# Patient Record
Sex: MALE | Race: WHITE | NOT HISPANIC OR LATINO | Employment: OTHER | URBAN - METROPOLITAN AREA
[De-identification: names, ages, dates, MRNs, and addresses within clinical notes are randomized per-mention and may not be internally consistent; named-entity substitution may affect disease eponyms.]

---

## 2017-01-13 ENCOUNTER — OFFICE VISIT (OUTPATIENT)
Dept: ENDOCRINOLOGY | Facility: CLINIC | Age: 67
End: 2017-01-13
Payer: COMMERCIAL

## 2017-01-13 VITALS
DIASTOLIC BLOOD PRESSURE: 76 MMHG | BODY MASS INDEX: 22.65 KG/M2 | WEIGHT: 167.25 LBS | HEART RATE: 93 BPM | SYSTOLIC BLOOD PRESSURE: 124 MMHG | HEIGHT: 72 IN

## 2017-01-13 DIAGNOSIS — E11.42 DM TYPE 2 WITH DIABETIC PERIPHERAL NEUROPATHY: Primary | Chronic | ICD-10-CM

## 2017-01-13 DIAGNOSIS — E78.1 HYPERTRIGLYCERIDEMIA: Chronic | ICD-10-CM

## 2017-01-13 DIAGNOSIS — M47.819 SPONDYLOSIS WITHOUT MYELOPATHY: ICD-10-CM

## 2017-01-13 DIAGNOSIS — F17.210 SMOKES 2 PACKS OF CIGARETTES PER DAY: Chronic | ICD-10-CM

## 2017-01-13 DIAGNOSIS — E11.21 TYPE 2 DIABETES WITH NEPHROPATHY: ICD-10-CM

## 2017-01-13 PROCEDURE — 99999 PR PBB SHADOW E&M-EST. PATIENT-LVL III: CPT | Mod: PBBFAC,,, | Performed by: NURSE PRACTITIONER

## 2017-01-13 PROCEDURE — 99214 OFFICE O/P EST MOD 30 MIN: CPT | Mod: S$GLB,,, | Performed by: NURSE PRACTITIONER

## 2017-01-13 RX ORDER — INSULIN LISPRO 100 [IU]/ML
INJECTION, SOLUTION INTRAVENOUS; SUBCUTANEOUS
Qty: 1 BOX | Refills: 6
Start: 2017-01-13 | End: 2017-04-26 | Stop reason: SDUPTHER

## 2017-01-13 RX ORDER — FLUTICASONE PROPIONATE 50 MCG
2 SPRAY, SUSPENSION (ML) NASAL DAILY PRN
COMMUNITY
End: 2019-01-09 | Stop reason: SDUPTHER

## 2017-01-13 RX ORDER — DESOXIMETASONE 2.5 MG/G
CREAM TOPICAL 2 TIMES DAILY PRN
Status: ON HOLD | COMMUNITY
End: 2021-01-01 | Stop reason: HOSPADM

## 2017-01-13 NOTE — PROGRESS NOTES
"CC: Mr. Moe Ren arrives today for management of Type 2 DM and chronic medical conditions as noted in visit diagnosis.      HPI: Mr. Moe Ren was diagnosed with Type 2 sometime between 2009 and 2012. No hospitalizations for  DM.     He has a h/o squamos cell oral CA, previously on primarily liquid diet, but is now tolerating solids well.   Occasionally drinks 2 ensures for BF, but reports he has been having [employer] sausage and egg croissant sandwich lately.     Currently using Accu check Amanda expert meter and is carb counting.     Checks BG 6x/day. BG logs:  Fasting   VASHTI 148-229  DIN  with two readings of 67 and 45  HS     Hypoglycemia: yes, 2 readings of 67 and 45. Felt "shaky" and treated with 1/2 can regular soda.     Missing doses of diabetes medications: No  Prandial Insulin Injections Taken with Meals: Yes    Exercise: None reported    Continues to smoke 2 ppd    CURRENT DIABETIC MEDS: Tresiba 8 units AM, Humalog (uses Accu-Chek Amanda Expert)  MN: Target 120, ISF 50, I:C 17  1030: Target 120, ISF 50, I:C 16  1630: Target 120, ISF 50, I:C 15    Uses Vials or Pens: pens  Type of Glucose Meter: Accu check Staci    Last Eye Exam: 04/2016  Last Podiatry Exam: 08/2016, +PN    REVIEW OF SYSTEMS  Personally reviewed past medical, social, and family history.     General: stable weight; no weakness,  fatigue, or fever.   Eyes: denies intermittent blurry vision.   Cardiac: no palpitations, chest pain, or edema.   Respiratory: no dyspnea, or wheezing; + smokers cough.   GI: no abdominal pain, heartburn, loss of appetite, or nausea.   Skin: no rashes, itching, dryness, or color changes.   Neuro: no mood changes, headaches, + numbness, tingling in toes.     Vital Signs  Personally reviewed the labs noted below.     Visit Vitals    /76 (BP Location: Right arm, Patient Position: Sitting, BP Method: Automatic)    Pulse 93    Ht 6' (1.829 m)    Wt 75.8 kg (167 lb 3.5 oz)    BMI " 22.68 kg/m2       Hemoglobin A1C   Date Value Ref Range Status   12/14/2016 7.2 (H) 4.5 - 6.2 % Final     Comment:     According to ADA guidelines, hemoglobin A1C <7.0% represents  optimal control in non-pregnant diabetic patients.  Different  metrics may apply to specific populations.   Standards of Medical Care in Diabetes - 2016.  For the purpose of screening for the presence of diabetes:  <5.7%     Consistent with the absence of diabetes  5.7-6.4%  Consistent with increasing risk for diabetes   (prediabetes)  >or=6.5%  Consistent with diabetes  Currently no consensus exists for use of hemoglobin A1C  for diagnosis of diabetes for children.     08/15/2016 7.6 (H) 4.5 - 6.2 % Final     Comment:     According to ADA guidelines, hemoglobin A1C <7.0% represents  optimal control in non-pregnant diabetic patients.  Different  metrics may apply to specific populations.   Standards of Medical Care in Diabetes - 2016.  For the purpose of screening for the presence of diabetes:  <5.7%     Consistent with the absence of diabetes  5.7-6.4%  Consistent with increasing risk for diabetes   (prediabetes)  >or=6.5%  Consistent with diabetes  Currently no consensus exists for use of hemoglobin A1C  for diagnosis of diabetes for children.     05/09/2016 8.4 (H) 4.5 - 6.2 % Final       Chemistry        Component Value Date/Time     12/29/2016 1047    K 4.4 12/29/2016 1047     12/29/2016 1047    CO2 25 12/29/2016 1047    BUN 8 12/29/2016 1047    CREATININE 1.0 12/29/2016 1047     (H) 12/29/2016 1047        Component Value Date/Time    CALCIUM 9.0 12/29/2016 1047    ALKPHOS 78 12/29/2016 1047    AST 30 12/29/2016 1047    ALT 18 12/29/2016 1047    BILITOT 0.4 12/29/2016 1047          Lab Results   Component Value Date    CHOL 165 12/14/2016    CHOL 154 02/01/2016    CHOL 145 12/17/2015     Lab Results   Component Value Date    HDL 52 12/14/2016    HDL 47 02/01/2016    HDL 47 12/17/2015     Lab Results   Component  Value Date    LDLCALC 49.0 (L) 12/14/2016    LDLCALC 61.2 (L) 02/01/2016    LDLCALC 51.6 (L) 12/17/2015     Lab Results   Component Value Date    TRIG 320 (H) 12/14/2016    TRIG 229 (H) 02/01/2016    TRIG 232 (H) 12/17/2015     Lab Results   Component Value Date    CHOLHDL 31.5 12/14/2016    CHOLHDL 30.5 02/01/2016    CHOLHDL 32.4 12/17/2015     Lab Results   Component Value Date    TSH 2.621 12/14/2016     Lab Results   Component Value Date    MICALBCREAT 13.2 08/22/2016     Vit D, 25-Hydroxy   Date Value Ref Range Status   02/01/2016 33 30 - 96 ng/mL Final     Comment:     Vitamin D deficiency.........<10 ng/mL                              Vitamin D insufficiency......10-29 ng/mL       Vitamin D sufficiency........> or equal to 30 ng/mL  Vitamin D toxicity............>100 ng/mL       PHYSICAL EXAMINATION  Constitutional: Appears well, no distress  Neck: Supple, trachea midline. No thryomegaly.  Respiratory: even and unlabored.  Cardiovascular: RRR  Lymph: no edema.   Skin: warm and dry; no rash, petechiae, ecchymosis, or acanthosis nigracans observed.  Neuro: CN 2-12 grossly intact; patient alert and cooperative.  Feet: wears appropriate shoes; saw Podiatry 08/2016.    Assessment/Plan    1. Type II or unspecified type diabetes mellitus with neurological manifestations  - A1C, DM improving.   - Continue to monitor BG AC/HS  - Continue Amanda meter with carb counting  - Change breakfast I:C to 14 to help reduce hyperglycemia at lunch  - Continue Tresiba 8 units QAM  - On Gabapentin for neuropathy and follows with Podiatry.    2. Spinal stenosis, lumbar region, with neurogenic claudication - limits physical activity. Sx is an option if tobacco use stops.    3. Smokes 2 packs of cigarettes per day - cessation encouraged   4. Hypertriglyceridemia - On Lipitor. Triglycerides uncontrolled. Was on a Fenofibrate, which was stopped after having back pain. Discuss with SAWYER Coleman for management.      FOLLOW UP  Return  in about 3 months (around 4/13/2017) with ANJU France APRN     Orders Placed This Encounter   Procedures    Hemoglobin A1c     Standing Status:   Future     Standing Expiration Date:   1/13/2018    Triglycerides     Standing Status:   Future     Standing Expiration Date:   1/13/2018

## 2017-01-13 NOTE — MR AVS SNAPSHOT
[facility] - Endocrinology  [address]  [facility] [address]  Phone: [phone]                  Moe Ren   2017 10:00 AM   Office Visit    Description:  Male : 1950   Provider:  SAWYER Bonds,ANP-C   Department:  [facility] - Endocrinology           Reason for Visit     Diabetes Mellitus           Diagnoses this Visit        Comments    DM type 2 with diabetic peripheral neuropathy    -  Primary            To Do List           Future Appointments        Provider Department Dept Phone    2017 9:30 AM LAB, [place] [facility] [phone]    2017 1:00 PM MACHO [facility] [phone]      Goals (5 Years of Data)     None      Follow-Up and Disposition     Return in about 3 months (around 2017).      [facility]     [facility] Care [facility] - 24/7 Assistance  Registered nurses in the [facility] provide clinical advisement, health education, appointment booking, and other advisory services.  Call for this free service at [phone].             Medications           Message regarding Medications     Verify the changes and/or additions to your medication regime listed below are the same as discussed with your clinician today.  If any of these changes or additions are incorrect, please notify your healthcare provider.        STOP taking these medications     acetaminophen-codeine 300-30mg (TYLENOL #3) 300-30 mg Tab Take 1 tablet by mouth every 4 to 6 hours as needed.    cefPROZIL (CEFZIL) 500 MG tablet Take 500 mg by mouth once daily.    fenofibrate 160 MG Tab TAKE 1 TABLET BY MOUTH ONCE DAILY    mupirocin (BACTROBAN) 2 % ointment APPLY TO NOSE ONCE DAILY    omeprazole (PRILOSEC) 20 MG capsule Take 20 mg by mouth once daily.           Verify that the below list of medications is an accurate representation of the medications you are currently taking.  If none reported, the list may be  "blank. If incorrect, please contact your healthcare provider. Carry this list with you in case of emergency.           Current Medications     ACCU-CHEK KM PLUS TEST STRP Strp 1 strip by Misc.(Non-Drug; Combo Route) route 6 (six) times daily. Pt to test blood glucose up to 6 times daily as needed.    ACCU-CHEK FASTCLIX Misc USE TO TEST 4 TIMES A DAY    alprazolam (XANAX) 0.5 MG tablet TAKE 1 TABLET (0.5 MG TOTAL) BY MOUTH 3 (THREE) TIMES DAILY.    atorvastatin (LIPITOR) 20 MG tablet TAKE 1 TABLET BY MOUTH ONCE DAILY    azelastine (ASTELIN) 137 mcg (0.1 %) nasal spray     BD ULTRA-FINE RENETTA PEN NEEDLES 32 gauge x 5/32" Ndle TO USE WITH MULTIPLE DAILY INJECTIONS    cyclobenzaprine (FLEXERIL) 10 MG tablet TAKE 1 TABLET (10 MG TOTAL) BY MOUTH 3 (THREE) TIMES DAILY AS NEEDED FOR MUSCLE SPASMS.    desoximetasone (TOPICORT) 0.25 % cream Apply topically 2 (two) times daily.    fluticasone (FLONASE) 50 mcg/actuation nasal spray 2 sprays by Each Nare route once daily.    gabapentin (NEURONTIN) 600 MG tablet TAKE 2 TABLETS BY MOUTH 3 TIMES A DAY    insulin degludec (TRESIBA FLEXTOUCH U-100) 100 unit/mL (3 mL) InPn Inject 7 Units into the skin once daily.    insulin lispro (HUMALOG KWIKPEN) 100 unit/mL InPn pen Use with insulin to carb ratio 1:9 with breakfast, 1:14 with lunch, 1:11 with dinner, plus correction scale as directed.    lisinopril (PRINIVIL,ZESTRIL) 5 MG tablet TAKE 1 TABLET (5 MG TOTAL) BY MOUTH ONCE DAILY.    mecobal-levomefolat Ca-B6 phos (L-METHYL-B6-B12) 3-35-2 mg Tab Take 1 tablet by mouth 2 (two) times daily.           Clinical Reference Information           Vital Signs - Last Recorded  Most recent update: 1/13/2017 10:05 AM by Jacqueline Ruiz MA    BP Pulse Ht Wt BMI    124/76 (BP Location: Right arm, Patient Position: Sitting, BP Method: Automatic) 93 6' (1.829 m) 75.8 kg (167 lb 3.5 oz) 22.68 kg/m2      Blood Pressure          Most Recent Value    BP  124/76      Allergies as of 1/13/2017     " Amoxicillin    Adhesive      Immunizations Administered on Date of Encounter - 1/13/2017     None      Orders Placed During Today's Visit     Future Labs/Procedures Expected by Expires    Hemoglobin A1c  1/13/2017 1/13/2018    Triglycerides  1/13/2017 1/13/2018      Smoking Cessation     If you would like to quit smoking:   You may be eligible for free services if you are a [place] resident and started smoking cigarettes before September 1, 1988.  Call the Smoking Cessation Trust (SCT) toll free at ([phone] or [phone] [phone].   Call 6-300-QUIT-NOW if you do not meet the above criteria.

## 2017-01-16 ENCOUNTER — PATIENT MESSAGE (OUTPATIENT)
Dept: ENDOCRINOLOGY | Facility: CLINIC | Age: 67
End: 2017-01-16

## 2017-01-16 DIAGNOSIS — G89.29 CHRONIC BACK PAIN GREATER THAN 3 MONTHS DURATION: ICD-10-CM

## 2017-01-16 DIAGNOSIS — M54.9 CHRONIC BACK PAIN GREATER THAN 3 MONTHS DURATION: ICD-10-CM

## 2017-01-16 RX ORDER — HYDROCODONE BITARTRATE AND ACETAMINOPHEN 10; 325 MG/1; MG/1
1 TABLET ORAL 2 TIMES DAILY PRN
Qty: 60 TABLET | Refills: 0 | Status: SHIPPED | OUTPATIENT
Start: 2017-01-16 | End: 2017-02-22 | Stop reason: SDUPTHER

## 2017-01-16 NOTE — TELEPHONE ENCOUNTER
----- Message from Gina Almonte sent at 1/16/2017 12:33 PM CST -----  Contact: self  [phone]  Pt is requesting refills on pain meds. Pls call pt [phone] when ready for . Thanks......Talisha

## 2017-02-02 ENCOUNTER — PATIENT MESSAGE (OUTPATIENT)
Dept: ENDOCRINOLOGY | Facility: CLINIC | Age: 67
End: 2017-02-02

## 2017-02-14 ENCOUNTER — PATIENT MESSAGE (OUTPATIENT)
Dept: ENDOCRINOLOGY | Facility: CLINIC | Age: 67
End: 2017-02-14

## 2017-02-22 DIAGNOSIS — M54.9 CHRONIC BACK PAIN GREATER THAN 3 MONTHS DURATION: ICD-10-CM

## 2017-02-22 DIAGNOSIS — G89.29 CHRONIC BACK PAIN GREATER THAN 3 MONTHS DURATION: ICD-10-CM

## 2017-02-22 RX ORDER — HYDROCODONE BITARTRATE AND ACETAMINOPHEN 10; 325 MG/1; MG/1
1 TABLET ORAL 2 TIMES DAILY PRN
Qty: 60 TABLET | Refills: 0 | Status: SHIPPED | OUTPATIENT
Start: 2017-02-22 | End: 2017-03-23 | Stop reason: SDUPTHER

## 2017-02-22 NOTE — TELEPHONE ENCOUNTER
----- Message from Shahida Bedoya sent at 2/22/2017 11:22 AM CST -----  Contact: Self/[phone]  Refill:  hydrocodone-acetaminophen 10-325mg (NORCO)  mg Tab    Thank you.

## 2017-02-24 ENCOUNTER — TELEPHONE (OUTPATIENT)
Dept: ENDOCRINOLOGY | Facility: CLINIC | Age: 67
End: 2017-02-24
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
## 2017-03-01 ENCOUNTER — TELEPHONE (OUTPATIENT)
Dept: DIABETES | Facility: CLINIC | Age: 67
End: 2017-03-01
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
## 2017-03-12 RX ORDER — MECOBAL/LEVOMEFOLAT CA/B6 PHOS 2-3-35 MG
TABLET ORAL
Qty: 60 TABLET | Refills: 5 | Status: SHIPPED | OUTPATIENT
Start: 2017-03-12 | End: 2018-01-10

## 2017-03-14 ENCOUNTER — PATIENT MESSAGE (OUTPATIENT)
Dept: ENDOCRINOLOGY | Facility: CLINIC | Age: 67
End: 2017-03-14

## 2017-03-23 ENCOUNTER — LAB VISIT (OUTPATIENT)
Dept: LAB | Facility: HOSPITAL | Age: 67
End: 2017-03-23
Payer: COMMERCIAL

## 2017-03-23 ENCOUNTER — OFFICE VISIT (OUTPATIENT)
Dept: FAMILY MEDICINE | Facility: CLINIC | Age: 67
End: 2017-03-23
Payer: COMMERCIAL

## 2017-03-23 VITALS
HEART RATE: 103 BPM | WEIGHT: 162.56 LBS | TEMPERATURE: 99 F | BODY MASS INDEX: 22.02 KG/M2 | SYSTOLIC BLOOD PRESSURE: 130 MMHG | HEIGHT: 72 IN | OXYGEN SATURATION: 96 % | DIASTOLIC BLOOD PRESSURE: 76 MMHG

## 2017-03-23 DIAGNOSIS — Z11.59 SPECIAL SCREENING EXAMINATION FOR OTHER SPECIFIED VIRAL DISEASES: ICD-10-CM

## 2017-03-23 DIAGNOSIS — M51.36 DEGENERATIVE DISC DISEASE, LUMBAR: ICD-10-CM

## 2017-03-23 DIAGNOSIS — E11.42 DM TYPE 2 WITH DIABETIC PERIPHERAL NEUROPATHY: Chronic | ICD-10-CM

## 2017-03-23 DIAGNOSIS — Z12.11 SPECIAL SCREENING FOR MALIGNANT NEOPLASMS, COLON: ICD-10-CM

## 2017-03-23 DIAGNOSIS — G89.29 CHRONIC BACK PAIN GREATER THAN 3 MONTHS DURATION: ICD-10-CM

## 2017-03-23 DIAGNOSIS — K63.5 POLYP OF COLON, UNSPECIFIED PART OF COLON, UNSPECIFIED TYPE: ICD-10-CM

## 2017-03-23 DIAGNOSIS — M62.838 MUSCLE SPASM: ICD-10-CM

## 2017-03-23 DIAGNOSIS — Z12.5 SCREENING FOR PROSTATE CANCER: ICD-10-CM

## 2017-03-23 DIAGNOSIS — M54.9 CHRONIC BACK PAIN GREATER THAN 3 MONTHS DURATION: ICD-10-CM

## 2017-03-23 DIAGNOSIS — Z00.00 ROUTINE MEDICAL EXAM: ICD-10-CM

## 2017-03-23 DIAGNOSIS — Z00.00 ROUTINE MEDICAL EXAM: Primary | ICD-10-CM

## 2017-03-23 DIAGNOSIS — F41.9 ANXIETY DISORDER, UNSPECIFIED TYPE: ICD-10-CM

## 2017-03-23 LAB
COMPLEXED PSA SERPL-MCNC: 2.6 NG/ML
GGT SERPL-CCNC: 44 U/L

## 2017-03-23 PROCEDURE — 84153 ASSAY OF PSA TOTAL: CPT

## 2017-03-23 PROCEDURE — 99396 PREV VISIT EST AGE 40-64: CPT | Mod: S$GLB,,,

## 2017-03-23 PROCEDURE — 82977 ASSAY OF GGT: CPT

## 2017-03-23 PROCEDURE — 99999 PR PBB SHADOW E&M-EST. PATIENT-LVL III: CPT | Mod: PBBFAC,,,

## 2017-03-23 PROCEDURE — 86803 HEPATITIS C AB TEST: CPT

## 2017-03-23 PROCEDURE — 36415 COLL VENOUS BLD VENIPUNCTURE: CPT | Mod: PO

## 2017-03-23 RX ORDER — ALPRAZOLAM 0.5 MG/1
TABLET ORAL
Qty: 90 TABLET | Refills: 2 | Status: SHIPPED | OUTPATIENT
Start: 2017-03-23 | End: 2017-08-02 | Stop reason: SDUPTHER

## 2017-03-23 RX ORDER — HYDROCODONE BITARTRATE AND ACETAMINOPHEN 10; 325 MG/1; MG/1
1 TABLET ORAL 2 TIMES DAILY PRN
Qty: 60 TABLET | Refills: 0 | Status: SHIPPED | OUTPATIENT
Start: 2017-04-22 | End: 2017-05-22

## 2017-03-23 RX ORDER — HYDROCODONE BITARTRATE AND ACETAMINOPHEN 10; 325 MG/1; MG/1
1 TABLET ORAL 2 TIMES DAILY PRN
Qty: 60 TABLET | Refills: 0 | Status: SHIPPED | OUTPATIENT
Start: 2017-03-23 | End: 2017-04-22

## 2017-03-23 RX ORDER — GABAPENTIN 600 MG/1
1200 TABLET ORAL 3 TIMES DAILY
Qty: 180 TABLET | Refills: 2 | Status: SHIPPED | OUTPATIENT
Start: 2017-03-23 | End: 2017-06-28 | Stop reason: SDUPTHER

## 2017-03-23 RX ORDER — HYDROCODONE BITARTRATE AND ACETAMINOPHEN 10; 325 MG/1; MG/1
1 TABLET ORAL 2 TIMES DAILY PRN
Qty: 60 TABLET | Refills: 0 | Status: SHIPPED | OUTPATIENT
Start: 2017-05-22 | End: 2017-06-20 | Stop reason: SDUPTHER

## 2017-03-23 RX ORDER — CYCLOBENZAPRINE HCL 10 MG
10 TABLET ORAL 3 TIMES DAILY PRN
Qty: 90 TABLET | Refills: 2 | Status: SHIPPED | OUTPATIENT
Start: 2017-03-23 | End: 2017-08-31 | Stop reason: SDUPTHER

## 2017-03-23 NOTE — PROGRESS NOTES
Chief Complaint   Patient presents with    Cough       HPI  Moe Ren is a 66 y.o. male who presents to the office today with literally a list of concerns.  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  He has had pancreatitis in the past.  Patient drinks alcohol every day.  Patient wants to know if he is healthy.  Pt is known to me.  These other concerns:    1.  Sometimes his hands become white, cold, but not painful.  He has been told in the past that he has Raynaud's phenomenon.    2.  He is developing a tremor in the hands.  This is a fine tremor, apparently some family member has had a history of Parkinson's disease.  The patient does not have any head bobbing, this does not go away with intentional actions.    3.  He has been having some urinary concerns, sometimes some dark objects seem to come out of his urine, but he doesn't have any flank pain ever had any history of renal calculi.    4.  Sometimes he has bowel movements that are bloody.  He had colon polyps in 2009, has not had a follow-up colonoscopy.  Does not have constipation or significant change in his bowel movements, weight, and no nausea or vomiting.    5.  He has had a Schatzki ring dilated in the past.  He also had a follow-up with [facility] for this, but there was no mention made of having a follow-up for his colon polyp problem.  I do not have a specific pathologic report regarding his colon polyp.    6.  Patient is concerned about prostate, he's concerned about having screening tests for hepatitis C.      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    Cataract     Diabetes mellitus     Hypertension     Pancreatitis 2008    2 Times       PAST SURGICAL HISTORY:  Past Surgical History:   Procedure  Laterality Date     thumb surgery      Dead Skin cell tumor Rt thumb    MOUTH FLOOR MASS EXCISION  2009    NASAL SEPTUM SURGERY      SKIN SURGERY         SOCIAL HISTORY:  Social History     Social History    Marital status:      Spouse name: N/A    Number of children: N/A    Years of education: N/A     Occupational History    Not on file.     Social History Main Topics    Smoking status: Current Every Day Smoker     Packs/day: 1.50     Years: 45.00    Smokeless tobacco: Never Used    Alcohol use 0.5 oz/week     1 Standard drinks or equivalent per week      Comment: daily scotch and water, half pint    Drug use: Yes     Special: Marijuana      Comment: rarely    Sexual activity: Not on file     Other Topics Concern    Not on file     Social History Narrative    , 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  One child committed suicide.       FAMILY HISTORY:  Family History   Problem Relation Age of Onset    Cataracts Mother     Leukemia Mother     Lung cancer Brother     No Known Problems Father     No Known Problems Daughter     No Known Problems Sister     No Known Problems Maternal Aunt     No Known Problems Maternal Uncle     No Known Problems Paternal Aunt     No Known Problems Paternal Uncle     No Known Problems Maternal Grandmother     No Known Problems Maternal Grandfather     No Known Problems Paternal Grandmother     No Known Problems Paternal Grandfather     Amblyopia Neg Hx     Blindness Neg Hx     Cancer Neg Hx     Diabetes Neg Hx     Glaucoma Neg Hx     Hypertension Neg Hx     Macular degeneration Neg Hx     Retinal detachment Neg Hx     Strabismus Neg Hx     Stroke Neg Hx     Thyroid disease Neg Hx        ALLERGIES AND MEDICATIONS: updated and reviewed.  Review of patient's allergies indicates:   Allergen Reactions    Amoxicillin Nausea And Vomiting    Adhesive Rash     Paper tape only  "    Current Outpatient Prescriptions   Medication Sig Dispense Refill    ACCU-CHEK KM PLUS TEST STRP Strp 1 strip by Misc.(Non-Drug; Combo Route) route 6 (six) times daily. Pt to test blood glucose up to 6 times daily as needed. 150 strip 11    ACCU-CHEK [place]STCLIX [facility] TO [place] 4 TIMES A  each 11    alprazolam (XANAX) 0.5 MG tablet TAKE 1 TABLET (0.5 MG TOTAL) BY MOUTH 3 (THREE) TIMES DAILY. 90 tablet 2    atorvastatin (LIPITOR) 20 MG tablet TAKE 1 TABLET BY MOUTH ONCE DAILY 90 tablet 3    azelastine (ASTELIN) 137 mcg (0.1 %) nasal spray   11    BD ULTRA-FINE RENETTA PEN NEEDLES 32 gauge x 5/32" Ndle TO USE WITH MULTIPLE DAILY INJECTIONS 200 each 11    cyclobenzaprine (FLEXERIL) 10 MG tablet Take 1 tablet (10 mg total) by mouth 3 (three) times daily as needed. 90 tablet 2    desoximetasone (TOPICORT) 0.25 % cream Apply topically 2 (two) times daily.      fluticasone (FLONASE) 50 mcg/actuation nasal spray 2 sprays by Each Nare route once daily.      gabapentin (NEURONTIN) 600 MG tablet Take 2 tablets (1,200 mg total) by mouth 3 (three) times daily. 180 tablet 2    hydrocodone-acetaminophen 10-325mg (NORCO)  mg Tab Take 1 tablet by mouth 2 (two) times daily as needed. 60 tablet 0    [START ON 4/22/2017] hydrocodone-acetaminophen 10-325mg (NORCO)  mg Tab Take 1 tablet by mouth 2 (two) times daily as needed. 60 tablet 0    [START ON 5/22/2017] hydrocodone-acetaminophen 10-325mg (NORCO)  mg Tab Take 1 tablet by mouth 2 (two) times daily as needed. 60 tablet 0    insulin degludec (TRESIBA FLEXTOUCH U-100) 100 unit/mL (3 mL) InPn Inject 7 Units into the skin once daily. 1 Syringe 3    insulin lispro (HUMALOG KWIKPEN) 100 unit/mL InPn pen Use with insulin to carb ratio 1:14 with breakfast, 1:16 with lunch, 1:15 with dinner, plus correction scale as directed. 1 Box 6    L-METHYL-B6-B12 3-35-2 mg Tab TAKE 1 TABLET BY MOUTH 2 (TWO) TIMES DAILY. 60 tablet 5    lisinopril " (PRINIVIL,ZESTRIL) 5 MG tablet TAKE 1 TABLET (5 MG TOTAL) BY MOUTH ONCE DAILY. 30 tablet 11     No current facility-administered medications for this visit.        ROS  Review of Systems   Constitutional: Negative for activity change and unexpected weight change.   HENT: Negative for hearing loss, rhinorrhea and trouble swallowing.    Eyes: Negative for discharge and visual disturbance.   Respiratory: Negative for chest tightness and wheezing.    Cardiovascular: Negative for chest pain and palpitations.   Gastrointestinal: Positive for blood in stool. Negative for constipation, diarrhea and vomiting.   Endocrine: Negative for polydipsia and polyuria.   Genitourinary: Negative for difficulty urinating, dysuria, flank pain, hematuria and urgency.   Musculoskeletal: Negative for arthralgias and joint swelling.   Neurological: Negative for weakness and headaches.   Psychiatric/Behavioral: Negative for confusion and dysphoric mood.       Physical Exam  Vitals:    03/23/17 1302   BP: 130/76   Pulse: 103   Temp: 98.5 °F (36.9 °C)    Body mass index is 22.05 kg/(m^2).  Weight: 73.8 kg (162 lb 9.4 oz)   Height: 6' (182.9 cm)     Physical Exam   Constitutional: He appears well-developed and well-nourished. No distress.   HENT:   TMs are normal bilaterally.  Oropharynx is clear, well hydrated.  No exudate.   Eyes: Conjunctivae are normal. Pupils are equal, round, and reactive to light. No scleral icterus.   Cardiovascular: Normal rate and regular rhythm.    Pulmonary/Chest: Effort normal.   Diminished breath sounds in all lung fields, no rales or wheezes heard.   Abdominal: Soft. Bowel sounds are normal. He exhibits no distension and no mass.   Genitourinary:   Genitourinary Comments: Rectal examination: Prostate is normal, Hemoccult was done, it was negative.  No tenderness, no hemorrhoids noted externally, etc.  Essentially negative exam.   Neurological:   Patient might have a little fine tremor associated with the hands.    Psychiatric: He has a normal mood and affect. His behavior is normal.   Vitals reviewed.      Health Maintenance       Date Due Completion Date    Pneumococcal PPSV23 (Medium Risk) (1) 10/16/1970 ---    Zoster Vaccine 10/16/2012 ---    Influenza Vaccine 8/1/2016 1/10/2014 (Done)    Override on 1/10/2014: Done    Eye Exam 4/5/2017 4/5/2016    Hemoglobin A1c 6/14/2017 12/14/2016    Foot Exam 8/17/2017 8/17/2016    Override on 5/27/2015: Done (Dr. Graves)    Urine Microalbumin 8/22/2017 8/22/2016    Lipid Panel 12/14/2017 12/14/2016    Colonoscopy 8/10/2019 8/10/2009    TETANUS VACCINE 6/22/2025 6/22/2015          Assessment & Plan    1. Routine medical exam  I'm doing this to check it out.  - Gamma GT; Future    2. DM type 2 with diabetic peripheral neuropathy  I doubt if the patient has any significant urinary problems.  - Urinalysis    3. Polyp of colon, unspecified part of colon, unspecified type  I'm ordering colonoscopy.    4. Chronic back pain greater than 3 months duration  He can continue with this medication.  - hydrocodone-acetaminophen 10-325mg (NORCO)  mg Tab; Take 1 tablet by mouth 2 (two) times daily as needed.  Dispense: 60 tablet; Refill: 0  - hydrocodone-acetaminophen 10-325mg (NORCO)  mg Tab; Take 1 tablet by mouth 2 (two) times daily as needed.  Dispense: 60 tablet; Refill: 0  - hydrocodone-acetaminophen 10-325mg (NORCO)  mg Tab; Take 1 tablet by mouth 2 (two) times daily as needed.  Dispense: 60 tablet; Refill: 0    5. Special screening examination for other specified viral diseases  Screening ordered.  - Hepatitis C antibody; Future    6. Special screening for malignant neoplasms, colon  Screening ordered.  - Case request GI: COLONOSCOPY    7. Screening for prostate cancer  Screening ordered.  - PSA, Screening; Future    8. Anxiety disorder, unspecified type  He can take this medication.  - alprazolam (XANAX) 0.5 MG tablet; TAKE 1 TABLET (0.5 MG TOTAL) BY MOUTH 3 (THREE) TIMES  "DAILY.  Dispense: 90 tablet; Refill: 2    9. Muscle spasm  Needs refills.  Done.  - cyclobenzaprine (FLEXERIL) 10 MG tablet; Take 1 tablet (10 mg total) by mouth 3 (three) times daily as needed.  Dispense: 90 tablet; Refill: 2    10. Degenerative disc disease, lumbar  Needs refills.  Done.  - gabapentin (NEURONTIN) 600 MG tablet; Take 2 tablets (1,200 mg total) by mouth 3 (three) times daily.  Dispense: 180 tablet; Refill: 2    11.  Long-time cigarette smoker.  He needs to stop smoking cigarettes.    12.  Ethanol abuse: This is ongoing, he's already had a couple bouts of pancreatitis, please note that the question mentioned above was "Am I in good health?"    Answer to this question is: I don't find the patient has cardiac disease, cerebrovascular disease, but he does have reversible problems, self inflicted, including cigarette and alcohol abuse.  He is not, in fact, living and healthy lifestyle.  I don't think that he has cirrhosis, but all of the other complaints that the patient has can be explained by overuse of alcohol (tremor, etc.), and he already has had tobacco related problems, and will have more of them in the future.      No Follow-up on file.   Follow-up: He is advised to stop smoking, drinking, lesion about her lifestyle, he will feel a lot better.        "

## 2017-03-23 NOTE — MR AVS SNAPSHOT
[facility]  [address]  [place] [address]  Phone: [phone]  Fax: [phone]                  Moe Ren   3/23/2017 1:00 PM   Office Visit    Description:  Male : 1950   Provider:  Gagandeep Weinberg Jr., MD   Department:  [facility] Medicine           Reason for Visit     Cough           Diagnoses this Visit        Comments    Routine medical exam    -  Primary     DM type 2 with diabetic peripheral neuropathy         Polyp of colon, unspecified part of colon, unspecified type         Chronic back pain greater than 3 months duration         Special screening examination for other specified viral diseases         Special screening for malignant neoplasms, colon         Screening for prostate cancer         Anxiety disorder, unspecified type         Muscle spasm         Degenerative disc disease, lumbar                To Do List           Future Appointments        Provider Department Dept Phone    2017 9:30 AM LAB, [place] [facility] [phone]    2017 1:00 PM MACHO [facility] - Endocrinology [phone]      Goals (5 Years of Data)     None       These Medications        Disp Refills Start End    hydrocodone-acetaminophen 10-325mg (NORCO)  mg Tab 60 tablet 0 3/23/2017 2017    Take 1 tablet by mouth 2 (two) times daily as needed. - Oral    Pharmacy: [facility]/pharmacy #[id number] - [place] - [address] [facility] Ph #: [phone]       alprazolam (XANAX) 0.5 MG tablet 90 tablet 2 3/23/2017     TAKE 1 TABLET (0.5 MG TOTAL) BY MOUTH 3 (THREE) TIMES DAILY.    Ph[id number] [place] [facility] [address] [facility]: [phone]       hydrocodone-acetaminophen 10-325mg (NORCO)  mg Tab 60 tablet 0 2017    Take 1 tablet by mouth 2 (two) times daily as needed. - Oral    Pharmacy: [facility][id number] [facility] [place] [facility] [address] [facility] Ph #: [phone]        hydrocodone-acetaminophen 10-325mg (NORCO)  mg Tab 60 tablet 0 5/22/2017 6/21/2017    Take 1 tablet by mouth 2 (two) times daily as needed. - Oral    [id number] [place] [facility] [address] [facility]: [phone]       cyclobenzaprine (FLEXERIL) 10 MG tablet 90 tablet 2 3/23/2017     Take 1 tablet (10 mg total) by mouth 3 (three) times daily as needed. - Oral    Ph[facility]: [id number] [place] [facility] [address] [place] [address] #: [phone]       gabapentin (NEURONTIN) 600 MG tablet 180 tablet 2 3/23/2017     Take 2 tablets (1,200 mg total) by mouth 3 (three) times daily. - Oral    [id number] - [address] [facility] #: [phone]         [facility]     [facility] - 24/7 Assistance  Registered nurses in the [facility] provide clinical advisement, health education, appointment booking, and other advisory services.  Call for this free service at [phone].             Medications           Message regarding Medications     Verify the changes and/or additions to your medication regime listed below are the same as discussed with your clinician today.  If any of these changes or additions are incorrect, please notify your healthcare provider.        START taking these NEW medications        Refills    hydrocodone-acetaminophen 10-325mg (NORCO)  mg Tab 0    Starting on: 4/22/2017    Sig: Take 1 tablet by mouth 2 (two) times daily as needed.    Class: Print    Route: Oral    hydrocodone-acetaminophen 10-325mg (NORCO)  mg Tab 0    Starting on: 5/22/2017    Sig: Take 1 tablet by mouth 2 (two) times daily as needed.    Class: Print    Route: Oral      CHANGE how you are taking these medications     Start Taking Instead of    cyclobenzaprine (FLEXERIL) 10 MG tablet cyclobenzaprine (FLEXERIL) 10 MG tablet    Dosage:  Take 1 tablet (10 mg total) by mouth 3 (three) times daily as needed. Dosage:  TAKE 1  "TABLET (10 MG TOTAL) BY MOUTH 3 (THREE) TIMES DAILY AS NEEDED FOR MUSCLE SPASMS.    Reason for Change:  Reorder     gabapentin (NEURONTIN) 600 MG tablet gabapentin (NEURONTIN) 600 MG tablet    Dosage:  Take 2 tablets (1,200 mg total) by mouth 3 (three) times daily. Dosage:  TAKE 2 TABLETS BY MOUTH 3 TIMES A DAY    Reason for Change:  Reorder            Verify that the below list of medications is an accurate representation of the medications you are currently taking.  If none reported, the list may be blank. If incorrect, please contact your healthcare provider. Carry this list with you in case of emergency.           Current Medications     ACCU-CHEK KM PLUS TEST STRP Strp 1 strip by Misc.(Non-Drug; Combo Route) route 6 (six) times daily. Pt to test blood glucose up to 6 times daily as needed.    ACCU-CHEK FASTCLIX Misc USE TO TEST 4 TIMES A DAY    alprazolam (XANAX) 0.5 MG tablet TAKE 1 TABLET (0.5 MG TOTAL) BY MOUTH 3 (THREE) TIMES DAILY.    atorvastatin (LIPITOR) 20 MG tablet TAKE 1 TABLET BY MOUTH ONCE DAILY    azelastine (ASTELIN) 137 mcg (0.1 %) nasal spray     BD ULTRA-FINE RENETTA PEN NEEDLES 32 gauge x 5/32" Ndle TO USE WITH MULTIPLE DAILY INJECTIONS    cyclobenzaprine (FLEXERIL) 10 MG tablet Take 1 tablet (10 mg total) by mouth 3 (three) times daily as needed.    desoximetasone (TOPICORT) 0.25 % cream Apply topically 2 (two) times daily.    fluticasone (FLONASE) 50 mcg/actuation nasal spray 2 sprays by Each Nare route once daily.    gabapentin (NEURONTIN) 600 MG tablet Take 2 tablets (1,200 mg total) by mouth 3 (three) times daily.    hydrocodone-acetaminophen 10-325mg (NORCO)  mg Tab Take 1 tablet by mouth 2 (two) times daily as needed.    hydrocodone-acetaminophen 10-325mg (NORCO)  mg Tab Starting on Apr 22, 2017. Take 1 tablet by mouth 2 (two) times daily as needed.    hydrocodone-acetaminophen 10-325mg (NORCO)  mg Tab Starting on May 22, 2017. Take 1 tablet by mouth 2 (two) times " daily as needed.    insulin degludec (TRESIBA FLEXTOUCH U-100) 100 unit/mL (3 mL) InPn Inject 7 Units into the skin once daily.    insulin lispro (HUMALOG KWIKPEN) 100 unit/mL InPn pen Use with insulin to carb ratio 1:14 with breakfast, 1:16 with lunch, 1:15 with dinner, plus correction scale as directed.    L-METHYL-B6-B12 3-35-2 mg Tab TAKE 1 TABLET BY MOUTH 2 (TWO) TIMES DAILY.    lisinopril (PRINIVIL,ZESTRIL) 5 MG tablet TAKE 1 TABLET (5 MG TOTAL) BY MOUTH ONCE DAILY.           Clinical Reference Information           Your Vitals Were     BP Pulse Temp Height Weight SpO2    130/76 (BP Location: Right arm, Patient Position: Sitting, BP Method: Manual) 103 98.5 °F (36.9 °C) 6' (1.829 m) 73.8 kg (162 lb 9.4 oz) 96%    BMI                22.05 kg/m2          Blood Pressure          Most Recent Value    BP  130/76      Allergies as of 3/23/2017     Amoxicillin    Adhesive      Immunizations Administered on Date of Encounter - 3/23/2017     None      Orders Placed During Today's Visit      Normal Orders This Visit    Case request GI: COLONOSCOPY     Urinalysis     Future Labs/Procedures Expected by Expires    Gamma GT  3/23/2017 5/22/2018    Hepatitis C antibody  3/23/2017 5/22/2018    PSA, Screening  3/23/2017 5/22/2018      Smoking Cessation     If you would like to quit smoking:   You may be eligible for free services if you are a [place] resident and started smoking cigarettes before September 1, 1988.  Call the Smoking Cessation Trust ([facility]) toll free at (892) [phone] or ([phone] [phone].   Call 1-800-QUIT-NOW if you do not meet the above criteria.            Language Assistance Services     ATTENTION: Language assistance services are available, free of charge. Please call [phone].      ATENCIÓN: Si joe saab, tiene a humphrey disposición servicios gratuitos de asistencia lingüística. Llame al [phone].     CHÚ Ý: N?u b?n nói Ti?ng Vi?t, có các d?ch v? h? tr? ngôn ng? mi?n phí dành cho b?n. G?i s?  [phone].         [facility] complies with applicable Federal civil rights laws and does not discriminate on the basis of race, color, national origin, age, disability, or sex.

## 2017-03-24 LAB — HCV AB SERPL QL IA: NEGATIVE

## 2017-04-01 ENCOUNTER — PATIENT MESSAGE (OUTPATIENT)
Dept: ENDOCRINOLOGY | Facility: CLINIC | Age: 67
End: 2017-04-01

## 2017-04-04 RX ORDER — BLOOD SUGAR DIAGNOSTIC
STRIP MISCELLANEOUS
Qty: 150 STRIP | Refills: 11 | Status: SHIPPED | OUTPATIENT
Start: 2017-04-04 | End: 2017-04-21 | Stop reason: SDUPTHER

## 2017-04-10 ENCOUNTER — ANESTHESIA EVENT (OUTPATIENT)
Dept: ENDOSCOPY | Facility: HOSPITAL | Age: 67
End: 2017-04-10
Payer: COMMERCIAL

## 2017-04-10 NOTE — ANESTHESIA PREPROCEDURE EVALUATION
04/10/2017  Moe Ren is a 66 y.o., male.    [facility] Anesthesia Evaluation    I have reviewed the Patient Summary Reports.    I have reviewed the Nursing Notes.      Review of Systems  Anesthesia Hx:  No problems with previous Anesthesia Denies Hx of Anesthetic complications  History of prior surgery of interest to airway management or planning: Denies Family Hx of Anesthesia complications.   Denies Personal Hx of Anesthesia complications.   Social:  Smoker, Alcohol Use    Hematology/Oncology:  Hematology Normal   Oncology Normal     EENT/Dental:EENT/Dental Normal   Cardiovascular:   Hypertension    Pulmonary:  Pulmonary Normal    Renal/:   Chronic Renal Disease    Hepatic/GI:  Hepatic/GI Normal    Musculoskeletal:   Arthritis     Neurological:   Neuromuscular Disease,    Endocrine:   Diabetes, type 2    Psych:   Psychiatric History          Physical Exam  General:  Well nourished    Airway/Jaw/Neck:  Airway Findings: Mouth Opening: Normal Tongue: Normal  General Airway Assessment: Adult  Mallampati: III  Improves to II with phonation.  TM Distance: Normal, at least 6 cm  Jaw/Neck Findings:     Eyes/Ears/Nose:  EYES/EARS/NOSE FINDINGS: Normal   Dental:  Dental Findings: Periodontal disease, Severe   Chest/Lungs:  Chest/Lungs Clear    Heart/Vascular:  Heart Findings: Normal Heart murmur: negative Vascular Findings: Normal    Abdomen:  Abdomen Findings: Normal    Musculoskeletal:  Musculoskeletal Findings: Normal   Skin:  Skin Findings: Normal    Mental Status:  Mental Status Findings:  Cooperative, Alert and Oriented         Anesthesia Plan  Type of Anesthesia, risks & benefits discussed:  Anesthesia Type:  general  Patient's Preference:   Intra-op Monitoring Plan:   Intra-op Monitoring Plan Comments:   Post Op Pain Control Plan:   Post Op Pain Control Plan Comments:   Induction:    Beta Blocker:  Patient  is not currently on a Beta-Blocker (No further documentation required).       Informed Consent: Patient understands risks and agrees with Anesthesia plan.  Questions answered. Anesthesia consent signed with patient.  ASA Score: 3     Day of Surgery Review of History & Physical:  There are no significant changes.  H&P update referred to the provider.     Anesthesia Plan Notes: Discussed risks and benefits with patient. Patient agrees to proceed with plan. All questions answered. The Patient is Ready For Surgery.  NPO after midnight.        Ready For Surgery From Anesthesia Perspective.

## 2017-04-11 ENCOUNTER — NURSE TRIAGE (OUTPATIENT)
Dept: ADMINISTRATIVE | Facility: CLINIC | Age: 67
End: 2017-04-11

## 2017-04-11 ENCOUNTER — ANESTHESIA (OUTPATIENT)
Dept: ENDOSCOPY | Facility: HOSPITAL | Age: 67
End: 2017-04-11
Payer: COMMERCIAL

## 2017-04-11 ENCOUNTER — HOSPITAL ENCOUNTER (OUTPATIENT)
Facility: HOSPITAL | Age: 67
Discharge: HOME | End: 2017-04-11
Attending: INTERNAL MEDICINE | Admitting: INTERNAL MEDICINE
Payer: COMMERCIAL

## 2017-04-11 ENCOUNTER — SURGERY (OUTPATIENT)
Age: 67
End: 2017-04-11

## 2017-04-11 VITALS
HEART RATE: 77 BPM | RESPIRATION RATE: 18 BRPM | DIASTOLIC BLOOD PRESSURE: 71 MMHG | HEIGHT: 72 IN | TEMPERATURE: 98 F | OXYGEN SATURATION: 97 % | WEIGHT: 164 LBS | SYSTOLIC BLOOD PRESSURE: 139 MMHG | BODY MASS INDEX: 22.21 KG/M2

## 2017-04-11 DIAGNOSIS — Z12.11 SPECIAL SCREENING FOR MALIGNANT NEOPLASMS, COLON: ICD-10-CM

## 2017-04-11 DIAGNOSIS — M51.36 DEGENERATIVE DISC DISEASE, LUMBAR: ICD-10-CM

## 2017-04-11 LAB — POCT GLUCOSE: 120 MG/DL (ref 70–110)

## 2017-04-11 PROCEDURE — 82962 GLUCOSE BLOOD TEST: CPT | Performed by: INTERNAL MEDICINE

## 2017-04-11 PROCEDURE — 37000008 HC ANESTHESIA 1ST 15 MINUTES: Performed by: INTERNAL MEDICINE

## 2017-04-11 PROCEDURE — 25000003 PHARM REV CODE 250: Performed by: NURSE ANESTHETIST, CERTIFIED REGISTERED

## 2017-04-11 PROCEDURE — 37000009 HC ANESTHESIA EA ADD 15 MINS: Performed by: INTERNAL MEDICINE

## 2017-04-11 PROCEDURE — 88305 TISSUE EXAM BY PATHOLOGIST: CPT | Mod: 26,,, | Performed by: PATHOLOGY

## 2017-04-11 PROCEDURE — 88305 TISSUE EXAM BY PATHOLOGIST: CPT | Performed by: PATHOLOGY

## 2017-04-11 PROCEDURE — 25000003 PHARM REV CODE 250: Performed by: ANESTHESIOLOGY

## 2017-04-11 PROCEDURE — D9220A PRA ANESTHESIA: Mod: PT,CRNA,, | Performed by: NURSE ANESTHETIST, CERTIFIED REGISTERED

## 2017-04-11 PROCEDURE — D9220A PRA ANESTHESIA: Mod: PT,ANES,, | Performed by: ANESTHESIOLOGY

## 2017-04-11 PROCEDURE — 63600175 PHARM REV CODE 636 W HCPCS: Performed by: NURSE ANESTHETIST, CERTIFIED REGISTERED

## 2017-04-11 PROCEDURE — 45380 COLONOSCOPY AND BIOPSY: CPT | Performed by: INTERNAL MEDICINE

## 2017-04-11 PROCEDURE — 27201012 HC FORCEPS, HOT/COLD, DISP: Performed by: INTERNAL MEDICINE

## 2017-04-11 RX ORDER — LIDOCAINE HCL/PF 100 MG/5ML
SYRINGE (ML) INTRAVENOUS
Status: DISCONTINUED | OUTPATIENT
Start: 2017-04-11 | End: 2017-04-11

## 2017-04-11 RX ORDER — LIDOCAINE HYDROCHLORIDE 10 MG/ML
1 INJECTION, SOLUTION EPIDURAL; INFILTRATION; INTRACAUDAL; PERINEURAL ONCE
Status: DISCONTINUED | OUTPATIENT
Start: 2017-04-11 | End: 2017-04-11 | Stop reason: HOSPADM

## 2017-04-11 RX ORDER — LIDOCAINE HYDROCHLORIDE 20 MG/ML
INJECTION, SOLUTION INFILTRATION; PERINEURAL
Status: DISCONTINUED
Start: 2017-04-11 | End: 2017-04-11 | Stop reason: HOSPADM

## 2017-04-11 RX ORDER — SODIUM CHLORIDE 9 MG/ML
INJECTION, SOLUTION INTRAVENOUS CONTINUOUS
Status: DISCONTINUED | OUTPATIENT
Start: 2017-04-11 | End: 2017-04-11 | Stop reason: HOSPADM

## 2017-04-11 RX ORDER — PROPOFOL 10 MG/ML
VIAL (ML) INTRAVENOUS
Status: DISCONTINUED
Start: 2017-04-11 | End: 2017-04-11 | Stop reason: HOSPADM

## 2017-04-11 RX ORDER — PROPOFOL 10 MG/ML
VIAL (ML) INTRAVENOUS
Status: DISCONTINUED | OUTPATIENT
Start: 2017-04-11 | End: 2017-04-11

## 2017-04-11 RX ORDER — GABAPENTIN 600 MG/1
TABLET ORAL
Qty: 180 TABLET | Refills: 2 | Status: SHIPPED | OUTPATIENT
Start: 2017-04-11 | End: 2017-06-01 | Stop reason: SDUPTHER

## 2017-04-11 RX ADMIN — SODIUM CHLORIDE: 0.9 INJECTION, SOLUTION INTRAVENOUS at 07:04

## 2017-04-11 RX ADMIN — PROPOFOL 40 MG: 10 INJECTION, EMULSION INTRAVENOUS at 08:04

## 2017-04-11 RX ADMIN — PROPOFOL 100 MG: 10 INJECTION, EMULSION INTRAVENOUS at 08:04

## 2017-04-11 RX ADMIN — LIDOCAINE HYDROCHLORIDE 100 MG: 20 INJECTION, SOLUTION INTRAVENOUS at 08:04

## 2017-04-11 NOTE — DISCHARGE SUMMARY
"[facility]  Discharge Summary      Admit Date: 4/11/2017    Discharge Date and Time:  04/11/2017 8:44 AM    Attending Physician: Meet Quesada MD     Reason for Admission: Screening colonoscopy    Procedures Performed: Procedure(s) (LRB):  COLONOSCOPY (N/A)    Hospital Course (synopsis of major diagnoses, care, treatment, and services provided during the course of the hospital stay): Outpatient colonoscopy     Consults: none    Significant Diagnostic Studies: Screening colonoscopy    Final Diagnoses:    Principal Problem: <principal problem not specified>   Secondary Diagnoses: Screening colonoscopy    Discharged Condition: good    Disposition: Home or Self Care    Follow Up/Patient Instructions: Follow-up with referring physician             Resume previous diet and activity.    Medications:  Reconciled Home Medications:   Current Discharge Medication List      CONTINUE these medications which have NOT CHANGED    Details   ACCU-CHEK KM PLUS TEST STRP Strp USE TO TEST UPTO 6 TIMES DAILY  Qty: 150 strip, Refills: 11    Associated Diagnoses: Type II diabetes mellitus, uncontrolled      ACCU-CHEK FASTCLIX Misc USE TO TEST 4 TIMES A DAY  Qty: 204 each, Refills: 11    Associated Diagnoses: Type II diabetes mellitus, uncontrolled      alprazolam (XANAX) 0.5 MG tablet TAKE 1 TABLET (0.5 MG TOTAL) BY MOUTH 3 (THREE) TIMES DAILY.  Qty: 90 tablet, Refills: 2    Associated Diagnoses: Anxiety disorder, unspecified type      atorvastatin (LIPITOR) 20 MG tablet TAKE 1 TABLET BY MOUTH ONCE DAILY  Qty: 90 tablet, Refills: 3    Associated Diagnoses: Type 2 diabetes with nephropathy      azelastine (ASTELIN) 137 mcg (0.1 %) nasal spray Refills: 11      BD ULTRA-FINE RENETTA PEN NEEDLES 32 gauge x 5/32" Ndle TO USE WITH MULTIPLE DAILY INJECTIONS  Qty: 200 each, Refills: 11    Associated Diagnoses: Type 2 diabetes with nephropathy      cyclobenzaprine (FLEXERIL) 10 MG tablet Take 1 tablet (10 mg total) by mouth 3 " (three) times daily as needed.  Qty: 90 tablet, Refills: 2    Associated Diagnoses: Muscle spasm      desoximetasone (TOPICORT) 0.25 % cream Apply topically 2 (two) times daily.      fluticasone (FLONASE) 50 mcg/actuation nasal spray 2 sprays by Each Nare route once daily.      gabapentin (NEURONTIN) 600 MG tablet Take 2 tablets (1,200 mg total) by mouth 3 (three) times daily.  Qty: 180 tablet, Refills: 2    Associated Diagnoses: Degenerative disc disease, lumbar      !! hydrocodone-acetaminophen 10-325mg (NORCO)  mg Tab Take 1 tablet by mouth 2 (two) times daily as needed.  Qty: 60 tablet, Refills: 0    Associated Diagnoses: Chronic back pain greater than 3 months duration      !! hydrocodone-acetaminophen 10-325mg (NORCO)  mg Tab Take 1 tablet by mouth 2 (two) times daily as needed.  Qty: 60 tablet, Refills: 0    Associated Diagnoses: Chronic back pain greater than 3 months duration      !! hydrocodone-acetaminophen 10-325mg (NORCO)  mg Tab Take 1 tablet by mouth 2 (two) times daily as needed.  Qty: 60 tablet, Refills: 0    Associated Diagnoses: Chronic back pain greater than 3 months duration      insulin degludec (TRESIBA FLEXTOUCH U-100) 100 unit/mL (3 mL) InPn Inject 7 Units into the skin once daily.  Qty: 1 Syringe, Refills: 3    Associated Diagnoses: Type 2 diabetes with nephropathy      insulin lispro (HUMALOG KWIKPEN) 100 unit/mL InPn pen Use with insulin to carb ratio 1:14 with breakfast, 1:16 with lunch, 1:15 with dinner, plus correction scale as directed.  Qty: 1 Box, Refills: 6    Associated Diagnoses: Type 2 diabetes with nephropathy      L-METHYL-B6-B12 3-35-2 mg Tab TAKE 1 TABLET BY MOUTH 2 (TWO) TIMES DAILY.  Qty: 60 tablet, Refills: 5      lisinopril (PRINIVIL,ZESTRIL) 5 MG tablet TAKE 1 TABLET (5 MG TOTAL) BY MOUTH ONCE DAILY.  Qty: 30 tablet, Refills: 11      polyethylene glycol (COLYTE) 240-22.72-6.72 -5.84 gram SolR Take 4,000 mLs (4 L total) by mouth as needed.  Qty: 1  Bottle, Refills: 0       !! - Potential duplicate medications found. Please discuss with provider.        No discharge procedures on file.

## 2017-04-11 NOTE — TELEPHONE ENCOUNTER
Reason for Disposition   Nursing judgment    Protocols used: ST NO GUIDELINE OR REFERENCE [id number]

## 2017-04-11 NOTE — TRANSFER OF CARE
Anesthesia Transfer of Care Note    Patient: Moe Ren    Procedure(s) Performed: Procedure(s) (LRB):  COLONOSCOPY (N/A)    Patient location: PACU    Anesthesia Type: general    Transport from OR: Transported from OR on room air with adequate spontaneous ventilation    Post pain: adequate analgesia    Post assessment: no apparent anesthetic complications    Post vital signs: stable    Level of consciousness: awake    Nausea/Vomiting: no nausea/vomiting    Complications: none          Last vitals:   Visit Vitals    BP (!) 97/58 (BP Location: Left arm, Patient Position: Lying, BP Method: Automatic)    Pulse 90    Temp 36.4 °C (97.5 °F) (Oral)    Resp 14    Ht 6' (1.829 m)    Wt 74.4 kg (164 lb)    SpO2 96%    BMI 22.24 kg/m2

## 2017-04-11 NOTE — H&P
"Chief Complaint:  "I need a colonoscopy."    HPI:  The patient is a 66 year old man presenting for a screening colonoscopy.  He underwent a colonoscopy in 2009 in which he had a nonadenomatous polyp.  The patient denies any abdominal pain, weight loss, nausea, emesis, diarrhea, constipation, melena, or hematochezia.  The patient also denies a family history of colon cancer.    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    Cataract     Diabetes mellitus     Hypertension     Pancreatitis 2008    2 Times     Past Surgical History:   Procedure Laterality Date     thumb surgery      Dead Skin cell tumor Rt thumb    MOUTH FLOOR MASS EXCISION  2009    NASAL SEPTUM SURGERY      SKIN SURGERY       Family History   Problem Relation Age of Onset    Cataracts Mother     Leukemia Mother     Lung cancer Brother     No Known Problems Father     No Known Problems Daughter     No Known Problems Sister     No Known Problems Maternal Aunt     No Known Problems Maternal Uncle     No Known Problems Paternal Aunt     No Known Problems Paternal Uncle     No Known Problems Maternal Grandmother     No Known Problems Maternal Grandfather     No Known Problems Paternal Grandmother     No Known Problems Paternal Grandfather     Amblyopia Neg Hx     Blindness Neg Hx     Cancer Neg Hx     Diabetes Neg Hx     Glaucoma Neg Hx     Hypertension Neg Hx     Macular degeneration Neg Hx     Retinal detachment Neg Hx     Strabismus Neg Hx     Stroke Neg Hx     Thyroid disease Neg Hx      Social History     Social History    Marital status:      Spouse name: N/A    Number of children: N/A    Years of education: N/A     Occupational History    Not on file.     Social History Main Topics    Smoking status: Current Every Day Smoker     Packs/day: 1.50     Years: 45.00    Smokeless tobacco: Never Used    Alcohol use 0.5 oz/week     1 Standard " drinks or equivalent per week      Comment: daily scotch and water, half pint    Drug use: Yes     Special: Marijuana      Comment: rarely    Sexual activity: Not on file     Other Topics Concern    Not on file     Social History Narrative    , 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  One child committed suicide.      lidocaine (PF) 10 mg/ml (1%)  1 mL Intradermal Once     Review of patient's allergies indicates:   Allergen Reactions    Amoxicillin Nausea And Vomiting    Adhesive Rash     Paper tape only     ROS:  No chest pain or dyspnea.  No dysuria.  No heartburn or dysphagia.  Otherwise as stated above.  Ten other systems negative.    Vitals:    04/11/17 0709 04/11/17 0710 04/11/17 0712   BP: 133/69 133/69 133/69   BP Location: Left arm     Patient Position: Lying     BP Method: Automatic     Pulse: 85     Resp: 18     Temp: 98 °F (36.7 °C)     TempSrc: Oral     SpO2: 99%     Weight: 74.4 kg (164 lb)     Height: 6' (1.829 m)       P.E.:  GEN: A x O x 3, NAD  SKIN: No jaundice  HEENT: EOMI, PERRL, anicteric sclera  CV: RRR, no M/R/G  Chest: CTA B  Abdomen: soft, NTND, normoactive BS  Ext: No C/C/E.  2+ dorsalis pedis pulses B  Neuro: No asterixes or tremors.  CN II-XII intact  Musculoskeletal: 5/5 strength bilaterally    Labs:  Lab Results   Component Value Date    WBC 11.35 04/05/2016    HGB 15.0 04/05/2016    HCT 46.0 04/05/2016    MCV 98 04/05/2016     (H) 04/05/2016     CMP  Sodium   Date Value Ref Range Status   12/29/2016 137 136 - 145 mmol/L Final     Potassium   Date Value Ref Range Status   12/29/2016 4.4 3.5 - 5.1 mmol/L Final     Chloride   Date Value Ref Range Status   12/29/2016 102 95 - 110 mmol/L Final     CO2   Date Value Ref Range Status   12/29/2016 25 23 - 29 mmol/L Final     Glucose   Date Value Ref Range Status   12/29/2016 167 (H) 70 - 110 mg/dL Final     BUN, Bld   Date Value Ref Range Status   12/29/2016 8  8 - 23 mg/dL Final     Creatinine   Date Value Ref Range Status   12/29/2016 1.0 0.5 - 1.4 mg/dL Final     Calcium   Date Value Ref Range Status   12/29/2016 9.0 8.7 - 10.5 mg/dL Final     Total Protein   Date Value Ref Range Status   12/29/2016 6.7 6.0 - 8.4 g/dL Final     Albumin   Date Value Ref Range Status   12/29/2016 3.5 3.5 - 5.2 g/dL Final     Total Bilirubin   Date Value Ref Range Status   12/29/2016 0.4 0.1 - 1.0 mg/dL Final     Comment:     For infants and newborns, interpretation of results should be based  on gestational age, weight and in agreement with clinical  observations.  Premature Infant recommended reference ranges:  Up to 24 hours.............<8.0 mg/dL  Up to 48 hours............<12.0 mg/dL  3-5 days..................<15.0 mg/dL  6-29 days.................<15.0 mg/dL       Alkaline Phosphatase   Date Value Ref Range Status   12/29/2016 78 55 - 135 U/L Final     AST   Date Value Ref Range Status   12/29/2016 30 10 - 40 U/L Final     ALT   Date Value Ref Range Status   12/29/2016 18 10 - 44 U/L Final     Anion Gap   Date Value Ref Range Status   12/29/2016 10 8 - 16 mmol/L Final     eGFR if    Date Value Ref Range Status   12/29/2016 >60.0 >60 mL/min/1.73 m^2 Final     eGFR if non    Date Value Ref Range Status   12/29/2016 >60.0 >60 mL/min/1.73 m^2 Final     Comment:     Calculation used to obtain the estimated glomerular filtration  rate (eGFR) is the CKD-EPI equation. Since race is unknown   in our information system, the eGFR values for   -American and Non--American patients are given   for each creatinine result.         No results for input(s): INR, APTT in the last 24 hours.    Invalid input(s): PT    A/P:  The patient is a 66 year old man presenting for a colonoscopy.  1.  Colonoscopy - he can undergo a colonoscopy.  I have explained the risks, benefits, and alternatives of the procedure in detail.  The patient voices understanding and all  questions have been answered.  The patient agrees to proceed as planned.

## 2017-04-11 NOTE — ANESTHESIA POSTPROCEDURE EVALUATION
Anesthesia Post Evaluation    Patient: Moe Ren    Procedure(s) Performed: Procedure(s) (LRB):  COLONOSCOPY (N/A)    Final Anesthesia Type: general  Patient location during evaluation: PACU  Patient participation: Yes- Able to Participate  Level of consciousness: awake and alert  Post-procedure vital signs: reviewed and stable  Pain management: adequate  Airway patency: patent  PONV status at discharge: No PONV  Anesthetic complications: no      Cardiovascular status: blood pressure returned to baseline  Respiratory status: unassisted  Hydration status: euvolemic  Follow-up not needed.        Visit Vitals    /71    Pulse 77    Temp 36.4 °C (97.5 °F) (Oral)    Resp 18    Ht 6' (1.829 m)    Wt 74.4 kg (164 lb)    SpO2 97%    BMI 22.24 kg/m2       Pain/Darwin Score: Pain Assessment Performed: Yes (4/11/2017  9:15 AM)  Presence of Pain: denies (4/11/2017  9:15 AM)  Darwin Score: 10 (4/11/2017  9:15 AM)

## 2017-04-11 NOTE — TELEPHONE ENCOUNTER
Scheduled for a colonscopy and is a diabetic and was advised to call the number and no one is answering the number. Blood sugar is 63 and pt is feeling weak and shaky. Just finished his prep. Was advised to call in the morning since he was a diabetic to be told what to do for a low blood glucose. Dr. Quesada answering service called and notified of the above. MD on call to contact pt. Caller advised to keep line open for MD call back. If no contact with MD in 10-15 min call back to OOC.  Verbalized understanding.

## 2017-04-11 NOTE — TELEPHONE ENCOUNTER
Pt called back to state that he did not get a call back. Answering service paged again and call connected with on call provider Dr. Gallegos. MD notified that pt was planning to go in early  And if unable to contact anyone at clinic was going to ED. MD states to Advise to have pt  Eat  peppermint or sugar candy. Do not drink anything. Call back placed and pt advised as noted and verbalized understanding.

## 2017-04-11 NOTE — IP AVS SNAPSHOT
[place] [address] [address] [place] [address]  Phone: [phone]           Patient Discharge Instructions   Our goal is to set you up for success. This packet includes information on your condition, medications, and your home care.  It will help you care for yourself to prevent having to return to the hospital.     Please ask your nurse if you have any questions.      There are many details to remember when preparing to leave the hospital. Here is what you will need to do:    1. Take your medicine. If you are prescribed medications, review your Medication List on the following pages. You may have new medications to  at the pharmacy and others that you'll need to stop taking. Review the instructions for how and when to take your medications. Talk with your doctor or nurses if you are unsure of what to do.     2. Go to your follow-up appointments. Specific follow-up information is listed in the following pages. Your may be contacted by a nurse or clinical provider about future appointments. Be sure we have all of the phone numbers to reach you. Please contact your provider's office if you are unable to make an appointment.     3. Watch for warning signs. Your doctor or nurse will give you detailed warning signs to watch for and when to call for assistance. These instructions may also include educational information about your condition. If you experience any of warning signs to your health, call your doctor.               ** Verify the list of medication(s) below is accurate and up to date. Carry this with you in case of emergency. If your medications have changed, please notify your healthcare provider.             Medication List      CONTINUE taking these medications        Additional Info                      ACCU-CHEK KM PLUS TEST STRP Strp   Quantity:  150 strip   Refills:  11   Generic drug:  blood sugar diagnostic    Instructions:  USE TO TEST UPTO 6 TIMES DAILY      "Begin Date    AM    Noon    PM    Bedtime       ACCU-CHEK FASTCLIX Misc   Quantity:  204 each   Refills:  11   Generic drug:  lancets    Instructions:  USE TO TEST 4 TIMES A DAY     Begin Date    AM    Noon    PM    Bedtime       alprazolam 0.5 MG tablet   Commonly known as:  XANAX   Quantity:  90 tablet   Refills:  2    Instructions:  TAKE 1 TABLET (0.5 MG TOTAL) BY MOUTH 3 (THREE) TIMES DAILY.     Begin Date    AM    Noon    PM    Bedtime       atorvastatin 20 MG tablet   Commonly known as:  LIPITOR   Quantity:  90 tablet   Refills:  3    Instructions:  TAKE 1 TABLET BY MOUTH ONCE DAILY     Begin Date    AM    Noon    PM    Bedtime       azelastine 137 mcg (0.1 %) nasal spray   Commonly known as:  ASTELIN   Refills:  11      Begin Date    AM    Noon    PM    Bedtime       BD ULTRA-FINE RENETTA PEN NEEDLES 32 gauge x 5/32" Ndle   Quantity:  200 each   Refills:  11   Generic drug:  pen needle, diabetic    Instructions:  TO USE WITH MULTIPLE DAILY INJECTIONS     Begin Date    AM    Noon    PM    Bedtime       cyclobenzaprine 10 MG tablet   Commonly known as:  FLEXERIL   Quantity:  90 tablet   Refills:  2   Dose:  10 mg    Instructions:  Take 1 tablet (10 mg total) by mouth 3 (three) times daily as needed.     Begin Date    AM    Noon    PM    Bedtime       desoximetasone 0.25 % cream   Commonly known as:  TOPICORT   Refills:  0    Instructions:  Apply topically 2 (two) times daily.     Begin Date    AM    Noon    PM    Bedtime       FLONASE 50 mcg/actuation nasal spray   Refills:  0   Dose:  2 spray   Generic drug:  fluticasone    Instructions:  2 sprays by Each Nare route once daily.     Begin Date    AM    Noon    PM    Bedtime       gabapentin 600 MG tablet   Commonly known as:  NEURONTIN   Quantity:  180 tablet   Refills:  2   Dose:  1200 mg    Instructions:  Take 2 tablets (1,200 mg total) by mouth 3 (three) times daily.     Begin Date    AM    Noon    PM    Bedtime       * hydrocodone-acetaminophen 10-325mg  " mg Tab   Commonly known as:  NORCO   Quantity:  60 tablet   Refills:  0   Dose:  1 tablet    Instructions:  Take 1 tablet by mouth 2 (two) times daily as needed.     Begin Date    AM    Noon    PM    Bedtime       * hydrocodone-acetaminophen 10-325mg  mg Tab   Commonly known as:  NORCO   Quantity:  60 tablet   Refills:  0   Dose:  1 tablet    Start Date:  4/22/2017   Instructions:  Take 1 tablet by mouth 2 (two) times daily as needed.     Begin Date    AM    Noon    PM    Bedtime       * hydrocodone-acetaminophen 10-325mg  mg Tab   Commonly known as:  NORCO   Quantity:  60 tablet   Refills:  0   Dose:  1 tablet    Start Date:  5/22/2017   Instructions:  Take 1 tablet by mouth 2 (two) times daily as needed.     Begin Date    AM    Noon    PM    Bedtime       insulin degludec 100 unit/mL (3 mL) Inpn   Commonly known as:  TRESIBA FLEXTOUCH U-100   Quantity:  1 Syringe   Refills:  3   Dose:  7 Units    Instructions:  Inject 7 Units into the skin once daily.     Begin Date    AM    Noon    PM    Bedtime       insulin lispro 100 unit/mL Inpn pen   Commonly known as:  HUMALOG KWIKPEN   Quantity:  1 Box   Refills:  6    Instructions:  Use with insulin to carb ratio 1:14 with breakfast, 1:16 with lunch, 1:15 with dinner, plus correction scale as directed.     Begin Date    AM    Noon    PM    Bedtime       L-METHYL-B6-B12 3-35-2 mg Tab   Quantity:  60 tablet   Refills:  5   Generic drug:  mecobal-levomefolat Ca-B6 phos    Instructions:  TAKE 1 TABLET BY MOUTH 2 (TWO) TIMES DAILY.     Begin Date    AM    Noon    PM    Bedtime       lisinopril 5 MG tablet   Commonly known as:  PRINIVIL,ZESTRIL   Quantity:  30 tablet   Refills:  11    Instructions:  TAKE 1 TABLET (5 MG TOTAL) BY MOUTH ONCE DAILY.     Begin Date    AM    Noon    PM    Bedtime       polyethylene glycol 240-22.72-6.72 -5.84 gram Solr   Commonly known as:  COLYTE   Quantity:  1 Bottle   Refills:  0   Dose:  4 L    Instructions:  Take 4,000 mLs (4 L  total) by mouth as needed.     Begin Date    AM    Noon    PM    Bedtime       * Notice:  This list has 3 medication(s) that are the same as other medications prescribed for you. Read the directions carefully, and ask your doctor or other care provider to review them with you.               Please bring to all follow up appointments:    1. A copy of your discharge instructions.  2. All medicines you are currently taking in their original bottles.  3. Identification and insurance card.    Please arrive 15 minutes ahead of scheduled appointment time.    Please call 24 hours in advance if you must reschedule your appointment and/or time.        Your Scheduled Appointments     Apr 19, 2017  9:30 AM CDT   F[facility] Lab [facility]    [address] [place] [address] [phone]            Apr 26, 2017  1:00 PM CDT   Established Patient with [facility] - Endocrinology ([facility] )    [address]  [facility] [address] [phone]              Follow-up Information     Follow up with Gagandeep Weinberg Jr, MD.    Specialty:  Internal Medicine    Why:  As needed    Contact information:    [address] [place] [address] [phone]          Discharge Instructions     Future Orders    Diet general     Questions:    Total calories:      Fat restriction, if any:      Protein restriction, if any:      Na restriction, if any:      Fluid restriction:      Additional restrictions:          Admission Information     Date & Time Provider Department CSN    4/11/2017  6:32 AM Meet Quesada MD [facility] [id number]      Care Providers     Provider Role Specialty Primary office phone    Meet Quesada MD Attending Provider Gastroenterology [phone]    Meet Quesada MD Surgeon  Gastroenterology [phone]      Your Vitals Were     BP Pulse Temp Resp Height Weight    97/58 (BP Location: Left arm,  Patient Position: Lying, BP Method: Automatic) 90 97.5 °F (36.4 °C) (Oral) 14 6' (1.829 m) 74.4 kg (164 lb)    SpO2 BMI             96% 22.24 kg/m2         Recent Lab Values        5/18/2015 7/30/2015 10/28/2015 2/1/2016 4/5/2016 5/9/2016 8/15/2016 12/14/2016      4:35 PM  9:05 AM  9:03 AM  9:03 AM 11:00 AM 10:03 AM 10:21 AM  8:00 AM    A1C 7.9 (H) 7.8 (H) 7.3 (H) 8.1 (H) 7.9 (H) 8.4 (H) 7.6 (H) 7.2 (H)    Comment for A1C at 10:21 AM on 8/15/2016:  According to ADA guidelines, hemoglobin A1C <7.0% represents  optimal control in non-pregnant diabetic patients.  Different  metrics may apply to specific populations.   Standards of Medical Care in Diabetes - 2016.  For the purpose of screening for the presence of diabetes:  <5.7%     Consistent with the absence of diabetes  5.7-6.4%  Consistent with increasing risk for diabetes   (prediabetes)  >or=6.5%  Consistent with diabetes  Currently no consensus exists for use of hemoglobin A1C  for diagnosis of diabetes for children.      Comment for A1C at  8:00 AM on 12/14/2016:  According to ADA guidelines, hemoglobin A1C <7.0% represents  optimal control in non-pregnant diabetic patients.  Different  metrics may apply to specific populations.   Standards of Medical Care in Diabetes - 2016.  For the purpose of screening for the presence of diabetes:  <5.7%     Consistent with the absence of diabetes  5.7-6.4%  Consistent with increasing risk for diabetes   (prediabetes)  >or=6.5%  Consistent with diabetes  Currently no consensus exists for use of hemoglobin A1C  for diagnosis of diabetes for children.        Pending Labs     Order Current Status    Specimen to Pathology - Surgery Collected (04/11/17 0841)      Allergies as of 4/11/2017        Reactions    Amoxicillin Nausea And Vomiting    Adhesive Rash    Paper tape only      [facility]     [facility] Nurse Care Line - 24/7 Assistance  Unless otherwise directed by your provider, please contact [facility], our  nurse care line that is available for 24/7 assistance.     Registered nurses in the [facility] provide clinical advisement, health education, appointment booking, and other advisory services.  Call for this free service at [phone].        Advance Directives     An advance directive is a document which, in the event you are no longer able to make decisions for yourself, tells your healthcare team what kind of treatment you do or do not want to receive, or who you would like to make those decisions for you.  If you do not currently have an advance directive, [facility] encourages you to create one.  For more information call:  [phone]), [phone] ([phone]),  or log on to [email or web address].        Smoking Cessation     If you would like to quit smoking:   You may be eligible for free services if you are a [place] resident and started smoking cigarettes before September 1, 1988.  Call the Smoking Cessation Trust ([facility]) toll free at ([phone] 782-0033 or ([phone] 946-2027.   Call 4-365-QUIT-NOW if you do not meet the above criteria.   Contact us via email: [email or web address]   View our website for more information: [email or web address]        Language Assistance Services     ATTENTION: Language assistance services are available, free of charge. Please call [phone].      ATENCIÓN: Si habla español, tiene a humphrey disposición servicios gratuitos de asistencia lingüística. Llame al [phone].     CHÚ Ý: N?u b?n nói Ti?ng Vi?t, có các d?ch v? h? tr? ngôn ng? mi?n phí dành cho b?n. G?i s? [phone].        Diabetes Discharge Instructions                                    [facility] complies with applicable Federal civil rights laws and does not discriminate on the basis of race, color, national origin, age, disability, or sex.

## 2017-04-15 ENCOUNTER — PATIENT MESSAGE (OUTPATIENT)
Dept: ENDOCRINOLOGY | Facility: CLINIC | Age: 67
End: 2017-04-15

## 2017-04-18 ENCOUNTER — OFFICE VISIT (OUTPATIENT)
Dept: OPTOMETRY | Facility: CLINIC | Age: 67
End: 2017-04-18
Payer: COMMERCIAL

## 2017-04-18 DIAGNOSIS — E11.9 DIABETES MELLITUS TYPE 2 WITHOUT RETINOPATHY: Primary | ICD-10-CM

## 2017-04-18 DIAGNOSIS — H25.13 NUCLEAR SCLEROSIS, BILATERAL: ICD-10-CM

## 2017-04-18 PROCEDURE — 99999 PR PBB SHADOW E&M-EST. PATIENT-LVL II: CPT | Mod: PBBFAC,,, | Performed by: OPTOMETRIST

## 2017-04-18 PROCEDURE — 92014 COMPRE OPH EXAM EST PT 1/>: CPT | Mod: S$GLB,,, | Performed by: OPTOMETRIST

## 2017-04-18 NOTE — MR AVS SNAPSHOT
[facility]  [address] [facility] [address] [place] [address]  Phone: [phone]  Fax: [phone]                  Moe Ren   2017 2:15 PM   Office Visit    Description:  Male : 1950   Provider:  Leonardo Ramsey OD   Department:  [facility]           Reason for Visit     Diabetic Eye Exam           Diagnoses this Visit        Comments    Diabetes mellitus type 2 without retinopathy    -  Primary     Nuclear sclerosis, bilateral                To Do List           Future Appointments        Provider Department Dept Phone    2017 9:30 AM [facility] [phone]    2017 1:00 PM MACHO Mullen [facility] - Endocrinology [phone]    2017 9:15 AM Sheree Graves DPM [facility] - Podiatry [phone]      Goals (5 Years of Data)     None      Follow-Up and Disposition     Return in about 1 year (around 2018).      [facility]     [facility] Nurse Care [facility] -  Assistance  Unless otherwise directed by your provider, please contact [facility], our nurse care line that is available for  assistance.     Registered nurses in the [facility] provide: appointment scheduling, clinical advisement, health education, and other advisory services.  Call: [phone] (toll free)               Medications           Message regarding Medications     Verify the changes and/or additions to your medication regime listed below are the same as discussed with your clinician today.  If any of these changes or additions are incorrect, please notify your healthcare provider.        STOP taking these medications     polyethylene glycol (COLYTE) 240-22.72-6.72 -5.84 gram SolR Take 4,000 mLs (4 L total) by mouth as needed.           Verify that the below list of medications is an accurate representation of the medications you are currently taking.  If none reported, the list may be blank. If incorrect, please contact your  "healthcare provider. Carry this list with you in case of emergency.           Current Medications     ACCU-CHEK KM PLUS TEST STRP Strp USE TO TEST UPTO 6 TIMES DAILY    ACCU-CHEK FASTCLIX Misc USE TO TEST 4 TIMES A DAY    alprazolam (XANAX) 0.5 MG tablet TAKE 1 TABLET (0.5 MG TOTAL) BY MOUTH 3 (THREE) TIMES DAILY.    atorvastatin (LIPITOR) 20 MG tablet TAKE 1 TABLET BY MOUTH ONCE DAILY    azelastine (ASTELIN) 137 mcg (0.1 %) nasal spray     BD ULTRA-FINE RENETTA PEN NEEDLES 32 gauge x 5/32" Ndle TO USE WITH MULTIPLE DAILY INJECTIONS    cyclobenzaprine (FLEXERIL) 10 MG tablet Take 1 tablet (10 mg total) by mouth 3 (three) times daily as needed.    desoximetasone (TOPICORT) 0.25 % cream Apply topically 2 (two) times daily.    fluticasone (FLONASE) 50 mcg/actuation nasal spray 2 sprays by Each Nare route once daily.    gabapentin (NEURONTIN) 600 MG tablet Take 2 tablets (1,200 mg total) by mouth 3 (three) times daily.    gabapentin (NEURONTIN) 600 MG tablet TAKE 2 TABLETS BY MOUTH 3 TIMES A DAY    hydrocodone-acetaminophen 10-325mg (NORCO)  mg Tab Take 1 tablet by mouth 2 (two) times daily as needed.    hydrocodone-acetaminophen 10-325mg (NORCO)  mg Tab Starting on Apr 22, 2017. Take 1 tablet by mouth 2 (two) times daily as needed.    hydrocodone-acetaminophen 10-325mg (NORCO)  mg Tab Starting on May 22, 2017. Take 1 tablet by mouth 2 (two) times daily as needed.    insulin degludec (TRESIBA FLEXTOUCH U-100) 100 unit/mL (3 mL) InPn Inject 7 Units into the skin once daily.    insulin lispro (HUMALOG KWIKPEN) 100 unit/mL InPn pen Use with insulin to carb ratio 1:14 with breakfast, 1:16 with lunch, 1:15 with dinner, plus correction scale as directed.    L-METHYL-B6-B12 3-35-2 mg Tab TAKE 1 TABLET BY MOUTH 2 (TWO) TIMES DAILY.    lisinopril (PRINIVIL,ZESTRIL) 5 MG tablet TAKE 1 TABLET (5 MG TOTAL) BY MOUTH ONCE DAILY.           Clinical Reference Information           Allergies as of 4/18/2017     " Amoxicillin    Adhesive      Immunizations Administered on Date of Encounter - 4/18/2017     None      Smoking Cessation     If you would like to quit smoking:   You may be eligible for free services if you are a [place] resident and started smoking cigarettes before September 1, 1988.  Call the Smoking Cessation Trust ([facility]) toll free at ([phone] 896-1023 or ([phone] 572-0002.   Call 1-800-QUIT-NOW if you do not meet the above criteria.   Contact us via email: [email or web address]   View our website for more information: [email or web address]        Language Assistance Services     ATTENTION: Language assistance services are available, free of charge. Please call [phone].      ATENCIÓN: Si habla katiana, tiene a humphrey disposición servicios gratuitos de asistencia lingüística. Llame al [phone].     CHÚ Ý: N?u b?n nói Ti?ng Vi?t, có các d?ch v? h? tr? ngôn ng? mi?n phí dành cho b?n. G?i s? [phone].         [facility] complies with applicable Federal civil rights laws and does not discriminate on the basis of race, color, national origin, age, disability, or sex.

## 2017-04-18 NOTE — PROGRESS NOTES
HPI     Diabetic Eye Exam    Additional comments: Pt is here for DM Check. Last BS was 134 just a few   minutes ago.           Comments   Pt is here for DM Check. Last BS was 134  Just a few minutes ago.  Denies eye pain and flashes. H/o floaters bilateral.   No itching, burning or tearing, reports allergies sometimes.   Dry Eyes bilateral.     Meds: Systane Balance BID  OU               [place] prn     (+)Smoker    Hemoglobin A1C       Date                     Value               Ref Range             Status                12/14/2016               7.2 (H)             4.5 - 6.2 %           Final                 08/15/2016               7.6 (H)             4.5 - 6.2 %           Final         05/09/2016               8.4 (H)             4.5 - 6.2 %           Final            ----------         Last edited by Leonardo Ramsey, VESTA on 4/18/2017  2:28 PM. (History)            Assessment /Plan     For exam results, see Encounter Report.    Diabetes mellitus type 2 without retinopathy  -No retinopathy noted today.  Continued control with primary care physician and annual comprehensive eye exam.    Nuclear sclerosis, bilateral  -Educated patient on presence of cataracts at today's exam, monitor at annual dilated fundus exam. 1-3+ years surgical estimate.      RTC 1 yr

## 2017-04-19 ENCOUNTER — LAB VISIT (OUTPATIENT)
Dept: LAB | Facility: HOSPITAL | Age: 67
End: 2017-04-19
Payer: COMMERCIAL

## 2017-04-19 ENCOUNTER — PATIENT MESSAGE (OUTPATIENT)
Dept: ENDOCRINOLOGY | Facility: CLINIC | Age: 67
End: 2017-04-19

## 2017-04-19 DIAGNOSIS — E11.42 DM TYPE 2 WITH DIABETIC PERIPHERAL NEUROPATHY: Chronic | ICD-10-CM

## 2017-04-19 DIAGNOSIS — E13.42 DIABETIC POLYNEUROPATHY ASSOCIATED WITH OTHER SPECIFIED DIABETES MELLITUS: Primary | ICD-10-CM

## 2017-04-19 LAB — TRIGL SERPL-MCNC: 98 MG/DL

## 2017-04-19 PROCEDURE — 36415 COLL VENOUS BLD VENIPUNCTURE: CPT | Mod: PO

## 2017-04-19 PROCEDURE — 84478 ASSAY OF TRIGLYCERIDES: CPT

## 2017-04-19 PROCEDURE — 83036 HEMOGLOBIN GLYCOSYLATED A1C: CPT

## 2017-04-20 LAB
ESTIMATED AVG GLUCOSE: 166 MG/DL
HBA1C MFR BLD HPLC: 7.4 %

## 2017-04-21 RX ORDER — LANCETS
EACH MISCELLANEOUS
Qty: 550 EACH | Refills: 3 | Status: SHIPPED | OUTPATIENT
Start: 2017-04-21 | End: 2018-04-30 | Stop reason: SDUPTHER

## 2017-04-26 ENCOUNTER — PATIENT MESSAGE (OUTPATIENT)
Dept: ENDOCRINOLOGY | Facility: CLINIC | Age: 67
End: 2017-04-26

## 2017-04-26 ENCOUNTER — OFFICE VISIT (OUTPATIENT)
Dept: ENDOCRINOLOGY | Facility: CLINIC | Age: 67
End: 2017-04-26
Payer: COMMERCIAL

## 2017-04-26 VITALS
DIASTOLIC BLOOD PRESSURE: 76 MMHG | WEIGHT: 159.81 LBS | SYSTOLIC BLOOD PRESSURE: 129 MMHG | BODY MASS INDEX: 21.65 KG/M2 | HEART RATE: 86 BPM | HEIGHT: 72 IN

## 2017-04-26 DIAGNOSIS — F17.210 SMOKES 2 PACKS OF CIGARETTES PER DAY: Chronic | ICD-10-CM

## 2017-04-26 DIAGNOSIS — E11.21 TYPE 2 DIABETES WITH NEPHROPATHY: ICD-10-CM

## 2017-04-26 DIAGNOSIS — E11.42 DM TYPE 2 WITH DIABETIC PERIPHERAL NEUROPATHY: Primary | Chronic | ICD-10-CM

## 2017-04-26 DIAGNOSIS — E78.1 HYPERTRIGLYCERIDEMIA: Chronic | ICD-10-CM

## 2017-04-26 PROCEDURE — 99999 PR PBB SHADOW E&M-EST. PATIENT-LVL IV: CPT | Mod: PBBFAC,,, | Performed by: NURSE PRACTITIONER

## 2017-04-26 PROCEDURE — 99214 OFFICE O/P EST MOD 30 MIN: CPT | Mod: S$GLB,,, | Performed by: NURSE PRACTITIONER

## 2017-04-26 RX ORDER — INSULIN LISPRO 100 [IU]/ML
INJECTION, SOLUTION INTRAVENOUS; SUBCUTANEOUS
Qty: 1 BOX | Refills: 6
Start: 2017-04-26 | End: 2017-06-02 | Stop reason: SDUPTHER

## 2017-04-26 NOTE — MR AVS SNAPSHOT
[facility]  [address]  [facility] [address]  Phone: [phone]                  Moe Ren   2017 1:00 PM   Office Visit    Description:  Male : 1950   Provider:  MACHO Mullen   Department:  [facility]           Reason for Visit     Diabetes Mellitus           Diagnoses this Visit        Comments    DM type 2 with diabetic peripheral neuropathy    -  Primary     Smokes 2 packs of cigarettes per day         Hypertriglyceridemia                To Do List           Future Appointments        Provider Department Dept Phone    2017 9:15 AM [facility] [phone]    2017 8:30 AM [facility] [phone]    2017 1:00 PM MACHO Mullen  Endocrinology [phone]      Goals (5 Years of Data)     None      Follow-Up and Disposition     Return in about 3 months (around 2017).      [facility]     [facility] Nurse Care Line -  Assistance  Unless otherwise directed by your provider, please contact [facility], our nurse care line that is available for  assistance.     Registered nurses in the [facility] provide: appointment scheduling, clinical advisement, health education, and other advisory services.  Call: [phone] (toll free)               Medications           Message regarding Medications     Verify the changes and/or additions to your medication regime listed below are the same as discussed with your clinician today.  If any of these changes or additions are incorrect, please notify your healthcare provider.             Verify that the below list of medications is an accurate representation of the medications you are currently taking.  If none reported, the list may be blank. If incorrect, please contact your healthcare provider. Carry this list with you in case of emergency.           Current Medications      "alprazolam (XANAX) 0.5 MG tablet TAKE 1 TABLET (0.5 MG TOTAL) BY MOUTH 3 (THREE) TIMES DAILY.    atorvastatin (LIPITOR) 20 MG tablet TAKE 1 TABLET BY MOUTH ONCE DAILY    azelastine (ASTELIN) 137 mcg (0.1 %) nasal spray     BD ULTRA-FINE RENETTA PEN NEEDLES 32 gauge x 5/32" Ndle TO USE WITH MULTIPLE DAILY INJECTIONS    blood sugar diagnostic (ACCU-CHEK KM PLUS TEST STRP) Strp USE TO TEST UPTO 6 TIMES DAILY    cyclobenzaprine (FLEXERIL) 10 MG tablet Take 1 tablet (10 mg total) by mouth 3 (three) times daily as needed.    desoximetasone (TOPICORT) 0.25 % cream Apply topically 2 (two) times daily.    fluticasone (FLONASE) 50 mcg/actuation nasal spray 2 sprays by Each Nare route once daily.    gabapentin (NEURONTIN) 600 MG tablet Take 2 tablets (1,200 mg total) by mouth 3 (three) times daily.    gabapentin (NEURONTIN) 600 MG tablet TAKE 2 TABLETS BY MOUTH 3 TIMES A DAY    hydrocodone-acetaminophen 10-325mg (NORCO)  mg Tab Take 1 tablet by mouth 2 (two) times daily as needed.    hydrocodone-acetaminophen 10-325mg (NORCO)  mg Tab Starting on May 22, 2017. Take 1 tablet by mouth 2 (two) times daily as needed.    insulin degludec (TRESIBA FLEXTOUCH U-100) 100 unit/mL (3 mL) InPn Inject 7 Units into the skin once daily.    insulin lispro (HUMALOG KWIKPEN) 100 unit/mL InPn pen Use with insulin to carb ratio 1:14 with breakfast, 1:16 with lunch, 1:15 with dinner, plus correction scale as directed.    L-METHYL-B6-B12 3-35-2 mg Tab TAKE 1 TABLET BY MOUTH 2 (TWO) TIMES DAILY.    lancets (ACCU-CHEK FASTCLIX) Misc USE TO TEST 6 TIMES A DAY    lisinopril (PRINIVIL,ZESTRIL) 5 MG tablet TAKE 1 TABLET (5 MG TOTAL) BY MOUTH ONCE DAILY.           Clinical Reference Information           Your Vitals Were     BP Pulse Height Weight BMI    129/76 (BP Location: Left arm, Patient Position: Sitting) 86 6' (1.829 m) 72.5 kg (159 lb 12.8 oz) 21.67 kg/m2      Blood Pressure          Most Recent Value    BP  129/76      Allergies as of " 4/26/2017     Amoxicillin    Adhesive      Immunizations Administered on Date of Encounter - 4/26/2017     None      Orders Placed During Today's Visit     Future Labs/Procedures Expected by Expires    Hemoglobin A1c  4/26/2017 6/25/2018      Smoking Cessation     If you would like to quit smoking:   You may be eligible for free services if you are a [place] resident and started smoking cigarettes before September 1, 1988.  Call the Smoking Cessation Trust ([facility]) toll free at ([phone] [phone] or (913) 556-1470.   Call 1-800-QUIT-NOW if you do not meet the above criteria.   Contact us via email: [email or web address]   View our website for more information: [email or web address]        Language Assistance Services     ATTENTION: Language assistance services are available, free of charge. Please call [phone].      ATENCIÓN: Si habla español, tiene a humphrey disposición servicios gratuitos de asistencia lingüística. Llame al [phone].     CHÚ Ý: N?u b?n nói Ti?ng Vi?t, có các d?ch v? h? tr? ngôn ng? mi?n phí dành cho b?n. G?i s? [phone].         [facility] complies with applicable Federal civil rights laws and does not discriminate on the basis of race, color, national origin, age, disability, or sex.

## 2017-05-01 ENCOUNTER — OFFICE VISIT (OUTPATIENT)
Dept: PODIATRY | Facility: CLINIC | Age: 67
End: 2017-05-01
Payer: COMMERCIAL

## 2017-05-01 VITALS — BODY MASS INDEX: 21.54 KG/M2 | WEIGHT: 159 LBS | HEIGHT: 72 IN

## 2017-05-01 DIAGNOSIS — E11.49 TYPE II DIABETES MELLITUS WITH NEUROLOGICAL MANIFESTATIONS: Primary | ICD-10-CM

## 2017-05-01 DIAGNOSIS — M20.5X2 HALLUX LIMITUS, LEFT: ICD-10-CM

## 2017-05-01 DIAGNOSIS — M20.42 HAMMER TOES OF BOTH FEET: ICD-10-CM

## 2017-05-01 DIAGNOSIS — M20.41 HAMMER TOES OF BOTH FEET: ICD-10-CM

## 2017-05-01 PROCEDURE — 99999 PR PBB SHADOW E&M-EST. PATIENT-LVL III: CPT | Mod: PBBFAC,,, | Performed by: PODIATRIST

## 2017-05-01 PROCEDURE — 99214 OFFICE O/P EST MOD 30 MIN: CPT | Mod: S$GLB,,, | Performed by: PODIATRIST

## 2017-05-01 NOTE — PATIENT INSTRUCTIONS
What is Peripheral Neuropathy?    Peripheral neuropathy is a disease of the nerves. It most often starts in your feet and may also eventually affect the arms.Sensory, motor, or both functions may be affected.  It may cause pain or make you unable to sense pain. Sometimes, weakness occurs as well. Lack of pain and weakness makes you more likely to injure yourself without knowing it. But you can learn ways to protect your feet from injury.  When nerves are diseased  Nerves in your feet carry signals to your brain. Your brain reads those signals and interprets them as sensations. When nerves in your feet are diseased, signals may be disrupted or changed. The result may be a lack of feeling (numbness) in your feet or other symptoms (tingling or pain) of peripheral neuropathy.    Symptoms mask pain  Symptoms of peripheral neuropathy usually begin in your toes. The symptoms slowly spread up your feet and legs as more nerve is affected. These symptoms may decrease sensation in your feet or mask pain. Without pain, you may not notice a cut or even a bone fracture. Cuts may become infected. Fractures may heal poorly and lead to foot deformity.    Common causes of peripheral neuropathy  Some common causes of peripheral neuropathy include:  · Diabetes or other endocrine disorders  · Toxins (such as alcohol)  · Nutritional deficiencies (such as Vitamin B-12)  · Kidney disease  · Injury  · Repetitive stress (such as carpal tunnel syndrome)  · Autoimmune disease  · Cancer and tumors  · Infection  Diagnosis and treatment  Diagnosis of peripheral neuropathy includes a complete history and physical exam.  Lab tests including blood work and imaging often help determine the cause.  Special nerve tests are often helpful including nerve conduction velocity studies (NCV), and electromyelography (EMG).  Treatment focuses on teating the underlying disorder and treating the symptoms using medications, injections, TENS (transcutaneous  electrical nerve stimulation), acupuncture, massage, and others.  Date Last Reviewed: 10/19/2015  © 2243-8783 [employer]. [address] [place] [address], [place] [address]. All rights reserved. This information is not intended as a substitute for professional medical care. Always follow your healthcare professional's instructions.        Treating Peripheral Neuropathy  Peripheral neuropathy is a disease of the nerves. It most often starts in your feet and may also eventually affect the arms. It may cause pain or may make you unable to sense pain. Sometimes, weakness occurs as well. Lack of pain and weakness makes you more likely to injure yourself without knowing it.  Learn ways to protect your feet. Check your feet daily for wounds you may not have felt. Avoid burns by testing bath water with your elbow before stepping in. Also, always wear shoes to prevent injury.    Regular foot care  If you have foot numbness, you may not notice cutting yourself while trimming your nails. To prevent problems, your doctor may ask you to visit for nail and callus trimming. See your doctor for foot care as often as suggested.  Check your feet daily  Catch problems early by checking your feet every day for changes. Look at the top and bottom of your feet, your heels, and between your toes. It may help to use a mirror. If this is hard, ask someone to check for you. Call your doctor if you notice a wound, ulceration, ingrown nail, or any changes in your feet. This includes increased heat, swelling, and redness.  Wear proper footwear  Always wear shoes and socks, even indoors. Ask your doctor how to choose the right shoe. After buying shoes, bring them to your doctor to be checked for fit. Take new shoes off every hour or so to check for red pressure areas on your feet. Each time you put on your shoes, use your fingers first to feel inside for foreign objects.  Common causes of peripheral neuropathy  Some common causes of  peripheral neuropathy include:  · Diabetes or other endocrine disorders  · Toxins (such as alcohol)  · Nutritional deficiencies (such as Vitamin B-12)  · Kidney disease  · Injury  · Repetitive stress (such as carpal tunnel syndrome)  · Autoimmune disease  · Cancer and tumors  · Infection  Diagnosis and treatment  Diagnosis of peripheral neuropathy includes a complete history and physical exam.  Lab tests including blood work and imaging often help determine the cause.  Special nerve tests are often helpful including nerve conduction velocity studies (NCV), and electromyelography (EMG).  Treatment focuses on treating the underlying disorder and treating symptoms through the use of medicines, injections, TENS (transcutaneous electrical nerve stimulation), acupuncture, massage, and other methods.  Date Last Reviewed: 10/19/2015  © 8349-4955 [employer]. [address] [place] [address], [place] [address]. All rights reserved. This information is not intended as a substitute for professional medical care. Always follow your healthcare professional's instructions.

## 2017-05-01 NOTE — PROGRESS NOTES
Subjective:      Patient ID: Moe Ren is a 66 y.o. male.    Chief Complaint: Diabetes Mellitus (pcp Dr. Lenard Galo ); Diabetic Foot Exam; and Nail Care    Moe is a 66 y.o. male who presents to the clinic for evaluation and treatment of high risk feet. Moe has a past medical history of Arthritis; Cancer; Cataract; Diabetes mellitus; Hypertension; and Pancreatitis (2008). The patient's chief complaint is numbness to the feet. Patient states that he now believes he cannot feel when heat is applied to his toes.  He is on gabapentin 1200 mg TID.   This patient has documented high risk feet requiring routine maintenance secondary to diabetes mellitis and those secondary complications of diabetes, as mentioned..      Chief Complaint   Patient presents with    Diabetes Mellitus     pcp Dr. Lenard Mead.     Diabetic Foot Exam    Nail Care       Current shoe gear:  [employer] tennis shoes    Hemoglobin A1C   Date Value Ref Range Status   04/19/2017 7.4 (H) 4.5 - 6.2 % Final     Comment:     According to ADA guidelines, hemoglobin A1C <7.0% represents  optimal control in non-pregnant diabetic patients.  Different  metrics may apply to specific populations.   Standards of Medical Care in Diabetes - 2016.  For the purpose of screening for the presence of diabetes:  <5.7%     Consistent with the absence of diabetes  5.7-6.4%  Consistent with increasing risk for diabetes   (prediabetes)  >or=6.5%  Consistent with diabetes  Currently no consensus exists for use of hemoglobin A1C  for diagnosis of diabetes for children.     12/14/2016 7.2 (H) 4.5 - 6.2 % Final     Comment:     According to ADA guidelines, hemoglobin A1C <7.0% represents  optimal control in non-pregnant diabetic patients.  Different  metrics may apply to specific populations.   Standards of Medical Care in Diabetes - 2016.  For the purpose of screening for the presence of diabetes:  <5.7%     Consistent with the absence of diabetes  5.7-6.4%  Consistent with  "increasing risk for diabetes   (prediabetes)  >or=6.5%  Consistent with diabetes  Currently no consensus exists for use of hemoglobin A1C  for diagnosis of diabetes for children.     08/15/2016 7.6 (H) 4.5 - 6.2 % Final     Comment:     According to ADA guidelines, hemoglobin A1C <7.0% represents  optimal control in non-pregnant diabetic patients.  Different  metrics may apply to specific populations.   Standards of Medical Care in Diabetes - 2016.  For the purpose of screening for the presence of diabetes:  <5.7%     Consistent with the absence of diabetes  5.7-6.4%  Consistent with increasing risk for diabetes   (prediabetes)  >or=6.5%  Consistent with diabetes  Currently no consensus exists for use of hemoglobin A1C  for diagnosis of diabetes for children.         Patient Active Problem List   Diagnosis    Anxiety disorder    Alcoholism    Smokes 2 packs of cigarettes per day    Hypertriglyceridemia    DM type 2 with diabetic peripheral neuropathy    Colon polyps    Spondylosis without myelopathy    Degeneration of lumbar or lumbosacral intervertebral disc    Spinal stenosis, lumbar region, with neurogenic claudication    Acquired spondylolisthesis    Thoracic or lumbosacral neuritis or radiculitis, unspecified    Facet arthritis of lumbar region    Closed rib fracture    Peripheral neuropathy    Chronic back pain greater than 3 months duration    Deviated nasal septum    ETOH abuse    Special screening for malignant neoplasms, colon     Current Outpatient Prescriptions on File Prior to Visit   Medication Sig Dispense Refill    alprazolam (XANAX) 0.5 MG tablet TAKE 1 TABLET (0.5 MG TOTAL) BY MOUTH 3 (THREE) TIMES DAILY. 90 tablet 2    atorvastatin (LIPITOR) 20 MG tablet TAKE 1 TABLET BY MOUTH ONCE DAILY 90 tablet 3    azelastine (ASTELIN) 137 mcg (0.1 %) nasal spray   11    BD ULTRA-FINE RENETTA PEN NEEDLES 32 gauge x 5/32" Ndle TO USE WITH MULTIPLE DAILY INJECTIONS 200 each 11    blood sugar " diagnostic (ACCU-CHEK KM PLUS TEST STRP) Strp USE TO TEST UPTO 6 TIMES DAILY 550 strip 3    cyclobenzaprine (FLEXERIL) 10 MG tablet Take 1 tablet (10 mg total) by mouth 3 (three) times daily as needed. 90 tablet 2    desoximetasone (TOPICORT) 0.25 % cream Apply topically 2 (two) times daily.      fluticasone (FLONASE) 50 mcg/actuation nasal spray 2 sprays by Each Nare route once daily.      gabapentin (NEURONTIN) 600 MG tablet Take 2 tablets (1,200 mg total) by mouth 3 (three) times daily. 180 tablet 2    gabapentin (NEURONTIN) 600 MG tablet TAKE 2 TABLETS BY MOUTH 3 TIMES A  tablet 2    hydrocodone-acetaminophen 10-325mg (NORCO)  mg Tab Take 1 tablet by mouth 2 (two) times daily as needed. 60 tablet 0    [START ON 5/22/2017] hydrocodone-acetaminophen 10-325mg (NORCO)  mg Tab Take 1 tablet by mouth 2 (two) times daily as needed. 60 tablet 0    insulin lispro (HUMALOG KWIKPEN) 100 unit/mL InPn pen Use with insulin to carb ratio 1:12 with breakfast, 1:17 with lunch, 1:14 with dinner, plus correction scale as directed. 1 Box 6    L-METHYL-B6-B12 3-35-2 mg Tab TAKE 1 TABLET BY MOUTH 2 (TWO) TIMES DAILY. 60 tablet 5    lancets (ACCU-CHEK FASTCLIX) Misc USE TO TEST 6 TIMES A  each 3    lisinopril (PRINIVIL,ZESTRIL) 5 MG tablet TAKE 1 TABLET (5 MG TOTAL) BY MOUTH ONCE DAILY. 30 tablet 11     No current facility-administered medications on file prior to visit.        Review of patient's allergies indicates:   Allergen Reactions    Amoxicillin Nausea And Vomiting    Adhesive Rash     Paper tape only     Past Surgical History:   Procedure Laterality Date     thumb surgery      Dead Skin cell tumor Rt thumb    COLONOSCOPY N/A 4/11/2017    Procedure: COLONOSCOPY;  Surgeon: Meet Quesada MD;  Location: [facility];  Service: Endoscopy;  Laterality: N/A;    MOUTH FLOOR MASS EXCISION  2009    NASAL SEPTUM SURGERY      SKIN SURGERY       Family History   Problem Relation Age of Onset     Cataracts Mother     Leukemia Mother     Lung cancer Brother     No Known Problems Father     No Known Problems Daughter     No Known Problems Sister     No Known Problems Maternal Aunt     No Known Problems Maternal Uncle     No Known Problems Paternal Aunt     No Known Problems Paternal Uncle     No Known Problems Maternal Grandmother     No Known Problems Maternal Grandfather     No Known Problems Paternal Grandmother     No Known Problems Paternal Grandfather     Amblyopia Neg Hx     Blindness Neg Hx     Cancer Neg Hx     Diabetes Neg Hx     Glaucoma Neg Hx     Hypertension Neg Hx     Macular degeneration Neg Hx     Retinal detachment Neg Hx     Strabismus Neg Hx     Stroke Neg Hx     Thyroid disease Neg Hx      Social History     Social History    Marital status:      Spouse name: N/A    Number of children: N/A    Years of education: N/A     Occupational History    Not on file.     Social History Main Topics    Smoking status: Current Every Day Smoker     Packs/day: 1.50     Years: 45.00    Smokeless tobacco: Never Used    Alcohol use 0.5 oz/week     1 Standard drinks or equivalent per week      Comment: daily scotch and water, half pint    Drug use: Yes     Special: Marijuana      Comment: rarely    Sexual activity: Not on file     Other Topics Concern    Not on file     Social History Narrative    , 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  One child committed suicide.       Review of Systems   Constitution: Negative for chills, diaphoresis, fever, malaise/fatigue and night sweats.   Cardiovascular: Negative for claudication, cyanosis, leg swelling and syncope.   Respiratory: Positive for cough. Negative for shortness of breath.    Hematologic/Lymphatic: Does not bruise/bleed easily.   Skin: Negative for color change, dry skin, nail changes, rash, suspicious lesions and unusual hair  distribution.   Musculoskeletal: Positive for arthritis, back pain, joint pain and muscle cramps. Negative for falls, joint swelling, muscle weakness and stiffness.   Gastrointestinal: Negative for constipation, diarrhea, nausea and vomiting.   Neurological: Positive for numbness, paresthesias and sensory change. Negative for brief paralysis, disturbances in coordination, focal weakness and tremors.   Psychiatric/Behavioral: Negative for altered mental status and hallucinations. The patient is not nervous/anxious.           Objective:     Vitals:    05/01/17 0914   Weight: 72.1 kg (159 lb)   Height: 6' (1.829 m)   PainSc: 0-No pain       Physical Exam   Constitutional: He appears well-developed and well-nourished. He is cooperative. No distress.   Cardiovascular:   Pulses:       Dorsalis pedis pulses are 1+ on the right side, and 1+ on the left side.        Posterior tibial pulses are 1+ on the right side, and 1+ on the left side.   Capillary refill 3-5 seconds all toes/distal feet, all toes/both feet warm to touch.      varicosities noted   Musculoskeletal:        Right ankle: Normal. No tenderness. Achilles tendon normal.        Left ankle: Normal. No tenderness. Achilles tendon normal.        Left foot: There is tenderness (1st MTPJ with pain with ROM and +tinels sign).   Normal angle, base, station of gait. Decreased stride length, early heel off, moderately propulsive toe off bilateral.    All ten toes without clubbing, cyanosis, or signs of ischemia.      Patient has hammertoes of digits    2-5 bilateral               partially reducible without symptom today.  Range of motion, stability, muscle strength, and muscle tone normal bilateral feet and legs.    Decreased first MPJ range of motion both weightbearing and nonweightbearing, no crepitus observed the first MP joint, +dorsal flag sign.Mild  bunion deformity is observed .      Lymphadenopathy:   Negative lymphadenopathy bilateral popliteal fossa and tarsal  tunnel.   Neurological: He is alert. He has normal strength. He displays no atrophy and no tremor. No sensory deficit. He exhibits normal muscle tone. He displays no seizure activity. Gait (decreased stride length, early heel of, moderate toe off bilateral) abnormal. Coordination normal.   Reflex Scores:       Patellar reflexes are 1+ on the right side and 1+ on the left side.       Achilles reflexes are 1+ on the right side and 1+ on the left side.  Vernon-Raina 5.07 monofilament is intact bilateral feet. Sharp/dull sensation is also intact Bilateral feet.    Paresthesias, and hyperesthesia bilateral feet at toes with no clearly identified trigger or source.       Skin: No bruising noted. No cyanosis. Nails show no clubbing.   Skin is normal age and health appropriate color, turgor, texture, and temperature bilateral lower extremities without ulceration, hyperpigmentation, discoloration, masses nodules or cords palpated.  No ecchymosis, erythema, edema, or cardinal signs of infection bilateral lower extremities.       Assessment:       Encounter Diagnoses   Name Primary?    Type II diabetes mellitus with neurological manifestations Yes    Hallux limitus, left     Hammer toes of both feet          Plan:       Moe was seen today for diabetes mellitus, diabetic foot exam and nail care.    Diagnoses and all orders for this visit:    Type II diabetes mellitus with neurological manifestations  -     Ambulatory consult to Neurology    Hallux limitus, left    Hammer toes of both feet      I counseled the patient on his conditions, their implications and medical management.     Shoe inspection. Diabetic Foot Education. Patient reminded of the importance of good nutrition and blood sugar control to help prevent podiatric complications of diabetes. Patient instructed on proper foot hygeine. We discussed wearing proper shoe gear, daily foot inspections, never walking without protective shoe gear, never putting sharp  instruments to feet.     Neuro Exam in office was otherwise normal however subjective sensation loss prompted referral to neurology.    I did  the patient in detail regarding surgical and conservative treatment measures for hallux limitus. I informed the  patient that the majority of pain is secondary to an arthritic joint with decreased joint spaces. Informed patient that outside of surgical intervention the main goal of therapy is to decreased the  range of motion at the first MPJ joint. This can be done so by utilizing either and extremely hard soled nonflexible shoe or a rocker bottom shoe such as a [employer] shape up     He will continue to monitor the areas daily, inspect his feet, wear protective shoe gear when ambulatory, moisturizer to maintain skin integrity and follow in this office in approximately 3-6 months, sooner p.r.n. Patient prefers yearly visits

## 2017-05-01 NOTE — MR AVS SNAPSHOT
[facility]  [address] [place] [address]  Phone: [phone]                  Moe Ren   2017 9:15 AM   Office Visit    Description:  Male : 1950   Provider:  Sheree Graves DPM   Department:  [facility]           Reason for Visit     Diabetes Mellitus     Diabetic Foot Exam     Nail Care           Diagnoses this Visit        Comments    Type II diabetes mellitus with neurological manifestations    -  Primary     Hallux limitus, left         Hammer toes of both feet                To Do List           Future Appointments        Provider Department Dept Phone    2017 8:30 AM LAB, [facility] [phone]    2017 1:00 PM [facility] - Endocrinology [phone]      Goals (5 Years of Data)     None      [facility] On [facility]     [facility] -  Assistance  Unless otherwise directed by your provider, please contact [facility], our nurse care line that is available for  assistance.     Registered nurses in the [facility] provide: appointment scheduling, clinical advisement, health education, and other advisory services.  Call: [phone] (toll free)               Medications           Message regarding Medications     Verify the changes and/or additions to your medication regime listed below are the same as discussed with your clinician today.  If any of these changes or additions are incorrect, please notify your healthcare provider.             Verify that the below list of medications is an accurate representation of the medications you are currently taking.  If none reported, the list may be blank. If incorrect, please contact your healthcare provider. Carry this list with you in case of emergency.           Current Medications     alprazolam (XANAX) 0.5 MG tablet TAKE 1 TABLET (0.5 MG TOTAL) BY MOUTH 3 (THREE) TIMES DAILY.    atorvastatin (LIPITOR) 20 MG tablet TAKE 1  "TABLET BY MOUTH ONCE DAILY    azelastine (ASTELIN) 137 mcg (0.1 %) nasal spray     BD ULTRA-FINE RENETTA PEN NEEDLES 32 gauge x 5/32" Ndle TO USE WITH MULTIPLE DAILY INJECTIONS    blood sugar diagnostic (ACCU-CHEK KM PLUS TEST STRP) Strp USE TO TEST UPTO 6 TIMES DAILY    cyclobenzaprine (FLEXERIL) 10 MG tablet Take 1 tablet (10 mg total) by mouth 3 (three) times daily as needed.    desoximetasone (TOPICORT) 0.25 % cream Apply topically 2 (two) times daily.    fluticasone (FLONASE) 50 mcg/actuation nasal spray 2 sprays by Each Nare route once daily.    gabapentin (NEURONTIN) 600 MG tablet Take 2 tablets (1,200 mg total) by mouth 3 (three) times daily.    gabapentin (NEURONTIN) 600 MG tablet TAKE 2 TABLETS BY MOUTH 3 TIMES A DAY    hydrocodone-acetaminophen 10-325mg (NORCO)  mg Tab Take 1 tablet by mouth 2 (two) times daily as needed.    hydrocodone-acetaminophen 10-325mg (NORCO)  mg Tab Starting on May 22, 2017. Take 1 tablet by mouth 2 (two) times daily as needed.    insulin lispro (HUMALOG KWIKPEN) 100 unit/mL InPn pen Use with insulin to carb ratio 1:12 with breakfast, 1:17 with lunch, 1:14 with dinner, plus correction scale as directed.    L-METHYL-B6-B12 3-35-2 mg Tab TAKE 1 TABLET BY MOUTH 2 (TWO) TIMES DAILY.    lancets (ACCU-CHEK FASTCLIX) Misc USE TO TEST 6 TIMES A DAY    lisinopril (PRINIVIL,ZESTRIL) 5 MG tablet TAKE 1 TABLET (5 MG TOTAL) BY MOUTH ONCE DAILY.           Clinical Reference Information           Your Vitals Were     Height Weight BMI          6' (1.829 m) 72.1 kg (159 lb) 21.56 kg/m2        Allergies as of 5/1/2017     Amoxicillin    Adhesive      Immunizations Administered on Date of Encounter - 5/1/2017     None      Orders Placed During Today's Visit      Normal Orders This Visit    Ambulatory consult to Neurology       Instructions      What is Peripheral Neuropathy?    Peripheral neuropathy is a disease of the nerves. It most often starts in your feet and may also eventually " affect the arms.Sensory, motor, or both functions may be affected.  It may cause pain or make you unable to sense pain. Sometimes, weakness occurs as well. Lack of pain and weakness makes you more likely to injure yourself without knowing it. But you can learn ways to protect your feet from injury.  When nerves are diseased  Nerves in your feet carry signals to your brain. Your brain reads those signals and interprets them as sensations. When nerves in your feet are diseased, signals may be disrupted or changed. The result may be a lack of feeling (numbness) in your feet or other symptoms (tingling or pain) of peripheral neuropathy.    Symptoms mask pain  Symptoms of peripheral neuropathy usually begin in your toes. The symptoms slowly spread up your feet and legs as more nerve is affected. These symptoms may decrease sensation in your feet or mask pain. Without pain, you may not notice a cut or even a bone fracture. Cuts may become infected. Fractures may heal poorly and lead to foot deformity.    Common causes of peripheral neuropathy  Some common causes of peripheral neuropathy include:  · Diabetes or other endocrine disorders  · Toxins (such as alcohol)  · Nutritional deficiencies (such as Vitamin B-12)  · Kidney disease  · Injury  · Repetitive stress (such as carpal tunnel syndrome)  · Autoimmune disease  · Cancer and tumors  · Infection  Diagnosis and treatment  Diagnosis of peripheral neuropathy includes a complete history and physical exam.  Lab tests including blood work and imaging often help determine the cause.  Special nerve tests are often helpful including nerve conduction velocity studies (NCV), and electromyelography (EMG).  Treatment focuses on teating the underlying disorder and treating the symptoms using medications, injections, TENS (transcutaneous electrical nerve stimulation), acupuncture, massage, and others.  Date Last Reviewed: 10/19/2015  © 5433-0823 [employer]. [address]  [place] [address]. All rights reserved. This information is not intended as a substitute for professional medical care. Always follow your healthcare professional's instructions.        Treating Peripheral Neuropathy  Peripheral neuropathy is a disease of the nerves. It most often starts in your feet and may also eventually affect the arms. It may cause pain or may make you unable to sense pain. Sometimes, weakness occurs as well. Lack of pain and weakness makes you more likely to injure yourself without knowing it.  Learn ways to protect your feet. Check your feet daily for wounds you may not have felt. Avoid burns by testing bath water with your elbow before stepping in. Also, always wear shoes to prevent injury.    Regular foot care  If you have foot numbness, you may not notice cutting yourself while trimming your nails. To prevent problems, your doctor may ask you to visit for nail and callus trimming. See your doctor for foot care as often as suggested.  Check your feet daily  Catch problems early by checking your feet every day for changes. Look at the top and bottom of your feet, your heels, and between your toes. It may help to use a mirror. If this is hard, ask someone to check for you. Call your doctor if you notice a wound, ulceration, ingrown nail, or any changes in your feet. This includes increased heat, swelling, and redness.  Wear proper footwear  Always wear shoes and socks, even indoors. Ask your doctor how to choose the right shoe. After buying shoes, bring them to your doctor to be checked for fit. Take new shoes off every hour or so to check for red pressure areas on your feet. Each time you put on your shoes, use your fingers first to feel inside for foreign objects.  Common causes of peripheral neuropathy  Some common causes of peripheral neuropathy include:  · Diabetes or other endocrine disorders  · Toxins (such as alcohol)  · Nutritional deficiencies (such as Vitamin  B-12)  · Kidney disease  · Injury  · Repetitive stress (such as carpal tunnel syndrome)  · Autoimmune disease  · Cancer and tumors  · Infection  Diagnosis and treatment  Diagnosis of peripheral neuropathy includes a complete history and physical exam.  Lab tests including blood work and imaging often help determine the cause.  Special nerve tests are often helpful including nerve conduction velocity studies (NCV), and electromyelography (EMG).  Treatment focuses on treating the underlying disorder and treating symptoms through the use of medicines, injections, TENS (transcutaneous electrical nerve stimulation), acupuncture, massage, and other methods.  Date Last Reviewed: 10/19/2015  © 7557-6349 [employer]. [address] [place] [address], [place] [address]. All rights reserved. This information is not intended as a substitute for professional medical care. Always follow your healthcare professional's instructions.             Smoking Cessation     If you would like to quit smoking:   You may be eligible for free services if you are a [place] resident and started smoking cigarettes before September 1, 1988.  Call the Smoking Cessation Trust ([facility]) toll free at (191) [phone] or ([phone].   Call 1-800-QUIT-NOW if you do not meet the above criteria.   Contact us via email: [email or web address]   View our website for more information: [email or web address]        Language Assistance Services     ATTENTION: Language assistance services are available, free of charge. Please call [phone].      ATENCIÓN: Si habla español, tiene a humphrey disposición servicios gratuitos de asistencia lingüística. Llame al [phone].     CHÚ Ý: N?u b?n nói Ti?ng Vi?t, có các d?ch v? h? tr? ngôn ng? mi?n phí dành cho b?n. G?i s? [phone].         [facility] complies with applicable Federal civil rights laws and does not discriminate on the basis of race, color, national origin, age, disability,  or sex.

## 2017-05-10 RX ORDER — LISINOPRIL 5 MG/1
5 TABLET ORAL DAILY
Qty: 30 TABLET | Refills: 6 | Status: SHIPPED | OUTPATIENT
Start: 2017-05-10 | End: 2017-12-04 | Stop reason: SDUPTHER

## 2017-05-10 NOTE — TELEPHONE ENCOUNTER
----- Message from Cathie Grace sent at 5/10/2017 10:01 AM CDT -----  Contact: [facility] [phone]  Pt is requesting a prescription refill for lisinopril (PRINIVIL,ZESTRIL) 5 MG tablet    [id number] - [place], [place] - [address]   [phone] (Phone)  [phone] (Fax)

## 2017-05-11 ENCOUNTER — PATIENT MESSAGE (OUTPATIENT)
Dept: ENDOCRINOLOGY | Facility: CLINIC | Age: 67
End: 2017-05-11

## 2017-06-01 ENCOUNTER — OFFICE VISIT (OUTPATIENT)
Dept: NEUROLOGY | Facility: CLINIC | Age: 67
End: 2017-06-01
Payer: COMMERCIAL

## 2017-06-01 ENCOUNTER — PATIENT MESSAGE (OUTPATIENT)
Dept: ENDOCRINOLOGY | Facility: CLINIC | Age: 67
End: 2017-06-01

## 2017-06-01 ENCOUNTER — LAB VISIT (OUTPATIENT)
Dept: LAB | Facility: HOSPITAL | Age: 67
End: 2017-06-01
Attending: NEUROLOGICAL SURGERY
Payer: COMMERCIAL

## 2017-06-01 VITALS
BODY MASS INDEX: 21.71 KG/M2 | HEIGHT: 72 IN | WEIGHT: 160.25 LBS | DIASTOLIC BLOOD PRESSURE: 79 MMHG | SYSTOLIC BLOOD PRESSURE: 120 MMHG | HEART RATE: 72 BPM

## 2017-06-01 DIAGNOSIS — G62.9 SMALL FIBER NEUROPATHY: ICD-10-CM

## 2017-06-01 DIAGNOSIS — G62.9 SMALL FIBER NEUROPATHY: Primary | ICD-10-CM

## 2017-06-01 DIAGNOSIS — F10.20 ALCOHOLISM: Chronic | ICD-10-CM

## 2017-06-01 LAB — TSH SERPL DL<=0.005 MIU/L-ACNC: 1.53 UIU/ML

## 2017-06-01 PROCEDURE — 99999 PR PBB SHADOW E&M-EST. PATIENT-LVL III: CPT | Mod: PBBFAC,,, | Performed by: NEUROLOGICAL SURGERY

## 2017-06-01 PROCEDURE — 86334 IMMUNOFIX E-PHORESIS SERUM: CPT

## 2017-06-01 PROCEDURE — 84425 ASSAY OF VITAMIN B-1: CPT

## 2017-06-01 PROCEDURE — 82746 ASSAY OF FOLIC ACID SERUM: CPT

## 2017-06-01 PROCEDURE — 99204 OFFICE O/P NEW MOD 45 MIN: CPT | Mod: S$GLB,,, | Performed by: NEUROLOGICAL SURGERY

## 2017-06-01 PROCEDURE — 82607 VITAMIN B-12: CPT

## 2017-06-01 PROCEDURE — 86334 IMMUNOFIX E-PHORESIS SERUM: CPT | Mod: 26,,, | Performed by: PATHOLOGY

## 2017-06-01 PROCEDURE — 84207 ASSAY OF VITAMIN B-6: CPT

## 2017-06-01 PROCEDURE — 84443 ASSAY THYROID STIM HORMONE: CPT

## 2017-06-01 PROCEDURE — 84165 PROTEIN E-PHORESIS SERUM: CPT

## 2017-06-01 PROCEDURE — 84165 PROTEIN E-PHORESIS SERUM: CPT | Mod: 26,,, | Performed by: PATHOLOGY

## 2017-06-01 PROCEDURE — 36415 COLL VENOUS BLD VENIPUNCTURE: CPT

## 2017-06-01 NOTE — PROGRESS NOTES
Chief Complaint   Patient presents with    Other     Neuropathy        Moe Ren is a 66 y.o. male with a history of multiple medical diagnoses as listed below that presents for evaluation of pain in the legs. He has been having numbness and tingling sensations in the feet for the last several months. There has been nothing that he can do to make the symptoms less prominent nor anything that he has found that can make the symptom worse. He does not feel that the symptoms have impacted his walking. He has no family history of muscle or nerve disease. He has a history of longstanding alcohol history.    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    Cataract     Diabetes mellitus     Hypertension     Pancreatitis 2008    2 Times       PAST SURGICAL HISTORY:  Past Surgical History:   Procedure Laterality Date     thumb surgery      Dead Skin cell tumor Rt thumb    COLONOSCOPY N/A 4/11/2017    Procedure: COLONOSCOPY;  Surgeon: Meet Quesada MD;  [facility];  Service: Endoscopy;  Laterality: N/A;    MOUTH FLOOR MASS EXCISION  2009    NASAL SEPTUM SURGERY      SKIN SURGERY         SOCIAL HISTORY:  Social History     Social History    Marital status:      Spouse name: N/A    Number of children: N/A    Years of education: N/A     Occupational History    Not on file.     Social History Main Topics    Smoking status: Current Every Day Smoker     Packs/day: 1.50     Years: 45.00    Smokeless tobacco: Never Used    Alcohol use 0.5 oz/week     1 Standard drinks or equivalent per week      Comment: daily scotch and water, half pint    Drug use:      Types: Marijuana      Comment: rarely    Sexual activity: Not on file     Other Topics Concern    Not on file     Social History Narrative    , 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 with two  "kids.  One child committed suicide.       FAMILY HISTORY:  Family History   Problem Relation Age of Onset    Cataracts Mother     Leukemia Mother     Lung cancer Brother     No Known Problems Father     No Known Problems Daughter     No Known Problems Sister     No Known Problems Maternal Aunt     No Known Problems Maternal Uncle     No Known Problems Paternal Aunt     No Known Problems Paternal Uncle     No Known Problems Maternal Grandmother     No Known Problems Maternal Grandfather     No Known Problems Paternal Grandmother     No Known Problems Paternal Grandfather     Amblyopia Neg Hx     Blindness Neg Hx     Cancer Neg Hx     Diabetes Neg Hx     Glaucoma Neg Hx     Hypertension Neg Hx     Macular degeneration Neg Hx     Retinal detachment Neg Hx     Strabismus Neg Hx     Stroke Neg Hx     Thyroid disease Neg Hx        ALLERGIES AND MEDICATIONS: updated and reviewed.  Review of patient's allergies indicates:   Allergen Reactions    Amoxicillin Nausea And Vomiting    Adhesive Rash     Paper tape only     Current Outpatient Prescriptions   Medication Sig Dispense Refill    alprazolam (XANAX) 0.5 MG tablet TAKE 1 TABLET (0.5 MG TOTAL) BY MOUTH 3 (THREE) TIMES DAILY. 90 tablet 2    atorvastatin (LIPITOR) 20 MG tablet TAKE 1 TABLET BY MOUTH ONCE DAILY 90 tablet 3    azelastine (ASTELIN) 137 mcg (0.1 %) nasal spray   11    BD ULTRA-FINE RENETTA PEN NEEDLES 32 gauge x 5/32" Ndle TO USE WITH MULTIPLE DAILY INJECTIONS 200 each 11    blood sugar diagnostic (ACCU-CHEK KM PLUS TEST STRP) Strp USE TO TEST UPTO 6 TIMES DAILY 550 strip 3    cyclobenzaprine (FLEXERIL) 10 MG tablet Take 1 tablet (10 mg total) by mouth 3 (three) times daily as needed. 90 tablet 2    desoximetasone (TOPICORT) 0.25 % cream Apply topically 2 (two) times daily.      fluticasone (FLONASE) 50 mcg/actuation nasal spray 2 sprays by Each Nare route once daily.      gabapentin (NEURONTIN) 600 MG tablet Take 2 tablets " (1,200 mg total) by mouth 3 (three) times daily. 180 tablet 2    HUMALOG KWIKPEN 100 unit/mL InPn pen USE WITH INSULIN TO CARB RATION 1:8 WITH BREAKFAST 1:13 WITH LUNCH 1:12 WITH DINNER PLUS CORRECTION 15 Syringe 5    hydrocodone-acetaminophen 10-325mg (NORCO)  mg Tab Take 1 tablet by mouth 2 (two) times daily as needed. 60 tablet 0    L-METHYL-B6-B12 3-35-2 mg Tab TAKE 1 TABLET BY MOUTH 2 (TWO) TIMES DAILY. 60 tablet 5    lancets (ACCU-CHEK FASTCLIX) [facility] [place] TO TEST 6 TIMES A  each 3    lisinopril (PRINIVIL,ZESTRIL) 5 MG tablet Take 1 tablet (5 mg total) by mouth once daily. 30 tablet 6     No current facility-administered medications for this visit.        Review of Systems   Constitutional: Negative for activity change, fatigue and unexpected weight change.   HENT: Negative for trouble swallowing and voice change.    Eyes: Negative for photophobia, pain and visual disturbance.   Respiratory: Negative for apnea and shortness of breath.    Cardiovascular: Negative for chest pain and palpitations.   Gastrointestinal: Negative for constipation, nausea and vomiting.   Genitourinary: Negative for difficulty urinating.   Musculoskeletal: Negative for arthralgias, back pain, gait problem, myalgias and neck pain.   Skin: Negative for color change and rash.   Neurological: Positive for numbness. Negative for dizziness, seizures, syncope, speech difficulty, weakness, light-headedness and headaches.   Psychiatric/Behavioral: Negative for agitation, behavioral problems and confusion.       Neurologic Exam     Mental Status   Oriented to person, place, and time.   Registration: recalls 3 of 3 objects.   Attention: normal. Concentration: normal.   Speech: speech is normal   Level of consciousness: alert  Knowledge: good.     Cranial Nerves     CN II   Visual fields full to confrontation.   Right visual field deficit: none  Left visual field deficit: none     CN III, IV, VI   Pupils are equal, round, and  reactive to light.  Extraocular motions are normal.   Right pupil: Size: 3 mm. Shape: regular. Accommodation: intact.   Left pupil: Size: 3 mm. Shape: regular. Accommodation: intact.   CN III: no CN III palsy  CN VI: no CN VI palsy  Nystagmus: none   Diplopia: none  Ophthalmoparesis: none  Upgaze: normal  Downgaze: normal  Conjugate gaze: present    CN V   Facial sensation intact.   Right facial sensation deficit: none  Left facial sensation deficit: none    CN VII   Facial expression full, symmetric.   Right facial weakness: none  Left facial weakness: none    CN VIII   CN VIII normal.     CN IX, X   CN IX normal.   CN X normal.   Palate: symmetric    CN XI   CN XI normal.   Right sternocleidomastoid strength: normal  Left sternocleidomastoid strength: normal  Right trapezius strength: normal  Left trapezius strength: normal    CN XII   CN XII normal.   Tongue deviation: none    Motor Exam   Muscle bulk: normal  Overall muscle tone: normal  Right arm tone: normal  Left arm tone: normal  Right leg tone: normal  Left leg tone: normal    Strength   Strength 5/5 throughout.     Sensory Exam   Right arm light touch: normal  Left arm light touch: normal  Right leg light touch: decreased from toes  Left leg light touch: decreased from toes  Right arm vibration: normal  Left arm vibration: normal  Right leg vibration: decreased from toes  Left leg vibration: decreased from toes  Right arm proprioception: normal  Left arm proprioception: normal  Right leg proprioception: normal  Left leg proprioception: normal  Right arm pinprick: normal  Left arm pinprick: normal  Right leg pinprick: decreased from toes  Left leg pinprick: decreased from toes    Gait, Coordination, and Reflexes     Gait  Gait: normal    Coordination   Romberg: negative  Finger to nose coordination: normal  Heel to shin coordination: normal  Tandem walking coordination: normal    Tremor   Resting tremor: absent    Reflexes   Right brachioradialis: 2+  Left  brachioradialis: 2+  Right biceps: 2+  Left biceps: 2+  Right triceps: 2+  Left triceps: 2+  Right patellar: 2+  Left patellar: 2+  Right achilles: 1+  Left achilles: 1+  Right plantar: normal  Left plantar: normal      Physical Exam   Constitutional: He is oriented to person, place, and time. He appears well-developed and well-nourished.   HENT:   Head: Normocephalic and atraumatic.   Eyes: EOM are normal. Pupils are equal, round, and reactive to light.   Cardiovascular: Intact distal pulses.    Pulmonary/Chest: Effort normal. No respiratory distress.   Musculoskeletal: Normal range of motion.   Neurological: He is alert and oriented to person, place, and time. He has normal strength. He has a normal Finger-Nose-Finger Test, a normal Heel to Shin Test, a normal Romberg Test and a normal Tandem Gait Test. Gait normal.   Reflex Scores:       Tricep reflexes are 2+ on the right side and 2+ on the left side.       Bicep reflexes are 2+ on the right side and 2+ on the left side.       Brachioradialis reflexes are 2+ on the right side and 2+ on the left side.       Patellar reflexes are 2+ on the right side and 2+ on the left side.       Achilles reflexes are 1+ on the right side and 1+ on the left side.  Skin: Skin is warm and dry.   Psychiatric: He has a normal mood and affect. His speech is normal and behavior is normal.       Vitals:    06/01/17 1315   BP: 120/79   BP Location: Left arm   Patient Position: Sitting   BP Method: Manual   Pulse: 72   Weight: 72.7 kg (160 lb 4.4 oz)   Height: 6' (1.829 m)       Assessment & Plan:    Problem List Items Addressed This Visit     Alcoholism (Chronic)    Small fiber neuropathy - Primary    Overview     Likely related to alcohol use         Current Assessment & Plan     Check for other causes of neuropathy         Relevant Orders    Folate (Completed)    Immunofixation electrophoresis (Completed)    Protein electrophoresis, serum (Completed)    TSH (Completed)    Vitamin B1     Vitamin B12 (Completed)    Vitamin B6 (Completed)      Other Visit Diagnoses    None.         Follow-up: Return in about 3 months (around 9/1/2017).  More than 50% of this 45 minute encounter was spent in counseling and coordinating care.

## 2017-06-01 NOTE — LETTER
June 4, 2017      Sheree Graves, DPM  [address] [facility] [address]           [facility] [address] [facility] [address]  [place] [address]  Phone: [phone]  Fax: [phone]          Patient: Moe Ren   MR Number: [id number]   YOB: 1950   Date of Visit: 6/1/2017       Dear Dr. Sheree Graves:    Thank you for referring Moe Ren to me for evaluation. Attached you will find relevant portions of my assessment and plan of care.    If you have questions, please do not hesitate to call me. I look forward to following Moe Ren along with you.    Sincerely,        Enclosure  CC:  No Recipients    If you would like to receive this communication electronically, please contact [email or web address] or [phone] to request more information on [employer] Link access.    For providers and/or their staff who would like to refer a patient to [facility], please contact us through our one-stop-shop provider referral line, [facility] , at [phone].    If you feel you have received this communication in error or would no longer like to receive these types of communications, please e-mail [email or web address]

## 2017-06-02 DIAGNOSIS — E11.21 TYPE 2 DIABETES WITH NEPHROPATHY: ICD-10-CM

## 2017-06-02 LAB
FOLATE SERPL-MCNC: >40 NG/ML
VIT B12 SERPL-MCNC: >2000 PG/ML

## 2017-06-02 RX ORDER — INSULIN LISPRO 100 [IU]/ML
INJECTION, SOLUTION INTRAVENOUS; SUBCUTANEOUS
Qty: 1 BOX | Refills: 6
Start: 2017-06-02 | End: 2017-06-05 | Stop reason: SDUPTHER

## 2017-06-02 NOTE — TELEPHONE ENCOUNTER
----- Message from Nayely Roxy sent at 6/1/2017  4:12 PM CDT -----  Contact: Moe   [phone]  Pt.says he needs his refills of   Humalog Kwik Pen/ Insulin Lis Pro  100 units p/ ml . Send to [facility]  Angélica Howe.     Pl call is you have any questions .    Pt.says  he is aware Misa is out and is asking someone to pls send his refills.

## 2017-06-05 ENCOUNTER — TELEPHONE (OUTPATIENT)
Dept: ENDOCRINOLOGY | Facility: CLINIC | Age: 67
End: 2017-06-05

## 2017-06-05 LAB
ALBUMIN SERPL ELPH-MCNC: 3.69 G/DL
ALPHA1 GLOB SERPL ELPH-MCNC: 0.38 G/DL
ALPHA2 GLOB SERPL ELPH-MCNC: 0.9 G/DL
B-GLOBULIN SERPL ELPH-MCNC: 0.76 G/DL
GAMMA GLOB SERPL ELPH-MCNC: 0.67 G/DL
INTERPRETATION SERPL IFE-IMP: NORMAL
PATHOLOGIST INTERPRETATION IFE: NORMAL
PATHOLOGIST INTERPRETATION SPE: NORMAL
PROT SERPL-MCNC: 6.4 G/DL

## 2017-06-05 RX ORDER — INSULIN LISPRO 100 [IU]/ML
INJECTION, SOLUTION INTRAVENOUS; SUBCUTANEOUS
Qty: 15 SYRINGE | Refills: 5 | Status: SHIPPED | OUTPATIENT
Start: 2017-06-05 | End: 2017-12-04 | Stop reason: CLARIF

## 2017-06-05 NOTE — TELEPHONE ENCOUNTER
----- Message from Nader Escobar sent at 6/5/2017  2:20 PM CDT -----  Contact: [facility]  Requesting a call back in regards to insulin lispro (HUMALOG KWIKPEN) 100 unit/mL InPn pen    Please call [phone] (Phone)  [phone] (Fax)

## 2017-06-08 LAB — PYRIDOXAL SERPL-MCNC: 106 UG/L (ref 5–50)

## 2017-06-09 ENCOUNTER — PATIENT MESSAGE (OUTPATIENT)
Dept: FAMILY MEDICINE | Facility: CLINIC | Age: 67
End: 2017-06-09

## 2017-06-10 LAB — VIT B1 SERPL-MCNC: 101 UG/L (ref 38–122)

## 2017-06-11 ENCOUNTER — PATIENT MESSAGE (OUTPATIENT)
Dept: NEUROLOGY | Facility: CLINIC | Age: 67
End: 2017-06-11

## 2017-06-13 ENCOUNTER — PATIENT MESSAGE (OUTPATIENT)
Dept: ENDOCRINOLOGY | Facility: CLINIC | Age: 67
End: 2017-06-13

## 2017-06-20 DIAGNOSIS — M54.9 CHRONIC BACK PAIN GREATER THAN 3 MONTHS DURATION: ICD-10-CM

## 2017-06-20 DIAGNOSIS — G89.29 CHRONIC BACK PAIN GREATER THAN 3 MONTHS DURATION: ICD-10-CM

## 2017-06-20 RX ORDER — HYDROCODONE BITARTRATE AND ACETAMINOPHEN 10; 325 MG/1; MG/1
1 TABLET ORAL 2 TIMES DAILY PRN
Qty: 60 TABLET | Refills: 0 | Status: SHIPPED | OUTPATIENT
Start: 2017-06-20 | End: 2017-07-20

## 2017-06-22 ENCOUNTER — PATIENT MESSAGE (OUTPATIENT)
Dept: ENDOCRINOLOGY | Facility: CLINIC | Age: 67
End: 2017-06-22

## 2017-06-28 DIAGNOSIS — M51.36 DEGENERATIVE DISC DISEASE, LUMBAR: ICD-10-CM

## 2017-06-28 RX ORDER — GABAPENTIN 600 MG/1
1200 TABLET ORAL 3 TIMES DAILY
Qty: 180 TABLET | Refills: 2 | Status: SHIPPED | OUTPATIENT
Start: 2017-06-28 | End: 2017-09-28 | Stop reason: SDUPTHER

## 2017-07-19 ENCOUNTER — LAB VISIT (OUTPATIENT)
Dept: LAB | Facility: HOSPITAL | Age: 67
End: 2017-07-19
Attending: NURSE PRACTITIONER
Payer: COMMERCIAL

## 2017-07-19 DIAGNOSIS — E11.42 DM TYPE 2 WITH DIABETIC PERIPHERAL NEUROPATHY: Chronic | ICD-10-CM

## 2017-07-19 LAB
ESTIMATED AVG GLUCOSE: 143 MG/DL
HBA1C MFR BLD HPLC: 6.6 %

## 2017-07-19 PROCEDURE — 36415 COLL VENOUS BLD VENIPUNCTURE: CPT | Mod: PO

## 2017-07-19 PROCEDURE — 83036 HEMOGLOBIN GLYCOSYLATED A1C: CPT

## 2017-07-31 ENCOUNTER — OFFICE VISIT (OUTPATIENT)
Dept: ENDOCRINOLOGY | Facility: CLINIC | Age: 67
End: 2017-07-31
Payer: COMMERCIAL

## 2017-07-31 VITALS
BODY MASS INDEX: 21.37 KG/M2 | HEIGHT: 72 IN | DIASTOLIC BLOOD PRESSURE: 77 MMHG | SYSTOLIC BLOOD PRESSURE: 127 MMHG | WEIGHT: 157.81 LBS | HEART RATE: 86 BPM

## 2017-07-31 DIAGNOSIS — E11.42 DM TYPE 2 WITH DIABETIC PERIPHERAL NEUROPATHY: Primary | Chronic | ICD-10-CM

## 2017-07-31 DIAGNOSIS — F17.210 SMOKES 2 PACKS OF CIGARETTES PER DAY: Chronic | ICD-10-CM

## 2017-07-31 DIAGNOSIS — E78.1 HYPERTRIGLYCERIDEMIA: Chronic | ICD-10-CM

## 2017-07-31 PROCEDURE — 4010F ACE/ARB THERAPY RXD/TAKEN: CPT | Mod: S$GLB,,, | Performed by: NURSE PRACTITIONER

## 2017-07-31 PROCEDURE — 99214 OFFICE O/P EST MOD 30 MIN: CPT | Mod: S$GLB,,, | Performed by: NURSE PRACTITIONER

## 2017-07-31 PROCEDURE — 3044F HG A1C LEVEL LT 7.0%: CPT | Mod: S$GLB,,, | Performed by: NURSE PRACTITIONER

## 2017-07-31 PROCEDURE — 99999 PR PBB SHADOW E&M-EST. PATIENT-LVL IV: CPT | Mod: PBBFAC,,, | Performed by: NURSE PRACTITIONER

## 2017-07-31 NOTE — PROGRESS NOTES
CC: Mr. Moe Ren arrives today for management of Type 2 DM and chronic medical conditions as noted in visit diagnosis.      HPI: Mr. Moe Ren was diagnosed with Type 2 sometime between 2009 and 2012. No hospitalizations for DM.   + h/o squamos cell oral CA  Spinal stenosis.  Meals generally at 10AM, 2PM, 6PM  Currently using Accu check Amanda expert meter and is carb counting.     Reports increased stress at work leading to lunch highs in the past month.   Otherwise, BGs mostly at/ near goal. Reasonable variability.  Checks BG 5x/day.    Hypoglycemia: usually Sundays after lunch (eats out), overestimates CHO.   Occassionally has more lows with increased activity.     Exercise: no formal exercise.  Continues to smoke 2 ppd    CURRENT DIABETIC MEDS: Humalog (uses Accu-Chek Amanda Expert)  MN: Target 115, ISF 50, I:C 8  11AM: Target 115, ISF 50, I:C 16  1600: Target 115, ISF 50, I:C 13  Missing doses of diabetes medications: No  Prandial Insulin Injections Taken with Meals: Yes    Uses Vials or Pens: pens  Type of Glucose Meter: Accu check Staci    Last Eye Exam: 04/2017  Last Podiatry Exam: 05/2017, +PN    REVIEW OF SYSTEMS  General: stable weight; no weakness,  fatigue, or fever.   Eyes: denies intermittent blurry vision.   Cardiac: no palpitations, chest pain, or edema.   Respiratory: no dyspnea, or wheezing; + smokers cough.   GI: no abdominal pain, heartburn, loss of appetite, or nausea.   Skin: no rashes, itching, dryness, or color changes.   Neuro: no mood changes, headaches, + numbness, tingling in toes.     Vital Signs  Personally reviewed the labs noted below.     /77 (BP Location: Right arm, Patient Position: Sitting)   Pulse 86   Ht 6' (1.829 m)   Wt 71.6 kg (157 lb 12.8 oz)   BMI 21.40 kg/m²     Hemoglobin A1C   Date Value Ref Range Status   07/19/2017 6.6 (H) 4.0 - 5.6 % Final     Comment:     According to ADA guidelines, hemoglobin A1c <7.0% represents  optimal control in non-pregnant  diabetic patients. Different  metrics may apply to specific patient populations.   Standards of Medical Care in Diabetes-2016.  For the purpose of screening for the presence of diabetes:  <5.7%     Consistent with the absence of diabetes  5.7-6.4%  Consistent with increasing risk for diabetes   (prediabetes)  >or=6.5%  Consistent with diabetes  Currently, no consensus exists for use of hemoglobin A1c  for diagnosis of diabetes for children.  This Hemoglobin A1c assay has significant interference with fetal   hemoglobin   (HbF). The results are invalid for patients with abnormal amounts of   HbF,   including those with known Hereditary Persistence   of Fetal Hemoglobin. Heterozygous hemoglobin variants (HbAS, HbAC,   HbAD, HbAE, HbA2) do not significantly interfere with this assay;   however, presence of multiple variants in a sample may impact the %   interference.     04/19/2017 7.4 (H) 4.5 - 6.2 % Final     Comment:     According to ADA guidelines, hemoglobin A1C <7.0% represents  optimal control in non-pregnant diabetic patients.  Different  metrics may apply to specific populations.   Standards of Medical Care in Diabetes - 2016.  For the purpose of screening for the presence of diabetes:  <5.7%     Consistent with the absence of diabetes  5.7-6.4%  Consistent with increasing risk for diabetes   (prediabetes)  >or=6.5%  Consistent with diabetes  Currently no consensus exists for use of hemoglobin A1C  for diagnosis of diabetes for children.     12/14/2016 7.2 (H) 4.5 - 6.2 % Final     Comment:     According to ADA guidelines, hemoglobin A1C <7.0% represents  optimal control in non-pregnant diabetic patients.  Different  metrics may apply to specific populations.   Standards of Medical Care in Diabetes - 2016.  For the purpose of screening for the presence of diabetes:  <5.7%     Consistent with the absence of diabetes  5.7-6.4%  Consistent with increasing risk for diabetes   (prediabetes)  >or=6.5%  Consistent  with diabetes  Currently no consensus exists for use of hemoglobin A1C  for diagnosis of diabetes for children.         Chemistry        Component Value Date/Time     12/29/2016 1047    K 4.4 12/29/2016 1047     12/29/2016 1047    CO2 25 12/29/2016 1047    BUN 8 12/29/2016 1047    CREATININE 1.0 12/29/2016 1047     (H) 12/29/2016 1047        Component Value Date/Time    CALCIUM 9.0 12/29/2016 1047    ALKPHOS 78 12/29/2016 1047    AST 30 12/29/2016 1047    ALT 18 12/29/2016 1047    BILITOT 0.4 12/29/2016 1047          Lab Results   Component Value Date    CHOL 165 12/14/2016    CHOL 154 02/01/2016    CHOL 145 12/17/2015     Lab Results   Component Value Date    HDL 52 12/14/2016    HDL 47 02/01/2016    HDL 47 12/17/2015     Lab Results   Component Value Date    LDLCALC 49.0 (L) 12/14/2016    LDLCALC 61.2 (L) 02/01/2016    LDLCALC 51.6 (L) 12/17/2015     Lab Results   Component Value Date    TRIG 98 04/19/2017    TRIG 320 (H) 12/14/2016    TRIG 229 (H) 02/01/2016     Lab Results   Component Value Date    CHOLHDL 31.5 12/14/2016    CHOLHDL 30.5 02/01/2016    CHOLHDL 32.4 12/17/2015     Lab Results   Component Value Date    TSH 1.530 06/01/2017     Lab Results   Component Value Date    MICALBCREAT 13.2 08/22/2016     Vit D, 25-Hydroxy   Date Value Ref Range Status   02/01/2016 33 30 - 96 ng/mL Final     Comment:     Vitamin D deficiency.........<10 ng/mL                              Vitamin D insufficiency......10-29 ng/mL       Vitamin D sufficiency........> or equal to 30 ng/mL  Vitamin D toxicity............>100 ng/mL       PHYSICAL EXAMINATION  Constitutional: Appears well, no distress  Neck: Supple, trachea midline.   Respiratory: even and unlabored.  Cardiovascular: RRR  Lymph: no edema.   Skin: warm and dry; no rash, petechiae, ecchymosis, or acanthosis nigracans observed.  Neuro: CN 2-12 grossly intact; patient alert and cooperative.  Feet: wears appropriate shoes.    Assessment/Plan    1.  Type II or unspecified type diabetes mellitus with neurological manifestations  - A1C controlled, DM stable  - Continue to monitor BG AC/HS  - Continue Amanda meter with carb counting  - Trial 1:7 ratio with BF while increased stress at work  - Caution with CHO estimation Sunday brunch.    - On Gabapentin for neuropathy and follows with Podiatry.     - On ACEi, statin     2. Smokes 2 packs of cigarettes per day - cessation encouraged.     3. Hypertriglyceridemia - On Lipitor. Triglycerides improved.        Discussed establishing PCP. Previous PCP retired.     ADDENDUM 11/2017, PT EMAIL:     I have made the suggested change to my breakfast ratio.   It was 1/7 ratio; I changed to 1/6.   Current ratios are now 1/6, 1/16, & 1/13 for breakfast, lunch, dinner respectively .     FOLLOW UP  Return in about 6 months    Orders Placed This Encounter   Procedures    Hemoglobin A1c     Standing Status:   Future     Standing Expiration Date:   9/29/2018    Vitamin D     Standing Status:   Future     Standing Expiration Date:   9/29/2018    Microalbumin/creatinine urine ratio     Standing Status:   Future     Standing Expiration Date:   9/29/2018     Order Specific Question:   Specimen Source     Answer:   Urine    Lipid panel     Standing Status:   Future     Standing Expiration Date:   9/29/2018    TSH     Standing Status:   Future     Standing Expiration Date:   9/29/2018

## 2017-08-02 ENCOUNTER — PATIENT MESSAGE (OUTPATIENT)
Dept: ENDOCRINOLOGY | Facility: CLINIC | Age: 67
End: 2017-08-02

## 2017-08-02 ENCOUNTER — OFFICE VISIT (OUTPATIENT)
Dept: FAMILY MEDICINE | Facility: CLINIC | Age: 67
End: 2017-08-02
Payer: COMMERCIAL

## 2017-08-02 VITALS
HEART RATE: 78 BPM | HEIGHT: 72 IN | WEIGHT: 166.25 LBS | OXYGEN SATURATION: 95 % | SYSTOLIC BLOOD PRESSURE: 136 MMHG | TEMPERATURE: 98 F | BODY MASS INDEX: 22.52 KG/M2 | DIASTOLIC BLOOD PRESSURE: 77 MMHG

## 2017-08-02 DIAGNOSIS — F41.9 ANXIETY DISORDER, UNSPECIFIED TYPE: Primary | ICD-10-CM

## 2017-08-02 DIAGNOSIS — F11.90 CHRONIC NARCOTIC USE: ICD-10-CM

## 2017-08-02 DIAGNOSIS — D12.6 TUBULAR ADENOMA OF COLON: ICD-10-CM

## 2017-08-02 DIAGNOSIS — G62.9 SMALL FIBER NEUROPATHY: ICD-10-CM

## 2017-08-02 DIAGNOSIS — F10.20 ALCOHOLISM: Chronic | ICD-10-CM

## 2017-08-02 DIAGNOSIS — M48.062 SPINAL STENOSIS, LUMBAR REGION, WITH NEUROGENIC CLAUDICATION: ICD-10-CM

## 2017-08-02 PROCEDURE — 99999 PR PBB SHADOW E&M-EST. PATIENT-LVL III: CPT | Mod: PBBFAC,,, | Performed by: INTERNAL MEDICINE

## 2017-08-02 PROCEDURE — 99214 OFFICE O/P EST MOD 30 MIN: CPT | Mod: S$GLB,,, | Performed by: INTERNAL MEDICINE

## 2017-08-02 RX ORDER — ALPRAZOLAM 0.5 MG/1
TABLET ORAL
Qty: 90 TABLET | Refills: 0 | Status: SHIPPED | OUTPATIENT
Start: 2017-08-02 | End: 2017-09-11 | Stop reason: SDUPTHER

## 2017-08-02 RX ORDER — SERTRALINE HYDROCHLORIDE 50 MG/1
50 TABLET, FILM COATED ORAL DAILY
Qty: 30 TABLET | Refills: 11 | Status: SHIPPED | OUTPATIENT
Start: 2017-08-02 | End: 2017-08-07 | Stop reason: SINTOL

## 2017-08-02 RX ORDER — HYDROCODONE BITARTRATE AND ACETAMINOPHEN 10; 325 MG/1; MG/1
1 TABLET ORAL EVERY 12 HOURS PRN
Qty: 60 TABLET | Refills: 0 | Status: SHIPPED | OUTPATIENT
Start: 2017-08-02 | End: 2017-09-11 | Stop reason: SDUPTHER

## 2017-08-02 NOTE — PROGRESS NOTES
This note was created by combination of typed  and Dragon dictation.  Transcription errors may be present.  If there are any questions, please contact me.    Assessment & Plan  Anxiety disorder, unspecified type-long discussion with the patient today.  He is taking 2 high risk medications and takes alcohol in excess.  We talked about health risks associated with all these things.  He's never tried SSRI therapy and after discussion I'm going to start him on Zoloft.  I'll refill his Xanax at his previous dose for now, but the goal is to ultimately wean off.  He is going to try and reduce his next to one pill at night.  -     alprazolam (XANAX) 0.5 MG tablet; TAKE 1 TABLET (0.5 MG TOTAL) BY MOUTH 3 (THREE) TIMES DAILY.  Dispense: 90 tablet; Refill: 0  -     sertraline (ZOLOFT) 50 MG tablet; Take 1 tablet (50 mg total) by mouth once daily.  Dispense: 30 tablet; Refill: 11    Tubular adenoma of colon    Spinal stenosis, lumbar region, with neurogenic claudication  Chronic narcotic use-again, on chronic high risk medications as well as underlying alcohol use.  He is very wary of surgery.  For now I refilled him at his previous dose, estimates he takes 1 and half to 2 tablets once at night.  I would like for him to set his first goals to be reducing alprazolam use as well as reducing alcohol intake.  -     hydrocodone-acetaminophen 10-325mg (NORCO)  mg Tab; Take 1 tablet by mouth every 12 (twelve) hours as needed for Pain.  Dispense: 60 tablet; Refill: 0    Alcoholism-currently drinks 4 to 6X a night and he promises me he will try to cut down to 3 drinks a day.    Small fiber neuropathy-multifactorial including spinal stenosis and alcohol use.      Medications Discontinued During This Encounter   Medication Reason    alprazolam (XANAX) 0.5 MG tablet Reorder       Follow-up: No Follow-up on file. OV 1 month f/u on reduced EtOH, new start zoloft and hopefully reduced need for  xanax      =================================================================      Chief Complaint   Patient presents with    Establish Care       KRISTIN  Moe is a 66 y.o. male, last appointment with this clinic was 3/23/2017.    Former pt of Dr. Weinberg  DM2 with neuropathy, followed by endo; gabapentin.  HTN  hyperlipidemia  Smoker, contemplative stage of quitting.  He is not interested in smoking cessation classes.  Spinal stenosis on chronic narcotic therapy, 7/2014 MRI L spine with lumbar spondylosis with mod/severe right neural foraminal stenossi L4-5; significant degenerative disk disease, facet joint arthropathy L4-5, L5-S1; hx of injections; had been seen by pain mgmt 2015 but discharged for THC. hydrocodone 10/325 BID. Takes by his estimate 1.5 tablets a day mainly in the evening.   Hx of Aleve had to stop after surgery for deviated septum. Now he notes various other joints that hurt and are helped by the narcotic therapy.  He had talked to neurosurgery, Janumet 2015, they had recommended surgery and he is very wary of surgery.  Anxiety, alprazolam 0.5 mg TID.  Typically takes 2 at night, maybe 1 in the morning.  Still works, finds his work very stressful.  Has heard bad things about SSRI therapy but has never used it himself.  Family history of suicide, father committed suicide and his son did as well.  He notes that after the death of his son, who brought him back to God.  He has about himself that he would never commit suicide and do such a thing to his family.  Alcohol use, drinks by his own estimate for this extrinsic night.  Notes that this is an improvement compared to previous.  Has been to Alcoholics Anonymous and has quit before.  Did not really find Alcoholics Anonymous to be that helpful.  Feels like he needs to get himself in his own mind set in order to quit and is not adjusted and further referral at this time.  Colon polyps, last colonoscopy 2009.  Referred in March for colonoscopy.  4/11/2017  colonoscopy with transverse tubular adenoma  Schatzki ring dilitation; followed by [facility].    Last narcotic refill 7/7  Last BZD refill 6/8, fills every 5-6 weeks    Answers for HPI/ROS submitted by the patient on 8/1/2017   Back pain  Chronicity: chronic  Onset: more than 1 year ago  Frequency: 2 to 4 times per day  Progression since onset: gradually worsening  Pain location: sacro-iliac  Pain quality: shooting  Radiates to: right foot  Pain - numeric: 8/10  Pain is: worse during the day  Aggravated by: standing  Stiffness is present: all day  leg pain: Yes  Treatments tried: analgesics  Improvement on treatment: mild      Patient Care Team:  Damian Ramon MD as PCP - General (Internal Medicine)  Kandice Bonilla RD as Dietitian (Diabetes)    Patient Active Problem List    Diagnosis Date Noted    Chronic narcotic use 08/02/2017    Special screening for malignant neoplasms, colon 04/11/2017    Deviated nasal septum 04/19/2016    Chronic back pain greater than 3 months duration 01/06/2016    Small fiber neuropathy 07/29/2015     Likely related to alcohol use      Closed rib fracture 01/16/2015    Facet arthritis of lumbar region 10/08/2014    Spondylosis without myelopathy 08/13/2014    Degeneration of lumbar or lumbosacral intervertebral disc 08/13/2014    Spinal stenosis, lumbar region, with neurogenic claudication 08/13/2014    Acquired spondylolisthesis 08/13/2014    Thoracic or lumbosacral neuritis or radiculitis, unspecified 08/13/2014    Tubular adenoma of colon 04/22/2014 4/11/2017 colonoscopy with transverse tubular adenoma      DM type 2 with diabetic peripheral neuropathy 07/24/2013    Hypertriglyceridemia 06/13/2013    Anxiety disorder 06/12/2013    Alcoholism 06/12/2013    Smokes 2 packs of cigarettes per day 06/12/2013       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    Cataract      Diabetes mellitus     Hypertension     Pancreatitis 2008    2 Times       PAST SURGICAL HISTORY:  Past Surgical History:   Procedure Laterality Date     thumb surgery      Dead Skin cell tumor Rt thumb    COLONOSCOPY N/A 4/11/2017    Procedure: COLONOSCOPY;  Surgeon: Meet Quesada MD;  [facility];  Service: Endoscopy;  Laterality: N/A;    MOUTH FLOOR MASS EXCISION  2009    NASAL SEPTUM SURGERY      SKIN SURGERY       Family History   Problem Relation Age of Onset    Cataracts Mother     Leukemia Mother     Lung cancer Brother     No Known Problems Father     No Known Problems Daughter     No Known Problems Sister     No Known Problems Maternal Aunt     No Known Problems Maternal Uncle     No Known Problems Paternal Aunt     No Known Problems Paternal Uncle     No Known Problems Maternal Grandmother     No Known Problems Maternal Grandfather     No Known Problems Paternal Grandmother     No Known Problems Paternal Grandfather     Amblyopia Neg Hx     Blindness Neg Hx     Cancer Neg Hx     Diabetes Neg Hx     Glaucoma Neg Hx     Hypertension Neg Hx     Macular degeneration Neg Hx     Retinal detachment Neg Hx     Strabismus Neg Hx     Stroke Neg Hx     Thyroid disease Neg Hx        SOCIAL HISTORY:  Social History     Social History    Marital status:      Spouse name: N/A    Number of children: N/A    Years of education: N/A     Occupational History    , self employed      Social History Main Topics    Smoking status: Current Every Day Smoker     Packs/day: 1.50     Years: 45.00    Smokeless tobacco: Never Used    Alcohol use 0.5 oz/week     1 Standard drinks or equivalent per week      Comment: daily scotch and water, half pint    Drug use:      Types: Marijuana      Comment: rarely    Sexual activity: Yes     Partners: Female     Other Topics Concern    Not on file     Social History Narrative    , specialized in  "underwater robotics.Self employed.No physical activity. with two kids.  One child committed suicide.       ALLERGIES AND MEDICATIONS: updated and reviewed.  Review of patient's allergies indicates:   Allergen Reactions    Amoxicillin Nausea And Vomiting    Adhesive Rash     Paper tape only     Current Outpatient Prescriptions   Medication Sig Dispense Refill    alprazolam (XANAX) 0.5 MG tablet TAKE 1 TABLET (0.5 MG TOTAL) BY MOUTH 3 (THREE) TIMES DAILY. 90 tablet 2    atorvastatin (LIPITOR) 20 MG tablet TAKE 1 TABLET BY MOUTH ONCE DAILY 90 tablet 3    azelastine (ASTELIN) 137 mcg (0.1 %) nasal spray   11    BD ULTRA-FINE RENETTA PEN NEEDLES 32 gauge x 5/32" Ndle TO USE WITH MULTIPLE DAILY INJECTIONS 200 each 11    blood sugar diagnostic (ACCU-CHEK KM PLUS TEST STRP) Strp USE TO TEST UPTO 6 TIMES DAILY 550 strip 3    cyclobenzaprine (FLEXERIL) 10 MG tablet Take 1 tablet (10 mg total) by mouth 3 (three) times daily as needed. 90 tablet 2    desoximetasone (TOPICORT) 0.25 % cream Apply topically 2 (two) times daily.      fluticasone (FLONASE) 50 mcg/actuation nasal spray 2 sprays by Each Nare route once daily.      gabapentin (NEURONTIN) 600 MG tablet Take 2 tablets (1,200 mg total) by mouth 3 (three) times daily. 180 tablet 2    HUMALOG KWIKPEN 100 unit/mL InPn pen USE WITH INSULIN TO CARB RATION 1:8 WITH BREAKFAST 1:13 WITH LUNCH 1:12 WITH DINNER PLUS CORRECTION 15 Syringe 5    L-METHYL-B6-B12 3-35-2 mg Tab TAKE 1 TABLET BY MOUTH 2 (TWO) TIMES DAILY. 60 tablet 5    lancets (ACCU-CHEK FASTCLIX) Misc USE TO TEST 6 TIMES A  each 3    lisinopril (PRINIVIL,ZESTRIL) 5 MG tablet Take 1 tablet (5 mg total) by mouth once daily. 30 tablet 6     No current facility-administered medications for this visit.        Review of Systems   Musculoskeletal: Positive for back pain.       Physical Exam   Vitals:    08/02/17 1301   BP: 136/77   BP Location: Right arm   Patient Position: Sitting   BP Method: " Automatic   Pulse: 78   Temp: 98.2 °F (36.8 °C)   TempSrc: Oral   SpO2: 95%   Weight: 75.4 kg (166 lb 3.6 oz)   Height: 6' (1.829 m)    Body mass index is 22.54 kg/m².  Weight: 75.4 kg (166 lb 3.6 oz)   Height: 6' (182.9 cm)     Physical Exam   Constitutional: He is oriented to person, place, and time. He appears well-developed and well-nourished. No distress.   Neurological: He is alert and oriented to person, place, and time.   Skin: Skin is warm and dry. No rash noted.   Psychiatric: He has a normal mood and affect. His behavior is normal. Thought content normal.

## 2017-08-07 ENCOUNTER — PATIENT MESSAGE (OUTPATIENT)
Dept: FAMILY MEDICINE | Facility: CLINIC | Age: 67
End: 2017-08-07

## 2017-08-07 ENCOUNTER — TELEPHONE (OUTPATIENT)
Dept: FAMILY MEDICINE | Facility: CLINIC | Age: 67
End: 2017-08-07

## 2017-08-07 DIAGNOSIS — F41.9 ANXIETY DISORDER, UNSPECIFIED TYPE: Primary | Chronic | ICD-10-CM

## 2017-08-07 RX ORDER — ESCITALOPRAM OXALATE 5 MG/1
5 TABLET ORAL DAILY
Qty: 30 TABLET | Refills: 11 | Status: SHIPPED | OUTPATIENT
Start: 2017-08-07 | End: 2017-09-11 | Stop reason: SDUPTHER

## 2017-08-07 NOTE — TELEPHONE ENCOUNTER
----- Message from Coretta Delgado sent at 8/7/2017  1:48 PM CDT -----  Contact: self  Patient states he thinks he is having side effects from  -- Zoloft . He is shaky , can barely write or use a computer mouse . He is concerned it is lowering his blood sugar .      [phone]     LL

## 2017-08-07 NOTE — TELEPHONE ENCOUNTER
Patient states he is very shacky since he started taking zoloft on Thursday. Patient advised to stop taking medication.  Please Advise

## 2017-08-10 ENCOUNTER — TELEPHONE (OUTPATIENT)
Dept: ENDOCRINOLOGY | Facility: CLINIC | Age: 67
End: 2017-08-10

## 2017-08-10 NOTE — TELEPHONE ENCOUNTER
----- Message from Falguni Marie sent at 8/4/2017 11:18 AM CDT -----  Regarding: PA Sumbitted   Good Morning,    I wanted to let you know that I have submitted a PA for Mr. Ren's Accu-Chek Amanda Plus Test Strips. It will take up to 24 hours for approval or denial.  I have left a message for the patient. Please let me know if you have any questions.    Thanks,  Falguni Marie  Pharmacy Technician   [facility]  Phone: [phone]  Fax: [phone]

## 2017-08-31 DIAGNOSIS — M62.838 MUSCLE SPASM: ICD-10-CM

## 2017-08-31 RX ORDER — CYCLOBENZAPRINE HCL 10 MG
10 TABLET ORAL 3 TIMES DAILY PRN
Qty: 90 TABLET | Refills: 2 | Status: SHIPPED | OUTPATIENT
Start: 2017-08-31 | End: 2018-03-13

## 2017-08-31 NOTE — TELEPHONE ENCOUNTER
----- Message from Ca Argueta sent at 8/31/2017 10:16 AM CDT -----  Contact: Rafal with [facility]  Refill request for -- cyclobenzaprine (FLEXERIL) 10 MG tablet--  Pharmacy call back   [phone].

## 2017-09-08 NOTE — PROGRESS NOTES
This note was created by combination of typed  and Dragon dictation.  Transcription errors may be present.  If there are any questions, please contact me.    Assessment & Plan  Anxiety disorder, unspecified type  Alcohol use disorder  Insomnia, unspecified type - would try trazodone instead of the xanax.  Expect that he'll be a slow wean.  Increase the lexapro to 10 mg.  Refilled xanax, 2 at night (he stopped the morning dose)..  has cut down the xanax - takes 2 at night nad not taking in the morning which is an improvement.  Refilled xanax #60  EtOH has cut down by self report.  -     trazodone (DESYREL) 50 MG tablet; 1/2 to 1 tablet at night for sleep; instead of xanax  Dispense: 30 tablet; Refill: 11  -     alprazolam (XANAX) 0.5 MG tablet; 2 tablets at night. Try the trazodone first  Dispense: 90 tablet; Refill: 0  -     escitalopram oxalate (LEXAPRO) 10 MG tablet; Take 1 tablet (10 mg total) by mouth once daily.  Dispense: 30 tablet; Refill: 11    Spinal stenosis, lumbar region, with neurogenic claudication  Chronic narcotic use - for now no change refilled hydrocodone.  -     hydrocodone-acetaminophen 10-325mg (NORCO)  mg Tab; Take 1 tablet by mouth every 12 (twelve) hours as needed for Pain.  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  Follow up 2 months on higher dose lexapro; see if trazodone helpful and if so wean away the xanax. If not - consider trial of buspar.  Cut down EtOH to 2 drinks max a day hopefully.      =================================================================      Chief Complaint   Patient presents with    Medication Refill       HPI  Moe is a 66 y.o. male,  "last appointment with this clinic was 8/2/2017.    DM2 with neuropathy, followed by endo; gabapentin.  HTN  hyperlipidemia  Smoker, contemplative stage of quitting.  He is not interested in smoking cessation classes.  Spinal stenosis on chronic narcotic therapy, 7/2014 MRI L spine with lumbar spondylosis with mod/severe right neural foraminal stenossi L4-5; significant degenerative disk disease, facet joint arthropathy L4-5, L5-S1; hx of injections; had been seen by pain mgmt 2015 but discharged for THC. hydrocodone 10/325 BID. Takes by his estimate 1.5 tablets a day mainly in the evening.   Hx of Aleve had to stop after surgery for deviated septum. Now he notes various other joints that hurt and are helped by the narcotic therapy. Worse with prolonged standing and walking.  He had talked to neurosurgery, 2015, they had recommended surgery and he is very wary of surgery.  Goal is to try to reduce intake.  Anxiety, Family history of suicide, father committed suicide and his son did as well. alprazolam 0.5 mg TID.  Typically takes 2 at night, maybe 1 in the morning.  8/2/2017, started on zoloft.  Zoloft SE of hypoglycemia/ shakiness so changed to Lexapro. Refilled xanax TID and ultimately goal is wean off. He was going to xanax to 1 tablet at night.  Notes he had a "hard father".  Alcohol use. Has been to Alcoholics Anonymous and has quit before.  Did not really find Alcoholics Anonymous to be that helpful.  Feels like he needs to get himself in his own mind set in order to quit and is not adjusted and further referral at this time.last OV he promised me he would cut down to 3 drinks a day.  Colon polyps, last colonoscopy 2009.  Referred in March for colonoscopy.  4/11/2017 colonoscopy with transverse tubular adenoma  Schatzki ring dilitation; followed by [facility].    Falls x 2.  Has to shuffle his feet when he wears slippers.  Stepped on a rug and the rug folded up and lost his balance.  Was holding on to the " doorknob and prevented an even worse fall.    Then another morning 6 AM wearing slippers fell and his his ribs on the bathtub.  Bruising on the left side.  Which exacerbated his back.  Caused right hip pain that lasted for 3-4 days. Has some radiation down the side of his leg down to his foot.    Stopped wearing slip on slippers and got new ones that hopefully will not come off so he can lift his feet.    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    Wonders if he had pancreatitis.  Was woken in the middle of the night with epigastric pain.  Naproxen really helped with that actually.  He stopped drinking completely for a week when that happened.    Chronic insomnia.      Had surgery for deviated septum earlier this year with Dr. De Anda.  Recalls high tolerance for medication.  Had to have a lot of sedation for the septum surgery.    Drinking 3-4 drinks a night.  Stopped going to bars. Mainly scotch.    lexapro better than the zoloft.  lexapro is helping - his wife notes a change.   Did not affect blood sugars.  Xanax - tried weaning down to 1 tablet at night.  In the past 2 in HS 1 during the day.      Needs to start exercising.     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 09/11/2017    Chronic narcotic use 08/02/2017    Special screening for malignant neoplasms, colon 04/11/2017    Deviated nasal septum 04/19/2016    Chronic back pain greater  than 3 months duration 01/06/2016    Small fiber neuropathy 07/29/2015     Likely related to alcohol use      Closed rib fracture 01/16/2015    Facet arthritis of lumbar region 10/08/2014    Spondylosis without myelopathy 08/13/2014    Degeneration of lumbar or lumbosacral intervertebral disc 08/13/2014    Spinal stenosis, lumbar region, with neurogenic claudication 08/13/2014    Tubular adenoma of colon 04/22/2014 4/11/2017 colonoscopy with transverse tubular adenoma      DM type 2 with diabetic peripheral neuropathy 07/24/2013    Hypertriglyceridemia 06/13/2013    Anxiety disorder 06/12/2013    Alcohol use disorder 06/12/2013    Smokes 2 packs of cigarettes per day 06/12/2013       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    Cataract     Diabetes mellitus     Hypertension     Pancreatitis 2008    2 Times       PAST SURGICAL HISTORY:  Past Surgical History:   Procedure Laterality Date     thumb surgery      Dead Skin cell tumor Rt thumb    COLONOSCOPY N/A 4/11/2017    Procedure: COLONOSCOPY;  Surgeon: Meet Quesada MD;  [facility];  Service: Endoscopy;  Laterality: N/A;    MOUTH FLOOR MASS EXCISION  2009    NASAL SEPTUM SURGERY      SKIN SURGERY         SOCIAL HISTORY:  Social History     Social History    Marital status:      Spouse name: N/A    Number of children: N/A    Years of education: N/A     Occupational History    , self employed      Social History Main Topics    Smoking status: Current Every Day Smoker     Packs/day: 1.50     Years: 45.00    Smokeless tobacco: Never Used    Alcohol use 0.5 oz/week     1 Standard drinks or equivalent per week      Comment: daily scotch and water, half pint    Drug use:      Types: Marijuana      Comment: rarely    Sexual activity: Yes     Partners: Female     Other Topics Concern    Not on file  "    Social History Narrative    , 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 with two kids.  One child committed suicide.       ALLERGIES AND MEDICATIONS: updated and reviewed.  Review of patient's allergies indicates:   Allergen Reactions    Amoxicillin Nausea And Vomiting    Adhesive Rash     Paper tape only     Current Outpatient Prescriptions   Medication Sig Dispense Refill    alprazolam (XANAX) 0.5 MG tablet TAKE 1 TABLET (0.5 MG TOTAL) BY MOUTH 3 (THREE) TIMES DAILY. 90 tablet 0    atorvastatin (LIPITOR) 20 MG tablet TAKE 1 TABLET BY MOUTH ONCE DAILY 90 tablet 3    azelastine (ASTELIN) 137 mcg (0.1 %) nasal spray   11    BD ULTRA-FINE RENETTA PEN NEEDLES 32 gauge x 5/32" Ndle TO USE WITH MULTIPLE DAILY INJECTIONS 200 each 11    blood sugar diagnostic (ACCU-CHEK KM PLUS TEST STRP) Strp USE TO TEST UPTO 6 TIMES DAILY 550 strip 3    cyclobenzaprine (FLEXERIL) 10 MG tablet Take 1 tablet (10 mg total) by mouth 3 (three) times daily as needed. 90 tablet 2    desoximetasone (TOPICORT) 0.25 % cream Apply topically 2 (two) times daily.      escitalopram oxalate (LEXAPRO) 5 MG Tab Take 1 tablet (5 mg total) by mouth once daily. 30 tablet 11    fluticasone (FLONASE) 50 mcg/actuation nasal spray 2 sprays by Each Nare route once daily.      gabapentin (NEURONTIN) 600 MG tablet Take 2 tablets (1,200 mg total) by mouth 3 (three) times daily. 180 tablet 2    HUMALOG KWIKPEN 100 unit/mL InPn pen USE WITH INSULIN TO CARB RATION 1:8 WITH BREAKFAST 1:13 WITH LUNCH 1:12 WITH DINNER PLUS CORRECTION 15 Syringe 5    hydrocodone-acetaminophen 10-325mg (NORCO)  mg Tab Take 1 tablet by mouth every 12 (twelve) hours as needed for Pain. 60 tablet 0    L-METHYL-B6-B12 3-35-2 mg Tab TAKE 1 TABLET BY MOUTH 2 (TWO) TIMES DAILY. 60 tablet 5    lancets (ACCU-CHEK FASTCLIX) Misc USE TO TEST 6 TIMES A  each 3    lisinopril (PRINIVIL,ZESTRIL) 5 MG tablet Take 1 " tablet (5 mg total) by mouth once daily. 30 tablet 6     No current facility-administered medications for this visit.        Review of Systems   HENT: Positive for hearing loss.    Eyes: Negative for discharge.   Respiratory: Negative for wheezing.    Cardiovascular: Negative for chest pain and palpitations.   Gastrointestinal: Negative for blood in stool, constipation, diarrhea and vomiting.   Genitourinary: Negative for hematuria and urgency.   Musculoskeletal: Negative for neck pain.   Neurological: Positive for headaches. Negative for weakness.   Endo/Heme/Allergies: Negative for polydipsia.       Physical Exam   Vitals:    09/11/17 1332   BP: 126/78   Pulse: 84   Temp: 98.3 °F (36.8 °C)   Weight: 70.7 kg (155 lb 12.1 oz)   Height: 6' (1.829 m)    Body mass index is 21.12 kg/m².  Weight: 70.7 kg (155 lb 12.1 oz)   Height: 6' (182.9 cm)     Physical Exam   Constitutional: He is oriented to person, place, and time. He appears well-developed and well-nourished. No distress.   Eyes: EOM are normal.   Cardiovascular: Normal rate, regular rhythm and normal heart sounds.    No murmur heard.  Pulmonary/Chest: Effort normal and breath sounds normal.   Abdominal: Soft. He exhibits no distension and no mass. There is no tenderness. There is no guarding.   Musculoskeletal: Normal range of motion.   Neurological: He is alert and oriented to person, place, and time. Coordination normal.   Skin: Skin is warm and dry.   Psychiatric: He has a normal mood and affect. His behavior is normal. Thought content normal.

## 2017-09-11 ENCOUNTER — OFFICE VISIT (OUTPATIENT)
Dept: FAMILY MEDICINE | Facility: CLINIC | Age: 67
End: 2017-09-11
Payer: COMMERCIAL

## 2017-09-11 VITALS
BODY MASS INDEX: 21.1 KG/M2 | WEIGHT: 155.75 LBS | SYSTOLIC BLOOD PRESSURE: 126 MMHG | HEIGHT: 72 IN | TEMPERATURE: 98 F | HEART RATE: 84 BPM | DIASTOLIC BLOOD PRESSURE: 78 MMHG

## 2017-09-11 DIAGNOSIS — F11.90 CHRONIC NARCOTIC USE: ICD-10-CM

## 2017-09-11 DIAGNOSIS — F10.90 ALCOHOL USE DISORDER: ICD-10-CM

## 2017-09-11 DIAGNOSIS — M48.062 SPINAL STENOSIS, LUMBAR REGION, WITH NEUROGENIC CLAUDICATION: ICD-10-CM

## 2017-09-11 DIAGNOSIS — F41.9 ANXIETY DISORDER, UNSPECIFIED TYPE: ICD-10-CM

## 2017-09-11 DIAGNOSIS — G47.00 INSOMNIA, UNSPECIFIED TYPE: Primary | ICD-10-CM

## 2017-09-11 DIAGNOSIS — R68.2 DRY MOUTH: ICD-10-CM

## 2017-09-11 PROCEDURE — 3008F BODY MASS INDEX DOCD: CPT | Mod: S$GLB,,, | Performed by: INTERNAL MEDICINE

## 2017-09-11 PROCEDURE — 99214 OFFICE O/P EST MOD 30 MIN: CPT | Mod: S$GLB,,, | Performed by: INTERNAL MEDICINE

## 2017-09-11 PROCEDURE — 99999 PR PBB SHADOW E&M-EST. PATIENT-LVL III: CPT | Mod: PBBFAC,,, | Performed by: INTERNAL MEDICINE

## 2017-09-11 RX ORDER — TRAZODONE HYDROCHLORIDE 50 MG/1
TABLET ORAL
Qty: 30 TABLET | Refills: 11 | Status: SHIPPED | OUTPATIENT
Start: 2017-09-11 | End: 2017-11-14

## 2017-09-11 RX ORDER — HYDROCODONE BITARTRATE AND ACETAMINOPHEN 10; 325 MG/1; MG/1
1 TABLET ORAL EVERY 12 HOURS PRN
Qty: 60 TABLET | Refills: 0 | Status: SHIPPED | OUTPATIENT
Start: 2017-09-11 | End: 2017-11-14 | Stop reason: SDUPTHER

## 2017-09-11 RX ORDER — ESCITALOPRAM OXALATE 10 MG/1
10 TABLET ORAL DAILY
Qty: 30 TABLET | Refills: 11 | Status: SHIPPED | OUTPATIENT
Start: 2017-09-11 | End: 2018-03-01 | Stop reason: SDUPTHER

## 2017-09-11 RX ORDER — ALPRAZOLAM 0.5 MG/1
TABLET ORAL
Qty: 90 TABLET | Refills: 0 | Status: SHIPPED | OUTPATIENT
Start: 2017-09-11 | End: 2017-11-14 | Stop reason: SDUPTHER

## 2017-09-11 NOTE — PATIENT INSTRUCTIONS
INCREASE LEXAPRO TO 10 MG    TRY TRAZODONE INSTEAD OF XANAX.  DO NOT TAKE BOTH TOGETHER.  GRADUALLY WOULD LIKE TO SEE ONLY TRAZODONE AT NIGHT.    FOR NOW NO CHANGE IN HYDROCODONE.    MAY CONSIDER BUSPAR IN THE FUTURE IF TRAZODONE INEFFECTIVE.    LIMIT ALCOHOL TO 2 DRINKS AT NIGHT.    FOLLOW UP 2 MONTHS.

## 2017-09-23 DIAGNOSIS — E11.21 TYPE 2 DIABETES WITH NEPHROPATHY: ICD-10-CM

## 2017-09-25 RX ORDER — PEN NEEDLE, DIABETIC 31 GX5/16"
NEEDLE, DISPOSABLE MISCELLANEOUS
Qty: 150 EACH | Refills: 12 | Status: SHIPPED | OUTPATIENT
Start: 2017-09-25 | End: 2018-04-30 | Stop reason: SDUPTHER

## 2017-09-28 DIAGNOSIS — M51.36 DEGENERATIVE DISC DISEASE, LUMBAR: ICD-10-CM

## 2017-09-28 RX ORDER — GABAPENTIN 600 MG/1
1200 TABLET ORAL 3 TIMES DAILY
Qty: 180 TABLET | Refills: 2 | Status: SHIPPED | OUTPATIENT
Start: 2017-09-28 | End: 2018-01-09 | Stop reason: SDUPTHER

## 2017-09-28 NOTE — TELEPHONE ENCOUNTER
----- Message from Aicha Woods sent at 9/27/2017 10:58 AM CDT -----  Contact:  [facility] [phone]  Pharmacy is following up on the refill for gabapentin (NEURONTIN) 600 MG tablet Please call  at your earliest convenience.  Thanks !

## 2017-10-03 ENCOUNTER — PATIENT MESSAGE (OUTPATIENT)
Dept: ENDOCRINOLOGY | Facility: CLINIC | Age: 67
End: 2017-10-03

## 2017-11-13 ENCOUNTER — OFFICE VISIT (OUTPATIENT)
Dept: NEUROLOGY | Facility: CLINIC | Age: 67
End: 2017-11-13
Payer: MEDICARE

## 2017-11-13 VITALS
HEART RATE: 88 BPM | HEIGHT: 72 IN | SYSTOLIC BLOOD PRESSURE: 138 MMHG | WEIGHT: 155.88 LBS | DIASTOLIC BLOOD PRESSURE: 69 MMHG | BODY MASS INDEX: 21.11 KG/M2

## 2017-11-13 DIAGNOSIS — G62.9 SMALL FIBER NEUROPATHY: Primary | ICD-10-CM

## 2017-11-13 PROCEDURE — 99999 PR PBB SHADOW E&M-EST. PATIENT-LVL III: CPT | Mod: PBBFAC,,, | Performed by: NEUROLOGICAL SURGERY

## 2017-11-13 PROCEDURE — 99214 OFFICE O/P EST MOD 30 MIN: CPT | Mod: S$GLB,,, | Performed by: NEUROLOGICAL SURGERY

## 2017-11-13 NOTE — PROGRESS NOTES
"Chief Complaint   Patient presents with    Neurologic Problem     Neuropathy        Moe Ren is a 67 y.o. male with a history of multiple medical diagnoses as listed below that presents for evaluation of pain in the legs. He has been having numbness and tingling sensations in the feet for the last several months. There has been nothing that he can do to make the symptoms less prominent nor anything that he has found that can make the symptom worse. He does not feel that the symptoms have impacted his walking. He has no family history of muscle or nerve disease. He has a history of longstanding alcohol history.    Interval history  11/13/2017  Overall he has been about the same since she was last seen in clinic.  There has been intermittent tingling in the feet and occasionally to the ankle area.  He says that the sensations also feel like pins and needles and burning sensations.  The sensations have been very short-lived several seconds at a time.  He says the pain is not lasting long enough for him to try to take any medications to help to alleviate the pain.  It is something he has been able to "deal with "for the last several months and does not look into changing his current medications and its management.  Tolerating gabapentin very well..    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    Cataract     Diabetes mellitus     Hypertension     Pancreatitis 2008    2 Times       PAST SURGICAL HISTORY:  Past Surgical History:   Procedure Laterality Date     thumb surgery      Dead Skin cell tumor Rt thumb    COLONOSCOPY N/A 4/11/2017    Procedure: COLONOSCOPY;  Surgeon: Meet Quesada MD;  Location: [facility];  Service: Endoscopy;  Laterality: N/A;    MOUTH FLOOR MASS EXCISION  2009    NASAL SEPTUM SURGERY      SKIN SURGERY         SOCIAL HISTORY:  Social History     Social History    Marital status: " "     Spouse name: N/A    Number of children: N/A    Years of education: N/A     Occupational History    , self employed      Social History Main Topics    Smoking status: Current Every Day Smoker     Packs/day: 1.50     Years: 45.00    Smokeless tobacco: Never Used    Alcohol use 0.5 oz/week     1 Standard drinks or equivalent per week      Comment: daily scotch and water, half pint    Drug use:      Types: Marijuana      Comment: rarely    Sexual activity: Yes     Partners: Female     Other Topics Concern    Not on file     Social History Narrative    , 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  One child committed suicide.       FAMILY HISTORY:  Family History   Problem Relation Age of Onset    Cataracts Mother     Leukemia Mother     Lung cancer Brother     Mental illness Father     No Known Problems Daughter     No Known Problems Sister     Mental illness Son        ALLERGIES AND MEDICATIONS: updated and reviewed.  Review of patient's allergies indicates:   Allergen Reactions    Amoxicillin Nausea And Vomiting    Adhesive Rash     Paper tape only     Current Outpatient Prescriptions   Medication Sig Dispense Refill    alprazolam (XANAX) 0.5 MG tablet 2 tablets at night. Try the trazodone first 90 tablet 0    atorvastatin (LIPITOR) 20 MG tablet TAKE 1 TABLET BY MOUTH ONCE DAILY 90 tablet 3    azelastine (ASTELIN) 137 mcg (0.1 %) nasal spray   11    BD ULTRA-FINE RENETTA PEN NEEDLES 32 gauge x 5/32" Ndle TO USE WITH MULTIPLE DAILY INJECTIONS 150 each 12    blood sugar diagnostic (ACCU-CHEK KM PLUS TEST STRP) Strp USE TO TEST UPTO 6 TIMES DAILY 550 strip 3    cyclobenzaprine (FLEXERIL) 10 MG tablet Take 1 tablet (10 mg total) by mouth 3 (three) times daily as needed. 90 tablet 2    desoximetasone (TOPICORT) 0.25 % cream Apply topically 2 (two) times daily.      escitalopram oxalate (LEXAPRO) 10 " MG tablet Take 1 tablet (10 mg total) by mouth once daily. 30 tablet 11    fluticasone (FLONASE) 50 mcg/actuation nasal spray 2 sprays by Each Nare route once daily.      gabapentin (NEURONTIN) 600 MG tablet Take 2 tablets (1,200 mg total) by mouth 3 (three) times daily. 180 tablet 2    HUMALOG KWIKPEN 100 unit/mL InPn pen USE WITH INSULIN TO CARB RATION 1:8 WITH BREAKFAST 1:13 WITH LUNCH 1:12 WITH DINNER PLUS CORRECTION 15 Syringe 5    hydrocodone-acetaminophen 10-325mg (NORCO)  mg Tab Take 1 tablet by mouth every 12 (twelve) hours as needed for Pain. 60 tablet 0    L-METHYL-B6-B12 3-35-2 mg Tab TAKE 1 TABLET BY MOUTH 2 (TWO) TIMES DAILY. 60 tablet 5    lancets (ACCU-CHEK FASTCLIX) Misc USE TO TEST 6 TIMES A  each 3    lisinopril (PRINIVIL,ZESTRIL) 5 MG tablet Take 1 tablet (5 mg total) by mouth once daily. 30 tablet 6    trazodone (DESYREL) 50 MG tablet 1/2 to 1 tablet at night for sleep; instead of xanax 30 tablet 11     No current facility-administered medications for this visit.        Review of Systems   Constitutional: Negative for activity change, fatigue and unexpected weight change.   HENT: Negative for trouble swallowing and voice change.    Eyes: Negative for photophobia, pain and visual disturbance.   Respiratory: Negative for apnea and shortness of breath.    Cardiovascular: Negative for chest pain and palpitations.   Gastrointestinal: Negative for constipation, nausea and vomiting.   Genitourinary: Negative for difficulty urinating.   Musculoskeletal: Negative for arthralgias, back pain, gait problem, myalgias and neck pain.   Skin: Negative for color change and rash.   Neurological: Positive for numbness. Negative for dizziness, seizures, syncope, speech difficulty, weakness, light-headedness and headaches.   Psychiatric/Behavioral: Negative for agitation, behavioral problems and confusion.       Neurologic Exam     Mental Status   Oriented to person, place, and time.    Registration: recalls 3 of 3 objects.   Attention: normal. Concentration: normal.   Speech: speech is normal   Level of consciousness: alert  Knowledge: good.     Cranial Nerves     CN II   Visual fields full to confrontation.   Right visual field deficit: none  Left visual field deficit: none     CN III, IV, VI   Pupils are equal, round, and reactive to light.  Extraocular motions are normal.   Right pupil: Size: 3 mm. Shape: regular. Accommodation: intact.   Left pupil: Size: 3 mm. Shape: regular. Accommodation: intact.   CN III: no CN III palsy  CN VI: no CN VI palsy  Nystagmus: none   Diplopia: none  Ophthalmoparesis: none  Upgaze: normal  Downgaze: normal  Conjugate gaze: present    CN V   Facial sensation intact.   Right facial sensation deficit: none  Left facial sensation deficit: none    CN VII   Facial expression full, symmetric.   Right facial weakness: none  Left facial weakness: none    CN VIII   CN VIII normal.     CN IX, X   CN IX normal.   CN X normal.   Palate: symmetric    CN XI   CN XI normal.   Right sternocleidomastoid strength: normal  Left sternocleidomastoid strength: normal  Right trapezius strength: normal  Left trapezius strength: normal    CN XII   CN XII normal.   Tongue deviation: none    Motor Exam   Muscle bulk: normal  Overall muscle tone: normal  Right arm tone: normal  Left arm tone: normal  Right leg tone: normal  Left leg tone: normal    Strength   Strength 5/5 throughout.     Sensory Exam   Right arm light touch: normal  Left arm light touch: normal  Right leg light touch: decreased from toes  Left leg light touch: decreased from toes  Right arm vibration: normal  Left arm vibration: normal  Right leg vibration: decreased from toes  Left leg vibration: decreased from toes  Right arm proprioception: normal  Left arm proprioception: normal  Right leg proprioception: normal  Left leg proprioception: normal  Right arm pinprick: normal  Left arm pinprick: normal  Right leg  pinprick: decreased from toes  Left leg pinprick: decreased from toes    Gait, Coordination, and Reflexes     Gait  Gait: normal    Coordination   Romberg: negative  Finger to nose coordination: normal  Heel to shin coordination: normal  Tandem walking coordination: normal    Tremor   Resting tremor: absent    Reflexes   Right brachioradialis: 2+  Left brachioradialis: 2+  Right biceps: 2+  Left biceps: 2+  Right triceps: 2+  Left triceps: 2+  Right patellar: 2+  Left patellar: 2+  Right achilles: 1+  Left achilles: 1+  Right plantar: normal  Left plantar: normal      Physical Exam   Constitutional: He is oriented to person, place, and time. He appears well-developed and well-nourished.   HENT:   Head: Normocephalic and atraumatic.   Eyes: EOM are normal. Pupils are equal, round, and reactive to light.   Cardiovascular: Intact distal pulses.    Pulmonary/Chest: Effort normal. No respiratory distress.   Musculoskeletal: Normal range of motion.   Neurological: He is alert and oriented to person, place, and time. He has normal strength. He has a normal Finger-Nose-Finger Test, a normal Heel to Shin Test, a normal Romberg Test and a normal Tandem Gait Test. Gait normal.   Reflex Scores:       Tricep reflexes are 2+ on the right side and 2+ on the left side.       Bicep reflexes are 2+ on the right side and 2+ on the left side.       Brachioradialis reflexes are 2+ on the right side and 2+ on the left side.       Patellar reflexes are 2+ on the right side and 2+ on the left side.       Achilles reflexes are 1+ on the right side and 1+ on the left side.  Skin: Skin is warm and dry.   Psychiatric: He has a normal mood and affect. His speech is normal and behavior is normal.       Vitals:    11/13/17 1400   BP: 138/69   BP Location: Left arm   Patient Position: Sitting   BP Method: Large (Automatic)   Pulse: 88   Weight: 70.7 kg (155 lb 13.8 oz)   Height: 6' (1.829 m)       Assessment & Plan:    Problem List Items Addressed  This Visit     Small fiber neuropathy - Primary    Overview     Likely related to alcohol use               Follow-up: Return if symptoms worsen or fail to improve.  More than 50% of this 25 minute encounter was spent in counseling and coordinating care.

## 2017-11-13 NOTE — PROGRESS NOTES
This note was created by combination of typed  and Dragon dictation.  Transcription errors may be present.  If there are any questions, please contact me.    Assessment & Plan  Anxiety disorder, unspecified type - trazodone without efficacy.  Has weaned down xanax use and I congratulated him on his reductions.  Will try buspar - try buspar at night.  If not completely controlled - hopefully will get relief with 1 buspar and 1 xanax at night instead of 2 xanax.  Can try 2 buspar for efficacy.  -     ALPRAZolam (XANAX) 0.5 MG tablet; 2 tablets at night. Try the trazodone first  Dispense: 60 tablet; Refill: 0  -     busPIRone (BUSPAR) 15 MG tablet; Take 1 tablet (15 mg total) by mouth 2 (two) times daily as needed (anxiety). Substitute for dose of xanax.  Dispense: 60 tablet; Refill: 11    Chronic narcotic use  Facet arthritis of lumbar region  Spinal stenosis, lumbar region, with neurogenic claudication  -seems to be using hydrocodone on avg 1 a day, sometimes may take another for breakthrough pain.  Has some left over and refilled it today and let's see how often he needs RF.  Last RF was about 2 months ago.   -     hydrocodone-acetaminophen 10-325mg (NORCO)  mg Tab; Take 1 tablet by mouth every 12 (twelve) hours as needed for Pain.  Dispense: 60 tablet; Refill: 0    Need for vaccination for Strep pneumoniae  -     Pneumococcal Conjugate Vaccine (13 Valent) (IM)    Smokers' cough - check PFTs. No inhaler currently.  -     Complete PFT with bronchodilator; Future    Abdominal aortic atherosclerosis - on statin.      There are no discontinued medications.    Follow-up: No Follow-up on file. follow up 3 months recall entered.      =================================================================      Chief Complaint   Patient presents with    Establish Care       KRISTIN  Moe is a 67 y.o. male, last appointment with this clinic was 9/11/2017.    DM2 with neuropathy, followed by endo;  "gabapentin.  HTN  hyperlipidemia  Smoker, contemplative stage of quitting.  He is not interested in smoking cessation classes.  Spinal stenosis on chronic narcotic therapy, 7/2014 MRI L spine with lumbar spondylosis with mod/severe right neural foraminal stenossi L4-5; significant degenerative disk disease, facet joint arthropathy L4-5, L5-S1; hx of injections; had been seen by pain mgmt 2015 but discharged for THC. hydrocodone 10/325 BID. Takes by his estimate 1.5 tablets a day mainly in the evening.   Hx of Aleve had to stop after surgery for deviated septum. Now he notes various other joints that hurt and are helped by the narcotic therapy. Worse with prolonged standing and walking.  He had talked to neurosurgery, 2015, they had recommended surgery and he is very wary of surgery.  Goal is to try to reduce intake.  Anxiety, Family history of suicide, father committed suicide and his son did as well. alprazolam 0.5 mg TID.  Typically takes 2 at night, maybe 1 in the morning.  8/2/2017, started on zoloft.  Zoloft SE of hypoglycemia/ shakiness so changed to Lexapro. Refilled xanax TID and ultimately goal is wean off. He was going to xanax to 1 tablet at night.  Notes he had a "hard father".  Alcohol use. Has been to Alcoholics Anonymous and has quit before.  Did not really find Alcoholics Anonymous to be that helpful.  Feels like he needs to get himself in his own mind set in order to quit and is not adjusted and further referral at this time.last OV he promised me he would cut down to 3 drinks a day.  Colon polyps, last colonoscopy 2009.  Referred in March for colonoscopy.  4/11/2017 colonoscopy with transverse tubular adenoma  Schatzki ring dilitation; followed by [facility].  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 [place] to be due to EtOH.    Last visit - insomnia - trial trazodone instead of xanax.  Slow wean.  Increased lexapro. He had cut out morning dose of xanax.  Cut down on EtOH.   Consider buspar    His " sugars he reports are all over the place.  Needs to email his data points to endo.  He attributes this to recent high stress.  Has been eating the same things.    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      He's been trying to limit his Xanax and his hydrocodone.  He last filled these 2 months ago and he estimates he has maybe 20 pills of Xanax left.  He's really been making a concerted effort to cut down.  However he did not find the trazodone effective so he still takes the Xanax at night.  No longer taking in the morning although he did have 1 day that was particularly stressful and he did take 1 in the morning.  Tries to stagger his Xanax and his hydrocodone use as we had discussed the high risk nature of both of these medications.      He continues to take the hydrocodone but reports that he still has some hydrocodone left over as well.  Takes one at a time.      He is taking a step backwards in his drinking unfortunately.  Due to the recent stressors.      Continues to smoke.  Recent URI and cough with that.  ENT called in an antibiotic for him.  Sometimes with inspiration - pain under the shoulder blade but that's better.  Notes a chronic cough however, productive throughout the day. Recalls hx of PFTs remotely.      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 09/11/2017    Chronic narcotic use  08/02/2017    Special screening for malignant neoplasms, colon 04/11/2017    Deviated nasal septum 04/19/2016    Chronic back pain greater than 3 months duration 01/06/2016    Small fiber neuropathy 07/29/2015     Likely related to alcohol use      Facet arthritis of lumbar region 10/08/2014    Spondylosis without myelopathy 08/13/2014    Degeneration of lumbar or lumbosacral intervertebral disc 08/13/2014    Spinal stenosis, lumbar region, with neurogenic claudication 08/13/2014    Tubular adenoma of colon 04/22/2014 4/11/2017 colonoscopy with transverse tubular adenoma      DM type 2 with diabetic peripheral neuropathy 07/24/2013    Hypertriglyceridemia 06/13/2013    Anxiety disorder 06/12/2013    Alcohol use disorder 06/12/2013    Smokes 2 packs of cigarettes per day 06/12/2013       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    Cataract     Diabetes mellitus     Hypertension     Pancreatitis 2008    2 Times       PAST SURGICAL HISTORY:  Past Surgical History:   Procedure Laterality Date     thumb surgery      Dead Skin cell tumor Rt thumb    COLONOSCOPY N/A 4/11/2017    Procedure: COLONOSCOPY;  Surgeon: Meet Quesada MD;  [facility];  Service: Endoscopy;  Laterality: N/A;    MOUTH FLOOR MASS EXCISION  2009    NASAL SEPTUM SURGERY      SKIN SURGERY         SOCIAL HISTORY:  Social History     Social History    Marital status:      Spouse name: N/A    Number of children: N/A    Years of education: N/A     Occupational History    , self employed      Social History Main Topics    Smoking status: Current Every Day Smoker     Packs/day: 1.50     Years: 45.00    Smokeless tobacco: Never Used    Alcohol use 0.5 oz/week     1 Standard drinks or equivalent per week      Comment: daily scotch and water, half pint    Drug use:      Types: Marijuana       "Comment: rarely    Sexual activity: Yes     Partners: Female     Other Topics Concern    Not on file     Social History Narrative    , 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  One child committed suicide.       ALLERGIES AND MEDICATIONS: updated and reviewed.  Review of patient's allergies indicates:   Allergen Reactions    Amoxicillin Nausea And Vomiting    Adhesive Rash     Paper tape only     Current Outpatient Prescriptions   Medication Sig Dispense Refill    alprazolam (XANAX) 0.5 MG tablet 2 tablets at night. Try the trazodone first 90 tablet 0    atorvastatin (LIPITOR) 20 MG tablet TAKE 1 TABLET BY MOUTH ONCE DAILY 90 tablet 3    azelastine (ASTELIN) 137 mcg (0.1 %) nasal spray   11    BD ULTRA-FINE RENETTA PEN NEEDLES 32 gauge x 5/32" Ndle TO USE WITH MULTIPLE DAILY INJECTIONS 150 each 12    blood sugar diagnostic (ACCU-CHEK KM PLUS TEST STRP) Strp USE TO TEST UPTO 6 TIMES DAILY 550 strip 3    cyclobenzaprine (FLEXERIL) 10 MG tablet Take 1 tablet (10 mg total) by mouth 3 (three) times daily as needed. 90 tablet 2    desoximetasone (TOPICORT) 0.25 % cream Apply topically 2 (two) times daily.      escitalopram oxalate (LEXAPRO) 10 MG tablet Take 1 tablet (10 mg total) by mouth once daily. 30 tablet 11    fluticasone (FLONASE) 50 mcg/actuation nasal spray 2 sprays by Each Nare route once daily.      gabapentin (NEURONTIN) 600 MG tablet Take 2 tablets (1,200 mg total) by mouth 3 (three) times daily. 180 tablet 2    HUMALOG KWIKPEN 100 unit/mL InPn pen USE WITH INSULIN TO CARB RATION 1:8 WITH BREAKFAST 1:13 WITH LUNCH 1:12 WITH DINNER PLUS CORRECTION 15 Syringe 5    hydrocodone-acetaminophen 10-325mg (NORCO)  mg Tab Take 1 tablet by mouth every 12 (twelve) hours as needed for Pain. 60 tablet 0    L-METHYL-B6-B12 3-35-2 mg Tab TAKE 1 TABLET BY MOUTH 2 (TWO) TIMES DAILY. 60 tablet 5    lancets (ACCU-CHEK FASTCLIX) Misc " USE TO TEST 6 TIMES A  each 3    lisinopril (PRINIVIL,ZESTRIL) 5 MG tablet Take 1 tablet (5 mg total) by mouth once daily. 30 tablet 6    trazodone (DESYREL) 50 MG tablet 1/2 to 1 tablet at night for sleep; instead of xanax 30 tablet 11     No current facility-administered medications for this visit.        Review of Systems   Constitutional: Negative for chills and fever.   HENT: Positive for congestion.    Respiratory: Positive for cough and sputum production. Negative for hemoptysis.    Cardiovascular: Negative for chest pain and palpitations.       Physical Exam   Vitals:    11/14/17 1457   BP: 128/74   Pulse: 84   Temp: 98.2 °F (36.8 °C)   SpO2: 95%   Weight: 72.9 kg (160 lb 11.5 oz)   Height: 6' (1.829 m)    Body mass index is 21.8 kg/m².  Weight: 72.9 kg (160 lb 11.5 oz)   Height: 6' (182.9 cm)     Physical Exam   Constitutional: He is oriented to person, place, and time. He appears well-developed and well-nourished. No distress.   Eyes: EOM are normal.   Cardiovascular: Normal rate, regular rhythm and normal heart sounds.    No murmur heard.  Pulmonary/Chest: Effort normal and breath sounds normal.   Musculoskeletal: Normal range of motion.   Neurological: He is alert and oriented to person, place, and time. Coordination normal.   Skin: Skin is warm and dry.   Psychiatric: He has a normal mood and affect. His behavior is normal. Thought content normal.

## 2017-11-14 ENCOUNTER — OFFICE VISIT (OUTPATIENT)
Dept: FAMILY MEDICINE | Facility: CLINIC | Age: 67
End: 2017-11-14
Payer: MEDICARE

## 2017-11-14 VITALS
HEIGHT: 72 IN | SYSTOLIC BLOOD PRESSURE: 128 MMHG | OXYGEN SATURATION: 95 % | BODY MASS INDEX: 21.77 KG/M2 | TEMPERATURE: 98 F | DIASTOLIC BLOOD PRESSURE: 74 MMHG | WEIGHT: 160.69 LBS | HEART RATE: 84 BPM

## 2017-11-14 DIAGNOSIS — Z23 NEED FOR VACCINATION FOR STREP PNEUMONIAE: ICD-10-CM

## 2017-11-14 DIAGNOSIS — M48.062 SPINAL STENOSIS, LUMBAR REGION, WITH NEUROGENIC CLAUDICATION: ICD-10-CM

## 2017-11-14 DIAGNOSIS — I70.0 ABDOMINAL AORTIC ATHEROSCLEROSIS: ICD-10-CM

## 2017-11-14 DIAGNOSIS — F11.90 CHRONIC NARCOTIC USE: ICD-10-CM

## 2017-11-14 DIAGNOSIS — J41.0 SMOKERS' COUGH: ICD-10-CM

## 2017-11-14 DIAGNOSIS — M47.816 FACET ARTHRITIS OF LUMBAR REGION: Primary | ICD-10-CM

## 2017-11-14 DIAGNOSIS — F41.9 ANXIETY DISORDER, UNSPECIFIED TYPE: ICD-10-CM

## 2017-11-14 PROCEDURE — G0009 ADMIN PNEUMOCOCCAL VACCINE: HCPCS | Mod: S$GLB,,, | Performed by: INTERNAL MEDICINE

## 2017-11-14 PROCEDURE — 99999 PR PBB SHADOW E&M-EST. PATIENT-LVL III: CPT | Mod: PBBFAC,,, | Performed by: INTERNAL MEDICINE

## 2017-11-14 PROCEDURE — 99214 OFFICE O/P EST MOD 30 MIN: CPT | Mod: S$GLB,,, | Performed by: INTERNAL MEDICINE

## 2017-11-14 PROCEDURE — 90670 PCV13 VACCINE IM: CPT | Mod: S$GLB,,, | Performed by: INTERNAL MEDICINE

## 2017-11-14 RX ORDER — HYDROCODONE BITARTRATE AND ACETAMINOPHEN 10; 325 MG/1; MG/1
1 TABLET ORAL EVERY 12 HOURS PRN
Qty: 60 TABLET | Refills: 0 | Status: ON HOLD | OUTPATIENT
Start: 2017-11-14 | End: 2018-01-03 | Stop reason: HOSPADM

## 2017-11-14 RX ORDER — ALPRAZOLAM 0.5 MG/1
TABLET ORAL
Qty: 60 TABLET | Refills: 0 | Status: SHIPPED | OUTPATIENT
Start: 2017-11-14 | End: 2018-01-10 | Stop reason: SDUPTHER

## 2017-11-14 RX ORDER — BUSPIRONE HYDROCHLORIDE 15 MG/1
15 TABLET ORAL 2 TIMES DAILY PRN
Qty: 60 TABLET | Refills: 11 | Status: SHIPPED | OUTPATIENT
Start: 2017-11-14 | End: 2018-03-01 | Stop reason: SDUPTHER

## 2017-11-14 NOTE — PROGRESS NOTES
Prevnar-13 vaccine administered, august well. Instructed to wait 15mins for observation, no reaction noted @ time of discharge.

## 2017-11-14 NOTE — PATIENT INSTRUCTIONS
STOP TRAZODONE.    WILL TRY BUSPAR AND SEE IF IT WORKS AS WELL AS XANAX - TRY BUSPAR BY ITSELF AT NIGHT.  IF STILL WITH ANXIETY - MAY TAKE 1 XANAX.  GOAL IS TO REPLACE XANAX COMPLETELY.    IF BUSPAR NO SIDE EFFECTS BUT NOT FULLY EFFECTIVE - MAY TRY 2.

## 2017-11-20 ENCOUNTER — PATIENT MESSAGE (OUTPATIENT)
Dept: ENDOCRINOLOGY | Facility: CLINIC | Age: 67
End: 2017-11-20

## 2017-11-24 ENCOUNTER — PATIENT MESSAGE (OUTPATIENT)
Dept: ENDOCRINOLOGY | Facility: CLINIC | Age: 67
End: 2017-11-24

## 2017-12-02 ENCOUNTER — PATIENT MESSAGE (OUTPATIENT)
Dept: ENDOCRINOLOGY | Facility: CLINIC | Age: 67
End: 2017-12-02

## 2017-12-04 DIAGNOSIS — E11.21 TYPE 2 DIABETES WITH NEPHROPATHY: ICD-10-CM

## 2017-12-04 RX ORDER — ATORVASTATIN CALCIUM 20 MG/1
20 TABLET, FILM COATED ORAL DAILY
Qty: 90 TABLET | Refills: 1 | Status: SHIPPED | OUTPATIENT
Start: 2017-12-04 | End: 2018-03-07 | Stop reason: SDUPTHER

## 2017-12-04 RX ORDER — LISINOPRIL 5 MG/1
5 TABLET ORAL DAILY
Qty: 30 TABLET | Refills: 6 | Status: SHIPPED | OUTPATIENT
Start: 2017-12-04 | End: 2018-07-10 | Stop reason: SDUPTHER

## 2017-12-04 RX ORDER — INSULIN ASPART 100 [IU]/ML
INJECTION, SOLUTION INTRAVENOUS; SUBCUTANEOUS
Qty: 15 SYRINGE | Refills: 5 | Status: SHIPPED | OUTPATIENT
Start: 2017-12-04 | End: 2018-02-05 | Stop reason: ALTCHOICE

## 2017-12-07 ENCOUNTER — HOSPITAL ENCOUNTER (OUTPATIENT)
Dept: RESPIRATORY THERAPY | Facility: HOSPITAL | Age: 67
Discharge: HOME | End: 2017-12-07
Attending: INTERNAL MEDICINE
Payer: MEDICARE

## 2017-12-07 DIAGNOSIS — J41.0 SMOKERS' COUGH: ICD-10-CM

## 2017-12-07 PROCEDURE — 94010 BREATHING CAPACITY TEST: CPT

## 2017-12-07 PROCEDURE — 94727 GAS DIL/WSHOT DETER LNG VOL: CPT

## 2017-12-07 PROCEDURE — 94729 DIFFUSING CAPACITY: CPT

## 2017-12-13 DIAGNOSIS — J43.8 OTHER EMPHYSEMA: Primary | ICD-10-CM

## 2017-12-13 NOTE — PROGRESS NOTES
PFT showed mild airflow obstruction.  Lung volume showing air trapping.  DLCO reduced. no O2 desat.  This looks like COPD

## 2018-01-01 ENCOUNTER — HOSPITAL ENCOUNTER (INPATIENT)
Facility: HOSPITAL | Age: 68
LOS: 2 days | Discharge: HOME HEALTH | DRG: 200 | End: 2018-01-03
Attending: EMERGENCY MEDICINE | Admitting: SURGERY
Payer: MEDICARE

## 2018-01-01 DIAGNOSIS — J93.9 PNEUMOTHORAX: ICD-10-CM

## 2018-01-01 DIAGNOSIS — R05.9 COUGH: ICD-10-CM

## 2018-01-01 DIAGNOSIS — S27.2XXA: ICD-10-CM

## 2018-01-01 DIAGNOSIS — S22.42XA CLOSED FRACTURE OF MULTIPLE RIBS OF LEFT SIDE, INITIAL ENCOUNTER: Primary | ICD-10-CM

## 2018-01-01 PROBLEM — J44.89: Status: ACTIVE | Noted: 2017-11-14

## 2018-01-01 PROBLEM — T79.7XXA SUBCUTANEOUS EMPHYSEMA DUE TO TRAUMA: Status: ACTIVE | Noted: 2018-01-01

## 2018-01-01 PROBLEM — J43.9: Status: ACTIVE | Noted: 2017-11-14

## 2018-01-01 PROBLEM — K86.0 ALCOHOL-INDUCED CHRONIC PANCREATITIS: Status: ACTIVE | Noted: 2018-01-01

## 2018-01-01 LAB
ALBUMIN SERPL BCP-MCNC: 3.8 G/DL
ALP SERPL-CCNC: 86 U/L
ALT SERPL W/O P-5'-P-CCNC: 19 U/L
ANION GAP SERPL CALC-SCNC: 11 MMOL/L
AST SERPL-CCNC: 33 U/L
BASOPHILS # BLD AUTO: 0.03 K/UL
BASOPHILS NFR BLD: 0.3 %
BILIRUB SERPL-MCNC: 0.6 MG/DL
BUN SERPL-MCNC: 22 MG/DL
CALCIUM SERPL-MCNC: 10 MG/DL
CHLORIDE SERPL-SCNC: 94 MMOL/L
CO2 SERPL-SCNC: 27 MMOL/L
CREAT SERPL-MCNC: 1.4 MG/DL
DIFFERENTIAL METHOD: ABNORMAL
EOSINOPHIL # BLD AUTO: 0.3 K/UL
EOSINOPHIL NFR BLD: 2.2 %
ERYTHROCYTE [DISTWIDTH] IN BLOOD BY AUTOMATED COUNT: 12.9 %
EST. GFR  (AFRICAN AMERICAN): >60 ML/MIN/1.73 M^2
EST. GFR  (NON AFRICAN AMERICAN): 52 ML/MIN/1.73 M^2
GLUCOSE SERPL-MCNC: 296 MG/DL
HCT VFR BLD AUTO: 39 %
HGB BLD-MCNC: 13.4 G/DL
LACTATE SERPL-SCNC: 1.3 MMOL/L
LYMPHOCYTES # BLD AUTO: 1.7 K/UL
LYMPHOCYTES NFR BLD: 14.5 %
MCH RBC QN AUTO: 33.1 PG
MCHC RBC AUTO-ENTMCNC: 34.4 G/DL
MCV RBC AUTO: 96 FL
MONOCYTES # BLD AUTO: 0.8 K/UL
MONOCYTES NFR BLD: 6.8 %
NEUTROPHILS # BLD AUTO: 8.9 K/UL
NEUTROPHILS NFR BLD: 76.2 %
PLATELET # BLD AUTO: 267 K/UL
PMV BLD AUTO: 10 FL
POCT GLUCOSE: 158 MG/DL (ref 70–110)
POCT GLUCOSE: 179 MG/DL (ref 70–110)
POCT GLUCOSE: 281 MG/DL (ref 70–110)
POTASSIUM SERPL-SCNC: 4.9 MMOL/L
PROT SERPL-MCNC: 7 G/DL
RBC # BLD AUTO: 4.05 M/UL
SODIUM SERPL-SCNC: 132 MMOL/L
WBC # BLD AUTO: 11.62 K/UL

## 2018-01-01 PROCEDURE — 63600175 PHARM REV CODE 636 W HCPCS: Performed by: EMERGENCY MEDICINE

## 2018-01-01 PROCEDURE — 96375 TX/PRO/DX INJ NEW DRUG ADDON: CPT

## 2018-01-01 PROCEDURE — 93005 ELECTROCARDIOGRAM TRACING: CPT

## 2018-01-01 PROCEDURE — 82962 GLUCOSE BLOOD TEST: CPT

## 2018-01-01 PROCEDURE — 99285 EMERGENCY DEPT VISIT HI MDM: CPT | Mod: 25

## 2018-01-01 PROCEDURE — 11000001 HC ACUTE MED/SURG PRIVATE ROOM

## 2018-01-01 PROCEDURE — 0W9B30Z DRAINAGE OF LEFT PLEURAL CAVITY WITH DRAINAGE DEVICE, PERCUTANEOUS APPROACH: ICD-10-PCS | Performed by: EMERGENCY MEDICINE

## 2018-01-01 PROCEDURE — 85025 COMPLETE CBC W/AUTO DIFF WBC: CPT

## 2018-01-01 PROCEDURE — 93010 ELECTROCARDIOGRAM REPORT: CPT | Mod: ,,, | Performed by: INTERNAL MEDICINE

## 2018-01-01 PROCEDURE — 96376 TX/PRO/DX INJ SAME DRUG ADON: CPT

## 2018-01-01 PROCEDURE — 25000003 PHARM REV CODE 250: Performed by: INTERNAL MEDICINE

## 2018-01-01 PROCEDURE — 96374 THER/PROPH/DIAG INJ IV PUSH: CPT

## 2018-01-01 PROCEDURE — 83605 ASSAY OF LACTIC ACID: CPT

## 2018-01-01 PROCEDURE — 63600175 PHARM REV CODE 636 W HCPCS: Performed by: STUDENT IN AN ORGANIZED HEALTH CARE EDUCATION/TRAINING PROGRAM

## 2018-01-01 PROCEDURE — 32551 INSERTION OF CHEST TUBE: CPT

## 2018-01-01 PROCEDURE — 94799 UNLISTED PULMONARY SVC/PX: CPT

## 2018-01-01 PROCEDURE — 25000003 PHARM REV CODE 250: Performed by: STUDENT IN AN ORGANIZED HEALTH CARE EDUCATION/TRAINING PROGRAM

## 2018-01-01 PROCEDURE — 80053 COMPREHEN METABOLIC PANEL: CPT

## 2018-01-01 PROCEDURE — 25000003 PHARM REV CODE 250

## 2018-01-01 PROCEDURE — 94761 N-INVAS EAR/PLS OXIMETRY MLT: CPT

## 2018-01-01 RX ORDER — INSULIN ASPART 100 [IU]/ML
1-10 INJECTION, SOLUTION INTRAVENOUS; SUBCUTANEOUS
Status: DISCONTINUED | OUTPATIENT
Start: 2018-01-01 | End: 2018-01-03 | Stop reason: HOSPADM

## 2018-01-01 RX ORDER — GLUCAGON 1 MG
1 KIT INJECTION
Status: DISCONTINUED | OUTPATIENT
Start: 2018-01-01 | End: 2018-01-03 | Stop reason: HOSPADM

## 2018-01-01 RX ORDER — OXYCODONE AND ACETAMINOPHEN 10; 325 MG/1; MG/1
1 TABLET ORAL EVERY 4 HOURS PRN
Status: DISCONTINUED | OUTPATIENT
Start: 2018-01-01 | End: 2018-01-03 | Stop reason: HOSPADM

## 2018-01-01 RX ORDER — MORPHINE SULFATE 10 MG/ML
2 INJECTION INTRAMUSCULAR; INTRAVENOUS; SUBCUTANEOUS EVERY 4 HOURS PRN
Status: DISCONTINUED | OUTPATIENT
Start: 2018-01-01 | End: 2018-01-01

## 2018-01-01 RX ORDER — ONDANSETRON 2 MG/ML
4 INJECTION INTRAMUSCULAR; INTRAVENOUS EVERY 8 HOURS PRN
Status: DISCONTINUED | OUTPATIENT
Start: 2018-01-01 | End: 2018-01-03 | Stop reason: HOSPADM

## 2018-01-01 RX ORDER — ALPRAZOLAM 0.25 MG/1
0.25 TABLET ORAL 2 TIMES DAILY PRN
Status: DISCONTINUED | OUTPATIENT
Start: 2018-01-01 | End: 2018-01-01

## 2018-01-01 RX ORDER — ENOXAPARIN SODIUM 100 MG/ML
30 INJECTION SUBCUTANEOUS EVERY 12 HOURS
Status: DISCONTINUED | OUTPATIENT
Start: 2018-01-01 | End: 2018-01-02

## 2018-01-01 RX ORDER — ATORVASTATIN CALCIUM 10 MG/1
20 TABLET, FILM COATED ORAL DAILY
Status: DISCONTINUED | OUTPATIENT
Start: 2018-01-01 | End: 2018-01-03 | Stop reason: HOSPADM

## 2018-01-01 RX ORDER — KETOROLAC TROMETHAMINE 30 MG/ML
15 INJECTION, SOLUTION INTRAMUSCULAR; INTRAVENOUS EVERY 6 HOURS
Status: DISCONTINUED | OUTPATIENT
Start: 2018-01-01 | End: 2018-01-03 | Stop reason: HOSPADM

## 2018-01-01 RX ORDER — IBUPROFEN 200 MG
16 TABLET ORAL
Status: DISCONTINUED | OUTPATIENT
Start: 2018-01-01 | End: 2018-01-03 | Stop reason: HOSPADM

## 2018-01-01 RX ORDER — IBUPROFEN 200 MG
24 TABLET ORAL
Status: DISCONTINUED | OUTPATIENT
Start: 2018-01-01 | End: 2018-01-03 | Stop reason: HOSPADM

## 2018-01-01 RX ORDER — THIAMINE HCL 100 MG
100 TABLET ORAL DAILY
Status: DISCONTINUED | OUTPATIENT
Start: 2018-01-01 | End: 2018-01-03 | Stop reason: HOSPADM

## 2018-01-01 RX ORDER — LIDOCAINE HYDROCHLORIDE 10 MG/ML
INJECTION INFILTRATION; PERINEURAL
Status: COMPLETED
Start: 2018-01-01 | End: 2018-01-01

## 2018-01-01 RX ORDER — LIDOCAINE HYDROCHLORIDE 10 MG/ML
5 INJECTION INFILTRATION; PERINEURAL
Status: COMPLETED | OUTPATIENT
Start: 2018-01-01 | End: 2018-01-01

## 2018-01-01 RX ORDER — LISINOPRIL 5 MG/1
5 TABLET ORAL DAILY
Status: DISCONTINUED | OUTPATIENT
Start: 2018-01-01 | End: 2018-01-03 | Stop reason: HOSPADM

## 2018-01-01 RX ORDER — MORPHINE SULFATE 10 MG/ML
6 INJECTION INTRAMUSCULAR; INTRAVENOUS; SUBCUTANEOUS
Status: COMPLETED | OUTPATIENT
Start: 2018-01-01 | End: 2018-01-01

## 2018-01-01 RX ORDER — ESCITALOPRAM OXALATE 10 MG/1
10 TABLET ORAL DAILY
Status: DISCONTINUED | OUTPATIENT
Start: 2018-01-01 | End: 2018-01-03 | Stop reason: HOSPADM

## 2018-01-01 RX ORDER — IPRATROPIUM BROMIDE AND ALBUTEROL SULFATE 2.5; .5 MG/3ML; MG/3ML
3 SOLUTION RESPIRATORY (INHALATION)
Status: DISCONTINUED | OUTPATIENT
Start: 2018-01-01 | End: 2018-01-01

## 2018-01-01 RX ORDER — ONDANSETRON 2 MG/ML
4 INJECTION INTRAMUSCULAR; INTRAVENOUS
Status: COMPLETED | OUTPATIENT
Start: 2018-01-01 | End: 2018-01-01

## 2018-01-01 RX ORDER — LORAZEPAM 2 MG/ML
1 INJECTION INTRAMUSCULAR EVERY 4 HOURS PRN
Status: DISCONTINUED | OUTPATIENT
Start: 2018-01-01 | End: 2018-01-03 | Stop reason: HOSPADM

## 2018-01-01 RX ORDER — MORPHINE SULFATE 10 MG/ML
2 INJECTION INTRAMUSCULAR; INTRAVENOUS; SUBCUTANEOUS
Status: COMPLETED | OUTPATIENT
Start: 2018-01-01 | End: 2018-01-01

## 2018-01-01 RX ORDER — HYDROMORPHONE HYDROCHLORIDE 2 MG/ML
1 INJECTION, SOLUTION INTRAMUSCULAR; INTRAVENOUS; SUBCUTANEOUS EVERY 4 HOURS PRN
Status: DISCONTINUED | OUTPATIENT
Start: 2018-01-01 | End: 2018-01-03 | Stop reason: HOSPADM

## 2018-01-01 RX ADMIN — MORPHINE SULFATE 2 MG: 10 INJECTION INTRAVENOUS at 03:01

## 2018-01-01 RX ADMIN — ESCITALOPRAM OXALATE 10 MG: 10 TABLET ORAL at 01:01

## 2018-01-01 RX ADMIN — HYDROMORPHONE HYDROCHLORIDE 1 MG: 2 INJECTION INTRAMUSCULAR; INTRAVENOUS; SUBCUTANEOUS at 07:01

## 2018-01-01 RX ADMIN — OXYCODONE HYDROCHLORIDE AND ACETAMINOPHEN 1 TABLET: 10; 325 TABLET ORAL at 02:01

## 2018-01-01 RX ADMIN — LISINOPRIL 5 MG: 5 TABLET ORAL at 01:01

## 2018-01-01 RX ADMIN — KETOROLAC TROMETHAMINE 15 MG: 30 INJECTION, SOLUTION INTRAMUSCULAR at 05:01

## 2018-01-01 RX ADMIN — Medication 100 MG: at 09:01

## 2018-01-01 RX ADMIN — THERA TABS 1 TABLET: TAB at 09:01

## 2018-01-01 RX ADMIN — KETOROLAC TROMETHAMINE 15 MG: 30 INJECTION, SOLUTION INTRAMUSCULAR at 11:01

## 2018-01-01 RX ADMIN — ATORVASTATIN CALCIUM 20 MG: 10 TABLET, FILM COATED ORAL at 01:01

## 2018-01-01 RX ADMIN — MORPHINE SULFATE 6 MG: 10 INJECTION INTRAVENOUS at 03:01

## 2018-01-01 RX ADMIN — ONDANSETRON 4 MG: 2 INJECTION INTRAMUSCULAR; INTRAVENOUS at 03:01

## 2018-01-01 RX ADMIN — LIDOCAINE HYDROCHLORIDE 200 MG: 10 INJECTION INFILTRATION; PERINEURAL at 05:01

## 2018-01-01 RX ADMIN — OXYCODONE HYDROCHLORIDE AND ACETAMINOPHEN 1 TABLET: 10; 325 TABLET ORAL at 08:01

## 2018-01-01 RX ADMIN — ENOXAPARIN SODIUM 30 MG: 100 INJECTION SUBCUTANEOUS at 08:01

## 2018-01-01 RX ADMIN — OXYCODONE HYDROCHLORIDE AND ACETAMINOPHEN 1 TABLET: 10; 325 TABLET ORAL at 10:01

## 2018-01-01 RX ADMIN — ALPRAZOLAM 0.25 MG: 0.25 TABLET ORAL at 02:01

## 2018-01-01 RX ADMIN — LIDOCAINE HYDROCHLORIDE 200 MG: 10 INJECTION, SOLUTION INFILTRATION; PERINEURAL at 05:01

## 2018-01-01 NOTE — NURSING
Admit note  Arrived via stretcher per transport, accompanied by spouse. Has 18 g to right ac, medicated with dilaudid for pain 7/10. Chest tube to left chest tube to low continuous wall suction. Applied safety socks, allergy and fall risk bands. Admit packet given to patient at bedside, report given to am nurse.

## 2018-01-01 NOTE — H&P
"                        General Surgery H&P    Chief complaint: Cough, rib pain    HPI: Mr. Ren is a 66 yo man with history of DM, alcoholism who presented to the ED with complaints of coughin. The patient reports that two days prior he fell onto a planter after tripping on the carpet. He fell onto his left side and denies any LOC. He reports significant left-sided back pain. He denies any hematemesis, fever/chills, nausea/vomiting, or chest pain.    Past Medical History:  Diabetes mellitus  Alcoholism  Chronic pancreatitis  SCC mouth  Skin cancer  Arthritis  Anxiety   Depression    Past Surgical History:  Thumb, skin cancer removal  Excision of SCC from mouth  Nasal septum    Current Medications:  No current facility-administered medications on file prior to encounter.      Current Outpatient Prescriptions on File Prior to Encounter   Medication Sig Dispense Refill    ALPRAZolam (XANAX) 0.5 MG tablet 2 tablets at night. Try the trazodone first 60 tablet 0    atorvastatin (LIPITOR) 20 MG tablet Take 1 tablet (20 mg total) by mouth once daily. 90 tablet 1    azelastine (ASTELIN) 137 mcg (0.1 %) nasal spray   11    BD ULTRA-FINE RENETTA PEN NEEDLES 32 gauge x 5/32" Ndle TO USE WITH MULTIPLE DAILY INJECTIONS 150 each 12    blood sugar diagnostic (ACCU-CHEK KM PLUS TEST STRP) Strp USE TO TEST UPTO 6 TIMES DAILY 550 strip 3    busPIRone (BUSPAR) 15 MG tablet Take 1 tablet (15 mg total) by mouth 2 (two) times daily as needed (anxiety). Substitute for dose of xanax. 60 tablet 11    cyclobenzaprine (FLEXERIL) 10 MG tablet Take 1 tablet (10 mg total) by mouth 3 (three) times daily as needed. 90 tablet 2    desoximetasone (TOPICORT) 0.25 % cream Apply topically 2 (two) times daily.      escitalopram oxalate (LEXAPRO) 10 MG tablet Take 1 tablet (10 mg total) by mouth once daily. 30 tablet 11    fluticasone (FLONASE) 50 mcg/actuation nasal spray 2 sprays by Each Nare route once daily.      gabapentin (NEURONTIN) " 600 MG tablet Take 2 tablets (1,200 mg total) by mouth 3 (three) times daily. 180 tablet 2    hydrocodone-acetaminophen 10-325mg (NORCO)  mg Tab Take 1 tablet by mouth every 12 (twelve) hours as needed for Pain. 60 tablet 0    insulin aspart (NOVOLOG FLEXPEN) 100 unit/mL InPn pen USE WITH INSULIN TO CARB RATIO 1:6 WITH BREAKFAST 1:16 WITH LUNCH 1:13 WITH DINNER PLUS CORRECTION 15 Syringe 5    ipratropium (ATROVENT HFA) 17 mcg/actuation inhaler Inhale 2 puffs into the lungs every 6 (six) hours. Rescue 12.9 g 5    lancets (ACCU-CHEK FASTCLIX) Misc USE TO TEST 6 TIMES A  each 3    lisinopril (PRINIVIL,ZESTRIL) 5 MG tablet Take 1 tablet (5 mg total) by mouth once daily. 30 tablet 6    L-METHYL-B6-B12 3-35-2 mg Tab TAKE 1 TABLET BY MOUTH 2 (TWO) TIMES DAILY. 60 tablet 5     Allergies:  Amoxicillin    Social History:  Smokes cigarettes daily. Drinks 5-6 glasses of Scotch per day. , owns business.    Family History:  Leukemia- mother  Lung cancer- brother    Review of Systems:  General ROS: negative for chills or fever  ENT ROS: negative for epistaxis or nasal congestion  Resp ROS: reports cough and shortness of breath, no wheezing  Cardiovascular ROS: no chest pain or palpitations  Gl ROS: no abdominal pain, black or bloody stools   ROS: no dysuria, trouble voiding, or hematuria  MSK ROS: negative for muscle pain or weakness  Neurological ROS: no TIA or stroke symptoms  Dermatological ROS: negative for rashes, itching    Vitals:  Vitals:    01/01/18 0617 01/01/18 0618 01/01/18 0818 01/01/18 1231   BP:  (!) 145/67 122/74 105/64   BP Location:  Left arm Right arm Right arm   Patient Position:  Lying Lying Lying   Pulse:  71 81 78   Resp:  16 18 19   Temp:  98.7 °F (37.1 °C) 97.9 °F (36.6 °C) 98.3 °F (36.8 °C)   TempSrc:  Oral Oral Oral   SpO2:  97% (!) 92% 95%   Weight: 74.2 kg (163 lb 9.3 oz)      Height: 6' (1.829 m)          Physical Exam:  General appearance: awake, alert, no  acute distress  HEENT: EOMI, anicteric, moist oral mucosal membranes  Chest: left sided chest tube in place with +air leak, bloody drainage, crepitus along anterior/posterior left chest wall  CV: S1, S2; no murmurs; RRR  Pulm: normal WOB, CTAB  Abd: +bowel sounds, soft, NTND  Extremities: warm, well-perfused, no edema, 1+ distal pulses  Neuro: CN II-XII grossly intact, normal speech  Skin: warm, dry, no rashes  Psych: appropriate mood and affect    Labs:  WBC 11.6, Hgb 13.4, Hct 39, plt 267  Na 132, K 4.9, BUN 22, Cr 1.4, glucose 296    Radiology:  CXR, 1/1/18: Large left-sided tension pneumothorax associated left-sided pleural effusion, consistent with large left-sided hydro-pneumothorax.    CT chest, 1/1/18: Interval placement of left-sided thoracostomy tube with reexpansion of the left lung. There is a tiny residual left-sided pneumothorax with associated small volume of left-sided pleural fluid. Acute left sided eighth through 10th posterior rib fractures. Moderate degree of subcutaneous emphysema about the left chest wall extending into the base of left neck. Bilateral patchy groundglass opacities throughout the lungs, right greater than left, which may represent edema, ARDS, infection, hemorrhage, or aspiration. Bandlike opacity within the left lower lobe may represent atelectasis and/or consolidation. Left lower lobe dependent atelectasis. Diffuse emphysematous changes of the lungs. Abnormal appearance of the visualized pancreatic body and tail which demonstrates coarse calcifications with dilatation of the main pancreatic duct suggesting sequela of chronic pancreatitis. Consider nonemergent followup dedicated pancreatic cross-sectional imaging.    Assessment/Plan:  68 yo man with history of DM, alcoholism, anxiety/depression presenting with a traumatic hemo/pneumothorax, left 8-10th posterior rib fractures from a fall, s/p left-sided chest tube placement, 1/1/18    -Admit to floor  -Chest tube to wall  suction  -Daily CXR  -MVI/Thiamine daily, monitor closely for signs of EtOH withdrawal  -Home dose lisinopril/statin  -Hospitalist consult for management of diabetes, HbgA1C depending  -Pain control for rib fractures, IV toradol/percocet  -Smoking cessation  -IS 10x/hr, encourage deep breaths  -OOB tid  -Lovenox/SCDs for DVT prophylaxis    Caroline Parikh MD  General Surgery

## 2018-01-01 NOTE — PLAN OF CARE
01/01/18 1200   Discharge Assessment   Assessment Type Discharge Planning Assessment   Confirmed/corrected address and phone number on facesheet? Yes   Assessment information obtained from? Patient;Caregiver   Prior to hospitilization cognitive status: Alert/Oriented   Prior to hospitalization functional status: Independent   Current cognitive status: Alert/Oriented   Current Functional Status: Independent   Lives With spouse   Able to Return to Prior Arrangements yes   Is patient able to care for self after discharge? Yes   Who are your caregiver(s) and their phone number(s)? Shanna pt spouse [phone]   Patient's perception of discharge disposition admitted as an inpatient   Readmission Within The Last 30 Days no previous admission in last 30 days   Patient currently being followed by outpatient case management? No   Patient currently receives any other outside agency services? No   Equipment Currently Used at Home none   Do you have any problems affording any of your prescribed medications? No   Is the patient taking medications as prescribed? yes   Does the patient have transportation home? Yes   Transportation Available car;family or friend will provide   Discharge Plan A Home with family   Discharge Plan B Home with family   Patient/Family In Agreement With Plan yes

## 2018-01-01 NOTE — NURSING
Changed dsg to left chest tube, applied drain gauze, covered with plain gauze and secured with medipore tape.

## 2018-01-01 NOTE — NURSING
Patient states that he check his own blood sugar and give himself insulin for better control. Spoke with Dr. Parikh and she states it would be okay as long as we chart it. Will continue to monitor.

## 2018-01-01 NOTE — ASSESSMENT & PLAN NOTE
Patient has well controlled diabetes managed by as needed insulin injection using sliding scale. Is on no long acting insulin. Patient has excellent control of his diabetes and asked me if he can do his own accuchecks and give his own insulin. I am ok with that as long as he reports BG and amount of insulin given. So far BG at goal.

## 2018-01-01 NOTE — ED TRIAGE NOTES
Pt presents to Ed with c/o a cough that he has been having for a while. Pt states that it started out as a smokers cough and then progressed. Pt also reports having a fall on Friday where he fell on his left side back area. Pt does have a small bruise to this area. Pt reports the pain is worse when he coughs or takes a deep breath. Denies chest pain or abdominal pain. Will continue to monitor.

## 2018-01-01 NOTE — ASSESSMENT & PLAN NOTE
No acute issues. Continue home regimen of escitalopram 10 mg daily. Anxiolytics for breakthrough as needed.

## 2018-01-01 NOTE — ASSESSMENT & PLAN NOTE
No symptoms to suggest acute pancreatitis or current issues with pancreatitis. Drinks on a daily basis. Alcohol cessation. Benzodiazepine to be used PRN. Must meet 2 of the following criteria: HR > 110, BP > 180/100, anxiety, tremors, hallucinations, seizures

## 2018-01-01 NOTE — ASSESSMENT & PLAN NOTE
2/2 to trauma resulting in tension pneumothorax. I am concerned that his underlying emphysema was a risk factor for this pneumothorax. Chest tube in place. Management per primary. DVT prophylaxis. Pain control.

## 2018-01-01 NOTE — ASSESSMENT & PLAN NOTE
Strongly encouraged cessation as it is negatively impacting his health. Not sure if patient is motivated to do so but discussed imaging results concerning for diffuse emphysema. This was alarming for him and his wife. Hopefully he will stop smoking

## 2018-01-01 NOTE — ED PROVIDER NOTES
Encounter Date: 1/1/2018    SCRIBE #1 NOTE: I, Bolivar Hoffman , am scribing for, and in the presence of,  Johny Bonilla MD . I have scribed the following portions of the note - Other sections scribed: HPI/ROS/PE .       History     Chief Complaint   Patient presents with    Cough     cough-productive tan mucous appearing thick; when coughs, has pain to left flank area by ribs; denies chest pains; started yesterday; denies fever, chills, or any associated symptoms; had fall this past week-tripped and fell on flower pot and was ok initially; woke up with pain yesterday     CC: Rib Pain, Cough     HPI: This 67 y.o. male with IDDM presents to the ED c/o acute-onset, constant L posterior rib pain since yesterday morning following a mechanical fall onto a flower pot 2 days prior and a productive cough since yesterday as well. Pt states that his rib pain is exacerbated with movement, palpation, deep inspiration, and coughing. No prior reported attempted tx. No hx of injury to the affected area. Pt otherwise denies fever, chills, chest pain, abdominal pain, and N/V.       The history is provided by the patient. No  was used.     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    Cataract     Diabetes mellitus     Pancreatitis 2008    2 Times     Past Surgical History:   Procedure Laterality Date     thumb surgery      Dead Skin cell tumor Rt thumb    COLONOSCOPY N/A 4/11/2017    Procedure: COLONOSCOPY;  Surgeon: Meet Quesada MD;  Location: [facility];  Service: Endoscopy;  Laterality: N/A;    MOUTH FLOOR MASS EXCISION  2009    NASAL SEPTUM SURGERY      SKIN SURGERY       Family History   Problem Relation Age of Onset    Cataracts Mother     Leukemia Mother     Lung cancer Brother      Mental illness Father     No Known Problems Daughter     No Known Problems Sister     Mental illness Son      Social History   Substance Use Topics    Smoking status: Current Every Day Smoker     Packs/day: 1.50     Years: 45.00     Types: Cigarettes    Smokeless tobacco: Never Used    Alcohol use 0.5 oz/week     1 Standard drinks or equivalent per week      Comment: daily scotch and water, half pint     Review of Systems   Constitutional: Negative for chills and fever.   HENT: Negative for ear pain and sore throat.    Eyes: Negative.  Negative for pain and visual disturbance.   Respiratory: Positive for cough (productive). Negative for shortness of breath and wheezing.    Cardiovascular: Negative for chest pain and leg swelling.   Gastrointestinal: Negative for abdominal pain, diarrhea, nausea and vomiting.   Endocrine: Negative.    Genitourinary: Negative for difficulty urinating and dysuria.   Musculoskeletal: Positive for arthralgias (L posterior rib pain ). Negative for back pain, gait problem, myalgias, neck pain and neck stiffness.   Skin: Negative for rash.   Neurological: Negative for weakness, numbness and headaches.       Physical Exam     Initial Vitals [01/01/18 0150]   BP Pulse Resp Temp SpO2   (!) 166/78 90 20 97.9 °F (36.6 °C) (!) 89 %      MAP       107.33         Physical Exam    Nursing note and vitals reviewed.  Constitutional: He appears well-developed and well-nourished. No distress.   HENT:   Head: Normocephalic and atraumatic.   Eyes: Conjunctivae and EOM are normal. Pupils are equal, round, and reactive to light. Right eye exhibits no discharge. Left eye exhibits no discharge.   Neck: Normal range of motion and full passive range of motion without pain. Neck supple. No thyromegaly present. No tracheal deviation present.   Cardiovascular: Normal rate, regular rhythm, normal heart sounds, intact distal pulses and normal pulses. Exam reveals no gallop and no friction rub.    No murmur  heard.  Pulmonary/Chest: Effort normal. He has no wheezes. He has rhonchi in the right upper field, the right middle field and the right lower field. He has no rales. He exhibits no tenderness.   Slight decreased breath sounds on left with rhonchi left base.  Chest wall tenderness left posterior 8-10 rib clinically fractured   Abdominal: Soft. Normal appearance and bowel sounds are normal. He exhibits no distension and no mass. There is no tenderness. There is no rebound and no guarding.   Musculoskeletal: Normal range of motion. He exhibits tenderness (TTP over the L sided 8th and 9th posterior ribs). He exhibits no edema.   Lymphadenopathy:     He has no cervical adenopathy.   Neurological: He is alert and oriented to person, place, and time. He has normal strength and normal reflexes. No cranial nerve deficit or sensory deficit.   Skin: Skin is warm, dry and intact. Capillary refill takes less than 2 seconds. No rash and no abscess noted. No erythema. No pallor.   Psychiatric: He has a normal mood and affect. His behavior is normal. Thought content normal.         ED Course   Chest Tube  Date/Time: 2018 3:48 AM  Location procedure was performed: [facility] EMERGENCY DEPARTMENT  Performed by: SHAUNA CORREA.  Authorized by: SHAUNA CORREA   Post-operative diagnosis: pneumothorax  Pre-operative diagnosis: pneumothorax  Consent Done: Yes  Consent: Verbal consent obtained. Written consent obtained.  Consent given by: patient  Patient understanding: patient states understanding of the procedure being performed  Patient consent: the patient's understanding of the procedure matches consent given  Procedure consent: procedure consent matches procedure scheduled  Relevant documents: relevant documents present and verified  Test results: test results available and properly labeled  Site marked: the operative site was marked  Imaging studies: imaging studies available  Patient identity confirmed: , name and verbally with  "patient  Time out: Immediately prior to procedure a "time out" was called to verify the correct patient, procedure, equipment, support staff and site/side marked as required.  Indications: pneumothorax, pleural effusion and hemothorax  Patient sedated: yes  Sedation type: anxiolysis  (See MAR for exact dosages of medications).  Analgesia: morphine  Sedation start date/time: 1/1/2018 3:15 AM  Sedation end date/time: 1/1/2018 3:50 AM  Anesthesia: local infiltration    Anesthesia:  Local Anesthetic: lidocaine 1% without epinephrine  Anesthetic total: 20 mL  Preparation: skin prepped with ChloraPrep and skin prepped with Betadine  Placement location: left lateral  Scalpel size: 11  Tube size: 32 Ghanaian  Dissection instrument: scissors  Ultrasound guidance: no  Tension pneumothorax heard: no  Tube connected to: water seal  Drainage characteristics: bloody  Drainage amount: 100 ml  Patient tolerance: Patient tolerated the procedure well with no immediate complications  Complications: No  Estimated blood loss (mL): 110  Specimens: No  Implants: No        Labs Reviewed   POCT GLUCOSE MONITORING CONTINUOUS          X-Rays:   Independently Interpreted Readings:   Chest X-Ray: Normal heart size. Left pneumothorax present. Left pleural effusion present. Rib Fracture(s): Right: 8th and 9th.                Scribe Attestation:   Scribe #1: I performed the above scribed service and the documentation accurately describes the services I performed. I attest to the accuracy of the note.    Attending Attestation:           Physician Attestation for Scribe:  Physician Attestation Statement for Scribe #1: I, Johny Bonilla MD , reviewed documentation, as scribed by Bolivar Hoffman  in my presence, and it is both accurate and complete.                 ED Course      Clinical Impression:   The encounter diagnosis was Cough.                           Johny Bonilla MD  01/01/18 0415    "

## 2018-01-01 NOTE — ASSESSMENT & PLAN NOTE
Clinically, he has chronic bronchitis. Imaging with evidence of diffuse emphysema. O2 sats stable at room air. PFTs 12/2017 with mild obstruction. Tobacco cessation.

## 2018-01-01 NOTE — HPI
67 year old male with DM2, tobacco abuse, anxiety disorder, chronic back pain, chronic narcotic use and COPD who presented with left sided tension pneumothorax secondary to trauma. Chest tube placed and managed by surgery. Hospital medicine consulted for co-management of diabetes, depression and COPD.

## 2018-01-01 NOTE — SUBJECTIVE & OBJECTIVE
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    Cataract     Diabetes mellitus     Pancreatitis 2008    2 Times       Past Surgical History:   Procedure Laterality Date     thumb surgery      Dead Skin cell tumor Rt thumb    COLONOSCOPY N/A 4/11/2017    Procedure: COLONOSCOPY;  Surgeon: Meet Quesada MD;  Location: [facility];  Service: Endoscopy;  Laterality: N/A;    MOUTH FLOOR MASS EXCISION  2009    NASAL SEPTUM SURGERY      SKIN SURGERY         Review of patient's allergies indicates:   Allergen Reactions    Amoxicillin Nausea And Vomiting    Adhesive Rash     Paper tape only       No current facility-administered medications on file prior to encounter.      Current Outpatient Prescriptions on File Prior to Encounter   Medication Sig    ALPRAZolam (XANAX) 0.5 MG tablet 2 tablets at night. Try the trazodone first    atorvastatin (LIPITOR) 20 MG tablet Take 1 tablet (20 mg total) by mouth once daily.    azelastine (ASTELIN) 137 mcg (0.1 %) nasal spray     BD ULTRA-FINE RENETTA PEN NEEDLES 32 gauge x 5/32" Ndle TO USE WITH MULTIPLE DAILY INJECTIONS    blood sugar diagnostic (ACCU-CHEK KM PLUS TEST STRP) Strp USE TO TEST UPTO 6 TIMES DAILY    busPIRone (BUSPAR) 15 MG tablet Take 1 tablet (15 mg total) by mouth 2 (two) times daily as needed (anxiety). Substitute for dose of xanax.    cyclobenzaprine (FLEXERIL) 10 MG tablet Take 1 tablet (10 mg total) by mouth 3 (three) times daily as needed.    desoximetasone (TOPICORT) 0.25 % cream Apply topically 2 (two) times daily.    escitalopram oxalate (LEXAPRO) 10 MG tablet Take 1 tablet (10 mg total) by mouth once daily.    fluticasone (FLONASE) 50 mcg/actuation nasal spray 2 sprays by Each Nare route once daily.    gabapentin (NEURONTIN) 600 MG tablet Take 2 tablets (1,200 mg total) by mouth 3 (three) times daily.    hydrocodone-acetaminophen 10-325mg (NORCO)  mg Tab Take 1 " tablet by mouth every 12 (twelve) hours as needed for Pain.    insulin aspart (NOVOLOG FLEXPEN) 100 unit/mL InPn pen USE WITH INSULIN TO CARB RATIO 1:6 WITH BREAKFAST 1:16 WITH LUNCH 1:13 WITH DINNER PLUS CORRECTION    ipratropium (ATROVENT HFA) 17 mcg/actuation inhaler Inhale 2 puffs into the lungs every 6 (six) hours. Rescue    lancets (ACCU-CHEK FASTCLIX) Misc USE TO TEST 6 TIMES A DAY    lisinopril (PRINIVIL,ZESTRIL) 5 MG tablet Take 1 tablet (5 mg total) by mouth once daily.    L-METHYL-B6-B12 3-35-2 mg Tab TAKE 1 TABLET BY MOUTH 2 (TWO) TIMES DAILY.     Family History     Problem Relation (Age of Onset)    Cataracts Mother    Leukemia Mother    Lung cancer Brother    Mental illness Father, Son    No Known Problems Daughter, Sister        Social History Main Topics    Smoking status: Current Every Day Smoker     Packs/day: 1.50     Years: 45.00     Types: Cigarettes    Smokeless tobacco: Never Used    Alcohol use 0.5 oz/week     1 Standard drinks or equivalent per week      Comment: daily scotch and water, half pint    Drug use: No      Comment: rarely    Sexual activity: Yes     Partners: Female     Review of Systems   Constitutional: Negative.    HENT: Negative.    Eyes: Negative.    Respiratory: Positive for cough (productive, chronic). Negative for shortness of breath.    Cardiovascular: Negative.    Gastrointestinal: Negative.    Endocrine: Negative.    Genitourinary: Negative.    Musculoskeletal: Negative.  Negative for back pain.   Hematological: Negative.    Psychiatric/Behavioral: Negative.      Objective:     Vital Signs (Most Recent):  Temp: 98.3 °F (36.8 °C) (01/01/18 1231)  Pulse: 78 (01/01/18 1231)  Resp: 19 (01/01/18 1231)  BP: 105/64 (01/01/18 1231)  SpO2: 95 % (01/01/18 1231) Vital Signs (24h Range):  Temp:  [97.9 °F (36.6 °C)-98.7 °F (37.1 °C)] 98.3 °F (36.8 °C)  Pulse:  [] 78  Resp:  [16-20] 19  SpO2:  [87 %-100 %] 95 %  BP: (105-166)/(64-78) 105/64     Weight: 74.2 kg (163 lb  9.3 oz)  Body mass index is 22.19 kg/m².    Physical Exam   Constitutional: He is oriented to person, place, and time. He appears well-developed. No distress.   Eyes: Conjunctivae and EOM are normal.   Neck: Normal range of motion.   Cardiovascular: Normal rate, regular rhythm, normal heart sounds and intact distal pulses.    Pulmonary/Chest: Effort normal.   Chest tube in place  Coarse breath sounds   Abdominal: Soft. Bowel sounds are normal.   Musculoskeletal: Normal range of motion. He exhibits no edema.   Neurological: He is alert and oriented to person, place, and time.   Skin: Skin is warm. No rash noted. He is not diaphoretic. No erythema. No pallor.   Psychiatric: He has a normal mood and affect. His behavior is normal. Judgment and thought content normal.   Nursing note and vitals reviewed.        CRANIAL NERVES     CN III, IV, VI   Extraocular motions are normal.        Significant Labs: All pertinent labs within the past 24 hours have been reviewed.    Significant Imaging: I have reviewed all pertinent imaging results/findings within the past 24 hours.  I have reviewed and interpreted all pertinent imaging results/findings within the past 24 hours.

## 2018-01-01 NOTE — CONSULTS
[facility] Hospital Medicine  Consult Note    Patient Name: Moe Ren  MRN: [id number]  Admission Date: 1/1/2018  Hospital Length of Stay: 0 days  Attending Physician: Zhou Oliver MD   Primary Care Provider: Damian Ramon MD           Patient information was obtained from patient and ER records.     Consults  Subjective:     Principal Problem: Pneumothorax with hemothorax, traumatic    Chief Complaint:   Chief Complaint   Patient presents with    Cough     cough-productive tan mucous appearing thick; when coughs, has pain to left flank area by ribs; denies chest pains; started yesterday; denies fever, chills, or any associated symptoms; had fall this past week-tripped and fell on flower pot and was ok initially; woke up with pain yesterday        HPI: 67 year old male with DM2, tobacco abuse, anxiety disorder, chronic back pain, chronic narcotic use and COPD who presented with left sided tension pneumothorax secondary to trauma. Chest tube placed and managed by surgery. Hospital medicine consulted for co-management of diabetes, depression and COPD.     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    Cataract     Diabetes mellitus     Pancreatitis 2008    2 Times       Past Surgical History:   Procedure Laterality Date     thumb surgery      Dead Skin cell tumor Rt thumb    COLONOSCOPY N/A 4/11/2017    Procedure: COLONOSCOPY;  Surgeon: Meet Quesada MD;  Location: [facility];  Service: Endoscopy;  Laterality: N/A;    MOUTH FLOOR MASS EXCISION  2009    NASAL SEPTUM SURGERY      SKIN SURGERY         Review of patient's allergies indicates:   Allergen Reactions    Amoxicillin Nausea And Vomiting    Adhesive Rash     Paper tape only       No current facility-administered medications on file prior to encounter.      Current Outpatient Prescriptions on File Prior to Encounter   Medication Sig    ALPRAZolam  "(XANAX) 0.5 MG tablet 2 tablets at night. Try the trazodone first    atorvastatin (LIPITOR) 20 MG tablet Take 1 tablet (20 mg total) by mouth once daily.    azelastine (ASTELIN) 137 mcg (0.1 %) nasal spray     BD ULTRA-FINE RENETTA PEN NEEDLES 32 gauge x 5/32" Ndle TO USE WITH MULTIPLE DAILY INJECTIONS    blood sugar diagnostic (ACCU-CHEK KM PLUS TEST STRP) Strp USE TO TEST UPTO 6 TIMES DAILY    busPIRone (BUSPAR) 15 MG tablet Take 1 tablet (15 mg total) by mouth 2 (two) times daily as needed (anxiety). Substitute for dose of xanax.    cyclobenzaprine (FLEXERIL) 10 MG tablet Take 1 tablet (10 mg total) by mouth 3 (three) times daily as needed.    desoximetasone (TOPICORT) 0.25 % cream Apply topically 2 (two) times daily.    escitalopram oxalate (LEXAPRO) 10 MG tablet Take 1 tablet (10 mg total) by mouth once daily.    fluticasone (FLONASE) 50 mcg/actuation nasal spray 2 sprays by Each Nare route once daily.    gabapentin (NEURONTIN) 600 MG tablet Take 2 tablets (1,200 mg total) by mouth 3 (three) times daily.    hydrocodone-acetaminophen 10-325mg (NORCO)  mg Tab Take 1 tablet by mouth every 12 (twelve) hours as needed for Pain.    insulin aspart (NOVOLOG FLEXPEN) 100 unit/mL InPn pen USE WITH INSULIN TO CARB RATIO 1:6 WITH BREAKFAST 1:16 WITH LUNCH 1:13 WITH DINNER PLUS CORRECTION    ipratropium (ATROVENT HFA) 17 mcg/actuation inhaler Inhale 2 puffs into the lungs every 6 (six) hours. Rescue    lancets (ACCU-CHEK FASTCLIX) Misc USE TO TEST 6 TIMES A DAY    lisinopril (PRINIVIL,ZESTRIL) 5 MG tablet Take 1 tablet (5 mg total) by mouth once daily.    L-METHYL-B6-B12 3-35-2 mg Tab TAKE 1 TABLET BY MOUTH 2 (TWO) TIMES DAILY.     Family History     Problem Relation (Age of Onset)    Cataracts Mother    Leukemia Mother    Lung cancer Brother    Mental illness Father, Son    No Known Problems Daughter, Sister        Social History Main Topics    Smoking status: Current Every Day Smoker     Packs/day: " 1.50     Years: 45.00     Types: Cigarettes    Smokeless tobacco: Never Used    Alcohol use 0.5 oz/week     1 Standard drinks or equivalent per week      Comment: daily scotch and water, half pint    Drug use: No      Comment: rarely    Sexual activity: Yes     Partners: Female     Review of Systems   Constitutional: Negative.    HENT: Negative.    Eyes: Negative.    Respiratory: Positive for cough (productive, chronic). Negative for shortness of breath.    Cardiovascular: Negative.    Gastrointestinal: Negative.    Endocrine: Negative.    Genitourinary: Negative.    Musculoskeletal: Negative.  Negative for back pain.   Hematological: Negative.    Psychiatric/Behavioral: Negative.      Objective:     Vital Signs (Most Recent):  Temp: 98.3 °F (36.8 °C) (01/01/18 1231)  Pulse: 78 (01/01/18 1231)  Resp: 19 (01/01/18 1231)  BP: 105/64 (01/01/18 1231)  SpO2: 95 % (01/01/18 1231) Vital Signs (24h Range):  Temp:  [97.9 °F (36.6 °C)-98.7 °F (37.1 °C)] 98.3 °F (36.8 °C)  Pulse:  [] 78  Resp:  [16-20] 19  SpO2:  [87 %-100 %] 95 %  BP: (105-166)/(64-78) 105/64     Weight: 74.2 kg (163 lb 9.3 oz)  Body mass index is 22.19 kg/m².    Physical Exam   Constitutional: He is oriented to person, place, and time. He appears well-developed. No distress.   Eyes: Conjunctivae and EOM are normal.   Neck: Normal range of motion.   Cardiovascular: Normal rate, regular rhythm, normal heart sounds and intact distal pulses.    Pulmonary/Chest: Effort normal.   Chest tube in place  Coarse breath sounds   Abdominal: Soft. Bowel sounds are normal.   Musculoskeletal: Normal range of motion. He exhibits no edema.   Neurological: He is alert and oriented to person, place, and time.   Skin: Skin is warm. No rash noted. He is not diaphoretic. No erythema. No pallor.   Psychiatric: He has a normal mood and affect. His behavior is normal. Judgment and thought content normal.   Nursing note and vitals reviewed.        CRANIAL NERVES     CN III,  IV, VI   Extraocular motions are normal.        Significant Labs: All pertinent labs within the past 24 hours have been reviewed.    Significant Imaging: I have reviewed all pertinent imaging results/findings within the past 24 hours.  I have reviewed and interpreted all pertinent imaging results/findings within the past 24 hours.    Assessment/Plan:     * Pneumothorax with hemothorax, traumatic    2/2 to trauma resulting in tension pneumothorax. I am concerned that his underlying emphysema was a risk factor for this pneumothorax. Chest tube in place. Management per primary. DVT prophylaxis. Pain control.           Closed fracture of multiple ribs of left side    Per primary          Subcutaneous emphysema due to trauma    Per primary          Continuous tobacco abuse    Strongly encouraged cessation as it is negatively impacting his health. Not sure if patient is motivated to do so but discussed imaging results concerning for diffuse emphysema. This was alarming for him and his wife. Hopefully he will stop smoking          Chronic obstructive bronchitis with pulmonary emphysema    Clinically, he has chronic bronchitis. Imaging with evidence of diffuse emphysema. O2 sats stable at room air. PFTs 12/2017 with mild obstruction. Tobacco cessation.           Alcohol-induced chronic pancreatitis    No symptoms to suggest acute pancreatitis or current issues with pancreatitis. Drinks on a daily basis. Alcohol cessation. Benzodiazepine to be used PRN. Must meet 2 of the following criteria: HR > 110, BP > 180/100, anxiety, tremors, hallucinations, seizures          Degeneration of lumbar or lumbosacral intervertebral disc    No acute issues. On chronic opioids.           DM type 2 with diabetic peripheral neuropathy    Patient has well controlled diabetes managed by as needed insulin injection using sliding scale. Is on no long acting insulin. Patient has excellent control of his diabetes and asked me if he can do his own  accuchecks and give his own insulin. I am ok with that as long as he reports BG and amount of insulin given. So far BG at goal. Lisinopril 5 mg daily for microalbuminuria.           Anxiety disorder    No acute issues. Continue home regimen of escitalopram 10 mg daily. Anxiolytics for breakthrough as needed.             VTE Risk Mitigation         Ordered     enoxaparin injection 30 mg  Every 12 hours     Route:  Subcutaneous        01/01/18 1330     Medium Risk of VTE  Once      01/01/18 0408     Place SHRUTHI hose  Until discontinued      01/01/18 0408     Place sequential compression device  Until discontinued      01/01/18 0408              Thank you for your consult. I will follow-up with patient. Please contact us if you have any additional questions.    Ava Osorio MD  Department of Hospital Medicine   [facility]

## 2018-01-02 LAB
ANION GAP SERPL CALC-SCNC: 9 MMOL/L
BASOPHILS # BLD AUTO: 0.05 K/UL
BASOPHILS NFR BLD: 0.5 %
BUN SERPL-MCNC: 20 MG/DL
CALCIUM SERPL-MCNC: 9.8 MG/DL
CHLORIDE SERPL-SCNC: 94 MMOL/L
CO2 SERPL-SCNC: 30 MMOL/L
CREAT SERPL-MCNC: 1.2 MG/DL
DIFFERENTIAL METHOD: ABNORMAL
EOSINOPHIL # BLD AUTO: 1 K/UL
EOSINOPHIL NFR BLD: 8.8 %
ERYTHROCYTE [DISTWIDTH] IN BLOOD BY AUTOMATED COUNT: 13 %
EST. GFR  (AFRICAN AMERICAN): >60 ML/MIN/1.73 M^2
EST. GFR  (NON AFRICAN AMERICAN): >60 ML/MIN/1.73 M^2
GLUCOSE SERPL-MCNC: 149 MG/DL
HCT VFR BLD AUTO: 37.6 %
HGB BLD-MCNC: 12.3 G/DL
LYMPHOCYTES # BLD AUTO: 2.6 K/UL
LYMPHOCYTES NFR BLD: 23.7 %
MAGNESIUM SERPL-MCNC: 1.6 MG/DL
MCH RBC QN AUTO: 32.3 PG
MCHC RBC AUTO-ENTMCNC: 32.7 G/DL
MCV RBC AUTO: 99 FL
MONOCYTES # BLD AUTO: 1 K/UL
MONOCYTES NFR BLD: 8.9 %
NEUTROPHILS # BLD AUTO: 6.4 K/UL
NEUTROPHILS NFR BLD: 57.8 %
PHOSPHATE SERPL-MCNC: 5.2 MG/DL
PLATELET # BLD AUTO: 294 K/UL
PMV BLD AUTO: 11 FL
POCT GLUCOSE: 128 MG/DL (ref 70–110)
POCT GLUCOSE: 161 MG/DL (ref 70–110)
POTASSIUM SERPL-SCNC: 5.3 MMOL/L
RBC # BLD AUTO: 3.81 M/UL
SODIUM SERPL-SCNC: 133 MMOL/L
WBC # BLD AUTO: 11.09 K/UL

## 2018-01-02 PROCEDURE — 36415 COLL VENOUS BLD VENIPUNCTURE: CPT

## 2018-01-02 PROCEDURE — G8987 SELF CARE CURRENT STATUS: HCPCS | Mod: CH

## 2018-01-02 PROCEDURE — G8988 SELF CARE GOAL STATUS: HCPCS | Mod: CH

## 2018-01-02 PROCEDURE — 11000001 HC ACUTE MED/SURG PRIVATE ROOM

## 2018-01-02 PROCEDURE — 63600175 PHARM REV CODE 636 W HCPCS: Performed by: INTERNAL MEDICINE

## 2018-01-02 PROCEDURE — 97161 PT EVAL LOW COMPLEX 20 MIN: CPT

## 2018-01-02 PROCEDURE — 80048 BASIC METABOLIC PNL TOTAL CA: CPT

## 2018-01-02 PROCEDURE — 25000003 PHARM REV CODE 250: Performed by: STUDENT IN AN ORGANIZED HEALTH CARE EDUCATION/TRAINING PROGRAM

## 2018-01-02 PROCEDURE — 83735 ASSAY OF MAGNESIUM: CPT

## 2018-01-02 PROCEDURE — 63600175 PHARM REV CODE 636 W HCPCS: Performed by: STUDENT IN AN ORGANIZED HEALTH CARE EDUCATION/TRAINING PROGRAM

## 2018-01-02 PROCEDURE — 97165 OT EVAL LOW COMPLEX 30 MIN: CPT

## 2018-01-02 PROCEDURE — G8989 SELF CARE D/C STATUS: HCPCS | Mod: CH

## 2018-01-02 PROCEDURE — 63600175 PHARM REV CODE 636 W HCPCS: Performed by: EMERGENCY MEDICINE

## 2018-01-02 PROCEDURE — 85025 COMPLETE CBC W/AUTO DIFF WBC: CPT

## 2018-01-02 PROCEDURE — 84100 ASSAY OF PHOSPHORUS: CPT

## 2018-01-02 RX ORDER — ENOXAPARIN SODIUM 100 MG/ML
40 INJECTION SUBCUTANEOUS EVERY 24 HOURS
Status: DISCONTINUED | OUTPATIENT
Start: 2018-01-03 | End: 2018-01-03 | Stop reason: HOSPADM

## 2018-01-02 RX ORDER — MORPHINE SULFATE 10 MG/ML
2 INJECTION INTRAMUSCULAR; INTRAVENOUS; SUBCUTANEOUS ONCE
Status: COMPLETED | OUTPATIENT
Start: 2018-01-02 | End: 2018-01-02

## 2018-01-02 RX ADMIN — KETOROLAC TROMETHAMINE 15 MG: 30 INJECTION, SOLUTION INTRAMUSCULAR at 05:01

## 2018-01-02 RX ADMIN — THERA TABS 1 TABLET: TAB at 08:01

## 2018-01-02 RX ADMIN — LISINOPRIL 5 MG: 5 TABLET ORAL at 08:01

## 2018-01-02 RX ADMIN — ATORVASTATIN CALCIUM 20 MG: 10 TABLET, FILM COATED ORAL at 08:01

## 2018-01-02 RX ADMIN — HYDROMORPHONE HYDROCHLORIDE 1 MG: 2 INJECTION INTRAMUSCULAR; INTRAVENOUS; SUBCUTANEOUS at 07:01

## 2018-01-02 RX ADMIN — OXYCODONE HYDROCHLORIDE AND ACETAMINOPHEN 1 TABLET: 10; 325 TABLET ORAL at 08:01

## 2018-01-02 RX ADMIN — OXYCODONE HYDROCHLORIDE AND ACETAMINOPHEN 1 TABLET: 10; 325 TABLET ORAL at 04:01

## 2018-01-02 RX ADMIN — OXYCODONE HYDROCHLORIDE AND ACETAMINOPHEN 1 TABLET: 10; 325 TABLET ORAL at 05:01

## 2018-01-02 RX ADMIN — LORAZEPAM 1 MG: 2 INJECTION, SOLUTION INTRAMUSCULAR; INTRAVENOUS at 10:01

## 2018-01-02 RX ADMIN — Medication 100 MG: at 08:01

## 2018-01-02 RX ADMIN — HYDROMORPHONE HYDROCHLORIDE 1 MG: 2 INJECTION INTRAMUSCULAR; INTRAVENOUS; SUBCUTANEOUS at 12:01

## 2018-01-02 RX ADMIN — MORPHINE SULFATE 2 MG: 10 INJECTION INTRAVENOUS at 11:01

## 2018-01-02 RX ADMIN — ENOXAPARIN SODIUM 30 MG: 100 INJECTION SUBCUTANEOUS at 08:01

## 2018-01-02 RX ADMIN — KETOROLAC TROMETHAMINE 15 MG: 30 INJECTION, SOLUTION INTRAMUSCULAR at 12:01

## 2018-01-02 RX ADMIN — ESCITALOPRAM OXALATE 10 MG: 10 TABLET ORAL at 08:01

## 2018-01-02 NOTE — NURSING
Patient complaining of severe pain to leg and back with spasms. Previously medicated patient with Dilaudid 1 mg and pain pill. States that was not doing any good. Placed call to Dr. Parikh. Waiting for return call. Will continue to monitor.

## 2018-01-02 NOTE — PLAN OF CARE
Problem: Patient Care Overview  Goal: Plan of Care Review  Outcome: Ongoing (interventions implemented as appropriate)  Pt progressing. Oriented X4. Voiding well per urinal. Skin integrity maintained. Left chest wall tube at low continuous wall suction with bloody drainage noted. Patient educated to increase ambulation, verbalizes understanding. Pain controlled with Iv and po pain meds. Denies nausea during shift. Vs stable. Adequate oral intake. Tolerating diet. Free of falls. Call light within reach. Bed in low position. No issues during shift. Continue plan of care.

## 2018-01-02 NOTE — NURSING
07:27 - Patient c/o of severe pain and spasms. Dilaudid 1 mg given IVP per order. Wife at bedside. States that it gave him immediate relief. Will continue to monitor.

## 2018-01-02 NOTE — PROGRESS NOTES
Chest tube clamped for ambulation. Patient ambulated with spouse twice around nursing station and tolerated well. Will continue to monitor.

## 2018-01-02 NOTE — PT/OT/SLP EVAL
Physical Therapy Evaluation and Discharge Note    Patient Name:  Moe Ren   MRN:  [id number]    Recommendations:     Discharge Recommendations:  outpatient PT   Discharge Equipment Recommendations: none   Barriers to discharge: None    Assessment:     Moe Ren is a 67 y.o. male admitted with a medical diagnosis of Pneumothorax with hemothorax, traumatic. At this time, patient is functioning at their prior level of function and does not require further acute PT services. Patient would benefit from outpatient PT at discharge to address chronic back pain. Nurse Rosales notified that patient had increased confusion at end of session and requesting pain medicine due to back pain.     Recent Surgery: * No surgery found *      Plan:     During this hospitalization, patient does not require further acute PT services.  Please re-consult if situation changes.     Plan of Care Reviewed with: patient, spouse    Subjective     Communicated with nurse Rosales prior to session.  Patient found standing in room coming out of bathroom upon PT entry to room, agreeable to evaluation.      Chief Complaint: Chronic back pain due to spinal stenosis but he does not want to have surgery.   Patient comments/goals: To go home.   Pain/Comfort:  · Pain Rating 1: 8/10  · Location 1: back (chronic)  · Pain Addressed 1: Cessation of Activity, Nurse notified that patient requesting pain medicine after PT session, Distraction    Living Environment:  Patient lives at home with his spouse. He fell prior to coming into the hospital due to his foot getting caught under a rug. He has spinal stenosis and has seen an orthopedic for it but does not want to have back surgery. Prior to admission, patients level of function was independent. Patient has the following equipment: none. Upon discharge, patient will have assistance from his spouse.    Objective:     Patient found with: chest tube, peripheral IV     General Precautions: Standard, fall    Orthopedic Precautions:N/A   Braces: N/A     Exams:  · Cognitive Exam:  Patient is oriented to Person (patient unable to verbalize where he currently was) and follows 100% of simple commands   · Gross Motor Coordination:  WFL  · Postural Exam:  Patient presented with the following abnormalities:    · -       Rounded shoulders  · -       Forward head  · Sensation:    · -       Impaired  numbness down right LE due to spinal stenosis  · RLE ROM: WFL  · RLE Strength: 4+/5  · LLE ROM: WFL  · LLE Strength: 5/5    Functional Mobility:  · Bed Mobility:     · Sit to Supine: modified independence  · Transfers:     · Sit to Stand:  modified independence with no AD  · Gait: 250ft with 1 seated rest break due to chronic back pain and numbness down his right leg. Patient had no loss of balance with ambulation and was able to carry his coffee mug in one hand and his chest tube in the other.     AM-PAC 6 CLICK MOBILITY  Total Score:24     Patient left supine with all lines intact, call button in reach and nurse Connie notified.    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    Cataract     Diabetes mellitus     Pancreatitis 2008    2 Times       Past Surgical History:   Procedure Laterality Date     thumb surgery      Dead Skin cell tumor Rt thumb    COLONOSCOPY N/A 4/11/2017    Procedure: COLONOSCOPY;  Surgeon: Meet Quesada MD;  Location: [facility];  Service: Endoscopy;  Laterality: N/A;    MOUTH FLOOR MASS EXCISION  2009    NASAL SEPTUM SURGERY      SKIN SURGERY       Time Tracking:     PT Received On: 01/02/18  PT Start Time: 1112     PT Stop Time:  1122  PT Total Time (min): 10 min     Billable Minutes: Evaluation  10     Angelina Morfin, PT  01/02/2018

## 2018-01-02 NOTE — PLAN OF CARE
Problem: Physical Therapy Goal  Goal: Physical Therapy Goal  Outcome: Outcome(s) achieved Date Met: 01/02/18    Patient ambulated 250ft with supervision and 1 seated rest break due to chronic back pain. He is okay to ambulate with nursing staff or family supervision. He would benefit from outpatient PT at discharge due to chronic back pain.

## 2018-01-02 NOTE — PROGRESS NOTES
Patient with intense burning to chest that radiates to back. States it feels like spasms. Prn dilaudid Iv administered. Patient states feels better immediately after administration. Will continue to monitor.

## 2018-01-02 NOTE — PLAN OF CARE
Problem: Occupational Therapy Goal  Goal: Occupational Therapy Goal  Outcome: Ongoing (interventions implemented as appropriate)  Patient tolerated evaluation well, patient with no need for skilled OT services at this time. RICARDO Fontenot, MS

## 2018-01-02 NOTE — SUBJECTIVE & OBJECTIVE
Interval History: Patient only complaint is pain at chest tube site.     Review of Systems   Constitutional: Negative for activity change.   Respiratory: Negative for chest tightness and shortness of breath.    Cardiovascular: Positive for chest pain.   Gastrointestinal: Negative for abdominal distention, constipation, diarrhea and nausea.   Musculoskeletal: Negative for arthralgias.     Objective:     Vital Signs (Most Recent):  Temp: 97.6 °F (36.4 °C) (01/02/18 0815)  Pulse: 90 (01/02/18 0815)  Resp: 18 (01/02/18 0815)  BP: 133/63 (01/02/18 0815)  SpO2: (!) 92 % (01/02/18 0815) Vital Signs (24h Range):  Temp:  [96.3 °F (35.7 °C)-98.3 °F (36.8 °C)] 97.6 °F (36.4 °C)  Pulse:  [68-92] 90  Resp:  [18-19] 18  SpO2:  [92 %-98 %] 92 %  BP: (105-133)/(58-68) 133/63     Weight: 74.2 kg (163 lb 9.3 oz)  Body mass index is 22.19 kg/m².    Intake/Output Summary (Last 24 hours) at 01/02/18 1038  Last data filed at 01/02/18 0700   Gross per 24 hour   Intake              240 ml   Output             1500 ml   Net            -1260 ml      Physical Exam   Constitutional: He is oriented to person, place, and time. He appears well-developed and well-nourished.   HENT:   Head: Normocephalic and atraumatic.   Cardiovascular: Normal rate.    Neurological: He is alert and oriented to person, place, and time.   Skin: Skin is warm and dry.   Psychiatric: He has a normal mood and affect. His behavior is normal.   Vitals reviewed.      Significant Labs:   BMP:   Recent Labs  Lab 01/02/18  0451   *   *   K 5.3*   CL 94*   CO2 30*   BUN 20   CREATININE 1.2   CALCIUM 9.8   MG 1.6     CBC:   Recent Labs  Lab 01/01/18  0311 01/02/18  0451   WBC 11.62 11.09   HGB 13.4* 12.3*   HCT 39.0* 37.6*    294       Significant Imaging:

## 2018-01-02 NOTE — PROGRESS NOTES
[facility]  General Surgery  Progress Note     Subjective:      Interval History: No acute events overnight. Reports continued left-sided chest pain, which is improving. No shortness of breath. Ambulating in hallway. CT with minimal drainage overnight.      Post-Op Info:  Left-sided chest tube placement, 1/1/18       Objective:   Vital Signs:   Vitals:    01/02/18 0407 01/02/18 0416 01/02/18 0815 01/02/18 1216   BP: 123/68  133/63 (!) 150/71   BP Location:   Left arm Left arm   Patient Position:   Lying Lying   Pulse: 83 74 90 (!) 47   Resp: 18 18 18   Temp: 97.7 °F (36.5 °C)  97.6 °F (36.4 °C) 97.3 °F (36.3 °C)   TempSrc: Oral  Oral Oral   SpO2:  (!) 93% (!) 92% 95%   Weight:       Height:          Intake/Output Summary (Last 24 hours):    Intake/Output Summary (Last 24 hours) at 01/02/18 1601  Last data filed at 01/02/18 0700   Gross per 24 hour   Intake                0 ml   Output             1000 ml   Net            -1000 ml     Physical Exam:  General appearance: awake, alert, no acute distress  HEENT: EOMI, anicteric, moist oral mucosal membranes  Chest: left sided chest tube in place with no air leak, bloody drainage, crepitus along anterior/posterior left chest wall  Pulm: normal WOB, CTAB     Significant Labs:  WBC 11.1, Hgb 12.3, Hct 37.6, plt 294  Na 133, K 5.3, Phos 5.2, Mg 1.6    CXR: Resolved left pneumothorax with chest tube in place.    Assessment/Plan:   68 yo man with history of DM, alcoholism, anxiety/depression presenting with a traumatic hemo/pneumothorax, left 8-10th posterior rib fractures from a fall, s/p left-sided chest tube placement, 1/1/18, hospital day #2     -Chest tube to water seal  -Repeat CXR in AM, will remove chest tube if no evidence of pneumothorax  -MVI/Thiamine daily, monitor closely for signs of EtOH withdrawal  -Home dose lisinopril/statin  -Diabetes well-controlled, evaluated by hospitalist. Patient is checking his own blood sugar regularly.  -Pain  control for rib fractures, IV toradol/percocet  -Smoking cessation  -IS 10x/hr, encourage deep breaths, Duonebs  -OOB tid  -Lovenox/SCDs for DVT prophylaxis  -Likely d/c home tomorrow     Caroline Parikh MD  General Surgery  [facility]

## 2018-01-02 NOTE — PLAN OF CARE
Problem: Fall Risk (Adult)  Goal: Absence of Falls  Patient will demonstrate the desired outcomes by discharge/transition of care.   Outcome: Ongoing (interventions implemented as appropriate)  Remain free of fall or injury. Plan of care being followed. Pain is being better controlled. Ambulating with wife and nurses during the day. Chest tube is now to water seal. Blood sugar is well controlled and being taken and insulin administer himself. Breathing much better. No new changes on today. Will continue to monitor.

## 2018-01-02 NOTE — PROGRESS NOTES
[facility] Hospital Medicine  Progress Note    Patient Name: Moe Ren  MRN: [id number]  Patient Class: IP- Inpatient   Admission Date: 1/1/2018  Length of Stay: 1 days  Attending Physician: Zhou Oliver MD  Primary Care Provider: Damian Ramon MD        Subjective:     Principal Problem:Pneumothorax with hemothorax, traumatic    HPI:  67 year old male with DM2, tobacco abuse, anxiety disorder, chronic back pain, chronic narcotic use and COPD who presented with left sided tension pneumothorax secondary to trauma. Chest tube placed and managed by surgery. Hospital medicine consulted for co-management of diabetes, depression and COPD.     Hospital Course:  No notes on file    Interval History: Patient only complaint is pain at chest tube site.     Review of Systems   Constitutional: Negative for activity change.   Respiratory: Negative for chest tightness and shortness of breath.    Cardiovascular: Positive for chest pain.   Gastrointestinal: Negative for abdominal distention, constipation, diarrhea and nausea.   Musculoskeletal: Negative for arthralgias.     Objective:     Vital Signs (Most Recent):  Temp: 97.6 °F (36.4 °C) (01/02/18 0815)  Pulse: 90 (01/02/18 0815)  Resp: 18 (01/02/18 0815)  BP: 133/63 (01/02/18 0815)  SpO2: (!) 92 % (01/02/18 0815) Vital Signs (24h Range):  Temp:  [96.3 °F (35.7 °C)-98.3 °F (36.8 °C)] 97.6 °F (36.4 °C)  Pulse:  [68-92] 90  Resp:  [18-19] 18  SpO2:  [92 %-98 %] 92 %  BP: (105-133)/(58-68) 133/63     Weight: 74.2 kg (163 lb 9.3 oz)  Body mass index is 22.19 kg/m².    Intake/Output Summary (Last 24 hours) at 01/02/18 1038  Last data filed at 01/02/18 0700   Gross per 24 hour   Intake              240 ml   Output             1500 ml   Net            -1260 ml      Physical Exam   Constitutional: He is oriented to person, place, and time. He appears well-developed and well-nourished.   HENT:   Head: Normocephalic and atraumatic.   Cardiovascular: Normal rate.     Neurological: He is alert and oriented to person, place, and time.   Skin: Skin is warm and dry.   Psychiatric: He has a normal mood and affect. His behavior is normal.   Vitals reviewed.      Significant Labs:   BMP:   Recent Labs  Lab 01/02/18  0451   *   *   K 5.3*   CL 94*   CO2 30*   BUN 20   CREATININE 1.2   CALCIUM 9.8   MG 1.6     CBC:   Recent Labs  Lab 01/01/18  0311 01/02/18  0451   WBC 11.62 11.09   HGB 13.4* 12.3*   HCT 39.0* 37.6*    294       Significant Imaging:     Assessment/Plan:      * Pneumothorax with hemothorax, traumatic    2/2 to trauma resulting in tension pneumothorax. I am concerned that his underlying emphysema was a risk factor for this pneumothorax. Chest tube in place. Management per primary. DVT prophylaxis. Pain control.           Alcohol-induced chronic pancreatitis    No symptoms to suggest acute pancreatitis or current issues with pancreatitis. Drinks on a daily basis. Alcohol cessation. Benzodiazepine to be used PRN. Must meet 2 of the following criteria: HR > 110, BP > 180/100, anxiety, tremors, hallucinations, seizures          Closed fracture of multiple ribs of left side    Per primary          Subcutaneous emphysema due to trauma    Per primary          Chronic obstructive bronchitis with pulmonary emphysema    Clinically, he has chronic bronchitis. Imaging with evidence of diffuse emphysema. O2 sats stable at room air. PFTs 12/2017 with mild obstruction. Tobacco cessation.           Degeneration of lumbar or lumbosacral intervertebral disc    No acute issues. On chronic opioids.           DM type 2 with diabetic peripheral neuropathy    Patient has well controlled diabetes managed by as needed insulin injection using sliding scale. Is on no long acting insulin. Patient has excellent control of his diabetes and asked me if he can do his own accuchecks and give his own insulin. I am ok with that as long as he reports BG and amount of insulin given. So  far BG at goal.           Continuous tobacco abuse    Strongly encouraged cessation as it is negatively impacting his health. Not sure if patient is motivated to do so but discussed imaging results concerning for diffuse emphysema. This was alarming for him and his wife. Hopefully he will stop smoking          Anxiety disorder    No acute issues. Continue home regimen of escitalopram 10 mg daily. Anxiolytics for breakthrough as needed.             VTE Risk Mitigation         Ordered     enoxaparin injection 30 mg  Every 12 hours     Route:  Subcutaneous        01/01/18 1330     Medium Risk of VTE  Once      01/01/18 0408     Place SHRUTHI hose  Until discontinued      01/01/18 0408     Place sequential compression device  Until discontinued      01/01/18 0408        Well controlled blood sugars and blood pressure.  Pain control. PT consult.       Paul Lopez MD  Department of Hospital Medicine   [facility]

## 2018-01-02 NOTE — PT/OT/SLP EVAL
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  Pain/Comfort:  · Pain Rating 1: 8/10  · Location 1: back (chronic)  · Pain Addressed 1: Cessation of Activity, Nurse notified, Distraction    Objective:     Patient found with: chest tube, peripheral IV    General Precautions: Standard, fall, respiratory   Orthopedic Precautions:N/A   Braces: N/A     Occupational Performance:    Bed Mobility:    · Patient completed Sit to Supine with modified independence    Functional Mobility/Transfers:  · Patient completed Sit <> Stand Transfer with independence  with  no assistive device   Patient completed Toilet Transfer independence, no AD     Activities of Daily Living:  · Feeding:  independence    · Grooming: independence    · UB Dressing: independence    · LB Dressing: minimum assistance    · Toileting: independence  " "    Cognitive/Visual Perceptual:  Cognitive/Psychosocial Skills:     -       Oriented to: Person and Situation   -       Follows Commands/attention:Easily distracted and Follows one-step commands  -       Communication: clear/fluent  -       Memory: Poor immediate recall  -       Safety awareness/insight to disability: intact     Physical Exam:  Balance:    -       sit balance fair plus, standing balance good    Patient left supine with all lines intact, call button in reach and nurse notified    [facility] 6 Click:  [facility] Total Score: 24    Treatment & Education:  evaluation  Education:    Assessment:     Moe Ren is a 67 y.o. male with a medical diagnosis of Pneumothorax with hemothorax, traumatic. At this time, patient is functioning at their prior level of function and does not require further acute OT services.     Clinical Decision Makin.  OT Low:  "Pt evaluation falls under low complexity for evaluation coding due to performance deficits noted in 1-3 areas as stated above and 0 co-morbities affecting current functional status. Data obtained from problem focused assessments. No modifications or assistance was required for completion of evaluation. Only brief occupational profile and history review completed."     Plan:     During this hospitalization, patient does not require further acute OT services.  Please re-consult if situation changes.    · Plan of Care Reviewed with: patient    This Plan of care has been discussed with the patient who was involved in its development and understands and is in agreement with the identified goals and treatment plan    GOALS:    Occupational Therapy Goals     Not on file          Multidisciplinary Problems (Resolved)        Problem: Occupational Therapy Goal    Goal Priority Disciplines Outcome Interventions   Occupational Therapy Goal   (Resolved)     OT, PT/OT Outcome(s) achieved                    Time Tracking:     OT Date of Treatment: 18  OT Start " Time: 1110  OT Stop Time: 1120  OT Total Time (min): 10 min    Billable Minutes:Evaluation 10 minutes with PT    RICARDO Fontenot, MS  1/2/2018

## 2018-01-03 VITALS
HEIGHT: 72 IN | SYSTOLIC BLOOD PRESSURE: 131 MMHG | DIASTOLIC BLOOD PRESSURE: 66 MMHG | OXYGEN SATURATION: 95 % | TEMPERATURE: 99 F | RESPIRATION RATE: 18 BRPM | BODY MASS INDEX: 22.15 KG/M2 | WEIGHT: 163.56 LBS | HEART RATE: 87 BPM

## 2018-01-03 PROBLEM — T79.7XXA SUBCUTANEOUS EMPHYSEMA DUE TO TRAUMA: Status: RESOLVED | Noted: 2018-01-01 | Resolved: 2018-01-03

## 2018-01-03 PROBLEM — S27.2XXA PNEUMOTHORAX WITH HEMOTHORAX, TRAUMATIC: Status: RESOLVED | Noted: 2018-01-01 | Resolved: 2018-01-03

## 2018-01-03 LAB
ANION GAP SERPL CALC-SCNC: 11 MMOL/L
BASOPHILS # BLD AUTO: 0.06 K/UL
BASOPHILS NFR BLD: 0.6 %
BUN SERPL-MCNC: 16 MG/DL
CALCIUM SERPL-MCNC: 9.4 MG/DL
CHLORIDE SERPL-SCNC: 95 MMOL/L
CO2 SERPL-SCNC: 27 MMOL/L
CREAT SERPL-MCNC: 1.1 MG/DL
DIFFERENTIAL METHOD: ABNORMAL
EOSINOPHIL # BLD AUTO: 1 K/UL
EOSINOPHIL NFR BLD: 10 %
ERYTHROCYTE [DISTWIDTH] IN BLOOD BY AUTOMATED COUNT: 12.5 %
EST. GFR  (AFRICAN AMERICAN): >60 ML/MIN/1.73 M^2
EST. GFR  (NON AFRICAN AMERICAN): >60 ML/MIN/1.73 M^2
GLUCOSE SERPL-MCNC: 149 MG/DL
HCT VFR BLD AUTO: 36.8 %
HGB BLD-MCNC: 12.3 G/DL
LYMPHOCYTES # BLD AUTO: 2 K/UL
LYMPHOCYTES NFR BLD: 20.8 %
MAGNESIUM SERPL-MCNC: 1.5 MG/DL
MCH RBC QN AUTO: 32.7 PG
MCHC RBC AUTO-ENTMCNC: 33.4 G/DL
MCV RBC AUTO: 98 FL
MONOCYTES # BLD AUTO: 1.1 K/UL
MONOCYTES NFR BLD: 10.8 %
NEUTROPHILS # BLD AUTO: 5.6 K/UL
NEUTROPHILS NFR BLD: 57.6 %
PHOSPHATE SERPL-MCNC: 3.5 MG/DL
PLATELET # BLD AUTO: 324 K/UL
PMV BLD AUTO: 10.8 FL
POCT GLUCOSE: 183 MG/DL (ref 70–110)
POCT GLUCOSE: 210 MG/DL (ref 70–110)
POTASSIUM SERPL-SCNC: 5.2 MMOL/L
RBC # BLD AUTO: 3.76 M/UL
SODIUM SERPL-SCNC: 133 MMOL/L
WBC # BLD AUTO: 9.68 K/UL

## 2018-01-03 PROCEDURE — 94761 N-INVAS EAR/PLS OXIMETRY MLT: CPT

## 2018-01-03 PROCEDURE — 63600175 PHARM REV CODE 636 W HCPCS: Performed by: INTERNAL MEDICINE

## 2018-01-03 PROCEDURE — 25000003 PHARM REV CODE 250: Performed by: STUDENT IN AN ORGANIZED HEALTH CARE EDUCATION/TRAINING PROGRAM

## 2018-01-03 PROCEDURE — 80048 BASIC METABOLIC PNL TOTAL CA: CPT

## 2018-01-03 PROCEDURE — 63600175 PHARM REV CODE 636 W HCPCS: Performed by: STUDENT IN AN ORGANIZED HEALTH CARE EDUCATION/TRAINING PROGRAM

## 2018-01-03 PROCEDURE — 63600175 PHARM REV CODE 636 W HCPCS: Performed by: SURGERY

## 2018-01-03 PROCEDURE — 83735 ASSAY OF MAGNESIUM: CPT

## 2018-01-03 PROCEDURE — 36415 COLL VENOUS BLD VENIPUNCTURE: CPT

## 2018-01-03 PROCEDURE — 85025 COMPLETE CBC W/AUTO DIFF WBC: CPT

## 2018-01-03 PROCEDURE — 84100 ASSAY OF PHOSPHORUS: CPT

## 2018-01-03 RX ORDER — MAGNESIUM SULFATE HEPTAHYDRATE 40 MG/ML
2 INJECTION, SOLUTION INTRAVENOUS ONCE
Status: COMPLETED | OUTPATIENT
Start: 2018-01-03 | End: 2018-01-03

## 2018-01-03 RX ORDER — OXYCODONE AND ACETAMINOPHEN 10; 325 MG/1; MG/1
1 TABLET ORAL EVERY 4 HOURS PRN
Qty: 30 TABLET | Refills: 0 | Status: SHIPPED | OUTPATIENT
Start: 2018-01-03 | End: 2018-01-03

## 2018-01-03 RX ORDER — OXYCODONE AND ACETAMINOPHEN 10; 325 MG/1; MG/1
1 TABLET ORAL EVERY 4 HOURS PRN
Qty: 30 TABLET | Refills: 0 | Status: SHIPPED | OUTPATIENT
Start: 2018-01-03 | End: 2018-04-04 | Stop reason: ALTCHOICE

## 2018-01-03 RX ADMIN — KETOROLAC TROMETHAMINE 15 MG: 30 INJECTION, SOLUTION INTRAMUSCULAR at 12:01

## 2018-01-03 RX ADMIN — ESCITALOPRAM OXALATE 10 MG: 10 TABLET ORAL at 08:01

## 2018-01-03 RX ADMIN — KETOROLAC TROMETHAMINE 15 MG: 30 INJECTION, SOLUTION INTRAMUSCULAR at 11:01

## 2018-01-03 RX ADMIN — MAGNESIUM SULFATE IN WATER 2 G: 40 INJECTION, SOLUTION INTRAVENOUS at 10:01

## 2018-01-03 RX ADMIN — Medication 100 MG: at 08:01

## 2018-01-03 RX ADMIN — ENOXAPARIN SODIUM 40 MG: 100 INJECTION SUBCUTANEOUS at 04:01

## 2018-01-03 RX ADMIN — OXYCODONE HYDROCHLORIDE AND ACETAMINOPHEN 1 TABLET: 10; 325 TABLET ORAL at 12:01

## 2018-01-03 RX ADMIN — LORAZEPAM 1 MG: 2 INJECTION, SOLUTION INTRAMUSCULAR; INTRAVENOUS at 08:01

## 2018-01-03 RX ADMIN — THERA TABS 1 TABLET: TAB at 08:01

## 2018-01-03 RX ADMIN — KETOROLAC TROMETHAMINE 15 MG: 30 INJECTION, SOLUTION INTRAMUSCULAR at 05:01

## 2018-01-03 RX ADMIN — LISINOPRIL 5 MG: 5 TABLET ORAL at 08:01

## 2018-01-03 RX ADMIN — MAGNESIUM SULFATE IN WATER 2 G: 40 INJECTION, SOLUTION INTRAVENOUS at 08:01

## 2018-01-03 RX ADMIN — OXYCODONE HYDROCHLORIDE AND ACETAMINOPHEN 1 TABLET: 10; 325 TABLET ORAL at 04:01

## 2018-01-03 RX ADMIN — ATORVASTATIN CALCIUM 20 MG: 10 TABLET, FILM COATED ORAL at 08:01

## 2018-01-03 NOTE — ASSESSMENT & PLAN NOTE
2/2 to trauma resulting in tension pneumothorax. I am concerned that his underlying emphysema was a risk factor for this pneumothorax. Chest tube in place. Management per primary. DVT prophylaxis. Pain control. To water seal.  X-ray this am. If resolved- surgery will pull tube.  Likely discharge to home.

## 2018-01-03 NOTE — PROGRESS NOTES
Follow-up Information     Chapo Nixon MD On 1/17/2018.    Specialty:  General Surgery  Why:  at 1:15pm. Hospital Follow Up Appointment.  Contact information:  [address]  [facility] [id number] [phone]             Damian Ramon MD On 1/10/2018.    Specialty:  Internal Medicine  Why:  at 1:20pm. Hospital Follow Up Appointment.  Contact information:  [address] [facility]  [place] [address]  [phone]             [facility].    Specialty:  Home Health Services  Why:  Home Health: Skilled Nursing and Physical and Occupational Therapy.  Contact information:  [address]  [place] [id number] [phone]

## 2018-01-03 NOTE — DISCHARGE SUMMARY
[facility]  Discharge Summary     Patient ID: [id number]    Admit date: 1/1/18    Discharge Date: 1/3/18    Admitting Physician:  Zhou Oliver MD    Discharge Provider: Chapo Nixon MD    Reason for Admission: Traumatic pneumothorax    Admission Condition: Fair    Procedures Performed: Left-sided chest tube placement, 1/1/18    Hospital Course: Mr. Ren is a 68 yo man with history of DM, alcoholism, anxiety/depression presenting with a traumatic hemo/pneumothorax and left 8-10th posterior rib fractures from a fall. He had a left-sided chest tube placement on 1/1/18. He also had a CT scan of his chest which demonstrated 8th-10th posterior rib fractures. His pneumothorax resolved and his chest tube was removed on hospital day #3. A follow up chest x-ray demonstrated no evidence of a recurrent pneumothorax. Mr. Ren's pain was well-controlled with oral pain medication. He was regularly using incentive spirometry. Internal Medicine was consulted for assistance with the patient's diabetes, which was well-controlled. The patient was also counseled on smoking cessation and reduction of his alcohol intake. This is hospital day #3 for Mr. Ren who will be discharged home in improved condition.    Final Diagnoses:    Principal Problem: Pneumothorax, multiple rib fractures, blunt trauma   Secondary Diagnoses: DM, alcoholism, nicotine use, anxiety/depression, HTN    Discharged Condition: stable    Discharge Exam:  General appearance: awake, alert, no acute distress  HEENT: EOMI, anicteric, moist oral mucosal membranes  CV: S1, S2; no murmurs; RRR  Pulm: CTAB, normal WOB  Chest: left-sided dressing in place   Abd: +bowel sounds, soft, NTND  Extremities: warm, well-perfused, no edema  Neuro: CN II-XII grossly intact, normal speech  Skin: warm, dry, no rashes  Psych: appropriate mood and affect    Disposition: Home or Self Care    Emergency Instructions:   -Contact clinic immediately if you have any  "fever/chills, redness/drainage from your incision site, nausea/vomiting, inability to tolerate oral intake, chest pain, or shortness of breath  -Start taking docusate if you experience diarrhea  -No driving while taking narcotics    Medications:   Moe Ren   Home Medication Instructions TARA:[id number]    Printed on:01/03/18 6655   Medication Information                      ALPRAZolam (XANAX) 0.5 MG tablet  2 tablets at night. Try the trazodone first             atorvastatin (LIPITOR) 20 MG tablet  Take 1 tablet (20 mg total) by mouth once daily.             azelastine (ASTELIN) 137 mcg (0.1 %) nasal spray               BD ULTRA-FINE RENETTA PEN NEEDLES 32 gauge x 5/32" Ndle  TO USE WITH MULTIPLE DAILY INJECTIONS             blood sugar diagnostic (ACCU-CHEK KM PLUS TEST STRP) Strp  USE TO TEST UPTO 6 TIMES DAILY             busPIRone (BUSPAR) 15 MG tablet  Take 1 tablet (15 mg total) by mouth 2 (two) times daily as needed (anxiety). Substitute for dose of xanax.             cyclobenzaprine (FLEXERIL) 10 MG tablet  Take 1 tablet (10 mg total) by mouth 3 (three) times daily as needed.             desoximetasone (TOPICORT) 0.25 % cream  Apply topically 2 (two) times daily.             escitalopram oxalate (LEXAPRO) 10 MG tablet  Take 1 tablet (10 mg total) by mouth once daily.             fluticasone (FLONASE) 50 mcg/actuation nasal spray  2 sprays by Each Nare route once daily.             gabapentin (NEURONTIN) 600 MG tablet  Take 2 tablets (1,200 mg total) by mouth 3 (three) times daily.             insulin aspart (NOVOLOG FLEXPEN) 100 unit/mL InPn pen  USE WITH INSULIN TO CARB RATIO 1:6 WITH BREAKFAST 1:16 WITH LUNCH 1:13 WITH DINNER PLUS CORRECTION             ipratropium (ATROVENT HFA) 17 mcg/actuation inhaler  Inhale 2 puffs into the lungs every 6 (six) hours. Rescue             L-METHYL-B6-B12 3-35-2 mg Tab  TAKE 1 TABLET BY MOUTH 2 (TWO) TIMES DAILY.             lancets (ACCU-CHEK FASTCLIX) Misc  USE " TO TEST 6 TIMES A DAY             lisinopril (PRINIVIL,ZESTRIL) 5 MG tablet  Take 1 tablet (5 mg total) by mouth once daily.             oxyCODONE-acetaminophen (PERCOCET)  mg per tablet  Take 1 tablet by mouth every 4 (four) hours as needed for Pain.               Activity: as tolerated  Diet: diabetic diet  Wound Care: keep wound clean and dry, remove dressing in 48 hours, ok to shower    Follow-up with your PCP in 1 week    Signed:  Carloine Parikh

## 2018-01-03 NOTE — PROGRESS NOTES
[facility] Hospital Medicine  Progress Note    Patient Name: Moe Ren  MRN: [id number]  Patient Class: IP- Inpatient   Admission Date: 1/1/2018  Length of Stay: 2 days  Attending Physician: Zhou Oliver MD  Primary Care Provider: Damian Ramon MD        Subjective:     Principal Problem:Pneumothorax with hemothorax, traumatic    HPI:  67 year old male with DM2, tobacco abuse, anxiety disorder, chronic back pain, chronic narcotic use and COPD who presented with left sided tension pneumothorax secondary to trauma. Chest tube placed and managed by surgery. Hospital medicine consulted for co-management of diabetes, depression and COPD.     Hospital Course:  No notes on file    Interval History: No new issues.     Review of Systems   Constitutional: Negative for activity change.   Respiratory: Negative for chest tightness and shortness of breath.    Cardiovascular: Positive for chest pain.   Gastrointestinal: Negative for abdominal distention, constipation, diarrhea and nausea.   Musculoskeletal: Negative for arthralgias.     Objective:     Vital Signs (Most Recent):  Temp: 97.7 °F (36.5 °C) (01/03/18 0340)  Pulse: 67 (01/03/18 0340)  Resp: 18 (01/03/18 0340)  BP: 132/71 (01/03/18 0340)  SpO2: 97 % (01/03/18 0340) Vital Signs (24h Range):  Temp:  [97.1 °F (36.2 °C)-97.8 °F (36.6 °C)] 97.7 °F (36.5 °C)  Pulse:  [47-90] 67  Resp:  [18] 18  SpO2:  [92 %-97 %] 97 %  BP: (131-150)/(63-77) 132/71     Weight: 74.2 kg (163 lb 9.3 oz)  Body mass index is 22.19 kg/m².    Intake/Output Summary (Last 24 hours) at 01/03/18 0634  Last data filed at 01/03/18 0500   Gross per 24 hour   Intake              720 ml   Output             1210 ml   Net             -490 ml      Physical Exam   Constitutional: He is oriented to person, place, and time. He appears well-developed and well-nourished.   HENT:   Head: Normocephalic and atraumatic.   Cardiovascular: Normal rate.    Neurological: He is alert and oriented to  person, place, and time.   Skin: Skin is warm and dry.   Psychiatric: He has a normal mood and affect. His behavior is normal.   Vitals reviewed.      Significant Labs:   CBC:   Recent Labs  Lab 01/02/18  0451   WBC 11.09   HGB 12.3*   HCT 37.6*        CMP:   Recent Labs  Lab 01/02/18  0451   *   K 5.3*   CL 94*   CO2 30*   *   BUN 20   CREATININE 1.2   CALCIUM 9.8   ANIONGAP 9   EGFRNONAA >60       Significant Imaging:     Assessment/Plan:      * Pneumothorax with hemothorax, traumatic    2/2 to trauma resulting in tension pneumothorax. I am concerned that his underlying emphysema was a risk factor for this pneumothorax. Chest tube in place. Management per primary. DVT prophylaxis. Pain control. To water seal.  X-ray this am. If resolved- surgery will pull tube.  Likely discharge to home.           Alcohol-induced chronic pancreatitis    No symptoms to suggest acute pancreatitis or current issues with pancreatitis. Drinks on a daily basis. Alcohol cessation. Benzodiazepine to be used PRN. Must meet 2 of the following criteria: HR > 110, BP > 180/100, anxiety, tremors, hallucinations, seizures          Closed fracture of multiple ribs of left side    Per primary          Subcutaneous emphysema due to trauma    Per primary          Chronic obstructive bronchitis with pulmonary emphysema    Clinically, he has chronic bronchitis. Imaging with evidence of diffuse emphysema. O2 sats stable at room air. PFTs 12/2017 with mild obstruction. Tobacco cessation.           Degeneration of lumbar or lumbosacral intervertebral disc    No acute issues. On chronic opioids.           DM type 2 with diabetic peripheral neuropathy    Patient has well controlled diabetes managed by as needed insulin injection using sliding scale. Is on no long acting insulin. Patient has excellent control of his diabetes and asked me if he can do his own accuchecks and give his own insulin. I am ok with that as long as he reports BG  and amount of insulin given. So far BG at goal.           Continuous tobacco abuse    Strongly encouraged cessation as it is negatively impacting his health. Not sure if patient is motivated to do so but discussed imaging results concerning for diffuse emphysema. This was alarming for him and his wife. Hopefully he will stop smoking          Anxiety disorder    No acute issues. Continue home regimen of escitalopram 10 mg daily. Anxiolytics for breakthrough as needed.             VTE Risk Mitigation         Ordered     enoxaparin injection 40 mg  Daily     Route:  Subcutaneous        01/02/18 1055     Medium Risk of VTE  Once      01/01/18 0408     Place SHRUTHI hose  Until discontinued      01/01/18 0408     Place sequential compression device  Until discontinued      01/01/18 0408        No new recs from med. Likely discharge to home later. Pneumothorax resolved on x-ray of 1/2.       Paul Lopez MD  Department of Hospital Medicine   [facility]

## 2018-01-03 NOTE — PROGRESS NOTES
Patient ambulating halls. No SOB or acute distress noted. Tolerated well. Will continue to monitor.

## 2018-01-03 NOTE — NURSING
Pt's discharge instructions, prescriptions and follow up appointments given and explained to pt. Verbalized understanding. IV discontinued. Reminded to keep dressing to left chest wall in place for 48 hrs. Verbalized understanding. Belongings at bedside. Pt refused transport and will ambulate off unit with wife.

## 2018-01-03 NOTE — PROGRESS NOTES
Patient ambulating halls with spouse. No SOB or acute distress noted. Tolerated well. Will continue to monitor.

## 2018-01-03 NOTE — PROGRESS NOTES
Patient ambulating halls. No acute distress noted and denies SOB. Tolerating well. Will continue to monitor.

## 2018-01-03 NOTE — PROGRESS NOTES
[facility]  General Surgery  Progress Note     Subjective:      Interval History: Reports left-sided chest pain improving. C/o leg pain. Ambulating in hallway. Denies any SOB. CT with 10 mL output.     Post-Op Info:  Left-sided chest tube placement, 1/1/18       Objective:   Vital Signs:   Vitals:    01/02/18 2001 01/03/18 0006 01/03/18 0340 01/03/18 0718   BP: (!) 143/77 131/66 132/71    BP Location:       Patient Position: Lying Lying Lying    Pulse: 71 74 67    Resp: 18 18 18    Temp: 97.4 °F (36.3 °C) 97.1 °F (36.2 °C) 97.7 °F (36.5 °C)    TempSrc: Oral Oral Oral    SpO2: 96% 96% 97% 98%   Weight:       Height:          Intake/Output Summary (Last 24 hours):    Intake/Output Summary (Last 24 hours) at 01/03/18 0739  Last data filed at 01/03/18 0500   Gross per 24 hour   Intake              720 ml   Output             1210 ml   Net             -490 ml     Physical Exam:  General appearance: awake, alert, no acute distress  HEENT: EOMI, anicteric, moist oral mucosal membranes  Chest: left sided chest tube in place with + air leak, bloody drainage  Pulm: normal WOB, CTAB     Significant Labs:  WBC 9.7, Hgb 12.3, Hct 36.8, plt 324  Na 133, K 5.2, Phos 5.2, Mg 1.5    CXR: Pending    Assessment/Plan:   68 yo man with history of DM, alcoholism, anxiety/depression presenting with a traumatic hemo/pneumothorax, left 8-10th posterior rib fractures from a fall, s/p left-sided chest tube placement, 1/1/18, hospital day #3     -Continue chest tube to water seal, will reconnect to suction if symptomatic  -Repeat CXR in AM, will remove chest tube if no evidence of pneumothorax  -MVI/Thiamine daily, monitor closely for signs of EtOH withdrawal  -Home dose lisinopril/statin  -Replete Mg  -Diabetes well-controlled, being followed by hospitalist. Patient is checking his own blood sugar regularly.   -Pain control for rib fractures, IV toradol/percocet  -Smoking cessation  -IS 10x/hr, encourage deep breaths,  Dufeliberto  -OJAKE tid  -Lovenox/SCDs for DVT prophylaxis  -Possible d/c home tomorrow if pneumothorax resolves     Caroline Parikh MD  General Surgery  [facility]

## 2018-01-03 NOTE — PLAN OF CARE
Problem: Patient Care Overview  Goal: Plan of Care Review  Outcome: Ongoing (interventions implemented as appropriate)  Pt progressing. Oriented X4. Voiding well per urinal. Skin integrity maintained. Left chest wall tube to water seal. Denies SOB.Patient educated to increase ambulation, verbalizes understanding. Pain controlled with Iv and po pain meds. Denies nausea during shift. Vs stable. Adequate oral intake. Tolerating diet. Free of falls. Call light within reach. Bed in low position. No issues during shift. Continue plan of care. Spouse at bedside.

## 2018-01-03 NOTE — PLAN OF CARE
Problem: Patient Care Overview  Goal: Plan of Care Review  Outcome: Ongoing (interventions implemented as appropriate)  Pt's progressing. No s/s of distress noted . Denies pain/discomfort. Chest tube to left chest wall removed per MD. Dressing in place. Up ad naomy. Safety measures maintained. Call bell within reach. Will continue to monitor

## 2018-01-03 NOTE — SUBJECTIVE & OBJECTIVE
Interval History: No new issues.     Review of Systems   Constitutional: Negative for activity change.   Respiratory: Negative for chest tightness and shortness of breath.    Cardiovascular: Positive for chest pain.   Gastrointestinal: Negative for abdominal distention, constipation, diarrhea and nausea.   Musculoskeletal: Negative for arthralgias.     Objective:     Vital Signs (Most Recent):  Temp: 97.7 °F (36.5 °C) (01/03/18 0340)  Pulse: 67 (01/03/18 0340)  Resp: 18 (01/03/18 0340)  BP: 132/71 (01/03/18 0340)  SpO2: 97 % (01/03/18 0340) Vital Signs (24h Range):  Temp:  [97.1 °F (36.2 °C)-97.8 °F (36.6 °C)] 97.7 °F (36.5 °C)  Pulse:  [47-90] 67  Resp:  [18] 18  SpO2:  [92 %-97 %] 97 %  BP: (131-150)/(63-77) 132/71     Weight: 74.2 kg (163 lb 9.3 oz)  Body mass index is 22.19 kg/m².    Intake/Output Summary (Last 24 hours) at 01/03/18 0634  Last data filed at 01/03/18 0500   Gross per 24 hour   Intake              720 ml   Output             1210 ml   Net             -490 ml      Physical Exam   Constitutional: He is oriented to person, place, and time. He appears well-developed and well-nourished.   HENT:   Head: Normocephalic and atraumatic.   Cardiovascular: Normal rate.    Neurological: He is alert and oriented to person, place, and time.   Skin: Skin is warm and dry.   Psychiatric: He has a normal mood and affect. His behavior is normal.   Vitals reviewed.      Significant Labs:   CBC:   Recent Labs  Lab 01/02/18  0451   WBC 11.09   HGB 12.3*   HCT 37.6*        CMP:   Recent Labs  Lab 01/02/18  0451   *   K 5.3*   CL 94*   CO2 30*   *   BUN 20   CREATININE 1.2   CALCIUM 9.8   ANIONGAP 9   EGFRNONAA >60       Significant Imaging:

## 2018-01-05 ENCOUNTER — PATIENT OUTREACH (OUTPATIENT)
Dept: ADMINISTRATIVE | Facility: CLINIC | Age: 68
End: 2018-01-05

## 2018-01-05 NOTE — PATIENT INSTRUCTIONS
"Pneumothorax,Spontaneous   PNEUMOTHORAX means an injury which causes a partial collapse of one lung. This occurs when a "bleb" or weakened spot on the lung surface ruptures. It may occur in persons with asthma, emphysema or even in those with no prior lung disease. The air that leaks out of the lung is trapped in the space between the lung and the chest wall (pleural space). This trapped air may compress the lung and prevent it from re-inflating.     Your pneumothorax is small and should get better without treatment with a catheter. This can be observed at home. If the amount of trapped air grows larger, it must be removed with a tube placed into the pleural space.   Home Care:   1) Rest at home. No strenuous activity for one week.   2) You may use acetaminophen (Tylenol) or ibuprofen (Motrin, Advil) to control pain, unless another medicine was prescribed. [ NOTE : If you have chronic liver or kidney disease or ever had a stomach ulcer or GI bleeding, talk with your doctor before using these medicines.]   3) Although your chest might hurt to breathe, during the next three days, it is important to take four slow deep breaths every one to two hours while awake. This sends extra oxygen and blood to the lung and is important to help keep the lung expanded. If an incentive spirometer (breathing exercise device) was given, use it as directed.   Follow Up   with your doctor or this facility in 24 hours or as advised for a repeat chest X-ray to be sure the pneumothorax is not getting larger.   [NOTE: Any X-rays taken will be reviewed by a radiologist. You will be notified of any new findings that may affect your care.]   Get Prompt Medical Attention   if any of the following occur:   -- Breathing becomes more painful or difficult   -- Increasing shortness of breath   -- Weakness, dizziness or fainting   © 9857-0438 [employer]. [address] [place] [address], [place] [address]. All rights reserved. This information is " not intended as a substitute for professional medical care. Always follow your healthcare professional's instructions.

## 2018-01-05 NOTE — Clinical Note
Please forward this important TCC information to your provider in order to maximize the post discharge care delivery of this patient.  C3 nurse spoke with Moe pearson for a TCC post hospital discharge follow up call. The patient has a scheduled HOSFU appointment with Damian Ramon MD on 1/10 @ 0859. Respectfully, Charline Mcfarlane RN  [facility]   [email or web address]     Please do not reply to this message, as this inbox is not routinely monitored.

## 2018-01-09 DIAGNOSIS — M51.36 DEGENERATIVE DISC DISEASE, LUMBAR: ICD-10-CM

## 2018-01-09 RX ORDER — GABAPENTIN 600 MG/1
1200 TABLET ORAL 3 TIMES DAILY
Qty: 180 TABLET | Refills: 2 | Status: SHIPPED | OUTPATIENT
Start: 2018-01-09 | End: 2018-01-10 | Stop reason: SDUPTHER

## 2018-01-09 NOTE — PROGRESS NOTES
Transitional Care Note  Subjective:       Patient ID: Moe Ren is a 67 y.o. male.  Chief Complaint: hospital followup    Family and/or Caretaker present at visit?  No.  Diagnostic tests reviewed/disposition: No diagnosic tests pending after this hospitalization.  Disease/illness education: none  Home health/community services discussion/referrals: Patient does not have home health established from hospital visit.  They do not need home health.  If needed, we will set up home health for the patient.   Establishment or re-establishment of referral orders for community resources: No other necessary community resources.   Discussion with other health care providers: No discussion with other health care providers necessary.   DM2 with neuropathy, followed by endo; gabapentin.  HTN  hyperlipidemia  Smoker, contemplative stage of quitting.  He is not interested in smoking cessation classes.  COPD, 12/2017 PFTs showing mild airflow obstruction, air trapping and decreased DLCO.  No desaturation.  Spinal stenosis on chronic narcotic therapy, 7/2014 MRI L spine with lumbar spondylosis with mod/severe right neural foraminal stenossi L4-5; significant degenerative disk disease, facet joint arthropathy L4-5, L5-S1; hx of injections; had been seen by pain mgmt 2015 but discharged for THC. hydrocodone 10/325 BID. Takes by his estimate 1.5 tablets a day mainly in the evening.   Hx of Aleve had to stop after surgery for deviated septum. Now he notes various other joints that hurt and are helped by the narcotic therapy. Worse with prolonged standing and walking.  He had talked to neurosurgery, 2015, they had recommended surgery and he is very wary of surgery.  Goal is to try to reduce intake.  Anxiety, Family history of suicide, father committed suicide and his son did as well. alprazolam 0.5 mg TID.  Typically takes 2 at night, maybe 1 in the morning.  8/2/2017, started on zoloft.  Zoloft SE of hypoglycemia/ shakiness so  "changed to Lexapro. Refilled xanax TID and ultimately goal is wean off. He was going to xanax to 1 tablet at night.  Notes he had a "hard father".  Alcohol use. Has been to Alcoholics Anonymous and has quit before.  Did not really find Alcoholics Anonymous to be that helpful.  Feels like he needs to get himself in his own mind set in order to quit and is not adjusted and further referral at this time.last OV he promised me he would cut down to 3 drinks a day.  Colon polyps, last colonoscopy 2009.  Referred in March for colonoscopy.  4/11/2017 colonoscopy with transverse tubular adenoma  Schatzki ring dilitation; followed by [facility].  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 [place] to be due to EtOH.    Last visit had been trying to limit his Xanax and hydrocodone.  He had been making a concerted effort to cut down.  Xanax was not helpful.  Tries to stagger his Xanax and hydrocodone use.  Had increased alcohol intake due to recent stress.  I wanted him to try BuSpar at night to decrease his Xanax use.  Trial of one BuSpar and one Xanax at night.  He had what I felt to be a smoker's cough so I wanted to check PFTs.  PFTs showing COPD with airflow obstruction, air trapping, reduced DLCO.  I sent in a prescription for Atrovent inhaler  He was admitted to the hospital with a fall with rib fractures causing pneumothorax in the left side.  CT showing fractures of ribs 89 and 10.  Had a chest tube which was removed while hospitalized.    He had tripped on the edge of a rug and fell and hit a flowerpot.  Didn't think much of it until 2 days later when he developed pain and dyspnea.  Wonders if in retrospect he had a collapsed lung prior to the event as he was more dyspneic.  Underlying emphysema.     Since last visit - joined a health club, [employer] program 9/2017.      Pain has reduced.  Pain meds in the evenings now.      Cutting down on xanax.  Taking 2 buspar in the evening instead of the xanax.  Not as " effective as xanax with some issues with sleep.  Cut down on alcohol and cigarettes.  1 PPD currently.  Reports that xanax some days he doesn't take any xanax.  Stressors - still some work stress though that is reduced.  Some stressors with his Rastafarian.    Plans to try 1 xanax in AM to try to reduce smoking as a nervous habit.  And keep buspar 2 at night.     Thinks the neda has a SE of memory loss.  Taking 2 tablets TID.  Plans to perhaps cut down but for now plans to stay on current dose.    Going to be a grandfather! Needs a Tdap.    Dermatologist gave him a cream to apply for bruising.  pramasone      Review of Systems   Constitutional: Negative for fatigue, fever and unexpected weight change.   Respiratory: Negative for chest tightness and shortness of breath.    Cardiovascular: Negative for chest pain and leg swelling.       Objective:      Physical Exam   Constitutional: He is oriented to person, place, and time. He appears well-developed and well-nourished. No distress.   Eyes: EOM are normal.   Cardiovascular: Normal rate, regular rhythm and normal heart sounds.    No murmur heard.  Pulmonary/Chest: Effort normal and breath sounds normal.   Left chest wall bandaged, no deformity, no swelling, no bruising, lung fields are clear   Musculoskeletal: Normal range of motion.   Neurological: He is alert and oriented to person, place, and time. Coordination normal.   Skin: Skin is warm and dry.   Psychiatric: He has a normal mood and affect. His behavior is normal. Thought content normal.       Assessment:       1. Closed fracture of multiple ribs of left side, initial encounter    2. Anxiety disorder, unspecified type    3. Degenerative disc disease, lumbar    4. Facet arthritis of lumbar region    5. Chronic back pain greater than 3 months duration    6. Need for diphtheria-tetanus-pertussis (Tdap) vaccine, adult/adolescent    7. Bruising        Plan:   Closed fracture of multiple ribs of left side, initial  encounter-status post chest tube, pneumothorax resolved, no issues    Anxiety disorder, unspecified type-has cut down on his Xanax, is now taking it in the mornings to try and substitute for smoking as he is trying to reduce smoking.  Taking BuSpar at night.  Refilled Xanax, #30, to take just one in the morning.   -     ALPRAZolam (XANAX) 0.5 MG tablet; Take 1 tablet (0.5 mg total) by mouth daily as needed for Anxiety. 2 tablets at night. Try the trazodone first  Dispense: 30 tablet; Refill: 0    Degenerative disc disease, lumbar  Facet arthritis of lumbar region  Chronic back pain greater than 3 months duration  -He has reduced his narcotic intake.  Was also given Percocet after discharge.  I've had him sign a pain contract today, salient points discussed.  He still has medication left and has reduced his intake and overall does not need refills.  I am reenlisting Norco but didn't send it in today.  When he needs a refill he'll call me, I would send and #30 at that time.  He feels like the gabapentin is causing some memory issues but is planning to stay on the same dose at this time.  Refilled that the pharmacy.  -     gabapentin (NEURONTIN) 600 MG tablet; Take 2 tablets (1,200 mg total) by mouth 3 (three) times daily.  Dispense: 180 tablet; Refill: 5  -     hydrocodone-acetaminophen 10-325mg (NORCO)  mg Tab; Take 1 tablet by mouth every 24 hours as needed for Pain.  Dispense: 30 tablet; Refill: 0    Need for diphtheria-tetanus-pertussis (Tdap) vaccine, adult/adolescent-expecting her grandson, needs pertussis vaccination, prescription sent to pharmacy.  Did discuss with him that this is probably not covered by insurance  -     diphth,pertus,acell,,tetanus (BOOSTRIX) 2.5-8-5 Lf-mcg-Lf/0.5mL Syrg injection; Inject 0.5 mLs into the muscle once.  Dispense: 0.5 mL; Refill: 0    Bruising-finds pramoxine/hydrocortisone to be helpful to resolve the bruising faster, prescription sent to pharmacy  -      pramoxine-hydrocortisone (ANALPRAM HC) cream; Apply topically 3 (three) times daily.  Dispense: 28.4 g; Refill: 0

## 2018-01-10 ENCOUNTER — OFFICE VISIT (OUTPATIENT)
Dept: FAMILY MEDICINE | Facility: CLINIC | Age: 68
End: 2018-01-10
Payer: MEDICARE

## 2018-01-10 VITALS
SYSTOLIC BLOOD PRESSURE: 126 MMHG | DIASTOLIC BLOOD PRESSURE: 68 MMHG | WEIGHT: 157.75 LBS | BODY MASS INDEX: 21.37 KG/M2 | TEMPERATURE: 98 F | HEIGHT: 72 IN | OXYGEN SATURATION: 97 % | HEART RATE: 56 BPM

## 2018-01-10 DIAGNOSIS — T14.8XXA BRUISING: ICD-10-CM

## 2018-01-10 DIAGNOSIS — G89.29 CHRONIC BACK PAIN GREATER THAN 3 MONTHS DURATION: ICD-10-CM

## 2018-01-10 DIAGNOSIS — M51.36 DEGENERATIVE DISC DISEASE, LUMBAR: ICD-10-CM

## 2018-01-10 DIAGNOSIS — Z23 NEED FOR DIPHTHERIA-TETANUS-PERTUSSIS (TDAP) VACCINE, ADULT/ADOLESCENT: ICD-10-CM

## 2018-01-10 DIAGNOSIS — F41.9 ANXIETY DISORDER, UNSPECIFIED TYPE: ICD-10-CM

## 2018-01-10 DIAGNOSIS — M54.9 CHRONIC BACK PAIN GREATER THAN 3 MONTHS DURATION: ICD-10-CM

## 2018-01-10 DIAGNOSIS — M47.816 FACET ARTHRITIS OF LUMBAR REGION: ICD-10-CM

## 2018-01-10 DIAGNOSIS — S22.42XA CLOSED FRACTURE OF MULTIPLE RIBS OF LEFT SIDE, INITIAL ENCOUNTER: Primary | ICD-10-CM

## 2018-01-10 PROCEDURE — 99999 PR PBB SHADOW E&M-EST. PATIENT-LVL III: CPT | Mod: PBBFAC,,, | Performed by: INTERNAL MEDICINE

## 2018-01-10 PROCEDURE — 99495 TRANSJ CARE MGMT MOD F2F 14D: CPT | Mod: S$GLB,,, | Performed by: INTERNAL MEDICINE

## 2018-01-10 RX ORDER — HYDROCODONE BITARTRATE AND ACETAMINOPHEN 10; 325 MG/1; MG/1
1 TABLET ORAL
Qty: 30 TABLET | Refills: 0
Start: 2018-01-10 | End: 2018-02-07 | Stop reason: SDUPTHER

## 2018-01-10 RX ORDER — ALPRAZOLAM 0.5 MG/1
0.5 TABLET ORAL DAILY PRN
Qty: 30 TABLET | Refills: 0 | Status: SHIPPED | OUTPATIENT
Start: 2018-01-10 | End: 2018-02-07 | Stop reason: SDUPTHER

## 2018-01-10 RX ORDER — GABAPENTIN 600 MG/1
1200 TABLET ORAL 3 TIMES DAILY
Qty: 180 TABLET | Refills: 5 | Status: SHIPPED | OUTPATIENT
Start: 2018-01-10 | End: 2018-05-04

## 2018-01-11 NOTE — PHYSICIAN QUERY
PT Name: Moe Ren  MR #: [id number]     Physician Query Form - Documentation Clarification      CDS/: Justine Burch               Contact information: [email or web address]    This form is a permanent document in the medical record.     Query Date: January 11, 2018    By submitting this query, we are merely seeking further clarification of documentation. Please utilize your independent clinical judgment when addressing the question(s) below.    The Medical record reflects the following:    Supporting Clinical Findings Location in Medical Record   Replete Mg    Magnesium= 1.5    Magnesium sulfate 2 gm IVPB x 1 dose given on 01/03       PN 01/03    Labs, Chem Profile 01/03    MAR                                                                                      Doctor, Please specify diagnosis or diagnoses associated with above clinical findings.    Provider Use Only      [x  ] Hypomagnesemia    [  ] Other diagnosis, please specify:  _______________                                                                                                                         [  ] Clinically undetermined

## 2018-01-18 ENCOUNTER — PATIENT MESSAGE (OUTPATIENT)
Dept: ENDOCRINOLOGY | Facility: CLINIC | Age: 68
End: 2018-01-18

## 2018-01-22 ENCOUNTER — LAB VISIT (OUTPATIENT)
Dept: LAB | Facility: HOSPITAL | Age: 68
End: 2018-01-22
Attending: NURSE PRACTITIONER
Payer: MEDICARE

## 2018-01-22 DIAGNOSIS — E11.42 DM TYPE 2 WITH DIABETIC PERIPHERAL NEUROPATHY: Chronic | ICD-10-CM

## 2018-01-22 LAB
25(OH)D3+25(OH)D2 SERPL-MCNC: 35 NG/ML
CHOLEST SERPL-MCNC: 169 MG/DL
CHOLEST/HDLC SERPL: 2.6 {RATIO}
ESTIMATED AVG GLUCOSE: 157 MG/DL
HBA1C MFR BLD HPLC: 7.1 %
HDLC SERPL-MCNC: 65 MG/DL
HDLC SERPL: 38.5 %
LDLC SERPL CALC-MCNC: 82.8 MG/DL
NONHDLC SERPL-MCNC: 104 MG/DL
TRIGL SERPL-MCNC: 106 MG/DL
TSH SERPL DL<=0.005 MIU/L-ACNC: 2.89 UIU/ML

## 2018-01-22 PROCEDURE — 83036 HEMOGLOBIN GLYCOSYLATED A1C: CPT

## 2018-01-22 PROCEDURE — 82306 VITAMIN D 25 HYDROXY: CPT

## 2018-01-22 PROCEDURE — 36415 COLL VENOUS BLD VENIPUNCTURE: CPT | Mod: PO

## 2018-01-22 PROCEDURE — 84443 ASSAY THYROID STIM HORMONE: CPT

## 2018-01-22 PROCEDURE — 80061 LIPID PANEL: CPT

## 2018-02-04 ENCOUNTER — PATIENT MESSAGE (OUTPATIENT)
Dept: ENDOCRINOLOGY | Facility: CLINIC | Age: 68
End: 2018-02-04

## 2018-02-05 ENCOUNTER — OFFICE VISIT (OUTPATIENT)
Dept: ENDOCRINOLOGY | Facility: CLINIC | Age: 68
End: 2018-02-05
Payer: MEDICARE

## 2018-02-05 VITALS
BODY MASS INDEX: 21.38 KG/M2 | DIASTOLIC BLOOD PRESSURE: 76 MMHG | HEART RATE: 93 BPM | WEIGHT: 157.88 LBS | SYSTOLIC BLOOD PRESSURE: 145 MMHG | HEIGHT: 72 IN

## 2018-02-05 DIAGNOSIS — E78.1 HYPERTRIGLYCERIDEMIA: ICD-10-CM

## 2018-02-05 DIAGNOSIS — R80.9 MICROALBUMINURIA: ICD-10-CM

## 2018-02-05 DIAGNOSIS — K86.0 ALCOHOL-INDUCED CHRONIC PANCREATITIS: ICD-10-CM

## 2018-02-05 PROCEDURE — 99215 OFFICE O/P EST HI 40 MIN: CPT | Mod: S$GLB,,, | Performed by: NURSE PRACTITIONER

## 2018-02-05 PROCEDURE — 3008F BODY MASS INDEX DOCD: CPT | Mod: S$GLB,,, | Performed by: NURSE PRACTITIONER

## 2018-02-05 PROCEDURE — 99999 PR PBB SHADOW E&M-EST. PATIENT-LVL V: CPT | Mod: PBBFAC,,, | Performed by: NURSE PRACTITIONER

## 2018-02-05 RX ORDER — DOXYCYCLINE HYCLATE 100 MG/1
100 TABLET, DELAYED RELEASE ORAL 2 TIMES DAILY
COMMUNITY
End: 2018-03-01

## 2018-02-05 RX ORDER — INSULIN DEGLUDEC 200 U/ML
14 INJECTION, SOLUTION SUBCUTANEOUS DAILY
Qty: 1 SYRINGE | Refills: 0 | COMMUNITY
Start: 2018-02-05 | End: 2018-02-28 | Stop reason: ALTCHOICE

## 2018-02-05 RX ORDER — METFORMIN HYDROCHLORIDE 500 MG/1
1000 TABLET, EXTENDED RELEASE ORAL 2 TIMES DAILY WITH MEALS
Qty: 120 TABLET | Refills: 0 | Status: SHIPPED | OUTPATIENT
Start: 2018-02-05 | End: 2018-03-04 | Stop reason: SDUPTHER

## 2018-02-05 NOTE — PATIENT INSTRUCTIONS
Will try to wean off of Novolog with metformin and once daily insulin like Tresiba.     Stop carb counting with Novolog.    Start Tresiba tonight at ~ 10 pm or later at 14 units.   Start metformin  mg tablet twice daily with meals for 1 week. Then increase to 2 tablets twice daily with meals.   You may continue to correct high blood sugar with Novolog according to the Expert meter recommendations.     Test blood sugar 3x/day before meals.     See Diabetes Educator/Registered Dietician for Medical Nutrition Therapy.     Return to clinic in 2-3 weeks. Message with any concerns or updates.

## 2018-02-05 NOTE — PROGRESS NOTES
"CC: This 67 y.o. White male  is here for evaluation of  T2DM along with comorbidities indicated in the Visit Diagnosis section of this encounter.    HPI: Moe Ren was diagnosed with T2DM in ~ 2010.  Metformin started at time of diagnosis.     He has a history of squamos cell oral CA, Spinal stenosis, chronic pancreatitis.    Last seen by ANJU France NP, in 7/2017. New to me.   a1c up from 6.6 to 7.1%.   Down to smoking 1 ppd. 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     Finds his blood glucoses have been higher since getting on antibiotics, with last week's glucoses up in the 200s often. He increased his carb ratio by 1 as seen below.   He is frustrated by how labile his glucoses are despite maintaining a consistent diet.     LAST DIABETES EDUCATION: 6/2016    PRESCRIBED DIABETES MEDICATIONS: Currently using Accu check Amanda expert meter and is carb counting.   Novolog Flexpen   ICR   12 am 1:5   1130 am 1:15   4 pm 1:12    ISF 1:50, goal 115     Misses medication doses - No    DM COMPLICATIONS: peripheral neuropathy    SIGNIFICANT DIABETES MED HISTORY: Tresiba gradually titrated down until it was stopped mid 2017 d/t hypoglycemia.     SELF MONITORING BLOOD GLUCOSE: Checks blood glucose at home 4-6x/day.     HYPOGLYCEMIC EPISODES: glucose drops as low as 40-50s once or twice a week.      CURRENT DIET: likes 2 glasses of milk a day - drinks with meals. His 2 pm meal is 2 Ensure's. Breakfast is a bowl of cereal - honey nut cheerios with milk, coffee w/ sugar - "his cheat."   Appears to very proficient at counting carbs.     Drinks scotch 5-6 whiskey drinks with water.     3 meal/day - Eats at 10 am, 2 pm, and 6 pm.     CURRENT EXERCISE: goes twice a week to gym - 1 hour of cardio/weights    OCCUPATION: part time contract . 10 hours.       BP (!) 145/76 (BP Location: Right arm, Patient Position: Sitting, BP Method: Large " (Automatic))   Pulse 93   Ht 6' (1.829 m)   Wt 71.6 kg (157 lb 13.6 oz)   BMI 21.41 kg/m²       ROS:   CONSTITUTIONAL: Appetite good, denies fatigue  RESPIRATORY: No shortness of breath or cough  CARDIAC: + chest pain worse worse with palpation       PHYSICAL EXAM:  GENERAL: Well developed, well nourished. No acute distress.   PSYCH: AAOx3, appropriate mood and affect, conversant, well-groomed. Judgement and insight good.   NEURO: Cranial nerves grossly intact. Speech clear, no tremor.   CHEST: Respirations even and unlabored.        Hemoglobin A1C   Date Value Ref Range Status   01/22/2018 7.1 (H) 4.0 - 5.6 % Final     Comment:     According to ADA guidelines, hemoglobin A1c <7.0% represents  optimal control in non-pregnant diabetic patients. Different  metrics may apply to specific patient populations.   Standards of Medical Care in Diabetes-2016.  For the purpose of screening for the presence of diabetes:  <5.7%     Consistent with the absence of diabetes  5.7-6.4%  Consistent with increasing risk for diabetes   (prediabetes)  >or=6.5%  Consistent with diabetes  Currently, no consensus exists for use of hemoglobin A1c  for diagnosis of diabetes for children.  This Hemoglobin A1c assay has significant interference with fetal   hemoglobin   (HbF). The results are invalid for patients with abnormal amounts of   HbF,   including those with known Hereditary Persistence   of Fetal Hemoglobin. Heterozygous hemoglobin variants (HbAS, HbAC,   HbAD, HbAE, HbA2) do not significantly interfere with this assay;   however, presence of multiple variants in a sample may impact the %   interference.     07/19/2017 6.6 (H) 4.0 - 5.6 % Final     Comment:     According to ADA guidelines, hemoglobin A1c <7.0% represents  optimal control in non-pregnant diabetic patients. Different  metrics may apply to specific patient populations.   Standards of Medical Care in Diabetes-2016.  For the purpose of screening for the presence of  diabetes:  <5.7%     Consistent with the absence of diabetes  5.7-6.4%  Consistent with increasing risk for diabetes   (prediabetes)  >or=6.5%  Consistent with diabetes  Currently, no consensus exists for use of hemoglobin A1c  for diagnosis of diabetes for children.  This Hemoglobin A1c assay has significant interference with fetal   hemoglobin   (HbF). The results are invalid for patients with abnormal amounts of   HbF,   including those with known Hereditary Persistence   of Fetal Hemoglobin. Heterozygous hemoglobin variants (HbAS, HbAC,   HbAD, HbAE, HbA2) do not significantly interfere with this assay;   however, presence of multiple variants in a sample may impact the %   interference.     04/19/2017 7.4 (H) 4.5 - 6.2 % Final     Comment:     According to ADA guidelines, hemoglobin A1C <7.0% represents  optimal control in non-pregnant diabetic patients.  Different  metrics may apply to specific populations.   Standards of Medical Care in Diabetes - 2016.  For the purpose of screening for the presence of diabetes:  <5.7%     Consistent with the absence of diabetes  5.7-6.4%  Consistent with increasing risk for diabetes   (prediabetes)  >or=6.5%  Consistent with diabetes  Currently no consensus exists for use of hemoglobin A1C  for diagnosis of diabetes for children.             Chemistry        Component Value Date/Time     (L) 01/03/2018 0435    K 5.2 (H) 01/03/2018 0435    CL 95 01/03/2018 0435    CO2 27 01/03/2018 0435    BUN 16 01/03/2018 0435    CREATININE 1.1 01/03/2018 0435     (H) 01/03/2018 0435        Component Value Date/Time    CALCIUM 9.4 01/03/2018 0435    ALKPHOS 86 01/01/2018 0311    AST 33 01/01/2018 0311    ALT 19 01/01/2018 0311    BILITOT 0.6 01/01/2018 0311    ESTGFRAFRICA >60 01/03/2018 0435    EGFRNONAA >60 01/03/2018 0435          Lab Results   Component Value Date    LDLCALC 82.8 01/22/2018        Ref. Range 1/22/2018 09:15   Cholesterol Latest Ref Range: 120 - 199 mg/dL  169   HDL Latest Ref Range: 40 - 75 mg/dL 65   LDL Cholesterol Latest Ref Range: 63.0 - 159.0 mg/dL 82.8   Total Cholesterol/HDL Ratio Latest Ref Range: 2.0 - 5.0  2.6   Triglycerides Latest Ref Range: 30 - 150 mg/dL 106     Lab Results   Component Value Date    SHRUTHILBCLEA 36.3 (H) 01/22/2018         STANDARDS of CARE:        ASA:               Last eye exam:       ASSESSMENT and PLAN:    A1C GOAL: < 7 %     1. Uncontrolled type 2 diabetes mellitus with complication, with long-term current use of insulin  Will try to wean off of Novolog with metformin and once daily insulin like Tresiba.     Stop carb counting with Novolog.    Start Tresiba tonight at ~ 10 pm or later at 14 units.   Start metformin  mg tablet twice daily with meals for 1 week. Then increase to 2 tablets twice daily with meals.   You may continue to correct high blood sugar with Novolog according to the Expert meter recommendations.     Test blood sugar 3x/day before meals.     See Diabetes Educator/Registered Dietician for Medical Nutrition Therapy.    Return to clinic in 2-3 weeks. Message with any concerns or updates.     Sample of Tresiba U200 1 pen provided to patient. Patient was instructed on how to use insulin and patient education was given.         Ambulatory Referral to Diabetes Education   2. Alcohol-induced chronic pancreatitis  Avoid dpp4-I and glp1-RA.    3. Hypertriglyceridemia  controlled   4. Microalbuminuria  Mildly high.   Will need to repeat with next lab draw.      Spent 40 minutes with patient with >50% time spent in counseling, as noted in # 1, 3, 4.           Orders Placed This Encounter   Procedures    Ambulatory Referral to Diabetes Education     Referral Priority:   Routine     Referral Type:   Consultation     Referral Reason:   Specialty Services Required     Requested Specialty:   Endocrinology     Number of Visits Requested:   1     Expiration Date:   2/5/2019        Follow-up in about 3 weeks (around  2/26/2018).

## 2018-02-07 ENCOUNTER — PATIENT MESSAGE (OUTPATIENT)
Dept: FAMILY MEDICINE | Facility: CLINIC | Age: 68
End: 2018-02-07

## 2018-02-07 DIAGNOSIS — G89.29 CHRONIC BACK PAIN GREATER THAN 3 MONTHS DURATION: ICD-10-CM

## 2018-02-07 DIAGNOSIS — M54.9 CHRONIC BACK PAIN GREATER THAN 3 MONTHS DURATION: ICD-10-CM

## 2018-02-07 DIAGNOSIS — M51.36 DEGENERATIVE DISC DISEASE, LUMBAR: ICD-10-CM

## 2018-02-07 DIAGNOSIS — F41.9 ANXIETY DISORDER, UNSPECIFIED TYPE: ICD-10-CM

## 2018-02-07 DIAGNOSIS — M47.816 FACET ARTHRITIS OF LUMBAR REGION: ICD-10-CM

## 2018-02-07 RX ORDER — HYDROCODONE BITARTRATE AND ACETAMINOPHEN 10; 325 MG/1; MG/1
1 TABLET ORAL
Qty: 30 TABLET | Refills: 0 | Status: SHIPPED | OUTPATIENT
Start: 2018-02-07 | End: 2018-03-01 | Stop reason: SDUPTHER

## 2018-02-07 RX ORDER — HYDROCODONE BITARTRATE AND ACETAMINOPHEN 10; 325 MG/1; MG/1
1 TABLET ORAL
Qty: 30 TABLET | Refills: 0 | Status: CANCELLED | OUTPATIENT
Start: 2018-02-07

## 2018-02-07 RX ORDER — ALPRAZOLAM 0.5 MG/1
0.5 TABLET ORAL DAILY PRN
Qty: 30 TABLET | Refills: 0 | Status: SHIPPED | OUTPATIENT
Start: 2018-02-07 | End: 2018-03-01 | Stop reason: SDUPTHER

## 2018-02-12 ENCOUNTER — CLINICAL SUPPORT (OUTPATIENT)
Dept: DIABETES | Facility: CLINIC | Age: 68
End: 2018-02-12
Payer: MEDICARE

## 2018-02-12 VITALS — WEIGHT: 158 LBS | BODY MASS INDEX: 21.43 KG/M2

## 2018-02-12 DIAGNOSIS — E11.42 DM TYPE 2 WITH DIABETIC PERIPHERAL NEUROPATHY: Primary | ICD-10-CM

## 2018-02-12 PROCEDURE — G0108 DIAB MANAGE TRN  PER INDIV: HCPCS | Mod: S$GLB,,, | Performed by: DIETITIAN, REGISTERED

## 2018-02-12 NOTE — PROGRESS NOTES
Diabetes Education  Author: Rose Sloan RD  Date: 2/12/2018    Diabetes Education Visit  Diabetes Education Record Assessment/Progress: Post Program/Follow-up  Pt visit today focused on appropriate CHO food choices to assit w glycemic control. Discussed CHO counting and limiting simple CHO as well as ways to increase calorie intake to keep wt in acceptable range  Diabetes Type  Diabetes Type : Type II  Diabetes History  Diabetes Diagnosis: 5-10 years    Nutrition-diet hx  Meal Planning: 3 meals per day, eats out often  What type of sweetener do you use?: sugar used in coffee; ~12 tsp/day  What type of beverages do you drink?: milk (12 oz whole milk at most meals)  Meal Plan 24 Hour Recall - Breakfast: 9;30AM honey nut cheerios, whole milk, coffee w 4 tsp sugar  Meal Plan 24 Hour Recall - Lunch: 2PM: 2 ENSURE ENLIGHT (2 cans= 700 jeronimo, 88g CHO, 40gProtei; best used post surgery/injury/illness not glycemic control)  Meal Plan 24 Hour Recall - Dinner: 6PM; 1/2 club sandwich, small order FF  sometimes fruit , milk  Meal Plan 24 Hour Recall - Snack: ice cream cone, regular jello    Monitoring   Monitoring: Other (acute checkAccu check Amanda expert meter )  Self Monitoring : 4-6x/day  Blood Glucose Logs: Yes (but only in meter)    Exercise   Exercise Type: use exercise equipment  Intensity: Moderate  Frequency:  (2x/wk)  Duration: 1 hour    Current Diabetes Treatment   Current Treatment: Oral Medication, Diet, Insulin    Social History  Preferred Learning Method: Face to Face  Primary Support: Self, Spouse  Educational Level: College Graduate  Occupation:   Smoking Status: Current Smoker  Alcohol Use: Weekly  Barriers to Change: None  Learning Challenges : None  Readiness to Learn : Acceptance  Cultural Influences: No    Diabetes Education Assessment/Progress  Diabetes Disease Process (diabetes disease process and treatment options): Discussion, Individual Session, Written Materials Provided,  "Demonstrates Understanding/Competency(verbalizes/demonstrates), Instructed  -Reviewed diabetes progression and self-management;   -To assist with CHO and Protein counting,Provided copy of [facility] Diabetes Management Guide and the Planning Healthy Meals guide from [employer]    Nutrition (Incorporating nutritional management into one's lifestyle): Discussion, Individual Session, Instructed, Comprehends Key Points   -diet hx indicates pt consumes regular sugar, foods high in simple CHO such as ice cream, doughnuts, reg jello every day; justifies use by counting total CHO and adjusting insulin dose by amt CHO consumed. At lunch, pt consumes at least 88 gCHO in form of 2 Ensure Enlive bc he states it keeps him from losing wt. Discussed ways to add calories and protein to assist w wt maintenance while NOT consuming the simple CHO; pt state he would have to "digest" the suggestions  -Rec'd limiting CHO to 250 g/day with 60-75g/meal and 15-30gCHO per snack; continue w his meal times; verbal menu suggestions/ modifications provided    Physical Activity (incorporating physical activity into one's lifestyle): Discussion, Individual Session  Medications (states correct name, dose, onset, peak, duration, side effects & timing of meds): Discussion, Individual Session, Demonstrates Understanding/Competency(verbalizes/demonstrates), Written Materials Provided, Instructed  Monitoring (monitoring blood glucose/other parameters & using results): Discussion, Individual Session, Written Materials Provided, Instructed, Comprehends Key Points, Needs Review, Demonstrates Understanding/Competency (verbalizes/demonstrates)  -SMBG 4-6x/day. Discussed  BG goals to reached by a combo of diet and meds    Acute Complications (preventing, detecting, and treating acute complications): Discussion, Individual Session, Demonstrates Understanding/Competency (verbalizes/demonstrates), Written Materials Provided, Instructed  Clinical (diabetes and " other pertinent medical history): Discussion, Individual Session  Cognitive (knowledge of self-management skills, functional health literacy): Not Covered/Deferred  Psychosocial (emotional response to diabetes): Not Covered/Deferred  Diabetes Distress and Support Systems: Not Covered/Deferred  Behavioral (readiness for change, lifestyle practices, self-care behaviors): Not Covered/Deferred    Goals  Patient has selected/evaluated goals during today's session: Yes, evaluated  Healthy Eating: In Progress    Diabetes Care Plan/Intervention  Education Plan/Intervention: Individual Follow-Up DSMT    Diabetes Meal Plan  Restrictions: Restricted Carbohydrate  Calories: 2200  Carbohydrate Per Meal: 60-75g  Carbohydrate Per Snack : 15-20g, 15-30g  Protein: 100g    Education Units of Time   Time Spent: 60 min    Health Maintenance Topics with due status: Not Due       Topic Last Completion Date    TETANUS VACCINE 06/22/2015    Colonoscopy 04/11/2017    Eye Exam 04/18/2017    Foot Exam 05/01/2017    Pneumococcal (65+) 11/14/2017    Lipid Panel 01/22/2018    Hemoglobin A1c 01/22/2018    Urine Microalbumin 01/22/2018    High Dose Statin 02/05/2018     Health Maintenance Due   Topic Date Due    Abdominal Aortic Aneurysm Screening  10/16/2017

## 2018-02-22 ENCOUNTER — PATIENT MESSAGE (OUTPATIENT)
Dept: ENDOCRINOLOGY | Facility: CLINIC | Age: 68
End: 2018-02-22

## 2018-02-27 ENCOUNTER — TELEPHONE (OUTPATIENT)
Dept: ENDOCRINOLOGY | Facility: CLINIC | Age: 68
End: 2018-02-27

## 2018-02-27 NOTE — TELEPHONE ENCOUNTER
Left message on patient's voicemail to confirm their appointment for Wed 2/28/18 at 3:00p and informed patient that we had a 1:00p cancellation and to notify the clinic if he would like his appointment moved to the earlier slot. Waiting to hear back.

## 2018-02-28 ENCOUNTER — OFFICE VISIT (OUTPATIENT)
Dept: ENDOCRINOLOGY | Facility: CLINIC | Age: 68
End: 2018-02-28
Payer: MEDICARE

## 2018-02-28 ENCOUNTER — TELEPHONE (OUTPATIENT)
Dept: ENDOCRINOLOGY | Facility: CLINIC | Age: 68
End: 2018-02-28

## 2018-02-28 ENCOUNTER — PATIENT MESSAGE (OUTPATIENT)
Dept: ENDOCRINOLOGY | Facility: CLINIC | Age: 68
End: 2018-02-28

## 2018-02-28 VITALS
BODY MASS INDEX: 21.05 KG/M2 | HEIGHT: 72 IN | SYSTOLIC BLOOD PRESSURE: 136 MMHG | HEART RATE: 86 BPM | DIASTOLIC BLOOD PRESSURE: 87 MMHG | WEIGHT: 155.44 LBS

## 2018-02-28 DIAGNOSIS — R80.9 MICROALBUMINURIA: ICD-10-CM

## 2018-02-28 DIAGNOSIS — K86.0 ALCOHOL-INDUCED CHRONIC PANCREATITIS: ICD-10-CM

## 2018-02-28 PROCEDURE — 99214 OFFICE O/P EST MOD 30 MIN: CPT | Mod: S$GLB,,, | Performed by: NURSE PRACTITIONER

## 2018-02-28 PROCEDURE — 99999 PR PBB SHADOW E&M-EST. PATIENT-LVL IV: CPT | Mod: PBBFAC,,, | Performed by: NURSE PRACTITIONER

## 2018-02-28 RX ORDER — REPAGLINIDE 1 MG/1
1 TABLET ORAL
Qty: 90 TABLET | Refills: 0 | Status: SHIPPED | OUTPATIENT
Start: 2018-02-28 | End: 2018-03-28 | Stop reason: ALTCHOICE

## 2018-02-28 NOTE — PROGRESS NOTES
This note was created by combination of typed  and Dragon dictation.  Transcription errors may be present.  If there are any questions, please contact me.    Assessment & Plan:   Chronic back pain greater than 3 months duration  Degenerative disc disease, lumbar  Facet arthritis of lumbar region   -not taking opiate daily.  Very glad to hear that. Refilled hydrocodone, to be filled in 2 weeks.  -     hydrocodone-acetaminophen 10-325mg (NORCO)  mg Tab; Take 1 tablet by mouth every 24 hours as needed for Pain.  Dispense: 30 tablet; Refill: 0    Anxiety disorder, unspecified type  Alcohol use disorder   - notes low mood worsened by diabetes fluctuating sugars and frustration with this.  Still drinking alcohol and has been drinking more in the past few weeks.  After discussion increase the lexapro.  Ideally would wean down the BZD with goal to wean to off.  buspar not as effective but also not as high risk and we discussed this (EtOH, BZD, and opiate).  Increase the lexapro to 20; after 2 weeks would try him on buspar 30 mg BID.  Refilled the buspar.  Refilled the alprazolam . He is not taking the opiate daily and I congratulated him on his achievement.  -     escitalopram oxalate (LEXAPRO) 20 MG tablet; Take 1 tablet (20 mg total) by mouth once daily.  Dispense: 30 tablet; Refill: 11  -     busPIRone (BUSPAR) 15 MG tablet; Take 2 tablets (30 mg total) by mouth 2 (two) times daily. Substitute for dose of xanax.  Dispense: 120 tablet; Refill: 11  -     ALPRAZolam (XANAX) 0.5 MG tablet; Take 1 tablet (0.5 mg total) by mouth daily as needed for Anxiety.  Dispense: 30 tablet; Refill: 0    Chronic seasonal allergic rhinitis, unspecified trigger  Chronic obstructive bronchitis with pulmonary emphysema  -notes incomplete relief with allegra and flonase. astelin too drying.  Add on singulair.  -     montelukast (SINGULAIR) 10 mg tablet; Take 1 tablet (10 mg total) by mouth every evening.  Dispense: 30 tablet;  "Refill: 0    Screening for AAA (abdominal aortic aneurysm)  -     US Abdominal Aorta; Future; Expected date: 03/01/2018    Abdominal aortic atherosclerosis - on statin.    There are no discontinued medications.  Modified Medications    No medications on file     New Prescriptions    No medications on file       Follow Up: No Follow-up on file.        Subjective:     Chief Complaint   Patient presents with    Medication Refill       HPI  Moe is a 67 y.o. male, last appointment with this clinic was 1/10/2018.    DM2 with neuropathy, followed by endo; gabapentin.  HTN  hyperlipidemia  Smoker, contemplative stage of quitting.  He is not interested in smoking cessation classes.  COPD, 12/2017 PFTs showing mild airflow obstruction, air trapping and decreased DLCO.  No desaturation.  Spinal stenosis on chronic narcotic therapy, 7/2014 MRI L spine with lumbar spondylosis with mod/severe right neural foraminal stenossi L4-5; significant degenerative disk disease, facet joint arthropathy L4-5, L5-S1; hx of injections; had been seen by pain mgmt 2015 but discharged for THC. hydrocodone 10/325 BID. Takes by his estimate 1.5 tablets a day mainly in the evening.   Hx of Aleve had to stop after surgery for deviated septum. Now he notes various other joints that hurt and are helped by the narcotic therapy. Worse with prolonged standing and walking.  He had talked to neurosurgery, 2015, they had recommended surgery and he is very wary of surgery.  Goal is to try to reduce intake.  Pain contract signed 1/2018  Anxiety, Family history of suicide, father committed suicide and his son did as well. alprazolam 0.5 mg TID.  Typically takes 2 at night, maybe 1 in the morning.  8/2/2017, started on zoloft.  Zoloft SE of hypoglycemia/ shakiness so changed to Lexapro. xanax TID and ultimately goal is wean off. He was going to xanax to 1 tablet at night.  Notes he had a "hard father".  Alcohol use. Has been to Alcoholics Anonymous and has " quit before.  Did not really find Alcoholics Anonymous to be that helpful.  Feels like he needs to get himself in his own mind set in order to quit and is not adjusted and further referral at this time.last OV he promised me he would cut down to 3 drinks a day.  Colon polyps, last colonoscopy 2009.  Referred in March for colonoscopy.  4/11/2017 colonoscopy with transverse tubular adenoma  Schatzki ring dilitation; followed by [facility].  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 [place] to be due to EtOH.    Endo stopped Tresiba due to hypoglycemia; titrate up metformin. Start repaglinide. Avoid DPP4-I and GLP1-RA    Last visit, follow-up after rib fracture and hospitalization.  At the time he had cut down alprazolam.  Taking 2 BuSpar and evening instead of the Xanax at night.  Had cut down on alcohol and cigarettes, 1 pack a day at that time.  Gabapentin side effect of memory loss.  His plan was to stay on the same dose at this time however.     last RF 2/7    Still has 14 pain pills remaining from that RF.     Allergies - Flonase.  Symptomatic.  Causes headache. Astelin too dry.  Allegra daily.  Never singulair.    His glucose control affects his mood a lot.  Example - last night prior to eating dinner, 250.  Had to supplement with novolog which caused him more frustration. And caused him to drink more/upset.   Had a low mood a few weeks ago with resultant increased alcohol intake.   Had changed from buspar at night to xanax at night and so ran out early.   Not sleeping well.  Fitbit tracking - seems to indicate 1.5 hours he's awake in the course of the night.     Patient Care Team:  Damian Ramon MD as PCP - General (Internal Medicine)  Kandice Bonilla RD as Dietitian (Diabetes)  Josias Ling MD as Consulting Physician (Neurosurgery)  Aime De Anda MD as Consulting Physician (Otolaryngology)    Patient Active Problem List    Diagnosis Date Noted    Closed fracture of multiple ribs of left side 01/01/2018     Alcohol-induced chronic pancreatitis 01/01/2018    Abdominal aortic atherosclerosis 11/14/2017     On CXR 4/5/2016 and on 11/2/2012 XR L spine      Chronic obstructive bronchitis with pulmonary emphysema 11/14/2017 12/2017 PFTs showed mild airflow obstruction.  Lung volume showing air trapping.  DLCO reduced. no O2 desat.  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    Chronic narcotic use 08/02/2017    Special screening for malignant neoplasms, colon 04/11/2017    Deviated nasal septum 04/19/2016    Chronic back pain greater than 3 months duration 01/06/2016    Small fiber neuropathy 07/29/2015     Likely related to alcohol use      Facet arthritis of lumbar region 10/08/2014    Spondylosis without myelopathy 08/13/2014    Degeneration of lumbar or lumbosacral intervertebral disc 08/13/2014    Spinal stenosis, lumbar region, with neurogenic claudication 08/13/2014    Tubular adenoma of colon 04/22/2014 4/11/2017 colonoscopy with transverse tubular adenoma      DM type 2 with diabetic peripheral neuropathy 07/24/2013    Hypertriglyceridemia 06/13/2013    Anxiety disorder 06/12/2013     Trazodone ineffective.     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    Cataract     Diabetes mellitus     Pancreatitis 2008    2 Times       PAST SURGICAL HISTORY:  Past Surgical History:   Procedure Laterality Date     thumb surgery      Dead Skin cell tumor Rt thumb    COLONOSCOPY N/A 4/11/2017    Procedure: COLONOSCOPY;  Surgeon: Meet Quesada MD;  Location: [facility];  Service: Endoscopy;  Laterality: N/A;    MOUTH FLOOR MASS EXCISION  2009    NASAL SEPTUM SURGERY      SKIN SURGERY         SOCIAL HISTORY:  Social History     Social History  "   Marital status:      Spouse name: N/A    Number of children: N/A    Years of education: N/A     Occupational History    , self employed      Social History Main Topics    Smoking status: Current Every Day Smoker     Packs/day: 1.50     Years: 45.00     Types: Cigarettes    Smokeless tobacco: Never Used    Alcohol use 0.5 oz/week     1 Standard drinks or equivalent per week      Comment: daily scotch and water, half pint    Drug use: No      Comment: rarely    Sexual activity: Yes     Partners: Female     Other Topics Concern    Not on file     Social History Narrative    , 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  One child committed suicide.       ALLERGIES AND MEDICATIONS: updated and reviewed.  Review of patient's allergies indicates:   Allergen Reactions    Amoxicillin Nausea And Vomiting    Adhesive Rash     Paper tape only     Current Outpatient Prescriptions   Medication Sig Dispense Refill    ALPRAZolam (XANAX) 0.5 MG tablet Take 1 tablet (0.5 mg total) by mouth daily as needed for Anxiety. 30 tablet 0    atorvastatin (LIPITOR) 20 MG tablet Take 1 tablet (20 mg total) by mouth once daily. 90 tablet 1    azelastine (ASTELIN) 137 mcg (0.1 %) nasal spray 1 spray by Nasal route.   11    BD ULTRA-FINE RENETTA PEN NEEDLES 32 gauge x 5/32" Ndle TO USE WITH MULTIPLE DAILY INJECTIONS 150 each 12    blood sugar diagnostic (ACCU-CHEK KM PLUS TEST STRP) Strp USE TO TEST UPTO 6 TIMES DAILY 550 strip 3    busPIRone (BUSPAR) 15 MG tablet Take 1 tablet (15 mg total) by mouth 2 (two) times daily as needed (anxiety). Substitute for dose of xanax. 60 tablet 11    cyclobenzaprine (FLEXERIL) 10 MG tablet Take 1 tablet (10 mg total) by mouth 3 (three) times daily as needed. 90 tablet 2    desoximetasone (TOPICORT) 0.25 % cream Apply topically 2 (two) times daily as needed.       doxycycline (DORYX) 100 MG EC " tablet Take 100 mg by mouth 2 (two) times daily.      escitalopram oxalate (LEXAPRO) 10 MG tablet Take 1 tablet (10 mg total) by mouth once daily. 30 tablet 11    fluticasone (FLONASE) 50 mcg/actuation nasal spray 2 sprays by Each Nare route once daily.      hydrocodone-acetaminophen 10-325mg (NORCO)  mg Tab Take 1 tablet by mouth every 24 hours as needed for Pain. 30 tablet 0    ipratropium (ATROVENT HFA) 17 mcg/actuation inhaler Inhale 2 puffs into the lungs every 6 (six) hours. Rescue 12.9 g 5    lancets (ACCU-CHEK FASTCLIX) Misc USE TO TEST 6 TIMES A  each 3    lisinopril (PRINIVIL,ZESTRIL) 5 MG tablet Take 1 tablet (5 mg total) by mouth once daily. 30 tablet 6    metFORMIN (GLUCOPHAGE-XR) 500 MG 24 hr tablet Take 2 tablets (1,000 mg total) by mouth 2 (two) times daily with meals. 120 tablet 0    oxyCODONE-acetaminophen (PERCOCET)  mg per tablet Take 1 tablet by mouth every 4 (four) hours as needed for Pain. 30 tablet 0    repaglinide (PRANDIN) 1 MG tablet Take 1 tablet (1 mg total) by mouth 3 (three) times daily before meals. 90 tablet 0    gabapentin (NEURONTIN) 600 MG tablet Take 2 tablets (1,200 mg total) by mouth 3 (three) times daily. 180 tablet 5     No current facility-administered medications for this visit.        Review of Systems   All other systems reviewed and are negative.      Objective:   Physical Exam   Vitals:    03/01/18 1416   BP: 128/80   Pulse: 96   Temp: 98.1 °F (36.7 °C)   SpO2: 96%   Weight: 69.4 kg (153 lb 1.8 oz)   Height: 6' (1.829 m)    Body mass index is 20.77 kg/m².  Weight: 69.4 kg (153 lb 1.8 oz)   Height: 6' (182.9 cm)     Physical Exam   Constitutional: He is oriented to person, place, and time. He appears well-developed and well-nourished. No distress.   Eyes: No scleral icterus.   Musculoskeletal: He exhibits no edema.   Neurological: He is alert and oriented to person, place, and time.   Skin: No rash noted.   Psychiatric: He has a normal mood  and affect. His behavior is normal. Thought content normal.

## 2018-02-28 NOTE — PROGRESS NOTES
CC: This 67 y.o. White male  is here for evaluation of  T2DM along with comorbidities indicated in the Visit Diagnosis section of this encounter.    HPI: Moe Ren was diagnosed with T2DM in ~ 2010.  Metformin started at time of diagnosis.     He has a history of squamos cell oral CA, Spinal stenosis, chronic pancreatitis.      Interval history  He was advised last week to stop taking tresiba d/t hypoglycemia on just 14 units/day and to titrate metformin to 2 grams/day. His fasting glucoses are high but he is having prandial excursions during the day. He did meet with dietician but diet is still high in carbs, presumably so that he can maintain his weight. Still drinking sugar in his coffee.       LAST DIABETES EDUCATION: 6/2016    PRESCRIBED DIABETES MEDICATIONS: metformin xr 1000 mg bid     Misses medication doses - No    DM COMPLICATIONS: peripheral neuropathy    SIGNIFICANT DIABETES MED HISTORY: Tresiba gradually titrated down until it was stopped mid 2017 d/t hypoglycemia.   Novolog stopped at ov 2/5/17 and metformin and tresiba were started. tresiba dc'd about a week later d/t hypo    SELF MONITORING BLOOD GLUCOSE: Checks blood glucose at home 4-6x/day.     HYPOGLYCEMIC EPISODES: glucose drops as low as 40-50s once or twice a week.      CURRENT DIET: 3 meal/day - Eats at 10 am, 2 pm, and 6 pm.     CURRENT EXERCISE: goes twice a week to gym - 1 hour of cardio/weights    SOCIAL: part time contract . 10 hours.  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       /87 (BP Location: Right arm, Patient Position: Sitting, BP Method: Large (Automatic))   Pulse 86   Ht 6' (1.829 m)   Wt 70.5 kg (155 lb 6.8 oz)   BMI 21.08 kg/m²       ROS:   CONSTITUTIONAL: Appetite good, denies fatigue  RESPIRATORY: No shortness of breath or cough        PHYSICAL EXAM:  GENERAL: Well developed, well nourished. No acute distress.   PSYCH: AAOx3,  appropriate mood and affect, conversant, well-groomed. Judgement and insight good.   NEURO: Cranial nerves grossly intact. Speech clear, no tremor.   CHEST: Respirations even and unlabored.        Hemoglobin A1C   Date Value Ref Range Status   01/22/2018 7.1 (H) 4.0 - 5.6 % Final     Comment:     According to ADA guidelines, hemoglobin A1c <7.0% represents  optimal control in non-pregnant diabetic patients. Different  metrics may apply to specific patient populations.   Standards of Medical Care in Diabetes-2016.  For the purpose of screening for the presence of diabetes:  <5.7%     Consistent with the absence of diabetes  5.7-6.4%  Consistent with increasing risk for diabetes   (prediabetes)  >or=6.5%  Consistent with diabetes  Currently, no consensus exists for use of hemoglobin A1c  for diagnosis of diabetes for children.  This Hemoglobin A1c assay has significant interference with fetal   hemoglobin   (HbF). The results are invalid for patients with abnormal amounts of   HbF,   including those with known Hereditary Persistence   of Fetal Hemoglobin. Heterozygous hemoglobin variants (HbAS, HbAC,   HbAD, HbAE, HbA2) do not significantly interfere with this assay;   however, presence of multiple variants in a sample may impact the %   interference.     07/19/2017 6.6 (H) 4.0 - 5.6 % Final     Comment:     According to ADA guidelines, hemoglobin A1c <7.0% represents  optimal control in non-pregnant diabetic patients. Different  metrics may apply to specific patient populations.   Standards of Medical Care in Diabetes-2016.  For the purpose of screening for the presence of diabetes:  <5.7%     Consistent with the absence of diabetes  5.7-6.4%  Consistent with increasing risk for diabetes   (prediabetes)  >or=6.5%  Consistent with diabetes  Currently, no consensus exists for use of hemoglobin A1c  for diagnosis of diabetes for children.  This Hemoglobin A1c assay has significant interference with fetal   hemoglobin    (HbF). The results are invalid for patients with abnormal amounts of   HbF,   including those with known Hereditary Persistence   of Fetal Hemoglobin. Heterozygous hemoglobin variants (HbAS, HbAC,   HbAD, HbAE, HbA2) do not significantly interfere with this assay;   however, presence of multiple variants in a sample may impact the %   interference.     04/19/2017 7.4 (H) 4.5 - 6.2 % Final     Comment:     According to ADA guidelines, hemoglobin A1C <7.0% represents  optimal control in non-pregnant diabetic patients.  Different  metrics may apply to specific populations.   Standards of Medical Care in Diabetes - 2016.  For the purpose of screening for the presence of diabetes:  <5.7%     Consistent with the absence of diabetes  5.7-6.4%  Consistent with increasing risk for diabetes   (prediabetes)  >or=6.5%  Consistent with diabetes  Currently no consensus exists for use of hemoglobin A1C  for diagnosis of diabetes for children.             Chemistry        Component Value Date/Time     (L) 01/03/2018 0435    K 5.2 (H) 01/03/2018 0435    CL 95 01/03/2018 0435    CO2 27 01/03/2018 0435    BUN 16 01/03/2018 0435    CREATININE 1.1 01/03/2018 0435     (H) 01/03/2018 0435        Component Value Date/Time    CALCIUM 9.4 01/03/2018 0435    ALKPHOS 86 01/01/2018 0311    AST 33 01/01/2018 0311    ALT 19 01/01/2018 0311    BILITOT 0.6 01/01/2018 0311    ESTGFRAFRICA >60 01/03/2018 0435    EGFRNONAA >60 01/03/2018 0435          Lab Results   Component Value Date    LDLCALC 82.8 01/22/2018        Ref. Range 1/22/2018 09:15   Cholesterol Latest Ref Range: 120 - 199 mg/dL 169   HDL Latest Ref Range: 40 - 75 mg/dL 65   LDL Cholesterol Latest Ref Range: 63.0 - 159.0 mg/dL 82.8   Total Cholesterol/HDL Ratio Latest Ref Range: 2.0 - 5.0  2.6   Triglycerides Latest Ref Range: 30 - 150 mg/dL 106     Lab Results   Component Value Date    MICALBCREAT 36.3 (H) 01/22/2018         STANDARDS of CARE:        ASA:                Last eye exam:       ASSESSMENT and PLAN:    A1C GOAL: < 7 %     1. Uncontrolled type 2 diabetes mellitus with complication, without long-term current use of insulin  Start repaglinide 1 mg ac.   Continue metformin.   Send log in a week. rtc in 2 mo with labs prior.       Hemoglobin A1c   2. Microalbuminuria  Microalbumin/creatinine urine ratio   3. Alcohol-induced chronic pancreatitis  Avoid dpp4-I and glp1-RA.            Orders Placed This Encounter   Procedures    Hemoglobin A1c     Standing Status:   Future     Standing Expiration Date:   4/29/2019    Microalbumin/creatinine urine ratio     Standing Status:   Future     Standing Expiration Date:   4/29/2019     Order Specific Question:   Specimen Source     Answer:   Urine        Follow-up in about 2 months (around 4/28/2018).

## 2018-03-01 ENCOUNTER — OFFICE VISIT (OUTPATIENT)
Dept: FAMILY MEDICINE | Facility: CLINIC | Age: 68
End: 2018-03-01
Payer: MEDICARE

## 2018-03-01 VITALS
HEIGHT: 72 IN | OXYGEN SATURATION: 96 % | BODY MASS INDEX: 20.74 KG/M2 | WEIGHT: 153.13 LBS | DIASTOLIC BLOOD PRESSURE: 80 MMHG | SYSTOLIC BLOOD PRESSURE: 128 MMHG | TEMPERATURE: 98 F | HEART RATE: 96 BPM

## 2018-03-01 DIAGNOSIS — J44.89 CHRONIC OBSTRUCTIVE BRONCHITIS WITH PULMONARY EMPHYSEMA: ICD-10-CM

## 2018-03-01 DIAGNOSIS — F41.9 ANXIETY DISORDER, UNSPECIFIED TYPE: ICD-10-CM

## 2018-03-01 DIAGNOSIS — M47.816 FACET ARTHRITIS OF LUMBAR REGION: ICD-10-CM

## 2018-03-01 DIAGNOSIS — I70.0 ABDOMINAL AORTIC ATHEROSCLEROSIS: ICD-10-CM

## 2018-03-01 DIAGNOSIS — F10.90 ALCOHOL USE DISORDER: ICD-10-CM

## 2018-03-01 DIAGNOSIS — J43.9 CHRONIC OBSTRUCTIVE BRONCHITIS WITH PULMONARY EMPHYSEMA: ICD-10-CM

## 2018-03-01 DIAGNOSIS — J30.2 CHRONIC SEASONAL ALLERGIC RHINITIS, UNSPECIFIED TRIGGER: ICD-10-CM

## 2018-03-01 DIAGNOSIS — M51.36 DEGENERATIVE DISC DISEASE, LUMBAR: ICD-10-CM

## 2018-03-01 DIAGNOSIS — M54.9 CHRONIC BACK PAIN GREATER THAN 3 MONTHS DURATION: Primary | ICD-10-CM

## 2018-03-01 DIAGNOSIS — G89.29 CHRONIC BACK PAIN GREATER THAN 3 MONTHS DURATION: Primary | ICD-10-CM

## 2018-03-01 DIAGNOSIS — Z13.6 SCREENING FOR AAA (ABDOMINAL AORTIC ANEURYSM): ICD-10-CM

## 2018-03-01 PROCEDURE — 99214 OFFICE O/P EST MOD 30 MIN: CPT | Mod: S$GLB,,, | Performed by: INTERNAL MEDICINE

## 2018-03-01 PROCEDURE — 99999 PR PBB SHADOW E&M-EST. PATIENT-LVL III: CPT | Mod: PBBFAC,,, | Performed by: INTERNAL MEDICINE

## 2018-03-01 RX ORDER — MONTELUKAST SODIUM 10 MG/1
10 TABLET ORAL NIGHTLY
Qty: 30 TABLET | Refills: 0 | Status: SHIPPED | OUTPATIENT
Start: 2018-03-01 | End: 2018-03-29 | Stop reason: SDUPTHER

## 2018-03-01 RX ORDER — ALPRAZOLAM 0.5 MG/1
0.5 TABLET ORAL DAILY PRN
Qty: 30 TABLET | Refills: 0 | Status: SHIPPED | OUTPATIENT
Start: 2018-03-01 | End: 2018-03-12 | Stop reason: SDUPTHER

## 2018-03-01 RX ORDER — ESCITALOPRAM OXALATE 20 MG/1
20 TABLET ORAL DAILY
Qty: 30 TABLET | Refills: 11 | Status: SHIPPED | OUTPATIENT
Start: 2018-03-01 | End: 2018-05-16 | Stop reason: SDUPTHER

## 2018-03-01 RX ORDER — HYDROCODONE BITARTRATE AND ACETAMINOPHEN 10; 325 MG/1; MG/1
1 TABLET ORAL
Qty: 30 TABLET | Refills: 0 | Status: SHIPPED | OUTPATIENT
Start: 2018-03-15 | End: 2018-04-12 | Stop reason: SDUPTHER

## 2018-03-01 RX ORDER — BUSPIRONE HYDROCHLORIDE 15 MG/1
30 TABLET ORAL 2 TIMES DAILY
Qty: 120 TABLET | Refills: 11 | Status: SHIPPED | OUTPATIENT
Start: 2018-03-01 | End: 2018-11-28

## 2018-03-05 RX ORDER — METFORMIN HYDROCHLORIDE 500 MG/1
1000 TABLET, EXTENDED RELEASE ORAL 2 TIMES DAILY WITH MEALS
Qty: 360 TABLET | Refills: 1 | Status: SHIPPED | OUTPATIENT
Start: 2018-03-05 | End: 2018-04-03 | Stop reason: SINTOL

## 2018-03-07 ENCOUNTER — TELEPHONE (OUTPATIENT)
Dept: BARIATRICS | Facility: CLINIC | Age: 68
End: 2018-03-07

## 2018-03-07 DIAGNOSIS — E11.21 TYPE 2 DIABETES WITH NEPHROPATHY: ICD-10-CM

## 2018-03-07 NOTE — TELEPHONE ENCOUNTER
----- Message from Magda Larson sent at 3/7/2018 12:54 PM CST -----  Contact: Malissa from [facility] can be reached at  [phone]  Pharmacy is requesting a refill for atorvastatin (LIPITOR) 20 MG tablet  For 90 supply      Thank you!

## 2018-03-08 RX ORDER — ATORVASTATIN CALCIUM 20 MG/1
20 TABLET, FILM COATED ORAL DAILY
Qty: 90 TABLET | Refills: 1 | Status: SHIPPED | OUTPATIENT
Start: 2018-03-08 | End: 2019-01-09 | Stop reason: SDUPTHER

## 2018-03-12 ENCOUNTER — PATIENT MESSAGE (OUTPATIENT)
Dept: FAMILY MEDICINE | Facility: CLINIC | Age: 68
End: 2018-03-12

## 2018-03-12 DIAGNOSIS — F41.9 ANXIETY DISORDER, UNSPECIFIED TYPE: ICD-10-CM

## 2018-03-12 RX ORDER — ALPRAZOLAM 0.5 MG/1
0.5 TABLET ORAL DAILY PRN
Qty: 30 TABLET | Refills: 0 | Status: SHIPPED | OUTPATIENT
Start: 2018-03-12 | End: 2018-04-12 | Stop reason: SDUPTHER

## 2018-03-12 NOTE — TELEPHONE ENCOUNTER
----- Message from Bijal Cheney sent at 3/12/2018  2:28 PM CDT -----  Contact: [facility] REQUESTING SCRIPT FOR XANAX TO BE RESENT. IMAGE DID NOT COME THROUGH.    THANKS!

## 2018-03-13 ENCOUNTER — PATIENT MESSAGE (OUTPATIENT)
Dept: FAMILY MEDICINE | Facility: CLINIC | Age: 68
End: 2018-03-13

## 2018-03-13 DIAGNOSIS — M62.838 MUSCLE SPASM: ICD-10-CM

## 2018-03-13 RX ORDER — CYCLOBENZAPRINE HCL 10 MG
10 TABLET ORAL 3 TIMES DAILY PRN
Qty: 90 TABLET | Refills: 2 | Status: CANCELLED | OUTPATIENT
Start: 2018-03-13

## 2018-03-13 RX ORDER — BACLOFEN 10 MG/1
10 TABLET ORAL 2 TIMES DAILY
Qty: 60 TABLET | Refills: 5 | Status: SHIPPED | OUTPATIENT
Start: 2018-03-13 | End: 2018-09-04 | Stop reason: SDUPTHER

## 2018-03-13 NOTE — TELEPHONE ENCOUNTER
Dr. Ramon,  I sent a request for a prescription refill for cyclobenzaprine.  This medication is not approved anymore.  The substitutes are; tizanidine tablet or baclofen tablet.  Please send a prescription to my [facility] pharmacy.  Note: I started taking the higher dose of Lexapro after out visit, this change has helped calmed me down more.  [facility] pharmacy called me and said they need instructions before filling the Xanax prescription you sent in after our visit.  Please verify they have the dosage instructions.  Thanks,  Moe Ren  [phone]

## 2018-03-21 ENCOUNTER — PATIENT MESSAGE (OUTPATIENT)
Dept: ENDOCRINOLOGY | Facility: CLINIC | Age: 68
End: 2018-03-21

## 2018-03-22 ENCOUNTER — HOSPITAL ENCOUNTER (OUTPATIENT)
Dept: RADIOLOGY | Facility: HOSPITAL | Age: 68
Discharge: HOME | End: 2018-03-22
Attending: INTERNAL MEDICINE
Payer: MEDICARE

## 2018-03-22 DIAGNOSIS — Z13.6 SCREENING FOR AAA (ABDOMINAL AORTIC ANEURYSM): ICD-10-CM

## 2018-03-22 PROCEDURE — 76775 US EXAM ABDO BACK WALL LIM: CPT | Mod: TC

## 2018-03-22 PROCEDURE — 76775 US EXAM ABDO BACK WALL LIM: CPT | Mod: 26,,, | Performed by: RADIOLOGY

## 2018-03-28 RX ORDER — REPAGLINIDE 1 MG/1
2 TABLET ORAL
Qty: 90 TABLET | Refills: 0 | Status: CANCELLED | OUTPATIENT
Start: 2018-03-28 | End: 2019-03-28

## 2018-03-28 RX ORDER — REPAGLINIDE 2 MG/1
2 TABLET ORAL
Qty: 90 TABLET | Refills: 0 | Status: SHIPPED | OUTPATIENT
Start: 2018-03-28 | End: 2018-04-03

## 2018-03-29 DIAGNOSIS — J30.2 CHRONIC SEASONAL ALLERGIC RHINITIS, UNSPECIFIED TRIGGER: ICD-10-CM

## 2018-03-29 RX ORDER — REPAGLINIDE 1 MG/1
TABLET ORAL
Qty: 90 TABLET | Refills: 0 | OUTPATIENT
Start: 2018-03-29

## 2018-03-29 RX ORDER — MONTELUKAST SODIUM 10 MG/1
10 TABLET ORAL NIGHTLY
Qty: 30 TABLET | Refills: 5 | Status: SHIPPED | OUTPATIENT
Start: 2018-03-29 | End: 2018-05-03 | Stop reason: SDUPTHER

## 2018-04-02 ENCOUNTER — LAB VISIT (OUTPATIENT)
Dept: LAB | Facility: HOSPITAL | Age: 68
End: 2018-04-02
Attending: NURSE PRACTITIONER
Payer: MEDICARE

## 2018-04-02 ENCOUNTER — TELEPHONE (OUTPATIENT)
Dept: ENDOCRINOLOGY | Facility: CLINIC | Age: 68
End: 2018-04-02

## 2018-04-02 ENCOUNTER — PATIENT MESSAGE (OUTPATIENT)
Dept: ENDOCRINOLOGY | Facility: CLINIC | Age: 68
End: 2018-04-02

## 2018-04-02 PROCEDURE — 83036 HEMOGLOBIN GLYCOSYLATED A1C: CPT

## 2018-04-02 PROCEDURE — 36415 COLL VENOUS BLD VENIPUNCTURE: CPT | Mod: PN

## 2018-04-02 NOTE — TELEPHONE ENCOUNTER
Called patient who wanted to reschedule appointment for Tues 4/3/18 at 2:00p and labs for today at 2:45p. Patient verbalized understanding.

## 2018-04-02 NOTE — TELEPHONE ENCOUNTER
----- Message from Karen Arnold sent at 4/2/2018 11:58 AM CDT -----  Contact: self  Pt is requesting sooner appointment than 4.30.18. States he has lost weight/difficulty eating. Contact pt at [phone].    Thanks-

## 2018-04-03 ENCOUNTER — OFFICE VISIT (OUTPATIENT)
Dept: ENDOCRINOLOGY | Facility: CLINIC | Age: 68
End: 2018-04-03
Payer: MEDICARE

## 2018-04-03 VITALS
DIASTOLIC BLOOD PRESSURE: 70 MMHG | BODY MASS INDEX: 19.61 KG/M2 | HEIGHT: 72 IN | SYSTOLIC BLOOD PRESSURE: 101 MMHG | WEIGHT: 144.81 LBS | HEART RATE: 88 BPM

## 2018-04-03 DIAGNOSIS — R63.0 APPETITE LOSS: ICD-10-CM

## 2018-04-03 DIAGNOSIS — E13.42 DIABETIC POLYNEUROPATHY ASSOCIATED WITH OTHER SPECIFIED DIABETES MELLITUS: ICD-10-CM

## 2018-04-03 DIAGNOSIS — F10.10 ETOH ABUSE: ICD-10-CM

## 2018-04-03 DIAGNOSIS — Z71.9 VISIT FOR COUNSELING: ICD-10-CM

## 2018-04-03 DIAGNOSIS — R80.9 MICROALBUMINURIA: ICD-10-CM

## 2018-04-03 DIAGNOSIS — K86.0 ALCOHOL-INDUCED CHRONIC PANCREATITIS: ICD-10-CM

## 2018-04-03 DIAGNOSIS — Z72.0 TOBACCO ABUSE: ICD-10-CM

## 2018-04-03 LAB
ESTIMATED AVG GLUCOSE: 160 MG/DL
HBA1C MFR BLD HPLC: 7.2 %

## 2018-04-03 PROCEDURE — 99215 OFFICE O/P EST HI 40 MIN: CPT | Mod: S$GLB,,, | Performed by: NURSE PRACTITIONER

## 2018-04-03 PROCEDURE — 99999 PR PBB SHADOW E&M-EST. PATIENT-LVL IV: CPT | Mod: PBBFAC,,, | Performed by: NURSE PRACTITIONER

## 2018-04-03 PROCEDURE — 3045F PR MOST RECENT HEMOGLOBIN A1C LEVEL 7.0-9.0%: CPT | Mod: CPTII,S$GLB,, | Performed by: NURSE PRACTITIONER

## 2018-04-03 NOTE — PROGRESS NOTES
CC: This 67 y.o. White male  is here for evaluation of  T2DM along with comorbidities indicated in the Visit Diagnosis section of this encounter.    HPI: Moe Ren was diagnosed with T2DM in ~ 2010.  Metformin started at time of diagnosis.     He has a history of squamos cell oral CA, Spinal stenosis, chronic pancreatitis.      Prior visit on 2/28/18  He was advised last week to stop taking tresiba d/t hypoglycemia on just 14 units/day and to titrate metformin to 2 grams/day. His fasting glucoses are high but he is having prandial excursions during the day. He did meet with dietician but diet is still high in carbs, presumably so that he can maintain his weight. Still drinking sugar in his coffee.   Plan Start repaglinide 1 mg ac.   Continue metformin.   Send log in a week. rtc in 2 mo with labs prior.     Interval history  Pt has been sending in frequent glucose logs. Metformin recently stopped and repaglinide doubled d/t reports of upset stomach, bad odor, and in particular low appetite presumably d/t metformin. He has been taking novolog for carbs since stopping metformin.  Yesterday     He has lost 11 lb since lov. Feels terrible and weak.    C/o memory loss so stopped gabapentin.   Smoking ppd - up from 1 ppd to 1.5 ppd now d/t anxiety.   Was drinking a lot when he was going through his cousin's illness. He suspects he had pancreatitis over the weekend bc he was having abdominal pains. Usually has 4-5 drinks (scotch with water)  each night  - last drank on Friday night.     Struggling to get in enough calories in. Still has low appetite despite stopping metformin last week although it has improved since metformin was stopped.     He has been taking 4-8 units of novolog before meals in addition to the repaglinide with BGs ranging from 90-200s, averaging mid 100s.       LAST DIABETES EDUCATION: 6/2016    PRESCRIBED DIABETES MEDICATIONS: repaglinide 2 mg ac      Misses medication doses - No    DM  COMPLICATIONS: peripheral neuropathy    SIGNIFICANT DIABETES MED HISTORY:   Tresiba gradually titrated down until it was stopped mid 2017 d/t hypoglycemia.   Novolog stopped at ov 2/5/18 and metformin and tresiba were started. tresiba dc'd about a week later d/t hypo    SELF MONITORING BLOOD GLUCOSE: Checks blood glucose at home 4-6x/day. See Media for Patient Entered Attachment.     HYPOGLYCEMIC EPISODES: bg dropped to 66 yesterday presumably d/t overcounting carbs and excessive novolog       CURRENT DIET: 3 meal/day - Eats at 10 am, 2 pm, and 6 pm.     CURRENT EXERCISE: none recent; previously - goes twice a week to gym - 1 hour of cardio/weights    SOCIAL: part time contract . 10 hours.  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       /70 (BP Location: Right arm, Patient Position: Sitting, BP Method: Medium (Automatic))   Pulse 88   Ht 6' (1.829 m)   Wt 65.7 kg (144 lb 13.5 oz)   BMI 19.64 kg/m²       ROS:   CONSTITUTIONAL: Appetite good, denies fatigue  RESPIRATORY: No shortness of breath or cough        PHYSICAL EXAM:  GENERAL: Well developed, well nourished. No acute distress.   PSYCH: AAOx3, appropriate mood and affect, conversant, well-groomed. Judgement and insight good.   NEURO: Cranial nerves grossly intact. Speech clear, no tremor.   CHEST: Respirations even and unlabored.        Hemoglobin A1C   Date Value Ref Range Status   04/02/2018 7.2 (H) 4.0 - 5.6 % Final     Comment:     According to ADA guidelines, hemoglobin A1c <7.0% represents  optimal control in non-pregnant diabetic patients. Different  metrics may apply to specific patient populations.   Standards of Medical Care in Diabetes-2016.  For the purpose of screening for the presence of diabetes:  <5.7%     Consistent with the absence of diabetes  5.7-6.4%  Consistent with increasing risk for diabetes   (prediabetes)  >or=6.5%  Consistent with  diabetes  Currently, no consensus exists for use of hemoglobin A1c  for diagnosis of diabetes for children.  This Hemoglobin A1c assay has significant interference with fetal   hemoglobin   (HbF). The results are invalid for patients with abnormal amounts of   HbF,   including those with known Hereditary Persistence   of Fetal Hemoglobin. Heterozygous hemoglobin variants (HbAS, HbAC,   HbAD, HbAE, HbA2) do not significantly interfere with this assay;   however, presence of multiple variants in a sample may impact the %   interference.     01/22/2018 7.1 (H) 4.0 - 5.6 % Final     Comment:     According to ADA guidelines, hemoglobin A1c <7.0% represents  optimal control in non-pregnant diabetic patients. Different  metrics may apply to specific patient populations.   Standards of Medical Care in Diabetes-2016.  For the purpose of screening for the presence of diabetes:  <5.7%     Consistent with the absence of diabetes  5.7-6.4%  Consistent with increasing risk for diabetes   (prediabetes)  >or=6.5%  Consistent with diabetes  Currently, no consensus exists for use of hemoglobin A1c  for diagnosis of diabetes for children.  This Hemoglobin A1c assay has significant interference with fetal   hemoglobin   (HbF). The results are invalid for patients with abnormal amounts of   HbF,   including those with known Hereditary Persistence   of Fetal Hemoglobin. Heterozygous hemoglobin variants (HbAS, HbAC,   HbAD, HbAE, HbA2) do not significantly interfere with this assay;   however, presence of multiple variants in a sample may impact the %   interference.     07/19/2017 6.6 (H) 4.0 - 5.6 % Final     Comment:     According to ADA guidelines, hemoglobin A1c <7.0% represents  optimal control in non-pregnant diabetic patients. Different  metrics may apply to specific patient populations.   Standards of Medical Care in Diabetes-2016.  For the purpose of screening for the presence of diabetes:  <5.7%     Consistent with the absence  of diabetes  5.7-6.4%  Consistent with increasing risk for diabetes   (prediabetes)  >or=6.5%  Consistent with diabetes  Currently, no consensus exists for use of hemoglobin A1c  for diagnosis of diabetes for children.  This Hemoglobin A1c assay has significant interference with fetal   hemoglobin   (HbF). The results are invalid for patients with abnormal amounts of   HbF,   including those with known Hereditary Persistence   of Fetal Hemoglobin. Heterozygous hemoglobin variants (HbAS, HbAC,   HbAD, HbAE, HbA2) do not significantly interfere with this assay;   however, presence of multiple variants in a sample may impact the %   interference.             Chemistry        Component Value Date/Time     (L) 01/03/2018 0435    K 5.2 (H) 01/03/2018 0435    CL 95 01/03/2018 0435    CO2 27 01/03/2018 0435    BUN 16 01/03/2018 0435    CREATININE 1.1 01/03/2018 0435     (H) 01/03/2018 0435        Component Value Date/Time    CALCIUM 9.4 01/03/2018 0435    ALKPHOS 86 01/01/2018 0311    AST 33 01/01/2018 0311    ALT 19 01/01/2018 0311    BILITOT 0.6 01/01/2018 0311    ESTGFRAFRICA >60 01/03/2018 0435    EGFRNONAA >60 01/03/2018 0435          Lab Results   Component Value Date    LDLCALC 82.8 01/22/2018        Ref. Range 1/22/2018 09:15   Cholesterol Latest Ref Range: 120 - 199 mg/dL 169   HDL Latest Ref Range: 40 - 75 mg/dL 65   LDL Cholesterol Latest Ref Range: 63.0 - 159.0 mg/dL 82.8   Total Cholesterol/HDL Ratio Latest Ref Range: 2.0 - 5.0  2.6   Triglycerides Latest Ref Range: 30 - 150 mg/dL 106     Lab Results   Component Value Date    MICALBCREAT 69.4 (H) 04/02/2018         STANDARDS of CARE:        ASA:               Last eye exam:       ASSESSMENT and PLAN:    A1C GOAL: < 7 %         1. Uncontrolled type 2 diabetes mellitus with complication, without long-term current use of insulin  Stop repaglinide - unlikely to control BGs alone.   Continue Novolog and continue prior settings for ISF and ICR on  [employer] Expert meter.     ICR   12 am 1:5   1130 am 1:15   4 pm 1:12    ISF 1:50, goal 115       Test bg ac/hs.   rtc in 4 weeks.    2. Appetite loss  Likely r/t combination of factors - metformin, increased smoking and alcohol intake, and grieving process.      3. Microalbuminuria  Improve glycemic control.      4. Alcohol-induced chronic pancreatitis  Encouraged abstinence from alcohol    5. Diabetic polyneuropathy associated with other specified diabetes mellitus  Improve glycemic control.      6. ETOH abuse  As above   7. Tobacco abuse  Encouraged smoking cessation.        Spent 40 minutes with patient with >50% time spent in counseling, as noted in # 1, 2, 4, 6-7.            No orders of the defined types were placed in this encounter.       Follow-up in about 4 weeks (around 5/1/2018).

## 2018-04-04 ENCOUNTER — OFFICE VISIT (OUTPATIENT)
Dept: FAMILY MEDICINE | Facility: CLINIC | Age: 68
End: 2018-04-04
Payer: MEDICARE

## 2018-04-04 VITALS
OXYGEN SATURATION: 96 % | SYSTOLIC BLOOD PRESSURE: 126 MMHG | HEART RATE: 71 BPM | DIASTOLIC BLOOD PRESSURE: 72 MMHG | BODY MASS INDEX: 19.86 KG/M2 | HEIGHT: 72 IN | WEIGHT: 146.63 LBS

## 2018-04-04 DIAGNOSIS — R63.4 WEIGHT LOSS: ICD-10-CM

## 2018-04-04 DIAGNOSIS — B96.89 ACUTE BACTERIAL SINUSITIS: ICD-10-CM

## 2018-04-04 DIAGNOSIS — J01.90 ACUTE BACTERIAL SINUSITIS: ICD-10-CM

## 2018-04-04 DIAGNOSIS — E11.42 DM TYPE 2 WITH DIABETIC PERIPHERAL NEUROPATHY: Primary | Chronic | ICD-10-CM

## 2018-04-04 PROCEDURE — 99214 OFFICE O/P EST MOD 30 MIN: CPT | Mod: S$GLB,,, | Performed by: INTERNAL MEDICINE

## 2018-04-04 PROCEDURE — 99999 PR PBB SHADOW E&M-EST. PATIENT-LVL III: CPT | Mod: PBBFAC,,, | Performed by: INTERNAL MEDICINE

## 2018-04-04 PROCEDURE — 3045F PR MOST RECENT HEMOGLOBIN A1C LEVEL 7.0-9.0%: CPT | Mod: CPTII,S$GLB,, | Performed by: INTERNAL MEDICINE

## 2018-04-04 RX ORDER — AZITHROMYCIN 250 MG/1
TABLET, FILM COATED ORAL
Qty: 6 TABLET | Refills: 0 | Status: SHIPPED | OUTPATIENT
Start: 2018-04-04 | End: 2018-05-04 | Stop reason: ALTCHOICE

## 2018-04-04 NOTE — PROGRESS NOTES
This note was created by combination of typed  and Dragon dictation.  Transcription errors may be present.  If there are any questions, please contact me.    Assessment & Plan:   Acute bacterial sinusitis - zithromax z earl  -     azithromycin (ZITHROMAX Z-EARL) 250 MG tablet; Take 2 pills day 1, then 1 pill day 2-5.  Dispense: 6 tablet; Refill: 0    Weight loss - multifactorial with high stress, SE of metformin, sinusitis.  Off of metformin.  Monitor.  If persisting - check CXR (smoker).    No change in chronic narcotic and chronic BZD - has been using these sparingly, aware of high risk nature of these meds.    Medications Discontinued During This Encounter   Medication Reason    oxyCODONE-acetaminophen (PERCOCET)  mg per tablet Therapy completed     Modified Medications    No medications on file     New Prescriptions    AZITHROMYCIN (ZITHROMAX Z-EARL) 250 MG TABLET    Take 2 pills day 1, then 1 pill day 2-5.       Follow Up: No Follow-up on file.        Subjective:     Chief Complaint   Patient presents with    Sinus Problem    Weight Loss       HPI  Moe is a 67 y.o. male, last appointment with this clinic was 3/1/2018.    DM2 with neuropathy, followed by endo; gabapentin.  HTN  hyperlipidemia  Smoker, contemplative stage of quitting.  He is not interested in smoking cessation classes.  COPD, 12/2017 PFTs showing mild airflow obstruction, air trapping and decreased DLCO.  No desaturation.  Spinal stenosis on chronic narcotic therapy, 7/2014 MRI L spine with lumbar spondylosis with mod/severe right neural foraminal stenossi L4-5; significant degenerative disk disease, facet joint arthropathy L4-5, L5-S1; hx of injections; had been seen by pain mgmt 2015 but discharged for THC. hydrocodone 10/325 BID. Takes by his estimate 1.5 tablets a day mainly in the evening.   Hx of Aleve had to stop after surgery for deviated septum. Now he notes various other joints that hurt and are helped by the narcotic  "therapy. Worse with prolonged standing and walking.  He had talked to neurosurgery, 2015, they had recommended surgery and he is very wary of surgery.  Goal is to try to reduce intake.  Pain contract signed 1/2018  Anxiety, Family history of suicide, father committed suicide and his son did as well. alprazolam 0.5 mg TID.  Typically takes 2 at night, maybe 1 in the morning.  8/2/2017, started on zoloft.  Zoloft SE of hypoglycemia/ shakiness so changed to Lexapro. xanax TID and ultimately goal is wean off. He was going to xanax to 1 tablet at night.  Notes he had a "hard father".  Alcohol use. Has been to Alcoholics Anonymous and has quit before.  Did not really find Alcoholics Anonymous to be that helpful.  Feels like he needs to get himself in his own mind set in order to quit and is not adjusted and further referral at this time.last OV he promised me he would cut down to 3 drinks a day.  Colon polyps, last colonoscopy 2009.  Referred in March for colonoscopy.  4/11/2017 colonoscopy with transverse tubular adenoma  Schatzki ring dilitation; followed by [facility].  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 [place] to be due to EtOH.    He thinks he has a sinus infection.  Having issues with abnormal smell and abnormal taste and it's been persisting for 3 weeks.  Has a history of ALLERGIES and last visit I started him on similar but he has not started it yet.  No fevers no chills.  Sinus pain and sinus congestion.    He saw endocrinology yesterday.  He had had side effects of metformin of weight loss.  Family stressors with resultant low appetite.  He was stopped off of oral hypoglycemics and resumed on NovoLog.  He is good at carb counting and so adjusts his insulin dose to match.    It's been a very difficult month for him emotionally.  In early March his cousin suffered cardiac arrest but he recovered circulation.  The patient had 2 rush over to [facility], he was there from about March 6 to March 10.  Low " appetite while in the hospital.  Spent a lot of time walking around.  He returned back to [place].   his cousin .  He returned back to [place] for the end of March for the  and return back .  Up until that point he had been moderate in his drinking but had a few episodes of binge drinking with that.  Maybe a week ago he had for 5 drinks on a Saturday night and awoke  in significant abdominal pain.  Reminiscent of previous episodes of pancreatic inflammation.  He was able to stay hydrated and thus did not report the ER for evaluation.  Took hydrocodone for the pain. He was afraid of EtOH withdrawal so he took a few xanax in the days following.  The following weekend he had 4-5 drinks on a Fri and awoke Sat with abd pain, not as severe.  Took more hydrocodone.  Has been abstinent since (about a week).    Notes dysphagia with a hx of Schatzki ring s/p dilitation and thinks he needs to follow up with GI again for possible scope.    Last visit increased lexapro and increased buspar. Thinks he has some modest improvement on the Lexapro higher dose.  Still with fidgeting but not as severe/bothersome.     Answers for HPI/ROS submitted by the patient on 2018   activity change: No  unexpected weight change: Yes  rhinorrhea: Yes  trouble swallowing: Yes  visual disturbance: No  chest tightness: No  polyuria: No  difficulty urinating: No  joint swelling: No  arthralgias: Yes  confusion: No  dysphoric mood: Yes        Patient Care Team:  Damian Ramon MD as PCP - General (Internal Medicine)  Kandice Bonilla RD as Dietitian (Diabetes)  Josias Ling MD as Consulting Physician (Neurosurgery)  Aime De Anda MD as Consulting Physician (Otolaryngology)    Patient Active Problem List    Diagnosis Date Noted    Chronic seasonal allergic rhinitis 2018    Closed fracture of multiple ribs of left side 2018    Alcohol-induced chronic pancreatitis 2018    Abdominal aortic  atherosclerosis 11/14/2017     On CXR 4/5/2016 and on 11/2/2012 XR L spine  On AAA screening 3/2018      Chronic obstructive bronchitis with pulmonary emphysema 11/14/2017 12/2017 PFTs showed mild airflow obstruction.  Lung volume showing air trapping.  DLCO reduced. no O2 desat.  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    Chronic narcotic use 08/02/2017    Special screening for malignant neoplasms, colon 04/11/2017    Deviated nasal septum 04/19/2016    Chronic back pain greater than 3 months duration 01/06/2016    Small fiber neuropathy 07/29/2015     Likely related to alcohol use      Facet arthritis of lumbar region 10/08/2014    Spondylosis without myelopathy 08/13/2014    Degeneration of lumbar or lumbosacral intervertebral disc 08/13/2014    Spinal stenosis, lumbar region, with neurogenic claudication 08/13/2014    Tubular adenoma of colon 04/22/2014 4/11/2017 colonoscopy with transverse tubular adenoma      DM type 2 with diabetic peripheral neuropathy 07/24/2013    Hypertriglyceridemia 06/13/2013    Anxiety disorder 06/12/2013     Trazodone ineffective.     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    Cataract     Diabetes mellitus     Pancreatitis 2008    2 Times       PAST SURGICAL HISTORY:  Past Surgical History:   Procedure Laterality Date     thumb surgery      Dead Skin cell tumor Rt thumb    COLONOSCOPY N/A 4/11/2017    Procedure: COLONOSCOPY;  Surgeon: Meet Quesada MD;  [facility]: [facility];  Service: Endoscopy;  Laterality: N/A;    MOUTH FLOOR MASS EXCISION  2009    NASAL SEPTUM SURGERY      SKIN SURGERY         SOCIAL HISTORY:  Social History     Social History    Marital status:      Spouse name: N/A  "   Number of children: N/A    Years of education: N/A     Occupational History    , self employed      Social History Main Topics    Smoking status: Current Every Day Smoker     Packs/day: 1.50     Years: 45.00     Types: Cigarettes    Smokeless tobacco: Never Used    Alcohol use 0.5 oz/week     1 Standard drinks or equivalent per week      Comment: daily scotch and water, half pint    Drug use: No      Comment: rarely    Sexual activity: Yes     Partners: Female     Other Topics Concern    Not on file     Social History Narrative    , 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  One child committed suicide.       ALLERGIES AND MEDICATIONS: updated and reviewed.  Review of patient's allergies indicates:   Allergen Reactions    Amoxicillin Nausea And Vomiting    Adhesive Rash     Paper tape only    Metformin Diarrhea     Appetite loss      Current Outpatient Prescriptions   Medication Sig Dispense Refill    ALPRAZolam (XANAX) 0.5 MG tablet Take 1 tablet (0.5 mg total) by mouth daily as needed for Anxiety. 30 tablet 0    atorvastatin (LIPITOR) 20 MG tablet Take 1 tablet (20 mg total) by mouth once daily. 90 tablet 1    baclofen (LIORESAL) 10 MG tablet Take 1 tablet (10 mg total) by mouth 2 (two) times daily. 60 tablet 5    BD ULTRA-FINE RENETTA PEN NEEDLES 32 gauge x 5/32" Ndle TO USE WITH MULTIPLE DAILY INJECTIONS 150 each 12    blood sugar diagnostic (ACCU-CHEK KM PLUS TEST STRP) Strp USE TO TEST UPTO 6 TIMES DAILY 550 strip 3    busPIRone (BUSPAR) 15 MG tablet Take 2 tablets (30 mg total) by mouth 2 (two) times daily. Substitute for dose of xanax. 120 tablet 11    desoximetasone (TOPICORT) 0.25 % cream Apply topically 2 (two) times daily as needed.       escitalopram oxalate (LEXAPRO) 20 MG tablet Take 1 tablet (20 mg total) by mouth once daily. 30 tablet 11    fluticasone (FLONASE) 50 mcg/actuation nasal " spray 2 sprays by Each Nare route once daily.      hydrocodone-acetaminophen 10-325mg (NORCO)  mg Tab Take 1 tablet by mouth every 24 hours as needed for Pain. 30 tablet 0    INSULIN ASPART (NOVOLOG FLEXPEN U-100 INSULIN SUBQ) Inject into the skin.      ipratropium (ATROVENT HFA) 17 mcg/actuation inhaler Inhale 2 puffs into the lungs every 6 (six) hours. Rescue 12.9 g 5    lancets (ACCU-CHEK FASTCLIX) Misc USE TO TEST 6 TIMES A  each 3    lisinopril (PRINIVIL,ZESTRIL) 5 MG tablet Take 1 tablet (5 mg total) by mouth once daily. 30 tablet 6    montelukast (SINGULAIR) 10 mg tablet TAKE 1 TABLET (10 MG TOTAL) BY MOUTH EVERY EVENING. 30 tablet 5    gabapentin (NEURONTIN) 600 MG tablet Take 2 tablets (1,200 mg total) by mouth 3 (three) times daily. 180 tablet 5     No current facility-administered medications for this visit.        Review of Systems   HENT: Negative for hearing loss.    Eyes: Negative for discharge.   Respiratory: Positive for wheezing.    Cardiovascular: Negative for chest pain and palpitations.   Gastrointestinal: Negative for blood in stool, constipation, diarrhea and vomiting.   Genitourinary: Negative for hematuria and urgency.   Musculoskeletal: Negative for neck pain.   Neurological: Positive for weakness and headaches.   Endo/Heme/Allergies: Negative for polydipsia.       Objective:   Physical Exam   Vitals:    04/04/18 1603   BP: 126/72   Pulse: 71   SpO2: 96%   Weight: 66.5 kg (146 lb 9.7 oz)   Height: 6' (1.829 m)    Body mass index is 19.88 kg/m².  Weight: 66.5 kg (146 lb 9.7 oz)   Height: 6' (182.9 cm)     Physical Exam   Constitutional: He is oriented to person, place, and time. He appears well-developed and well-nourished.   HENT:   TMs grey/clear bilaterally.  OP no erythema no exudates   Eyes: EOM are normal.   Neck: Neck supple.   Cardiovascular: Normal rate, regular rhythm and normal heart sounds.    Pulmonary/Chest: Effort normal and breath sounds normal. He has no  wheezes.   Lymphadenopathy:     He has no cervical adenopathy.   Neurological: He is alert and oriented to person, place, and time.   Skin: Skin is warm and dry.   Psychiatric: He has a normal mood and affect. His behavior is normal.

## 2018-04-12 DIAGNOSIS — M54.9 CHRONIC BACK PAIN GREATER THAN 3 MONTHS DURATION: ICD-10-CM

## 2018-04-12 DIAGNOSIS — M51.36 DEGENERATIVE DISC DISEASE, LUMBAR: ICD-10-CM

## 2018-04-12 DIAGNOSIS — G89.29 CHRONIC BACK PAIN GREATER THAN 3 MONTHS DURATION: ICD-10-CM

## 2018-04-12 DIAGNOSIS — M47.816 FACET ARTHRITIS OF LUMBAR REGION: ICD-10-CM

## 2018-04-12 DIAGNOSIS — F41.9 ANXIETY DISORDER, UNSPECIFIED TYPE: ICD-10-CM

## 2018-04-12 RX ORDER — ALPRAZOLAM 0.5 MG/1
0.5 TABLET ORAL DAILY PRN
Qty: 30 TABLET | Refills: 0 | Status: SHIPPED | OUTPATIENT
Start: 2018-04-12 | End: 2018-05-14 | Stop reason: SDUPTHER

## 2018-04-12 RX ORDER — HYDROCODONE BITARTRATE AND ACETAMINOPHEN 10; 325 MG/1; MG/1
1 TABLET ORAL
Qty: 30 TABLET | Refills: 0 | Status: SHIPPED | OUTPATIENT
Start: 2018-04-12 | End: 2018-05-14 | Stop reason: SDUPTHER

## 2018-04-24 ENCOUNTER — OFFICE VISIT (OUTPATIENT)
Dept: OPTOMETRY | Facility: CLINIC | Age: 68
End: 2018-04-24
Payer: COMMERCIAL

## 2018-04-24 DIAGNOSIS — H52.4 HYPEROPIA WITH PRESBYOPIA OF BOTH EYES: ICD-10-CM

## 2018-04-24 DIAGNOSIS — H52.03 HYPEROPIA WITH PRESBYOPIA OF BOTH EYES: ICD-10-CM

## 2018-04-24 DIAGNOSIS — Z01.00 EYE EXAM, ROUTINE: Primary | ICD-10-CM

## 2018-04-24 PROCEDURE — 92014 COMPRE OPH EXAM EST PT 1/>: CPT | Mod: S$GLB,,, | Performed by: OPTOMETRIST

## 2018-04-24 PROCEDURE — 92015 DETERMINE REFRACTIVE STATE: CPT | Mod: S$GLB,,, | Performed by: OPTOMETRIST

## 2018-04-24 PROCEDURE — 99999 PR PBB SHADOW E&M-EST. PATIENT-LVL II: CPT | Mod: PBBFAC,,, | Performed by: OPTOMETRIST

## 2018-04-24 NOTE — PROGRESS NOTES
HPI     DSL- 4/18/17     Pt states no Va change. Pt wear OTC +2.50 for near and +1.50 for computer.   Pt has eye allergies. Pt has floaters. Glare is a bother. BSL was 103   after lunch.     Eyemeds  [place] OU PRN  Systane OU PRN    Hemoglobin A1C       Date                     Value               Ref Range             Status                04/02/2018               7.2 (H)             4.0 - 5.6 %           Final                  01/22/2018               7.1 (H)             4.0 - 5.6 %           Final                  07/19/2017               6.6 (H)             4.0 - 5.6 %           Final                ----------      Last edited by Leonardo Ramsey, OD on 4/24/2018  2:30 PM. (History)            Assessment /Plan     For exam results, see Encounter Report.    Diabetes mellitus type 2 without retinopathy  -No retinopathy noted today.  Continued control with primary care physician and annual comprehensive eye exam.    Nuclear sclerosis, bilateral  -Educated patient on presence of cataracts at today's exam, monitor at annual dilated fundus exam. 5+ years surgical estimate.          RTC 1 yr

## 2018-04-24 NOTE — PROGRESS NOTES
HPI     DSL- 4/18/17     Pt states no Va change. Pt wear OTC +2.50 for near and +1.50 for computer.   Pt has eye allergies. Pt has floaters. Glare is a bother. BSL was 103   after lunch.     Eyemeds  [place] OU PRN  Systane OU PRN    Hemoglobin A1C       Date                     Value               Ref Range             Status                04/02/2018               7.2 (H)             4.0 - 5.6 %           Final                  01/22/2018               7.1 (H)             4.0 - 5.6 %           Final                  07/19/2017               6.6 (H)             4.0 - 5.6 %           Final                ----------      Last edited by Leonardo Ramsey, OD on 4/24/2018  2:30 PM. (History)            Assessment /Plan     For exam results, see Encounter Report.    Eye exam, routine  -Eyemed vision exam    Eyeglass Final Rx     Eyeglass Final Rx       Sphere Cylinder Axis Dist VA Add    Right +0.50 Sphere  20/25 +2.50    Left [place] +0.50 175 20/25 +2.50    Type:  SVL    Expiration Date:  4/25/2019                  RTC 1 yr

## 2018-04-27 ENCOUNTER — TELEPHONE (OUTPATIENT)
Dept: ENDOCRINOLOGY | Facility: CLINIC | Age: 68
End: 2018-04-27

## 2018-04-30 ENCOUNTER — PATIENT MESSAGE (OUTPATIENT)
Dept: ENDOCRINOLOGY | Facility: CLINIC | Age: 68
End: 2018-04-30

## 2018-04-30 DIAGNOSIS — E13.42 DIABETIC POLYNEUROPATHY ASSOCIATED WITH OTHER SPECIFIED DIABETES MELLITUS: ICD-10-CM

## 2018-04-30 DIAGNOSIS — E11.42 DM TYPE 2 WITH DIABETIC PERIPHERAL NEUROPATHY: Chronic | ICD-10-CM

## 2018-04-30 DIAGNOSIS — E11.21 TYPE 2 DIABETES WITH NEPHROPATHY: ICD-10-CM

## 2018-04-30 RX ORDER — INSULIN ASPART 100 [IU]/ML
INJECTION, SOLUTION INTRAVENOUS; SUBCUTANEOUS
Qty: 3 BOX | Refills: 1 | Status: SHIPPED | OUTPATIENT
Start: 2018-04-30 | End: 2018-11-05 | Stop reason: SDUPTHER

## 2018-04-30 RX ORDER — INSULIN PUMP SYRINGE, 3 ML
EACH MISCELLANEOUS
Qty: 1 EACH | Refills: 0 | Status: SHIPPED | OUTPATIENT
Start: 2018-04-30 | End: 2020-10-21 | Stop reason: SDUPTHER

## 2018-04-30 RX ORDER — PEN NEEDLE, DIABETIC 30 GX3/16"
NEEDLE, DISPOSABLE MISCELLANEOUS
Qty: 300 EACH | Refills: 3 | Status: SHIPPED | OUTPATIENT
Start: 2018-04-30 | End: 2019-06-25 | Stop reason: SDUPTHER

## 2018-04-30 RX ORDER — LANCETS
1 EACH MISCELLANEOUS
Qty: 600 EACH | Refills: 3 | Status: SHIPPED | OUTPATIENT
Start: 2018-04-30 | End: 2019-06-25 | Stop reason: SDUPTHER

## 2018-05-01 RX ORDER — LANCETS
EACH MISCELLANEOUS
Qty: 550 EACH | Refills: 3 | Status: CANCELLED | OUTPATIENT
Start: 2018-05-01

## 2018-05-01 RX ORDER — INSULIN ASPART 100 [IU]/ML
INJECTION, SOLUTION INTRAVENOUS; SUBCUTANEOUS
Qty: 3 BOX | Refills: 1 | OUTPATIENT
Start: 2018-05-01

## 2018-05-01 RX ORDER — LANCETS
1 EACH MISCELLANEOUS
Qty: 600 EACH | Refills: 3 | OUTPATIENT
Start: 2018-05-01

## 2018-05-02 ENCOUNTER — OFFICE VISIT (OUTPATIENT)
Dept: PODIATRY | Facility: CLINIC | Age: 68
End: 2018-05-02
Payer: MEDICARE

## 2018-05-02 VITALS
WEIGHT: 146 LBS | BODY MASS INDEX: 19.77 KG/M2 | SYSTOLIC BLOOD PRESSURE: 138 MMHG | HEIGHT: 72 IN | DIASTOLIC BLOOD PRESSURE: 76 MMHG

## 2018-05-02 DIAGNOSIS — M20.5X2 HALLUX LIMITUS, LEFT: ICD-10-CM

## 2018-05-02 DIAGNOSIS — M21.6X2 ACQUIRED BILATERAL PES CAVUS: ICD-10-CM

## 2018-05-02 DIAGNOSIS — M21.6X1 ACQUIRED BILATERAL PES CAVUS: ICD-10-CM

## 2018-05-02 DIAGNOSIS — M20.42 HAMMER TOES OF BOTH FEET: ICD-10-CM

## 2018-05-02 DIAGNOSIS — M20.41 HAMMER TOES OF BOTH FEET: ICD-10-CM

## 2018-05-02 DIAGNOSIS — E11.49 TYPE II DIABETES MELLITUS WITH NEUROLOGICAL MANIFESTATIONS: Primary | ICD-10-CM

## 2018-05-02 PROCEDURE — 3008F BODY MASS INDEX DOCD: CPT | Mod: CPTII,S$GLB,, | Performed by: PODIATRIST

## 2018-05-02 PROCEDURE — 3045F PR MOST RECENT HEMOGLOBIN A1C LEVEL 7.0-9.0%: CPT | Mod: CPTII,S$GLB,, | Performed by: PODIATRIST

## 2018-05-02 PROCEDURE — 99214 OFFICE O/P EST MOD 30 MIN: CPT | Mod: S$GLB,,, | Performed by: PODIATRIST

## 2018-05-02 PROCEDURE — 99999 PR PBB SHADOW E&M-EST. PATIENT-LVL III: CPT | Mod: PBBFAC,,, | Performed by: PODIATRIST

## 2018-05-02 NOTE — PATIENT INSTRUCTIONS
Recommend lotions: eucerin, eucerin for diabetics, aquaphor, A&D ointment, gold bond for diabetics, sween, [place]'s Bees all purpose baby ointment,  urea 40 with aloe (found on amazon.com)    Shoe recommendations: (try 6pm.com, [email or web address] , [email or web address], or shoes.[email or web address] for discounted prices) you can visit DSW shoes in [employer]  or Express Fit in the [facility] (there are also several shoe brand outlets in the [facility])    Asics (GT 2000 or gel foundations), Asics Gel Cumulus or Gel Numbus; Saucony Grid Cohesion or Xodus, Mizuno Wave [place] or Wave Pepe; Tonya Intuition or Instinct; Hoka One Challenger, or New Balance Vazee Pave (tennis shoe)    sofft brand, clarks, crocs, aerosoles, naturalizers, SAS, ecco, born, tatianna méndez, rockports (dress shoes)    Vionic, burkenstocks, fitflops, propet (sandals)  Nike comfort thong sandals, crocs, propet (house shoes)    Nail Home remedy:  Vicks Vapor rub to nails for easier managability    Occasional soaks for 15-20 mins in luke warm water with 1 cup of listerine and 1 cup of apple cider vinegar are ok You may add several drops of oil of oregano or tea tree oil as well        Diabetes: Inspecting Your Feet  Diabetes increases your chances of developing foot problems. So inspect your feet every day. This helps you find small skin irritations before they become serious infections. If you have trouble seeing the bottoms of your feet, use a mirror or ask a family member or friend to help.     Pressure spots on the bottom of the foot are common areas where problems develop.   How to check your feet  Below are tips to help you look for foot problems. Try to check your feet at the same time each day, such as when you get out of bed in the morning:  · Check the top of each foot. The tops of toes, back of the heel, and outer edge of the foot can get a lot of rubbing from poor-fitting shoes.  · Check the bottom of each foot. Daily wear and tear often leads to problems at pressure  spots.  · Check the toes and nails. Fungal infections often occur between toes. Toenail problems can also be a sign of fungal infections or lead to breaks in the skin.  · Check your shoes, too. Loose objects inside a shoe can injure the foot. Use your hand to feel inside your shoes for things like sharon, loose stitching, or rough areas that could irritate your skin.  Warning signs  Look for any color changes in the foot. Redness with streaks can signal a severe infection, which needs immediate medical attention. Tell your doctor right away if you have any of these problems:  · Swelling, sometimes with color changes, may be a sign of poor blood flow or infection. Symptoms include tenderness and an increase in the size of your foot.  · Warm or hot areas on your feet may be signs of infection. A foot that is cold may not be getting enough blood.  · Sensations such as burning, tingling, or pins and needles can be signs of a problem. Also check for areas that may be numb.  · Hot spots are caused by friction or pressure. Look for hot spots in areas that get a lot of rubbing. Hot spots can turn into blisters, calluses, or sores.  · Cracks and sores are caused by dry or irritated skin. They are a sign that the skin is breaking down, which can lead to infection.  · Toenail problems to watch for include nails growing into the skin (ingrown toenail) and causing redness or pain. Thick, yellow, or discolored nails can signal a fungal infection.  · Drainage and odor can develop from untreated sores and ulcers. Call your doctor right away if you notice white or yellow drainage, bleeding, or unpleasant odor.   © 6281-5760 [employer]. [address] [place] [id number]. All rights reserved. This information is not intended as a substitute for professional medical care. Always follow your healthcare professional's instructions.        Step-by-Step:  Inspecting Your Feet (Diabetes)    Date Last Reviewed:  10/1/2016  © 6977-4077 [employer], [employer]. [address] [place] [address], [place] [address]. All rights reserved. This information is not intended as a substitute for professional medical care. Always follow your healthcare professional's instructions.

## 2018-05-03 ENCOUNTER — PATIENT MESSAGE (OUTPATIENT)
Dept: ENDOCRINOLOGY | Facility: CLINIC | Age: 68
End: 2018-05-03

## 2018-05-03 DIAGNOSIS — J30.2 SEASONAL ALLERGIC RHINITIS, UNSPECIFIED TRIGGER: ICD-10-CM

## 2018-05-03 RX ORDER — MONTELUKAST SODIUM 10 MG/1
10 TABLET ORAL NIGHTLY
Qty: 30 TABLET | Refills: 5 | Status: SHIPPED | OUTPATIENT
Start: 2018-05-03 | End: 2018-11-09 | Stop reason: SDUPTHER

## 2018-05-03 NOTE — TELEPHONE ENCOUNTER
Called patient who stated he is trying to set up his Accu-Chek Amanda meter with all his settings and it requires a Health Care Professional Code. Scheduled patient to come in on Fri 5/4/18 at 11:00a to discuss further with provider. Attempted to provide patient with Accu-Chek customer service number [phone] but patient stated he'll just come in to see provider.

## 2018-05-04 ENCOUNTER — PATIENT MESSAGE (OUTPATIENT)
Dept: ENDOCRINOLOGY | Facility: CLINIC | Age: 68
End: 2018-05-04

## 2018-05-04 ENCOUNTER — OFFICE VISIT (OUTPATIENT)
Dept: ENDOCRINOLOGY | Facility: CLINIC | Age: 68
End: 2018-05-04
Payer: MEDICARE

## 2018-05-04 VITALS
WEIGHT: 145.06 LBS | DIASTOLIC BLOOD PRESSURE: 66 MMHG | BODY MASS INDEX: 19.65 KG/M2 | HEIGHT: 72 IN | SYSTOLIC BLOOD PRESSURE: 125 MMHG | HEART RATE: 69 BPM

## 2018-05-04 DIAGNOSIS — R80.9 MICROALBUMINURIA: ICD-10-CM

## 2018-05-04 DIAGNOSIS — Z72.0 TOBACCO ABUSE: ICD-10-CM

## 2018-05-04 DIAGNOSIS — F10.10 ETOH ABUSE: ICD-10-CM

## 2018-05-04 DIAGNOSIS — G63 POLYNEUROPATHY ASSOCIATED WITH UNDERLYING DISEASE: ICD-10-CM

## 2018-05-04 PROCEDURE — 3008F BODY MASS INDEX DOCD: CPT | Mod: CPTII,S$GLB,, | Performed by: NURSE PRACTITIONER

## 2018-05-04 PROCEDURE — 3045F PR MOST RECENT HEMOGLOBIN A1C LEVEL 7.0-9.0%: CPT | Mod: CPTII,S$GLB,, | Performed by: NURSE PRACTITIONER

## 2018-05-04 PROCEDURE — 99999 PR PBB SHADOW E&M-EST. PATIENT-LVL IV: CPT | Mod: PBBFAC,,, | Performed by: NURSE PRACTITIONER

## 2018-05-04 PROCEDURE — 99214 OFFICE O/P EST MOD 30 MIN: CPT | Mod: S$GLB,,, | Performed by: NURSE PRACTITIONER

## 2018-05-04 NOTE — PROGRESS NOTES
CC: This 67 y.o. White male  is here for evaluation of  T2DM along with comorbidities indicated in the Visit Diagnosis section of this encounter.    HPI: Moe Ren was diagnosed with T2DM in ~ 2010.  Metformin started at time of diagnosis.     He has a history of squamos cell oral CA, Spinal stenosis, chronic pancreatitis.      Prior visit on 4/3/18  Pt has been sending in frequent glucose logs. Metformin recently stopped and repaglinide doubled d/t reports of upset stomach, bad odor, and in particular low appetite presumably d/t metformin. He has been taking novolog for carbs since stopping metformin.  Yesterday   He has lost 11 lb since lov. Feels terrible and weak.    C/o memory loss so stopped gabapentin.   Smoking ppd - up from 1 ppd to 1.5 ppd now d/t anxiety.   Was drinking a lot when he was going through his cousin's illness. He suspects he had pancreatitis over the weekend bc he was having abdominal pains. Usually has 4-5 drinks (scotch with water)  each night  - last drank on Friday night.   Struggling to get in enough calories in. Still has low appetite despite stopping metformin last week although it has improved since metformin was stopped.   He has been taking 4-8 units of novolog before meals in addition to the repaglinide with BGs ranging from 90-200s, averaging mid 100s.   Plan Stop repaglinide - unlikely to control BGs alone.   Continue Novolog and continue prior settings for ISF and ICR on Accuchek Expert meter.   ICR   12 am 1:5   1130 am 1:15   4 pm 1:12  ISF 1:50, goal 115   Test bg ac/hs. rtc in 4 weeks.     Interval history  Has changed ICR - Using 1:6 for breakfast, 1:9 for lunch and 1:12 for dinner -- which gives him less insulin for breakfast and lunch.   Now using an Accuchek Amanda meter because his Expert broke. Would like to get HCP code for the Accuchek alessia so that he can use the insulin dose calculator.   Has cut down from 5-6 scotch per day to now 2 a day. Now is working on  cutting back on smoking. Grandson just born yesterday.   a1c basically the same from 7.1 to 7.2%.     LAST DIABETES EDUCATION: 6/2016    PRESCRIBED DIABETES MEDICATIONS: Novolog     ISF 1:50, goal 115     Misses medication doses - No  Breakfast 12-13  Units, lunch 11-12 units, dinner ~ 8 units       DM COMPLICATIONS: peripheral neuropathy    SIGNIFICANT DIABETES MED HISTORY:   Tresiba gradually titrated down until it was stopped mid 2017 d/t hypoglycemia.   Novolog stopped at ov 2/5/18 and metformin and tresiba were started. tresiba dc'd about a week later d/t hypo  repaglinide started at ov 2/28/18, and stopped about a month later d/t low efficacy     SELF MONITORING BLOOD GLUCOSE: Checks blood glucose at home 4- 6x/day. Recalls BGs 60-200s but mostly in the 100s.     HYPOGLYCEMIC EPISODES: bg dropped to 60s twice in the last week presumably d/t overcounting carbs at various times.      CURRENT DIET: 3 meal/day - Eats at 10 am, 2 pm, and 6 pm. Eats about 60 to 100 carbs per meal.     CURRENT EXERCISE: none recent; previously - goes twice a week to gym - 1 hour of cardio/weights    SOCIAL: part time contract . 10 hours.  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       /66 (BP Location: Right arm, Patient Position: Sitting, BP Method: Large (Automatic))   Pulse 69   Ht 6' (1.829 m)   Wt 65.8 kg (145 lb 1 oz)   BMI 19.67 kg/m²       ROS:   CONSTITUTIONAL: Appetite good, denies fatigue  RESPIRATORY: No shortness of breath; + cough; + talbert       PHYSICAL EXAM:  GENERAL: Well developed, well nourished. No acute distress.   PSYCH: AAOx3, appropriate mood and affect, conversant, well-groomed. Judgement and insight good.   NEURO: Cranial nerves grossly intact. Speech clear, no tremor.   CHEST: Respirations even and unlabored.      Hemoglobin A1C   Date Value Ref Range Status   04/02/2018 7.2 (H) 4.0 - 5.6 % Final     Comment:      According to ADA guidelines, hemoglobin A1c <7.0% represents  optimal control in non-pregnant diabetic patients. Different  metrics may apply to specific patient populations.   Standards of Medical Care in Diabetes-2016.  For the purpose of screening for the presence of diabetes:  <5.7%     Consistent with the absence of diabetes  5.7-6.4%  Consistent with increasing risk for diabetes   (prediabetes)  >or=6.5%  Consistent with diabetes  Currently, no consensus exists for use of hemoglobin A1c  for diagnosis of diabetes for children.  This Hemoglobin A1c assay has significant interference with fetal   hemoglobin   (HbF). The results are invalid for patients with abnormal amounts of   HbF,   including those with known Hereditary Persistence   of Fetal Hemoglobin. Heterozygous hemoglobin variants (HbAS, HbAC,   HbAD, HbAE, HbA2) do not significantly interfere with this assay;   however, presence of multiple variants in a sample may impact the %   interference.     01/22/2018 7.1 (H) 4.0 - 5.6 % Final     Comment:     According to ADA guidelines, hemoglobin A1c <7.0% represents  optimal control in non-pregnant diabetic patients. Different  metrics may apply to specific patient populations.   Standards of Medical Care in Diabetes-2016.  For the purpose of screening for the presence of diabetes:  <5.7%     Consistent with the absence of diabetes  5.7-6.4%  Consistent with increasing risk for diabetes   (prediabetes)  >or=6.5%  Consistent with diabetes  Currently, no consensus exists for use of hemoglobin A1c  for diagnosis of diabetes for children.  This Hemoglobin A1c assay has significant interference with fetal   hemoglobin   (HbF). The results are invalid for patients with abnormal amounts of   HbF,   including those with known Hereditary Persistence   of Fetal Hemoglobin. Heterozygous hemoglobin variants (HbAS, HbAC,   HbAD, HbAE, HbA2) do not significantly interfere with this assay;   however, presence of multiple  variants in a sample may impact the %   interference.     07/19/2017 6.6 (H) 4.0 - 5.6 % Final     Comment:     According to ADA guidelines, hemoglobin A1c <7.0% represents  optimal control in non-pregnant diabetic patients. Different  metrics may apply to specific patient populations.   Standards of Medical Care in Diabetes-2016.  For the purpose of screening for the presence of diabetes:  <5.7%     Consistent with the absence of diabetes  5.7-6.4%  Consistent with increasing risk for diabetes   (prediabetes)  >or=6.5%  Consistent with diabetes  Currently, no consensus exists for use of hemoglobin A1c  for diagnosis of diabetes for children.  This Hemoglobin A1c assay has significant interference with fetal   hemoglobin   (HbF). The results are invalid for patients with abnormal amounts of   HbF,   including those with known Hereditary Persistence   of Fetal Hemoglobin. Heterozygous hemoglobin variants (HbAS, HbAC,   HbAD, HbAE, HbA2) do not significantly interfere with this assay;   however, presence of multiple variants in a sample may impact the %   interference.             Chemistry        Component Value Date/Time     (L) 01/03/2018 0435    K 5.2 (H) 01/03/2018 0435    CL 95 01/03/2018 0435    CO2 27 01/03/2018 0435    BUN 16 01/03/2018 0435    CREATININE 1.1 01/03/2018 0435     (H) 01/03/2018 0435        Component Value Date/Time    CALCIUM 9.4 01/03/2018 0435    ALKPHOS 86 01/01/2018 0311    AST 33 01/01/2018 0311    ALT 19 01/01/2018 0311    BILITOT 0.6 01/01/2018 0311    ESTGFRAFRICA >60 01/03/2018 0435    EGFRNONAA >60 01/03/2018 0435          Lab Results   Component Value Date    LDLCALC 82.8 01/22/2018        Ref. Range 1/22/2018 09:15   Cholesterol Latest Ref Range: 120 - 199 mg/dL 169   HDL Latest Ref Range: 40 - 75 mg/dL 65   LDL Cholesterol Latest Ref Range: 63.0 - 159.0 mg/dL 82.8   Total Cholesterol/HDL Ratio Latest Ref Range: 2.0 - 5.0  2.6   Triglycerides Latest Ref Range: 30 - 150  mg/dL 106     Lab Results   Component Value Date    MICALBCREAT 69.4 (H) 04/02/2018         STANDARDS of CARE:        ASA:               Last eye exam:       ASSESSMENT and PLAN:    A1C GOAL: < 7 %     1. Uncontrolled type 2 diabetes mellitus with complication, with long-term current use of insulin  Continue current Novolog bolus ratios.   Test blood sugar before meals and bedtime. Will try to get pt the HCP code but otherwise he can use a standard insulin dose calculator alessia.  rtc in 2 mo with labs prior.     Hemoglobin A1c    Comprehensive metabolic panel   2. ETOH abuse  Praised pt on his progress.    3. Tobacco abuse  Encouraged cessation. Declines referral to cessation program.    4. Microalbuminuria  Continue lisinopril.   5. Polyneuropathy associated with underlying disease  Improve glycemic control.            Orders Placed This Encounter   Procedures    Hemoglobin A1c     Standing Status:   Future     Standing Expiration Date:   7/3/2019    Comprehensive metabolic panel     Standing Status:   Future     Standing Expiration Date:   7/3/2019        Follow-up in about 2 months (around 7/4/2018).

## 2018-05-05 NOTE — PROGRESS NOTES
Subjective:      Patient ID: Moe Ren is a 67 y.o. male.    Chief Complaint: Diabetes Mellitus; Diabetic Foot Exam; and Nail Care    Moe is a 67 y.o. male who presents to the clinic for evaluation and treatment of high risk feet. Moe has a past medical history of Arthritis; Basal cell carcinoma (1990s); Cancer; Cataract; Diabetes mellitus; and Pancreatitis (2008).  The patient has no major complaints with feet. Chief concern is how to care for feet as a diabetic.   This patient has documented high risk feet requiring routine maintenance secondary to diabetes mellitis and those secondary complications of diabetes, as mentioned..      Chief Complaint   Patient presents with    Diabetes Mellitus    Diabetic Foot Exam    Nail Care       Current shoe gear:  [employer] tennis shoes    Hemoglobin A1C   Date Value Ref Range Status   04/02/2018 7.2 (H) 4.0 - 5.6 % Final     Comment:     According to ADA guidelines, hemoglobin A1c <7.0% represents  optimal control in non-pregnant diabetic patients. Different  metrics may apply to specific patient populations.   Standards of Medical Care in Diabetes-2016.  For the purpose of screening for the presence of diabetes:  <5.7%     Consistent with the absence of diabetes  5.7-6.4%  Consistent with increasing risk for diabetes   (prediabetes)  >or=6.5%  Consistent with diabetes  Currently, no consensus exists for use of hemoglobin A1c  for diagnosis of diabetes for children.  This Hemoglobin A1c assay has significant interference with fetal   hemoglobin   (HbF). The results are invalid for patients with abnormal amounts of   HbF,   including those with known Hereditary Persistence   of Fetal Hemoglobin. Heterozygous hemoglobin variants (HbAS, HbAC,   HbAD, HbAE, HbA2) do not significantly interfere with this assay;   however, presence of multiple variants in a sample may impact the %   interference.     01/22/2018 7.1 (H) 4.0 - 5.6 % Final     Comment:     According to ADA  guidelines, hemoglobin A1c <7.0% represents  optimal control in non-pregnant diabetic patients. Different  metrics may apply to specific patient populations.   Standards of Medical Care in Diabetes-2016.  For the purpose of screening for the presence of diabetes:  <5.7%     Consistent with the absence of diabetes  5.7-6.4%  Consistent with increasing risk for diabetes   (prediabetes)  >or=6.5%  Consistent with diabetes  Currently, no consensus exists for use of hemoglobin A1c  for diagnosis of diabetes for children.  This Hemoglobin A1c assay has significant interference with fetal   hemoglobin   (HbF). The results are invalid for patients with abnormal amounts of   HbF,   including those with known Hereditary Persistence   of Fetal Hemoglobin. Heterozygous hemoglobin variants (HbAS, HbAC,   HbAD, HbAE, HbA2) do not significantly interfere with this assay;   however, presence of multiple variants in a sample may impact the %   interference.     07/19/2017 6.6 (H) 4.0 - 5.6 % Final     Comment:     According to ADA guidelines, hemoglobin A1c <7.0% represents  optimal control in non-pregnant diabetic patients. Different  metrics may apply to specific patient populations.   Standards of Medical Care in Diabetes-2016.  For the purpose of screening for the presence of diabetes:  <5.7%     Consistent with the absence of diabetes  5.7-6.4%  Consistent with increasing risk for diabetes   (prediabetes)  >or=6.5%  Consistent with diabetes  Currently, no consensus exists for use of hemoglobin A1c  for diagnosis of diabetes for children.  This Hemoglobin A1c assay has significant interference with fetal   hemoglobin   (HbF). The results are invalid for patients with abnormal amounts of   HbF,   including those with known Hereditary Persistence   of Fetal Hemoglobin. Heterozygous hemoglobin variants (HbAS, HbAC,   HbAD, HbAE, HbA2) do not significantly interfere with this assay;   however, presence of multiple variants in a  sample may impact the %   interference.         Patient Active Problem List   Diagnosis    Anxiety disorder    Alcohol use disorder    Continuous tobacco abuse    Hypertriglyceridemia    DM type 2 with diabetic peripheral neuropathy    Tubular adenoma of colon    Spondylosis without myelopathy    Degeneration of lumbar or lumbosacral intervertebral disc    Spinal stenosis, lumbar region, with neurogenic claudication    Facet arthritis of lumbar region    Small fiber neuropathy    Chronic back pain greater than 3 months duration    Deviated nasal septum    Special screening for malignant neoplasms, colon    Chronic narcotic use    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 30 tablet 0    atorvastatin (LIPITOR) 20 MG tablet Take 1 tablet (20 mg total) by mouth once daily. 90 tablet 1    baclofen (LIORESAL) 10 MG tablet Take 1 tablet (10 mg total) by mouth 2 (two) times daily. 60 tablet 5    blood sugar diagnostic (ACCU-CHEK KM PLUS TEST STRP) Strp 1 strip by Misc.(Non-Drug; Combo Route) route 6 (six) times daily. To be used with Accu-Chek Km Plus glucometer 600 strip 3    blood-glucose meter kit Use as instructed, Accu-Chek Km Plus glucometer 1 each 0    busPIRone (BUSPAR) 15 MG tablet Take 2 tablets (30 mg total) by mouth 2 (two) times daily. Substitute for dose of xanax. 120 tablet 11    desoximetasone (TOPICORT) 0.25 % cream Apply topically 2 (two) times daily as needed.       escitalopram oxalate (LEXAPRO) 20 MG tablet Take 1 tablet (20 mg total) by mouth once  "daily. 30 tablet 11    fluticasone (FLONASE) 50 mcg/actuation nasal spray 2 sprays by Each Nare route once daily.      hydrocodone-acetaminophen 10-325mg (NORCO)  mg Tab Take 1 tablet by mouth every 24 hours as needed for Pain. 30 tablet 0    insulin aspart U-100 (NOVOLOG) 100 unit/mL InPn pen Inject prior to meals. Requires max 50 units/day. 3 Box 1    ipratropium (ATROVENT HFA) 17 mcg/actuation inhaler Inhale 2 puffs into the lungs every 6 (six) hours. Rescue 12.9 g 5    lancets (ACCU-CHEK FASTCLIX) Misc 1 lancet by Misc.(Non-Drug; Combo Route) route 6 (six) times daily. To be used with Accu-Chek Amanda Plus glucometer 600 each 3    lisinopril (PRINIVIL,ZESTRIL) 5 MG tablet Take 1 tablet (5 mg total) by mouth once daily. 30 tablet 6    pen needle, diabetic (BD ULTRA-FINE RENETTA PEN NEEDLE) 32 gauge x 5/32" Ndle Use 3x/day with Novolog 300 each 3     No current facility-administered medications on file prior to visit.        Review of patient's allergies indicates:   Allergen Reactions    Amoxicillin Nausea And Vomiting    Adhesive Rash     Paper tape only     Past Surgical History:   Procedure Laterality Date     thumb surgery      Dead Skin cell tumor Rt thumb    COLONOSCOPY N/A 4/11/2017    Procedure: COLONOSCOPY;  Surgeon: Meet Quesada MD;  Location: [facility];  Service: Endoscopy;  Laterality: N/A;    MOUTH FLOOR MASS EXCISION  2009    NASAL SEPTUM SURGERY      SKIN SURGERY       Family History   Problem Relation Age of Onset    Cataracts Mother     Leukemia Mother     Lung cancer Brother     Mental illness Father     No Known Problems Daughter     No Known Problems Sister     Mental illness Son      Social History     Social History    Marital status:      Spouse name: N/A    Number of children: N/A    Years of education: N/A     Occupational History    , self employed      Social History Main Topics    Smoking status: Current Every Day Smoker     " Packs/day: 1.50     Years: 45.00     Types: Cigarettes    Smokeless tobacco: Never Used    Alcohol use 0.5 oz/week     1 Standard drinks or equivalent per week      Comment: daily scotch and water, half pint    Drug use: No      Comment: rarely    Sexual activity: Yes     Partners: Female     Other Topics Concern    Not on file     Social History Narrative    , 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  One child committed suicide.       Review of Systems   Constitution: Negative for chills, diaphoresis, fever, malaise/fatigue and night sweats.   Cardiovascular: Negative for claudication, cyanosis, leg swelling and syncope.   Respiratory: Negative for shortness of breath.    Hematologic/Lymphatic: Does not bruise/bleed easily.   Skin: Negative for color change, dry skin, nail changes, rash, suspicious lesions and unusual hair distribution.   Musculoskeletal: Positive for arthritis, back pain, joint pain and muscle cramps. Negative for falls, joint swelling, muscle weakness and stiffness.   Gastrointestinal: Negative for constipation, diarrhea, nausea and vomiting.   Neurological: Positive for numbness, paresthesias and sensory change. Negative for brief paralysis, disturbances in coordination, focal weakness and tremors.   Psychiatric/Behavioral: Negative for altered mental status and hallucinations. The patient is not nervous/anxious.           Objective:     Vitals:    05/02/18 1439   BP: 138/76   Weight: 66.2 kg (146 lb)   Height: 6' (1.829 m)   PainSc: 0-No pain       Physical Exam   Constitutional: He appears well-developed and well-nourished. He is cooperative. No distress.   Cardiovascular:   Pulses:       Dorsalis pedis pulses are 1+ on the right side, and 1+ on the left side.        Posterior tibial pulses are 1+ on the right side, and 1+ on the left side.   Capillary refill 3-5 seconds all toes/distal feet, all toes/both feet warm  to touch.      varicosities noted   Musculoskeletal:        Right ankle: Normal. No tenderness. Achilles tendon normal.        Left ankle: Normal. No tenderness. Achilles tendon normal.        Left foot: There is tenderness (1st MTPJ with pain with ROM and +tinels sign).   Patient has bilateral heel varus. There is a bilateral anterior cavus foot deformity Gait analysis reveals an early heel off with the fore foot bilateral striking longer in midstance. Patient shoes demonstrated medial heel counter wear bilateral.      All ten toes without clubbing, cyanosis, or signs of ischemia.      Patient has hammertoes of digits    2-5 bilateral               partially reducible without symptom today.  Range of motion, stability, muscle strength, and muscle tone normal bilateral feet and legs.    Decreased first MPJ range of motion both weightbearing and nonweightbearing, no crepitus observed the first MP joint, +dorsal flag sign.Mild  bunion deformity is observed .      Lymphadenopathy:   Negative lymphadenopathy bilateral popliteal fossa and tarsal tunnel.   Neurological: He is alert. He has normal strength. He displays no atrophy and no tremor. No sensory deficit. He exhibits normal muscle tone. He displays no seizure activity. Gait (decreased stride length, early heel of, moderate toe off bilateral) abnormal. Coordination normal.   Reflex Scores:       Patellar reflexes are 1+ on the right side and 1+ on the left side.       Achilles reflexes are 1+ on the right side and 1+ on the left side.  [place] 5.07 monofilament is intact bilateral feet. Sharp/dull sensation is also intact Bilateral feet.    Paresthesias, and hyperesthesia bilateral feet at toes with no clearly identified trigger or source.       Skin: No bruising noted. No cyanosis. Nails show no clubbing.   Skin is normal age and health appropriate color, turgor, texture, and temperature bilateral lower extremities without ulceration, hyperpigmentation,  discoloration, masses nodules or cords palpated.  No ecchymosis, erythema, edema, or cardinal signs of infection bilateral lower extremities.       Assessment:       Encounter Diagnoses   Name Primary?    Type II diabetes mellitus with neurological manifestations Yes    Hallux limitus, left     Hammer toes of both feet     Acquired bilateral pes cavus          Plan:       Moe was seen today for diabetes mellitus, diabetic foot exam and nail care.    Diagnoses and all orders for this visit:    Type II diabetes mellitus with neurological manifestations  -     DIABETIC SHOES FOR HOME USE    Hallux limitus, left  -     DIABETIC SHOES FOR HOME USE    Hammer toes of both feet  -     DIABETIC SHOES FOR HOME USE    Acquired bilateral pes cavus  -     DIABETIC SHOES FOR HOME USE      I counseled the patient on his conditions, their implications and medical management.     Shoe inspection. Diabetic Foot Education. Patient reminded of the importance of good nutrition and blood sugar control to help prevent podiatric complications of diabetes. Patient instructed on proper foot hygeine. We discussed wearing proper shoe gear, daily foot inspections, never walking without protective shoe gear, never putting sharp instruments to feet.     Neuro Exam in office was otherwise normal however subjective sensation loss andd paresthesia likely to sensory neuropathy    I did  the patient in detail regarding surgical and conservative treatment measures for hallux limitus. I informed the  patient that the majority of pain is secondary to an arthritic joint with decreased joint spaces. Informed patient that outside of surgical intervention the main goal of therapy is to decreased the  range of motion at the first MPJ joint. This can be done so by utilizing either and extremely hard soled nonflexible shoe or a rocker bottom shoe such as a [employer] shape up     Rx diabetic shoes for protection and support    He will continue to  monitor the areas daily, inspect his feet, wear protective shoe gear when ambulatory, moisturizer to maintain skin integrity and follow in this office in approximately 3-6 months, sooner p.r.n. Patient prefers yearly visits

## 2018-05-14 ENCOUNTER — PATIENT MESSAGE (OUTPATIENT)
Dept: FAMILY MEDICINE | Facility: CLINIC | Age: 68
End: 2018-05-14

## 2018-05-14 DIAGNOSIS — M51.36 DEGENERATIVE DISC DISEASE, LUMBAR: ICD-10-CM

## 2018-05-14 DIAGNOSIS — G89.29 CHRONIC BACK PAIN GREATER THAN 3 MONTHS DURATION: ICD-10-CM

## 2018-05-14 DIAGNOSIS — M54.9 CHRONIC BACK PAIN GREATER THAN 3 MONTHS DURATION: ICD-10-CM

## 2018-05-14 DIAGNOSIS — F41.9 ANXIETY DISORDER, UNSPECIFIED TYPE: ICD-10-CM

## 2018-05-14 DIAGNOSIS — M47.816 FACET ARTHRITIS OF LUMBAR REGION: ICD-10-CM

## 2018-05-14 RX ORDER — ALPRAZOLAM 0.5 MG/1
0.5 TABLET ORAL DAILY PRN
Qty: 30 TABLET | Refills: 0 | Status: SHIPPED | OUTPATIENT
Start: 2018-05-14 | End: 2018-06-11 | Stop reason: SDUPTHER

## 2018-05-14 RX ORDER — HYDROCODONE BITARTRATE AND ACETAMINOPHEN 10; 325 MG/1; MG/1
1 TABLET ORAL
Qty: 30 TABLET | Refills: 0 | Status: SHIPPED | OUTPATIENT
Start: 2018-05-14 | End: 2018-06-11 | Stop reason: SDUPTHER

## 2018-05-15 NOTE — PROGRESS NOTES
This note was created by combination of typed  and Dragon dictation.  Transcription errors may be present.  If there are any questions, please contact me.    Assessment & Plan:   Abdominal pain, unspecified abdominal location - with a hx of pancreatitis. Has cut down on EtOH but last visit had noted increased intake, of late he notes 2 daily but none since symptoms started. Tolerating PO.  Recently refilled pain meds.  Check labs.  If labs are significantly aberrant he may need to go to ER but BP is good, no fever, abd exam without peritoneal signs.  Don't think this is cardiac or pulmonary.  -     Comprehensive metabolic panel; Future; Expected date: 05/16/2018  -     CBC auto differential; Future; Expected date: 05/16/2018  -     Amylase; Future; Expected date: 05/16/2018  -     Lipase; Future; Expected date: 05/16/2018  -     Cancel: Lactate dehydrogenase; Future; Expected date: 05/16/2018  -     Lactate dehydrogenase; Future; Expected date: 05/16/2018    Anxiety disorder, unspecified type - anhedonia, possibly from overmedication?  Vs. Uncontrolled depression. Trial of decreased dose of lexapro 10 mg (1/2 tablet of 20).  If worse - may need to change SSRIs.  -     escitalopram oxalate (LEXAPRO) 20 MG tablet; Take 0.5 tablets (10 mg total) by mouth once daily.  Dispense: 15 tablet; Refill: 11    Irregular heartbeat - PACs. Not c/w AFib on EKG.   -     EKG 12-lead    Medications Discontinued During This Encounter   Medication Reason    escitalopram oxalate (LEXAPRO) 20 MG tablet Reorder     Modified Medications    Modified Medication Previous Medication    ESCITALOPRAM OXALATE (LEXAPRO) 20 MG TABLET escitalopram oxalate (LEXAPRO) 20 MG tablet       Take 0.5 tablets (10 mg total) by mouth once daily.    Take 1 tablet (20 mg total) by mouth once daily.     New Prescriptions    No medications on file       Follow Up: No Follow-up on file.        Subjective:     Chief Complaint   Patient presents with     "Abdominal Pain       HPI  Moe is a 67 y.o. male, last appointment with this clinic was 4/4/2018.    DM2 with neuropathy, followed by endo; gabapentin with SE of memory loss and ? Tremor so stopped; metformin with SE.  HTN  hyperlipidemia  Smoker, contemplative stage of quitting.  He is not interested in smoking cessation classes.  COPD, 12/2017 PFTs showing mild airflow obstruction, air trapping and decreased DLCO.  No desaturation.  Spinal stenosis on chronic narcotic therapy, 7/2014 MRI L spine with lumbar spondylosis with mod/severe right neural foraminal stenossi L4-5; significant degenerative disk disease, facet joint arthropathy L4-5, L5-S1; hx of injections; had been seen by pain mgmt 2015 but discharged for THC. hydrocodone 10/325 BID. Takes by his estimate 1.5 tablets a day mainly in the evening.   Hx of Aleve had to stop after surgery for deviated septum. Now he notes various other joints that hurt and are helped by the narcotic therapy. Worse with prolonged standing and walking.  He had talked to neurosurgery, 2015, they had recommended surgery and he is very wary of surgery.  Goal is to try to reduce intake.  Pain contract signed 1/2018  Anxiety, Family history of suicide, father committed suicide and his son did as well. alprazolam 0.5 mg TID.  Typically takes 2 at night, maybe 1 in the morning.  8/2/2017, started on zoloft.  Zoloft SE of hypoglycemia/ shakiness so changed to Lexapro. xanax TID and ultimately goal is wean off. He was going to xanax to 1 tablet at night.  Notes he had a "hard father".  Alcohol use. Has been to Alcoholics Anonymous and has quit before.  Did not really find Alcoholics Anonymous to be that helpful.  Feels like he needs to get himself in his own mind set in order to quit and is not adjusted and further referral at this time.last OV he promised me he would cut down to 3 drinks a day.  Colon polyps, last colonoscopy 2009.  Referred in March for colonoscopy.  4/11/2017 " colonoscopy with transverse tubular adenoma  Schatzki ring dilitation; followed by [facility].  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 [facility] to be due to EtOH.    Recently seen by endo. Plan to continue current Novolog at current bolus ratios.    abd pain worse with meals, tums without relief.  Has been taking narcotic for the pain.  It's across the abdomen.  Reminiscent of pancreatic pain in the past.  Getting acid reflux sensation, even with medications. BMs are solid/firm. No blood grossly.  Duration about 10 days.  Compared to inception, seems to be worsening. Overall feeling weak and body aching.  Breathing is fair, using inhaler a bit more often.     Had cut down the alcohol to 2 a day but in the past week no alcohol.    Mood is a little low.  He thinks the lexapro may be blunting his emotions.     Patient Care Team:  Damian Ramon MD as PCP - General (Internal Medicine)  Kandice Bonilla RD as Dietitian (Diabetes)  Josias Ling MD as Consulting Physician (Neurosurgery)  Amie De Anda MD as Consulting Physician (Otolaryngology)    Patient Active Problem List    Diagnosis Date Noted    Chronic seasonal allergic rhinitis 03/01/2018    Closed fracture of multiple ribs of left side 01/01/2018    Alcohol-induced chronic pancreatitis 01/01/2018    Abdominal aortic atherosclerosis 11/14/2017     On CXR 4/5/2016 and on 11/2/2012 XR L spine  On AAA screening 3/2018      Chronic obstructive bronchitis with pulmonary emphysema 11/14/2017 12/2017 PFTs showed mild airflow obstruction.  Lung volume showing air trapping.  DLCO reduced. no O2 desat.  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    Chronic narcotic use 08/02/2017    Special screening for malignant neoplasms, colon 04/11/2017    Deviated nasal septum 04/19/2016    Chronic back pain greater than 3 months duration 01/06/2016    Small fiber neuropathy 07/29/2015     Likely related to alcohol use       Facet arthritis of lumbar region 10/08/2014    Spondylosis without myelopathy 08/13/2014    Degeneration of lumbar or lumbosacral intervertebral disc 08/13/2014    Spinal stenosis, lumbar region, with neurogenic claudication 08/13/2014    Tubular adenoma of colon 04/22/2014 4/11/2017 colonoscopy with transverse tubular adenoma      DM type 2 with diabetic peripheral neuropathy 07/24/2013    Hypertriglyceridemia 06/13/2013    Anxiety disorder 06/12/2013     Trazodone ineffective.     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    Cataract     Diabetes mellitus     Pancreatitis 2008    2 Times       PAST SURGICAL HISTORY:  Past Surgical History:   Procedure Laterality Date     thumb surgery      Dead Skin cell tumor Rt thumb    COLONOSCOPY N/A 4/11/2017    Procedure: COLONOSCOPY;  Surgeon: Meet Queasda MD;  Location[facility];  Service: Endoscopy;  Laterality: N/A;    MOUTH FLOOR MASS EXCISION  2009    NASAL SEPTUM SURGERY      SKIN SURGERY         SOCIAL HISTORY:  Social History     Social History    Marital status:      Spouse name: N/A    Number of children: N/A    Years of education: N/A     Occupational History    , self employed      Social History Main Topics    Smoking status: Current Every Day Smoker     Packs/day: 1.50     Years: 45.00     Types: Cigarettes    Smokeless tobacco: Never Used    Alcohol use 0.5 oz/week     1 Standard drinks or equivalent per week      Comment: daily scotch and water, half pint    Drug use: No      Comment: rarely    Sexual activity: Yes     Partners: Female     Other Topics Concern    Not on file     Social History Narrative    , specialized in underwater robotics.Self employed.No physical  activity. with two kids.  One child committed suicide.       ALLERGIES AND MEDICATIONS: updated and reviewed.  Review of patient's allergies indicates:   Allergen Reactions    Amoxicillin Nausea And Vomiting    Adhesive Rash     Paper tape only    Metformin Diarrhea     Appetite loss      Current Outpatient Prescriptions   Medication Sig Dispense Refill    ALPRAZolam (XANAX) 0.5 MG tablet Take 1 tablet (0.5 mg total) by mouth daily as needed for Anxiety. 30 tablet 0    atorvastatin (LIPITOR) 20 MG tablet Take 1 tablet (20 mg total) by mouth once daily. 90 tablet 1    baclofen (LIORESAL) 10 MG tablet Take 1 tablet (10 mg total) by mouth 2 (two) times daily. 60 tablet 5    blood sugar diagnostic (ACCU-CHEK KM PLUS TEST STRP) Strp 1 strip by Misc.(Non-Drug; Combo Route) route 6 (six) times daily. To be used with Accu-Chek Km Plus glucometer 600 strip 3    blood-glucose meter kit Use as instructed, Accu-Chek Km Plus glucometer 1 each 0    busPIRone (BUSPAR) 15 MG tablet Take 2 tablets (30 mg total) by mouth 2 (two) times daily. Substitute for dose of xanax. 120 tablet 11    desoximetasone (TOPICORT) 0.25 % cream Apply topically 2 (two) times daily as needed.       escitalopram oxalate (LEXAPRO) 20 MG tablet Take 1 tablet (20 mg total) by mouth once daily. 30 tablet 11    fluticasone (FLONASE) 50 mcg/actuation nasal spray 2 sprays by Each Nare route once daily.      hydrocodone-acetaminophen 10-325mg (NORCO)  mg Tab Take 1 tablet by mouth every 24 hours as needed for Pain. 30 tablet 0    insulin aspart (NOVOLOG FLEXPEN U-100 INSULIN SUBQ)       insulin aspart U-100 (NOVOLOG) 100 unit/mL InPn pen Inject prior to meals. Requires max 50 units/day. 3 Box 1    ipratropium (ATROVENT HFA) 17 mcg/actuation inhaler Inhale 2 puffs into the lungs every 6 (six) hours. Rescue 12.9 g 5    lancets (ACCU-CHEK FASTCLIX) Misc 1 lancet by Misc.(Non-Drug; Combo Route) route 6 (six) times daily. To be  "used with Accu-Chek Amanda Plus glucometer 600 each 3    lisinopril (PRINIVIL,ZESTRIL) 5 MG tablet Take 1 tablet (5 mg total) by mouth once daily. 30 tablet 6    montelukast (SINGULAIR) 10 mg tablet Take 1 tablet (10 mg total) by mouth every evening. 30 tablet 5    pen needle, diabetic (BD ULTRA-FINE RENETTA PEN NEEDLE) 32 gauge x 5/32" Ndle Use 3x/day with Novolog 300 each 3     No current facility-administered medications for this visit.        Review of Systems   Constitutional: Positive for malaise/fatigue. Negative for chills and fever.   Respiratory: Positive for shortness of breath. Negative for cough.    Cardiovascular: Negative for chest pain, palpitations, orthopnea and claudication.   Gastrointestinal: Positive for abdominal pain and heartburn. Negative for blood in stool, diarrhea and melena.   Genitourinary: Negative for dysuria.   Neurological: Positive for weakness.   Psychiatric/Behavioral: Positive for depression.       Objective:   Physical Exam   Vitals:    05/16/18 0844   BP: 130/70   Pulse: 84   Temp: 98.1 °F (36.7 °C)   SpO2: 98%   Weight: 65.3 kg (143 lb 15.4 oz)   Height: 6' (1.829 m)    Body mass index is 19.52 kg/m².  Weight: 65.3 kg (143 lb 15.4 oz)   Height: 6' (182.9 cm)     Physical Exam   Constitutional: He is oriented to person, place, and time. He appears well-developed and well-nourished. No distress.   Eyes: EOM are normal.   Cardiovascular: Normal rate and normal heart sounds.    No murmur heard.  Irregularly irregular S1S2   Pulmonary/Chest: Effort normal and breath sounds normal.   Abdominal:   abd S+S epigastric discomfort without mass, guarding, involuntary rebound.  Vides's sign negative. Somewhat scaphoid abd   Musculoskeletal: Normal range of motion. He exhibits no edema.   Neurological: He is alert and oriented to person, place, and time. Coordination normal.   Skin: Skin is warm and dry.   Psychiatric: He has a normal mood and affect. His behavior is normal. Thought " content normal.     EKG sinus, with PACs.

## 2018-05-16 ENCOUNTER — HOSPITAL ENCOUNTER (EMERGENCY)
Facility: HOSPITAL | Age: 68
Discharge: HOME | End: 2018-05-17
Attending: EMERGENCY MEDICINE
Payer: MEDICARE

## 2018-05-16 ENCOUNTER — OFFICE VISIT (OUTPATIENT)
Dept: FAMILY MEDICINE | Facility: CLINIC | Age: 68
End: 2018-05-16
Payer: MEDICARE

## 2018-05-16 VITALS
BODY MASS INDEX: 19.5 KG/M2 | DIASTOLIC BLOOD PRESSURE: 70 MMHG | OXYGEN SATURATION: 98 % | WEIGHT: 143.94 LBS | SYSTOLIC BLOOD PRESSURE: 130 MMHG | TEMPERATURE: 98 F | HEIGHT: 72 IN | HEART RATE: 84 BPM

## 2018-05-16 DIAGNOSIS — R11.2 NON-INTRACTABLE VOMITING WITH NAUSEA, UNSPECIFIED VOMITING TYPE: ICD-10-CM

## 2018-05-16 DIAGNOSIS — K59.00 CONSTIPATION, UNSPECIFIED CONSTIPATION TYPE: ICD-10-CM

## 2018-05-16 DIAGNOSIS — I49.9 IRREGULAR HEARTBEAT: ICD-10-CM

## 2018-05-16 DIAGNOSIS — R10.9 ABDOMINAL PAIN, UNSPECIFIED ABDOMINAL LOCATION: Primary | ICD-10-CM

## 2018-05-16 DIAGNOSIS — F41.9 ANXIETY DISORDER, UNSPECIFIED TYPE: ICD-10-CM

## 2018-05-16 DIAGNOSIS — N20.0 RENAL STONE: ICD-10-CM

## 2018-05-16 DIAGNOSIS — K80.20 CALCULUS OF GALLBLADDER WITHOUT CHOLECYSTITIS WITHOUT OBSTRUCTION: Primary | ICD-10-CM

## 2018-05-16 DIAGNOSIS — J18.9 PNEUMONIA DUE TO INFECTIOUS ORGANISM, UNSPECIFIED LATERALITY, UNSPECIFIED PART OF LUNG: ICD-10-CM

## 2018-05-16 DIAGNOSIS — R10.9 ABDOMINAL PAIN: ICD-10-CM

## 2018-05-16 DIAGNOSIS — K29.70 GASTRITIS, PRESENCE OF BLEEDING UNSPECIFIED, UNSPECIFIED CHRONICITY, UNSPECIFIED GASTRITIS TYPE: Primary | ICD-10-CM

## 2018-05-16 LAB
ALBUMIN SERPL BCP-MCNC: 3.5 G/DL
ALP SERPL-CCNC: 71 U/L
ALT SERPL W/O P-5'-P-CCNC: 25 U/L
ANION GAP SERPL CALC-SCNC: 13 MMOL/L
AST SERPL-CCNC: 32 U/L
BACTERIA #/AREA URNS HPF: NORMAL /HPF
BASOPHILS # BLD AUTO: 0.12 K/UL
BASOPHILS NFR BLD: 1 %
BILIRUB SERPL-MCNC: 0.3 MG/DL
BILIRUB UR QL STRIP: NEGATIVE
BUN SERPL-MCNC: 23 MG/DL
CALCIUM SERPL-MCNC: 10.7 MG/DL
CHLORIDE SERPL-SCNC: 94 MMOL/L
CLARITY UR: CLEAR
CO2 SERPL-SCNC: 26 MMOL/L
COLOR UR: YELLOW
CREAT SERPL-MCNC: 1.3 MG/DL
DIFFERENTIAL METHOD: ABNORMAL
EOSINOPHIL # BLD AUTO: 0.7 K/UL
EOSINOPHIL NFR BLD: 5.8 %
ERYTHROCYTE [DISTWIDTH] IN BLOOD BY AUTOMATED COUNT: 13.3 %
EST. GFR  (AFRICAN AMERICAN): >60 ML/MIN/1.73 M^2
EST. GFR  (NON AFRICAN AMERICAN): 57 ML/MIN/1.73 M^2
GLUCOSE SERPL-MCNC: 161 MG/DL
GLUCOSE UR QL STRIP: NEGATIVE
HCT VFR BLD AUTO: 39.5 %
HGB BLD-MCNC: 13.5 G/DL
HGB UR QL STRIP: NEGATIVE
KETONES UR QL STRIP: ABNORMAL
LEUKOCYTE ESTERASE UR QL STRIP: NEGATIVE
LIPASE SERPL-CCNC: 20 U/L
LYMPHOCYTES # BLD AUTO: 2.9 K/UL
LYMPHOCYTES NFR BLD: 23.4 %
MCH RBC QN AUTO: 32.6 PG
MCHC RBC AUTO-ENTMCNC: 34.2 G/DL
MCV RBC AUTO: 95 FL
MICROSCOPIC COMMENT: NORMAL
MONOCYTES # BLD AUTO: 1.4 K/UL
MONOCYTES NFR BLD: 11.8 %
NEUTROPHILS # BLD AUTO: 7.1 K/UL
NEUTROPHILS NFR BLD: 57.8 %
NITRITE UR QL STRIP: NEGATIVE
PH UR STRIP: 6 [PH] (ref 5–8)
PLATELET # BLD AUTO: 340 K/UL
PMV BLD AUTO: 10.2 FL
POTASSIUM SERPL-SCNC: 4.7 MMOL/L
PROT SERPL-MCNC: 6.5 G/DL
PROT UR QL STRIP: NEGATIVE
RBC # BLD AUTO: 4.14 M/UL
RBC #/AREA URNS HPF: 0 /HPF (ref 0–4)
SODIUM SERPL-SCNC: 133 MMOL/L
SP GR UR STRIP: 1.01 (ref 1–1.03)
URN SPEC COLLECT METH UR: ABNORMAL
UROBILINOGEN UR STRIP-ACNC: NEGATIVE EU/DL
WBC # BLD AUTO: 12.22 K/UL
WBC #/AREA URNS HPF: 3 /HPF (ref 0–5)

## 2018-05-16 PROCEDURE — 83690 ASSAY OF LIPASE: CPT | Mod: 91

## 2018-05-16 PROCEDURE — 96361 HYDRATE IV INFUSION ADD-ON: CPT

## 2018-05-16 PROCEDURE — 80053 COMPREHEN METABOLIC PANEL: CPT | Mod: 91

## 2018-05-16 PROCEDURE — 96375 TX/PRO/DX INJ NEW DRUG ADDON: CPT

## 2018-05-16 PROCEDURE — 99284 EMERGENCY DEPT VISIT MOD MDM: CPT | Mod: 25

## 2018-05-16 PROCEDURE — 63600175 PHARM REV CODE 636 W HCPCS: Performed by: EMERGENCY MEDICINE

## 2018-05-16 PROCEDURE — 25000003 PHARM REV CODE 250: Performed by: EMERGENCY MEDICINE

## 2018-05-16 PROCEDURE — 85025 COMPLETE CBC W/AUTO DIFF WBC: CPT | Mod: 91

## 2018-05-16 PROCEDURE — 96374 THER/PROPH/DIAG INJ IV PUSH: CPT

## 2018-05-16 PROCEDURE — 3008F BODY MASS INDEX DOCD: CPT | Mod: CPTII,S$GLB,, | Performed by: INTERNAL MEDICINE

## 2018-05-16 PROCEDURE — 99214 OFFICE O/P EST MOD 30 MIN: CPT | Mod: S$GLB,,, | Performed by: INTERNAL MEDICINE

## 2018-05-16 PROCEDURE — 99999 PR PBB SHADOW E&M-EST. PATIENT-LVL III: CPT | Mod: PBBFAC,,, | Performed by: INTERNAL MEDICINE

## 2018-05-16 PROCEDURE — 81000 URINALYSIS NONAUTO W/SCOPE: CPT

## 2018-05-16 PROCEDURE — 93010 ELECTROCARDIOGRAM REPORT: CPT | Mod: S$GLB,,, | Performed by: INTERNAL MEDICINE

## 2018-05-16 PROCEDURE — 93005 ELECTROCARDIOGRAM TRACING: CPT | Mod: S$GLB,,, | Performed by: INTERNAL MEDICINE

## 2018-05-16 RX ORDER — ESCITALOPRAM OXALATE 20 MG/1
10 TABLET ORAL DAILY
Qty: 15 TABLET | Refills: 11
Start: 2018-05-16 | End: 2019-01-10 | Stop reason: SDUPTHER

## 2018-05-16 RX ORDER — HYDROCODONE BITARTRATE AND ACETAMINOPHEN 5; 325 MG/1; MG/1
1 TABLET ORAL
Status: COMPLETED | OUTPATIENT
Start: 2018-05-17 | End: 2018-05-16

## 2018-05-16 RX ORDER — SUCRALFATE 1 G/1
1 TABLET ORAL
Qty: 30 TABLET | Refills: 1 | Status: SHIPPED | OUTPATIENT
Start: 2018-05-16 | End: 2018-07-10

## 2018-05-16 RX ORDER — ONDANSETRON 4 MG/1
4 TABLET, FILM COATED ORAL EVERY 6 HOURS
Qty: 12 TABLET | Refills: 0 | Status: SHIPPED | OUTPATIENT
Start: 2018-05-16 | End: 2018-11-28

## 2018-05-16 RX ORDER — DOCUSATE SODIUM 100 MG/1
100 CAPSULE, LIQUID FILLED ORAL 2 TIMES DAILY
Qty: 20 CAPSULE | Refills: 0 | Status: SHIPPED | OUTPATIENT
Start: 2018-05-16 | End: 2018-05-26

## 2018-05-16 RX ORDER — OMEPRAZOLE 40 MG/1
40 CAPSULE, DELAYED RELEASE ORAL DAILY
Qty: 30 CAPSULE | Refills: 11 | Status: ON HOLD | OUTPATIENT
Start: 2018-05-16 | End: 2018-06-18

## 2018-05-16 RX ORDER — MORPHINE SULFATE 10 MG/ML
2 INJECTION INTRAMUSCULAR; INTRAVENOUS; SUBCUTANEOUS
Status: DISCONTINUED | OUTPATIENT
Start: 2018-05-16 | End: 2018-05-16

## 2018-05-16 RX ORDER — ONDANSETRON 2 MG/ML
4 INJECTION INTRAMUSCULAR; INTRAVENOUS
Status: COMPLETED | OUTPATIENT
Start: 2018-05-16 | End: 2018-05-16

## 2018-05-16 RX ORDER — ASPIRIN 81 MG/1
81 TABLET ORAL DAILY
COMMUNITY
End: 2018-11-28 | Stop reason: ALTCHOICE

## 2018-05-16 RX ORDER — AZITHROMYCIN 250 MG/1
250 TABLET, FILM COATED ORAL DAILY
Qty: 6 TABLET | Refills: 0 | Status: SHIPPED | OUTPATIENT
Start: 2018-05-16 | End: 2018-07-10 | Stop reason: ALTCHOICE

## 2018-05-16 RX ORDER — MORPHINE SULFATE 10 MG/ML
4 INJECTION INTRAMUSCULAR; INTRAVENOUS; SUBCUTANEOUS
Status: COMPLETED | OUTPATIENT
Start: 2018-05-16 | End: 2018-05-16

## 2018-05-16 RX ORDER — HYDROMORPHONE HYDROCHLORIDE 1 MG/ML
0.5 INJECTION, SOLUTION INTRAMUSCULAR; INTRAVENOUS; SUBCUTANEOUS
Status: DISCONTINUED | OUTPATIENT
Start: 2018-05-16 | End: 2018-05-16

## 2018-05-16 RX ADMIN — ONDANSETRON 4 MG: 2 INJECTION INTRAMUSCULAR; INTRAVENOUS at 09:05

## 2018-05-16 RX ADMIN — HYDROCODONE BITARTRATE AND ACETAMINOPHEN 1 TABLET: 5; 325 TABLET ORAL at 11:05

## 2018-05-16 RX ADMIN — MORPHINE SULFATE 4 MG: 10 INJECTION INTRAVENOUS at 09:05

## 2018-05-16 RX ADMIN — SODIUM CHLORIDE 1000 ML: 0.9 INJECTION, SOLUTION INTRAVENOUS at 10:05

## 2018-05-17 VITALS
RESPIRATION RATE: 18 BRPM | HEART RATE: 60 BPM | WEIGHT: 143 LBS | BODY MASS INDEX: 19.37 KG/M2 | HEIGHT: 72 IN | OXYGEN SATURATION: 99 % | SYSTOLIC BLOOD PRESSURE: 177 MMHG | TEMPERATURE: 99 F | DIASTOLIC BLOOD PRESSURE: 81 MMHG

## 2018-05-17 PROCEDURE — 25000003 PHARM REV CODE 250: Performed by: EMERGENCY MEDICINE

## 2018-05-17 NOTE — ED TRIAGE NOTES
Pt arrived to ED via personal transport with c/o right sided abd pain that he describes as sharp, and loss of appetite x 10 days. Pt denies vomiting, diarrhea, blood in the stools, CP or SOB. He reports his last bowel movement was this morning

## 2018-05-17 NOTE — ED PROVIDER NOTES
Encounter Date: 5/16/2018    This is a SORT/MSE of a 67 y.o. male presenting to the ED with c/o generalized abdominal pain, worse in the Right lower abdomen. Has been seen by PCP, getting worse. Also with N/V.  Care will be transferred to an alternate provider when patient is roomed for a full evaluation and final disposition. SAWYER Ramirez, FNP-C    SCRIBE #1 NOTE: I, Racquel Sebastian, am scribing for, and in the presence of, Rochelle Sorto MD.       History     Chief Complaint   Patient presents with    Abdominal Pain     Pt c/o abdominal pain x 1 week.  Seen PCP today, had some tests done today.  Reports nausea.      CC: Abdominal Pain    HPI: This 67 y.o male who has arthritis, basal cell carcinoma, diabetes mellitus, and pancreatitis presents to the ED for an evaluation of acute onset, constant right upper quadrant abdominal pain for the past 10 days.  Patient reports at 7:30 this evening, his pain worsened and rates it as a 10/10.  He describes the pain as stabbing in nature.  Associated symptoms include nausea and emesis.  Patient reports being evaluated by his PCP for his symptoms and reports being prescribed Prilosec, which he reports taking taking this evening.  Patient also reports attempting treatment with Tums and 5 mg Hydrocodone.  Patient reports last eating lunch this afternoon, which consisted of ensure, grits, toast, and eggs.  He reports his lunch did not exacerbate his pain today.  Patient reports he has been attempting to gain weight after having a 15 pound unintentional weight loss over the past few months, secondary to mourning the death of a recent family member.  Patient denies chest pain, increased shortness of breath, diarrhea, fever, chills, or any other associated symptoms.  Last normal bowel movement this morning.  No exacerbating factors.  Patient currently on Novolog insulin.      The history is provided by the patient. No  was used.     Review of patient's  allergies indicates:   Allergen Reactions    Amoxicillin Nausea And Vomiting    Adhesive Rash     Paper tape only    Metformin Diarrhea     Appetite loss      Past Medical History:   Diagnosis Date    Arthritis     Basal cell carcinoma 1990s    back     Cancer     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    Cataract     Diabetes mellitus     Pancreatitis 2008    2 Times     Past Surgical History:   Procedure Laterality Date     thumb surgery      Dead Skin cell tumor Rt thumb    COLONOSCOPY N/A 4/11/2017    Procedure: COLONOSCOPY;  Surgeon: Meet Quesada MD;  [facility];  Service: Endoscopy;  Laterality: N/A;    MOUTH FLOOR MASS EXCISION  2009    NASAL SEPTUM SURGERY      SKIN SURGERY       Family History   Problem Relation Age of Onset    Cataracts Mother     Leukemia Mother     Lung cancer Brother     Mental illness Father     No Known Problems Daughter     No Known Problems Sister     Mental illness Son      Social History   Substance Use Topics    Smoking status: Current Every Day Smoker     Packs/day: 1.50     Years: 45.00     Types: Cigarettes    Smokeless tobacco: Never Used    Alcohol use 0.5 oz/week     1 Standard drinks or equivalent per week      Comment: daily scotch and water, half pint     Review of Systems   Constitutional: Negative for chills and fever.   HENT: Negative for ear pain and sore throat.    Eyes: Negative for pain.   Respiratory: Negative for cough and shortness of breath.    Cardiovascular: Negative for chest pain.   Gastrointestinal: Positive for abdominal pain, nausea and vomiting. Negative for blood in stool, constipation and diarrhea.   Genitourinary: Negative for dysuria.   Musculoskeletal: Negative for back pain.   Skin: Negative for rash.   Neurological: Negative for headaches.       Physical Exam     Initial Vitals [05/16/18 2045]   BP Pulse Resp Temp SpO2   (!) 146/69 60 18 99 °F (37.2 °C) 96 %      MAP       94.67         Physical  Exam    Nursing note and vitals reviewed.  Constitutional: Vital signs are normal. He appears well-developed and well-nourished. He is active.  Non-toxic appearance. No distress.   Patient thin and pale in appearance.    HENT:   Head: Normocephalic and atraumatic.   Eyes: EOM are normal.   Neck: Trachea normal. Neck supple.   Cardiovascular: Normal rate and regular rhythm.   Pulmonary/Chest: Breath sounds normal. No respiratory distress.   Abdominal: Soft. Normal appearance and bowel sounds are normal. He exhibits no distension. There is tenderness in the right upper quadrant.   + Vdies's sign and pain with sonographic Vides. No obvious stones with bedside US. No signs of cholecystitis.    Musculoskeletal: Normal range of motion. He exhibits no edema.   Neurological: He is alert.   Skin: Skin is warm, dry and intact.   Psychiatric: He has a normal mood and affect.         ED Course   Procedures  Labs Reviewed   CBC W/ AUTO DIFFERENTIAL - Abnormal; Notable for the following:        Result Value    RBC 4.14 (*)     Hemoglobin 13.5 (*)     Hematocrit 39.5 (*)     MCH 32.6 (*)     Mono # 1.4 (*)     Eos # 0.7 (*)     All other components within normal limits   COMPREHENSIVE METABOLIC PANEL - Abnormal; Notable for the following:     Sodium 133 (*)     Chloride 94 (*)     Glucose 161 (*)     Calcium 10.7 (*)     eGFR if non  57 (*)     All other components within normal limits   URINALYSIS - Abnormal; Notable for the following:     Ketones, UA 1+ (*)     All other components within normal limits   LIPASE   URINALYSIS MICROSCOPIC          X-Rays:   Independently Interpreted Readings:   Chest X-Ray: Pulmonary hyperinflation and subtle airspace disease in the left perihilar region, which could reflect developing pneumonia, subsegmental atelectasis, or aspiration   Abdomen: No acute changes. + constipation/fecal content.   Other Readings:  RUQ US: Cholelithiasis, without sonographic evidence of acute  cholecystitis.  Non-obstructing R renal stone.    Medical Decision Making:   History:   Old Medical Records: I decided to obtain old medical records.  Initial Assessment:   67 y.o male who has arthritis, basal cell carcinoma, diabetes mellitus, and pancreatitis presents to the ED for an evaluation of acute onset, constant right upper quadrant abdominal pain for the past 10 days, worsened at 7:30 pm today.  Associated nausea and emesis.  Differential Diagnosis:   Biliary disease, Pancreatitis, Gastroenteritis, Metabolic derangement, ACS (unlikely)  Clinical Tests:   Lab Tests: Ordered and Reviewed  Radiological Study: Ordered and Reviewed  ED Management:  Patient will receive an abdominal workup and ultrasound of right upper quadrant to assess gallbladder.  Will treat pain while in the ED.  Disposition dependent upon results.     Update note: Labs wnl. Formal RUQ shows non-obstructing gallstones and . Pain improved with pain meds and patient tolerated PO. Will discharge with zofran, bland diet instructions and pcp /general surgery f/u.    11:07 PM 5/16/2018  Pat Sorto MD    Update note: Patient pain returing s/p US. Will give PO meds. elevated BP likely due to this and not cardiac in nature in setting of no CP or SOB. UA negative for UTI and CXR shows possible early pneumonia and constipation. In addition to what was stated above, will discharge with a z brunilda and colace.    11:44 PM 5/16/2018  Pat Sorto MD                       Scribe Attestation:   Scribe #1: I performed the above scribed service and the documentation accurately describes the services I performed. I attest to the accuracy of the note.    Attending Attestation:           Physician Attestation for Scribe:  Physician Attestation Statement for Scribe #1: I, Pat Sorto MD, reviewed documentation, as scribed by Racquel Sebastian in my presence, and it is both accurate and complete.                    Clinical Impression:   The primary  encounter diagnosis was Calculus of gallbladder without cholecystitis without obstruction. Diagnoses of Abdominal pain, Renal stone, and Non-intractable vomiting with nausea, unspecified vomiting type were also pertinent to this visit.                           Rochelle Sorto MD  05/16/18 [id number]       Rochelle Sorto MD  05/16/18 [id number]

## 2018-05-17 NOTE — ED NOTES
Pt. Tolerated PO challenge well. No nausea or vomiting or adverse report noted. DR. Sorot notified of results.

## 2018-05-18 ENCOUNTER — TELEPHONE (OUTPATIENT)
Dept: FAMILY MEDICINE | Facility: CLINIC | Age: 68
End: 2018-05-18

## 2018-05-18 NOTE — TELEPHONE ENCOUNTER
----- Message from Damion George sent at 5/18/2018  9:20 AM CDT -----  Contact: Moe [phone]  Pt would like to schedule an appointment to be seen. He says it was on his discharge papers, where he was seen for abdominal bleeding. He also mentioned something in regards to general surgery. Please call at your earliest convenience.

## 2018-05-18 NOTE — TELEPHONE ENCOUNTER
----- Message from Aicha Woods sent at 5/18/2018  1:27 PM CDT -----  Contact: [phone] Nicole Rg is requesting the pt latest clinical notes faxed to [phone] Thanks !

## 2018-05-21 PROBLEM — K80.20 CALCULUS OF GALLBLADDER WITHOUT CHOLECYSTITIS WITHOUT OBSTRUCTION: Status: ACTIVE | Noted: 2018-05-21

## 2018-05-21 NOTE — PROGRESS NOTES
This note was created by combination of typed  and Dragon dictation.  Transcription errors may be present.  If there are any questions, please contact me.    Assessment & Plan:   Abnormal CXR - s/p treatment with Z brunilda.  Atypical pneumonia?  Atalectasis?  Repeat CXR today. No cough no fever.  -     X-Ray Chest PA And Lateral; Future; Expected date: 05/22/2018    Calculus of gallbladder without cholecystitis without obstruction on RUQ US 5/2018 - incidental, discussed significance of findings, if he has abd pain in the future would need to re-investigate if there is acute obstruction.    Stomach pain - seems to be improved with high dose PPI and sucralfate.  Check EGD.  Stop the sucralfate for now.  -     Case request GI: ESOPHAGOGASTRODUODENOSCOPY (EGD)        There are no discontinued medications.  Modified Medications    No medications on file     New Prescriptions    No medications on file       Follow Up: No Follow-up on file.        Subjective:     Chief Complaint   Patient presents with    ER followup       HPI  Moe is a 67 y.o. male, last appointment with this clinic was 5/16/2018.    DM2 with neuropathy, followed by endo; gabapentin with SE of memory loss and ? Tremor so stopped; metformin with SE.  HTN  hyperlipidemia  Smoker, contemplative stage of quitting.  He is not interested in smoking cessation classes.  COPD, 12/2017 PFTs showing mild airflow obstruction, air trapping and decreased DLCO.  No desaturation.  Spinal stenosis on chronic narcotic therapy, 7/2014 MRI L spine with lumbar spondylosis with mod/severe right neural foraminal stenossi L4-5; significant degenerative disk disease, facet joint arthropathy L4-5, L5-S1; hx of injections; had been seen by pain mgmt 2015 but discharged for THC. hydrocodone 10/325 BID. Takes by his estimate 1.5 tablets a day mainly in the evening.   Hx of Aleve had to stop after surgery for deviated septum. Now he notes various other joints that hurt and  "are helped by the narcotic therapy. Worse with prolonged standing and walking.  He had talked to neurosurgery, 2015, they had recommended surgery and he is very wary of surgery.  Goal is to try to reduce intake.  Pain contract signed 1/2018  Anxiety, Family history of suicide, father committed suicide and his son did as well. alprazolam 0.5 mg TID.  Typically takes 2 at night, maybe 1 in the morning.  8/2/2017, started on zoloft.  Zoloft SE of hypoglycemia/ shakiness so changed to Lexapro. xanax TID and ultimately goal is wean off. He was going to xanax to 1 tablet at night.  Notes he had a "hard father".  Alcohol use. Has been to Alcoholics Anonymous and has quit before.  Did not really find Alcoholics Anonymous to be that helpful.  Feels like he needs to get himself in his own mind set in order to quit and is not adjusted and further referral at this time.last OV he promised me he would cut down to 3 drinks a day.  Colon polyps, last colonoscopy 2009.  Referred in March for colonoscopy.  4/11/2017 colonoscopy with transverse tubular adenoma  Schatzki ring dilitation; followed by [facility].  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 Pilger to be due to EtOH.  Gallstones on RUQ US 5/2018.    Was seen in ER after visit with me with continued pain.  RUQ US with gallstones without cholecystitis.  CXR with ? Left pneumonia. SHAMA worley.     He notes that after his visit with me that he took the meds, dropped the pain down some but still with pain so went to the ER.  Had the above workup.     Is reducing his pain med intake but thinks that the sucralfate and the high dose prilosed are the most beneficial for him so far.  Some constipation; took a laxative on Sunday. Had a more significant BM today after having minimal BMs. No cough. No dyspnea. Is agreeable to get another EGD.     Answers for HPI/ROS submitted by the patient on 5/20/2018   Abdominal pain  Chronicity: recurrent  Onset: 1 to 4 weeks ago  Onset quality: " gradual  Frequency: 2 to 4 times per day  Episode duration: 3 hours  Progression since onset: gradually improving  Pain - numeric: 9/10  Pain quality: aching, cramping, sharp  anorexia: Yes  arthralgias: Yes  belching: Yes  flatus: Yes  hematochezia: No  Aggravated by: eating  Diagnostic workup: ultrasound  Pain severity: severe  Treatments tried: antacids, oral narcotic analgesics  Improvement on treatment: moderate  abdominal surgery: No  colon cancer: No  Crohn's disease: No  GERD: Yes  irritable bowel syndrome: No  pancreatitis: Yes  PUD: No  ulcerative colitis: No  UTI: No      Patient Care Team:  Damian Ramon MD as PCP - General (Internal Medicine)  Kandice Bonilla RD as Dietitian (Diabetes)  Josias Ling MD as Consulting Physician (Neurosurgery)  Aime De Anda MD as Consulting Physician (Otolaryngology)    Patient Active Problem List    Diagnosis Date Noted    Calculus of gallbladder without cholecystitis without obstruction on RUQ US 5/2018 05/21/2018    Chronic seasonal allergic rhinitis 03/01/2018    Closed fracture of multiple ribs of left side 01/01/2018    Alcohol-induced chronic pancreatitis 01/01/2018    Abdominal aortic atherosclerosis 11/14/2017     On CXR 4/5/2016 and on 11/2/2012 XR L spine  On AAA screening 3/2018      Chronic obstructive bronchitis with pulmonary emphysema 11/14/2017 12/2017 PFTs showed mild airflow obstruction.  Lung volume showing air trapping.  DLCO reduced. no O2 desat.  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    Chronic narcotic use 08/02/2017    Special screening for malignant neoplasms, colon 04/11/2017    Deviated nasal septum 04/19/2016    Chronic back pain greater than 3 months duration 01/06/2016    Small fiber neuropathy 07/29/2015     Likely related to alcohol use      Facet arthritis of lumbar region 10/08/2014    Spondylosis without myelopathy 08/13/2014    Degeneration of lumbar or lumbosacral  intervertebral disc 08/13/2014    Spinal stenosis, lumbar region, with neurogenic claudication 08/13/2014    Tubular adenoma of colon 04/22/2014 4/11/2017 colonoscopy with transverse tubular adenoma      DM type 2 with diabetic peripheral neuropathy 07/24/2013    Hypertriglyceridemia 06/13/2013    Anxiety disorder 06/12/2013     Trazodone ineffective.     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    Cataract     Diabetes mellitus     Pancreatitis 2008    2 Times       PAST SURGICAL HISTORY:  Past Surgical History:   Procedure Laterality Date     thumb surgery      Dead Skin cell tumor Rt thumb    COLONOSCOPY N/A 4/11/2017    Procedure: COLONOSCOPY;  Surgeon: Meet Quesada MD;  [facility];  Service: Endoscopy;  Laterality: N/A;    MOUTH FLOOR MASS EXCISION  2009    NASAL SEPTUM SURGERY      SKIN SURGERY         SOCIAL HISTORY:  Social History     Social History    Marital status:      Spouse name: N/A    Number of children: N/A    Years of education: N/A     Occupational History    , self employed      Social History Main Topics    Smoking status: Current Every Day Smoker     Packs/day: 1.50     Years: 45.00     Types: Cigarettes    Smokeless tobacco: Never Used    Alcohol use 0.5 oz/week     1 Standard drinks or equivalent per week      Comment: daily scotch and water, half pint    Drug use: No      Comment: rarely    Sexual activity: Yes     Partners: Female     Other Topics Concern    Not on file     Social History Narrative    , 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  One child committed suicide.       ALLERGIES AND MEDICATIONS: updated and reviewed.  Review of  patient's allergies indicates:   Allergen Reactions    Amoxicillin Nausea And Vomiting    Adhesive Rash     Paper tape only    Metformin Diarrhea     Appetite loss      Current Outpatient Prescriptions   Medication Sig Dispense Refill    ALPRAZolam (XANAX) 0.5 MG tablet Take 1 tablet (0.5 mg total) by mouth daily as needed for Anxiety. 30 tablet 0    aspirin (ECOTRIN) 81 MG EC tablet Take 81 mg by mouth once daily.      atorvastatin (LIPITOR) 20 MG tablet Take 1 tablet (20 mg total) by mouth once daily. 90 tablet 1    azithromycin (Z-EARL) 250 MG tablet Take 1 tablet (250 mg total) by mouth once daily. Take first 2 tablets together, then 1 every day until finished. 6 tablet 0    baclofen (LIORESAL) 10 MG tablet Take 1 tablet (10 mg total) by mouth 2 (two) times daily. 60 tablet 5    blood sugar diagnostic (ACCU-CHEK KM PLUS TEST STRP) Strp 1 strip by Misc.(Non-Drug; Combo Route) route 6 (six) times daily. To be used with Accu-Chek Km Plus glucometer 600 strip 3    blood-glucose meter kit Use as instructed, Accu-Chek Km Plus glucometer 1 each 0    busPIRone (BUSPAR) 15 MG tablet Take 2 tablets (30 mg total) by mouth 2 (two) times daily. Substitute for dose of xanax. 120 tablet 11    desoximetasone (TOPICORT) 0.25 % cream Apply topically 2 (two) times daily as needed.       docusate sodium (COLACE) 100 MG capsule Take 1 capsule (100 mg total) by mouth 2 (two) times daily. Discontinue if you develop pneumonia. 20 capsule 0    escitalopram oxalate (LEXAPRO) 20 MG tablet Take 0.5 tablets (10 mg total) by mouth once daily. 15 tablet 11    fluticasone (FLONASE) 50 mcg/actuation nasal spray 2 sprays by Each Nare route once daily.      hydrocodone-acetaminophen 10-325mg (NORCO)  mg Tab Take 1 tablet by mouth every 24 hours as needed for Pain. 30 tablet 0    insulin aspart (NOVOLOG FLEXPEN U-100 INSULIN SUBQ)       insulin aspart U-100 (NOVOLOG) 100 unit/mL InPn pen Inject prior to meals.  "Requires max 50 units/day. 3 Box 1    ipratropium (ATROVENT HFA) 17 mcg/actuation inhaler Inhale 2 puffs into the lungs every 6 (six) hours. Rescue 12.9 g 5    lancets (ACCU-CHEK FASTCLIX) Misc 1 lancet by Misc.(Non-Drug; Combo Route) route 6 (six) times daily. To be used with Accu-Chek Amanda Plus glucometer 600 each 3    lisinopril (PRINIVIL,ZESTRIL) 5 MG tablet Take 1 tablet (5 mg total) by mouth once daily. 30 tablet 6    montelukast (SINGULAIR) 10 mg tablet Take 1 tablet (10 mg total) by mouth every evening. 30 tablet 5    omeprazole (PRILOSEC) 40 MG capsule Take 1 capsule (40 mg total) by mouth once daily. 30 capsule 11    ondansetron (ZOFRAN) 4 MG tablet Take 1 tablet (4 mg total) by mouth every 6 (six) hours. 12 tablet 0    pen needle, diabetic (BD ULTRA-FINE RENETTA PEN NEEDLE) 32 gauge x 5/32" Ndle Use 3x/day with Novolog 300 each 3    sucralfate (CARAFATE) 1 gram tablet Take 1 tablet (1 g total) by mouth 4 (four) times daily before meals and nightly. 30 tablet 1     No current facility-administered medications for this visit.        Review of Systems   Constitutional: Positive for weight loss. Negative for fever.   Gastrointestinal: Positive for abdominal pain, constipation and nausea. Negative for diarrhea, melena and vomiting.   Genitourinary: Positive for frequency. Negative for dysuria and hematuria.   Musculoskeletal: Positive for myalgias.   Neurological: Positive for headaches.       Objective:   Physical Exam   Vitals:    05/22/18 1048   BP: 122/70   Pulse: (!) 50   Temp: 98.5 °F (36.9 °C)   SpO2: 97%   Weight: 65.5 kg (144 lb 4.7 oz)   Height: 6' (1.829 m)    Body mass index is 19.57 kg/m².  Weight: 65.5 kg (144 lb 4.7 oz)   Height: 6' (182.9 cm)     Physical Exam   Constitutional: He is oriented to person, place, and time. He appears well-developed and well-nourished. No distress.   Eyes: EOM are normal.   Cardiovascular: Normal rate, regular rhythm and normal heart sounds.    No murmur " heard.  Pulmonary/Chest: Effort normal and breath sounds normal.   No dullness to percussion   Abdominal: He exhibits no distension and no mass. There is no tenderness. There is no rebound and no guarding.   RUQ nontender   Musculoskeletal: Normal range of motion.   Neurological: He is alert and oriented to person, place, and time. Coordination normal.   Skin: Skin is warm and dry.   Psychiatric: He has a normal mood and affect. His behavior is normal. Thought content normal.

## 2018-05-22 ENCOUNTER — HOSPITAL ENCOUNTER (OUTPATIENT)
Dept: RADIOLOGY | Facility: HOSPITAL | Age: 68
Discharge: HOME | End: 2018-05-22
Attending: INTERNAL MEDICINE
Payer: MEDICARE

## 2018-05-22 ENCOUNTER — OFFICE VISIT (OUTPATIENT)
Dept: FAMILY MEDICINE | Facility: CLINIC | Age: 68
End: 2018-05-22
Payer: MEDICARE

## 2018-05-22 VITALS
HEIGHT: 72 IN | SYSTOLIC BLOOD PRESSURE: 122 MMHG | TEMPERATURE: 99 F | OXYGEN SATURATION: 97 % | BODY MASS INDEX: 19.55 KG/M2 | HEART RATE: 50 BPM | DIASTOLIC BLOOD PRESSURE: 70 MMHG | WEIGHT: 144.31 LBS

## 2018-05-22 DIAGNOSIS — K80.20 CALCULUS OF GALLBLADDER WITHOUT CHOLECYSTITIS WITHOUT OBSTRUCTION: ICD-10-CM

## 2018-05-22 DIAGNOSIS — R93.89 ABNORMAL CXR: Primary | ICD-10-CM

## 2018-05-22 DIAGNOSIS — R10.9 STOMACH PAIN: ICD-10-CM

## 2018-05-22 DIAGNOSIS — R93.89 ABNORMAL CXR: ICD-10-CM

## 2018-05-22 PROCEDURE — 3008F BODY MASS INDEX DOCD: CPT | Mod: CPTII,S$GLB,, | Performed by: INTERNAL MEDICINE

## 2018-05-22 PROCEDURE — 71046 X-RAY EXAM CHEST 2 VIEWS: CPT | Mod: TC,FY,PO

## 2018-05-22 PROCEDURE — 99999 PR PBB SHADOW E&M-EST. PATIENT-LVL III: CPT | Mod: PBBFAC,,, | Performed by: INTERNAL MEDICINE

## 2018-05-22 PROCEDURE — 71046 X-RAY EXAM CHEST 2 VIEWS: CPT | Mod: 26,,, | Performed by: RADIOLOGY

## 2018-05-22 PROCEDURE — 99214 OFFICE O/P EST MOD 30 MIN: CPT | Mod: S$GLB,,, | Performed by: INTERNAL MEDICINE

## 2018-05-24 ENCOUNTER — PATIENT MESSAGE (OUTPATIENT)
Dept: ADMINISTRATIVE | Facility: OTHER | Age: 68
End: 2018-05-24

## 2018-06-10 ENCOUNTER — PATIENT MESSAGE (OUTPATIENT)
Dept: FAMILY MEDICINE | Facility: CLINIC | Age: 68
End: 2018-06-10

## 2018-06-11 DIAGNOSIS — F41.9 ANXIETY DISORDER, UNSPECIFIED TYPE: ICD-10-CM

## 2018-06-11 DIAGNOSIS — M47.816 FACET ARTHRITIS OF LUMBAR REGION: ICD-10-CM

## 2018-06-11 DIAGNOSIS — M51.36 DEGENERATIVE DISC DISEASE, LUMBAR: ICD-10-CM

## 2018-06-11 DIAGNOSIS — G89.29 CHRONIC BACK PAIN GREATER THAN 3 MONTHS DURATION: ICD-10-CM

## 2018-06-11 DIAGNOSIS — M54.9 CHRONIC BACK PAIN GREATER THAN 3 MONTHS DURATION: ICD-10-CM

## 2018-06-11 RX ORDER — HYDROCODONE BITARTRATE AND ACETAMINOPHEN 10; 325 MG/1; MG/1
1 TABLET ORAL
Qty: 30 TABLET | Refills: 0 | Status: SHIPPED | OUTPATIENT
Start: 2018-06-11 | End: 2018-07-09 | Stop reason: SDUPTHER

## 2018-06-11 RX ORDER — ALPRAZOLAM 0.5 MG/1
0.5 TABLET ORAL DAILY PRN
Qty: 30 TABLET | Refills: 0 | Status: SHIPPED | OUTPATIENT
Start: 2018-06-11 | End: 2018-07-09 | Stop reason: SDUPTHER

## 2018-06-13 ENCOUNTER — ANESTHESIA EVENT (OUTPATIENT)
Dept: ENDOSCOPY | Facility: HOSPITAL | Age: 68
End: 2018-06-13
Payer: MEDICARE

## 2018-06-13 RX ORDER — SODIUM CHLORIDE 9 MG/ML
INJECTION, SOLUTION INTRAVENOUS CONTINUOUS
Status: CANCELLED | OUTPATIENT
Start: 2018-06-13

## 2018-06-13 RX ORDER — LIDOCAINE HYDROCHLORIDE 10 MG/ML
1 INJECTION, SOLUTION EPIDURAL; INFILTRATION; INTRACAUDAL; PERINEURAL ONCE
Status: CANCELLED | OUTPATIENT
Start: 2018-06-13 | End: 2018-06-13

## 2018-06-18 ENCOUNTER — SURGERY (OUTPATIENT)
Age: 68
End: 2018-06-18

## 2018-06-18 ENCOUNTER — HOSPITAL ENCOUNTER (OUTPATIENT)
Facility: HOSPITAL | Age: 68
Discharge: HOME | End: 2018-06-18
Attending: INTERNAL MEDICINE | Admitting: INTERNAL MEDICINE
Payer: MEDICARE

## 2018-06-18 ENCOUNTER — PATIENT MESSAGE (OUTPATIENT)
Dept: FAMILY MEDICINE | Facility: CLINIC | Age: 68
End: 2018-06-18

## 2018-06-18 ENCOUNTER — ANESTHESIA (OUTPATIENT)
Dept: ENDOSCOPY | Facility: HOSPITAL | Age: 68
End: 2018-06-18
Payer: MEDICARE

## 2018-06-18 VITALS
SYSTOLIC BLOOD PRESSURE: 121 MMHG | HEART RATE: 65 BPM | TEMPERATURE: 97 F | DIASTOLIC BLOOD PRESSURE: 64 MMHG | RESPIRATION RATE: 18 BRPM | OXYGEN SATURATION: 99 %

## 2018-06-18 DIAGNOSIS — K29.70 GASTRITIS, PRESENCE OF BLEEDING UNSPECIFIED, UNSPECIFIED CHRONICITY, UNSPECIFIED GASTRITIS TYPE: ICD-10-CM

## 2018-06-18 DIAGNOSIS — Z12.11 COLON CANCER SCREENING: ICD-10-CM

## 2018-06-18 LAB — POCT GLUCOSE: 115 MG/DL (ref 70–110)

## 2018-06-18 PROCEDURE — D9220A PRA ANESTHESIA: Mod: PT,ANES,, | Performed by: ANESTHESIOLOGY

## 2018-06-18 PROCEDURE — 88305 TISSUE EXAM BY PATHOLOGIST: CPT | Performed by: PATHOLOGY

## 2018-06-18 PROCEDURE — C1726 CATH, BAL DIL, NON-VASCULAR: HCPCS | Performed by: INTERNAL MEDICINE

## 2018-06-18 PROCEDURE — 43239 EGD BIOPSY SINGLE/MULTIPLE: CPT | Mod: 59

## 2018-06-18 PROCEDURE — 25000003 PHARM REV CODE 250: Performed by: NURSE ANESTHETIST, CERTIFIED REGISTERED

## 2018-06-18 PROCEDURE — 82962 GLUCOSE BLOOD TEST: CPT | Performed by: INTERNAL MEDICINE

## 2018-06-18 PROCEDURE — 63600175 PHARM REV CODE 636 W HCPCS: Performed by: NURSE ANESTHETIST, CERTIFIED REGISTERED

## 2018-06-18 PROCEDURE — 88305 TISSUE EXAM BY PATHOLOGIST: CPT | Mod: 26,,, | Performed by: PATHOLOGY

## 2018-06-18 PROCEDURE — 37000009 HC ANESTHESIA EA ADD 15 MINS: Performed by: INTERNAL MEDICINE

## 2018-06-18 PROCEDURE — D9220A PRA ANESTHESIA: Mod: PT,CRNA,, | Performed by: NURSE ANESTHETIST, CERTIFIED REGISTERED

## 2018-06-18 PROCEDURE — 43249 ESOPH EGD DILATION <30 MM: CPT | Performed by: INTERNAL MEDICINE

## 2018-06-18 PROCEDURE — 37000008 HC ANESTHESIA 1ST 15 MINUTES: Performed by: INTERNAL MEDICINE

## 2018-06-18 PROCEDURE — 25000003 PHARM REV CODE 250: Performed by: INTERNAL MEDICINE

## 2018-06-18 RX ORDER — GLYCOPYRROLATE 0.2 MG/ML
INJECTION INTRAMUSCULAR; INTRAVENOUS
Status: COMPLETED
Start: 2018-06-18 | End: 2018-06-18

## 2018-06-18 RX ORDER — OMEPRAZOLE 40 MG/1
40 CAPSULE, DELAYED RELEASE ORAL
Qty: 60 CAPSULE | Refills: 3 | Status: SHIPPED | OUTPATIENT
Start: 2018-06-18 | End: 2019-01-09 | Stop reason: SDUPTHER

## 2018-06-18 RX ORDER — SODIUM CHLORIDE 9 MG/ML
INJECTION, SOLUTION INTRAVENOUS CONTINUOUS PRN
Status: DISCONTINUED | OUTPATIENT
Start: 2018-06-18 | End: 2018-06-18

## 2018-06-18 RX ORDER — PROPOFOL 10 MG/ML
VIAL (ML) INTRAVENOUS
Status: COMPLETED
Start: 2018-06-18 | End: 2018-06-18

## 2018-06-18 RX ORDER — SODIUM CHLORIDE 9 MG/ML
INJECTION, SOLUTION INTRAVENOUS ONCE
Status: COMPLETED | OUTPATIENT
Start: 2018-06-18 | End: 2018-06-18

## 2018-06-18 RX ORDER — GLYCOPYRROLATE 0.2 MG/ML
INJECTION INTRAMUSCULAR; INTRAVENOUS
Status: DISCONTINUED | OUTPATIENT
Start: 2018-06-18 | End: 2018-06-18

## 2018-06-18 RX ORDER — LIDOCAINE HYDROCHLORIDE 10 MG/ML
INJECTION INFILTRATION; PERINEURAL
Status: DISCONTINUED
Start: 2018-06-18 | End: 2018-06-18 | Stop reason: HOSPADM

## 2018-06-18 RX ORDER — PROPOFOL 10 MG/ML
VIAL (ML) INTRAVENOUS
Status: DISCONTINUED | OUTPATIENT
Start: 2018-06-18 | End: 2018-06-18

## 2018-06-18 RX ORDER — LIDOCAINE HCL/PF 100 MG/5ML
SYRINGE (ML) INTRAVENOUS
Status: DISCONTINUED | OUTPATIENT
Start: 2018-06-18 | End: 2018-06-18

## 2018-06-18 RX ADMIN — PROPOFOL 100 MG: 10 INJECTION, EMULSION INTRAVENOUS at 08:06

## 2018-06-18 RX ADMIN — SODIUM CHLORIDE: 0.9 INJECTION, SOLUTION INTRAVENOUS at 07:06

## 2018-06-18 RX ADMIN — PROPOFOL 50 MG: 10 INJECTION, EMULSION INTRAVENOUS at 08:06

## 2018-06-18 RX ADMIN — LIDOCAINE HYDROCHLORIDE 100 MG: 20 INJECTION, SOLUTION INTRAVENOUS at 08:06

## 2018-06-18 RX ADMIN — SODIUM CHLORIDE: 0.9 INJECTION, SOLUTION INTRAVENOUS at 08:06

## 2018-06-18 RX ADMIN — GLYCOPYRROLATE 0.1 MG: 0.2 INJECTION, SOLUTION INTRAMUSCULAR; INTRAVENOUS at 08:06

## 2018-06-18 NOTE — [employer] PATIENT INSTRUCTIONS
Discharge Summary/Instructions after an Endoscopic Procedure  Patient Name: Moe Ren  Patient MRN: [id number]  Patient YOB: 1950  Monday, June 18, 2018  Marco Antonio Gonsalves MD  RESTRICTIONS:  During your procedure today, you received medications for sedation.  These   medications may affect your judgment, balance and coordination.  Therefore,   for 24 hours, you have the following restrictions:   - DO NOT drive a car, operate machinery, make legal/financial decisions,   sign important papers or drink alcohol.    ACTIVITY:  Today: no heavy lifting, straining or running due to procedural   sedation/anesthesia.  The following day: return to full activity including work.  DIET:  Eat and drink normally unless instructed otherwise.     TREATMENT FOR COMMON SIDE EFFECTS:  - Mild abdominal pain, nausea, belching, bloating or excessive gas:  rest,   eat lightly and use a heating pad.  - Sore Throat: treat with throat lozenges and/or gargle with warm salt   water.  - Because air was used during the procedure, expelling large amounts of air   from your rectum or belching is normal.  - If a bowel prep was taken, you may not have a bowel movement for 1-3 days.    This is normal.  SYMPTOMS TO WATCH FOR AND REPORT TO YOUR PHYSICIAN:  1. Abdominal pain or bloating, other than gas cramps.  2. Chest pain.  3. Back pain.  4. Signs of infection such as: chills or fever occurring within 24 hours   after the procedure.  5. Rectal bleeding, which would show as bright red, maroon, or black stools.   (A tablespoon of blood from the rectum is not serious, especially if   hemorrhoids are present.)  6. Vomiting.  7. Weakness or dizziness.  GO DIRECTLY TO THE NEAREST EMERGENCY ROOM IF YOU HAVE ANY OF THE FOLLOWING:      Difficulty breathing              Chills and/or fever over 101 F   Persistent vomiting and/or vomiting blood   Severe abdominal pain   Severe chest pain   Black, tarry stools   Bleeding- more than one  tablespoon   Any other symptom or condition that you feel may need urgent attention  Your doctor recommends these additional instructions:  If any biopsies were taken, your doctors clinic will contact you in 1 to 2   weeks with any results.  - Discharge patient to home.   - Resume previous diet.   - Use a proton pump inhibitor PO BID for 4 weeks, then daily.   - Observe patient's clinical course.   - Await pathology results.   - Return to primary care physician as previously scheduled.   - Return to GI clinic in 3 weeks.  Consider esophageal manometry, if   symptoms persist.    - The findings and recommendations were discussed with the patient's family.     - Patient has a contact number available for emergencies.  The signs and   symptoms of potential delayed complications were discussed with the   patient.  Return to normal activities tomorrow.  Written discharge   instructions were provided to the patient.  For questions, problems or results please call your physician - Marco Antonio Gonsalves MD at Work:  [phone].  [facility] can be reached at [phone]     IF A COMPLICATION OR EMERGENCY SITUATION ARISES AND YOU ARE UNABLE TO REACH   YOUR PHYSICIAN - GO DIRECTLY TO THE EMERGENCY ROOM.  Marco Antonio Gonsalves MD  6/18/2018 8:48:25 AM  This report has been verified and signed electronically.  [employer]

## 2018-06-18 NOTE — TRANSFER OF CARE
Anesthesia Transfer of Care Note    Patient: Moe Ren    Procedure(s) Performed: Procedure(s) (LRB):  ESOPHAGOGASTRODUODENOSCOPY (EGD) (N/A)    Patient location: GI    Anesthesia Type: general    Transport from OR: Transported from OR on room air with adequate spontaneous ventilation    Post pain: adequate analgesia    Post assessment: no apparent anesthetic complications and tolerated procedure well    Post vital signs: stable    Level of consciousness: awake    Nausea/Vomiting: no nausea/vomiting    Complications: none    Transfer of care protocol was followed      Last vitals:   Visit Vitals  /69 (BP Location: Left arm, Patient Position: Sitting)   Pulse 86   Temp 36 °C (96.8 °F) (Axillary)   Resp 16   SpO2 99%

## 2018-06-18 NOTE — OR NURSING
MD at bedside with patient and wife.  Results and follow-up discussed, patient and wife verbalized understanding.  No acute distress voiced or observed.

## 2018-06-18 NOTE — ANESTHESIA POSTPROCEDURE EVALUATION
Anesthesia Post Evaluation    Patient: Moe Ren    Procedure(s) Performed: Procedure(s) (LRB):  ESOPHAGOGASTRODUODENOSCOPY (EGD) (N/A)    Final Anesthesia Type: general  Patient location during evaluation: GI PACU  Patient participation: Yes- Able to Participate  Level of consciousness: awake and alert, awake and oriented  Post-procedure vital signs: reviewed and stable  Pain management: adequate  Airway patency: patent  PONV status at discharge: No PONV  Anesthetic complications: no      Cardiovascular status: blood pressure returned to baseline and hemodynamically stable  Respiratory status: unassisted, spontaneous ventilation and room air  Hydration status: euvolemic  Follow-up not needed.        Visit Vitals  /64   Pulse 65   Temp 36 °C (96.8 °F) (Axillary)   Resp 18   SpO2 99%       Pain/Darwin Score: Pain Assessment Performed: Yes (6/18/2018  7:16 AM)  Presence of Pain: denies (6/18/2018  9:30 AM)  Pain Rating Prior to Med Admin: 4 (6/18/2018  7:16 AM)  Darwin Score: 10 (6/18/2018  9:30 AM)

## 2018-06-18 NOTE — ANESTHESIA PREPROCEDURE EVALUATION
06/18/2018  Moe Ren is a 67 y.o., male.    Anesthesia Evaluation    I have reviewed the Patient Summary Reports.    I have reviewed the Nursing Notes.   I have reviewed the Medications.     Review of Systems  Anesthesia Hx:  No problems with previous Anesthesia Denies Hx of Anesthetic complications  History of prior surgery of interest to airway management or planning: Denies Family Hx of Anesthesia complications.   Denies Personal Hx of Anesthesia complications.   Social:  Smoker, Alcohol Use    Hematology/Oncology:  Hematology Normal   Oncology Normal     EENT/Dental:EENT/Dental Normal   Cardiovascular:   Hypertension    Pulmonary:   COPD    Renal/:   Chronic Renal Disease    Hepatic/GI:  Hepatic/GI Normal    Musculoskeletal:   Arthritis     Neurological:   Neuromuscular Disease,    Endocrine:   Diabetes, type 2    Psych:   Psychiatric History          Physical Exam  General:  Well nourished    Airway/Jaw/Neck:  Airway Findings: Mouth Opening: Normal Tongue: Normal  General Airway Assessment: Adult  Mallampati: III  Improves to II with phonation.  TM Distance: Normal, at least 6 cm  Jaw/Neck Findings:     Eyes/Ears/Nose:  EYES/EARS/NOSE FINDINGS: Normal   Dental:  Dental Findings: Periodontal disease, Severe   Chest/Lungs:  Chest/Lungs Clear    Heart/Vascular:  Heart Findings: Normal Heart murmur: negative Vascular Findings: Normal    Abdomen:  Abdomen Findings: Normal    Musculoskeletal:  Musculoskeletal Findings: Normal   Skin:  Skin Findings: Normal    Mental Status:  Mental Status Findings:  Cooperative, Alert and Oriented         Anesthesia Plan  Type of Anesthesia, risks & benefits discussed:  Anesthesia Type:  general  Patient's Preference:   Intra-op Monitoring Plan: standard ASA monitors  Intra-op Monitoring Plan Comments:   Post Op Pain Control Plan: multimodal analgesia, IV/PO Opioids  PRN and per primary service following discharge from PACU  Post Op Pain Control Plan Comments:   Induction:   IV  Beta Blocker:  Patient is not currently on a Beta-Blocker (No further documentation required).       Informed Consent: Patient understands risks and agrees with Anesthesia plan.  Questions answered. Anesthesia consent signed with patient.  ASA Score: 3     Day of Surgery Review of History & Physical:  There are no significant changes.  H&P update referred to the provider.     Anesthesia Plan Notes: Discussed risks and benefits with patient. Patient agrees to proceed with plan. All questions answered. The Patient is Ready For Surgery.  NPO after midnight.        Ready For Surgery From Anesthesia Perspective.

## 2018-06-18 NOTE — H&P (VIEW-ONLY)
This note was created by combination of typed  and Dragon dictation.  Transcription errors may be present.  If there are any questions, please contact me.    Assessment & Plan:   Abnormal CXR - s/p treatment with Z brunilda.  Atypical pneumonia?  Atalectasis?  Repeat CXR today. No cough no fever.  -     X-Ray Chest PA And Lateral; Future; Expected date: 05/22/2018    Calculus of gallbladder without cholecystitis without obstruction on RUQ US 5/2018 - incidental, discussed significance of findings, if he has abd pain in the future would need to re-investigate if there is acute obstruction.    Stomach pain - seems to be improved with high dose PPI and sucralfate.  Check EGD.  Stop the sucralfate for now.  -     Case request GI: ESOPHAGOGASTRODUODENOSCOPY (EGD)        There are no discontinued medications.  Modified Medications    No medications on file     New Prescriptions    No medications on file       Follow Up: No Follow-up on file.        Subjective:     Chief Complaint   Patient presents with    ER followup       HPI  Moe is a 67 y.o. male, last appointment with this clinic was 5/16/2018.    DM2 with neuropathy, followed by endo; gabapentin with SE of memory loss and ? Tremor so stopped; metformin with SE.  HTN  hyperlipidemia  Smoker, contemplative stage of quitting.  He is not interested in smoking cessation classes.  COPD, 12/2017 PFTs showing mild airflow obstruction, air trapping and decreased DLCO.  No desaturation.  Spinal stenosis on chronic narcotic therapy, 7/2014 MRI L spine with lumbar spondylosis with mod/severe right neural foraminal stenossi L4-5; significant degenerative disk disease, facet joint arthropathy L4-5, L5-S1; hx of injections; had been seen by pain mgmt 2015 but discharged for THC. hydrocodone 10/325 BID. Takes by his estimate 1.5 tablets a day mainly in the evening.   Hx of Aleve had to stop after surgery for deviated septum. Now he notes various other joints that hurt and  "are helped by the narcotic therapy. Worse with prolonged standing and walking.  He had talked to neurosurgery, 2015, they had recommended surgery and he is very wary of surgery.  Goal is to try to reduce intake.  Pain contract signed 1/2018  Anxiety, Family history of suicide, father committed suicide and his son did as well. alprazolam 0.5 mg TID.  Typically takes 2 at night, maybe 1 in the morning.  8/2/2017, started on zoloft.  Zoloft SE of hypoglycemia/ shakiness so changed to Lexapro. xanax TID and ultimately goal is wean off. He was going to xanax to 1 tablet at night.  Notes he had a "hard father".  Alcohol use. Has been to Alcoholics Anonymous and has quit before.  Did not really find Alcoholics Anonymous to be that helpful.  Feels like he needs to get himself in his own mind set in order to quit and is not adjusted and further referral at this time.last OV he promised me he would cut down to 3 drinks a day.  Colon polyps, last colonoscopy 2009.  Referred in March for colonoscopy.  4/11/2017 colonoscopy with transverse tubular adenoma  Schatzki ring dilitation; followed by [facility].  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 [employer] to be due to EtOH.  Gallstones on RUQ US 5/2018.    Was seen in ER after visit with me with continued pain.  RUQ US with gallstones without cholecystitis.  CXR with ? Left pneumonia. SHAMA worley.     He notes that after his visit with me that he took the meds, dropped the pain down some but still with pain so went to the ER.  Had the above workup.     Is reducing his pain med intake but thinks that the sucralfate and the high dose prilosed are the most beneficial for him so far.  Some constipation; took a laxative on Sunday. Had a more significant BM today after having minimal BMs. No cough. No dyspnea. Is agreeable to get another EGD.     Answers for HPI/ROS submitted by the patient on 5/20/2018   Abdominal pain  Chronicity: recurrent  Onset: 1 to 4 weeks ago  Onset quality: " gradual  Frequency: 2 to 4 times per day  Episode duration: 3 hours  Progression since onset: gradually improving  Pain - numeric: 9/10  Pain quality: aching, cramping, sharp  anorexia: Yes  arthralgias: Yes  belching: Yes  flatus: Yes  hematochezia: No  Aggravated by: eating  Diagnostic workup: ultrasound  Pain severity: severe  Treatments tried: antacids, oral narcotic analgesics  Improvement on treatment: moderate  abdominal surgery: No  colon cancer: No  Crohn's disease: No  GERD: Yes  irritable bowel syndrome: No  pancreatitis: Yes  PUD: No  ulcerative colitis: No  UTI: No      Patient Care Team:  Damian Ramon MD as PCP - General (Internal Medicine)  Kandice Bonilla RD as Dietitian (Diabetes)  Josias Ling MD as Consulting Physician (Neurosurgery)  Aime De Anda MD as Consulting Physician (Otolaryngology)    Patient Active Problem List    Diagnosis Date Noted    Calculus of gallbladder without cholecystitis without obstruction on RUQ US 5/2018 05/21/2018    Chronic seasonal allergic rhinitis 03/01/2018    Closed fracture of multiple ribs of left side 01/01/2018    Alcohol-induced chronic pancreatitis 01/01/2018    Abdominal aortic atherosclerosis 11/14/2017     On CXR 4/5/2016 and on 11/2/2012 XR L spine  On AAA screening 3/2018      Chronic obstructive bronchitis with pulmonary emphysema 11/14/2017 12/2017 PFTs showed mild airflow obstruction.  Lung volume showing air trapping.  DLCO reduced. no O2 desat.  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    Chronic narcotic use 08/02/2017    Special screening for malignant neoplasms, colon 04/11/2017    Deviated nasal septum 04/19/2016    Chronic back pain greater than 3 months duration 01/06/2016    Small fiber neuropathy 07/29/2015     Likely related to alcohol use      Facet arthritis of lumbar region 10/08/2014    Spondylosis without myelopathy 08/13/2014    Degeneration of lumbar or lumbosacral  intervertebral disc 08/13/2014    Spinal stenosis, lumbar region, with neurogenic claudication 08/13/2014    Tubular adenoma of colon 04/22/2014 4/11/2017 colonoscopy with transverse tubular adenoma      DM type 2 with diabetic peripheral neuropathy 07/24/2013    Hypertriglyceridemia 06/13/2013    Anxiety disorder 06/12/2013     Trazodone ineffective.     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    Cataract     Diabetes mellitus     Pancreatitis 2008    2 Times       PAST SURGICAL HISTORY:  Past Surgical History:   Procedure Laterality Date     thumb surgery      Dead Skin cell tumor Rt thumb    COLONOSCOPY N/A 4/11/2017    Procedure: COLONOSCOPY;  Surgeon: Meet Quesada MD;  Location: [facility];  Service: Endoscopy;  Laterality: N/A;    MOUTH FLOOR MASS EXCISION  2009    NASAL SEPTUM SURGERY      SKIN SURGERY         SOCIAL HISTORY:  Social History     Social History    Marital status:      Spouse name: N/A    Number of children: N/A    Years of education: N/A     Occupational History    , self employed      Social History Main Topics    Smoking status: Current Every Day Smoker     Packs/day: 1.50     Years: 45.00     Types: Cigarettes    Smokeless tobacco: Never Used    Alcohol use 0.5 oz/week     1 Standard drinks or equivalent per week      Comment: daily scotch and water, half pint    Drug use: No      Comment: rarely    Sexual activity: Yes     Partners: Female     Other Topics Concern    Not on file     Social History Narrative    , 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  One child committed suicide.       ALLERGIES AND MEDICATIONS: updated and reviewed.  Review of  patient's allergies indicates:   Allergen Reactions    Amoxicillin Nausea And Vomiting    Adhesive Rash     Paper tape only    Metformin Diarrhea     Appetite loss      Current Outpatient Prescriptions   Medication Sig Dispense Refill    ALPRAZolam (XANAX) 0.5 MG tablet Take 1 tablet (0.5 mg total) by mouth daily as needed for Anxiety. 30 tablet 0    aspirin (ECOTRIN) 81 MG EC tablet Take 81 mg by mouth once daily.      atorvastatin (LIPITOR) 20 MG tablet Take 1 tablet (20 mg total) by mouth once daily. 90 tablet 1    azithromycin (Z-EARL) 250 MG tablet Take 1 tablet (250 mg total) by mouth once daily. Take first 2 tablets together, then 1 every day until finished. 6 tablet 0    baclofen (LIORESAL) 10 MG tablet Take 1 tablet (10 mg total) by mouth 2 (two) times daily. 60 tablet 5    blood sugar diagnostic (ACCU-CHEK KM PLUS TEST STRP) Strp 1 strip by Misc.(Non-Drug; Combo Route) route 6 (six) times daily. To be used with Accu-Chek Km Plus glucometer 600 strip 3    blood-glucose meter kit Use as instructed, Accu-Chek Km Plus glucometer 1 each 0    busPIRone (BUSPAR) 15 MG tablet Take 2 tablets (30 mg total) by mouth 2 (two) times daily. Substitute for dose of xanax. 120 tablet 11    desoximetasone (TOPICORT) 0.25 % cream Apply topically 2 (two) times daily as needed.       docusate sodium (COLACE) 100 MG capsule Take 1 capsule (100 mg total) by mouth 2 (two) times daily. Discontinue if you develop pneumonia. 20 capsule 0    escitalopram oxalate (LEXAPRO) 20 MG tablet Take 0.5 tablets (10 mg total) by mouth once daily. 15 tablet 11    fluticasone (FLONASE) 50 mcg/actuation nasal spray 2 sprays by Each Nare route once daily.      hydrocodone-acetaminophen 10-325mg (NORCO)  mg Tab Take 1 tablet by mouth every 24 hours as needed for Pain. 30 tablet 0    insulin aspart (NOVOLOG FLEXPEN U-100 INSULIN SUBQ)       insulin aspart U-100 (NOVOLOG) 100 unit/mL InPn pen Inject prior to meals.  "Requires max 50 units/day. 3 Box 1    ipratropium (ATROVENT HFA) 17 mcg/actuation inhaler Inhale 2 puffs into the lungs every 6 (six) hours. Rescue 12.9 g 5    lancets (ACCU-CHEK FASTCLIX) Misc 1 lancet by Misc.(Non-Drug; Combo Route) route 6 (six) times daily. To be used with Accu-Chek Amanda Plus glucometer 600 each 3    lisinopril (PRINIVIL,ZESTRIL) 5 MG tablet Take 1 tablet (5 mg total) by mouth once daily. 30 tablet 6    montelukast (SINGULAIR) 10 mg tablet Take 1 tablet (10 mg total) by mouth every evening. 30 tablet 5    omeprazole (PRILOSEC) 40 MG capsule Take 1 capsule (40 mg total) by mouth once daily. 30 capsule 11    ondansetron (ZOFRAN) 4 MG tablet Take 1 tablet (4 mg total) by mouth every 6 (six) hours. 12 tablet 0    pen needle, diabetic (BD ULTRA-FINE RENETTA PEN NEEDLE) 32 gauge x 5/32" Ndle Use 3x/day with Novolog 300 each 3    sucralfate (CARAFATE) 1 gram tablet Take 1 tablet (1 g total) by mouth 4 (four) times daily before meals and nightly. 30 tablet 1     No current facility-administered medications for this visit.        Review of Systems   Constitutional: Positive for weight loss. Negative for fever.   Gastrointestinal: Positive for abdominal pain, constipation and nausea. Negative for diarrhea, melena and vomiting.   Genitourinary: Positive for frequency. Negative for dysuria and hematuria.   Musculoskeletal: Positive for myalgias.   Neurological: Positive for headaches.       Objective:   Physical Exam   Vitals:    05/22/18 1048   BP: 122/70   Pulse: (!) 50   Temp: 98.5 °F (36.9 °C)   SpO2: 97%   Weight: 65.5 kg (144 lb 4.7 oz)   Height: 6' (1.829 m)    Body mass index is 19.57 kg/m².  Weight: 65.5 kg (144 lb 4.7 oz)   Height: 6' (182.9 cm)     Physical Exam   Constitutional: He is oriented to person, place, and time. He appears well-developed and well-nourished. No distress.   Eyes: EOM are normal.   Cardiovascular: Normal rate, regular rhythm and normal heart sounds.    No murmur " heard.  Pulmonary/Chest: Effort normal and breath sounds normal.   No dullness to percussion   Abdominal: He exhibits no distension and no mass. There is no tenderness. There is no rebound and no guarding.   RUQ nontender   Musculoskeletal: Normal range of motion.   Neurological: He is alert and oriented to person, place, and time. Coordination normal.   Skin: Skin is warm and dry.   Psychiatric: He has a normal mood and affect. His behavior is normal. Thought content normal.

## 2018-06-19 PROBLEM — K44.9 HIATAL HERNIA: Status: ACTIVE | Noted: 2018-06-19

## 2018-06-19 PROBLEM — K22.2 SCHATZKI'S RING OF DISTAL ESOPHAGUS: Status: ACTIVE | Noted: 2018-06-19

## 2018-06-20 ENCOUNTER — PATIENT MESSAGE (OUTPATIENT)
Dept: FAMILY MEDICINE | Facility: CLINIC | Age: 68
End: 2018-06-20

## 2018-06-29 ENCOUNTER — TELEPHONE (OUTPATIENT)
Dept: ENDOCRINOLOGY | Facility: CLINIC | Age: 68
End: 2018-06-29

## 2018-06-29 NOTE — TELEPHONE ENCOUNTER
Called and spoke to Latosha with [employer] who needs chart notes of recent visit, current med list, and current A1c from Endocrine to complete request for diabetic shoes. Information will be faxed to them today. They will contact Dr. Herrera to sign MD orders on behalf of Ellyn Meza NP.

## 2018-07-02 RX ORDER — LISINOPRIL 5 MG/1
TABLET ORAL
Qty: 30 TABLET | Refills: 5 | Status: CANCELLED | OUTPATIENT
Start: 2018-07-02

## 2018-07-09 ENCOUNTER — TELEPHONE (OUTPATIENT)
Dept: ENDOCRINOLOGY | Facility: CLINIC | Age: 68
End: 2018-07-09

## 2018-07-09 ENCOUNTER — PATIENT MESSAGE (OUTPATIENT)
Dept: ENDOCRINOLOGY | Facility: CLINIC | Age: 68
End: 2018-07-09

## 2018-07-09 ENCOUNTER — LAB VISIT (OUTPATIENT)
Dept: LAB | Facility: HOSPITAL | Age: 68
End: 2018-07-09
Payer: MEDICARE

## 2018-07-09 DIAGNOSIS — M51.36 DEGENERATIVE DISC DISEASE, LUMBAR: ICD-10-CM

## 2018-07-09 DIAGNOSIS — M47.816 FACET ARTHRITIS OF LUMBAR REGION: ICD-10-CM

## 2018-07-09 DIAGNOSIS — M54.9 CHRONIC BACK PAIN GREATER THAN 3 MONTHS DURATION: ICD-10-CM

## 2018-07-09 DIAGNOSIS — F41.9 ANXIETY DISORDER, UNSPECIFIED TYPE: ICD-10-CM

## 2018-07-09 DIAGNOSIS — G89.29 CHRONIC BACK PAIN GREATER THAN 3 MONTHS DURATION: ICD-10-CM

## 2018-07-09 LAB
ALBUMIN SERPL BCP-MCNC: 3.7 G/DL
ALP SERPL-CCNC: 76 U/L
ALT SERPL W/O P-5'-P-CCNC: 22 U/L
ANION GAP SERPL CALC-SCNC: 12 MMOL/L
AST SERPL-CCNC: 34 U/L
BILIRUB SERPL-MCNC: 0.3 MG/DL
BUN SERPL-MCNC: 9 MG/DL
CALCIUM SERPL-MCNC: 9.3 MG/DL
CHLORIDE SERPL-SCNC: 102 MMOL/L
CO2 SERPL-SCNC: 20 MMOL/L
CREAT SERPL-MCNC: 1.2 MG/DL
EST. GFR  (AFRICAN AMERICAN): >60 ML/MIN/1.73 M^2
EST. GFR  (NON AFRICAN AMERICAN): >60 ML/MIN/1.73 M^2
GLUCOSE SERPL-MCNC: 257 MG/DL
POTASSIUM SERPL-SCNC: 4.4 MMOL/L
PROT SERPL-MCNC: 6.7 G/DL
SODIUM SERPL-SCNC: 134 MMOL/L

## 2018-07-09 PROCEDURE — 36415 COLL VENOUS BLD VENIPUNCTURE: CPT | Mod: PN

## 2018-07-09 PROCEDURE — 83036 HEMOGLOBIN GLYCOSYLATED A1C: CPT

## 2018-07-09 PROCEDURE — 80053 COMPREHEN METABOLIC PANEL: CPT

## 2018-07-09 RX ORDER — ALPRAZOLAM 0.5 MG/1
0.5 TABLET ORAL DAILY PRN
Qty: 30 TABLET | Refills: 0 | Status: SHIPPED | OUTPATIENT
Start: 2018-07-09 | End: 2018-08-06 | Stop reason: SDUPTHER

## 2018-07-09 RX ORDER — HYDROCODONE BITARTRATE AND ACETAMINOPHEN 10; 325 MG/1; MG/1
1 TABLET ORAL
Qty: 30 TABLET | Refills: 0 | Status: SHIPPED | OUTPATIENT
Start: 2018-07-09 | End: 2018-08-06 | Stop reason: SDUPTHER

## 2018-07-09 NOTE — TELEPHONE ENCOUNTER
----- Message from Rachel Jara sent at 7/9/2018  4:10 PM CDT -----  Contact: [facility] pharmacy   .Rx Refill/Request     Is this a Refill or New Rx:  Refill   Rx Name and Strength:  lisinopril (PRINIVIL,ZESTRIL) 5 MG tablet  Preferred Pharmacy with phone number: [facility] #[id number] - [place], [place] - [address]  Communication Preference: call back   Additional Information:

## 2018-07-10 ENCOUNTER — OFFICE VISIT (OUTPATIENT)
Dept: ENDOCRINOLOGY | Facility: CLINIC | Age: 68
End: 2018-07-10
Payer: MEDICARE

## 2018-07-10 VITALS
WEIGHT: 146.81 LBS | SYSTOLIC BLOOD PRESSURE: 128 MMHG | DIASTOLIC BLOOD PRESSURE: 70 MMHG | BODY MASS INDEX: 19.88 KG/M2 | HEART RATE: 72 BPM | HEIGHT: 72 IN

## 2018-07-10 DIAGNOSIS — E11.649 HYPOGLYCEMIA ASSOCIATED WITH DIABETES: ICD-10-CM

## 2018-07-10 DIAGNOSIS — E13.42 DIABETIC POLYNEUROPATHY ASSOCIATED WITH OTHER SPECIFIED DIABETES MELLITUS: ICD-10-CM

## 2018-07-10 DIAGNOSIS — R80.9 MICROALBUMINURIA: ICD-10-CM

## 2018-07-10 LAB
ESTIMATED AVG GLUCOSE: 134 MG/DL
HBA1C MFR BLD HPLC: 6.3 %

## 2018-07-10 PROCEDURE — 3008F BODY MASS INDEX DOCD: CPT | Mod: CPTII,S$GLB,, | Performed by: NURSE PRACTITIONER

## 2018-07-10 PROCEDURE — 3044F HG A1C LEVEL LT 7.0%: CPT | Mod: CPTII,S$GLB,, | Performed by: NURSE PRACTITIONER

## 2018-07-10 PROCEDURE — 99214 OFFICE O/P EST MOD 30 MIN: CPT | Mod: S$GLB,,, | Performed by: NURSE PRACTITIONER

## 2018-07-10 PROCEDURE — 99999 PR PBB SHADOW E&M-EST. PATIENT-LVL IV: CPT | Mod: PBBFAC,,, | Performed by: NURSE PRACTITIONER

## 2018-07-10 RX ORDER — LISINOPRIL 5 MG/1
5 TABLET ORAL DAILY
Qty: 90 TABLET | Refills: 2 | Status: SHIPPED | OUTPATIENT
Start: 2018-07-10 | End: 2019-01-09 | Stop reason: SDUPTHER

## 2018-07-27 ENCOUNTER — PATIENT MESSAGE (OUTPATIENT)
Dept: ENDOCRINOLOGY | Facility: CLINIC | Age: 68
End: 2018-07-27

## 2018-07-30 ENCOUNTER — PATIENT MESSAGE (OUTPATIENT)
Dept: ENDOCRINOLOGY | Facility: CLINIC | Age: 68
End: 2018-07-30

## 2018-08-06 DIAGNOSIS — M47.816 FACET ARTHRITIS OF LUMBAR REGION: ICD-10-CM

## 2018-08-06 DIAGNOSIS — F41.9 ANXIETY DISORDER, UNSPECIFIED TYPE: ICD-10-CM

## 2018-08-06 DIAGNOSIS — M54.9 CHRONIC BACK PAIN GREATER THAN 3 MONTHS DURATION: ICD-10-CM

## 2018-08-06 DIAGNOSIS — G89.29 CHRONIC BACK PAIN GREATER THAN 3 MONTHS DURATION: ICD-10-CM

## 2018-08-06 DIAGNOSIS — M51.36 DEGENERATIVE DISC DISEASE, LUMBAR: ICD-10-CM

## 2018-08-06 RX ORDER — HYDROCODONE BITARTRATE AND ACETAMINOPHEN 10; 325 MG/1; MG/1
1 TABLET ORAL
Qty: 30 TABLET | Refills: 0 | Status: SHIPPED | OUTPATIENT
Start: 2018-08-06 | End: 2018-09-04 | Stop reason: SDUPTHER

## 2018-08-06 RX ORDER — ALPRAZOLAM 0.5 MG/1
0.5 TABLET ORAL DAILY PRN
Qty: 30 TABLET | Refills: 0 | Status: SHIPPED | OUTPATIENT
Start: 2018-08-06 | End: 2018-09-04 | Stop reason: SDUPTHER

## 2018-08-09 ENCOUNTER — PATIENT MESSAGE (OUTPATIENT)
Dept: FAMILY MEDICINE | Facility: CLINIC | Age: 68
End: 2018-08-09

## 2018-08-15 DIAGNOSIS — E11.9 TYPE 2 DIABETES MELLITUS: ICD-10-CM

## 2018-08-24 DIAGNOSIS — E11.9 TYPE 2 DIABETES MELLITUS: ICD-10-CM

## 2018-08-28 ENCOUNTER — PATIENT MESSAGE (OUTPATIENT)
Dept: ENDOCRINOLOGY | Facility: CLINIC | Age: 68
End: 2018-08-28

## 2018-09-04 DIAGNOSIS — G89.29 CHRONIC BACK PAIN GREATER THAN 3 MONTHS DURATION: ICD-10-CM

## 2018-09-04 DIAGNOSIS — F41.9 ANXIETY DISORDER, UNSPECIFIED TYPE: ICD-10-CM

## 2018-09-04 DIAGNOSIS — M51.36 DEGENERATIVE DISC DISEASE, LUMBAR: ICD-10-CM

## 2018-09-04 DIAGNOSIS — M47.816 FACET ARTHRITIS OF LUMBAR REGION: ICD-10-CM

## 2018-09-04 DIAGNOSIS — M62.838 MUSCLE SPASM: ICD-10-CM

## 2018-09-04 DIAGNOSIS — M54.9 CHRONIC BACK PAIN GREATER THAN 3 MONTHS DURATION: ICD-10-CM

## 2018-09-04 RX ORDER — ALPRAZOLAM 0.5 MG/1
0.5 TABLET ORAL DAILY PRN
Qty: 30 TABLET | Refills: 0 | Status: SHIPPED | OUTPATIENT
Start: 2018-09-04 | End: 2018-10-03 | Stop reason: SDUPTHER

## 2018-09-04 RX ORDER — BACLOFEN 10 MG/1
10 TABLET ORAL 2 TIMES DAILY
Qty: 60 TABLET | Refills: 5 | Status: SHIPPED | OUTPATIENT
Start: 2018-09-04 | End: 2018-12-17 | Stop reason: SDUPTHER

## 2018-09-04 RX ORDER — HYDROCODONE BITARTRATE AND ACETAMINOPHEN 10; 325 MG/1; MG/1
1 TABLET ORAL
Qty: 30 TABLET | Refills: 0 | Status: SHIPPED | OUTPATIENT
Start: 2018-09-04 | End: 2018-09-10 | Stop reason: SDUPTHER

## 2018-09-10 ENCOUNTER — PATIENT MESSAGE (OUTPATIENT)
Dept: FAMILY MEDICINE | Facility: CLINIC | Age: 68
End: 2018-09-10

## 2018-09-10 DIAGNOSIS — M51.36 DEGENERATIVE DISC DISEASE, LUMBAR: ICD-10-CM

## 2018-09-10 DIAGNOSIS — M54.9 CHRONIC BACK PAIN GREATER THAN 3 MONTHS DURATION: ICD-10-CM

## 2018-09-10 DIAGNOSIS — G89.29 CHRONIC BACK PAIN GREATER THAN 3 MONTHS DURATION: ICD-10-CM

## 2018-09-10 DIAGNOSIS — M47.816 FACET ARTHRITIS OF LUMBAR REGION: ICD-10-CM

## 2018-09-10 RX ORDER — HYDROCODONE BITARTRATE AND ACETAMINOPHEN 10; 325 MG/1; MG/1
1 TABLET ORAL
Qty: 30 TABLET | Refills: 0 | Status: SHIPPED | OUTPATIENT
Start: 2018-09-10 | End: 2018-10-03 | Stop reason: SDUPTHER

## 2018-09-12 ENCOUNTER — PATIENT MESSAGE (OUTPATIENT)
Dept: ENDOCRINOLOGY | Facility: CLINIC | Age: 68
End: 2018-09-12

## 2018-09-13 ENCOUNTER — PATIENT MESSAGE (OUTPATIENT)
Dept: ENDOCRINOLOGY | Facility: CLINIC | Age: 68
End: 2018-09-13

## 2018-10-02 NOTE — PROGRESS NOTES
This note was created by combination of typed  and Dragon dictation.  Transcription errors may be present.  If there are any questions, please contact me.    Assessment & Plan:   Anxiety disorder, unspecified type  Alcohol use disorder, mild, in controlled environment  -long discussion with the patient.  I do not 1 escalate his alprazolam.  In the setting of increasing alcohol use and also with narcotics.  He has been on alprazolam longstanding I have weaned him down.  I would not escalated further.  He is not convinced that BuSpar has been helpful.  Lexapro at higher doses causes him to feel emotionally numb.  He has never tried hydroxyzine and after discussion I will try him with hydroxyzine.  He may take it up to twice daily.  Xanax he can take once a day.  He is not sure if BuSpar is helpful and he may just stop it after his most recent prescription is done.  If he experiences worsening anxiety off of it he can restarted.  I did discuss with him the long-term it would be my goal to further reduce his alprazolam.  Currently taking 0.5 mg daily and in the future I would decrease it to 0.25 mg daily.  -     hydrOXYzine HCl (ATARAX) 25 MG tablet; Take 1 tablet (25 mg total) by mouth 3 (three) times daily as needed for Itching.  Dispense: 60 tablet; Refill: 5  -     ALPRAZolam (XANAX) 0.5 MG tablet; Take 1 tablet (0.5 mg total) by mouth daily as needed for Anxiety.  Dispense: 30 tablet; Refill: 0    Need for vaccination for Strep pneumoniae  -     (In Office Administered) Pneumococcal Polysaccharide Vaccine (23 Valent) (SQ/IM)    Need for shingles vaccine  -     varicella-zoster gE-AS01B, PF, (SHINGRIX, PF,) 50 mcg/0.5 mL injection; Inject 0.5 mLs into the muscle once. Repeat in 2 months for 1 dose  Dispense: 0.5 mL; Refill: 1    Needs flu shot  -     Influenza - High Dose (65+) (PF) (IM)    Continuous tobacco abuse  -he'll call the [employer] quit line as he might be able to get his nicotine patch  covered.    Degenerative disc disease, lumbar  Facet arthritis of lumbar region  Chronic back pain greater than 3 months duration  -refilled hydrocodone for once daily use.  He anticipates higher levels of pain with more activity upcoming travel but with risk factors as above I would not escalate this and we talked about high risk from a safety standpoint.  -     HYDROcodone-acetaminophen (NORCO)  mg per tablet; Take 1 tablet by mouth every 24 hours as needed for Pain.  Dispense: 30 tablet; Refill: 0    Medications Discontinued During This Encounter   Medication Reason    ALPRAZolam (XANAX) 0.5 MG tablet Reorder    HYDROcodone-acetaminophen (NORCO)  mg per tablet Reorder         Current Outpatient Medications:     ALPRAZolam (XANAX) 0.5 MG tablet, Take 1 tablet (0.5 mg total) by mouth daily as needed for Anxiety., Disp: 30 tablet, Rfl: 0    aspirin (ECOTRIN) 81 MG EC tablet, Take 81 mg by mouth once daily., Disp: , Rfl:     atorvastatin (LIPITOR) 20 MG tablet, Take 1 tablet (20 mg total) by mouth once daily., Disp: 90 tablet, Rfl: 1    baclofen (LIORESAL) 10 MG tablet, TAKE 1 TABLET (10 MG TOTAL) BY MOUTH 2 (TWO) TIMES DAILY., Disp: 60 tablet, Rfl: 5    blood sugar diagnostic (ACCU-CHEK KM PLUS TEST STRP) Strp, 1 strip by Misc.(Non-Drug; Combo Route) route 6 (six) times daily. To be used with Accu-Chek Km Plus glucometer, Disp: 600 strip, Rfl: 3    blood-glucose meter kit, Use as instructed, Accu-Chek Km Plus glucometer, Disp: 1 each, Rfl: 0    busPIRone (BUSPAR) 15 MG tablet, Take 2 tablets (30 mg total) by mouth 2 (two) times daily. Substitute for dose of xanax., Disp: 120 tablet, Rfl: 11    desoximetasone (TOPICORT) 0.25 % cream, Apply topically 2 (two) times daily as needed. , Disp: , Rfl:     escitalopram oxalate (LEXAPRO) 20 MG tablet, Take 0.5 tablets (10 mg total) by mouth once daily., Disp: 15 tablet, Rfl: 11    fluticasone (FLONASE) 50 mcg/actuation nasal spray, 2 sprays by  "Each Nare route once daily., Disp: , Rfl:     HYDROcodone-acetaminophen (NORCO)  mg per tablet, Take 1 tablet by mouth every 24 hours as needed for Pain., Disp: 30 tablet, Rfl: 0    insulin aspart (NOVOLOG FLEXPEN U-100 INSULIN SUBQ), , Disp: , Rfl:     insulin aspart U-100 (NOVOLOG) 100 unit/mL InPn pen, Inject prior to meals. Requires max 50 units/day., Disp: 3 Box, Rfl: 1    ipratropium (ATROVENT HFA) 17 mcg/actuation inhaler, Inhale 2 puffs into the lungs every 6 (six) hours. Rescue, Disp: 12.9 g, Rfl: 5    lancets (ACCU-CHEK FASTCLIX) Misc, 1 lancet by Misc.(Non-Drug; Combo Route) route 6 (six) times daily. To be used with Accu-Chek Amanda Plus glucometer, Disp: 600 each, Rfl: 3    lisinopril (PRINIVIL,ZESTRIL) 5 MG tablet, Take 1 tablet (5 mg total) by mouth once daily., Disp: 90 tablet, Rfl: 2    montelukast (SINGULAIR) 10 mg tablet, Take 1 tablet (10 mg total) by mouth every evening., Disp: 30 tablet, Rfl: 5    omeprazole (PRILOSEC) 40 MG capsule, Take 1 capsule (40 mg total) by mouth 2 (two) times daily before meals., Disp: 60 capsule, Rfl: 3    ondansetron (ZOFRAN) 4 MG tablet, Take 1 tablet (4 mg total) by mouth every 6 (six) hours., Disp: 12 tablet, Rfl: 0    pen needle, diabetic (BD ULTRA-FINE RENETTA PEN NEEDLE) 32 gauge x 5/32" Ndle, Use 3x/day with Novolog, Disp: 300 each, Rfl: 3    hydrOXYzine HCl (ATARAX) 25 MG tablet, Take 1 tablet (25 mg total) by mouth 3 (three) times daily as needed for Itching., Disp: 60 tablet, Rfl: 5    varicella-zoster gE-AS01B, PF, (SHINGRIX, PF,) 50 mcg/0.5 mL injection, Inject 0.5 mLs into the muscle once. Repeat in 2 months for 1 dose, Disp: 0.5 mL, Rfl: 1    Follow Up: No Follow-up on file.    Subjective:     Chief Complaint   Patient presents with    Anxiety    Back Pain    Nicotine Dependence       HPI  Moe is a 67 y.o. male, last appointment with this clinic was 5/22/2018.    DM2 with neuropathy, followed by endo; gabapentin with SE of memory loss " "and ? Tremor so stopped; metformin with SE.  HTN  hyperlipidemia  Smoker, contemplative stage of quitting.  He is not interested in smoking cessation classes.  COPD, 12/2017 PFTs showing mild airflow obstruction, air trapping and decreased DLCO.  No desaturation.  Spinal stenosis on chronic narcotic therapy, 7/2014 MRI L spine with lumbar spondylosis with mod/severe right neural foraminal stenossi L4-5; significant degenerative disk disease, facet joint arthropathy L4-5, L5-S1; hx of injections; had been seen by pain mgmt 2015 but discharged for THC. hydrocodone 10/325 BID. Takes by his estimate 1.5 tablets a day mainly in the evening.   Hx of Aleve had to stop after surgery for deviated septum. Now he notes various other joints that hurt and are helped by the narcotic therapy. Worse with prolonged standing and walking.  He had talked to neurosurgery, 2015, they had recommended surgery and he is very wary of surgery.  Goal is to try to reduce intake.  Pain contract signed 1/2018  Anxiety, Family history of suicide, father committed suicide and his son did as well. alprazolam 0.5 mg TID.  Typically takes 2 at night, maybe 1 in the morning.  8/2/2017, started on zoloft.  Zoloft SE of hypoglycemia/ shakiness so changed to Lexapro. xanax TID and ultimately goal is wean off. He was going to xanax to 1 tablet at night.  Notes he had a "hard father".  Alcohol use. Has been to Alcoholics Anonymous and has quit before.  Did not really find Alcoholics Anonymous to be that helpful.  Feels like he needs to get himself in his own mind set in order to quit and is not adjusted and further referral at this time.last OV he promised me he would cut down to 3 drinks a day.  Colon polyps, last colonoscopy 2009.  Referred in March for colonoscopy.  4/11/2017 colonoscopy with transverse tubular adenoma  Schatzki ring dilitation; followed by [facility].  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 [place] to be due to EtOH.  Gallstones on RUQ US " 5/2018.  6/2018 EGD with hiatal hernia, chronic gastritis, H pylori negative. Schatzki ring s/p dilitation    I previously saw him back in mid May.  Incidental calculus of the gallbladder  Stomach pain, plan was EGD  Abnormal chest x-ray status post treatment for pneumonia. Repeat CXR 5/22 was normal.     9/10 norco #30  9/5 xanax 0.5 mg #30    7/9/18 a1c 6.3.  1/2018 FLP good    He notes high stress.  He has deadlines at work at that he does not think it is going to be done with significant financial consequences.  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  So that is in extreme source of stress.  He is going to be traveling for vacation, degrees, [place], [place].  Unfortunately he does find travel to be stressful.  He is anticipating that his stress level be even worse.  So for this stress, he is requesting an increase for his Xanax.  Currently 0.5 mg once daily.  I have also been supplementing him with BuSpar.  He is not sure that the BuSpar has really ever helped.  He has never tried hydroxyzine.  He has been drinking more.  He estimates he will drink anywhere from 3-6 alcoholic drinks a night.  He is requesting refill on his hydrocodone.  One today.  In anticipation of walking more for his upcoming trip, he is requesting a temporary increase in the number.  I did discuss with him with his risk factors of a history of alcohol use disorder and current use of high risk medications that increasing the doses would be dangerous.  I am going to try him with hydroxyzine.  Higher doses of Lexapro had side effects of emotional numbness.    He is requesting a flu shot, pneumonia shot, and a shingles shot.  I will send that to the pharmacy.    Wants to quit smoking.  He has lozenges and has a fake cigarette.  We talked about nicotine patch and he wonders if a prescription would be cheaper than over-the-counter.  I did discuss with him that the smoking trust fund may be able to supply him this for free and he  will call them.    Hx of PACs on EKG 5/2018.    Answers for HPI/ROS submitted by the patient on 10/2/2018   Back pain  Chronicity: chronic  Onset: more than 1 year ago  Frequency: 2 to 4 times per day  Progression since onset: gradually worsening  Pain location: sacro-iliac  Pain quality: aching  Radiates to: right thigh  Pain - numeric: 10/10  Pain is: worse during the day  Aggravated by: standing  Stiffness is present: in the morning  bladder incontinence: No  bowel incontinence: No  leg pain: Yes  numbness: No  paresis: No  paresthesias: Yes  pelvic pain: Yes  genital pain: No  Risk factors: history of cancer  Pain severity: severe  Treatments tried: analgesics  Improvement on treatment: mild      Patient Care Team:  Damian Ramon MD as PCP - General (Internal Medicine)  Damian Ramon MD as PCP - Tati GIL/Alexander Bonilla RD as Dietitian (Diabetes)  Josias Ling MD as Consulting Physician (Neurosurgery)  Aime De Anda MD as Consulting Physician (Otolaryngology)    Patient Active Problem List    Diagnosis Date Noted    Schatzki's ring of distal esophagus 6/2018 s/p dilitation 06/19/2018    Hiatal hernia on EGD 6/2018; biopsies chronic gastritis, H pylori negative. 06/19/2018    Colon cancer screening 06/18/2018    Calculus of gallbladder without cholecystitis without obstruction on RUQ US 5/2018 05/21/2018    Chronic seasonal allergic rhinitis 03/01/2018    Closed fracture of multiple ribs of left side 01/01/2018    Alcohol-induced chronic pancreatitis 01/01/2018    Abdominal aortic atherosclerosis 11/14/2017     On CXR 4/5/2016 and on 11/2/2012 XR L spine  On AAA screening 3/2018      Chronic obstructive bronchitis with pulmonary emphysema 11/14/2017 12/2017 PFTs showed mild airflow obstruction.  Lung volume showing air trapping.  DLCO reduced. no O2 desat.  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    Chronic narcotic use  08/02/2017    Special screening for malignant neoplasms, colon 04/11/2017    Deviated nasal septum 04/19/2016    Chronic back pain greater than 3 months duration 01/06/2016    Small fiber neuropathy 07/29/2015     Likely related to alcohol use      Facet arthritis of lumbar region 10/08/2014    Spondylosis without myelopathy 08/13/2014    Degeneration of lumbar or lumbosacral intervertebral disc 08/13/2014    Spinal stenosis, lumbar region, with neurogenic claudication 08/13/2014    Tubular adenoma of colon 4/2017 04/22/2014 4/11/2017 colonoscopy with transverse tubular adenoma      DM type 2 with diabetic peripheral neuropathy 07/24/2013    Hypertriglyceridemia 06/13/2013    Anxiety disorder 06/12/2013     Trazodone ineffective.     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    Cataract     Diabetes mellitus     Pancreatitis 2008    2 Times       PAST SURGICAL HISTORY:  Past Surgical History:   Procedure Laterality Date     thumb surgery      Dead Skin cell tumor Rt thumb    CAUTERIZATION OF TURBINATES N/A 4/19/2016    [facility] at [facility] OR    COLONOSCOPY N/A 4/11/2017    Procedure: COLONOSCOPY;  Surgeon: Meet Quesada MD;  Location: [facility] ENDO;  Service: Endoscopy;  Laterality: N/A;    COLONOSCOPY N/A 4/11/2017    Performed by Meet Quesada MD at [facility] ENDO    ANDREA-TRANSFORAMINAL Right 11/26/2014    [facility]    ANDREA-TRANSFORAMINAL Right 11/5/2014    [facility]    ESOPHAGOGASTRODUODENOSCOPY N/A 6/18/2018    Procedure: ESOPHAGOGASTRODUODENOSCOPY (EGD);  Surgeon: Marco Antonio Gonsalves MD;  Location: [facility] ENDO;  Service: Endoscopy;  Laterality: N/A;    ESOPHAGOGASTRODUODENOSCOPY  (EGD) N/A 6/18/2018    [facility] at [facility] ENDO    ESOPHAGOGASTRODUODENOSCOPY (EGD) N/A 5/30/2014    Performed by Meet [facility]ada [facility] at [facility] ENDO    MOUTH FLOOR MASS EXCISION  2009    NASAL SEPTUM SURGERY      RECONSTRUCTION-NASAL N/A 4/19/2016    Per[facility] at [facility] OR    SKIN SURGERY         SOCIAL HISTORY:  Social History     Socioeconomic History    Marital status:      Spouse name: Not on file    Number of children: Not on file    Years of education: Not on file    Highest education level: Not on file   Social Needs    Financial resource strain: Not on file    Food insecurity - worry: Not on file    Food insecurity - inability: Not on file    Transportation needs - medical: Not on file    Transportation needs - non-medical: Not on file   Occupational History    Occupation: , self employed   Tobacco Use    Smoking status: Current Every Day Smoker     Packs/day: 1.50     Years: 45.00     Pack years: 67.50     Types: Cigarettes    Smokeless tobacco: Never Used   Substance and Sexual Activity    Alcohol use: Yes     Alcohol/week: 0.5 oz     Types: 1 Standard drinks or equivalent per week     Comment: daily scotch and water, half pint    Drug use: No     Comment: rarely    Sexual activity: Yes     Partners: Female   Other Topics Concern    Not on file   Social History Narrative    , 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  One child committed suicide.       ALLERGIES AND MEDICATIONS: updated and reviewed.  Review of patient's allergies indicates:   Allergen Reactions    Amoxicillin Nausea And Vomiting    Adhesive Rash     Paper tape only    Metformin Diarrhea     Appetite loss      Current Outpatient Medications   Medication Sig Dispense Refill    ALPRAZolam (XANAX) 0.5 MG tablet Take 1 tablet (0.5 mg total) by mouth daily as needed for Anxiety. 30  tablet 0    aspirin (ECOTRIN) 81 MG EC tablet Take 81 mg by mouth once daily.      atorvastatin (LIPITOR) 20 MG tablet Take 1 tablet (20 mg total) by mouth once daily. 90 tablet 1    baclofen (LIORESAL) 10 MG tablet TAKE 1 TABLET (10 MG TOTAL) BY MOUTH 2 (TWO) TIMES DAILY. 60 tablet 5    blood sugar diagnostic (ACCU-CHEK KM PLUS TEST STRP) Strp 1 strip by Misc.(Non-Drug; Combo Route) route 6 (six) times daily. To be used with Accu-Chek Km Plus glucometer 600 strip 3    blood-glucose meter kit Use as instructed, Accu-Chek Km Plus glucometer 1 each 0    busPIRone (BUSPAR) 15 MG tablet Take 2 tablets (30 mg total) by mouth 2 (two) times daily. Substitute for dose of xanax. 120 tablet 11    desoximetasone (TOPICORT) 0.25 % cream Apply topically 2 (two) times daily as needed.       escitalopram oxalate (LEXAPRO) 20 MG tablet Take 0.5 tablets (10 mg total) by mouth once daily. 15 tablet 11    fluticasone (FLONASE) 50 mcg/actuation nasal spray 2 sprays by Each Nare route once daily.      HYDROcodone-acetaminophen (NORCO)  mg per tablet Take 1 tablet by mouth every 24 hours as needed for Pain. 30 tablet 0    insulin aspart (NOVOLOG FLEXPEN U-100 INSULIN SUBQ)       insulin aspart U-100 (NOVOLOG) 100 unit/mL InPn pen Inject prior to meals. Requires max 50 units/day. 3 Box 1    ipratropium (ATROVENT HFA) 17 mcg/actuation inhaler Inhale 2 puffs into the lungs every 6 (six) hours. Rescue 12.9 g 5    lancets (ACCU-CHEK FASTCLIX) Misc 1 lancet by Misc.(Non-Drug; Combo Route) route 6 (six) times daily. To be used with Accu-Chek Km Plus glucometer 600 each 3    lisinopril (PRINIVIL,ZESTRIL) 5 MG tablet Take 1 tablet (5 mg total) by mouth once daily. 90 tablet 2    montelukast (SINGULAIR) 10 mg tablet Take 1 tablet (10 mg total) by mouth every evening. 30 tablet 5    omeprazole (PRILOSEC) 40 MG capsule Take 1 capsule (40 mg total) by mouth 2 (two) times daily before meals. 60 capsule 3     "ondansetron (ZOFRAN) 4 MG tablet Take 1 tablet (4 mg total) by mouth every 6 (six) hours. 12 tablet 0    pen needle, diabetic (BD ULTRA-FINE RENETTA PEN NEEDLE) 32 gauge x 5/32" Ndle Use 3x/day with Novolog 300 each 3     No current facility-administered medications for this visit.        Review of Systems   Constitutional: Negative for fever and weight loss.   Cardiovascular: Negative for chest pain.   Gastrointestinal: Negative for abdominal pain.   Genitourinary: Negative for dysuria and hematuria.   Musculoskeletal: Positive for back pain.   Neurological: Negative for headaches.       Objective:   Physical Exam   Vitals:    10/03/18 1506   BP: (!) 148/88   Pulse: 88   Weight: 70.8 kg (156 lb 1.4 oz)   Height: 6' (1.829 m)    Body mass index is 21.17 kg/m².  Weight: 70.8 kg (156 lb 1.4 oz)   Height: 6' (182.9 cm)     Physical Exam   Constitutional: He is oriented to person, place, and time. He appears well-developed and well-nourished. No distress.   Eyes: EOM are normal.   Cardiovascular: Normal rate and normal heart sounds.   No murmur heard.  Frequent early beats   Pulmonary/Chest: Effort normal and breath sounds normal.   Musculoskeletal: Normal range of motion. He exhibits no edema.   Neurological: He is alert and oriented to person, place, and time. Coordination normal.   Skin: Skin is warm and dry.   Psychiatric: He has a normal mood and affect. His behavior is normal. Thought content normal.     "

## 2018-10-03 ENCOUNTER — OFFICE VISIT (OUTPATIENT)
Dept: FAMILY MEDICINE | Facility: CLINIC | Age: 68
End: 2018-10-03
Payer: MEDICARE

## 2018-10-03 ENCOUNTER — LAB VISIT (OUTPATIENT)
Dept: LAB | Facility: HOSPITAL | Age: 68
End: 2018-10-03
Attending: NURSE PRACTITIONER
Payer: MEDICARE

## 2018-10-03 VITALS
HEIGHT: 72 IN | HEART RATE: 88 BPM | SYSTOLIC BLOOD PRESSURE: 148 MMHG | WEIGHT: 156.06 LBS | BODY MASS INDEX: 21.14 KG/M2 | DIASTOLIC BLOOD PRESSURE: 88 MMHG

## 2018-10-03 DIAGNOSIS — M54.9 CHRONIC BACK PAIN GREATER THAN 3 MONTHS DURATION: ICD-10-CM

## 2018-10-03 DIAGNOSIS — M51.36 DEGENERATIVE DISC DISEASE, LUMBAR: ICD-10-CM

## 2018-10-03 DIAGNOSIS — Z72.0 CONTINUOUS TOBACCO ABUSE: ICD-10-CM

## 2018-10-03 DIAGNOSIS — Z23 NEED FOR VACCINATION FOR STREP PNEUMONIAE: ICD-10-CM

## 2018-10-03 DIAGNOSIS — F41.9 ANXIETY DISORDER, UNSPECIFIED TYPE: Primary | ICD-10-CM

## 2018-10-03 DIAGNOSIS — M47.816 FACET ARTHRITIS OF LUMBAR REGION: ICD-10-CM

## 2018-10-03 DIAGNOSIS — Z23 NEED FOR SHINGLES VACCINE: ICD-10-CM

## 2018-10-03 DIAGNOSIS — Z23 NEEDS FLU SHOT: ICD-10-CM

## 2018-10-03 DIAGNOSIS — G89.29 CHRONIC BACK PAIN GREATER THAN 3 MONTHS DURATION: ICD-10-CM

## 2018-10-03 DIAGNOSIS — F10.10 ALCOHOL USE DISORDER, MILD, IN CONTROLLED ENVIRONMENT: ICD-10-CM

## 2018-10-03 PROCEDURE — 1101F PT FALLS ASSESS-DOCD LE1/YR: CPT | Mod: CPTII,,, | Performed by: INTERNAL MEDICINE

## 2018-10-03 PROCEDURE — 90732 PPSV23 VACC 2 YRS+ SUBQ/IM: CPT | Mod: PBBFAC,PO

## 2018-10-03 PROCEDURE — 36415 COLL VENOUS BLD VENIPUNCTURE: CPT | Mod: PN

## 2018-10-03 PROCEDURE — 3008F BODY MASS INDEX DOCD: CPT | Mod: CPTII,,, | Performed by: INTERNAL MEDICINE

## 2018-10-03 PROCEDURE — 83036 HEMOGLOBIN GLYCOSYLATED A1C: CPT

## 2018-10-03 PROCEDURE — 90662 IIV NO PRSV INCREASED AG IM: CPT | Mod: PBBFAC,PO

## 2018-10-03 PROCEDURE — 99213 OFFICE O/P EST LOW 20 MIN: CPT | Mod: PBBFAC,PO,25 | Performed by: INTERNAL MEDICINE

## 2018-10-03 PROCEDURE — 99214 OFFICE O/P EST MOD 30 MIN: CPT | Mod: S$PBB,,, | Performed by: INTERNAL MEDICINE

## 2018-10-03 PROCEDURE — 99999 PR PBB SHADOW E&M-EST. PATIENT-LVL III: CPT | Mod: PBBFAC,,, | Performed by: INTERNAL MEDICINE

## 2018-10-03 RX ORDER — HYDROXYZINE HYDROCHLORIDE 25 MG/1
25 TABLET, FILM COATED ORAL 3 TIMES DAILY PRN
Qty: 60 TABLET | Refills: 5 | Status: SHIPPED | OUTPATIENT
Start: 2018-10-03 | End: 2018-10-08

## 2018-10-03 RX ORDER — ALPRAZOLAM 0.5 MG/1
0.5 TABLET ORAL DAILY PRN
Qty: 30 TABLET | Refills: 0 | Status: SHIPPED | OUTPATIENT
Start: 2018-10-03 | End: 2018-11-09 | Stop reason: SDUPTHER

## 2018-10-03 RX ORDER — HYDROCODONE BITARTRATE AND ACETAMINOPHEN 10; 325 MG/1; MG/1
1 TABLET ORAL
Qty: 30 TABLET | Refills: 0 | Status: SHIPPED | OUTPATIENT
Start: 2018-10-03 | End: 2018-11-09 | Stop reason: SDUPTHER

## 2018-10-04 LAB
ESTIMATED AVG GLUCOSE: 157 MG/DL
HBA1C MFR BLD HPLC: 7.1 %

## 2018-10-08 ENCOUNTER — OFFICE VISIT (OUTPATIENT)
Dept: FAMILY MEDICINE | Facility: CLINIC | Age: 68
End: 2018-10-08
Payer: MEDICARE

## 2018-10-08 ENCOUNTER — HOSPITAL ENCOUNTER (OUTPATIENT)
Dept: RADIOLOGY | Facility: HOSPITAL | Age: 68
Discharge: HOME | End: 2018-10-08
Attending: INTERNAL MEDICINE
Payer: MEDICARE

## 2018-10-08 VITALS
BODY MASS INDEX: 21.17 KG/M2 | DIASTOLIC BLOOD PRESSURE: 88 MMHG | WEIGHT: 156.31 LBS | HEART RATE: 72 BPM | SYSTOLIC BLOOD PRESSURE: 148 MMHG | HEIGHT: 72 IN | OXYGEN SATURATION: 98 % | TEMPERATURE: 98 F

## 2018-10-08 DIAGNOSIS — R07.81 RIB PAIN ON RIGHT SIDE: ICD-10-CM

## 2018-10-08 DIAGNOSIS — B96.89 ACUTE BACTERIAL SINUSITIS: ICD-10-CM

## 2018-10-08 DIAGNOSIS — J01.90 ACUTE BACTERIAL SINUSITIS: ICD-10-CM

## 2018-10-08 DIAGNOSIS — F41.9 ANXIETY DISORDER, UNSPECIFIED TYPE: ICD-10-CM

## 2018-10-08 DIAGNOSIS — R55 SYNCOPE, UNSPECIFIED SYNCOPE TYPE: Primary | ICD-10-CM

## 2018-10-08 DIAGNOSIS — E11.42 DM TYPE 2 WITH DIABETIC PERIPHERAL NEUROPATHY: Chronic | ICD-10-CM

## 2018-10-08 PROCEDURE — 71100 X-RAY EXAM RIBS UNI 2 VIEWS: CPT | Mod: TC,FY,PO

## 2018-10-08 PROCEDURE — 3008F BODY MASS INDEX DOCD: CPT | Mod: CPTII,,, | Performed by: INTERNAL MEDICINE

## 2018-10-08 PROCEDURE — 99999 PR PBB SHADOW E&M-EST. PATIENT-LVL IV: CPT | Mod: PBBFAC,,, | Performed by: INTERNAL MEDICINE

## 2018-10-08 PROCEDURE — 99214 OFFICE O/P EST MOD 30 MIN: CPT | Mod: S$PBB,,, | Performed by: INTERNAL MEDICINE

## 2018-10-08 PROCEDURE — 99214 OFFICE O/P EST MOD 30 MIN: CPT | Mod: PBBFAC,PO | Performed by: INTERNAL MEDICINE

## 2018-10-08 PROCEDURE — 1101F PT FALLS ASSESS-DOCD LE1/YR: CPT | Mod: CPTII,,, | Performed by: INTERNAL MEDICINE

## 2018-10-08 PROCEDURE — 93005 ELECTROCARDIOGRAM TRACING: CPT | Mod: PBBFAC,PO | Performed by: INTERNAL MEDICINE

## 2018-10-08 PROCEDURE — 3045F PR MOST RECENT HEMOGLOBIN A1C LEVEL 7.0-9.0%: CPT | Mod: CPTII,,, | Performed by: INTERNAL MEDICINE

## 2018-10-08 PROCEDURE — 71100 X-RAY EXAM RIBS UNI 2 VIEWS: CPT | Mod: 26,,, | Performed by: RADIOLOGY

## 2018-10-08 PROCEDURE — 93010 ELECTROCARDIOGRAM REPORT: CPT | Mod: S$PBB,,, | Performed by: INTERNAL MEDICINE

## 2018-10-08 RX ORDER — HYDROXYZINE HYDROCHLORIDE 50 MG/1
TABLET, FILM COATED ORAL
Qty: 30 TABLET | Refills: 3 | Status: SHIPPED | OUTPATIENT
Start: 2018-10-08 | End: 2018-12-06 | Stop reason: SDUPTHER

## 2018-10-08 RX ORDER — DOXYCYCLINE 100 MG/1
100 CAPSULE ORAL EVERY 12 HOURS
Qty: 14 CAPSULE | Refills: 0 | Status: SHIPPED | OUTPATIENT
Start: 2018-10-08 | End: 2018-10-15

## 2018-10-08 RX ORDER — HYDROXYZINE HYDROCHLORIDE 50 MG/1
TABLET, FILM COATED ORAL
Qty: 30 TABLET | Refills: 3 | Status: SHIPPED | OUTPATIENT
Start: 2018-10-08 | End: 2018-10-08 | Stop reason: SDUPTHER

## 2018-10-08 NOTE — PROGRESS NOTES
This note was created by combination of typed  and Dragon dictation.  Transcription errors may be present.  If there are any questions, please contact me.    Assessment & Plan:   Syncope, unspecified syncope type  -his symptoms are more consistent with syncope rather than tripping or falling.  Check CBC, BMP, check TSH.  Referral to Cardiology for evaluation.  I do not think this is central nervous system pathology.  Does not seem to be consistent with seizure, no postictal, sounds like he was out for maybe a 2nd or 2 at most, awoke oriented.  -     EKG 12-lead  -     Ambulatory referral to Cardiology  -     CBC auto differential; Future; Expected date: 10/08/2018  -     Basic metabolic panel; Future; Expected date: 10/08/2018  -     TSH; Future; Expected date: 10/08/2018    Anxiety disorder, unspecified type  -rx for hydroxyzine 50, 1/2 tablet PRN  -     Discontinue: hydrOXYzine HCl (ATARAX) 50 MG tablet; 1/2 tablet every 4-6 hours as needed for anxiety  Dispense: 30 tablet; Refill: 3  -     hydrOXYzine (ATARAX) 50 MG tablet; 1/2 tablet every 4-6 hours as needed for anxiety  Dispense: 30 tablet; Refill: 3    Acute bacterial sinusitis  -rx for doxycycline  -     doxycycline (VIBRAMYCIN) 100 MG Cap; Take 1 capsule (100 mg total) by mouth every 12 (twelve) hours. for 7 days  Dispense: 14 capsule; Refill: 0    DM type 2 with diabetic peripheral neuropathy  -     TSH; Future; Expected date: 10/08/2018    Rib pain on right side  -check XR.  He is OK not to have additional narcotic to what he's already taking.   -     X-Ray Ribs 2 View Right; Future; Expected date: 10/08/2018        Medications Discontinued During This Encounter   Medication Reason    hydrOXYzine HCl (ATARAX) 25 MG tablet     hydrOXYzine HCl (ATARAX) 50 MG tablet Reorder         Current Outpatient Medications:     ALPRAZolam (XANAX) 0.5 MG tablet, Take 1 tablet (0.5 mg total) by mouth daily as needed for Anxiety., Disp: 30 tablet, Rfl: 0     aspirin (ECOTRIN) 81 MG EC tablet, Take 81 mg by mouth once daily., Disp: , Rfl:     atorvastatin (LIPITOR) 20 MG tablet, Take 1 tablet (20 mg total) by mouth once daily., Disp: 90 tablet, Rfl: 1    baclofen (LIORESAL) 10 MG tablet, TAKE 1 TABLET (10 MG TOTAL) BY MOUTH 2 (TWO) TIMES DAILY., Disp: 60 tablet, Rfl: 5    blood sugar diagnostic (ACCU-CHEK KM PLUS TEST STRP) Strp, 1 strip by Misc.(Non-Drug; Combo Route) route 6 (six) times daily. To be used with Accu-Chek Km Plus glucometer, Disp: 600 strip, Rfl: 3    blood-glucose meter kit, Use as instructed, Accu-Chek Km Plus glucometer, Disp: 1 each, Rfl: 0    busPIRone (BUSPAR) 15 MG tablet, Take 2 tablets (30 mg total) by mouth 2 (two) times daily. Substitute for dose of xanax., Disp: 120 tablet, Rfl: 11    desoximetasone (TOPICORT) 0.25 % cream, Apply topically 2 (two) times daily as needed. , Disp: , Rfl:     escitalopram oxalate (LEXAPRO) 20 MG tablet, Take 0.5 tablets (10 mg total) by mouth once daily., Disp: 15 tablet, Rfl: 11    fluticasone (FLONASE) 50 mcg/actuation nasal spray, 2 sprays by Each Nare route once daily., Disp: , Rfl:     HYDROcodone-acetaminophen (NORCO)  mg per tablet, Take 1 tablet by mouth every 24 hours as needed for Pain., Disp: 30 tablet, Rfl: 0    hydrOXYzine (ATARAX) 50 MG tablet, 1/2 tablet every 4-6 hours as needed for anxiety, Disp: 30 tablet, Rfl: 3    insulin aspart (NOVOLOG FLEXPEN U-100 INSULIN SUBQ), , Disp: , Rfl:     insulin aspart U-100 (NOVOLOG) 100 unit/mL InPn pen, Inject prior to meals. Requires max 50 units/day., Disp: 3 Box, Rfl: 1    ipratropium (ATROVENT HFA) 17 mcg/actuation inhaler, Inhale 2 puffs into the lungs every 6 (six) hours. Rescue, Disp: 12.9 g, Rfl: 5    lancets (ACCU-CHEK FASTCLIX) Misc, 1 lancet by Misc.(Non-Drug; Combo Route) route 6 (six) times daily. To be used with Accu-Chek Km Plus glucometer, Disp: 600 each, Rfl: 3    lisinopril (PRINIVIL,ZESTRIL) 5 MG tablet, Take  "1 tablet (5 mg total) by mouth once daily., Disp: 90 tablet, Rfl: 2    montelukast (SINGULAIR) 10 mg tablet, Take 1 tablet (10 mg total) by mouth every evening., Disp: 30 tablet, Rfl: 5    omeprazole (PRILOSEC) 40 MG capsule, Take 1 capsule (40 mg total) by mouth 2 (two) times daily before meals., Disp: 60 capsule, Rfl: 3    ondansetron (ZOFRAN) 4 MG tablet, Take 1 tablet (4 mg total) by mouth every 6 (six) hours., Disp: 12 tablet, Rfl: 0    pen needle, diabetic (BD ULTRA-FINE RENETTA PEN NEEDLE) 32 gauge x 5/32" Ndle, Use 3x/day with Novolog, Disp: 300 each, Rfl: 3    doxycycline (VIBRAMYCIN) 100 MG Cap, Take 1 capsule (100 mg total) by mouth every 12 (twelve) hours. for 7 days, Disp: 14 capsule, Rfl: 0    Follow Up: No Follow-up on file.    Subjective:     Chief Complaint   Patient presents with    Fall    Chest Pain       HPI  Moe is a 67 y.o. male, last appointment with this clinic was 10/3/2018.    Last Thursday, he went out to eat with his wife.  Had 3 alcoholic drinks which is an improvement for him over previous.  He typically drinks and Ensure and he was at home, in the kitchen, drinking the Ensure, tilted his head back to drink it, and then the next thing he knew he was waking up on the floor.  The he had apparently fallen onto his right side, hit the right hip and hit his right elbow which jammed into the right chest wall with resultant rib pain.    Does not recall any prodrome preceding this, no aura, no lightheadedness, no sensation of palpitations or presyncope.  Has never had anything like this before.  This was unwitnessed.  By the time his wife came into the room he was getting himself up off of the floor.    No further episodes since then.  I previously evaluated him for irregular heartbeat, EKG showing PACs.  No sensation of palpitations.    He has had some bruising on his right hip and bruising on the right elbow.  No bruising on the ribs.  It hurts to take a deep breath and coughing is " extremely painful.    He has been having sinus congestion for the past 6 months by his estimate, with discomfort and pressure which never seems to fully resolve.    He reports that the pharmacy was out of hydroxyzine.  I had gotten notice on another patient that 25 mg are in short supply and I will give him a prescription for 50 mg and instructions to take half of a pill.    Answers for HPI/ROS submitted by the patient on 10/8/2018   activity change: No  unexpected weight change: No  rhinorrhea: No  trouble swallowing: No  visual disturbance: No  chest tightness: No  polyuria: No  difficulty urinating: No  joint swelling: No  arthralgias: Yes  confusion: No  dysphoric mood: No      Patient Care Team:  Damian Ramon MD as PCP - General (Internal Medicine)  Damian Ramon MD as PCP - Tati GIL/Alexander Bonilla RD as Dietitian (Diabetes)  Josias Ling MD as Consulting Physician (Neurosurgery)  Aime De Anda MD as Consulting Physician (Otolaryngology)    Patient Active Problem List    Diagnosis Date Noted    Schatzki's ring of distal esophagus 6/2018 s/p dilitation 06/19/2018    Hiatal hernia on EGD 6/2018; biopsies chronic gastritis, H pylori negative. 06/19/2018    Colon cancer screening 06/18/2018    Calculus of gallbladder without cholecystitis without obstruction on RUQ US 5/2018 05/21/2018    Chronic seasonal allergic rhinitis 03/01/2018    Closed fracture of multiple ribs of left side 01/01/2018    Alcohol-induced chronic pancreatitis 01/01/2018    Abdominal aortic atherosclerosis 11/14/2017     On CXR 4/5/2016 and on 11/2/2012 XR L spine  On AAA screening 3/2018      Chronic obstructive bronchitis with pulmonary emphysema 11/14/2017 12/2017 PFTs showed mild airflow obstruction.  Lung volume showing air trapping.  DLCO reduced. no O2 desat.  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    Chronic narcotic use 08/02/2017    Special  screening for malignant neoplasms, colon 04/11/2017    Deviated nasal septum 04/19/2016    Chronic back pain greater than 3 months duration 01/06/2016    Small fiber neuropathy 07/29/2015     Likely related to alcohol use      Facet arthritis of lumbar region 10/08/2014    Spondylosis without myelopathy 08/13/2014    Degeneration of lumbar or lumbosacral intervertebral disc 08/13/2014    Spinal stenosis, lumbar region, with neurogenic claudication 08/13/2014    Tubular adenoma of colon 4/2017 04/22/2014 4/11/2017 colonoscopy with transverse tubular adenoma      DM type 2 with diabetic peripheral neuropathy 07/24/2013    Hypertriglyceridemia 06/13/2013    Anxiety disorder 06/12/2013     Trazodone ineffective.     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    Cataract     Diabetes mellitus     Pancreatitis 2008    2 Times       PAST SURGICAL HISTORY:  Past Surgical History:   Procedure Laterality Date     thumb surgery      Dead Skin cell tumor Rt thumb    CAUTERIZATION OF TURBINATES N/A 4/19/2016    [facility]    COLONOSCOPY N/A 4/11/2017    Procedure: COLONOSCOPY;  Surgeon: Meet Quesada MD;  Location: [facility] ENDO;  Service: Endoscopy;  Laterality: N/A;    COLONOSCOPY N/A 4/11/2017    Performed by Meet Quesada MD at [facility] ENDO    ANDREA-TRANSFORAMINAL Right 11/26/2014    [facility]    ANDREA-TRANSFORAMINAL Right 11/5/2014    [facility]    ESOPHAGOGASTRODUODENOSCOPY N/A 6/18/2018    Procedure: ESOPHAGOGASTRODUODENOSCOPY (EGD);  Surgeon: Marco Antonio Gonsalves MD;  Location: [facility] ENDO;  Service: Endoscopy;  Laterality: N/A;     ESOPHAGOGASTRODUODENOSCOPY (EGD) N/A 6/18/2018    Per[facility]    ESOPHAGOGASTRODUODENOSCOPY (EGD) N/A 5/30/2014    [facility]ada [facility] at [facility]O    MOUTH FLOOR MASS EXCISION  2009    NASAL SEPTUM SURGERY      RECONSTRUCTION-NASAL N/A 4/19/2016    [facility] at [facility]    SKIN SURGERY         SOCIAL HISTORY:  Social History     Socioeconomic History    Marital status:      Spouse name: Not on file    Number of children: Not on file    Years of education: Not on file    Highest education level: Not on file   Social Needs    Financial resource strain: Not on file    Food insecurity - worry: Not on file    Food insecurity - inability: Not on file    Transportation needs - medical: Not on file    Transportation needs - non-medical: Not on file   Occupational History    Occupation: , self employed   Tobacco Use    Smoking status: Current Every Day Smoker     Packs/day: 1.50     Years: 45.00     Pack years: 67.50     Types: Cigarettes    Smokeless tobacco: Never Used   Substance and Sexual Activity    Alcohol use: Yes     Alcohol/week: 0.5 oz     Types: 1 Standard drinks or equivalent per week     Comment: daily scotch and water, half pint    Drug use: No     Comment: rarely    Sexual activity: Yes     Partners: Female   Other Topics Concern    Not on file   Social History Narrative    , 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  One child committed suicide.       ALLERGIES AND MEDICATIONS: updated and reviewed.  Review of patient's allergies indicates:   Allergen Reactions    Amoxicillin Nausea And Vomiting    Adhesive Rash     Paper tape only    Metformin Diarrhea     Appetite loss      Current Outpatient Medications   Medication Sig Dispense Refill    ALPRAZolam (XANAX) 0.5 MG tablet Take 1 tablet (0.5 mg total) by mouth daily as  needed for Anxiety. 30 tablet 0    aspirin (ECOTRIN) 81 MG EC tablet Take 81 mg by mouth once daily.      atorvastatin (LIPITOR) 20 MG tablet Take 1 tablet (20 mg total) by mouth once daily. 90 tablet 1    baclofen (LIORESAL) 10 MG tablet TAKE 1 TABLET (10 MG TOTAL) BY MOUTH 2 (TWO) TIMES DAILY. 60 tablet 5    blood sugar diagnostic (ACCU-CHEK KM PLUS TEST STRP) Strp 1 strip by Misc.(Non-Drug; Combo Route) route 6 (six) times daily. To be used with Accu-Chek Km Plus glucometer 600 strip 3    blood-glucose meter kit Use as instructed, Accu-Chek Km Plus glucometer 1 each 0    busPIRone (BUSPAR) 15 MG tablet Take 2 tablets (30 mg total) by mouth 2 (two) times daily. Substitute for dose of xanax. 120 tablet 11    desoximetasone (TOPICORT) 0.25 % cream Apply topically 2 (two) times daily as needed.       escitalopram oxalate (LEXAPRO) 20 MG tablet Take 0.5 tablets (10 mg total) by mouth once daily. 15 tablet 11    fluticasone (FLONASE) 50 mcg/actuation nasal spray 2 sprays by Each Nare route once daily.      HYDROcodone-acetaminophen (NORCO)  mg per tablet Take 1 tablet by mouth every 24 hours as needed for Pain. 30 tablet 0    hydrOXYzine HCl (ATARAX) 25 MG tablet Take 1 tablet (25 mg total) by mouth 3 (three) times daily as needed for Itching. 60 tablet 5    insulin aspart (NOVOLOG FLEXPEN U-100 INSULIN SUBQ)       insulin aspart U-100 (NOVOLOG) 100 unit/mL InPn pen Inject prior to meals. Requires max 50 units/day. 3 Box 1    ipratropium (ATROVENT HFA) 17 mcg/actuation inhaler Inhale 2 puffs into the lungs every 6 (six) hours. Rescue 12.9 g 5    lancets (ACCU-CHEK FASTCLIX) Misc 1 lancet by Misc.(Non-Drug; Combo Route) route 6 (six) times daily. To be used with Accu-Chek Km Plus glucometer 600 each 3    lisinopril (PRINIVIL,ZESTRIL) 5 MG tablet Take 1 tablet (5 mg total) by mouth once daily. 90 tablet 2    montelukast (SINGULAIR) 10 mg tablet Take 1 tablet (10 mg total) by mouth every  "evening. 30 tablet 5    omeprazole (PRILOSEC) 40 MG capsule Take 1 capsule (40 mg total) by mouth 2 (two) times daily before meals. 60 capsule 3    ondansetron (ZOFRAN) 4 MG tablet Take 1 tablet (4 mg total) by mouth every 6 (six) hours. 12 tablet 0    pen needle, diabetic (BD ULTRA-FINE RENETTA PEN NEEDLE) 32 gauge x 5/32" Ndle Use 3x/day with Novolog 300 each 3     No current facility-administered medications for this visit.        Review of Systems   HENT: Negative for hearing loss.    Eyes: Negative for discharge.   Respiratory: Positive for wheezing.    Cardiovascular: Positive for chest pain. Negative for palpitations.   Gastrointestinal: Negative for blood in stool, constipation, diarrhea and vomiting.   Genitourinary: Negative for hematuria and urgency.   Musculoskeletal: Negative for neck pain.   Neurological: Positive for headaches. Negative for weakness.   Endo/Heme/Allergies: Negative for polydipsia.   All other systems reviewed and are negative.      Objective:   Physical Exam   Vitals:    10/08/18 1508   BP: (!) 148/88   Pulse: 72   Temp: 98 °F (36.7 °C)   SpO2: 98%   Weight: 70.9 kg (156 lb 4.9 oz)   Height: 6' (1.829 m)    Body mass index is 21.2 kg/m².  Weight: 70.9 kg (156 lb 4.9 oz)   Height: 6' (182.9 cm)     Physical Exam   Constitutional: He is oriented to person, place, and time. He appears well-developed and well-nourished. No distress.   Eyes: EOM are normal.   Cardiovascular: Normal rate and normal heart sounds.   No murmur heard.  Frequent early beats   Pulmonary/Chest: Effort normal and breath sounds normal.   Musculoskeletal: Normal range of motion.   Tender to palpation over the right lateral chest wall around the area of ribs 6 through 8, without deformity, without swelling, crepitus.  No overlying bruising  Ecchymosis present over the right lateral elbow area without deformity, no swelling, no effusion,  5/5  Normally ambulation, right hip greater trochanter area is mildly tender " without swelling or deformity or induration   Neurological: He is alert and oriented to person, place, and time. Coordination normal.   Skin: Skin is warm and dry.   Psychiatric: He has a normal mood and affect. His behavior is normal. Thought content normal.     EKG showing frequent PVCs.

## 2018-10-09 ENCOUNTER — PATIENT MESSAGE (OUTPATIENT)
Dept: ENDOCRINOLOGY | Facility: CLINIC | Age: 68
End: 2018-10-09

## 2018-10-09 ENCOUNTER — TELEPHONE (OUTPATIENT)
Dept: ENDOCRINOLOGY | Facility: CLINIC | Age: 68
End: 2018-10-09

## 2018-10-10 ENCOUNTER — TELEPHONE (OUTPATIENT)
Dept: ENDOCRINOLOGY | Facility: CLINIC | Age: 68
End: 2018-10-10

## 2018-10-10 ENCOUNTER — OFFICE VISIT (OUTPATIENT)
Dept: ENDOCRINOLOGY | Facility: CLINIC | Age: 68
End: 2018-10-10
Payer: MEDICARE

## 2018-10-10 VITALS
BODY MASS INDEX: 21.29 KG/M2 | DIASTOLIC BLOOD PRESSURE: 76 MMHG | SYSTOLIC BLOOD PRESSURE: 125 MMHG | WEIGHT: 157.19 LBS | HEART RATE: 80 BPM | HEIGHT: 72 IN

## 2018-10-10 DIAGNOSIS — F10.10 ETOH ABUSE: ICD-10-CM

## 2018-10-10 DIAGNOSIS — Z71.9 VISIT FOR COUNSELING: ICD-10-CM

## 2018-10-10 DIAGNOSIS — R80.9 MICROALBUMINURIA: ICD-10-CM

## 2018-10-10 DIAGNOSIS — Z72.0 TOBACCO ABUSE: ICD-10-CM

## 2018-10-10 DIAGNOSIS — E13.42 DIABETIC POLYNEUROPATHY ASSOCIATED WITH OTHER SPECIFIED DIABETES MELLITUS: ICD-10-CM

## 2018-10-10 DIAGNOSIS — E11.649 HYPOGLYCEMIA ASSOCIATED WITH DIABETES: ICD-10-CM

## 2018-10-10 PROCEDURE — 1101F PT FALLS ASSESS-DOCD LE1/YR: CPT | Mod: CPTII,,, | Performed by: NURSE PRACTITIONER

## 2018-10-10 PROCEDURE — 3008F BODY MASS INDEX DOCD: CPT | Mod: CPTII,,, | Performed by: NURSE PRACTITIONER

## 2018-10-10 PROCEDURE — 99215 OFFICE O/P EST HI 40 MIN: CPT | Mod: S$PBB,,, | Performed by: NURSE PRACTITIONER

## 2018-10-10 PROCEDURE — 3045F PR MOST RECENT HEMOGLOBIN A1C LEVEL 7.0-9.0%: CPT | Mod: CPTII,,, | Performed by: NURSE PRACTITIONER

## 2018-10-10 PROCEDURE — 99999 PR PBB SHADOW E&M-EST. PATIENT-LVL V: CPT | Mod: PBBFAC,,, | Performed by: NURSE PRACTITIONER

## 2018-10-10 PROCEDURE — 99215 OFFICE O/P EST HI 40 MIN: CPT | Mod: PBBFAC,PN | Performed by: NURSE PRACTITIONER

## 2018-10-10 NOTE — PATIENT INSTRUCTIONS
Make sure to test BG at bedtime. Your fasting blood sugars could be high due to high blood sugars after dinner.   Reduce Novolog dose by about 2 units before breakfast on Sundays.   Will get Dexcom to contact you regarding coverage for Dexcom  Continuous Glucose Monitoring (CGM) sensor.

## 2018-10-10 NOTE — PROGRESS NOTES
CC: This 67 y.o. White male  is here for evaluation of  T2DM along with comorbidities indicated in the Visit Diagnosis section of this encounter.    HPI: Moe Ren was diagnosed with T2DM in ~ 2010.  Metformin started at time of diagnosis.     He has a history of squamos cell oral CA, Spinal stenosis, chronic pancreatitis.      Prior visit on 7/10/18  a1c is down from 7.2 to 6.3%.   Has a lot of hypoglycemia after lunch - he attributes this to not eating his whole meal at times. Last week he was walking a lot on vacation and this contributed to hypoglycemia.   Drinking and smoking more r/t uncontrolled anxiety.   Plan Change ICR at lunch from 9 to 12 to avoid midday hypoglycemia.   Test bg ac/pp/hs, 6x/day.   rtc in 3 mo with labs prior.     Interval history  He is being sent to Cardiology by his PCP Dr. Ramon for evaluation for syncope; it was not associated with hypoglycemia.   a1c up from 6.3 to 7.1%.   He will be going on a 2 week vacation and will take a cruise around the [place]. He is interested in starting Dexcom CGM.     His BG is dropping to 40s midday on Sundays. He wakes up about 2-3 hours earlier than usual and eats breakfast, takes usually dose for breakfast but BG drops ~ 3 pm.     He has been drinking 3 drinks/day.     LAST DIABETES EDUCATION: 6/2016; 2/2018    PRESCRIBED DIABETES MEDICATIONS: Novolog ac; he rounds up on his dosing of Novolog   6:15 AM - ICR 10   11:30 AM - ICR 12       ISF 1:50, goal 115     Misses medication doses - No    DM COMPLICATIONS: peripheral neuropathy    SIGNIFICANT DIABETES MED HISTORY:   Tresiba gradually titrated down until it was stopped mid 2017 d/t hypoglycemia.   Novolog stopped at ov 2/5/18 and metformin and tresiba were started. tresiba dc'd about a week later d/t hypo  repaglinide started at ov 2/28/18, and stopped about a month later d/t low efficacy       SELF MONITORING BLOOD GLUCOSE: Checks blood glucose at home 5x/day. See pt's email from today with  uploaded BG logbook. Average BG is 149 +/-60    HYPOGLYCEMIC EPISODES: symptoms start in the 60s but definitely in the 50s. Corrects with coke.      CURRENT DIET: 3 meal/day - Eats at 10 am, 2 pm, and 6 pm. Eats about 60 to 100 carbs per meal.   He supplements with Ensure.     CURRENT EXERCISE: none recent; previously - goes twice a week to gym - 1 hour of cardio/weights    SOCIAL: part time contract . 10 hours.  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       /76 (BP Location: Right arm, Patient Position: Sitting)   Pulse 80   Ht 6' (1.829 m)   Wt 71.3 kg (157 lb 3 oz)   BMI 21.32 kg/m²       ROS:   CONSTITUTIONAL: Appetite ok, denies fatigue  RESPIRATORY: No shortness of breath  PSYCH: + anxiety       PHYSICAL EXAM:  GENERAL: Well developed, well nourished. No acute distress.   PSYCH: AAOx3, appropriate mood and affect, conversant, well-groomed. Judgement and insight good.   NEURO: Cranial nerves grossly intact. Speech clear, no tremor.   CHEST: Respirations even and unlabored.      Hemoglobin A1C   Date Value Ref Range Status   10/03/2018 7.1 (H) 4.0 - 5.6 % Final     Comment:     ADA Screening Guidelines:  5.7-6.4%  Consistent with prediabetes  >or=6.5%  Consistent with diabetes  High levels of fetal hemoglobin interfere with the HbA1C  assay. Heterozygous hemoglobin variants (HbS, HgC, etc)do  not significantly interfere with this assay.   However, presence of multiple variants may affect accuracy.     07/09/2018 6.3 (H) 4.0 - 5.6 % Final     Comment:     ADA Screening Guidelines:  5.7-6.4%  Consistent with prediabetes  >or=6.5%  Consistent with diabetes  High levels of fetal hemoglobin interfere with the HbA1C  assay. Heterozygous hemoglobin variants (HbS, HgC, etc)do  not significantly interfere with this assay.   However, presence of multiple variants may affect accuracy.     04/02/2018 7.2 (H) 4.0 - 5.6 % Final      Comment:     According to ADA guidelines, hemoglobin A1c <7.0% represents  optimal control in non-pregnant diabetic patients. Different  metrics may apply to specific patient populations.   Standards of Medical Care in Diabetes-2016.  For the purpose of screening for the presence of diabetes:  <5.7%     Consistent with the absence of diabetes  5.7-6.4%  Consistent with increasing risk for diabetes   (prediabetes)  >or=6.5%  Consistent with diabetes  Currently, no consensus exists for use of hemoglobin A1c  for diagnosis of diabetes for children.  This Hemoglobin A1c assay has significant interference with fetal   hemoglobin   (HbF). The results are invalid for patients with abnormal amounts of   HbF,   including those with known Hereditary Persistence   of Fetal Hemoglobin. Heterozygous hemoglobin variants (HbAS, HbAC,   HbAD, HbAE, HbA2) do not significantly interfere with this assay;   however, presence of multiple variants in a sample may impact the %   interference.             Chemistry        Component Value Date/Time     (L) 10/08/2018 1550    K 4.6 10/08/2018 1550    CL 98 10/08/2018 1550    CO2 24 10/08/2018 1550    BUN 11 10/08/2018 1550    CREATININE 1.1 10/08/2018 1550     (H) 10/08/2018 1550        Component Value Date/Time    CALCIUM 9.3 10/08/2018 1550    ALKPHOS 76 07/09/2018 1304    AST 34 07/09/2018 1304    ALT 22 07/09/2018 1304    BILITOT 0.3 07/09/2018 1304    ESTGFRAFRICA >60.0 10/08/2018 1550    EGFRNONAA >60.0 10/08/2018 1550          Lab Results   Component Value Date    LDLCALC 82.8 01/22/2018        Ref. Range 1/22/2018 09:15   Cholesterol Latest Ref Range: 120 - 199 mg/dL 169   HDL Latest Ref Range: 40 - 75 mg/dL 65   LDL Cholesterol Latest Ref Range: 63.0 - 159.0 mg/dL 82.8   Total Cholesterol/HDL Ratio Latest Ref Range: 2.0 - 5.0  2.6   Triglycerides Latest Ref Range: 30 - 150 mg/dL 106     Lab Results   Component Value Date    MICALBCREAT 69.4 (H) 04/02/2018          STANDARDS of CARE:        ASA:               Last eye exam:       ASSESSMENT and PLAN:    A1C GOAL: < 7 %     1. Uncontrolled type 2 diabetes mellitus with complication, with long-term current use of insulin  Make sure to test BG at bedtime. Your fasting blood sugars could be high due to high blood sugars after dinner.   Reduce Novolog dose by about 2 units before breakfast on Sundays.   Will get Dexcom to contact you regarding coverage for Dexcom  Continuous Glucose Monitoring (CGM) sensor.     Test blood sugar 6x/day.   rtc in 3 mo with labs prior.       Patient is testing blood sugars 5x/day and taking 3 injections of insulin a day.   Patient needs to use Dexcom CGM readings to help adjust insulin doses.         Hemoglobin A1c    Lipid panel   2. Diabetic polyneuropathy associated with other specified diabetes mellitus  Patient wears diabetic shoes.    3. Microalbuminuria  Continue lisinopril   4. Hypoglycemia associated with diabetes  Reduce insulin as above. Needs CGM device.    5. Tobacco abuse  Counseled total cessation.    6. ETOH abuse  Advised him to cut back on alcohol.    7. Visit for counseling       Spent 40 minutes with patient with >50% time spent in counseling, as noted in # 1, 2, 4-6.             Orders Placed This Encounter   Procedures    Hemoglobin A1c     Standing Status:   Future     Standing Expiration Date:   12/9/2019    Lipid panel     Standing Status:   Future     Standing Expiration Date:   10/10/2019        No Follow-up on file.

## 2018-10-11 ENCOUNTER — PATIENT MESSAGE (OUTPATIENT)
Dept: ENDOCRINOLOGY | Facility: CLINIC | Age: 68
End: 2018-10-11

## 2018-10-11 ENCOUNTER — TELEPHONE (OUTPATIENT)
Dept: FAMILY MEDICINE | Facility: CLINIC | Age: 68
End: 2018-10-11

## 2018-10-12 ENCOUNTER — TELEPHONE (OUTPATIENT)
Dept: FAMILY MEDICINE | Facility: CLINIC | Age: 68
End: 2018-10-12

## 2018-10-12 NOTE — TELEPHONE ENCOUNTER
Patient was given doxycycline and started it on Monday 10/08/2018 and was fine but when he took his dose Thursday night and Friday morning he has broken out in a rash all over his face.  He stated no problems with breathing or swallowing.  Do you want him to start a new medicine?  I did inform him not to take anymore of the doxy.

## 2018-10-12 NOTE — TELEPHONE ENCOUNTER
If he is feeling better can just stop it and not take anything further.  If he is still sick I can send in zithromax.

## 2018-10-18 ENCOUNTER — PATIENT MESSAGE (OUTPATIENT)
Dept: FAMILY MEDICINE | Facility: CLINIC | Age: 68
End: 2018-10-18

## 2018-10-18 DIAGNOSIS — J01.90 ACUTE BACTERIAL SINUSITIS: Primary | ICD-10-CM

## 2018-10-18 DIAGNOSIS — N52.9 MALE ERECTILE DISORDER: ICD-10-CM

## 2018-10-18 DIAGNOSIS — B96.89 ACUTE BACTERIAL SINUSITIS: Primary | ICD-10-CM

## 2018-10-18 RX ORDER — SILDENAFIL 50 MG/1
50 TABLET, FILM COATED ORAL DAILY PRN
Qty: 10 TABLET | Refills: 0 | Status: SHIPPED | OUTPATIENT
Start: 2018-10-18 | End: 2019-01-09 | Stop reason: SDUPTHER

## 2018-10-18 RX ORDER — AZITHROMYCIN 250 MG/1
TABLET, FILM COATED ORAL
Qty: 6 TABLET | Refills: 0 | Status: SHIPPED | OUTPATIENT
Start: 2018-10-18 | End: 2018-10-23

## 2018-10-19 ENCOUNTER — PATIENT MESSAGE (OUTPATIENT)
Dept: FAMILY MEDICINE | Facility: CLINIC | Age: 68
End: 2018-10-19

## 2018-10-22 ENCOUNTER — PATIENT MESSAGE (OUTPATIENT)
Dept: FAMILY MEDICINE | Facility: CLINIC | Age: 68
End: 2018-10-22

## 2018-10-22 NOTE — TELEPHONE ENCOUNTER
Spoke with pt he's in [place] will be boarding ship later this week.    Taking abx.  Was also taking NSAIDs which he stopped.   No BRBPR.    Discussed differential - could be gastritis; or C diff; or SE of antibiotic.    Recommended he get evaluated.  He is probably going to wait until the cruise - has a ship's doctor and can hopefully get abx either on the ship or at a port if suspicion for C diff is high.    He stopped NSAIDS

## 2018-11-05 ENCOUNTER — OFFICE VISIT (OUTPATIENT)
Dept: CARDIOLOGY | Facility: CLINIC | Age: 68
End: 2018-11-05
Payer: MEDICARE

## 2018-11-05 VITALS
DIASTOLIC BLOOD PRESSURE: 76 MMHG | WEIGHT: 157 LBS | HEART RATE: 65 BPM | SYSTOLIC BLOOD PRESSURE: 126 MMHG | OXYGEN SATURATION: 98 % | HEIGHT: 72 IN | BODY MASS INDEX: 21.26 KG/M2

## 2018-11-05 DIAGNOSIS — I70.0 ABDOMINAL AORTIC ATHEROSCLEROSIS: ICD-10-CM

## 2018-11-05 DIAGNOSIS — R55 SYNCOPE AND COLLAPSE: ICD-10-CM

## 2018-11-05 DIAGNOSIS — J44.89 CHRONIC OBSTRUCTIVE BRONCHITIS WITH PULMONARY EMPHYSEMA: ICD-10-CM

## 2018-11-05 DIAGNOSIS — E11.42 DM TYPE 2 WITH DIABETIC PERIPHERAL NEUROPATHY: Primary | Chronic | ICD-10-CM

## 2018-11-05 DIAGNOSIS — J43.9 CHRONIC OBSTRUCTIVE BRONCHITIS WITH PULMONARY EMPHYSEMA: ICD-10-CM

## 2018-11-05 DIAGNOSIS — R07.89 CHEST PAIN, ATYPICAL: ICD-10-CM

## 2018-11-05 DIAGNOSIS — D12.6 TUBULAR ADENOMA OF COLON: ICD-10-CM

## 2018-11-05 DIAGNOSIS — Z72.0 CONTINUOUS TOBACCO ABUSE: ICD-10-CM

## 2018-11-05 DIAGNOSIS — M48.062 SPINAL STENOSIS, LUMBAR REGION, WITH NEUROGENIC CLAUDICATION: ICD-10-CM

## 2018-11-05 PROCEDURE — 1101F PT FALLS ASSESS-DOCD LE1/YR: CPT | Mod: CPTII,HCNC,S$GLB, | Performed by: INTERNAL MEDICINE

## 2018-11-05 PROCEDURE — 99204 OFFICE O/P NEW MOD 45 MIN: CPT | Mod: HCNC,S$GLB,, | Performed by: INTERNAL MEDICINE

## 2018-11-05 PROCEDURE — 3045F PR MOST RECENT HEMOGLOBIN A1C LEVEL 7.0-9.0%: CPT | Mod: CPTII,HCNC,S$GLB, | Performed by: INTERNAL MEDICINE

## 2018-11-05 PROCEDURE — 99999 PR PBB SHADOW E&M-EST. PATIENT-LVL III: CPT | Mod: PBBFAC,HCNC,, | Performed by: INTERNAL MEDICINE

## 2018-11-05 RX ORDER — INSULIN ASPART 100 [IU]/ML
INJECTION, SOLUTION INTRAVENOUS; SUBCUTANEOUS
Qty: 45 ML | Refills: 1 | Status: SHIPPED | OUTPATIENT
Start: 2018-11-05 | End: 2019-01-09 | Stop reason: SDUPTHER

## 2018-11-05 NOTE — LETTER
November 5, 2018      Damian Ramon MD  [address] [facility]  [place] [address]           [facility]  [address] [facility]  [place] [address]  Phone: [phone]          Patient: Moe Ren   MR Number: [id number]   YOB: 1950   Date of Visit: 11/5/2018       Dear Dr. Damian Ramon:    Thank you for referring Moe Ren to me for evaluation. Attached you will find relevant portions of my assessment and plan of care.    If you have questions, please do not hesitate to call me. I look forward to following Moe Ren along with you.    Sincerely,    Lane Irby MD    Enclosure  CC:  No Recipients    If you would like to receive this communication electronically, please contact [email or web address] or [phone] to request more information on [facility] Link access.    For providers and/or their staff who would like to refer a patient to [facility], please contact us through our one-stop-shop provider referral line, [facility] , at [phone].    If you feel you have received this communication in error or would no longer like to receive these types of communications, please e-mail [email or web address]

## 2018-11-05 NOTE — PROGRESS NOTES
Subjective:    Patient ID:  Moe Ren is a 68 y.o. male who presents for follow-up of Chest Pain      HPI     Referred by Dr Yenny Rosa is a 67 y.o. male, last appointment with this clinic was 10/3/2018.     Last Thursday, he went out to eat with his wife.  Had 3 alcoholic drinks which is an improvement for him over previous.  He typically drinks and Ensure and he was at home, in the kitchen, drinking the Ensure, tilted his head back to drink it, and then the next thing he knew he was waking up on the floor.  The he had apparently fallen onto his right side, hit the right hip and hit his right elbow which jammed into the right chest wall with resultant rib pain.     Does not recall any prodrome preceding this, no aura, no lightheadedness, no sensation of palpitations or presyncope.  Has never had anything like this before.  This was unwitnessed.  By the time his wife came into the room he was getting himself up off of the floor.     No further episodes since then.  I previously evaluated him for irregular heartbeat, EKG showing PACs.  No sensation of palpitations.     He has had some bruising on his right hip and bruising on the right elbow.  No bruising on the ribs.  It hurts to take a deep breath and coughing is extremely painful.     He has been having sinus congestion for the past 6 months by his estimate, with discomfort and pressure which never seems to fully resolve.     He reports that the pharmacy was out of hydroxyzine.  I had gotten notice on another patient that 25 mg are in short supply and I will give him a prescription for 50 mg and instructions to take half of a pill.    EKG 10/8/18 NSR with frequent PVCs        Review of Systems   Constitution: Negative for decreased appetite.   HENT: Negative for ear discharge.    Eyes: Negative for blurred vision.   Endocrine: Negative for polyphagia.   Skin: Negative for nail changes.   Neurological: Negative for aphonia.   Psychiatric/Behavioral: Negative  for hallucinations.        Objective:    Physical Exam   Constitutional: He is oriented to person, place, and time. He appears well-developed and well-nourished.   HENT:   Head: Normocephalic and atraumatic.   Eyes: Conjunctivae are normal. Pupils are equal, round, and reactive to light.   Neck: Normal range of motion. Neck supple.   Cardiovascular: Normal rate, normal heart sounds and intact distal pulses.   Pulmonary/Chest: Effort normal and breath sounds normal.   Abdominal: Soft. Bowel sounds are normal.   Musculoskeletal: Normal range of motion.   Neurological: He is alert and oriented to person, place, and time.   Skin: Skin is warm and dry.         Assessment:       1. DM type 2 with diabetic peripheral neuropathy    2. Spinal stenosis, lumbar region, with neurogenic claudication    3. Abdominal aortic atherosclerosis    4. Chronic obstructive bronchitis with pulmonary emphysema    5. Tubular adenoma of colon 4/2017    6. Continuous tobacco abuse    7. Chest pain, atypical         Plan:       Will check echo, carotid and holter for syncope - if unrevealing and syncope returns consider ILR  Lexiscan myoview for CP - cannot walk treadmill due to spinal stenosis

## 2018-11-08 ENCOUNTER — PATIENT MESSAGE (OUTPATIENT)
Dept: FAMILY MEDICINE | Facility: CLINIC | Age: 68
End: 2018-11-08

## 2018-11-08 DIAGNOSIS — M54.9 CHRONIC BACK PAIN GREATER THAN 3 MONTHS DURATION: ICD-10-CM

## 2018-11-08 DIAGNOSIS — J43.8 OTHER EMPHYSEMA: ICD-10-CM

## 2018-11-08 DIAGNOSIS — M51.36 DEGENERATIVE DISC DISEASE, LUMBAR: ICD-10-CM

## 2018-11-08 DIAGNOSIS — J30.2 SEASONAL ALLERGIC RHINITIS, UNSPECIFIED TRIGGER: ICD-10-CM

## 2018-11-08 DIAGNOSIS — M47.816 FACET ARTHRITIS OF LUMBAR REGION: ICD-10-CM

## 2018-11-08 DIAGNOSIS — G89.29 CHRONIC BACK PAIN GREATER THAN 3 MONTHS DURATION: ICD-10-CM

## 2018-11-08 DIAGNOSIS — F41.9 ANXIETY DISORDER, UNSPECIFIED TYPE: ICD-10-CM

## 2018-11-09 ENCOUNTER — TELEPHONE (OUTPATIENT)
Dept: NEUROSURGERY | Facility: CLINIC | Age: 68
End: 2018-11-09

## 2018-11-09 PROBLEM — T39.395A NSAID-INDUCED DUODENAL ULCER: Status: ACTIVE | Noted: 2018-11-09

## 2018-11-09 PROBLEM — K26.9 NSAID-INDUCED DUODENAL ULCER: Status: ACTIVE | Noted: 2018-11-09

## 2018-11-09 RX ORDER — MONTELUKAST SODIUM 10 MG/1
10 TABLET ORAL NIGHTLY
Qty: 30 TABLET | Refills: 5 | Status: SHIPPED | OUTPATIENT
Start: 2018-11-09 | End: 2019-05-08

## 2018-11-09 RX ORDER — HYDROCODONE BITARTRATE AND ACETAMINOPHEN 10; 325 MG/1; MG/1
1 TABLET ORAL
Qty: 30 TABLET | Refills: 0 | Status: SHIPPED | OUTPATIENT
Start: 2018-11-09 | End: 2018-12-06 | Stop reason: SDUPTHER

## 2018-11-09 RX ORDER — ALPRAZOLAM 0.5 MG/1
0.5 TABLET ORAL DAILY PRN
Qty: 30 TABLET | Refills: 0 | Status: SHIPPED | OUTPATIENT
Start: 2018-11-09 | End: 2018-12-06 | Stop reason: SDUPTHER

## 2018-11-09 NOTE — TELEPHONE ENCOUNTER
Called pt to set up appt to discuss surgery w/ Dr. Ling. Per Dr. Ling's last note from 2015, he did not consider pt to be a surgical candidate at that time 2/2 his drinking and smoking. Pt states that have symptoms have progressed over the past few years, and he no longer drinks or smokes. I have scheduled him to see Dr. Ling on 11/28. He is aware of time, date, and location. Will send appt letter in mail.

## 2018-11-09 NOTE — TELEPHONE ENCOUNTER
----- Message from Yasmeen Conti sent at 11/9/2018 11:00 AM CST -----  Contact: Pt. [phone]  Needs Advice    Reason for call:The patient would like to speak to someone regarding scheduling a follow up appointment. He said he saw Dr. Ling last year but it's not on his chart. Please contact the patient to discuss further.          Communication Preference:PHONE     Additional Information:

## 2018-11-14 ENCOUNTER — HOSPITAL ENCOUNTER (OUTPATIENT)
Dept: RADIOLOGY | Facility: HOSPITAL | Age: 68
Discharge: HOME | End: 2018-11-14
Attending: INTERNAL MEDICINE
Payer: MEDICARE

## 2018-11-14 ENCOUNTER — HOSPITAL ENCOUNTER (OUTPATIENT)
Dept: CARDIOLOGY | Facility: HOSPITAL | Age: 68
Discharge: HOME | End: 2018-11-14
Attending: INTERNAL MEDICINE
Payer: MEDICARE

## 2018-11-14 VITALS — BODY MASS INDEX: 21.26 KG/M2 | HEIGHT: 72 IN | WEIGHT: 157 LBS

## 2018-11-14 DIAGNOSIS — J44.89 CHRONIC OBSTRUCTIVE BRONCHITIS WITH PULMONARY EMPHYSEMA: ICD-10-CM

## 2018-11-14 DIAGNOSIS — M48.062 SPINAL STENOSIS, LUMBAR REGION, WITH NEUROGENIC CLAUDICATION: ICD-10-CM

## 2018-11-14 DIAGNOSIS — D12.6 TUBULAR ADENOMA OF COLON: ICD-10-CM

## 2018-11-14 DIAGNOSIS — R07.89 CHEST PAIN, ATYPICAL: ICD-10-CM

## 2018-11-14 DIAGNOSIS — E11.42 DM TYPE 2 WITH DIABETIC PERIPHERAL NEUROPATHY: ICD-10-CM

## 2018-11-14 DIAGNOSIS — Z72.0 CONTINUOUS TOBACCO ABUSE: ICD-10-CM

## 2018-11-14 DIAGNOSIS — I70.0 ABDOMINAL AORTIC ATHEROSCLEROSIS: ICD-10-CM

## 2018-11-14 DIAGNOSIS — J43.9 CHRONIC OBSTRUCTIVE BRONCHITIS WITH PULMONARY EMPHYSEMA: ICD-10-CM

## 2018-11-14 DIAGNOSIS — R55 SYNCOPE AND COLLAPSE: ICD-10-CM

## 2018-11-14 LAB
AORTIC ROOT ANNULUS: 3.44 CM
AORTIC VALVE CUSP SEPERATION: 2.26 CM
ASCENDING AORTA: 2.87 CM
AV MEAN GRADIENT: 2.07 MMHG
AV PEAK GRADIENT: 3.92 MMHG
AV VALVE AREA: 2.36 CM2
BSA FOR ECHO PROCEDURE: 1.9 M2
CV ECHO LV RWT: 0.32 CM
CV STRESS BASE HR: 64
DIASTOLIC BLOOD PRESSURE: 77
DOP CALC AO PEAK VEL: 0.99 M/S
DOP CALC AO VTI: 26.11 CM
DOP CALC LVOT AREA: 3.02 CM2
DOP CALC LVOT DIAMETER: 1.96 CM
DOP CALC LVOT STROKE VOLUME: 61.7 CM3
DOP CALCLVOT PEAK VEL VTI: 20.46 CM
E WAVE DECELERATION TIME: 143.05 MSEC
E/A RATIO: 0.84
E/E' RATIO: 7.8
ECHO LV POSTERIOR WALL: 0.8 CM (ref 0.6–1.1)
FRACTIONAL SHORTENING: 33 % (ref 28–44)
INTERVENTRICULAR SEPTUM: 1.03 CM (ref 0.6–1.1)
IVRT: 0.11 MSEC
LA MAJOR: 3.98 CM
LA MINOR: 4.54 CM
LA WIDTH: 3.55 CM
LEFT ATRIUM SIZE: 3.26 CM
LEFT ATRIUM VOLUME INDEX: 22 ML/M2
LEFT ATRIUM VOLUME: 41.72 CM3
LEFT CBA DIAS: 19 CM/S
LEFT CBA SYS: 83 CM/S
LEFT CCA DIST DIAS: 21 CM/S
LEFT CCA DIST SYS: 72 CM/S
LEFT CCA MID DIAS: 26 CM/S
LEFT CCA MID SYS: 92 CM/S
LEFT CCA PROX DIAS: 22 CM/S
LEFT CCA PROX SYS: 123 CM/S
LEFT ECA DIAS: 6 CM/S
LEFT ECA SYS: 96 CM/S
LEFT ICA DIST DIAS: 42 CM/S
LEFT ICA DIST SYS: 136 CM/S
LEFT ICA MID DIAS: 56 CM/S
LEFT ICA MID SYS: 158 CM/S
LEFT ICA PROX DIAS: 40 CM/S
LEFT ICA PROX SYS: 108 CM/S
LEFT INTERNAL DIMENSION IN SYSTOLE: 3.3 CM (ref 2.1–4)
LEFT VENTRICLE DIASTOLIC VOLUME INDEX: 60.24 ML/M2
LEFT VENTRICLE DIASTOLIC VOLUME: 114.45 ML
LEFT VENTRICLE MASS INDEX: 83.1 G/M2
LEFT VENTRICLE SYSTOLIC VOLUME INDEX: 23.2 ML/M2
LEFT VENTRICLE SYSTOLIC VOLUME: 44.08 ML
LEFT VENTRICULAR INTERNAL DIMENSION IN DIASTOLE: 4.93 CM (ref 3.5–6)
LEFT VENTRICULAR MASS: 157.93 G
LEFT VERTEBRAL DIAS: 30 CM/S
LEFT VERTEBRAL SYS: 100 CM/S
LV LATERAL E/E' RATIO: 7.09
LV SEPTAL E/E' RATIO: 8.67
MV PEAK A VEL: 0.93 M/S
MV PEAK E VEL: 0.78 M/S
NUC REST DIASTOLIC VOLUME INDEX: 83
NUC REST EJECTION FRACTION: 66
NUC REST SYSTOLIC VOLUME INDEX: 28
OHS CV CAROTID ULTRASOUND LEFT ICA/CCA RATIO: 1.28
OHS CV CAROTID ULTRASOUND RIGHT ICA/CCA RATIO: 1.1
OHS CV CPX 85 PERCENT MAX PREDICTED HEART RATE MALE: 131
OHS CV CPX MAX PREDICTED HEART RATE: 154
OHS CV CPX PATIENT IS FEMALE: 0
OHS CV CPX PATIENT IS MALE: 1
OHS CV CPX PEAK DIASTOLIC BLOOD PRESSURE: 77 MMHG
OHS CV CPX PEAK HEAR RATE: 86
OHS CV CPX PEAK RATE PRESSURE PRODUCT: NORMAL
OHS CV CPX PEAK SYSTOLIC BLOOD PRESSURE: 127
OHS CV CPX PERCENT MAX PREDICTED HEART RATE ACHIEVED: 56
OHS CV CPX RATE PRESSURE PRODUCT PRESENTING: 7296
PISA TR MAX VEL: 2.3 M/S
PULM VEIN S/D RATIO: 1.41
PV PEAK D VEL: 0.51 M/S
PV PEAK S VEL: 0.72 M/S
PV PEAK VELOCITY: 0.84 CM/S
RA MAJOR: 3.82 CM
RA PRESSURE: 8 MMHG
RA WIDTH: 3.52 CM
RIGHT CBA DIAS: 8 CM/S
RIGHT CBA SYS: 50 CM/S
RIGHT CCA DIST DIAS: 14 CM/S
RIGHT CCA DIST SYS: 69 CM/S
RIGHT CCA MID DIAS: 11 CM/S
RIGHT CCA MID SYS: 91 CM/S
RIGHT CCA PROX DIAS: 10 CM/S
RIGHT CCA PROX SYS: 107 CM/S
RIGHT ECA DIAS: 9 CM/S
RIGHT ECA SYS: 56 CM/S
RIGHT ICA DIST DIAS: 32 CM/S
RIGHT ICA DIST SYS: 99 CM/S
RIGHT ICA MID DIAS: 45 CM/S
RIGHT ICA MID SYS: 118 CM/S
RIGHT ICA PROX DIAS: 31 CM/S
RIGHT ICA PROX SYS: 115 CM/S
RIGHT VENTRICULAR END-DIASTOLIC DIMENSION: 3.44 CM
RIGHT VERTEBRAL DIAS: 11 CM/S
RIGHT VERTEBRAL SYS: 45 CM/S
RV TISSUE DOPPLER FREE WALL SYSTOLIC VELOCITY 1 (APICAL 4 CHAMBER VIEW): 11.85 M/S
SINUS: 3.32 CM
STJ: 2.35 CM
SYSTOLIC BLOOD PRESSURE: 114
TDI LATERAL: 0.11
TDI SEPTAL: 0.09
TDI: 0.1
TR MAX PG: 21.16 MMHG
TRICUSPID ANNULAR PLANE SYSTOLIC EXCURSION: 2.18 CM
TV REST PULMONARY ARTERY PRESSURE: 29.16 MMHG

## 2018-11-14 PROCEDURE — 78452 HT MUSCLE IMAGE SPECT MULT: CPT | Mod: 26,HCNC,, | Performed by: INTERNAL MEDICINE

## 2018-11-14 PROCEDURE — 93017 CV STRESS TEST TRACING ONLY: CPT | Mod: HCNC

## 2018-11-14 PROCEDURE — 93227 XTRNL ECG REC<48 HR R&I: CPT | Mod: HCNC,,, | Performed by: INTERNAL MEDICINE

## 2018-11-14 PROCEDURE — 63600175 PHARM REV CODE 636 W HCPCS: Mod: HCNC

## 2018-11-14 PROCEDURE — 93306 TTE W/DOPPLER COMPLETE: CPT | Mod: 26,HCNC,, | Performed by: INTERNAL MEDICINE

## 2018-11-14 PROCEDURE — 93306 TTE W/DOPPLER COMPLETE: CPT | Mod: HCNC

## 2018-11-14 PROCEDURE — 93880 EXTRACRANIAL BILAT STUDY: CPT | Mod: 26,HCNC,, | Performed by: INTERNAL MEDICINE

## 2018-11-14 PROCEDURE — 93880 EXTRACRANIAL BILAT STUDY: CPT | Mod: 50,HCNC

## 2018-11-14 PROCEDURE — 93225 XTRNL ECG REC<48 HRS REC: CPT | Mod: HCNC

## 2018-11-14 PROCEDURE — 93018 CV STRESS TEST I&R ONLY: CPT | Mod: HCNC,,, | Performed by: INTERNAL MEDICINE

## 2018-11-14 PROCEDURE — 93016 CV STRESS TEST SUPVJ ONLY: CPT | Mod: HCNC,,, | Performed by: INTERNAL MEDICINE

## 2018-11-14 RX ORDER — REGADENOSON 0.08 MG/ML
INJECTION, SOLUTION INTRAVENOUS
Status: COMPLETED
Start: 2018-11-14 | End: 2018-11-14

## 2018-11-14 RX ADMIN — REGADENOSON: 0.08 INJECTION, SOLUTION INTRAVENOUS at 09:11

## 2018-11-15 ENCOUNTER — LAB VISIT (OUTPATIENT)
Dept: LAB | Facility: HOSPITAL | Age: 68
End: 2018-11-15
Attending: INTERNAL MEDICINE
Payer: MEDICARE

## 2018-11-15 DIAGNOSIS — E11.9 DIABETES MELLITUS WITHOUT COMPLICATION: ICD-10-CM

## 2018-11-15 DIAGNOSIS — K26.0 ACUTE DUODENAL ULCER WITH HEMORRHAGE AND OBSTRUCTION: Primary | ICD-10-CM

## 2018-11-15 LAB
BASOPHILS # BLD AUTO: 0.14 K/UL
BASOPHILS NFR BLD: 1.4 %
DIFFERENTIAL METHOD: ABNORMAL
EOSINOPHIL # BLD AUTO: 1.1 K/UL
EOSINOPHIL NFR BLD: 11.5 %
ERYTHROCYTE [DISTWIDTH] IN BLOOD BY AUTOMATED COUNT: 14 %
HCT VFR BLD AUTO: 34.1 %
HGB BLD-MCNC: 11.2 G/DL
IMM GRANULOCYTES # BLD AUTO: 0.03 K/UL
IMM GRANULOCYTES NFR BLD AUTO: 0.3 %
LYMPHOCYTES # BLD AUTO: 2.6 K/UL
LYMPHOCYTES NFR BLD: 26.4 %
MCH RBC QN AUTO: 30.2 PG
MCHC RBC AUTO-ENTMCNC: 32.8 G/DL
MCV RBC AUTO: 92 FL
MONOCYTES # BLD AUTO: 0.9 K/UL
MONOCYTES NFR BLD: 9.3 %
NEUTROPHILS # BLD AUTO: 5 K/UL
NEUTROPHILS NFR BLD: 51.1 %
NRBC BLD-RTO: 0 /100 WBC
OHS CV EVENT MONITOR DAY: 1
OHS CV HOLTER LENGTH DECIMAL HOURS: 23.98
OHS CV HOLTER LENGTH HOURS: 23
OHS CV HOLTER LENGTH MINUTES: 59
PLATELET # BLD AUTO: 410 K/UL
PMV BLD AUTO: 10.3 FL
RBC # BLD AUTO: 3.71 M/UL
WBC # BLD AUTO: 9.77 K/UL

## 2018-11-15 PROCEDURE — 85025 COMPLETE CBC W/AUTO DIFF WBC: CPT | Mod: HCNC

## 2018-11-15 PROCEDURE — 36415 COLL VENOUS BLD VENIPUNCTURE: CPT | Mod: HCNC,PO

## 2018-11-19 ENCOUNTER — PATIENT MESSAGE (OUTPATIENT)
Dept: ENDOCRINOLOGY | Facility: CLINIC | Age: 68
End: 2018-11-19

## 2018-11-20 ENCOUNTER — PATIENT MESSAGE (OUTPATIENT)
Dept: ENDOCRINOLOGY | Facility: CLINIC | Age: 68
End: 2018-11-20

## 2018-11-26 ENCOUNTER — OFFICE VISIT (OUTPATIENT)
Dept: CARDIOLOGY | Facility: CLINIC | Age: 68
End: 2018-11-26
Payer: MEDICARE

## 2018-11-26 VITALS
SYSTOLIC BLOOD PRESSURE: 121 MMHG | WEIGHT: 157 LBS | HEART RATE: 78 BPM | HEIGHT: 72 IN | BODY MASS INDEX: 21.26 KG/M2 | DIASTOLIC BLOOD PRESSURE: 67 MMHG | OXYGEN SATURATION: 95 %

## 2018-11-26 DIAGNOSIS — I70.0 ABDOMINAL AORTIC ATHEROSCLEROSIS: ICD-10-CM

## 2018-11-26 DIAGNOSIS — E78.1 HYPERTRIGLYCERIDEMIA: Chronic | ICD-10-CM

## 2018-11-26 DIAGNOSIS — R07.89 CHEST PAIN, ATYPICAL: Primary | ICD-10-CM

## 2018-11-26 DIAGNOSIS — R55 SYNCOPE AND COLLAPSE: ICD-10-CM

## 2018-11-26 DIAGNOSIS — J44.89 CHRONIC OBSTRUCTIVE BRONCHITIS WITH PULMONARY EMPHYSEMA: ICD-10-CM

## 2018-11-26 DIAGNOSIS — J43.9 CHRONIC OBSTRUCTIVE BRONCHITIS WITH PULMONARY EMPHYSEMA: ICD-10-CM

## 2018-11-26 PROCEDURE — 99999 PR PBB SHADOW E&M-EST. PATIENT-LVL III: CPT | Mod: PBBFAC,HCNC,, | Performed by: INTERNAL MEDICINE

## 2018-11-26 PROCEDURE — 1101F PT FALLS ASSESS-DOCD LE1/YR: CPT | Mod: CPTII,HCNC,S$GLB, | Performed by: INTERNAL MEDICINE

## 2018-11-26 PROCEDURE — 99214 OFFICE O/P EST MOD 30 MIN: CPT | Mod: HCNC,S$GLB,, | Performed by: INTERNAL MEDICINE

## 2018-11-26 RX ORDER — METOPROLOL SUCCINATE 25 MG/1
25 TABLET, EXTENDED RELEASE ORAL DAILY
Qty: 30 TABLET | Refills: 11 | Status: SHIPPED | OUTPATIENT
Start: 2018-11-26 | End: 2019-05-07 | Stop reason: SDUPTHER

## 2018-11-26 NOTE — PROGRESS NOTES
Subjective:    Patient ID:  Moe Ren is a 68 y.o. male who presents for follow-up of Results      HPI     Referred by Dr Ramon  Moe is a 67 y.o. male, last appointment with this clinic was 10/3/2018.     Last Thursday, he went out to eat with his wife.  Had 3 alcoholic drinks which is an improvement for him over previous.  He typically drinks and Ensure and he was at home, in the kitchen, drinking the Ensure, tilted his head back to drink it, and then the next thing he knew he was waking up on the floor.  The he had apparently fallen onto his right side, hit the right hip and hit his right elbow which jammed into the right chest wall with resultant rib pain.     Does not recall any prodrome preceding this, no aura, no lightheadedness, no sensation of palpitations or presyncope.  Has never had anything like this before.  This was unwitnessed.  By the time his wife came into the room he was getting himself up off of the floor.     No further episodes since then.  I previously evaluated him for irregular heartbeat, EKG showing PACs.  No sensation of palpitations.     He has had some bruising on his right hip and bruising on the right elbow.  No bruising on the ribs.  It hurts to take a deep breath and coughing is extremely painful.     He has been having sinus congestion for the past 6 months by his estimate, with discomfort and pressure which never seems to fully resolve.     He reports that the pharmacy was out of hydroxyzine.  I had gotten notice on another patient that 25 mg are in short supply and I will give him a prescription for 50 mg and instructions to take half of a pill.     EKG 10/8/18 NSR with frequent PVCs     Echo 11/14/18   · Low normal left ventricular systolic function. The estimated ejection fraction is 50%  · No wall motion abnormalities.  · Mild mitral regurgitation.    Stress test 11/14/18  · Abnormal Usha MPI  · There is a small amount of mild ischemia in the apex wall(s).  · Rest Ejection  Fraction is 66 %.  · LV cavity size at rest is normal.  · Resting wall motion is physiologic.     Carotid US 11/14/18  · Mild bilateral internal carotid artery plaque with no hemodynamically significant stenosis     Holter 11/14/18  Abnormal Holter  Frequent PVCs and PACs  Diary not returned   There were very frequent PVCs totalling 10179 and averaging 637.82 per hour. There were 8 couplets. There were 484 bigeminal cycles. There were 4 triplets.  There were very frequent PACs totalling 2495 and averaging 104.05 per hour.     Stopped smoking/drinking and feels much better  Denies dizziness or syncope      Review of Systems   Constitution: Negative for decreased appetite.   HENT: Negative for ear discharge.    Eyes: Negative for blurred vision.   Endocrine: Negative for polyphagia.   Skin: Negative for nail changes.   Neurological: Negative for aphonia.   Psychiatric/Behavioral: Negative for hallucinations.        Objective:    Physical Exam   Constitutional: He is oriented to person, place, and time. He appears well-developed and well-nourished.   HENT:   Head: Normocephalic and atraumatic.   Eyes: Conjunctivae are normal. Pupils are equal, round, and reactive to light.   Neck: Normal range of motion. Neck supple.   Cardiovascular: Normal rate, normal heart sounds and intact distal pulses.   Pulmonary/Chest: Effort normal and breath sounds normal.   Abdominal: Soft. Bowel sounds are normal.   Musculoskeletal: Normal range of motion.   Neurological: He is alert and oriented to person, place, and time.   Skin: Skin is warm and dry.         Assessment:       1. Chest pain, atypical    2. Syncope and collapse    3. Hypertriglyceridemia    4. Chronic obstructive bronchitis with pulmonary emphysema    5. Abdominal aortic atherosclerosis         Plan:       I discussed his abnormal stress and holter findings  Given his lack of symptoms he would like to try medical Rx before considering a [facility]  Will add toprol XL 25 qd for  frequent PVCs  OV 3 months

## 2018-11-28 ENCOUNTER — OFFICE VISIT (OUTPATIENT)
Dept: NEUROSURGERY | Facility: CLINIC | Age: 68
End: 2018-11-28
Payer: MEDICARE

## 2018-11-28 VITALS
DIASTOLIC BLOOD PRESSURE: 71 MMHG | TEMPERATURE: 98 F | SYSTOLIC BLOOD PRESSURE: 126 MMHG | BODY MASS INDEX: 21.02 KG/M2 | HEART RATE: 59 BPM | WEIGHT: 155 LBS

## 2018-11-28 DIAGNOSIS — M47.819 SPONDYLOSIS WITHOUT MYELOPATHY: ICD-10-CM

## 2018-11-28 DIAGNOSIS — M47.816 FACET ARTHRITIS OF LUMBAR REGION: Primary | ICD-10-CM

## 2018-11-28 DIAGNOSIS — G89.29 CHRONIC BACK PAIN GREATER THAN 3 MONTHS DURATION: ICD-10-CM

## 2018-11-28 DIAGNOSIS — M54.9 CHRONIC BACK PAIN GREATER THAN 3 MONTHS DURATION: ICD-10-CM

## 2018-11-28 DIAGNOSIS — M47.816 LUMBAR SPONDYLOSIS: ICD-10-CM

## 2018-11-28 PROCEDURE — 99999 PR PBB SHADOW E&M-EST. PATIENT-LVL III: CPT | Mod: PBBFAC,HCNC,, | Performed by: NEUROLOGICAL SURGERY

## 2018-11-28 PROCEDURE — 1101F PT FALLS ASSESS-DOCD LE1/YR: CPT | Mod: CPTII,HCNC,S$GLB, | Performed by: NEUROLOGICAL SURGERY

## 2018-11-28 PROCEDURE — 99203 OFFICE O/P NEW LOW 30 MIN: CPT | Mod: HCNC,S$GLB,, | Performed by: NEUROLOGICAL SURGERY

## 2018-11-28 NOTE — PATIENT INSTRUCTIONS
Pt will likely require a 4-level MIS TLIF. He is willing to proceed with surgery. Will order an updated MRI of the lumbar spine and have pt follow up with me to establish a definitive plan.

## 2018-11-28 NOTE — PROGRESS NOTES
Subjective:   I, Ny Thibodeaux, attest that this documentation has been prepared under the direction and in the presence of Josias Ling MD.     Patient ID: Moe Ren is a 68 y.o. male     Chief Complaint: Follow-up and Lumbar Spine Pain (L-Spine)      HPI  Mr. Moe Ren is a 68 y.o. year old male who was last seen by me on 2015 and is here today to discuss surgery. At that time, pt had complained of low back pain with associated RLE pain and was worked up with an MRI of L-spine which revealed spondylolisthesis at L4/5. I recommended a decompression and fusion at this level and counseled patient on refraining from smoking and to  his alcohol intake before proceeding with intervention.     He is here because the pain in his low back has been progressing. His pain is now more or less constant and he has difficulty participating in normal activities. He is unable to walk for long period of times without developing pain and weakness in his RLE. He mentions falling several times this year, most recently 3 months ago secondary to syncope. He states he quit smoking in October and has limited his alcohol intake to 1-5 drinks a week.      Review of Systems   Constitutional: Negative for activity change, appetite change, fatigue, fever and unexpected weight change.   HENT: Negative for facial swelling.    Eyes: Negative.    Respiratory: Negative.    Cardiovascular: Negative.    Gastrointestinal: Negative for diarrhea, nausea and vomiting.   Endocrine: Negative.    Genitourinary: Negative.    Musculoskeletal: Positive for back pain. Negative for joint swelling, myalgias and neck pain.        +RLE pain   Neurological: Negative for dizziness, weakness, numbness and headaches.   Psychiatric/Behavioral: Negative.       Past Medical History:   Diagnosis Date    Abnormal heart rhythm     frequent PVCs and PACs    Arthritis     Basal cell carcinoma 1990s    back     Cancer     squamous Ca of floor of mouth.   Skin ca.  BCE    Cataract     Diabetes mellitus     Gastric ulcer     GERD (gastroesophageal reflux disease)     Pancreatitis 2008    2 Times       Objective:      Vitals:    11/28/18 1123   BP: 126/71   Pulse: (!) 59   Temp: 97.6 °F (36.4 °C)      Physical Exam   Constitutional: He is oriented to person, place, and time. He appears well-nourished.   HENT:   Head: Normocephalic and atraumatic.   Neck: Neck supple.   Neurological: He is alert and oriented to person, place, and time. No cranial nerve deficit. He displays a negative Romberg sign. GCS eye subscore is 4. GCS verbal subscore is 5. GCS motor subscore is 6.          I, Dr. Josias Ling, personally performed the services described in this documentation. All medical record entries made by the scribe, Ny Thibodeaux, were at my direction and in my presence.  I have reviewed the chart and agree that the record reflects my personal performance and is accurate and complete. Josias Ling MD. 11/28/2018    Assessment:       1. Facet arthritis of lumbar region    2. Spondylosis without myelopathy    3. Chronic back pain greater than 3 months duration    4. Lumbar spondylosis         Plan:     Pt will likely require a 4-level MIS TLIF. He is willing to proceed with surgical intervention. Will order an updated MRI of the lumbar spine and have pt follow up with me to establish a definitive plan.

## 2018-12-06 ENCOUNTER — TELEPHONE (OUTPATIENT)
Dept: NEUROSURGERY | Facility: CLINIC | Age: 68
End: 2018-12-06

## 2018-12-06 DIAGNOSIS — M48.062 SPINAL STENOSIS, LUMBAR REGION, WITH NEUROGENIC CLAUDICATION: Primary | ICD-10-CM

## 2018-12-06 DIAGNOSIS — F41.9 ANXIETY DISORDER, UNSPECIFIED TYPE: ICD-10-CM

## 2018-12-06 DIAGNOSIS — M51.37 DEGENERATION OF LUMBAR OR LUMBOSACRAL INTERVERTEBRAL DISC: ICD-10-CM

## 2018-12-06 DIAGNOSIS — M47.816 FACET ARTHRITIS OF LUMBAR REGION: ICD-10-CM

## 2018-12-06 DIAGNOSIS — M54.9 CHRONIC BACK PAIN GREATER THAN 3 MONTHS DURATION: ICD-10-CM

## 2018-12-06 DIAGNOSIS — G89.29 CHRONIC BACK PAIN GREATER THAN 3 MONTHS DURATION: ICD-10-CM

## 2018-12-06 DIAGNOSIS — M51.36 DEGENERATIVE DISC DISEASE, LUMBAR: ICD-10-CM

## 2018-12-06 RX ORDER — HYDROCODONE BITARTRATE AND ACETAMINOPHEN 10; 325 MG/1; MG/1
1 TABLET ORAL
Qty: 30 TABLET | Refills: 0 | Status: SHIPPED | OUTPATIENT
Start: 2018-12-06 | End: 2019-01-03 | Stop reason: SDUPTHER

## 2018-12-06 RX ORDER — ALPRAZOLAM 0.5 MG/1
0.5 TABLET ORAL DAILY PRN
Qty: 30 TABLET | Refills: 0 | Status: SHIPPED | OUTPATIENT
Start: 2018-12-06 | End: 2019-01-03 | Stop reason: SDUPTHER

## 2018-12-06 RX ORDER — HYDROXYZINE HYDROCHLORIDE 50 MG/1
TABLET, FILM COATED ORAL
Qty: 30 TABLET | Refills: 3 | Status: SHIPPED | OUTPATIENT
Start: 2018-12-06 | End: 2019-04-11 | Stop reason: SDUPTHER

## 2018-12-06 RX ORDER — HYDROXYZINE HYDROCHLORIDE 50 MG/1
TABLET, FILM COATED ORAL
Qty: 30 TABLET | Refills: 3 | Status: SHIPPED | OUTPATIENT
Start: 2018-12-06 | End: 2018-12-06 | Stop reason: SDUPTHER

## 2018-12-12 ENCOUNTER — HOSPITAL ENCOUNTER (OUTPATIENT)
Dept: RADIOLOGY | Facility: HOSPITAL | Age: 68
Discharge: HOME | End: 2018-12-12
Attending: NEUROLOGICAL SURGERY
Payer: MEDICARE

## 2018-12-12 ENCOUNTER — TELEPHONE (OUTPATIENT)
Dept: NEUROSURGERY | Facility: CLINIC | Age: 68
End: 2018-12-12

## 2018-12-12 ENCOUNTER — OFFICE VISIT (OUTPATIENT)
Dept: NEUROSURGERY | Facility: CLINIC | Age: 68
End: 2018-12-12
Payer: MEDICARE

## 2018-12-12 VITALS
WEIGHT: 160.69 LBS | TEMPERATURE: 99 F | DIASTOLIC BLOOD PRESSURE: 73 MMHG | SYSTOLIC BLOOD PRESSURE: 168 MMHG | HEART RATE: 71 BPM | HEIGHT: 72 IN | BODY MASS INDEX: 21.77 KG/M2

## 2018-12-12 DIAGNOSIS — M47.816 LUMBAR SPONDYLOSIS: Primary | ICD-10-CM

## 2018-12-12 DIAGNOSIS — G89.29 CHRONIC BACK PAIN GREATER THAN 3 MONTHS DURATION: ICD-10-CM

## 2018-12-12 DIAGNOSIS — M47.816 LUMBAR SPONDYLOSIS: ICD-10-CM

## 2018-12-12 DIAGNOSIS — M43.16 SPONDYLOLISTHESIS OF LUMBAR REGION: Primary | ICD-10-CM

## 2018-12-12 DIAGNOSIS — M54.9 CHRONIC BACK PAIN GREATER THAN 3 MONTHS DURATION: ICD-10-CM

## 2018-12-12 DIAGNOSIS — M43.17 SPONDYLOLISTHESIS OF LUMBOSACRAL REGION: ICD-10-CM

## 2018-12-12 DIAGNOSIS — M47.819 SPONDYLOSIS WITHOUT MYELOPATHY: ICD-10-CM

## 2018-12-12 DIAGNOSIS — M51.16 LUMBAR DISC HERNIATION WITH RADICULOPATHY: ICD-10-CM

## 2018-12-12 DIAGNOSIS — M47.816 FACET ARTHRITIS OF LUMBAR REGION: ICD-10-CM

## 2018-12-12 PROCEDURE — 99214 OFFICE O/P EST MOD 30 MIN: CPT | Mod: HCNC,S$GLB,, | Performed by: NEUROLOGICAL SURGERY

## 2018-12-12 PROCEDURE — 99999 PR PBB SHADOW E&M-EST. PATIENT-LVL V: CPT | Mod: PBBFAC,HCNC,, | Performed by: NEUROLOGICAL SURGERY

## 2018-12-12 PROCEDURE — 72148 MRI LUMBAR SPINE W/O DYE: CPT | Mod: 26,HCNC,, | Performed by: RADIOLOGY

## 2018-12-12 PROCEDURE — 72148 MRI LUMBAR SPINE W/O DYE: CPT | Mod: TC,HCNC

## 2018-12-12 PROCEDURE — 1101F PT FALLS ASSESS-DOCD LE1/YR: CPT | Mod: CPTII,HCNC,S$GLB, | Performed by: NEUROLOGICAL SURGERY

## 2018-12-12 NOTE — PROGRESS NOTES
Subjective:   I, Ny Thibodeaux, attest that this documentation has been prepared under the direction and in the presence of Josias Ling MD.     Patient ID: Moe Ren is a 68 y.o. male     Chief Complaint: Follow-up      HPI  The pt is a 68 y.o. year old male with lumbar facet arthritis, lumbar spondylosis without myelopathy, and Chronic back pain who presents today for follow up after MRI of L-spine.  Pt was recently seen in clinic after several years, on 11/28/2018, with complaints of progressed low back pain and associated limitations in his physical capabilities. He specified being unable to walk for long period of times without developing pain and weakness in his RLE and several falls this year. He reports he quit smoking in October and has limited his alcohol intake to 1-5 drinks a week.     Today pt reports a mild episode of epistaxis today but states he has been otherwise well. No pertinent acute complaints. He has a hx of DM which is well-controlled. Denies any other medical complications.      Review of Systems   Constitutional: Negative for activity change, appetite change, fatigue, fever and unexpected weight change.   HENT: Negative for facial swelling.    Eyes: Negative.    Respiratory: Negative.    Cardiovascular: Negative.    Gastrointestinal: Negative for diarrhea, nausea and vomiting.   Endocrine: Negative.    Genitourinary: Negative.    Musculoskeletal: Positive for back pain. Negative for joint swelling, myalgias and neck pain.        +RLE pain   Neurological: Negative for dizziness, weakness, numbness and headaches.   Psychiatric/Behavioral: Negative.       Past Medical History:   Diagnosis Date    Abnormal heart rhythm     frequent PVCs and PACs    Arthritis     Basal cell carcinoma 1990s    back     Cancer     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    Cataract     Diabetes mellitus     Gastric ulcer     GERD (gastroesophageal reflux disease)     Pancreatitis 2008 2  Times       Objective:      Vitals:    12/12/18 0818   BP: (!) 168/73   Pulse: 71   Temp: 98.9 °F (37.2 °C)      Physical Exam   Constitutional: He is oriented to person, place, and time. He appears well-nourished.   HENT:   Head: Normocephalic and atraumatic.   Neck: Neck supple.   Neurological: He is alert and oriented to person, place, and time. No cranial nerve deficit. He displays a negative Romberg sign. GCS eye subscore is 4. GCS verbal subscore is 5. GCS motor subscore is 6.          IMAGING:  MRI Lumbar Spine Without Contrast (12/12/2018) shows sacralization of L5. Spondylolisthesis at L4-5. Slight spondylolisthesis at L5-S1.    I have personally reviewed the images with the pt.      I, Dr. Josias Ling, personally performed the services described in this documentation. All medical record entries made by the scribe, Ny Thibodeaux, were at my direction and in my presence.  I have reviewed the chart and agree that the record reflects my personal performance and is accurate and complete. Josias Ling MD. 12/12/2018    Assessment:       1. Lumbar spondylosis.  2. Lumbar spondylolisthesis.    Plan:   I have personally reviewed the MRI of lumbar spine with the pt which shows sacralization of L5. Spondylolisthesis at L4-5. Slight spondylolisthesis at L5-S1.    I recommend the patient a left MIS Transforaminal lumbar interbody fusion (TLIF) at L4-5 and L5-S1. I have discussed the risks/benefits, indications, and alternatives for the proposed procedure in detail.  I have answered all of their questions and the pt wishes to proceed with surgery.  We will schedule the pt on January 10, 2019.      I recommend the patient follow up with Pre-op for further medical evaluation. I will refer the patient.     Pt advised to use a humidifier to prevent dryness and irritation of his nose.

## 2018-12-12 NOTE — PATIENT INSTRUCTIONS
I have personally reviewed the MRI of lumbar spine with the pt which shows sacralization of L5. Spondylolisthesis at L4-5. Slight spondylolisthesis at L5-S1.    I recommend the patient a left MIS Transforaminal lumbar interbody fusion (TLIF) at L4-5 and L5-S1. I have discussed the risks/benefits, indications, and alternatives for the proposed procedure in detail.  I have answered all of their questions and the pt wishes to proceed with surgery.  We will schedule the pt on January 10, 2019.      I recommend the patient follow up with Pre-op for further medical evaluation. I will refer the patient.     Pt advised to use a humidifier to prevent dryness and irritation of his nose.

## 2018-12-14 ENCOUNTER — ANESTHESIA EVENT (OUTPATIENT)
Dept: SURGERY | Facility: HOSPITAL | Age: 68
DRG: 460 | End: 2018-12-14
Payer: MEDICARE

## 2018-12-14 ENCOUNTER — TELEPHONE (OUTPATIENT)
Dept: CARDIOLOGY | Facility: CLINIC | Age: 68
End: 2018-12-14

## 2018-12-14 ENCOUNTER — TELEPHONE (OUTPATIENT)
Dept: PREADMISSION TESTING | Facility: HOSPITAL | Age: 68
End: 2018-12-14

## 2018-12-14 DIAGNOSIS — K86.0 ALCOHOL-INDUCED CHRONIC PANCREATITIS: ICD-10-CM

## 2018-12-14 DIAGNOSIS — Z01.818 PREOPERATIVE TESTING: Primary | ICD-10-CM

## 2018-12-14 DIAGNOSIS — M79.604 PAIN OF RIGHT LOWER EXTREMITY: ICD-10-CM

## 2018-12-14 DIAGNOSIS — F10.10 ETOH ABUSE: ICD-10-CM

## 2018-12-14 DIAGNOSIS — Z78.9 ALCOHOL USE: ICD-10-CM

## 2018-12-14 NOTE — ANESTHESIA PREPROCEDURE EVALUATION
Anesthesia Assessment: Preoperative EQUATION     Planned Procedure: Procedure(s) (LRB):  FUSION, SPINE, LUMBAR, TLIF, MINIMALLY INVASIVE @L4/5 & L5/S1 (Left)  Requested Anesthesia Type:General  Surgeon: Josias Ling MD  Service: Neurosurgery  Known or anticipated Date of Surgery:1/10/2019     Surgeon notes: reviewed     Electronic QUestionnaire Assessment completed via nurse interview with patient.    NO AQ        Triage considerations:         Previous anesthesia records:MAC and No problems  6/18/2018 ESOPHAGOGASTRODUODENOSCOPY (EGD)  Airway/Jaw/Neck:  Airway Findings: Mouth Opening: Normal Tongue: Normal  General Airway Assessment: Adult  Mallampati: III  Improves to II with phonation.  TM Distance: Normal, at least 6 cm  Jaw/Neck Findings:           Last PCP note: within 3 months , within [facility]   Subspecialty notes: Cardiology: General, Endocrinology, NEUROSURGERY, PODIATRY, & OPTOMETRY     Other important co-morbidities: PER Epic:  DM2 WIT NEUROPATHY, BRONCHITIS WITH EMPHYSEMA, HYPERTRIGLYCERIDEMIA,H/O COLON ADENOMA, PANCREATITIS SECONDARY TO ETOH,GALLSTONES, SCHATZKI'S RING OF DISTAL ESOPHAGUS,H/O DUODENAL ULCER, H/OSQUAMOUS CELL CARCINOMA OF MOUTH FLOOR, BCC OF BACK,SYNCOPE WITH COLLAPSE,& SMOKER     Tests already available:  Available tests,  within 1 month , within [facility] .12/12/2018 MRI LUMBAR SPINE W/O CONTRAST,11/15/2018 CBC, 11/14/2018 EKG, TTE(ECHO), 24 HOUR HOLTER, BILATERAL CAROTID DOPPLER,STRESS TEST WITH MYOCARDIAL PERFUSION (SPECT)                       Instructions given. (See in Nurse's note)     Optimization:  Anesthesia Preop Clinic Assessment  Indicated    Medical Opinion Indicated                       Sub-specialist consult indicated:   CARDIOLOGY                                  Plan:           Testing:  BMP, A1C, PT/INR, PTT, T&S and UA   Pre-anesthesia  visit                                   Visit focus: concerns in complex and/or prolonged anesthesia                       Consultation:IM Perioperative Hospitalist                      Patient  has previously scheduled Medical Appointment:1/9/2019 LAB     Navigation: Tests Scheduled. TBD                   Consults scheduled.TBD                   Results will be tracked by Preop Clinic.                                Electronically signed by Samara Mustafa RN at 12/14/2018  2:09 PM       Pre-admit on 1/10/2019            Detailed Report    12/17 Etelvina note form 12/14/2018:  Cleared for spine fusion at moderate cardiac risk         Documentation       Lane Irby MD        1/8 Labs and ua resulted and noted. Medically optimized by Dr. Moore  on 1/7.            Moe Ren is a 68 y.o. Male with PMHx significant for COPD, T2DM, and frequent ectopy requiring beta blockade. He has been evalauted by neurosurgery and is appropriate for TLIF for spondylolisthesis and disc herniation.    AnesHx:  2016 - GETA  Easy mask  Grade I with Mac #3    EKG 10/8/18 NSR with frequent PVCs     Echo 11/14/18   · Low normal left ventricular systolic function. The estimated ejection fraction is 50%  · No wall motion abnormalities.  · Mild mitral regurgitation.     Stress test 11/14/18  · Abnormal Usha MPI  · There is a small amount of mild ischemia in the apex wall(s).  · Rest Ejection Fraction is 66 %.  · LV cavity size at rest is normal.  · Resting wall motion is physiologic.     Carotid US 11/14/18  · Mild bilateral internal carotid artery plaque with no hemodynamically significant stenosis      Holter 11/14/18  Abnormal Holter  Frequent PVCs and PACs  Diary not returned   There were very frequent PVCs totalling 71284 and averaging 637.82 per hour. There were 8 couplets. There were 484 bigeminal cycles. There were 4 triplets.  There were very frequent PACs totalling 2495 and averaging 104.05 per hour.       Anesthesia Evaluation    I have reviewed the Patient Summary Reports.    I have reviewed the Nursing Notes.   I have reviewed the  Medications.     Review of Systems  Anesthesia Hx:  No problems with previous Anesthesia  History of prior surgery of interest to airway management or planning: Denies Family Hx of Anesthesia complications.   Denies Personal Hx of Anesthesia complications.   Social:  Smoker, Alcohol Use Quit smoking for the last week.  Was smoking 2 packs/day for 15-16 years.      Does not currently drink alcohol daily for the past week (has cut back).  Was drinking approx. 3 drinks per day.     Hematology/Oncology:  Hematology Normal      Oncology Comments: H/O SQUAMOUS CELL CARCINOMA OF MOUTH FLOOR, BCC OF BACK    EENT/Dental:   chronic allergic rhinitis (uses Flonase)   Cardiovascular:   Exercise tolerance: poor Denies Pacemaker.  Denies Hypertension.  Denies MI. CAD  asymptomatic  Denies CABG/stent.   Denies Angina.         BOWERS (improved since stopping smoking) ECG has been reviewed. Abdominal aortic atherosclerosis - on statin.    Abnormal stress and holter findings - Per Cardiology note, trying medical Rx given his lack of symptoms.     Pulmonary:  Chronic Obstructive Pulmonary Disease (COPD): Chronic Bronchitis and Emphysema Inhaler use is rescue inhaler PRN. Oral/Intravenous steroid use is no prior steroid Rx. Current breathing status is optimal, free of wheezing.    Renal/:   Hyponatremia - likely from fluid intake   Hepatic/GI:   PUD, GERD, well controlled moderate Schatzki ring. Esophageal / Stomach Disorders Gastric Conditions:, Gastritis (non-erosive)  Hernia, Hiatal Hernia (large)   Musculoskeletal:   Arthritis (hands)  Lumbar spondylosis.  Lumbar spondylolisthesis.   Spine Disorders: lumbar    Neurological:   Neuromuscular Disease, Small fiber neuropathy - Felt to be due to EtOH.   Endocrine:   Diabetes, well controlled, type 2, using insulin  Diabetes, Type 2 Diabetes , Complications include Diabetic Neuropathy, peripheral sensory neuropathy , controlled by insulin. Typical AM glucose range: 140-150s , most recent  HgA1c value was 7.2 on 1/7/19.    Psych:  Anxiety Disorder.          Physical Exam  General:  Well nourished    Airway/Jaw/Neck:  Airway Findings: Mouth Opening: Normal Tongue: Normal  General Airway Assessment: Adult  Mallampati: III  Improves to II with phonation.  TM Distance: Normal, at least 6 cm  Jaw/Neck Findings:     Neck ROM: Normal ROM      Dental:  Dental Findings: In tact, molar caps   Chest/Lungs:  Chest/Lungs Findings: Clear to auscultation, Normal Respiratory Rate         Mental Status:  Mental Status Findings:  Cooperative, Alert and Oriented         Anesthesia Plan  Type of Anesthesia, risks & benefits discussed:  Anesthesia Type:  general  Patient's Preference:   Intra-op Monitoring Plan: arterial line and standard ASA monitors  Intra-op Monitoring Plan Comments:   Post Op Pain Control Plan: multimodal analgesia, IV/PO Opioids PRN and per primary service following discharge from PACU  Post Op Pain Control Plan Comments:   Induction:   IV  Beta Blocker:  Patient is on a Beta-Blocker and has received one dose within the past 24 hours (No further documentation required).       Informed Consent: Patient understands risks and agrees with Anesthesia plan.  Questions answered.   ASA Score: 3     Day of Surgery Review of History & Physical:    H&P update referred to the surgeon.         Ready For Surgery From Anesthesia Perspective.

## 2018-12-14 NOTE — TELEPHONE ENCOUNTER
----- Message from Samara Mustafa RN sent at 12/14/2018  2:21 PM CST -----  Patient is scheduled for lumbar spine fusion, TLIF, minimally invasive @ L4/5 and L5/S1 on 1/10/2019 with Dr. Ling( alessia[roximately 330 minutes of general anesthesia). He will need Cardiology clearance for this surgery. Patient was last seen by you on 11/26/2018. Please evaluate to see if can clear from last appt or if new appt &/or further testing is needed. Thanks!

## 2018-12-14 NOTE — TELEPHONE ENCOUNTER
----- Message from Samara Mustafa RN sent at 12/14/2018  2:19 PM CST -----  Please schedule Dr. Moore, poc, labs, & ua. Thanks!

## 2018-12-14 NOTE — PRE-PROCEDURE INSTRUCTIONS
Patient stated has not had any problems with anesthesia in the past. Will need medical optimization with Dr. Moore, poc appt, labs,& ua. Our  will call to set up these appts. Will also need cardiology clearance with your Cardiologist, Dr. Irby. He will set up this appt.  Preop instructions given. Hold asa, asa containing products, nsaids, vitamins and supplements one week prior to surgery. Verbalizes understanding.

## 2018-12-14 NOTE — PRE ADMISSION SCREENING
Anesthesia Assessment: Preoperative EQUATION    Planned Procedure: Procedure(s) (LRB):  FUSION, SPINE, LUMBAR, TLIF, MINIMALLY INVASIVE @L4/5 & L5/S1 (Left)  Requested Anesthesia Type:General  Surgeon: Josias Ling MD  Service: Neurosurgery  Known or anticipated Date of Surgery:1/10/2019    Surgeon notes: reviewed    Electronic QUestionnaire Assessment completed via nurse interview with patient.    NO AQ      Triage considerations:       Previous anesthesia records:MAC and No problems  6/18/2018 ESOPHAGOGASTRODUODENOSCOPY (EGD)  Airway/Jaw/Neck:  Airway Findings: Mouth Opening: Normal Tongue: Normal  General Airway Assessment: Adult  Mallampati: III  Improves to II with phonation.  TM Distance: Normal, at least 6 cm  Jaw/Neck Findings:         Last PCP note: within 3 months , within [facility]   Subspecialty notes: Cardiology: General, Endocrinology, NEUROSURGERY, PODIATRY, & OPTOMETRY    Other important co-morbidities: PER Epic:  DM2 WIT NEUROPATHY, BRONCHITIS WITH EMPHYSEMA, HYPERTRIGLYCERIDEMIA,H/O COLON ADENOMA, PANCREATITIS SECONDARY TO ETOH,GALLSTONES, SCHATZKI'S RING OF DISTAL ESOPHAGUS,H/O DUODENAL ULCER, H/OSQUAMOUS CELL CARCINOMA OF MOUTH FLOOR, BCC OF BACK,SYNCOPE WITH COLLAPSE,& SMOKER     Tests already available:  Available tests,  within 1 month , within [facility] .12/12/2018 MRI LUMBAR SPINE W/O CONTRAST,11/15/2018 CBC, 11/14/2018 EKG, TTE(ECHO), 24 HOUR HOLTER, BILATERAL CAROTID DOPPLER,STRESS TEST WITH MYOCARDIAL PERFUSION (SPECT)            Instructions given. (See in Nurse's note)    Optimization:  Anesthesia Preop Clinic Assessment  Indicated    Medical Opinion Indicated       Sub-specialist consult indicated:   CARDIOLOGY            Plan:    Testing:  BMP, A1C, PT/INR, PTT, T&S and UA   Pre-anesthesia  visit       Visit focus: concerns in complex and/or prolonged anesthesia     Consultation:IM Perioperative Hospitalist     Patient  has previously scheduled Medical Appointment:1/9/2019  LAB    Navigation: Tests Scheduled. TBD             Consults scheduled.TBD             Results will be tracked by Preop Clinic.

## 2018-12-17 ENCOUNTER — PATIENT MESSAGE (OUTPATIENT)
Dept: CARDIOLOGY | Facility: CLINIC | Age: 68
End: 2018-12-17

## 2018-12-17 DIAGNOSIS — M62.838 MUSCLE SPASM: ICD-10-CM

## 2018-12-17 RX ORDER — BACLOFEN 10 MG/1
10 TABLET ORAL 2 TIMES DAILY
Qty: 60 TABLET | Refills: 4 | Status: ON HOLD | OUTPATIENT
Start: 2018-12-17 | End: 2019-01-11 | Stop reason: HOSPADM

## 2018-12-27 ENCOUNTER — PATIENT MESSAGE (OUTPATIENT)
Dept: ENDOCRINOLOGY | Facility: CLINIC | Age: 68
End: 2018-12-27

## 2019-01-03 DIAGNOSIS — G89.29 CHRONIC BACK PAIN GREATER THAN 3 MONTHS DURATION: ICD-10-CM

## 2019-01-03 DIAGNOSIS — M51.36 DEGENERATIVE DISC DISEASE, LUMBAR: ICD-10-CM

## 2019-01-03 DIAGNOSIS — F41.9 ANXIETY DISORDER, UNSPECIFIED TYPE: ICD-10-CM

## 2019-01-03 DIAGNOSIS — M54.9 CHRONIC BACK PAIN GREATER THAN 3 MONTHS DURATION: ICD-10-CM

## 2019-01-03 DIAGNOSIS — M47.816 FACET ARTHRITIS OF LUMBAR REGION: ICD-10-CM

## 2019-01-03 RX ORDER — ALPRAZOLAM 0.5 MG/1
0.5 TABLET ORAL DAILY PRN
Qty: 30 TABLET | Refills: 0 | Status: SHIPPED | OUTPATIENT
Start: 2019-01-03 | End: 2019-03-25 | Stop reason: SDUPTHER

## 2019-01-03 RX ORDER — HYDROCODONE BITARTRATE AND ACETAMINOPHEN 10; 325 MG/1; MG/1
1 TABLET ORAL
Qty: 30 TABLET | Refills: 0 | Status: ON HOLD | OUTPATIENT
Start: 2019-01-03 | End: 2019-01-11 | Stop reason: HOSPADM

## 2019-01-06 PROBLEM — R94.39 ABNORMAL STRESS TEST: Status: ACTIVE | Noted: 2019-01-06

## 2019-01-07 ENCOUNTER — INITIAL CONSULT (OUTPATIENT)
Dept: INTERNAL MEDICINE | Facility: CLINIC | Age: 69
DRG: 460 | End: 2019-01-07
Payer: MEDICARE

## 2019-01-07 ENCOUNTER — PATIENT MESSAGE (OUTPATIENT)
Dept: FAMILY MEDICINE | Facility: CLINIC | Age: 69
End: 2019-01-07

## 2019-01-07 ENCOUNTER — HOSPITAL ENCOUNTER (OUTPATIENT)
Dept: PREADMISSION TESTING | Facility: HOSPITAL | Age: 69
Discharge: HOME | DRG: 460 | End: 2019-01-07
Attending: ANESTHESIOLOGY
Payer: MEDICARE

## 2019-01-07 ENCOUNTER — TELEPHONE (OUTPATIENT)
Dept: FAMILY MEDICINE | Facility: CLINIC | Age: 69
End: 2019-01-07

## 2019-01-07 VITALS
SYSTOLIC BLOOD PRESSURE: 106 MMHG | TEMPERATURE: 98 F | HEART RATE: 50 BPM | DIASTOLIC BLOOD PRESSURE: 55 MMHG | HEIGHT: 72 IN | WEIGHT: 163.31 LBS | OXYGEN SATURATION: 98 % | BODY MASS INDEX: 22.12 KG/M2

## 2019-01-07 DIAGNOSIS — F11.90 CHRONIC NARCOTIC USE: ICD-10-CM

## 2019-01-07 DIAGNOSIS — J43.9 CHRONIC OBSTRUCTIVE BRONCHITIS WITH PULMONARY EMPHYSEMA: ICD-10-CM

## 2019-01-07 DIAGNOSIS — I83.92 ASYMPTOMATIC VARICOSE VEINS OF LEFT LOWER EXTREMITY: ICD-10-CM

## 2019-01-07 DIAGNOSIS — F41.9 ANXIETY DISORDER, UNSPECIFIED TYPE: Chronic | ICD-10-CM

## 2019-01-07 DIAGNOSIS — R94.39 ABNORMAL STRESS TEST: ICD-10-CM

## 2019-01-07 DIAGNOSIS — K26.9 NSAID-INDUCED DUODENAL ULCER: ICD-10-CM

## 2019-01-07 DIAGNOSIS — E11.42 DM TYPE 2 WITH DIABETIC PERIPHERAL NEUROPATHY: Chronic | ICD-10-CM

## 2019-01-07 DIAGNOSIS — K44.9 HIATAL HERNIA: ICD-10-CM

## 2019-01-07 DIAGNOSIS — T39.395A NSAID-INDUCED DUODENAL ULCER: ICD-10-CM

## 2019-01-07 DIAGNOSIS — Z01.818 PREOP EXAMINATION: Primary | ICD-10-CM

## 2019-01-07 DIAGNOSIS — J44.89 CHRONIC OBSTRUCTIVE BRONCHITIS WITH PULMONARY EMPHYSEMA: ICD-10-CM

## 2019-01-07 DIAGNOSIS — F10.10 ALCOHOL USE DISORDER, MILD, IN CONTROLLED ENVIRONMENT: ICD-10-CM

## 2019-01-07 DIAGNOSIS — K22.2 SCHATZKI'S RING OF DISTAL ESOPHAGUS: ICD-10-CM

## 2019-01-07 DIAGNOSIS — E87.1 HYPONATREMIA: ICD-10-CM

## 2019-01-07 PROBLEM — R07.89 CHEST PAIN, ATYPICAL: Status: RESOLVED | Noted: 2018-11-05 | Resolved: 2019-01-07

## 2019-01-07 PROCEDURE — 3045F PR MOST RECENT HEMOGLOBIN A1C LEVEL 7.0-9.0%: CPT | Mod: CPTII,HCNC,S$GLB, | Performed by: HOSPITALIST

## 2019-01-07 PROCEDURE — 99999 PR PBB SHADOW E&M-EST. PATIENT-LVL III: ICD-10-PCS | Mod: PBBFAC,HCNC,, | Performed by: HOSPITALIST

## 2019-01-07 PROCEDURE — 1101F PT FALLS ASSESS-DOCD LE1/YR: CPT | Mod: CPTII,HCNC,S$GLB, | Performed by: HOSPITALIST

## 2019-01-07 PROCEDURE — 3045F PR MOST RECENT HEMOGLOBIN A1C LEVEL 7.0-9.0%: ICD-10-PCS | Mod: CPTII,HCNC,S$GLB, | Performed by: HOSPITALIST

## 2019-01-07 PROCEDURE — 1101F PR PT FALLS ASSESS DOC 0-1 FALLS W/OUT INJ PAST YR: ICD-10-PCS | Mod: CPTII,HCNC,S$GLB, | Performed by: HOSPITALIST

## 2019-01-07 PROCEDURE — 99214 OFFICE O/P EST MOD 30 MIN: CPT | Mod: HCNC,S$GLB,, | Performed by: HOSPITALIST

## 2019-01-07 PROCEDURE — 99214 PR OFFICE/OUTPT VISIT, EST, LEVL IV, 30-39 MIN: ICD-10-PCS | Mod: HCNC,S$GLB,, | Performed by: HOSPITALIST

## 2019-01-07 PROCEDURE — 99999 PR PBB SHADOW E&M-EST. PATIENT-LVL III: CPT | Mod: PBBFAC,HCNC,, | Performed by: HOSPITALIST

## 2019-01-07 RX ORDER — MULTIVITAMIN
2 TABLET ORAL DAILY
COMMUNITY

## 2019-01-07 NOTE — ASSESSMENT & PLAN NOTE
Has almost quit   No history of alcohol with drawl ( subjectively )  No suggestion of  hepatic decompensation

## 2019-01-07 NOTE — ASSESSMENT & PLAN NOTE
Was on NSAID   No longer using NSAID   Care with NSAID suggested   No longer having GI bleeding   Nov 15 th 2018 - Hb 11.2

## 2019-01-07 NOTE — LETTER
January 7, 2019      Prudencio Armenta MD  [address] [facility] [address]           [facility]  [address] [facility] [address]  Phone: [phone]          Patient: Moe Ren   MR Number: [id number]   YOB: 1950   Date of Visit: 1/7/2019       Dear Dr. Prudencio Armenta:    Thank you for referring Moe Ren to me for evaluation. Attached you will find relevant portions of my assessment and plan of care.    If you have questions, please do not hesitate to call me. I look forward to following Moe Ren along with you.    Sincerely,    Arlette Moore MD    Enclosure  CC:  Josias Ling MD    If you would like to receive this communication electronically, please contact [email or web address] or [phone] to request more information on [employer] Link access.    For providers and/or their staff who would like to refer a patient to [facility], please contact us through our one-stop-shop provider referral line, [facility], at [phone].    If you feel you have received this communication in error or would no longer like to receive these types of communications, please e-mail [email or web address]

## 2019-01-07 NOTE — PATIENT INSTRUCTIONS
Verified PreOp Instructions given:    -- Medication information (what to hold and what to take)   -- NPO guidelines as follows: (or as per your Surgeon)  1. Stop ALL solid food, gum, candy (including vitamins) 8 hours before arrival time.  2. Stop all CLOUDY liquids: coffee with creamer, cloudy juices, 6 hours prior to arrival time.  3. The patient should be ENCOURAGED to drink carbohydrate-rich clear liquids (sports drinks, clear juices) until 2 hours prior to arrival time.  4. CLEAR liquids include only water, black coffee NO creamer, clear oral rehydration drinks, clear sports drinks or clear fruit juices (no orange juice, no pulpy juices, no apple cider).   5. IF IN DOUBT, drink water instead.   6. NOTHING TO DRINK 2 hours before to arrival time. If you are told to take medication on the morning of surgery, it may be taken with a sip of water.   -- Arrival place and directions given; time to be given the day before procedure by the Surgeon's Office   -- Bathing with antibacterial soap   -- Don't wear any jewelry or bring any valuables AM of surgery   -- No makeup or moisturizer to face   -- No perfume/cologne, powder, lotions or aftershave     Pt verbalized understanding.

## 2019-01-07 NOTE — ASSESSMENT & PLAN NOTE
Had Cardiology evaluation  Given his lack of symptoms , like to try medical Rx before considering a LHC  No chest pain at rest or on exertion

## 2019-01-07 NOTE — ASSESSMENT & PLAN NOTE
Feels breathing a lot better since cut down on tobacco   Not using Atrovent inhaler much   Not troubled with breathing

## 2019-01-07 NOTE — OUTPATIENT SUBJECTIVE & OBJECTIVE
Outpatient Subjective & Objective     Chief complaint-Preoperative evaluation, Perioperative Medical management, complication reduction plan     Active cardiac conditions- none    Revised cardiac risk index predictors- insulin requiring diabetes mellitus    Functional capacity -Examples of physical activity, takes flights of stairs,  can take 1 flight of stairs and weeding garden----- He can undertake all the above activities without  chest pain,chest tightness, Shortness of breath ,dizziness,lightheadedness making his exercise tolerance more  than 4 Mets.   Was active , working out in the gym until early 2018 with no exertional symptoms     Review of Systems   Constitutional: Negative for chills and fever.        No unusual weight changes     HENT:        STOPBANG score  3/ 8      Age over 50 years  Neck size over 40 CM  Male gender   Eyes:        No unusual vision changes   Respiratory:          Cough with clear phlegm - much better since tobacco reduction  No change in color , consistency, amount of phlegm   Dry cough   No Hemoptysis   Cardiovascular:        As noted   Gastrointestinal:        Bowels- Regular  No overt GI/ blood losses   Endocrine:        Prednisone use > 20 mg daily for 3 weeks- None    Genitourinary: Negative for dysuria.        No urinary hesitancy    Musculoskeletal:        As above      Skin: Negative for rash.   Neurological: Negative for syncope.        No unilateral weakness   Hematological:        Current use of Anticoagulants  Antiplatelet agents  None   Psychiatric/Behavioral:          Depression, Anxiety - Controlled     No SI/HI     No vascular stenting           No anesthesia, bleeding, cardiac problems , PONV with previous surgeries/procedures.  Medications and Allergies reviewed in epic.   FH- No anesthesia,bleeding / venous thrombosis , early onset heart disease in family   Lives with wife who can help     Physical Exam  Blood pressure (!) 106/55, pulse (!) 50, temperature 97.7  °F (36.5 °C), temperature source Oral, height 6' (1.829 m), weight 74.1 kg (163 lb 4.8 oz), SpO2 98 %.      Physical Exam  Constitutional- Vitals - Body mass index is 22.15 kg/m².,   Vitals:    01/07/19 1501   BP: (!) 106/55   Pulse: (!) 50   Temp: 97.7 °F (36.5 °C)     General appearance-Conscious,Coherent  Eyes- No conjunctival icterus,pupils  round  and reactive to light   ENT-Oral cavity- moist  , Hearing grossly normal   Neck- No thyromegaly ,Trachea -central, No jugular venous distension,   No Carotid Bruit   Cardiovascular -Heart Sounds- Normal ,  no murmur  and  occasional irregularity suggestive of ectopy   , No gallop rhythm   Respiratory - Normal Respiratory Effort, Normal breath sounds,  no wheeze  and  no forced expiratory wheeze    Peripheral pitting pedal edema-- none , no calf pain   Gastrointestinal -Soft abdomen, No palpable masses, Non Tender,Liver,Spleen not palpable. No-- free fluid and shifting dullness  Musculoskeletal- No finger Clubbing. Strength grossly normal   Lymphatic-No Palpable cervical, axillary,Inguinal lymphadenopathy   Psychiatric - normal effect,Orientation  Rt Dorsalis pedis pulses-palpable    Lt Dorsalis pedis pulses- palpable   Rt Posterior tibial pulses -palpable   Left posterior tibial pulses -palpable   Miscellaneous -  no asterixis,  no renal bruit and  bowel sounds positive   Investigations  Lab and Imaging have been reviewed in [facility].    Review of Medicine tests    EKG- I had independently reviewed the EKG from--10/8/2018   It was reported to be showing     Sinus rhythm with sinus arrhythmia with frequent Premature ventricular  complexes  Possible Left atrial enlargement  Borderline Abnormal ECG  When compared with ECG of 16-MAY-2018 09:09,  Premature ventricular complexes are now Present  Aberrant conduction is no longer Present    Nov 2018     · Abnormal Usha MPI  · There is a small amount of mild ischemia in the apex wall(s).  · Rest Ejection Fraction is 66 %.  · LV  cavity size at rest is normal.  · Resting wall motion is physiologic    Nov 2018     · Mild bilateral internal carotid artery plaque with no hemodynamically significant stenosis    Review of clinical lab tests:  Lab Results   Component Value Date    CREATININE 1.2 01/07/2019    HGB 11.2 (L) 11/15/2018     (H) 11/15/2018   in the setting of recent GI bleeding       Review of old records- Was done and information gathered regards to events leading to surgery and health conditions of significance in the perioperative period.    Outpatient Subjective & Objective

## 2019-01-07 NOTE — TELEPHONE ENCOUNTER
----- Message from Neisha Prieto sent at 1/4/2019  4:51 PM CST -----  Contact: Drew Molina called to request what type of pain is patient being treated for [id number] medication. please call at [phone]          ALPRAZolam (XANAX) 0.5 MG tablet  HYDROcodone-acetaminophen (NORCO)  mg per tablet 30            [address]

## 2019-01-07 NOTE — ASSESSMENT & PLAN NOTE
Xanax   Attributes anxiety for alcohol excess   Anxiety - as per him his whole life  I suggest monitoring the sodium as SIADH from Lexapro  use and hypersecretion of ADH associated with surgery can reduce sodium in the perioperative period

## 2019-01-07 NOTE — ASSESSMENT & PLAN NOTE
Suggested feet care   Under podiatry twice a year   Type 2 Diabetes Mellitus  On treatment with oral  Insulin  Under Endocrinology care     Hemoglobin A1c- on 1/7 - 7.2   Does Carb counting   Capillary glucose check-Yes  Pre breakfast -150- 200  Pre lunch -150- 200  Pre supper-150- 200  Bed time-150- 200  Stressful situation -    Diabetes Mellitus-I suggest monitoring the glucose in the perioperative period ( Before meals and bed time,if the patient is on oral feeds or every 6 hourly ,if the patient is NPO )  Blood glucose target in hospitalized patients is 140-180. Oral Hypoglycemic agents are generally avoided during the hospital stay . If glucose is consistently elevated ,I suggest using basal ,prandial Insulin regimen to control the glucose , as elevated glucose can be associated with adverse surgical out comes. Please consider involving Hospital Medicine or Endocrinology ,if any help is needed with Glucose control. Patient will be instructed based on the pre op clinic guidelines  about adjustment of diabetic treatment  considering the NPO status for Surgery       I had educated that uncontrolled DM can cause post op complications,risk of infection, wound healing problem,increased length of stay in hospital and its associated complications.I suggest exercise as much as possible and follow diabetic diet    On Lisinopril for renal protection - holding ACE on the AM of surgery discussed   Toprol XL for skipped beats

## 2019-01-07 NOTE — PROGRESS NOTES
[facility] - Pre Op Consult  Progress Note    Patient Name: Moe Ren  MRN: [id number]  Date of Evaluation- 01/07/2019  PCP- Damian Ramon MD    Future cases for Moe Ren [[id number]]     Case ID Status Date Time Rizwan Procedure Provider Location    [id number] [facility] 1/10/2019  7:00  FUSION, SPINE, LUMBAR, TLIF, MINIMALLY INVASIVE @L4/5 & L5/S1 Josias Ling MD [[id number]] [facility] OR 2ND FLR          HPI:  History of present illness- I had the pleasure of meeting this pleasant 68 y.o. gentleman in the pre op clinic prior to his elective spine surgery. The patient is new to me .    I have obtained the history by speaking to the patient and by reviewing the electronic health records.    Events leading up to surgery / History of presenting illness -    Spondylolisthesis of lumbar region   Spondylolisthesis of lumbosacral region   Lumbar disc herniation with radiculopathy       He has been troubled with moderate-severe  low back pian , down the side of the leg , down to mid calf , starting at the Rt hip  for long  . Pain increases with activity and decreases with resting.    Relevant health conditions of significance for the perioperative period/ History of presenting illness -    He used to smoke tobacco, 2 packs a day down to few cigarettes a day   Used to drink alcohol excessively( 4-5- 8 drinks a night ) , down to 4 drinks a week  Cut both of them down for surgery and feels a lot better     His main health conditions are Anxiety , Depression , DM    Patient Active Problem List    Diagnosis Date Noted    Abnormal stress test 01/06/2019    NSAID-induced duodenal ulcer while traveling. EGD done in [place] 10/2018 with duodenal ulcers 11/09/2018         Schatzki's ring of distal esophagus 6/2018 s/p dilitation 06/19/2018                                            Chronic obstructive bronchitis with pulmonary emphysema 11/14/2017              Chronic narcotic use 08/02/2017                                                            DM type 2 with diabetic peripheral neuropathy 07/24/2013         Anxiety disorder 06/12/2013         Alcohol use disorder, mild, in controlled environment 06/12/2013    Continuous tobacco abuse 06/12/2013     .        Subjective/ Objective:          Chief complaint-Preoperative evaluation, Perioperative Medical management, complication reduction plan     Active cardiac conditions- none    Revised cardiac risk index predictors- insulin requiring diabetes mellitus    Functional capacity -Examples of physical activity, takes flights of stairs,  can take 1 flight of stairs and weeding garden----- He can undertake all the above activities without  chest pain,chest tightness, Shortness of breath ,dizziness,lightheadedness making his exercise tolerance more  than 4 Mets.   Was active , working out in the gym until early 2018 with no exertional symptoms     Review of Systems   Constitutional: Negative for chills and fever.        No unusual weight changes     HENT:        STOPBANG score  3/ 8      Age over 50 years  Neck size over 40 CM  Male gender   Eyes:        No unusual vision changes   Respiratory:          Cough with clear phlegm - much better since tobacco reduction  No change in color , consistency, amount of phlegm   Dry cough   No Hemoptysis   Cardiovascular:        As noted   Gastrointestinal:        Bowels- Regular  No overt GI/ blood losses   Endocrine:        Prednisone use > 20 mg daily for 3 weeks- None    Genitourinary: Negative for dysuria.        No urinary hesitancy    Musculoskeletal:        As above      Skin: Negative for rash.   Neurological: Negative for syncope.        No unilateral weakness   Hematological:        Current use of Anticoagulants  Antiplatelet agents  None   Psychiatric/Behavioral:          Depression, Anxiety - Controlled     No SI/HI     No vascular stenting           No anesthesia, bleeding, cardiac problems , PONV with previous  surgeries/procedures.  Medications and Allergies reviewed in epic.   FH- No anesthesia,bleeding / venous thrombosis , early onset heart disease in family   Lives with wife who can help     Physical Exam  Blood pressure (!) 106/55, pulse (!) 50, temperature 97.7 °F (36.5 °C), temperature source Oral, height 6' (1.829 m), weight 74.1 kg (163 lb 4.8 oz), SpO2 98 %.      Physical Exam  Constitutional- Vitals - Body mass index is 22.15 kg/m².,   Vitals:    01/07/19 1501   BP: (!) 106/55   Pulse: (!) 50   Temp: 97.7 °F (36.5 °C)     General appearance-Conscious,Coherent  Eyes- No conjunctival icterus,pupils  round  and reactive to light   ENT-Oral cavity- moist  , Hearing grossly normal   Neck- No thyromegaly ,Trachea -central, No jugular venous distension,   No Carotid Bruit   Cardiovascular -Heart Sounds- Normal ,  no murmur  and  occasional irregularity suggestive of ectopy   , No gallop rhythm   Respiratory - Normal Respiratory Effort, Normal breath sounds,  no wheeze  and  no forced expiratory wheeze    Peripheral pitting pedal edema-- none , no calf pain   Gastrointestinal -Soft abdomen, No palpable masses, Non Tender,Liver,Spleen not palpable. No-- free fluid and shifting dullness  Musculoskeletal- No finger Clubbing. Strength grossly normal   Lymphatic-No Palpable cervical, axillary,Inguinal lymphadenopathy   Psychiatric - normal effect,Orientation  Rt Dorsalis pedis pulses-palpable    Lt Dorsalis pedis pulses- palpable   Rt Posterior tibial pulses -palpable   Left posterior tibial pulses -palpable   Miscellaneous -  no asterixis,  no renal bruit and  bowel sounds positive   Investigations  Lab and Imaging have been reviewed in [facility].    Review of Medicine tests    EKG- I had independently reviewed the EKG from--10/8/2018   It was reported to be showing     Sinus rhythm with sinus arrhythmia with frequent Premature ventricular  complexes  Possible Left atrial enlargement  Borderline Abnormal ECG  When compared with  ECG of 16-MAY-2018 09:09,  Premature ventricular complexes are now Present  Aberrant conduction is no longer Present    Nov 2018     · Abnormal Usha MPI  · There is a small amount of mild ischemia in the apex wall(s).  · Rest Ejection Fraction is 66 %.  · LV cavity size at rest is normal.  · Resting wall motion is physiologic    Nov 2018     · Mild bilateral internal carotid artery plaque with no hemodynamically significant stenosis    Review of clinical lab tests:  Lab Results   Component Value Date    CREATININE 1.2 01/07/2019    HGB 11.2 (L) 11/15/2018     (H) 11/15/2018   in the setting of recent GI bleeding       Review of old records- Was done and information gathered regards to events leading to surgery and health conditions of significance in the perioperative period.        Preoperative cardiac risk assessment-  The patient does not have any active cardiac conditions . Revised cardiac risk index predictors- 1---.Functional capacity is more than 4 Mets. He will be undergoing a Spine procedure that carries a intermediate risk     The estimated risk of the rate of adverse cardiac outcomes  0.9%    No further cardiac work up is indicated prior to proceeding with the surgery          American Society of Anesthesiologists Physical status classification ( ASA ) class: 3     Postoperative pulmonary complication risk assessment:      ARISCAT ( Canet) risk index- risk class -  Low, if duration of surgery is under 3 hours, intermediate, if duration of surgery is over 3 hours       Assessment/Plan:     Abnormal stress test  Had Cardiology evaluation  Given his lack of symptoms , like to try medical Rx before considering a LHC  No chest pain at rest or on exertion             DM type 2 with diabetic peripheral neuropathy  Suggested feet care   Under podiatry twice a year   Type 2 Diabetes Mellitus  On treatment with oral  Insulin  Under Endocrinology care     Hemoglobin A1c- on 1/7 - 7.2   Does Carb counting    Capillary glucose check-Yes  Pre breakfast -150- 200  Pre lunch -150- 200  Pre supper-150- 200  Bed time-150- 200  Stressful situation -    Diabetes Mellitus-I suggest monitoring the glucose in the perioperative period ( Before meals and bed time,if the patient is on oral feeds or every 6 hourly ,if the patient is NPO )  Blood glucose target in hospitalized patients is 140-180. Oral Hypoglycemic agents are generally avoided during the hospital stay . If glucose is consistently elevated ,I suggest using basal ,prandial Insulin regimen to control the glucose , as elevated glucose can be associated with adverse surgical out comes. Please consider involving Hospital Medicine or Endocrinology ,if any help is needed with Glucose control. Patient will be instructed based on the pre op clinic guidelines  about adjustment of diabetic treatment  considering the NPO status for Surgery       I had educated that uncontrolled DM can cause post op complications,risk of infection, wound healing problem,increased length of stay in hospital and its associated complications.I suggest exercise as much as possible and follow diabetic diet    On Lisinopril for renal protection - holding ACE on the AM of surgery discussed   Toprol XL for skipped beats       Chronic narcotic use  Chronic continuous opioid use- In view of the opioid use, the patient may have opioid tolerance .I suggest considering the possibility of opioid tolerance  in planning post operative pain control     Anxiety disorder  Xanax   Attributes anxiety for alcohol excess   Anxiety - as per him his whole life  I suggest monitoring the sodium as SIADH from Lexapro  use and hypersecretion of ADH associated with surgery can reduce sodium in the perioperative period    Alcohol use disorder, mild, in controlled environment  Has almost quit   No history of alcohol with drawl ( subjectively )  No suggestion of  hepatic decompensation     Chronic obstructive bronchitis with  pulmonary emphysema  Feels breathing a lot better since cut down on tobacco   Not using Atrovent inhaler much   Not troubled with breathing      Schatzki's ring of distal esophagus 6/2018 s/p dilitation  No problem with swallowing   on regular consistency diet and on thin liquids      NSAID-induced duodenal ulcer while traveling. EGD done in [facility] 10/2018 with duodenal ulcers  Was on NSAID   No longer using NSAID   Care with NSAID suggested   No longer having GI bleeding   Nov 15 th 2018 - Hb 11.2    Hiatal hernia on EGD 6/2018; biopsies chronic gastritis, H pylori negative.  GERD-  I suggest continuation of the Proton pump inhibitor in the perioperative period . I suggest aspiration precautions    Hyponatremia  Drinks about 48 ounces of water a day plus 50 ounces of coffee and tea put together  Had low Sodium in the past  Lexapro could be causing Low Sodium through SIADH compunded by excessive fluid intake   Suggested reducing fluid intake to 2 litre,s ( 64 ounces a day )whichj likely will improve the sodium   Suggested avoidance of hypotonic IV fluids emily op   Fluid status Euvolemic       Asymptomatic varicose veins of left lower extremity  Increased risk of thrombosis in the emily operative period , compression stocking use discussed        Preventive perioperative care    Thromboembolic prophylaxis:  His risk factors for thrombosis include surgical procedure and age.I suggest  thromboembolic prophylaxis ( mechanical/pharmacological, weighing the risk benefits of pharmacological agent use considering emily procedural bleeding )  during the perioperative period.I suggested being active in the post operative period.      Postoperative pulmonary complication prophylaxis-Risk factors for post operative pulmonary complications include age over 65 years, ASA class >2 and COPD- I suggest incentive spirometry use, early ambulation and end tidal carbon dioxide monitoring  , oral care , head end of bed elevation      Renal complication prophylaxis-Risk factors for renal complications include diabetes mellitus . I suggest keeping him optimally  hydrated     Surgical site Infection Prophylaxis-I  suggest appropriate antibiotic for Prophylaxis against Surgical site infections     Delirium prophylaxis-Risk factors - opioid use - I suggest avoidance / minimizing the use of  Benzodiazepines ( unless the patient has been taking it on a regular basis ),Anticholinergic medication,Antihistamines ( like  Benadryl).I suggest minimizing the use of opioid medication and use of IV tylenol,if it is appropriate. I suggest using the lowest possible dose of opioids for the shortest duration possible in the perioperative period. I suggest to Keep shades/blinds open during the day, lights off and shades closed at night to encourage normal sleep/wake cycle.I encourage the presence of the family member with the patient at all times, if at all possible as mental status changes can be picked up early by the family members and they help with reorientation. I encouraged the presence of family to help with orientation in the perioperative period. Benadryl avoidance suggested      In view of Spine procedure the patient  is at risk of postoperative urinary retention.  I suggest avoidance / minimizing the of  Benzodiazepines,Anticholinergic medication,antihistamines ( Benadryl) , if possible in the perioperative period. I suggest using the minimum possible use of opioids for the minimum period of time in the perioperative period. Benadryl avoidance suggested      This visit was focused on Preoperative evaluation, Perioperative Medical management, complication reduction plans. I suggest that the patient follows up with primary care or relevant sub specialists for ongoing health care.    I appreciate the opportunity to be involved in this patients care. Please feel free to contact me if there were any questions about this consultation.    Patient is  optimized       Patient was instructed to call and update me about any changes to health,  medication, office visits ,testing out side of the emily operative care center , hospitalizations between now and surgery     Arlette Moore MD  Perioperative Medicine  [facility]   Pager [phone]  -----  1/8- 16 53     Urinalysis from 1/7/2019 showed - no suggestion of UTI   -----  1/9- 12 37      Called to follow up ,spoke to him,  to address any concerns with the up coming surgery or any questions on Medication instructions -  Doing fine,No changes to Medication, Health -  Call, if needed

## 2019-01-07 NOTE — ASSESSMENT & PLAN NOTE
Drinks about 48 ounces of water a day plus 50 ounces of coffee and tea put together  Had low Sodium in the past  Lexapro could be causing Low Sodium through SIADH compunded by excessive fluid intake   Suggested reducing fluid intake to 2 litre,s ( 64 ounces a day )whichj likely will improve the sodium   Suggested avoidance of hypotonic IV fluids emily op   Fluid status Euvolemic

## 2019-01-07 NOTE — PRE-PROCEDURE INSTRUCTIONS
Medication instructions given. Preop instructions given. He has been holding asa, asa containing products, nsaids, vitamins and supplements one week prior to surgery. Call for changes in status or questions.   Shower the night before surgery and the morning of surgery with an antibacterial soap( hibiclens or dial antibacterial soap).  Nothing on the skin once shower. Do not apply any deodorant, lotion, powder, perfume,or aftershave.  No jewelry going to surgery.  May have solid foods, gum, and hard candy until 8 hours before surgery/procedure time.  May have clear liquids( water, gatorade, powerade or apple juice) until 2 hours prior to surgery/procedure time.  No red or orange drinks. If in doubt , drink water. Nothing to drink 2 hours before arrival time for surgery/procedure. If you are told to take medication in the morning of surgery, it may be taken with a sip of water. If your doctor tells you something different pertaining to when to stop eating or drinking, follow your doctor's instructions.Verbalizes understanding. General anesthesia information given.

## 2019-01-07 NOTE — HPI
History of present illness- I had the pleasure of meeting this pleasant 68 y.o. gentleman in the pre op clinic prior to his elective spine surgery. The patient is new to me .    I have obtained the history by speaking to the patient and by reviewing the electronic health records.    Events leading up to surgery / History of presenting illness -    Spondylolisthesis of lumbar region   Spondylolisthesis of lumbosacral region   Lumbar disc herniation with radiculopathy       He has been troubled with moderate-severe  low back pian , down the side of the leg , down to mid calf , starting at the Rt hip  for long  . Pain increases with activity and decreases with resting.    Relevant health conditions of significance for the perioperative period/ History of presenting illness -    He used to smoke tobacco, 2 packs a day down to few cigarettes a day   Used to drink alcohol excessively( 4-5- 8 drinks a night ) , down to 4 drinks a week  Cut both of them down for surgery and feels a lot better     His main health conditions are Anxiety , Depression , DM    Patient Active Problem List    Diagnosis Date Noted    Abnormal stress test 01/06/2019    NSAID-induced duodenal ulcer while traveling. EGD done in [facility] 10/2018 with duodenal ulcers 11/09/2018         Schatzki's ring of distal esophagus 6/2018 s/p dilitation 06/19/2018                                            Chronic obstructive bronchitis with pulmonary emphysema 11/14/2017              Chronic narcotic use 08/02/2017                                                           DM type 2 with diabetic peripheral neuropathy 07/24/2013         Anxiety disorder 06/12/2013         Alcohol use disorder, mild, in controlled environment 06/12/2013    Continuous tobacco abuse 06/12/2013     .

## 2019-01-09 ENCOUNTER — TELEPHONE (OUTPATIENT)
Dept: NEUROSURGERY | Facility: CLINIC | Age: 69
End: 2019-01-09

## 2019-01-09 DIAGNOSIS — E11.21 TYPE 2 DIABETES WITH NEPHROPATHY: ICD-10-CM

## 2019-01-09 DIAGNOSIS — J43.8 OTHER EMPHYSEMA: ICD-10-CM

## 2019-01-09 DIAGNOSIS — K29.70 GASTRITIS, PRESENCE OF BLEEDING UNSPECIFIED, UNSPECIFIED CHRONICITY, UNSPECIFIED GASTRITIS TYPE: ICD-10-CM

## 2019-01-09 DIAGNOSIS — N52.9 MALE ERECTILE DISORDER: ICD-10-CM

## 2019-01-09 RX ORDER — OMEPRAZOLE 40 MG/1
40 CAPSULE, DELAYED RELEASE ORAL
Qty: 60 CAPSULE | Refills: 3 | Status: SHIPPED | OUTPATIENT
Start: 2019-01-09 | End: 2020-06-04 | Stop reason: SDUPTHER

## 2019-01-09 RX ORDER — FLUTICASONE PROPIONATE 50 MCG
2 SPRAY, SUSPENSION (ML) NASAL DAILY PRN
Qty: 16 G | Refills: 2 | Status: SHIPPED | OUTPATIENT
Start: 2019-01-09

## 2019-01-09 RX ORDER — ATORVASTATIN CALCIUM 20 MG/1
20 TABLET, FILM COATED ORAL DAILY
Qty: 90 TABLET | Refills: 1 | Status: SHIPPED | OUTPATIENT
Start: 2019-01-09 | End: 2019-01-31 | Stop reason: DRUGHIGH

## 2019-01-09 RX ORDER — LISINOPRIL 5 MG/1
5 TABLET ORAL DAILY
Qty: 90 TABLET | Refills: 2 | Status: SHIPPED | OUTPATIENT
Start: 2019-01-09 | End: 2019-05-28 | Stop reason: ALTCHOICE

## 2019-01-09 RX ORDER — INSULIN ASPART 100 [IU]/ML
INJECTION, SOLUTION INTRAVENOUS; SUBCUTANEOUS
Qty: 45 ML | Refills: 1 | Status: SHIPPED | OUTPATIENT
Start: 2019-01-09 | End: 2019-01-10 | Stop reason: SDUPTHER

## 2019-01-09 RX ORDER — SILDENAFIL 50 MG/1
50 TABLET, FILM COATED ORAL DAILY PRN
Qty: 10 TABLET | Refills: 0 | Status: ON HOLD | OUTPATIENT
Start: 2019-01-09 | End: 2019-01-11 | Stop reason: HOSPADM

## 2019-01-09 NOTE — TELEPHONE ENCOUNTER
Patient contacted. Advised to arrive for surgery at 1055, DOSC, NPO after midnight the night before, shower x 2 with Hibiclens or Dial antibacterial soap. Understanding verbalized by patient.

## 2019-01-09 NOTE — TELEPHONE ENCOUNTER
----- Message from Damion George sent at 1/9/2019  9:46 AM CST -----  Contact: Moe [phone]  REFILL: atorvastatin (LIPITOR) 20 MG tablet, escitalopram oxalate (LEXAPRO) 20 MG tablet, fluticasone (FLONASE) 50 mcg/actuation nasal spray, insulin aspart U-100 (NOVOLOG FLEXPEN U-100 INSULIN) 100 unit/mL InPn pen, ipratropium (ATROVENT HFA) 17 mcg/actuation inhaler, lisinopril (PRINIVIL,ZESTRIL) 5 MG tablet, omeprazole (PRILOSEC) 40 MG capsule, sildenafil (VIAGRA) 50 MG tablet, sildenafil (VIAGRA) 50 MG tablet, DIABETIC SUPPLIES    [address]

## 2019-01-10 ENCOUNTER — TELEPHONE (OUTPATIENT)
Dept: FAMILY MEDICINE | Facility: CLINIC | Age: 69
End: 2019-01-10

## 2019-01-10 ENCOUNTER — ANESTHESIA (OUTPATIENT)
Dept: SURGERY | Facility: HOSPITAL | Age: 69
DRG: 460 | End: 2019-01-10
Payer: MEDICARE

## 2019-01-10 ENCOUNTER — HOSPITAL ENCOUNTER (INPATIENT)
Facility: HOSPITAL | Age: 69
LOS: 2 days | Discharge: HOME | DRG: 460 | End: 2019-01-12
Attending: NEUROLOGICAL SURGERY | Admitting: NEUROLOGICAL SURGERY
Payer: MEDICARE

## 2019-01-10 DIAGNOSIS — M48.061 LUMBAR STENOSIS: ICD-10-CM

## 2019-01-10 DIAGNOSIS — K21.9 GERD (GASTROESOPHAGEAL REFLUX DISEASE): ICD-10-CM

## 2019-01-10 DIAGNOSIS — M47.816 FACET ARTHRITIS OF LUMBAR REGION: ICD-10-CM

## 2019-01-10 DIAGNOSIS — F41.9 ANXIETY DISORDER, UNSPECIFIED TYPE: ICD-10-CM

## 2019-01-10 DIAGNOSIS — M51.37 DEGENERATION OF LUMBAR OR LUMBOSACRAL INTERVERTEBRAL DISC: ICD-10-CM

## 2019-01-10 DIAGNOSIS — M47.819 SPONDYLOSIS WITHOUT MYELOPATHY: ICD-10-CM

## 2019-01-10 DIAGNOSIS — R13.10 DYSPHAGIA, UNSPECIFIED TYPE: Primary | ICD-10-CM

## 2019-01-10 DIAGNOSIS — R12 HEARTBURN: ICD-10-CM

## 2019-01-10 DIAGNOSIS — M48.062 SPINAL STENOSIS OF LUMBAR REGION WITH NEUROGENIC CLAUDICATION: ICD-10-CM

## 2019-01-10 DIAGNOSIS — M48.062 SPINAL STENOSIS, LUMBAR REGION, WITH NEUROGENIC CLAUDICATION: ICD-10-CM

## 2019-01-10 DIAGNOSIS — Z98.1 S/P LUMBAR SPINAL FUSION: ICD-10-CM

## 2019-01-10 LAB
POCT GLUCOSE: 212 MG/DL (ref 70–110)
POCT GLUCOSE: 213 MG/DL (ref 70–110)
POCT GLUCOSE: 255 MG/DL (ref 70–110)

## 2019-01-10 PROCEDURE — C1769 GUIDE WIRE: HCPCS | Mod: HCNC | Performed by: NEUROLOGICAL SURGERY

## 2019-01-10 PROCEDURE — 63600175 PHARM REV CODE 636 W HCPCS: Mod: HCNC | Performed by: NURSE ANESTHETIST, CERTIFIED REGISTERED

## 2019-01-10 PROCEDURE — 22632 ARTHRD PST TQ 1NTRSPC LM EA: CPT | Mod: HCNC,,, | Performed by: NEUROLOGICAL SURGERY

## 2019-01-10 PROCEDURE — 82962 GLUCOSE BLOOD TEST: CPT | Mod: HCNC | Performed by: NEUROLOGICAL SURGERY

## 2019-01-10 PROCEDURE — 25000003 PHARM REV CODE 250: Mod: HCNC | Performed by: NEUROLOGICAL SURGERY

## 2019-01-10 PROCEDURE — 63600175 PHARM REV CODE 636 W HCPCS: Mod: HCNC | Performed by: STUDENT IN AN ORGANIZED HEALTH CARE EDUCATION/TRAINING PROGRAM

## 2019-01-10 PROCEDURE — 37000008 HC ANESTHESIA 1ST 15 MINUTES: Mod: HCNC | Performed by: NEUROLOGICAL SURGERY

## 2019-01-10 PROCEDURE — 25000003 PHARM REV CODE 250: Mod: HCNC | Performed by: STUDENT IN AN ORGANIZED HEALTH CARE EDUCATION/TRAINING PROGRAM

## 2019-01-10 PROCEDURE — 94761 N-INVAS EAR/PLS OXIMETRY MLT: CPT | Mod: HCNC

## 2019-01-10 PROCEDURE — 37000009 HC ANESTHESIA EA ADD 15 MINS: Mod: HCNC | Performed by: NEUROLOGICAL SURGERY

## 2019-01-10 PROCEDURE — 25000003 PHARM REV CODE 250: Mod: HCNC | Performed by: NURSE ANESTHETIST, CERTIFIED REGISTERED

## 2019-01-10 PROCEDURE — 22853 INSJ BIOMECHANICAL DEVICE: CPT | Mod: HCNC,,, | Performed by: NEUROLOGICAL SURGERY

## 2019-01-10 PROCEDURE — 27201423 OPTIME MED/SURG SUP & DEVICES STERILE SUPPLY: Mod: HCNC | Performed by: NEUROLOGICAL SURGERY

## 2019-01-10 PROCEDURE — 22842 PR POSTERIOR SEGMENTAL INSTRUMENTATION 3-6 VRT SEG: ICD-10-PCS | Mod: HCNC,,, | Performed by: NEUROLOGICAL SURGERY

## 2019-01-10 PROCEDURE — 63600175 PHARM REV CODE 636 W HCPCS: Mod: HCNC | Performed by: ANESTHESIOLOGY

## 2019-01-10 PROCEDURE — 86920 COMPATIBILITY TEST SPIN: CPT | Mod: HCNC

## 2019-01-10 PROCEDURE — 36000711: Mod: HCNC | Performed by: NEUROLOGICAL SURGERY

## 2019-01-10 PROCEDURE — D9220A PRA ANESTHESIA: Mod: HCNC,ANES,, | Performed by: ANESTHESIOLOGY

## 2019-01-10 PROCEDURE — 20600001 HC STEP DOWN PRIVATE ROOM: Mod: HCNC

## 2019-01-10 PROCEDURE — 22630 ARTHRD PST TQ 1NTRSPC LUM: CPT | Mod: HCNC,,, | Performed by: NEUROLOGICAL SURGERY

## 2019-01-10 PROCEDURE — 63600175 PHARM REV CODE 636 W HCPCS: Mod: HCNC | Performed by: NEUROLOGICAL SURGERY

## 2019-01-10 PROCEDURE — D9220A PRA ANESTHESIA: ICD-10-PCS | Mod: HCNC,ANES,, | Performed by: ANESTHESIOLOGY

## 2019-01-10 PROCEDURE — 63600175 PHARM REV CODE 636 W HCPCS: Mod: HCNC

## 2019-01-10 PROCEDURE — 71000039 HC RECOVERY, EACH ADD'L HOUR: Mod: HCNC | Performed by: NEUROLOGICAL SURGERY

## 2019-01-10 PROCEDURE — 22630 PR ARTHRODESIS POSTERIOR INTERBODY LUMBAR: ICD-10-PCS | Mod: HCNC,,, | Performed by: NEUROLOGICAL SURGERY

## 2019-01-10 PROCEDURE — 22853 PR INSERT BIOMECH DEV W/INTERBODY ARTHRODESIS, EA CONTIGUOUS DEFECT: ICD-10-PCS | Mod: HCNC,,, | Performed by: NEUROLOGICAL SURGERY

## 2019-01-10 PROCEDURE — 22632 PR ARTHRODESIS POSTERIOR INTERBODY EA ADDL: ICD-10-PCS | Mod: HCNC,,, | Performed by: NEUROLOGICAL SURGERY

## 2019-01-10 PROCEDURE — 20936 PR AUTOGRAFT SPINE SURGERY LOCAL FROM SAME INCISION: ICD-10-PCS | Mod: HCNC,,, | Performed by: NEUROLOGICAL SURGERY

## 2019-01-10 PROCEDURE — 22842 INSERT SPINE FIXATION DEVICE: CPT | Mod: HCNC,,, | Performed by: NEUROLOGICAL SURGERY

## 2019-01-10 PROCEDURE — 36000710: Mod: HCNC | Performed by: NEUROLOGICAL SURGERY

## 2019-01-10 PROCEDURE — 20936 SP BONE AGRFT LOCAL ADD-ON: CPT | Mod: HCNC,,, | Performed by: NEUROLOGICAL SURGERY

## 2019-01-10 PROCEDURE — 71000033 HC RECOVERY, INTIAL HOUR: Mod: HCNC | Performed by: NEUROLOGICAL SURGERY

## 2019-01-10 PROCEDURE — C1713 ANCHOR/SCREW BN/BN,TIS/BN: HCPCS | Mod: HCNC | Performed by: NEUROLOGICAL SURGERY

## 2019-01-10 DEVICE — SCREW SNIPER 6.5MM X 40MM: Type: IMPLANTABLE DEVICE | Site: SPINE LUMBAR | Status: FUNCTIONAL

## 2019-01-10 DEVICE — CAGE LEVA 10MM 25X10X10: Type: IMPLANTABLE DEVICE | Site: SPINE LUMBAR | Status: FUNCTIONAL

## 2019-01-10 DEVICE — ROD SPINAL PERC 45MM LORDOTIC: Type: IMPLANTABLE DEVICE | Site: SPINE LUMBAR | Status: FUNCTIONAL

## 2019-01-10 DEVICE — SET SCREW SPINAL LOCKING: Type: IMPLANTABLE DEVICE | Site: SPINE LUMBAR | Status: FUNCTIONAL

## 2019-01-10 DEVICE — CAGE LEVA 11MM 25X10X10: Type: IMPLANTABLE DEVICE | Site: SPINE LUMBAR | Status: FUNCTIONAL

## 2019-01-10 DEVICE — ROD SPINAL LORDOTIC 5.5X80MM: Type: IMPLANTABLE DEVICE | Site: SPINE LUMBAR | Status: FUNCTIONAL

## 2019-01-10 DEVICE — SCREW SNIPER 6.5MM X 45MM: Type: IMPLANTABLE DEVICE | Site: SPINE LUMBAR | Status: FUNCTIONAL

## 2019-01-10 RX ORDER — PANTOPRAZOLE SODIUM 40 MG/1
40 TABLET, DELAYED RELEASE ORAL DAILY
Status: DISCONTINUED | OUTPATIENT
Start: 2019-01-11 | End: 2019-01-12 | Stop reason: HOSPADM

## 2019-01-10 RX ORDER — LISINOPRIL 5 MG/1
5 TABLET ORAL DAILY
Status: DISCONTINUED | OUTPATIENT
Start: 2019-01-11 | End: 2019-01-12 | Stop reason: HOSPADM

## 2019-01-10 RX ORDER — METOPROLOL SUCCINATE 25 MG/1
25 TABLET, EXTENDED RELEASE ORAL DAILY
Status: DISCONTINUED | OUTPATIENT
Start: 2019-01-11 | End: 2019-01-12 | Stop reason: HOSPADM

## 2019-01-10 RX ORDER — METHYLPREDNISOLONE ACETATE 40 MG/ML
INJECTION, SUSPENSION INTRA-ARTICULAR; INTRALESIONAL; INTRAMUSCULAR; SOFT TISSUE
Status: DISCONTINUED | OUTPATIENT
Start: 2019-01-10 | End: 2019-01-10 | Stop reason: HOSPADM

## 2019-01-10 RX ORDER — MONTELUKAST SODIUM 10 MG/1
10 TABLET ORAL NIGHTLY
Status: DISCONTINUED | OUTPATIENT
Start: 2019-01-10 | End: 2019-01-12 | Stop reason: HOSPADM

## 2019-01-10 RX ORDER — ALPRAZOLAM 0.25 MG/1
0.5 TABLET ORAL DAILY PRN
Status: DISCONTINUED | OUTPATIENT
Start: 2019-01-10 | End: 2019-01-12 | Stop reason: HOSPADM

## 2019-01-10 RX ORDER — SODIUM CHLORIDE 9 MG/ML
INJECTION, SOLUTION INTRAVENOUS CONTINUOUS
Status: DISCONTINUED | OUTPATIENT
Start: 2019-01-10 | End: 2019-01-11

## 2019-01-10 RX ORDER — IBUPROFEN 200 MG
24 TABLET ORAL
Status: DISCONTINUED | OUTPATIENT
Start: 2019-01-10 | End: 2019-01-12 | Stop reason: HOSPADM

## 2019-01-10 RX ORDER — FENTANYL CITRATE 50 UG/ML
INJECTION, SOLUTION INTRAMUSCULAR; INTRAVENOUS
Status: COMPLETED
Start: 2019-01-10 | End: 2019-01-10

## 2019-01-10 RX ORDER — MIDAZOLAM HYDROCHLORIDE 1 MG/ML
INJECTION, SOLUTION INTRAMUSCULAR; INTRAVENOUS
Status: DISCONTINUED | OUTPATIENT
Start: 2019-01-10 | End: 2019-01-10

## 2019-01-10 RX ORDER — EPHEDRINE SULFATE 50 MG/ML
INJECTION, SOLUTION INTRAVENOUS
Status: DISCONTINUED | OUTPATIENT
Start: 2019-01-10 | End: 2019-01-10

## 2019-01-10 RX ORDER — HYDROMORPHONE HYDROCHLORIDE 1 MG/ML
INJECTION, SOLUTION INTRAMUSCULAR; INTRAVENOUS; SUBCUTANEOUS
Status: COMPLETED
Start: 2019-01-10 | End: 2019-01-10

## 2019-01-10 RX ORDER — NEOSTIGMINE METHYLSULFATE 1 MG/ML
INJECTION, SOLUTION INTRAVENOUS
Status: DISCONTINUED | OUTPATIENT
Start: 2019-01-10 | End: 2019-01-10

## 2019-01-10 RX ORDER — ONDANSETRON 2 MG/ML
INJECTION INTRAMUSCULAR; INTRAVENOUS
Status: DISCONTINUED | OUTPATIENT
Start: 2019-01-10 | End: 2019-01-10

## 2019-01-10 RX ORDER — MIDAZOLAM HYDROCHLORIDE 1 MG/ML
1 INJECTION INTRAMUSCULAR; INTRAVENOUS EVERY 10 MIN PRN
Status: DISCONTINUED | OUTPATIENT
Start: 2019-01-10 | End: 2019-01-11

## 2019-01-10 RX ORDER — SODIUM CHLORIDE 0.9 % (FLUSH) 0.9 %
3 SYRINGE (ML) INJECTION
Status: DISCONTINUED | OUTPATIENT
Start: 2019-01-10 | End: 2019-01-12 | Stop reason: HOSPADM

## 2019-01-10 RX ORDER — MUPIROCIN 20 MG/G
1 OINTMENT TOPICAL 2 TIMES DAILY
Status: DISCONTINUED | OUTPATIENT
Start: 2019-01-10 | End: 2019-01-12 | Stop reason: HOSPADM

## 2019-01-10 RX ORDER — FLUTICASONE PROPIONATE 50 MCG
2 SPRAY, SUSPENSION (ML) NASAL DAILY
Status: DISCONTINUED | OUTPATIENT
Start: 2019-01-11 | End: 2019-01-12 | Stop reason: HOSPADM

## 2019-01-10 RX ORDER — BACLOFEN 10 MG/1
10 TABLET ORAL 2 TIMES DAILY
Status: DISCONTINUED | OUTPATIENT
Start: 2019-01-10 | End: 2019-01-11

## 2019-01-10 RX ORDER — INSULIN ASPART 100 [IU]/ML
INJECTION, SOLUTION INTRAVENOUS; SUBCUTANEOUS
Qty: 45 ML | Refills: 5 | Status: ON HOLD | OUTPATIENT
Start: 2019-01-10 | End: 2019-01-10 | Stop reason: SDUPTHER

## 2019-01-10 RX ORDER — OXYCODONE AND ACETAMINOPHEN 7.5; 325 MG/1; MG/1
1 TABLET ORAL EVERY 4 HOURS PRN
Status: DISCONTINUED | OUTPATIENT
Start: 2019-01-10 | End: 2019-01-10

## 2019-01-10 RX ORDER — SODIUM CHLORIDE, SODIUM LACTATE, POTASSIUM CHLORIDE, CALCIUM CHLORIDE 600; 310; 30; 20 MG/100ML; MG/100ML; MG/100ML; MG/100ML
INJECTION, SOLUTION INTRAVENOUS CONTINUOUS PRN
Status: DISCONTINUED | OUTPATIENT
Start: 2019-01-10 | End: 2019-01-10

## 2019-01-10 RX ORDER — HYDROXYZINE HYDROCHLORIDE 25 MG/1
25 TABLET, FILM COATED ORAL 4 TIMES DAILY PRN
Status: DISCONTINUED | OUTPATIENT
Start: 2019-01-10 | End: 2019-01-12 | Stop reason: HOSPADM

## 2019-01-10 RX ORDER — FENTANYL CITRATE 50 UG/ML
INJECTION, SOLUTION INTRAMUSCULAR; INTRAVENOUS
Status: DISCONTINUED | OUTPATIENT
Start: 2019-01-10 | End: 2019-01-10

## 2019-01-10 RX ORDER — BISACODYL 10 MG
10 SUPPOSITORY, RECTAL RECTAL DAILY
Status: DISCONTINUED | OUTPATIENT
Start: 2019-01-11 | End: 2019-01-11

## 2019-01-10 RX ORDER — MUPIROCIN 20 MG/G
OINTMENT TOPICAL
Status: DISCONTINUED | OUTPATIENT
Start: 2019-01-10 | End: 2019-01-10

## 2019-01-10 RX ORDER — BACITRACIN 50000 [IU]/1
INJECTION, POWDER, FOR SOLUTION INTRAMUSCULAR
Status: DISCONTINUED | OUTPATIENT
Start: 2019-01-10 | End: 2019-01-10 | Stop reason: HOSPADM

## 2019-01-10 RX ORDER — HYDROMORPHONE HYDROCHLORIDE 1 MG/ML
1 INJECTION, SOLUTION INTRAMUSCULAR; INTRAVENOUS; SUBCUTANEOUS
Status: COMPLETED | OUTPATIENT
Start: 2019-01-10 | End: 2019-01-10

## 2019-01-10 RX ORDER — BUPIVACAINE HYDROCHLORIDE AND EPINEPHRINE 5; 5 MG/ML; UG/ML
INJECTION, SOLUTION EPIDURAL; INTRACAUDAL; PERINEURAL
Status: DISCONTINUED | OUTPATIENT
Start: 2019-01-10 | End: 2019-01-10 | Stop reason: HOSPADM

## 2019-01-10 RX ORDER — ACETAMINOPHEN 10 MG/ML
INJECTION, SOLUTION INTRAVENOUS
Status: DISCONTINUED | OUTPATIENT
Start: 2019-01-10 | End: 2019-01-10

## 2019-01-10 RX ORDER — LIDOCAINE HCL/PF 100 MG/5ML
SYRINGE (ML) INTRAVENOUS
Status: DISCONTINUED | OUTPATIENT
Start: 2019-01-10 | End: 2019-01-10

## 2019-01-10 RX ORDER — HYDROMORPHONE HYDROCHLORIDE 1 MG/ML
1 INJECTION, SOLUTION INTRAMUSCULAR; INTRAVENOUS; SUBCUTANEOUS
Status: DISCONTINUED | OUTPATIENT
Start: 2019-01-10 | End: 2019-01-12 | Stop reason: HOSPADM

## 2019-01-10 RX ORDER — ATORVASTATIN CALCIUM 20 MG/1
20 TABLET, FILM COATED ORAL DAILY
Status: DISCONTINUED | OUTPATIENT
Start: 2019-01-11 | End: 2019-01-12 | Stop reason: HOSPADM

## 2019-01-10 RX ORDER — HYDROCODONE BITARTRATE AND ACETAMINOPHEN 10; 325 MG/1; MG/1
1 TABLET ORAL EVERY 4 HOURS PRN
Status: DISCONTINUED | OUTPATIENT
Start: 2019-01-10 | End: 2019-01-11

## 2019-01-10 RX ORDER — GLUCAGON 1 MG
1 KIT INJECTION
Status: DISCONTINUED | OUTPATIENT
Start: 2019-01-10 | End: 2019-01-12 | Stop reason: HOSPADM

## 2019-01-10 RX ORDER — KETAMINE HCL IN 0.9 % NACL 50 MG/5 ML
SYRINGE (ML) INTRAVENOUS
Status: DISCONTINUED | OUTPATIENT
Start: 2019-01-10 | End: 2019-01-10

## 2019-01-10 RX ORDER — GLYCOPYRROLATE 0.2 MG/ML
INJECTION INTRAMUSCULAR; INTRAVENOUS
Status: DISCONTINUED | OUTPATIENT
Start: 2019-01-10 | End: 2019-01-10

## 2019-01-10 RX ORDER — INSULIN ASPART 100 [IU]/ML
1-10 INJECTION, SOLUTION INTRAVENOUS; SUBCUTANEOUS
Status: DISCONTINUED | OUTPATIENT
Start: 2019-01-10 | End: 2019-01-12 | Stop reason: HOSPADM

## 2019-01-10 RX ORDER — FENTANYL CITRATE 50 UG/ML
25 INJECTION, SOLUTION INTRAMUSCULAR; INTRAVENOUS EVERY 5 MIN PRN
Status: COMPLETED | OUTPATIENT
Start: 2019-01-10 | End: 2019-01-10

## 2019-01-10 RX ORDER — PROPOFOL 10 MG/ML
VIAL (ML) INTRAVENOUS
Status: DISCONTINUED | OUTPATIENT
Start: 2019-01-10 | End: 2019-01-10

## 2019-01-10 RX ORDER — SODIUM CHLORIDE 9 MG/ML
INJECTION, SOLUTION INTRAVENOUS CONTINUOUS PRN
Status: DISCONTINUED | OUTPATIENT
Start: 2019-01-10 | End: 2019-01-10

## 2019-01-10 RX ORDER — AMOXICILLIN 250 MG
2 CAPSULE ORAL NIGHTLY PRN
Status: DISCONTINUED | OUTPATIENT
Start: 2019-01-10 | End: 2019-01-11

## 2019-01-10 RX ORDER — ONDANSETRON 8 MG/1
8 TABLET, ORALLY DISINTEGRATING ORAL EVERY 6 HOURS PRN
Status: DISCONTINUED | OUTPATIENT
Start: 2019-01-10 | End: 2019-01-12 | Stop reason: HOSPADM

## 2019-01-10 RX ORDER — MAG HYDROX/ALUMINUM HYD/SIMETH 200-200-20
30 SUSPENSION, ORAL (FINAL DOSE FORM) ORAL EVERY 4 HOURS PRN
Status: DISCONTINUED | OUTPATIENT
Start: 2019-01-10 | End: 2019-01-12 | Stop reason: HOSPADM

## 2019-01-10 RX ORDER — ROCURONIUM BROMIDE 10 MG/ML
INJECTION, SOLUTION INTRAVENOUS
Status: DISCONTINUED | OUTPATIENT
Start: 2019-01-10 | End: 2019-01-10

## 2019-01-10 RX ORDER — ESCITALOPRAM OXALATE 10 MG/1
10 TABLET ORAL DAILY
Status: DISCONTINUED | OUTPATIENT
Start: 2019-01-11 | End: 2019-01-12 | Stop reason: HOSPADM

## 2019-01-10 RX ORDER — INSULIN ASPART 100 [IU]/ML
INJECTION, SOLUTION INTRAVENOUS; SUBCUTANEOUS
Qty: 45 ML | Refills: 5 | Status: SHIPPED | OUTPATIENT
Start: 2019-01-10 | End: 2020-09-04 | Stop reason: SDUPTHER

## 2019-01-10 RX ORDER — ESCITALOPRAM OXALATE 20 MG/1
10 TABLET ORAL DAILY
Qty: 15 TABLET | Refills: 5
Start: 2019-01-10 | End: 2019-04-11 | Stop reason: SDUPTHER

## 2019-01-10 RX ORDER — ONDANSETRON 2 MG/ML
4 INJECTION INTRAMUSCULAR; INTRAVENOUS DAILY PRN
Status: DISCONTINUED | OUTPATIENT
Start: 2019-01-10 | End: 2019-01-10

## 2019-01-10 RX ORDER — MUPIROCIN 20 MG/G
1 OINTMENT TOPICAL
Status: DISCONTINUED | OUTPATIENT
Start: 2019-01-10 | End: 2019-01-10

## 2019-01-10 RX ORDER — IBUPROFEN 200 MG
16 TABLET ORAL
Status: DISCONTINUED | OUTPATIENT
Start: 2019-01-10 | End: 2019-01-12 | Stop reason: HOSPADM

## 2019-01-10 RX ADMIN — ACETAMINOPHEN 1000 MG: 10 INJECTION, SOLUTION INTRAVENOUS at 05:01

## 2019-01-10 RX ADMIN — PROPOFOL 100 MG: 10 INJECTION, EMULSION INTRAVENOUS at 04:01

## 2019-01-10 RX ADMIN — INSULIN HUMAN 5 UNITS: 100 INJECTION, SOLUTION PARENTERAL at 02:01

## 2019-01-10 RX ADMIN — Medication 20 MG: at 02:01

## 2019-01-10 RX ADMIN — OXYCODONE AND ACETAMINOPHEN 1 TABLET: 7.5; 325 TABLET ORAL at 07:01

## 2019-01-10 RX ADMIN — SODIUM CHLORIDE, SODIUM LACTATE, POTASSIUM CHLORIDE, AND CALCIUM CHLORIDE: 600; 310; 30; 20 INJECTION, SOLUTION INTRAVENOUS at 04:01

## 2019-01-10 RX ADMIN — MIDAZOLAM HYDROCHLORIDE 1 MG: 1 INJECTION, SOLUTION INTRAMUSCULAR; INTRAVENOUS at 01:01

## 2019-01-10 RX ADMIN — ROCURONIUM BROMIDE 40 MG: 10 INJECTION, SOLUTION INTRAVENOUS at 02:01

## 2019-01-10 RX ADMIN — MUPIROCIN 1 G: 20 OINTMENT TOPICAL at 10:01

## 2019-01-10 RX ADMIN — BACLOFEN 10 MG: 10 TABLET ORAL at 08:01

## 2019-01-10 RX ADMIN — Medication 10 MG: at 04:01

## 2019-01-10 RX ADMIN — CEFTRIAXONE 2 G: 2 INJECTION, SOLUTION INTRAVENOUS at 01:01

## 2019-01-10 RX ADMIN — MIDAZOLAM HYDROCHLORIDE 2 MG: 1 INJECTION, SOLUTION INTRAMUSCULAR; INTRAVENOUS at 04:01

## 2019-01-10 RX ADMIN — MUPIROCIN: 20 OINTMENT TOPICAL at 01:01

## 2019-01-10 RX ADMIN — PROPOFOL 160 MG: 10 INJECTION, EMULSION INTRAVENOUS at 02:01

## 2019-01-10 RX ADMIN — FENTANYL CITRATE 50 MCG: 50 INJECTION, SOLUTION INTRAMUSCULAR; INTRAVENOUS at 06:01

## 2019-01-10 RX ADMIN — FENTANYL CITRATE 25 MCG: 50 INJECTION INTRAMUSCULAR; INTRAVENOUS at 07:01

## 2019-01-10 RX ADMIN — Medication 10 MG: at 05:01

## 2019-01-10 RX ADMIN — FENTANYL CITRATE 25 MCG: 50 INJECTION, SOLUTION INTRAMUSCULAR; INTRAVENOUS at 06:01

## 2019-01-10 RX ADMIN — SODIUM CHLORIDE: 0.9 INJECTION, SOLUTION INTRAVENOUS at 07:01

## 2019-01-10 RX ADMIN — ONDANSETRON 4 MG: 2 INJECTION INTRAMUSCULAR; INTRAVENOUS at 05:01

## 2019-01-10 RX ADMIN — FENTANYL CITRATE 25 MCG: 50 INJECTION INTRAMUSCULAR; INTRAVENOUS at 06:01

## 2019-01-10 RX ADMIN — SODIUM CHLORIDE: 0.9 INJECTION, SOLUTION INTRAVENOUS at 02:01

## 2019-01-10 RX ADMIN — EPHEDRINE SULFATE 5 MG: 50 INJECTION, SOLUTION INTRAMUSCULAR; INTRAVENOUS; SUBCUTANEOUS at 03:01

## 2019-01-10 RX ADMIN — LIDOCAINE HYDROCHLORIDE 100 MG: 20 INJECTION, SOLUTION INTRAVENOUS at 02:01

## 2019-01-10 RX ADMIN — EPHEDRINE SULFATE 10 MG: 50 INJECTION, SOLUTION INTRAMUSCULAR; INTRAVENOUS; SUBCUTANEOUS at 04:01

## 2019-01-10 RX ADMIN — EPHEDRINE SULFATE 5 MG: 50 INJECTION, SOLUTION INTRAMUSCULAR; INTRAVENOUS; SUBCUTANEOUS at 05:01

## 2019-01-10 RX ADMIN — HYDROMORPHONE HYDROCHLORIDE 1 MG: 1 INJECTION, SOLUTION INTRAMUSCULAR; INTRAVENOUS; SUBCUTANEOUS at 07:01

## 2019-01-10 RX ADMIN — FENTANYL CITRATE 50 MCG: 50 INJECTION, SOLUTION INTRAMUSCULAR; INTRAVENOUS at 05:01

## 2019-01-10 RX ADMIN — REMIFENTANIL HYDROCHLORIDE 0.1 MCG/KG/MIN: 1 INJECTION, POWDER, LYOPHILIZED, FOR SOLUTION INTRAVENOUS at 02:01

## 2019-01-10 RX ADMIN — HYDROMORPHONE HYDROCHLORIDE 1 MG: 1 INJECTION, SOLUTION INTRAMUSCULAR; INTRAVENOUS; SUBCUTANEOUS at 08:01

## 2019-01-10 RX ADMIN — NEOSTIGMINE METHYLSULFATE 3 MG: 1 INJECTION INTRAVENOUS at 06:01

## 2019-01-10 RX ADMIN — MONTELUKAST SODIUM 10 MG: 10 TABLET, FILM COATED ORAL at 08:01

## 2019-01-10 RX ADMIN — HYDROCODONE BITARTRATE AND ACETAMINOPHEN 1 TABLET: 10; 325 TABLET ORAL at 10:01

## 2019-01-10 RX ADMIN — INSULIN ASPART 3 UNITS: 100 INJECTION, SOLUTION INTRAVENOUS; SUBCUTANEOUS at 09:01

## 2019-01-10 RX ADMIN — GLYCOPYRROLATE 0.4 MG: 0.2 INJECTION, SOLUTION INTRAMUSCULAR; INTRAVENOUS at 06:01

## 2019-01-10 NOTE — TELEPHONE ENCOUNTER
----- Message from Damion George sent at 1/10/2019  9:17 AM CST -----  Contact: [facility] [phone]  REFILL: PATRICK JEROME(# how much he used per meal on the directions#)    PHARMACY: [facility] [address] [place] [address]

## 2019-01-10 NOTE — TELEPHONE ENCOUNTER
----- Message from Letty Calloway sent at 1/9/2019 12:35 PM CST -----  Contact: Pharmacy  Type:  Pharmacy Calling to Clarify an RX    Name of Caller:  Maureen    [id number]  [place], [place] - [address] [facility]  [address] [facility] [place] [address]  Phone: [phone] Fax: [phone]      Prescription Name:  insulin aspart U-100 (NOVOLOG FLEXPEN U-100 INSULIN) 100 unit/mL InPn pen    What do they need to clarify?Clarify the direction on medication     Best Call Back Number: [phone]

## 2019-01-11 LAB
ANION GAP SERPL CALC-SCNC: 11 MMOL/L
BASOPHILS # BLD AUTO: 0.12 K/UL
BASOPHILS NFR BLD: 0.8 %
BUN SERPL-MCNC: 11 MG/DL
CALCIUM SERPL-MCNC: 9.3 MG/DL
CHLORIDE SERPL-SCNC: 100 MMOL/L
CO2 SERPL-SCNC: 18 MMOL/L
CREAT SERPL-MCNC: 1.1 MG/DL
DIFFERENTIAL METHOD: ABNORMAL
EOSINOPHIL # BLD AUTO: 0 K/UL
EOSINOPHIL NFR BLD: 0.1 %
ERYTHROCYTE [DISTWIDTH] IN BLOOD BY AUTOMATED COUNT: 15.4 %
EST. GFR  (AFRICAN AMERICAN): >60 ML/MIN/1.73 M^2
EST. GFR  (NON AFRICAN AMERICAN): >60 ML/MIN/1.73 M^2
GLUCOSE SERPL-MCNC: 212 MG/DL
HCT VFR BLD AUTO: 33.8 %
HGB BLD-MCNC: 10.7 G/DL
IMM GRANULOCYTES # BLD AUTO: 0.15 K/UL
IMM GRANULOCYTES NFR BLD AUTO: 1 %
LYMPHOCYTES # BLD AUTO: 1 K/UL
LYMPHOCYTES NFR BLD: 6.6 %
MCH RBC QN AUTO: 29.4 PG
MCHC RBC AUTO-ENTMCNC: 31.7 G/DL
MCV RBC AUTO: 93 FL
MONOCYTES # BLD AUTO: 1 K/UL
MONOCYTES NFR BLD: 6.9 %
NEUTROPHILS # BLD AUTO: 12.5 K/UL
NEUTROPHILS NFR BLD: 84.6 %
NRBC BLD-RTO: 0 /100 WBC
PLATELET # BLD AUTO: 286 K/UL
PMV BLD AUTO: 10.2 FL
POCT GLUCOSE: 145 MG/DL (ref 70–110)
POCT GLUCOSE: 210 MG/DL (ref 70–110)
POCT GLUCOSE: 235 MG/DL (ref 70–110)
POCT GLUCOSE: 238 MG/DL (ref 70–110)
POCT GLUCOSE: 265 MG/DL (ref 70–110)
POTASSIUM SERPL-SCNC: 5.2 MMOL/L
RBC # BLD AUTO: 3.64 M/UL
SODIUM SERPL-SCNC: 129 MMOL/L
TROPONIN I SERPL DL<=0.01 NG/ML-MCNC: 0.01 NG/ML
WBC # BLD AUTO: 14.74 K/UL

## 2019-01-11 PROCEDURE — 99900035 HC TECH TIME PER 15 MIN (STAT): Mod: HCNC

## 2019-01-11 PROCEDURE — 25000242 PHARM REV CODE 250 ALT 637 W/ HCPCS: Mod: HCNC | Performed by: STUDENT IN AN ORGANIZED HEALTH CARE EDUCATION/TRAINING PROGRAM

## 2019-01-11 PROCEDURE — 25000003 PHARM REV CODE 250: Mod: HCNC | Performed by: PHYSICIAN ASSISTANT

## 2019-01-11 PROCEDURE — 63600175 PHARM REV CODE 636 W HCPCS: Mod: HCNC | Performed by: STUDENT IN AN ORGANIZED HEALTH CARE EDUCATION/TRAINING PROGRAM

## 2019-01-11 PROCEDURE — 99024 POSTOP FOLLOW-UP VISIT: CPT | Mod: HCNC,,, | Performed by: PHYSICIAN ASSISTANT

## 2019-01-11 PROCEDURE — 20600001 HC STEP DOWN PRIVATE ROOM: Mod: HCNC

## 2019-01-11 PROCEDURE — 36415 COLL VENOUS BLD VENIPUNCTURE: CPT | Mod: HCNC

## 2019-01-11 PROCEDURE — 94799 UNLISTED PULMONARY SVC/PX: CPT | Mod: HCNC

## 2019-01-11 PROCEDURE — 63600175 PHARM REV CODE 636 W HCPCS: Mod: HCNC | Performed by: PHYSICIAN ASSISTANT

## 2019-01-11 PROCEDURE — 92610 EVALUATE SWALLOWING FUNCTION: CPT | Mod: HCNC

## 2019-01-11 PROCEDURE — 85025 COMPLETE CBC W/AUTO DIFF WBC: CPT | Mod: HCNC

## 2019-01-11 PROCEDURE — 80048 BASIC METABOLIC PNL TOTAL CA: CPT | Mod: HCNC

## 2019-01-11 PROCEDURE — 99024 PR POST-OP FOLLOW-UP VISIT: ICD-10-PCS | Mod: HCNC,,, | Performed by: PHYSICIAN ASSISTANT

## 2019-01-11 PROCEDURE — 25000003 PHARM REV CODE 250: Mod: HCNC | Performed by: STUDENT IN AN ORGANIZED HEALTH CARE EDUCATION/TRAINING PROGRAM

## 2019-01-11 PROCEDURE — 84484 ASSAY OF TROPONIN QUANT: CPT | Mod: HCNC

## 2019-01-11 RX ORDER — AMOXICILLIN 250 MG
1 CAPSULE ORAL 2 TIMES DAILY
Status: DISCONTINUED | OUTPATIENT
Start: 2019-01-11 | End: 2019-01-12 | Stop reason: HOSPADM

## 2019-01-11 RX ORDER — BACITRACIN 500 [USP'U]/G
OINTMENT TOPICAL 2 TIMES DAILY
Status: DISCONTINUED | OUTPATIENT
Start: 2019-01-11 | End: 2019-01-12 | Stop reason: HOSPADM

## 2019-01-11 RX ORDER — OXYCODONE AND ACETAMINOPHEN 5; 325 MG/1; MG/1
1 TABLET ORAL EVERY 4 HOURS PRN
Status: DISCONTINUED | OUTPATIENT
Start: 2019-01-11 | End: 2019-01-12 | Stop reason: HOSPADM

## 2019-01-11 RX ORDER — DIAZEPAM 5 MG/1
5 TABLET ORAL EVERY 6 HOURS PRN
Status: DISCONTINUED | OUTPATIENT
Start: 2019-01-11 | End: 2019-01-12 | Stop reason: HOSPADM

## 2019-01-11 RX ORDER — TAMSULOSIN HYDROCHLORIDE 0.4 MG/1
0.4 CAPSULE ORAL DAILY
Status: DISCONTINUED | OUTPATIENT
Start: 2019-01-11 | End: 2019-01-12 | Stop reason: HOSPADM

## 2019-01-11 RX ORDER — POLYETHYLENE GLYCOL 3350 17 G/17G
17 POWDER, FOR SOLUTION ORAL DAILY
Status: DISCONTINUED | OUTPATIENT
Start: 2019-01-11 | End: 2019-01-12 | Stop reason: HOSPADM

## 2019-01-11 RX ORDER — CALCIUM CARBONATE 200(500)MG
500 TABLET,CHEWABLE ORAL 3 TIMES DAILY PRN
Status: DISCONTINUED | OUTPATIENT
Start: 2019-01-11 | End: 2019-01-12 | Stop reason: HOSPADM

## 2019-01-11 RX ORDER — BACITRACIN 500 [USP'U]/G
OINTMENT TOPICAL 2 TIMES DAILY
Refills: 0 | COMMUNITY
Start: 2019-01-11 | End: 2019-01-31

## 2019-01-11 RX ORDER — HEPARIN SODIUM 5000 [USP'U]/ML
5000 INJECTION, SOLUTION INTRAVENOUS; SUBCUTANEOUS EVERY 8 HOURS
Status: DISCONTINUED | OUTPATIENT
Start: 2019-01-11 | End: 2019-01-12 | Stop reason: HOSPADM

## 2019-01-11 RX ORDER — BISACODYL 10 MG
10 SUPPOSITORY, RECTAL RECTAL DAILY PRN
Status: DISCONTINUED | OUTPATIENT
Start: 2019-01-11 | End: 2019-01-12 | Stop reason: HOSPADM

## 2019-01-11 RX ADMIN — INSULIN ASPART 4 UNITS: 100 INJECTION, SOLUTION INTRAVENOUS; SUBCUTANEOUS at 08:01

## 2019-01-11 RX ADMIN — STANDARDIZED SENNA CONCENTRATE AND DOCUSATE SODIUM 1 TABLET: 8.6; 5 TABLET, FILM COATED ORAL at 09:01

## 2019-01-11 RX ADMIN — ONDANSETRON 8 MG: 8 TABLET, ORALLY DISINTEGRATING ORAL at 02:01

## 2019-01-11 RX ADMIN — INSULIN ASPART 4 UNITS: 100 INJECTION, SOLUTION INTRAVENOUS; SUBCUTANEOUS at 01:01

## 2019-01-11 RX ADMIN — POLYETHYLENE GLYCOL 3350 17 G: 17 POWDER, FOR SOLUTION ORAL at 08:01

## 2019-01-11 RX ADMIN — HYDROMORPHONE HYDROCHLORIDE 1 MG: 1 INJECTION, SOLUTION INTRAMUSCULAR; INTRAVENOUS; SUBCUTANEOUS at 03:01

## 2019-01-11 RX ADMIN — ATORVASTATIN CALCIUM 20 MG: 20 TABLET, FILM COATED ORAL at 08:01

## 2019-01-11 RX ADMIN — CALCIUM CARBONATE (ANTACID) CHEW TAB 500 MG 500 MG: 500 CHEW TAB at 06:01

## 2019-01-11 RX ADMIN — CALCIUM CARBONATE (ANTACID) CHEW TAB 500 MG 500 MG: 500 CHEW TAB at 11:01

## 2019-01-11 RX ADMIN — PROMETHAZINE HYDROCHLORIDE 6.25 MG: 25 INJECTION INTRAMUSCULAR; INTRAVENOUS at 09:01

## 2019-01-11 RX ADMIN — HYDROMORPHONE HYDROCHLORIDE 1 MG: 1 INJECTION, SOLUTION INTRAMUSCULAR; INTRAVENOUS; SUBCUTANEOUS at 09:01

## 2019-01-11 RX ADMIN — METOPROLOL SUCCINATE 25 MG: 25 TABLET, EXTENDED RELEASE ORAL at 08:01

## 2019-01-11 RX ADMIN — MUPIROCIN 1 G: 20 OINTMENT TOPICAL at 09:01

## 2019-01-11 RX ADMIN — ALPRAZOLAM 0.5 MG: 0.25 TABLET ORAL at 12:01

## 2019-01-11 RX ADMIN — FLUTICASONE PROPIONATE 100 MCG: 50 SPRAY, METERED NASAL at 08:01

## 2019-01-11 RX ADMIN — HEPARIN SODIUM 5000 UNITS: 5000 INJECTION, SOLUTION INTRAVENOUS; SUBCUTANEOUS at 09:01

## 2019-01-11 RX ADMIN — ALUMINUM HYDROXIDE, MAGNESIUM HYDROXIDE, AND SIMETHICONE: 200; 200; 20 SUSPENSION ORAL at 04:01

## 2019-01-11 RX ADMIN — PANTOPRAZOLE SODIUM 40 MG: 40 TABLET, DELAYED RELEASE ORAL at 08:01

## 2019-01-11 RX ADMIN — SODIUM CHLORIDE TAB 1 GM 2 G: 1 TAB at 09:01

## 2019-01-11 RX ADMIN — BACITRACIN: 500 OINTMENT TOPICAL at 09:01

## 2019-01-11 RX ADMIN — DIAZEPAM 5 MG: 5 TABLET ORAL at 01:01

## 2019-01-11 RX ADMIN — STANDARDIZED SENNA CONCENTRATE AND DOCUSATE SODIUM 1 TABLET: 8.6; 5 TABLET, FILM COATED ORAL at 08:01

## 2019-01-11 RX ADMIN — HYDROCODONE BITARTRATE AND ACETAMINOPHEN 1 TABLET: 10; 325 TABLET ORAL at 07:01

## 2019-01-11 RX ADMIN — ALUMINUM HYDROXIDE, MAGNESIUM HYDROXIDE, AND SIMETHICONE 30 ML: 200; 200; 20 SUSPENSION ORAL at 11:01

## 2019-01-11 RX ADMIN — HYDROMORPHONE HYDROCHLORIDE 1 MG: 1 INJECTION, SOLUTION INTRAMUSCULAR; INTRAVENOUS; SUBCUTANEOUS at 11:01

## 2019-01-11 RX ADMIN — BACITRACIN: 500 OINTMENT TOPICAL at 11:01

## 2019-01-11 RX ADMIN — MONTELUKAST SODIUM 10 MG: 10 TABLET, FILM COATED ORAL at 09:01

## 2019-01-11 RX ADMIN — ESCITALOPRAM OXALATE 10 MG: 10 TABLET ORAL at 08:01

## 2019-01-11 RX ADMIN — TAMSULOSIN HYDROCHLORIDE 0.4 MG: 0.4 CAPSULE ORAL at 03:01

## 2019-01-11 RX ADMIN — SODIUM CHLORIDE TAB 1 GM 2 G: 1 TAB at 08:01

## 2019-01-11 RX ADMIN — INSULIN ASPART 3 UNITS: 100 INJECTION, SOLUTION INTRAVENOUS; SUBCUTANEOUS at 10:01

## 2019-01-11 RX ADMIN — ALUMINUM HYDROXIDE, MAGNESIUM HYDROXIDE, AND SIMETHICONE 30 ML: 200; 200; 20 SUSPENSION ORAL at 09:01

## 2019-01-11 RX ADMIN — LISINOPRIL 5 MG: 5 TABLET ORAL at 08:01

## 2019-01-11 RX ADMIN — MUPIROCIN 1 G: 20 OINTMENT TOPICAL at 08:01

## 2019-01-11 RX ADMIN — DIAZEPAM 5 MG: 5 TABLET ORAL at 09:01

## 2019-01-11 RX ADMIN — HYDROMORPHONE HYDROCHLORIDE 1 MG: 1 INJECTION, SOLUTION INTRAMUSCULAR; INTRAVENOUS; SUBCUTANEOUS at 04:01

## 2019-01-11 RX ADMIN — OXYCODONE HYDROCHLORIDE AND ACETAMINOPHEN 1 TABLET: 5; 325 TABLET ORAL at 06:01

## 2019-01-11 RX ADMIN — HEPARIN SODIUM 5000 UNITS: 5000 INJECTION, SOLUTION INTRAVENOUS; SUBCUTANEOUS at 03:01

## 2019-01-11 RX ADMIN — HYDROMORPHONE HYDROCHLORIDE 1 MG: 1 INJECTION, SOLUTION INTRAMUSCULAR; INTRAVENOUS; SUBCUTANEOUS at 12:01

## 2019-01-11 RX ADMIN — HYDROCODONE BITARTRATE AND ACETAMINOPHEN 1 TABLET: 10; 325 TABLET ORAL at 02:01

## 2019-01-11 NOTE — ASSESSMENT & PLAN NOTE
68 year old male with lumbar facet arthritis, lumbar spondylosis without myelopathy, and chronic back pain s/p L4/5 and L5/S1 TLIF on 1/10.  -Pt improving post op  -Post op xrays are stable  -LSO brace on when sitting, standing, OOB, or working with therapy. OK to remove when lying in bed.   -Bacitracin to incision BID  -Pain control: DC Hydrocodone and Baclofen. Start Oxycodone and Valium PRN.   -Urinary retention: Ocampo dc'd, voiding trial. Continue Flomax. Bladder scan q 4 hours PRN.   -Fever - T-max 100.7 overnight. Asymptomatic CTM.   IS to bedside.  -Dysphagia: Resolved.  Tolerating diet.  Cleared by ST  -Hyponatremia: Na 129. Chronic, present on admit. Begin Na tabs for replacement. FU with PCP after discharge.   -DVT prophylaxis: Begin SQH. Continue SHRUTHI's/SCD's.   -Bowel regimen: Begin Miralax and senna daily  -Atelectasis prevention: IS hourly while awake. OOB > 6 hours per day.   -DC pending voiding/afebrile

## 2019-01-11 NOTE — DISCHARGE INSTRUCTIONS
Please follow ONLY the instructions that are checked below.    Activity Restrictions:  [x]  Return to work will be determined on an individual basis.  [x]  No lifting greater than 10 pounds.  [x]  Avoid bending and twisting the area of your surgery more than 45 degrees from neutral position in any direction.  [x]  No driving or operating machinery:  [x]  until cleared by your surgeon.  [x]  while taking narcotic pain medications or muscle relaxants.  [x]  Wear your brace at all times except when lying in bed, while showering, or using the restroom.  [x]  Increase ambulation over the next 2 weeks so that you are walking 2 miles per day at 2 weeks post-operatively.  [x]  Walk on paved surfaces only. It is okay to walk up and down stairs while holding onto a side rail.  [x]  No sexual activity for 2-3 weeks.    Discharge Medication/Follow-up:  [x]  Please refer to discharge medication reconciliation form.   [x]  Do not take ANY Aspirin or Aspirin containing products  [x]  Do not take ANY non-steroidal anti-inflammatory drugs (NSAIDS), including the following: ibuprofen, naprosyn, Aleve, Advil, Indocin, Mobic, or Celebrex for:  [x]  6 weeks  [x]  Prescriptions for appropriate medication will be given upon discharge.  [x]  Take docusate (Colace 100 mg): take one capsule a day as needed for constipation. You can get this over the counter.  [x]  Follow-up appointment:  [x]  10-14 days post-op for wound check by physician assistant/nurse  [x]  4-6 weeks with MD:  [x]  with x-rays  [x]  An appointment will be mailed to you.    Wound Care:  [x]  No bandage required. Keep your incision open to the air.  [x]  You may shower on the 2nd day after your surgery. Keep the incision clean and dry at all times. Please cover the incision while showering and REMOVE once you have completed taking your shower. Do not allow the force of water to hit the incision. If the incision gets damp, pat it dry. Do not rub or scrub the incision.  [x]   You cannot take a bath until 8 weeks after surgery.  [x]  Apply bacitracin to incision twice a day for 2 weeks.    Call your doctor or go to the Emergency Room for any signs of infection, including: increased redness, drainage, pain, or fever (temperature ?101.5 for 24 hours). Call your doctor or go to the Emergency Room if there are any localized neurological changes; problems with speech, vision, numbness, tingling, weakness, or severe headache; or for other concerns.    Special Instructions:  [x]  No use of tobacco products.  [x]  Diet: Please eat a diabetic diet as tolerated.  [x]  Your sodium levels were lower than normal at the time you were admitted to the hospital. Please continue to take over the counter salt tablets for replacement and follow up with you primary care physician in 1-2 weeks for evaluation. You are responsible for scheduling this appointment.   [x]  You had issues with urinary retention after surgery, so you were started on a new medication called Flomax. Please follow up with Urology in 1-2 weeks for evaluation and management. This appointment will be scheduled for you.     Physicians need 3 days' notice for pain medicine to be refilled. Pain medicine will only be refilled between 8 AM and 5 PM, Monday through Friday, due to Food and Drug Administration regulation of documentation.    If you have any questions about this form, please call [phone].    Form No. 19051 (Revised 10/31/2013)  Anesthesia: General Anesthesia     You are watched continuously during your procedure by your anesthesia provider.     Youre due to have surgery. During surgery, youll be given medicine called anesthesia or anesthetic. This will keep you comfortable and pain-free. Your anesthesia provider will use general anesthesia.  What is general anesthesia?  General anesthesia puts you into a state like deep sleep. It goes into the bloodstream (IV anesthetics), into the lungs (gas anesthetics), or both. You feel  nothing during the procedure. You will not remember it. During the procedure, the anesthesia provider monitors you continuously. He or she checks your heart rate and rhythm, blood pressure, breathing, and blood oxygen.  · IV anesthetics. IV anesthetics are given through an IV line in your arm. Theyre often given first. This is so you are asleep before a gas anesthetic is started. Some kinds of IV anesthetics relieve pain. Others relax you. Your doctor will decide which kind is best in your case.  · Gas anesthetics. Gas anesthetics are breathed into the lungs. They are often used to keep you asleep. They can be given through a facemask or a tube placed in your larynx or trachea (breathing tube).  ? If you have a facemask, your anesthesia provider will most likely place it over your nose and mouth while youre still awake. Youll breathe oxygen through the mask as your IV anesthetic is started. Gas anesthetic may be added through the mask.  ? If you have a tube in the larynx or trachea, it will be inserted into your throat after youre asleep.  Anesthesia tools and medicines  You will likely have:  · IV anesthetics. These are put into an IV line into your bloodstream.  · Gas anesthetics. You breathe these anesthetics into your lungs, where they pass into your bloodstream.  · Pulse oximeter. This is a small clip that is attached to the end of your finger. This measures your blood oxygen level.  · Electrocardiography leads (electrodes). These are small sticky pads that are placed on your chest. They record your heart rate and rhythm.  · Blood pressure cuff. This reads your blood pressure.  Risks and possible complications  General anesthesia has some risks. These include:  · Breathing problems  · Nausea and vomiting  · Sore throat or hoarseness (usually temporary)  · Allergic reaction to the anesthetic  · Irregular heartbeat (rare)  · Cardiac arrest (rare)   Anesthesia safety  · Follow all instructions you are given  for how long not to eat or drink before your procedure.  · Be sure your doctor knows what medicines and drugs you take. This includes over-the-counter medicines, herbs, supplements, alcohol or other drugs. You will be asked when those were last taken.  · Have an adult family member or friend drive you home after the procedure.  · For the first 24 hours after your surgery:  ? Do not drive or use heavy equipment.  ? Do not make important decisions or sign legal documents. If important decisions or signing legal documents is necessary during the first 24 hours after surgery, have a trusted family member or spouse act on your behalf.  ? Avoid alcohol.  ? Have a responsible adult stay with you. He or she can watch for problems and help keep you safe.  Date Last Reviewed: 12/1/2016 © 2000-2017 [employer]. [address] [place] [address], [place] [address]. All rights reserved. This information is not intended as a substitute for professional medical care. Always follow your healthcare professional's instructions

## 2019-01-11 NOTE — PT/OT/SLP EVAL
"Speech Language Pathology Evaluation  Bedside Swallow and Discharge Summary    Patient Name:  Moe Ren   MRN:  [id number]  Admitting Diagnosis: <principal problem not specified>    Recommendations:                 General Recommendations:  Follow-up not indicated  Diet recommendations:  Regular, Thin   Aspiration Precautions: Standard aspiration precautions   General Precautions: Standard,    Communication strategies:  none    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    Cataract     Diabetes mellitus     Gastric ulcer     GERD (gastroesophageal reflux disease)     Pancreatitis 2008    2 Times       Past Surgical History:   Procedure Laterality Date     thumb surgery      Dead Skin cell tumor Rt thumb    CAUTERIZATION OF TURBINATES N/A 4/19/2016    [facility]    COLONOSCOPY N/A 4/11/2017    Performed by Meet Quesada MD at [facility] ENDO    ANDREA-TRANSFORAMINAL Right 11/26/2014    [facility]    ANDREA-TRANSFORAMINAL Right 11/5/2014    [facility]    ESOPHAGOGASTRODUODENOSCOPY (EGD) N/A 6/18/2018    Performed by Marco Antonio Gonsalves MD at [facility] ENDO    ESOPHAGOGASTRODUODENOSCOPY (EGD) N/A 5/30/2014    Performed by Meet Quesada [facility] at [facility] ENDO    FUSION, SPINE, LUMBAR, TLIF, MINIMALLY INVASIVE @L4/5 & L5/S1 Left 1/10/2019    [facility]    MOUTH FLOOR MASS EXCISION  2009    NASAL SEPTUM SURGERY      RECONSTRUCTION-NASAL N/A 4/19/2016    Per[facility] by [facility] at [facility] OR    SKIN SURGERY         Social History: Patient lives with spouse.    Prior diet: regular / thin liquids.     Subjective     "I'm not doing well my acid reflux is real bad right now"    Pain/Comfort:  · Pain " Rating 1: 0/10    Objective:     Oral Musculature Evaluation  · Oral Musculature: WFL  · Dentition: present and adequate  · Mucosal Quality: good  · Mandibular Strength and Mobility: WFL  · Oral Labial Strength and Mobility: WFL  · Lingual Strength and Mobility: WFL  · Velar Elevation: WFL  · Buccal Strength and Mobility: WFL  · Volitional Cough: Present  · Volitional Swallow: Present    Bedside Swallow Eval:   Consistencies Assessed:  · Thin liquids via 2 straw sips  · Solids via 1/2 cracker     Oral Phase:   · WFL    Pharyngeal Phase:   · belching post all swallows    Compensatory Strategies  · None    Treatment: Patient symptoms coincide with reflux. Oral and pharyngeal phases of swallow appear WFL. Skilled education was provided to patient and family members re: diet recs, standard aspiration precautions of which to follow, and ongoing ST plan of care. RN updated. MD paged - awaiting call back.     Assessment:     Moe Ren is a 68 y.o. male with an SLP diagnosis of no oropharyngeal dysphagia. .     Plan:   · Plan of Care reviewed with:  patient   · SLP Follow-Up:  No       Discharge recommendations:  other (see comments)   Barriers to Discharge:  None    Time Tracking:     SLP Treatment Date:   01/11/19  Speech Start Time:  1100  Speech Stop Time:  1108     Speech Total Time (min):  8 min    Billable Minutes: Eval Swallow and Oral Function 8    Emily Abadie, CCC-SLP  01/11/2019

## 2019-01-11 NOTE — ANESTHESIA POSTPROCEDURE EVALUATION
Anesthesia Post Evaluation    Patient: Moe Ren    Procedure(s) Performed: Procedure(s) (LRB):  FUSION, SPINE, LUMBAR, TLIF, MINIMALLY INVASIVE @L4/5 & L5/S1 (Left)    Final Anesthesia Type: general  Patient location during evaluation: PACU  Patient participation: Yes- Able to Participate  Level of consciousness: awake and alert  Post-procedure vital signs: reviewed and stable  Pain management: adequate  Airway patency: patent  PONV status at discharge: No PONV  Anesthetic complications: no      Cardiovascular status: blood pressure returned to baseline and hemodynamically stable  Respiratory status: unassisted, spontaneous ventilation and room air  Hydration status: euvolemic  Follow-up not needed.        Visit Vitals  BP (!) 161/71 (Patient Position: Lying)   Pulse 81   Temp 36.6 °C (97.8 °F) (Axillary)   Resp 13   Ht 6' (1.829 m)   Wt 68 kg (150 lb)   SpO2 (!) 93%   BMI 20.34 kg/m²       Pain/Darwin Score: Pain Rating Prior to Med Admin: 6 (1/11/2019  4:37 AM)  Pain Rating Post Med Admin: 4 (1/11/2019 12:00 AM)  Darwin Score: 10 (1/10/2019  8:37 PM)

## 2019-01-11 NOTE — ASSESSMENT & PLAN NOTE
68 year old male with lumbar facet arthritis, lumbar spondylosis without myelopathy, and chronic back pain s/p L4/5 and L5/S1 TLIF on 1/10.    -Post op xrays pending  -LSO brace on when sitting, standing, OOB, or working with therapy. OK to remove when lying in bed.   -Bacitracin to incision BID  -Pain control: DC Hydrocodone and Baclofen. Start Oxycodone and Valium PRN.   -Urinary retention: Denies any issues pre op. Straight cath x 2. If patient requires another straight cath, will replace luna. Begin Flomax. Bladder scan q 4 hours PRN.   -Dysphagia: Wife reports issues swallowing pre op. Denies any progression but patient unable to tolerate liquids or solids. NPO. ST ordered for swallow eval.   -DVT prophylaxis: Begin SQH. Continue SHRUTHI's/SCD's.   -Bowel regimen: Begin Miralax and senna daily  -Atelectasis prevention: IS hourly while awake. OOB > 6 hours per day.       DISPO: Likely DC home tomorrow

## 2019-01-11 NOTE — PROGRESS NOTES
Report called to Linda RN, pt resting quietly,VSS, states pain 4/10,dressing dry and intact, bilat /dorsi /planter flexion strong, august po fluids and meds, stable at present

## 2019-01-11 NOTE — PLAN OF CARE
CM met with patient to obtain discharge planning assessment.  Patient is POD 1 for lumbar spinal fusion.  Planned discharge is home with family - Plan (A) or home with family and home health - Plan (B).    PCP:  Damian Ramon MD     Payor:  Payor: [employer] MEDICARE / Plan: [employer] MEDICARE HMO / Product Type: Capitation /      Pharmacy:    [facility] - [place], [place] - [address]  [address] [facility] [address]  Phone: [phone] Fax: [phone]       01/11/19 1313   Discharge Assessment   Assessment Type Discharge Planning Assessment   Confirmed/corrected address and phone number on facesheet? Yes   Assessment information obtained from? Patient   Communicated expected length of stay with patient/caregiver no   Prior to hospitilization cognitive status: Alert/Oriented   Prior to hospitalization functional status: Independent   Current cognitive status: Alert/Oriented   Current Functional Status: Independent   Lives With spouse   Able to Return to Prior Arrangements yes   Is patient able to care for self after discharge? Yes   Who are your caregiver(s) and their phone number(s)? Shanna - spouse [phone]   Patient's perception of discharge disposition home or selfcare   Readmission Within the Last 30 Days no previous admission in last 30 days   Patient currently being followed by outpatient case management? No   Patient currently receives any other outside agency services? No   Equipment Currently Used at Home none   Do you have any problems affording any of your prescribed medications? No   Is the patient taking medications as prescribed? yes   Does the patient have transportation home? Yes   Transportation Anticipated family or friend will provide   Does the patient receive services at the [facility]? No   Discharge Plan A Home with family   Discharge Plan B Home with family;Home Health   DME Needed Upon Discharge  none   Patient/Family in Agreement with Plan yes

## 2019-01-11 NOTE — SUBJECTIVE & OBJECTIVE
Interval History:   Urinary retention overnight and this morning. Straight cath x 2. Swallowing difficulties with liquids, solids, and medications. Patient with dysphagia at baseline. Speech therapy ordered. Pain control issues overnight. Medications adjusted. Denies any LE pain or paresthesias. Denies any urinary incontinence.        Medications:  Continuous Infusions:  Scheduled Meds:   atorvastatin  20 mg Oral Daily    escitalopram oxalate  10 mg Oral Daily    fluticasone  2 spray Each Nare Daily    heparin (porcine)  5,000 Units Subcutaneous Q8H    ipratropium  2 puff Inhalation Q6H    lisinopril  5 mg Oral Daily    metoprolol succinate  25 mg Oral Daily    montelukast  10 mg Oral QHS    mupirocin  1 g Nasal BID    pantoprazole  40 mg Oral Daily    polyethylene glycol  17 g Oral Daily    senna-docusate 8.6-50 mg  1 tablet Oral BID    sodium chloride  2 g Oral TID    tamsulosin  0.4 mg Oral Daily     PRN Meds:ALPRAZolam, aluminum-magnesium hydroxide-simethicone, bisacodyl, dextrose 50%, dextrose 50%, diazePAM, glucagon (human recombinant), glucose, glucose, HYDROmorphone, hydrOXYzine, insulin aspart U-100, ondansetron, oxyCODONE-acetaminophen, promethazine (PHENERGAN) IVPB, sodium chloride 0.9%     Review of Systems  Objective:     Weight: 68 kg (150 lb)  Body mass index is 20.34 kg/m².  Vital Signs (Most Recent):  Temp: 98.3 °F (36.8 °C) (01/11/19 0719)  Pulse: 80 (01/11/19 0719)  Resp: 16 (01/11/19 0719)  BP: (!) 141/66 (01/11/19 0719)  SpO2: (!) 92 % (01/11/19 0719) Vital Signs (24h Range):  Temp:  [97 °F (36.1 °C)-98.3 °F (36.8 °C)] 98.3 °F (36.8 °C)  Pulse:  [73-94] 80  Resp:  [13-20] 16  SpO2:  [92 %-100 %] 92 %  BP: (120-189)/(58-92) 141/66     Date 01/11/19 0700 - 01/12/19 0659   Shift 2975-2317 2701-6744 6217-5245 24 Hour Total   INTAKE   Shift Total(mL/kg)       OUTPUT   Urine(mL/kg/hr) 1650   1650   Shift Total(mL/kg) 1650(24.3)   1650(24.3)   Weight (kg) 68 68 68 68     Neurosurgery  Physical Exam  General: well developed, well nourished, no distress.   Head: normocephalic, atraumatic  Neurologic: Alert and oriented. Thought content appropriate.  GCS: Motor: 6/Verbal: 5/Eyes: 4 GCS Total: 15  Mental Status: Awake, Alert, Oriented x 4  Language: No aphasia  Speech: No dysarthria  Cranial nerves: face symmetric, tongue midline, CN II-XII grossly intact.   Eyes: pupils equal, round, reactive to light with accomodation, EOMI.   Pulmonary: normal respirations, no signs of respiratory distress  Abdomen: soft, non-distended, not tender to palpation  Sensory: intact to light touch throughout  Motor Strength:Moves all extremities spontaneously with good tone.  Full strength upper and lower extremities. Tremors in BUE.   Mcduffie: absent  Clonus: absent  Skin: Skin is warm, dry and intact.      Incision:  Clean, dry, dissolvable sutures intact. No surrounding erythema or edema. No drainage from the incision.         Significant Labs:  Recent Labs   Lab 01/11/19  0312   *   *   K 5.2*      CO2 18*   BUN 11   CREATININE 1.1   CALCIUM 9.3     Recent Labs   Lab 01/11/19  0312   WBC 14.74*   HGB 10.7*   HCT 33.8*

## 2019-01-11 NOTE — NURSING
CECELIA Randhawa aware of persistent urinary retention. RN will place Ocampo catheter. Mobility and Xray plans for this afternoon.

## 2019-01-11 NOTE — HOSPITAL COURSE
1/10: OR for L4/L5 and L5/S1 TLIF. No intra op complications. Patient tolerated the procedure well. Recovered in PACU.   1/11: Urinary retention overnight and this morning. Straight cath x 2. Swallowing difficulties with liquids, solids, and medications. Patient with dysphagia at baseline. Speech therapy ordered. Pain control issues overnight. Medications adjusted. PT ordered.   1/12: Ekg/troponin are wnl.  CP resolved today. Low grade temp. Ocampo removed, monitor voiding check PVR.  DC planning pending voiding.

## 2019-01-11 NOTE — OP NOTE
DATE OF PROCEDURE:  01/10/2019.    SURGEON:  Josias Ling M.D., Ph.D.    ASSISTANT:  Rubio Clay M.D. (RES) (the assistant is a [facility] resident).    PREOPERATIVE DIAGNOSES:  1.  L4-L5 spondylolisthesis.  2.  L4-L5 and L5-S1 spondylosis.  3.  Lumbar facet arthritis.  4.  History of smoking and tobacco abuse.     POSTOPERATIVE DIAGNOSES:  1.  L4-L5 spondylolisthesis.  2.  L4-L5 and L5-S1 spondylosis.  3.  Lumbar facet arthritis.  4.  History of smoking and tobacco abuse.     INDICATIONS IN DETAIL:  Mr. Moe Ren is a very pleasant 68-year-old   gentleman who has been under my care with back pain that is worse on the right   than the left.  The patient has had a workup, which revealed that he has lumbar   spondylosis with facet arthritis at L4-L5 and L5-S1 and he has a grade 1   spondylolisthesis at L4-L5.  The patient has had worsening back pain and has had   conservative care for this, which has not helped to this point.  The patient   has been seen by me, and after the risks, benefits and alternatives were   described, wished to proceed with a transforaminal lumbar interbody fusion for a   decompression and fusion at L4-L5 and L5-S1.  I had also counseled the patient   that we should be able to better correct his spondylolisthesis, which would help   him greatly.  The patient understood and wished to proceed.    PROCEDURE IN DETAIL:  The patient was seen again in the preoperative area and   the risks, benefits and alternatives were discussed and he wished to proceed.    The patient was brought to the Operating Room and a general anesthetic was   administered.  All proper lines were placed.  The patient was turned to the   prone position on a Cirilo table.  The patient's back was cleaned, prepped and   draped in the usual fashion.  AP and lateral fluoroscopy was performed and the   L4-L5 and L5-S1 levels were seen and marked.  A lidocaine-bupivacaine mix was   infiltrated under the  skin lateral to the shadow of the facet at L4-L5 on the   right side and a 2.5 cm incision was made in the skin and carried down to the   fascia using a #10 blade.  Using the METRx set, a minimally invasive approach   was made to the junction of the lamina and the facet on the right side.  Once   this was performed, under microscopic visualization, we removed the soft tissue   using Bovie cautery and, using a high-speed Movaya drill with a matchstick   farooq, performed a complete facetectomy at L4-L5.  Laminectomy was also performed   on the right.  The bone from the drilling was collected using the Hensler bone   press to be used for fusion later.  Once we had bony removal, the ligamentum   flavum was dissected and removed.  The thecal sac was seen.  We then dissected   through the foramen and found the L4-L5 disk in the foramen.  We retracted the   thecal sac and nerve roots, which had been decompressed medially and entered   into the L4-L5 disk space.  Diskectomy was performed using curettes and rongeurs   in the usual fashion and the endplates were prepared for fusion using disk   kandi.  Once this was completed, an expandable cage trial was placed and we   opted to place an expandable Leva cage at this level.  We opted to use a 25 x 11   mm cage at L4-L5.  This was fully expanded under direct visualization as well   as fluoroscopic guidance.  Once this was in place, bone from the Hensler bone   press was placed in its center as autograft.  Once this was complete, the wound   was irrigated copiously.  All bleeding points were coagulated.  A Depo-Medrol   soaked piece of Gelfoam was placed over the thecal sac and nerve roots in that   region.    At this point, the retractor was removed and we made a similar incision on the   right side lateral to the shadow of the facet at L5-S1.  An approach was made   exactly as we had done at L4-L5.  Again, under microscopic visualization, the   soft tissues were removed.   We also performed a facetectomy and laminectomy,   collecting the bone for autograft.  Through this approach, we decompressed the   foramen and the thecal sac.  Once the decompression was complete, we performed a   diskectomy at L5-S1.  Once this was complete, we placed a Leva cage.  At this   time, a 10 mm cage was placed.  Once this was in place, the autograft was put in   place.  The retractors were then removed.  We then placed percutaneous pedicle   screws at L4, L5 and S1.  This was done by using fluoroscopic guidance to place   Jamshidi needles at those levels.  This was done in a lateral to medial fashion.    We did this first at L4, then at L5 and then at S1.  We then verified   placement of our Jamshidi needles with lateral fluoroscopy and stimulated them.    Although the cMAPs were appropriate except for the S1 screw on the left side,   which stimulated at 9 amperes.  This was then replaced.  Unfortunately, the   placement was only slightly better with stimulation at 12 amperes.  Given this   and the difficulty seeing this level, we opted not to place a screw on that   side.  Once this was complete, we then placed wires through the Jamshidi   needles, tapped the pedicles and placed percutaneous pedicle screws with towers   attached.  Rods of the appropriate size were then placed through the towers and   reduced into the screw heads.  End caps were used to secure this.  Once we had   finished our permanent tightening, the towers were removed.  The wound was   irrigated copiously.  All bleeding points were coagulated.  The wound was then   closed in layers with a 4-0 Monocryl in the skin bilaterally.  A clean dressing   was placed.  The patient was turned to the supine position on a gurney.  The   patient was then awakened by the Anesthesia staff.  At the point the patient was   awake and following commands, he was extubated.    There were no intraprocedural complications.    EBL was approximately 100  Carolynn.    All counts were correct at the end of surgery.    There were no abnormal findings by neuromonitoring during the case.    Dr. Josias Ling was present during the entire procedure.    There were no specimens taken to Pathology.      JENNIFER/GARRY  dd: 01/11/2019 09:09:24 (CST)  td: 01/11/2019 09:31:06 (CST)  Doc ID   #[id number]  Job ID #[id number]    CC:

## 2019-01-11 NOTE — BRIEF OP NOTE
[facility]  Brief Operative Note    SUMMARY     Surgery Date: 1/10/2019     Surgeon(s) and Role:     * Josias Ling MD - Primary     * Rubio Clay MD - Resident - Assisting        Pre-op Diagnosis:  Spondylolisthesis of lumbar region [M43.16]  Spondylolisthesis of lumbosacral region [M43.17]  Lumbar disc herniation with radiculopathy [M51.16]    Post-op Diagnosis:  Post-Op Diagnosis Codes:     * Spondylolisthesis of lumbar region [M43.16]     * Spondylolisthesis of lumbosacral region [M43.17]     * Lumbar disc herniation with radiculopathy [M51.16]    Procedure(s) (LRB):  FUSION, SPINE, LUMBAR, TLIF, MINIMALLY INVASIVE @L4/5 & L5/S1 (Left)    Anesthesia: General    Description of Procedure: L4-5, L5-S1 TLIF    Description of the findings of the procedure: adequate hardware positioning on intraoperative imaging    Estimated Blood Loss: * No values recorded between 1/10/2019  3:19 PM and 1/10/2019  6:35 PM *         Specimens:   Specimen (12h ago, onward)    None

## 2019-01-11 NOTE — PROGRESS NOTES
[facility]  Neurosurgery  Progress Note    Subjective:     History of Present Illness: Mr. Moe Ren is a 68 year old male with lumbar facet arthritis, lumbar spondylosis without myelopathy, and chronic back pain who presented to NSGY clinic with complaints of progression of his pain. He stated that he was unable to walk for long period of times without developing pain and weakness in his RLE which had lead to multiple falls. MRI lumbar spine showed sacralization of L5, spondylolisthesis at L4-5 and slight spondylolisthesis at L5-S1. He presents to [facility] today for elective TLIF.     Post-Op Info:  Procedure(s) (LRB):  FUSION, SPINE, LUMBAR, TLIF, MINIMALLY INVASIVE @L4/5 & L5/S1 (Left)   1 Day Post-Op     Interval History:   No family at bedside. Urinary retention overnight and this morning. Straight cath x 2. Swallowing difficulties with liquids, solids, and medications. Patient with dysphagia at baseline. Speech therapy ordered. Pain control issues overnight. Medications adjusted. Denies any LE pain or paresthesias. Denies any urinary incontinence.        Medications:  Continuous Infusions:  Scheduled Meds:   atorvastatin  20 mg Oral Daily    escitalopram oxalate  10 mg Oral Daily    fluticasone  2 spray Each Nare Daily    heparin (porcine)  5,000 Units Subcutaneous Q8H    ipratropium  2 puff Inhalation Q6H    lisinopril  5 mg Oral Daily    metoprolol succinate  25 mg Oral Daily    montelukast  10 mg Oral QHS    mupirocin  1 g Nasal BID    pantoprazole  40 mg Oral Daily    polyethylene glycol  17 g Oral Daily    senna-docusate 8.6-50 mg  1 tablet Oral BID    sodium chloride  2 g Oral TID    tamsulosin  0.4 mg Oral Daily     PRN Meds:ALPRAZolam, aluminum-magnesium hydroxide-simethicone, bisacodyl, dextrose 50%, dextrose 50%, diazePAM, glucagon (human recombinant), glucose, glucose, HYDROmorphone, hydrOXYzine, insulin aspart U-100, ondansetron, oxyCODONE-acetaminophen, promethazine  (PHENERGAN) IVPB, sodium chloride 0.9%     Review of Systems  Objective:     Weight: 68 kg (150 lb)  Body mass index is 20.34 kg/m².  Vital Signs (Most Recent):  Temp: 98.3 °F (36.8 °C) (01/11/19 0719)  Pulse: 80 (01/11/19 0719)  Resp: 16 (01/11/19 0719)  BP: (!) 141/66 (01/11/19 0719)  SpO2: (!) 92 % (01/11/19 0719) Vital Signs (24h Range):  Temp:  [97 °F (36.1 °C)-98.3 °F (36.8 °C)] 98.3 °F (36.8 °C)  Pulse:  [73-94] 80  Resp:  [13-20] 16  SpO2:  [92 %-100 %] 92 %  BP: (120-189)/(58-92) 141/66     Date 01/11/19 0700 - 01/12/19 0659   Shift 5842-6371 9583-6694 3079-0888 24 Hour Total   INTAKE   Shift Total(mL/kg)       OUTPUT   Urine(mL/kg/hr) 1650   1650   Shift Total(mL/kg) 1650(24.3)   1650(24.3)   Weight (kg) 68 68 68 68     Neurosurgery Physical Exam  General: well developed, well nourished, no distress.   Head: normocephalic, atraumatic  Neurologic: Alert and oriented. Thought content appropriate.  GCS: Motor: 6/Verbal: 5/Eyes: 4 GCS Total: 15  Mental Status: Awake, Alert, Oriented x 4  Language: No aphasia  Speech: No dysarthria  Cranial nerves: face symmetric, tongue midline, CN II-XII grossly intact.   Eyes: pupils equal, round, reactive to light with accomodation, EOMI.   Pulmonary: normal respirations, no signs of respiratory distress  Abdomen: soft, non-distended, not tender to palpation  Sensory: intact to light touch throughout  Motor Strength:Moves all extremities spontaneously with good tone.  Full strength upper and lower extremities. Tremors in BUE.   Mcduffie: absent  Clonus: absent  Skin: Skin is warm, dry and intact.      Incision:  Clean, dry, dissolvable sutures intact. No surrounding erythema or edema. No drainage from the incision.         Significant Labs:  Recent Labs   Lab 01/11/19 0312   *   *   K 5.2*      CO2 18*   BUN 11   CREATININE 1.1   CALCIUM 9.3     Recent Labs   Lab 01/11/19 0312   WBC 14.74*   HGB 10.7*   HCT 33.8*            Assessment/Plan:      Lumbar stenosis    68 year old male with lumbar facet arthritis, lumbar spondylosis without myelopathy, and chronic back pain s/p L4/5 and L5/S1 TLIF on 1/10.    -Post op xrays pending  -LSO brace on when sitting, standing, OOB, or working with therapy. OK to remove when lying in bed.   -Bacitracin to incision BID  -Pain control: DC Hydrocodone and Baclofen. Start Oxycodone and Valium PRN.   -Urinary retention: Denies any issues pre op. Straight cath x 2. If patient requires another straight cath, will replace luna. Begin Flomax. Bladder scan q 4 hours PRN.   -Dysphagia: Wife reports issues swallowing pre op. Denies any progression but patient unable to tolerate liquids or solids. NPO. ST ordered for swallow eval.   -Hyponatremia: Na 129. Chronic, present on admit. Begin Na tabs for replacement. FU with PCP after discharge.   -DVT prophylaxis: Begin SQH. Continue SHRUTHI's/SCD's.   -Bowel regimen: Begin Miralax and senna daily  -Atelectasis prevention: IS hourly while awake. OOB > 6 hours per day.       DISPO: Likely DC home tomorrow       Patient seen and examined by Dr. Ling. All of the patient's questions were answered.     Please call with any questions      Sydnee Almeida PA-C   Neurosurgery   Pager: [phone]

## 2019-01-11 NOTE — TRANSFER OF CARE
Anesthesia Transfer of Care Note    Patient: Moe Ren    Procedure(s) Performed: Procedure(s) (LRB):  FUSION, SPINE, LUMBAR, TLIF, MINIMALLY INVASIVE @L4/5 & L5/S1 (Left)    Patient location: PACU    Anesthesia Type: general    Transport from OR: Transported from OR on 6-10 L/min O2 by face mask with adequate spontaneous ventilation    Post pain: adequate analgesia    Post assessment: tolerated procedure well and no apparent anesthetic complications    Post vital signs: stable    Level of consciousness: awake, alert and oriented    Nausea/Vomiting: no nausea/vomiting    Complications: none    Transfer of care protocol was followed      Last vitals:   Visit Vitals  BP (!) 189/92   Pulse 92   Temp 36.6 °C (97.9 °F) (Temporal)   Resp 20   Ht 6' (1.829 m)   Wt 72.6 kg (160 lb)   SpO2 95%   BMI 21.70 kg/m²

## 2019-01-11 NOTE — PLAN OF CARE
GURJIT following for DC needs. GURJIT in communication with CM.    SW spoke with the patient and patient's wife regarding discharge planning. SW informed patient and wife that HH is being recommended. The patient did not want to give HH choices at this time. The patient stated that he does not want anyone to come to his house until he speaks to his physicians and is ready to go home.     Nitza Madera LMSW  [facility]  [phone]

## 2019-01-11 NOTE — HPI
Mr. Moe Ren is a 68 year old male with lumbar facet arthritis, lumbar spondylosis without myelopathy, and chronic back pain who presented to NSGY clinic with complaints of progression of his pain. He stated that he was unable to walk for long period of times without developing pain and weakness in his RLE which had lead to multiple falls. MRI lumbar spine showed sacralization of L5, spondylolisthesis at L4-5 and slight spondylolisthesis at L5-S1. He presents to [facility] today for elective TLIF.

## 2019-01-11 NOTE — NURSING
Patient having issues voiding. Night shift RN had to straight cath patient. Patient attempting to void on his own now. Will alert CECELIA Randhawa now.

## 2019-01-11 NOTE — NURSING
RN attempted bedside swallow exam. Patient able to swallow water but unable to keep it down. Patient vomited water back up after about 5 minutes. Making patient NPO until Speech is able to see patient.

## 2019-01-11 NOTE — NURSING
Patient coughing and sputtering attempting to eat breakfast, CECELIA Randhawa on unit and aware. RN to assess now.

## 2019-01-12 VITALS
HEART RATE: 60 BPM | DIASTOLIC BLOOD PRESSURE: 58 MMHG | HEIGHT: 72 IN | OXYGEN SATURATION: 96 % | SYSTOLIC BLOOD PRESSURE: 123 MMHG | TEMPERATURE: 98 F | BODY MASS INDEX: 20.32 KG/M2 | RESPIRATION RATE: 18 BRPM | WEIGHT: 150 LBS

## 2019-01-12 LAB
ANION GAP SERPL CALC-SCNC: 11 MMOL/L
BASOPHILS # BLD AUTO: 0.1 K/UL
BASOPHILS NFR BLD: 0.8 %
BUN SERPL-MCNC: 8 MG/DL
CALCIUM SERPL-MCNC: 9.4 MG/DL
CHLORIDE SERPL-SCNC: 98 MMOL/L
CO2 SERPL-SCNC: 26 MMOL/L
CREAT SERPL-MCNC: 1.1 MG/DL
DIFFERENTIAL METHOD: ABNORMAL
EOSINOPHIL # BLD AUTO: 0 K/UL
EOSINOPHIL NFR BLD: 0.2 %
ERYTHROCYTE [DISTWIDTH] IN BLOOD BY AUTOMATED COUNT: 15.8 %
EST. GFR  (AFRICAN AMERICAN): >60 ML/MIN/1.73 M^2
EST. GFR  (NON AFRICAN AMERICAN): >60 ML/MIN/1.73 M^2
GLUCOSE SERPL-MCNC: 263 MG/DL
HCT VFR BLD AUTO: 29.8 %
HGB BLD-MCNC: 10.1 G/DL
IMM GRANULOCYTES # BLD AUTO: 0.16 K/UL
IMM GRANULOCYTES NFR BLD AUTO: 1.2 %
LYMPHOCYTES # BLD AUTO: 1.6 K/UL
LYMPHOCYTES NFR BLD: 12.7 %
MCH RBC QN AUTO: 30.2 PG
MCHC RBC AUTO-ENTMCNC: 33.9 G/DL
MCV RBC AUTO: 89 FL
MONOCYTES # BLD AUTO: 1.9 K/UL
MONOCYTES NFR BLD: 14.6 %
NEUTROPHILS # BLD AUTO: 9.1 K/UL
NEUTROPHILS NFR BLD: 70.5 %
NRBC BLD-RTO: 0 /100 WBC
PLATELET # BLD AUTO: 273 K/UL
PMV BLD AUTO: 10.7 FL
POCT GLUCOSE: 130 MG/DL (ref 70–110)
POCT GLUCOSE: 263 MG/DL (ref 70–110)
POCT GLUCOSE: 300 MG/DL (ref 70–110)
POCT GLUCOSE: 330 MG/DL (ref 70–110)
POTASSIUM SERPL-SCNC: 4.4 MMOL/L
RBC # BLD AUTO: 3.34 M/UL
SODIUM SERPL-SCNC: 135 MMOL/L
WBC # BLD AUTO: 12.87 K/UL

## 2019-01-12 PROCEDURE — 25000003 PHARM REV CODE 250: Mod: HCNC | Performed by: STUDENT IN AN ORGANIZED HEALTH CARE EDUCATION/TRAINING PROGRAM

## 2019-01-12 PROCEDURE — 97161 PT EVAL LOW COMPLEX 20 MIN: CPT | Mod: HCNC

## 2019-01-12 PROCEDURE — 25000003 PHARM REV CODE 250: Mod: HCNC | Performed by: NEUROLOGICAL SURGERY

## 2019-01-12 PROCEDURE — 63600175 PHARM REV CODE 636 W HCPCS: Mod: HCNC | Performed by: PHYSICIAN ASSISTANT

## 2019-01-12 PROCEDURE — 36415 COLL VENOUS BLD VENIPUNCTURE: CPT | Mod: HCNC

## 2019-01-12 PROCEDURE — 63600175 PHARM REV CODE 636 W HCPCS: Mod: HCNC | Performed by: STUDENT IN AN ORGANIZED HEALTH CARE EDUCATION/TRAINING PROGRAM

## 2019-01-12 PROCEDURE — 25000003 PHARM REV CODE 250: Mod: HCNC | Performed by: PHYSICIAN ASSISTANT

## 2019-01-12 PROCEDURE — 85025 COMPLETE CBC W/AUTO DIFF WBC: CPT | Mod: HCNC

## 2019-01-12 PROCEDURE — 80048 BASIC METABOLIC PNL TOTAL CA: CPT | Mod: HCNC

## 2019-01-12 PROCEDURE — 25000242 PHARM REV CODE 250 ALT 637 W/ HCPCS: Mod: HCNC | Performed by: STUDENT IN AN ORGANIZED HEALTH CARE EDUCATION/TRAINING PROGRAM

## 2019-01-12 PROCEDURE — 63600175 PHARM REV CODE 636 W HCPCS: Mod: HCNC | Performed by: NEUROLOGICAL SURGERY

## 2019-01-12 RX ORDER — TAMSULOSIN HYDROCHLORIDE 0.4 MG/1
0.4 CAPSULE ORAL DAILY
Qty: 30 CAPSULE | Refills: 3 | Status: SHIPPED | OUTPATIENT
Start: 2019-01-13 | End: 2019-05-15 | Stop reason: SDUPTHER

## 2019-01-12 RX ORDER — DIAZEPAM 5 MG/1
5 TABLET ORAL EVERY 6 HOURS PRN
Qty: 60 TABLET | Refills: 0 | Status: SHIPPED | OUTPATIENT
Start: 2019-01-12 | End: 2019-04-08

## 2019-01-12 RX ORDER — DIAZEPAM 5 MG/ML
5 INJECTION, SOLUTION INTRAMUSCULAR; INTRAVENOUS ONCE
Status: COMPLETED | OUTPATIENT
Start: 2019-01-12 | End: 2019-01-12

## 2019-01-12 RX ORDER — OXYCODONE AND ACETAMINOPHEN 5; 325 MG/1; MG/1
1 TABLET ORAL EVERY 4 HOURS PRN
Qty: 60 TABLET | Refills: 0 | Status: SHIPPED | OUTPATIENT
Start: 2019-01-12 | End: 2019-04-08

## 2019-01-12 RX ORDER — SULFAMETHOXAZOLE AND TRIMETHOPRIM 800; 160 MG/1; MG/1
1 TABLET ORAL 2 TIMES DAILY
Qty: 10 TABLET | Refills: 0 | Status: SHIPPED | OUTPATIENT
Start: 2019-01-12 | End: 2019-01-17

## 2019-01-12 RX ORDER — VANCOMYCIN HCL IN 5 % DEXTROSE 1G/250ML
1000 PLASTIC BAG, INJECTION (ML) INTRAVENOUS ONCE
Status: COMPLETED | OUTPATIENT
Start: 2019-01-12 | End: 2019-01-12

## 2019-01-12 RX ADMIN — HEPARIN SODIUM 5000 UNITS: 5000 INJECTION, SOLUTION INTRAVENOUS; SUBCUTANEOUS at 01:01

## 2019-01-12 RX ADMIN — INSULIN ASPART 6 UNITS: 100 INJECTION, SOLUTION INTRAVENOUS; SUBCUTANEOUS at 08:01

## 2019-01-12 RX ADMIN — CALCIUM CARBONATE (ANTACID) CHEW TAB 500 MG 500 MG: 500 CHEW TAB at 08:01

## 2019-01-12 RX ADMIN — ALUMINUM HYDROXIDE, MAGNESIUM HYDROXIDE, AND SIMETHICONE 30 ML: 200; 200; 20 SUSPENSION ORAL at 08:01

## 2019-01-12 RX ADMIN — ESCITALOPRAM OXALATE 10 MG: 10 TABLET ORAL at 08:01

## 2019-01-12 RX ADMIN — FLUTICASONE PROPIONATE 100 MCG: 50 SPRAY, METERED NASAL at 08:01

## 2019-01-12 RX ADMIN — STANDARDIZED SENNA CONCENTRATE AND DOCUSATE SODIUM 1 TABLET: 8.6; 5 TABLET, FILM COATED ORAL at 08:01

## 2019-01-12 RX ADMIN — ATORVASTATIN CALCIUM 20 MG: 20 TABLET, FILM COATED ORAL at 08:01

## 2019-01-12 RX ADMIN — POLYETHYLENE GLYCOL 3350 17 G: 17 POWDER, FOR SOLUTION ORAL at 08:01

## 2019-01-12 RX ADMIN — PANTOPRAZOLE SODIUM 40 MG: 40 TABLET, DELAYED RELEASE ORAL at 08:01

## 2019-01-12 RX ADMIN — Medication 1000 MG: at 11:01

## 2019-01-12 RX ADMIN — HEPARIN SODIUM 5000 UNITS: 5000 INJECTION, SOLUTION INTRAVENOUS; SUBCUTANEOUS at 05:01

## 2019-01-12 RX ADMIN — BACITRACIN: 500 OINTMENT TOPICAL at 11:01

## 2019-01-12 RX ADMIN — IPRATROPIUM BROMIDE 2 PUFF: 17 AEROSOL, METERED RESPIRATORY (INHALATION) at 04:01

## 2019-01-12 RX ADMIN — METOPROLOL SUCCINATE 25 MG: 25 TABLET, EXTENDED RELEASE ORAL at 08:01

## 2019-01-12 RX ADMIN — HYDROMORPHONE HYDROCHLORIDE 1 MG: 1 INJECTION, SOLUTION INTRAMUSCULAR; INTRAVENOUS; SUBCUTANEOUS at 01:01

## 2019-01-12 RX ADMIN — INSULIN ASPART 6 UNITS: 100 INJECTION, SOLUTION INTRAVENOUS; SUBCUTANEOUS at 11:01

## 2019-01-12 RX ADMIN — TAMSULOSIN HYDROCHLORIDE 0.4 MG: 0.4 CAPSULE ORAL at 08:01

## 2019-01-12 RX ADMIN — OXYCODONE HYDROCHLORIDE AND ACETAMINOPHEN 1 TABLET: 5; 325 TABLET ORAL at 10:01

## 2019-01-12 RX ADMIN — PROMETHAZINE HYDROCHLORIDE 12.5 MG: 25 INJECTION INTRAMUSCULAR; INTRAVENOUS at 04:01

## 2019-01-12 RX ADMIN — DIAZEPAM 5 MG: 5 INJECTION, SOLUTION INTRAMUSCULAR; INTRAVENOUS at 04:01

## 2019-01-12 RX ADMIN — CALCIUM CARBONATE (ANTACID) CHEW TAB 500 MG 500 MG: 500 CHEW TAB at 11:01

## 2019-01-12 RX ADMIN — HYDROMORPHONE HYDROCHLORIDE 1 MG: 1 INJECTION, SOLUTION INTRAMUSCULAR; INTRAVENOUS; SUBCUTANEOUS at 08:01

## 2019-01-12 RX ADMIN — LISINOPRIL 5 MG: 5 TABLET ORAL at 08:01

## 2019-01-12 RX ADMIN — OXYCODONE HYDROCHLORIDE AND ACETAMINOPHEN 1 TABLET: 5; 325 TABLET ORAL at 04:01

## 2019-01-12 RX ADMIN — INSULIN ASPART 8 UNITS: 100 INJECTION, SOLUTION INTRAVENOUS; SUBCUTANEOUS at 04:01

## 2019-01-12 RX ADMIN — MUPIROCIN 1 G: 20 OINTMENT TOPICAL at 08:01

## 2019-01-12 RX ADMIN — HYDROMORPHONE HYDROCHLORIDE 1 MG: 1 INJECTION, SOLUTION INTRAMUSCULAR; INTRAVENOUS; SUBCUTANEOUS at 02:01

## 2019-01-12 NOTE — SUBJECTIVE & OBJECTIVE
Interval History:   Denies further CP.  Swallowing resolved.  Tmax 100.7 ON.  Ocampo remains in place. Denies F/C/CP/SOB.   NAEON.   Denies new/worsening weakness today.  Tolerating diet.        Medications:  Continuous Infusions:  Scheduled Meds:   atorvastatin  20 mg Oral Daily    bacitracin   Topical (Top) BID    escitalopram oxalate  10 mg Oral Daily    fluticasone  2 spray Each Nare Daily    heparin (porcine)  5,000 Units Subcutaneous Q8H    ipratropium  2 puff Inhalation Q6H    lisinopril  5 mg Oral Daily    metoprolol succinate  25 mg Oral Daily    montelukast  10 mg Oral QHS    mupirocin  1 g Nasal BID    pantoprazole  40 mg Oral Daily    polyethylene glycol  17 g Oral Daily    senna-docusate 8.6-50 mg  1 tablet Oral BID    sodium chloride  2 g Oral TID    tamsulosin  0.4 mg Oral Daily     PRN Meds:ALPRAZolam, aluminum-magnesium hydroxide-simethicone, bisacodyl, calcium carbonate, dextrose 50%, dextrose 50%, diazePAM, glucagon (human recombinant), glucose, glucose, HYDROmorphone, hydrOXYzine, insulin aspart U-100, ondansetron, oxyCODONE-acetaminophen, promethazine (PHENERGAN) IVPB, sodium chloride 0.9%       Objective:     Weight: 68 kg (150 lb)  Body mass index is 20.34 kg/m².  Vital Signs (Most Recent):  Temp: 97.5 °F (36.4 °C) (01/12/19 1538)  Pulse: 60 (01/12/19 1538)  Resp: 18 (01/12/19 1538)  BP: (!) 123/58 (01/12/19 1538)  SpO2: 96 % (01/12/19 1538) Vital Signs (24h Range):  Temp:  [96.4 °F (35.8 °C)-100.7 °F (38.2 °C)] 97.5 °F (36.4 °C)  Pulse:  [60-91] 60  Resp:  [18-19] 18  SpO2:  [92 %-96 %] 96 %  BP: (119-148)/(57-73) 123/58     Date 01/12/19 0700 - 01/13/19 0659   Shift 9978-8201 7309-7476 2096-5766 24 Hour Total   INTAKE   Shift Total(mL/kg)       OUTPUT   Urine(mL/kg/hr) 300(0.6) 130  430   Shift Total(mL/kg) 300(4.4) 130(1.9)  430(6.3)   Weight (kg) 68 68 68 68     Neurosurgery Physical Exam    General: well developed, well nourished, no distress.   Head: normocephalic,  atraumatic  Neurologic: Alert and oriented. Thought content appropriate.  GCS: Motor: 6/Verbal: 5/Eyes: 4 GCS Total: 15  Mental Status: Awake, Alert, Oriented x 4  Language: No aphasia  Speech: No dysarthria  Cranial nerves: face symmetric, tongue midline, CN II-XII grossly intact.   Eyes: pupils equal, round, reactive to light with accomodation, EOMI.   Pulmonary: normal respirations, no signs of respiratory distress  Abdomen: soft, non-distended, not tender to palpation  Sensory: intact to light touch throughout  Motor Strength:Moves all extremities spontaneously with good tone.  Full strength upper and lower extremities. Tremors in BUE.   Mcduffie: absent  Clonus: absent  Skin: Skin is warm, dry and intact.      Incision:  Clean, dry, dissolvable sutures intact. No surrounding erythema or edema. No drainage from the incision.         Significant Labs:  Recent Labs   Lab 01/11/19 0312 01/12/19  0428   * 263*   * 135*   K 5.2* 4.4    98   CO2 18* 26   BUN 11 8   CREATININE 1.1 1.1   CALCIUM 9.3 9.4     Recent Labs   Lab 01/11/19 0312 01/12/19  0428   WBC 14.74* 12.87*   HGB 10.7* 10.1*   HCT 33.8* 29.8*    273

## 2019-01-12 NOTE — PLAN OF CARE
Problem: Adult Inpatient Plan of Care  Goal: Plan of Care Review  Outcome: Ongoing (interventions implemented as appropriate)  Pt had continuous episodes of vomiting/spitting up during the night. Pt denies nausea and says that he is just having severe acid reflux, which he has had before in the past. Little to no relief achieved with prn medications. PO medications seen in emesis. Neurosurgery notified of pt's distress. Additional dose of phenergan and iv valium given. Mild relief noted. At least 1L of output seen from pt's episodes. Pt complains of 4-6/10 back and hip pain controlled with dilaudid, PO medications not effective or kept down. Vital signs stable. PRN insulin given for BG > 200.   POC reviewed with patient. Pt verbalized understanding. Questions and concerns addressed. No acute events overnight. Pt progressing toward goals. Will continue to monitor. See flow sheets for full assessment and VS info

## 2019-01-12 NOTE — PT/OT/SLP EVAL
Physical Therapy Evaluation    Patient Name:  Moe Ren   MRN:  [id number]    Recommendations:     Discharge Recommendations:  (OPPT)   Discharge Equipment Recommendations: none   Barriers to discharge: None    Assessment:     Moe Ren is a 68 y.o. male admitted with a medical diagnosis of Lumbar stenosis.  He presents with the following impairments/functional limitations:  weakness, gait instability, impaired endurance, impaired balance, impaired self care skills, impaired functional mobilty, orthopedic precautions, pain. Pt completed functional mobility without physical assist or use of DME. Ambulated approx household distance with mild instability noted but no overt LOB. Further gait distance limited by back pain. Pt would benefit from skilled acute PT in order to address current deficits and progress functional mobility.     Rehab Prognosis: Good; patient would benefit from acute skilled PT services to address these deficits and reach maximum level of function.    Recent Surgery: Procedure(s) (LRB):  FUSION, SPINE, LUMBAR, TLIF, MINIMALLY INVASIVE @L4/5 & L5/S1 (Left) 2 Days Post-Op    Plan:     During this hospitalization, patient to be seen 3 x/week to address the identified rehab impairments via gait training, therapeutic activities, therapeutic exercises, neuromuscular re-education and progress toward the following goals:    · Plan of Care Expires:  02/10/19    Subjective     Chief Complaint: back pain   Patient/Family Comments/goals: return home   Pain/Comfort:  · Pain Rating 1: 7/10  · Location - Orientation 1: lower  · Location 1: back  · Pain Addressed 1: Reposition, Distraction, Pre-medicate for activity, Cessation of Activity    Patients cultural, spiritual, Jain conflicts given the current situation: no    Living Environment:  Pt lives with wife in a 1SH with  to enter.   Prior to admission, patients level of function was independent; reports ambulation distance limited PTA 2* pain.   Equipment used at home: none.  DME owned (not currently used): none.  Upon discharge, patient will have assistance from wife.    Objective:     Communicated with RN prior to session.  Patient found supine with (no lines connected during session)  upon PT entry to room.    General Precautions: Standard, fall   Orthopedic Precautions:spinal precautions   Braces: LSO     Exams:  · Cognitive Exam:  Patient is oriented to Person, Place, Time and Situation  · Sensation:    · -       Impaired BLE 2* neuropathy   · RLE ROM: WFL  · RLE Strength: WFL  · LLE ROM: WFL  · LLE Strength: WFL    Functional Mobility:  · Bed Mobility:     · Supine to Sit: supervision and via log-roll technique  · Transfers:     · Sit to Stand:  stand by assistance with no AD  · Gait: ~100 ft. with no AD and CGA  · Decreased lizzeth, decreased step length, impaired weight-shifting ability, mild gait instability       Therapeutic Activities and Exercises:  Pt educated on role of PT and PT POC. Pt verbalized understanding.   Pt donned LSO while seated EOB with SBA.   Pt educated on spinal precautions, log-roll technique, and LSO wear. Pt v/u.   Pt oriented to call bell and instructed that he must have staff assist for standing tasks/transfers. Pt v/u.     AM-PAC 6 CLICK MOBILITY  Total Score:22     Patient left up in chair with all lines intact, call button in reach and RN present. Chair alarm not placed as okay'd by RN.     GOALS:   Multidisciplinary Problems     Physical Therapy Goals        Problem: Physical Therapy Goal    Goal Priority Disciplines Outcome Goal Variances Interventions   Physical Therapy Goal     PT, PT/OT Ongoing (interventions implemented as appropriate)     Description:  Goals to be met by: 19     Patient will increase functional independence with mobility by performin. Supine to sit with Modified [place]  2. Sit to stand transfer with Supervision  3. Gait  x 150 feet with Supervision using AD as needed.   4.  Lower extremity exercise program x15 reps, with supervision, in order to increase LE strength and (I) with functional mobility.                       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    Cataract     Diabetes mellitus     Gastric ulcer     GERD (gastroesophageal reflux disease)     Pancreatitis 2008    2 Times       Past Surgical History:   Procedure Laterality Date     thumb surgery      Dead Skin cell tumor Rt thumb    CAUTERIZATION OF TURBINATES N/A 4/19/2016    Performed by [facility] at [facility]    COLONOSCOPY N/A 4/11/2017    Performed by Meet Quesada MD at [facility] ENDO    ANDREA-TRANSFORAMINAL Right 11/26/2014    [facility] at [facility]    ANDREA-TRANSFORAMINAL Right 11/5/2014    [facility] by [facility] at [facility]    ESOPHAGOGASTRODUODENOSCOPY (EGD) N/A 6/18/2018    Performed by Marco Antonio Gonsalves MD at [facility] ENDO    ESOPHAGOGASTRODUODENOSCOPY (EGD) N/A 5/30/2014    Performed by Meet Quesada MD at [facility] ENDO    FUSION, SPINE, LUMBAR, TLIF, MINIMALLY INVASIVE @L4/5 & L5/S1 Left 1/10/2019    [facility]    MOUTH FLOOR MASS EXCISION  2009    NASAL SEPTUM SURGERY      RECONSTRUCTION-NASAL N/A 4/19/2016    Performed by [facility]ime [facility]a MD at [facility] OR    SKIN SURGERY         Clinical Decision Making:     History  Co-morbidities and personal factors that may impact the plan of care Examination  Body Structures and Functions, activity limitations and participation restrictions that may impact the plan of care Clinical Presentation   Decision Making/ Complexity Score   Co-morbidities:   [x] Time since onset of injury / illness / exacerbation  [] Status of current condition  []Patient's cognitive status and safety concerns    [x]  Multiple Medical Problems (see med hx)  Personal Factors:   [x] Patient's age  [] Prior Level of function   [] Patient's home situation (environment and family support)  [] Patient's level of motivation  [] Expected progression of patient      HISTORY:(criteria)    [] 11175 - no personal factors/history    [] 51490 - has 1-2 personal factor/comorbidity     [x] 77997 - has >3 personal factor/comorbidity     Body Regions:  [] Objective examination findings  [] Head     []  Neck  [] Trunk   [] Upper Extremity  [] Lower Extremity    Body Systems:  [] For communication ability, affect, cognition, language, and learning style: the assessment of the ability to make needs known, consciousness, orientation (person, place, and time), expected emotional /behavioral responses, and learning preferences (eg, learning barriers, education  needs)  [x] For the neuromuscular system: a general assessment of gross coordinated movement (eg, balance, gait, locomotion, transfers, and transitions) and motor function  (motor control and motor learning)  [x] For the musculoskeletal system: the assessment of gross symmetry, gross range of motion, gross strength, height, and weight  [] For the integumentary system: the assessment of pliability(texture), presence of scar formation, skin color, and skin integrity  [] For cardiovascular/pulmonary system: the assessment of heart rate, respiratory rate, blood pressure, and edema     Activity limitations:    [] Patient's cognitive status and saf ety concerns          [] Status of current condition      [] Weight bearing restriction  [] Cardiopulmunary Restriction    Participation Restrictions:   [] Goals and goal agreement with the patient     [] Rehab potential (prognosis) and probable outcome      Examination of Body System: (criteria)    [x] 66682 - addressing 1-2 elements    [] 47585 - addressing a total of 3 or more elements     [] 51599 -  Addressing a total of 4 or more elements          Clinical Presentation: (criteria)  Stable - 43828     On examination of body system using standardized tests and measures patient presents with 1-2 elements from any of the following: body structures and functions, activity limitations, and/or participation restrictions.  Leading to a clinical presentation that is considered stable and/or uncomplicated                              Clinical Decision Making  (Eval Complexity):  Low- 08026     Time Tracking:     PT Received On: 01/12/19  PT Start Time: 1130     PT Stop Time: 1146  PT Total Time (min): 16 min     Billable Minutes: Evaluation 16    Felicia Marie, PT, DPT   1/12/2019  [phone]

## 2019-01-12 NOTE — PROGRESS NOTES
[facility]  Neurosurgery  Progress Note    Subjective:     History of Present Illness: Mr. Moe Ren is a 68 year old male with lumbar facet arthritis, lumbar spondylosis without myelopathy, and chronic back pain who presented to NSGY clinic with complaints of progression of his pain. He stated that he was unable to walk for long period of times without developing pain and weakness in his RLE which had lead to multiple falls. MRI lumbar spine showed sacralization of L5, spondylolisthesis at L4-5 and slight spondylolisthesis at L5-S1. He presents to [facility] today for elective TLIF.     Post-Op Info:  Procedure(s) (LRB):  FUSION, SPINE, LUMBAR, TLIF, MINIMALLY INVASIVE @L4/5 & L5/S1 (Left)   2 Days Post-Op     Interval History:   Denies further CP.  Swallowing resolved.  Tmax 100.7 ON.  Ocampo remains in place. Denies F/C/CP/SOB.   NAEON.   Denies new/worsening weakness today.  Tolerating diet.        Medications:  Continuous Infusions:  Scheduled Meds:   atorvastatin  20 mg Oral Daily    bacitracin   Topical (Top) BID    escitalopram oxalate  10 mg Oral Daily    fluticasone  2 spray Each Nare Daily    heparin (porcine)  5,000 Units Subcutaneous Q8H    ipratropium  2 puff Inhalation Q6H    lisinopril  5 mg Oral Daily    metoprolol succinate  25 mg Oral Daily    montelukast  10 mg Oral QHS    mupirocin  1 g Nasal BID    pantoprazole  40 mg Oral Daily    polyethylene glycol  17 g Oral Daily    senna-docusate 8.6-50 mg  1 tablet Oral BID    sodium chloride  2 g Oral TID    tamsulosin  0.4 mg Oral Daily     PRN Meds:ALPRAZolam, aluminum-magnesium hydroxide-simethicone, bisacodyl, calcium carbonate, dextrose 50%, dextrose 50%, diazePAM, glucagon (human recombinant), glucose, glucose, HYDROmorphone, hydrOXYzine, insulin aspart U-100, ondansetron, oxyCODONE-acetaminophen, promethazine (PHENERGAN) IVPB, sodium chloride 0.9%       Objective:     Weight: 68 kg (150 lb)  Body mass index is 20.34  kg/m².  Vital Signs (Most Recent):  Temp: 97.5 °F (36.4 °C) (01/12/19 1538)  Pulse: 60 (01/12/19 1538)  Resp: 18 (01/12/19 1538)  BP: (!) 123/58 (01/12/19 1538)  SpO2: 96 % (01/12/19 1538) Vital Signs (24h Range):  Temp:  [96.4 °F (35.8 °C)-100.7 °F (38.2 °C)] 97.5 °F (36.4 °C)  Pulse:  [60-91] 60  Resp:  [18-19] 18  SpO2:  [92 %-96 %] 96 %  BP: (119-148)/(57-73) 123/58     Date 01/12/19 0700 - 01/13/19 0659   Shift 0643-0016 7967-2523 0584-8080 24 Hour Total   INTAKE   Shift Total(mL/kg)       OUTPUT   Urine(mL/kg/hr) 300(0.6) 130  430   Shift Total(mL/kg) 300(4.4) 130(1.9)  430(6.3)   Weight (kg) 68 68 68 68     Neurosurgery Physical Exam    General: well developed, well nourished, no distress.   Head: normocephalic, atraumatic  Neurologic: Alert and oriented. Thought content appropriate.  GCS: Motor: 6/Verbal: 5/Eyes: 4 GCS Total: 15  Mental Status: Awake, Alert, Oriented x 4  Language: No aphasia  Speech: No dysarthria  Cranial nerves: face symmetric, tongue midline, CN II-XII grossly intact.   Eyes: pupils equal, round, reactive to light with accomodation, EOMI.   Pulmonary: normal respirations, no signs of respiratory distress  Abdomen: soft, non-distended, not tender to palpation  Sensory: intact to light touch throughout  Motor Strength:Moves all extremities spontaneously with good tone.  Full strength upper and lower extremities. Tremors in BUE.   Mcduffie: absent  Clonus: absent  Skin: Skin is warm, dry and intact.      Incision:  Clean, dry, dissolvable sutures intact. No surrounding erythema or edema. No drainage from the incision.         Significant Labs:  Recent Labs   Lab 01/11/19  0312 01/12/19  0428   * 263*   * 135*   K 5.2* 4.4    98   CO2 18* 26   BUN 11 8   CREATININE 1.1 1.1   CALCIUM 9.3 9.4     Recent Labs   Lab 01/11/19 0312 01/12/19  0428   WBC 14.74* 12.87*   HGB 10.7* 10.1*   HCT 33.8* 29.8*    273         Assessment/Plan:     * Lumbar stenosis    68 year old  male with lumbar facet arthritis, lumbar spondylosis without myelopathy, and chronic back pain s/p L4/5 and L5/S1 TLIF on 1/10.  -Pt improving post op  -Post op xrays are stable  -LSO brace on when sitting, standing, OOB, or working with therapy. OK to remove when lying in bed.   -Bacitracin to incision BID  -Pain control: DC Hydrocodone and Baclofen. Start Oxycodone and Valium PRN.   -Urinary retention: Ocampo dc'd, voiding trial. Continue Flomax. Bladder scan q 4 hours PRN.   -Fever - T-max 100.7 overnight. Asymptomatic CTM.   IS to bedside.  -Dysphagia: Resolved.  Tolerating diet.  Cleared by ST  -Hyponatremia: Na 129. Chronic, present on admit. Begin Na tabs for replacement. FU with PCP after discharge.   -DVT prophylaxis: Begin SQH. Continue SHRUTHI's/SCD's.   -Bowel regimen: Begin Miralax and senna daily  -Atelectasis prevention: IS hourly while awake. OOB > 6 hours per day.   -DC pending voiding/afebrile         CECELIA Arreola  Neurosurgery  [facility]

## 2019-01-12 NOTE — PLAN OF CARE
Problem: Physical Therapy Goal  Goal: Physical Therapy Goal  Goals to be met by: 19     Patient will increase functional independence with mobility by performin. Supine to sit with Modified [place]  2. Sit to stand transfer with Supervision  3. Gait  x 150 feet with Supervision using AD as needed.   4. Lower extremity exercise program x15 reps, with supervision, in order to increase LE strength and (I) with functional mobility.     Outcome: Ongoing (interventions implemented as appropriate)  PT evaluation complete and appropriate goals established.    Felicia Marie, PT, DPT   2019  [phone]

## 2019-01-13 NOTE — NURSING
Patient received discharge orders. Discharge instructions reviewed with family and patient. Patent demonstrated understanding of discharge instructions. IV and tele removed. Prescriptions given to patient. Waiting for transportation to be discharged home with family. Talya.

## 2019-01-14 LAB
BLD PROD TYP BPU: NORMAL
BLD PROD TYP BPU: NORMAL
BLOOD UNIT EXPIRATION DATE: NORMAL
BLOOD UNIT EXPIRATION DATE: NORMAL
BLOOD UNIT TYPE CODE: 6200
BLOOD UNIT TYPE CODE: 6200
BLOOD UNIT TYPE: NORMAL
BLOOD UNIT TYPE: NORMAL
CODING SYSTEM: NORMAL
CODING SYSTEM: NORMAL
DISPENSE STATUS: NORMAL
DISPENSE STATUS: NORMAL
TRANS ERYTHROCYTES VOL PATIENT: NORMAL ML
TRANS ERYTHROCYTES VOL PATIENT: NORMAL ML

## 2019-01-14 NOTE — PLAN OF CARE
Patient discharged home.  The patient does not have any home needs.  Family provided transportation home.  Neurosurgery clinic to schedule follow up appointment.    Future Appointments   Date Time Provider Department Center   1/21/2019  8:40 AM Tonya Johnson PA-C [facility] UROLOGY [facility]   1/24/2019 10:20 AM Mary Mata RN [facility] NEUROS7 [facility]   1/31/2019  1:00 PM Ellyn Meza NP [facility] ENDO DI [facility]   3/7/2019  2:30 PM Lane Irby MD [facility] CARDIO [place]        01/14/19 0849   Final Note   Assessment Type Final Discharge Note   Anticipated Discharge Disposition Home   Hospital Follow Up  Appt(s) scheduled? (Neurosurgery clinic to schedule follow up appointment.)   Discharge plans and expectations educations in teach back method with documentation complete? Yes

## 2019-01-15 ENCOUNTER — PATIENT MESSAGE (OUTPATIENT)
Dept: FAMILY MEDICINE | Facility: CLINIC | Age: 69
End: 2019-01-15

## 2019-01-15 ENCOUNTER — TELEPHONE (OUTPATIENT)
Dept: FAMILY MEDICINE | Facility: CLINIC | Age: 69
End: 2019-01-15

## 2019-01-15 DIAGNOSIS — F41.9 ANXIETY DISORDER, UNSPECIFIED TYPE: ICD-10-CM

## 2019-01-15 DIAGNOSIS — R33.9 URINARY RETENTION: Primary | ICD-10-CM

## 2019-01-15 RX ORDER — ESCITALOPRAM OXALATE 20 MG/1
20 TABLET ORAL DAILY
Qty: 30 TABLET | Refills: 11 | Status: SHIPPED | OUTPATIENT
Start: 2019-01-15 | End: 2019-01-31 | Stop reason: SDUPTHER

## 2019-01-15 NOTE — TELEPHONE ENCOUNTER
----- Message from Carmen Locke sent at 1/15/2019 12:26 PM CST -----  Contact: Self   Patient is requesting a urology referral for the [facility]. Please call at [phone].

## 2019-01-15 NOTE — TELEPHONE ENCOUNTER
Looks like he has appt with urology 1/21 for urinary retention.  I will submit referral (in case hsi insurance requires it) but would keep that appt

## 2019-01-17 NOTE — DISCHARGE SUMMARY
[facility]  Neurosurgery  Discharge Summary      Patient Name: Moe Ren  MRN: [id number]  Admission Date: 1/10/2019  Hospital Length of Stay: 2 days  Discharge Date: 1/17/19  Attending Physician: Josias Ling M.D.   Discharging Provider: CECELIA Arreola  Primary Care Provider: Damian Ramon MD    HPI:   Mr. Moe Ren is a 68 year old male with lumbar facet arthritis, lumbar spondylosis without myelopathy, and chronic back pain who presented to NSGY clinic with complaints of progression of his pain. He stated that he was unable to walk for long period of times without developing pain and weakness in his RLE which had lead to multiple falls. MRI lumbar spine showed sacralization of L5, spondylolisthesis at L4-5 and slight spondylolisthesis at L5-S1. He presents to [facility] today for elective TLIF.     Procedure(s) (LRB):  FUSION, SPINE, LUMBAR, TLIF, MINIMALLY INVASIVE @L4/5 & L5/S1 (Left)     Hospital Course: 1/10: OR for L4/L5 and L5/S1 TLIF. No intra op complications. Patient tolerated the procedure well. Recovered in PACU.   1/11: Urinary retention overnight and this morning. Straight cath x 2. Swallowing difficulties with liquids, solids, and medications. Patient with dysphagia at baseline. Speech therapy ordered. Pain control issues overnight. Medications adjusted. PT ordered.   1/12: Ekg/troponin are wnl.  CP resolved today. Low grade temp. Ocampo removed, voiding normally without post void residuals.  Low grade fever resolved.  DC home today.  F/u in 2 weeks for wound check.       Pending Diagnostic Studies:     None        Final Active Diagnoses:    Diagnosis Date Noted POA    PRINCIPAL PROBLEM:  Lumbar stenosis 1/11/19 FUSION, SPINE, LUMBAR, TLIF, MINIMALLY INVASIVE @L4/5 & L5/S1 (Left) [M48.061] 01/10/2019 Yes      Problems Resolved During this Admission:      Discharged Condition: stable    Disposition: Home or Self Care      Patient Instructions:      Ambulatory referral to  Home Health   Referral Priority: Routine Referral Type: Home Health   Referral Reason: Specialty Services Required   Requested Specialty: Home Health Services   Number of Visits Requested: 1     Medications:  Reconciled Home Medications:      Medication List      START taking these medications    bacitracin 500 unit/gram ointment  Apply topically 2 (two) times daily.     diazePAM 5 MG tablet  Commonly known as:  VALIUM  Take 1 tablet (5 mg total) by mouth every 6 (six) hours as needed (muscles spasms).     oxyCODONE-acetaminophen 5-325 mg per tablet  Commonly known as:  PERCOCET  Take 1 tablet by mouth every 4 (four) hours as needed (pain).     sulfamethoxazole-trimethoprim 800-160mg 800-160 mg Tab  Commonly known as:  BACTRIM DS  Take 1 tablet by mouth 2 (two) times daily. for 5 days     tamsulosin 0.4 mg Cap  Commonly known as:  FLOMAX  Take 1 capsule (0.4 mg total) by mouth once daily.        CHANGE how you take these medications    insulin aspart U-100 100 unit/mL Inpn pen  Commonly known as:  NovoLOG Flexpen U-100 Insulin  Sliding scale up to 20 units TID. MAX 50 UNITS/DAY.  What changed:  additional instructions     metoprolol succinate 25 MG 24 hr tablet  Commonly known as:  TOPROL-XL  Take 1 tablet (25 mg total) by mouth once daily.  What changed:  when to take this        CONTINUE taking these medications    ALPRAZolam 0.5 MG tablet  Commonly known as:  XANAX  Take 1 tablet (0.5 mg total) by mouth daily as needed for Anxiety.     atorvastatin 20 MG tablet  Commonly known as:  LIPITOR  Take 1 tablet (20 mg total) by mouth once daily.     blood sugar diagnostic Strp  Commonly known as:  ACCU-CHEK KM PLUS TEST STRP  1 strip by Misc.(Non-Drug; Combo Route) route 6 (six) times daily. To be used with Accu-Chek Km Plus glucometer     blood-glucose meter kit  Use as instructed, Accu-Chek Km Plus glucometer     desoximetasone 0.25 % cream  Commonly known as:  TOPICORT  Apply topically 2 (two) times daily as  "needed.     escitalopram oxalate 20 MG tablet  Commonly known as:  LEXAPRO  Take 0.5 tablets (10 mg total) by mouth once daily.     fluticasone 50 mcg/actuation nasal spray  Commonly known as:  FLONASE  2 sprays (100 mcg total) by Each Nare route daily as needed.     hydrOXYzine 50 MG tablet  Commonly known as:  ATARAX  1/2 tablet every 4-6 hours as needed for anxiety     ipratropium 17 mcg/actuation inhaler  Commonly known as:  ATROVENT HFA  Inhale 2 puffs into the lungs every 6 (six) hours. Rescue     lancets Misc  Commonly known as:  ACCU-CHEK FASTCLIX LANCING DEV  1 lancet by Misc.(Non-Drug; Combo Route) route 6 (six) times daily. To be used with Accu-Chek Amanda Plus glucometer     lisinopril 5 MG tablet  Commonly known as:  PRINIVIL,ZESTRIL  Take 1 tablet (5 mg total) by mouth once daily.     montelukast 10 mg tablet  Commonly known as:  SINGULAIR  Take 1 tablet (10 mg total) by mouth every evening.     multivitamin per tablet  Commonly known as:  THERAGRAN  Take 2 tablets by mouth once daily.     omeprazole 40 MG capsule  Commonly known as:  PRILOSEC  Take 1 capsule (40 mg total) by mouth 2 (two) times daily before meals.     pen needle, diabetic 32 gauge x 5/32" Ndle  Commonly known as:  BD ULTRA-FINE RENETTA PEN NEEDLE  Use 3x/day with CECELIA Minaya  Neurosurgery [facility]  "

## 2019-01-21 ENCOUNTER — OFFICE VISIT (OUTPATIENT)
Dept: UROLOGY | Facility: CLINIC | Age: 69
End: 2019-01-21
Payer: MEDICARE

## 2019-01-21 VITALS
DIASTOLIC BLOOD PRESSURE: 75 MMHG | WEIGHT: 164 LBS | HEIGHT: 72 IN | SYSTOLIC BLOOD PRESSURE: 134 MMHG | HEART RATE: 88 BPM | BODY MASS INDEX: 22.21 KG/M2

## 2019-01-21 DIAGNOSIS — Z12.5 PROSTATE CANCER SCREENING: ICD-10-CM

## 2019-01-21 DIAGNOSIS — R35.1 NOCTURIA: ICD-10-CM

## 2019-01-21 DIAGNOSIS — R33.9 URINARY RETENTION: Primary | ICD-10-CM

## 2019-01-21 DIAGNOSIS — E11.8 TYPE 2 DIABETES MELLITUS WITH COMPLICATION, WITH LONG-TERM CURRENT USE OF INSULIN: ICD-10-CM

## 2019-01-21 DIAGNOSIS — Z79.4 TYPE 2 DIABETES MELLITUS WITH COMPLICATION, WITH LONG-TERM CURRENT USE OF INSULIN: ICD-10-CM

## 2019-01-21 PROCEDURE — 3045F PR MOST RECENT HEMOGLOBIN A1C LEVEL 7.0-9.0%: CPT | Mod: CPTII,HCNC,S$GLB, | Performed by: PHYSICIAN ASSISTANT

## 2019-01-21 PROCEDURE — 51798 US URINE CAPACITY MEASURE: CPT | Mod: HCNC,S$GLB,, | Performed by: PHYSICIAN ASSISTANT

## 2019-01-21 PROCEDURE — 99999 PR PBB SHADOW E&M-EST. PATIENT-LVL V: CPT | Mod: PBBFAC,HCNC,, | Performed by: PHYSICIAN ASSISTANT

## 2019-01-21 PROCEDURE — 99999 PR PBB SHADOW E&M-EST. PATIENT-LVL V: ICD-10-PCS | Mod: PBBFAC,HCNC,, | Performed by: PHYSICIAN ASSISTANT

## 2019-01-21 PROCEDURE — 99203 PR OFFICE/OUTPT VISIT, NEW, LEVL III, 30-44 MIN: ICD-10-PCS | Mod: HCNC,S$GLB,, | Performed by: PHYSICIAN ASSISTANT

## 2019-01-21 PROCEDURE — 3045F PR MOST RECENT HEMOGLOBIN A1C LEVEL 7.0-9.0%: ICD-10-PCS | Mod: CPTII,HCNC,S$GLB, | Performed by: PHYSICIAN ASSISTANT

## 2019-01-21 PROCEDURE — 1101F PR PT FALLS ASSESS DOC 0-1 FALLS W/OUT INJ PAST YR: ICD-10-PCS | Mod: CPTII,HCNC,S$GLB, | Performed by: PHYSICIAN ASSISTANT

## 2019-01-21 PROCEDURE — 51798 PR MEAS,POST-VOID RES,US,NON-IMAGING: ICD-10-PCS | Mod: HCNC,S$GLB,, | Performed by: PHYSICIAN ASSISTANT

## 2019-01-21 PROCEDURE — 1101F PT FALLS ASSESS-DOCD LE1/YR: CPT | Mod: CPTII,HCNC,S$GLB, | Performed by: PHYSICIAN ASSISTANT

## 2019-01-21 PROCEDURE — 99203 OFFICE O/P NEW LOW 30 MIN: CPT | Mod: HCNC,S$GLB,, | Performed by: PHYSICIAN ASSISTANT

## 2019-01-21 NOTE — PROGRESS NOTES
CHIEF COMPLAINT:    Mr. Ren is a 68 y.o. male presenting for urinary retention.    PRESENTING ILLNESS:    Moe Ren is a 68 y.o. male  who presents for urinary retention.   Patient was admitted for elective FUSION, SPINE, LUMBAR, TLIF, MINIMALLY INVASIVE @L4/5 & L5/S1 on 1/10/19.  He experienced urinary retention 1/11 requiring straight caths.  A luna catheter was ultimatley replaced and removed 1/12.  Patient was able to void and discharged without the catheter.       He is taking flomax.    He reports a history of painful catheter placement.  He reports having a catheter in place while being treated for a GI bleed in [facility].    Prior to his surgery he denies difficulty urinating, hesitancy, intermittency, frequency and dysuria.  He has nocturia x 3.  It is not bothersome as he can fall back to sleep.  He drinks a lot of fluids.      He denies a personal and family history of prostate cancer.      REVIEW OF SYSTEMS:    Constitutional: Negative for fever and chills.   HENT: Negative for hearing loss.   Eyes: Negative for visual disturbance.   Respiratory: Negative for shortness of breath.   Cardiovascular: Negative for chest pain.   Gastrointestinal: Negative for vomiting, and constipation.   Genitourinary: See HPI  Neurological: Negative for dizziness.   Hematological: Does not bruise/bleed easily.   Psychiatric/Behavioral: Negative for confusion.       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    Cataract     Diabetes mellitus     Gastric ulcer     GERD (gastroesophageal reflux disease)     Pancreatitis 2008    2 Times       Past Surgical History:   Procedure Laterality Date     thumb surgery      Dead Skin cell tumor Rt thumb    CAUTERIZATION OF TURBINATES N/A 4/19/2016    [facility]     COLONOSCOPY N/A 4/11/2017    Performed by Meet Quesada MD at [facility] ENDO    ANDREA-TRANSFORAMINAL Right 11/26/2014    [facility] at [facility]    ANDREA-TRANSFORAMINAL Right 11/5/2014    [facility] at [facility]    ESOPHAGOGASTRODUODENOSCOPY (EGD) N/A 6/18/2018    Performed by Marco Antonio Gonsalves MD at [facility] ENDO    ESOPHAGOGASTRODUODENOSCOPY (EGD) N/A 5/30/2014    Performed by Meet Quesada MD at [facility] ENDO    FUSION, SPINE, LUMBAR, TLIF, MINIMALLY INVASIVE @L4/5 & L5/S1 Left 1/10/2019    [facility]    MOUTH FLOOR MASS EXCISION  2009    NASAL SEPTUM SURGERY      RECONSTRUCTION-NASAL N/A 4/19/2016    Per[facility]ime [facility] at [facility] OR    SKIN SURGERY         Family History   Problem Relation Age of Onset    Cataracts Mother     Leukemia Mother     Lung cancer Brother     Mental illness Father         suicide    No Known Problems Daughter     No Known Problems Sister        Social History     Socioeconomic History    Marital status:      Spouse name: Not on file    Number of children: Not on file    Years of education: Not on file    Highest education level: Not on file   Social Needs    Financial resource strain: Not on file    Food insecurity - worry: Not on file    Food insecurity - inability: Not on file    Transportation needs - medical: Not on file    Transportation needs - non-medical: Not on file   Occupational History    Occupation: , self employed   Tobacco Use    Smoking status: Current Every Day Smoker     Packs/day: 0.25     Years: 45.00     Pack years: 11.25     Types: Cigarettes    Smokeless tobacco: Never Used   Substance and Sexual Activity    Alcohol use: Yes     Alcohol/week: 0.5 oz     Types: 1 Standard drinks or equivalent per week     Comment: 4 drinks a week    Drug use: No    Sexual activity: Yes     Partners: Female   Other Topics Concern     Not on file   Social History Narrative    , 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 with two kids.  One child committed suicide.       Allergies:  Amoxicillin; Adhesive; and Metformin    Medications:    Current Outpatient Medications:     ALPRAZolam (XANAX) 0.5 MG tablet, Take 1 tablet (0.5 mg total) by mouth daily as needed for Anxiety., Disp: 30 tablet, Rfl: 0    atorvastatin (LIPITOR) 20 MG tablet, Take 1 tablet (20 mg total) by mouth once daily., Disp: 90 tablet, Rfl: 1    bacitracin 500 unit/gram ointment, Apply topically 2 (two) times daily., Disp: , Rfl: 0    blood sugar diagnostic (ACCU-CHEK KM PLUS TEST STRP) Strp, 1 strip by Misc.(Non-Drug; Combo Route) route 6 (six) times daily. To be used with Accu-Chek Km Plus glucometer, Disp: 600 strip, Rfl: 3    blood-glucose meter kit, Use as instructed, Accu-Chek Km Plus glucometer, Disp: 1 each, Rfl: 0    desoximetasone (TOPICORT) 0.25 % cream, Apply topically 2 (two) times daily as needed. , Disp: , Rfl:     diazePAM (VALIUM) 5 MG tablet, Take 1 tablet (5 mg total) by mouth every 6 (six) hours as needed (muscles spasms)., Disp: 60 tablet, Rfl: 0    escitalopram oxalate (LEXAPRO) 20 MG tablet, Take 0.5 tablets (10 mg total) by mouth once daily., Disp: 15 tablet, Rfl: 5    escitalopram oxalate (LEXAPRO) 20 MG tablet, TAKE 1 TABLET (20 MG TOTAL) BY MOUTH ONCE DAILY., Disp: 30 tablet, Rfl: 11    fluticasone (FLONASE) 50 mcg/actuation nasal spray, 2 sprays (100 mcg total) by Each Nare route daily as needed., Disp: 16 g, Rfl: 2    hydrOXYzine (ATARAX) 50 MG tablet, 1/2 tablet every 4-6 hours as needed for anxiety, Disp: 30 tablet, Rfl: 3    insulin aspart U-100 (NOVOLOG FLEXPEN U-100 INSULIN) 100 unit/mL InPn pen, Sliding scale up to 20 units TID. MAX 50 UNITS/DAY., Disp: 45 mL, Rfl: 5    ipratropium (ATROVENT HFA) 17 mcg/actuation inhaler, Inhale 2 puffs into the lungs every 6 (six) hours. Rescue, Disp:  "12.9 g, Rfl: 5    lancets (ACCU-CHEK FASTCLIX) Misc, 1 lancet by Misc.(Non-Drug; Combo Route) route 6 (six) times daily. To be used with Accu-Chek Amanda Plus glucometer, Disp: 600 each, Rfl: 3    lisinopril (PRINIVIL,ZESTRIL) 5 MG tablet, Take 1 tablet (5 mg total) by mouth once daily., Disp: 90 tablet, Rfl: 2    metoprolol succinate (TOPROL-XL) 25 MG 24 hr tablet, Take 1 tablet (25 mg total) by mouth once daily. (Patient taking differently: Take 25 mg by mouth every morning. ), Disp: 30 tablet, Rfl: 11    montelukast (SINGULAIR) 10 mg tablet, Take 1 tablet (10 mg total) by mouth every evening., Disp: 30 tablet, Rfl: 5    multivitamin (THERAGRAN) per tablet, Take 2 tablets by mouth once daily., Disp: , Rfl:     omeprazole (PRILOSEC) 40 MG capsule, Take 1 capsule (40 mg total) by mouth 2 (two) times daily before meals., Disp: 60 capsule, Rfl: 3    oxyCODONE-acetaminophen (PERCOCET) 5-325 mg per tablet, Take 1 tablet by mouth every 4 (four) hours as needed (pain)., Disp: 60 tablet, Rfl: 0    pen needle, diabetic (BD ULTRA-FINE RENETTA PEN NEEDLE) 32 gauge x 5/32" Ndle, Use 3x/day with Novolog, Disp: 300 each, Rfl: 3    tamsulosin (FLOMAX) 0.4 mg Cap, Take 1 capsule (0.4 mg total) by mouth once daily., Disp: 30 capsule, Rfl: 3    PHYSICAL EXAMINATION:    Constitutional: He appears well-developed and well-nourished.  He is in no apparent distress.    Eyes: No scleral icterus noted bilaterally. No discharge bilaterally.    Nose: No rhinorrhea    Cardiovascular: Normal rate.  No pitting edema noted in lower extremities bilaterally    Pulmonary/Chest: Effort normal. No respiratory distress.     Abdominal:  He exhibits no distension.  There is no CVA tenderness.     Lymphadenopathy:        Right: No supraclavicular adenopathy present.        Left: No supraclavicular adenopathy present.     Neurological: He is alert and oriented to person, place, and time.     Skin: Skin is warm and dry.     Psych: Cooperative with " normal affect.    Genitourinary:  No rectal mass.    The prostate is 25g.  Prostate is smooth, non tender with no nodules noted.    Physical Exam    Bladder scan performed by Nurse Valentin.  PVR 93ml    LABS:    U/a: 1.010, pH 5, + glucose, otherwise  negative  Lab Results   Component Value Date    PSA 2.6 03/23/2017    PSA 2.1 05/18/2015       IMPRESSION:    Encounter Diagnoses   Name Primary?    Urinary retention Yes    Prostate cancer screening     Nocturia     Type 2 diabetes mellitus with complication, with long-term current use of insulin          PLAN:    PVR today is 93ml.  Continue flomax  We will continue to monitor nocturia as it is not bothesome.    Discussed glucosuria and that it could be a sign of high blood sugar.  Patient states that his sugar has been running high since his surgery and that he is following up with his DM provider at the end of the month.    He is due for a PSA.  However given his recent catheterization, we will wait a month before checking a PSA to avoid a false PSA elevation.     Follow up in 1 year or sooner if needed.    Tonya Johnson PA-C

## 2019-01-21 NOTE — LETTER
January 21, 2019      Josias Ling MD  [address]  [facility] [address]           [facility] - Urology 4th Floor  [address]  [facility] [address]  Phone: [phone]          Patient: Moe Ren   MR Number: [id number]   YOB: 1950   Date of Visit: 1/21/2019       Dear Dr. Josias Ling:    Thank you for referring Moe Ren to me for evaluation. Attached you will find relevant portions of my assessment and plan of care.    If you have questions, please do not hesitate to call me. I look forward to following Moe Ren along with you.    Sincerely,    Tonya Johnson PA-C    Enclosure  CC:  No Recipients    If you would like to receive this communication electronically, please contact [email or web address] or [phone] to request more information on [email or web address] Link access.    For providers and/or their staff who would like to refer a patient to [facility], please contact us through our one-stop-shop provider referral line, [facility], at [phone].    If you feel you have received this communication in error or would no longer like to receive these types of communications, please e-mail [email or web address]

## 2019-01-22 ENCOUNTER — PATIENT MESSAGE (OUTPATIENT)
Dept: ENDOCRINOLOGY | Facility: CLINIC | Age: 69
End: 2019-01-22

## 2019-01-23 ENCOUNTER — PATIENT MESSAGE (OUTPATIENT)
Dept: ENDOCRINOLOGY | Facility: CLINIC | Age: 69
End: 2019-01-23

## 2019-01-24 ENCOUNTER — CLINICAL SUPPORT (OUTPATIENT)
Dept: NEUROSURGERY | Facility: CLINIC | Age: 69
End: 2019-01-24
Payer: MEDICARE

## 2019-01-24 VITALS
HEART RATE: 96 BPM | WEIGHT: 161 LBS | TEMPERATURE: 99 F | HEIGHT: 72 IN | SYSTOLIC BLOOD PRESSURE: 116 MMHG | DIASTOLIC BLOOD PRESSURE: 65 MMHG | BODY MASS INDEX: 21.81 KG/M2

## 2019-01-24 PROCEDURE — 99999 PR PBB SHADOW E&M-EST. PATIENT-LVL IV: ICD-10-PCS | Mod: PBBFAC,HCNC,,

## 2019-01-24 PROCEDURE — 99999 PR PBB SHADOW E&M-EST. PATIENT-LVL IV: CPT | Mod: PBBFAC,HCNC,,

## 2019-01-31 ENCOUNTER — PATIENT MESSAGE (OUTPATIENT)
Dept: ENDOCRINOLOGY | Facility: CLINIC | Age: 69
End: 2019-01-31

## 2019-01-31 ENCOUNTER — OFFICE VISIT (OUTPATIENT)
Dept: ENDOCRINOLOGY | Facility: CLINIC | Age: 69
End: 2019-01-31
Payer: MEDICARE

## 2019-01-31 VITALS
SYSTOLIC BLOOD PRESSURE: 113 MMHG | HEART RATE: 84 BPM | HEIGHT: 72 IN | DIASTOLIC BLOOD PRESSURE: 70 MMHG | BODY MASS INDEX: 21.81 KG/M2 | WEIGHT: 161 LBS

## 2019-01-31 DIAGNOSIS — R80.9 MICROALBUMINURIA: ICD-10-CM

## 2019-01-31 DIAGNOSIS — E78.5 HYPERLIPIDEMIA LDL GOAL <70: ICD-10-CM

## 2019-01-31 DIAGNOSIS — E11.42 DM TYPE 2 WITH DIABETIC PERIPHERAL NEUROPATHY: Primary | Chronic | ICD-10-CM

## 2019-01-31 PROCEDURE — 99214 PR OFFICE/OUTPT VISIT, EST, LEVL IV, 30-39 MIN: ICD-10-PCS | Mod: HCNC,S$GLB,, | Performed by: NURSE PRACTITIONER

## 2019-01-31 PROCEDURE — 1101F PT FALLS ASSESS-DOCD LE1/YR: CPT | Mod: HCNC,CPTII,S$GLB, | Performed by: NURSE PRACTITIONER

## 2019-01-31 PROCEDURE — 99999 PR PBB SHADOW E&M-EST. PATIENT-LVL V: ICD-10-PCS | Mod: PBBFAC,HCNC,, | Performed by: NURSE PRACTITIONER

## 2019-01-31 PROCEDURE — 3045F PR MOST RECENT HEMOGLOBIN A1C LEVEL 7.0-9.0%: ICD-10-PCS | Mod: HCNC,CPTII,S$GLB, | Performed by: NURSE PRACTITIONER

## 2019-01-31 PROCEDURE — 99214 OFFICE O/P EST MOD 30 MIN: CPT | Mod: HCNC,S$GLB,, | Performed by: NURSE PRACTITIONER

## 2019-01-31 PROCEDURE — 99999 PR PBB SHADOW E&M-EST. PATIENT-LVL V: CPT | Mod: PBBFAC,HCNC,, | Performed by: NURSE PRACTITIONER

## 2019-01-31 PROCEDURE — 1101F PR PT FALLS ASSESS DOC 0-1 FALLS W/OUT INJ PAST YR: ICD-10-PCS | Mod: HCNC,CPTII,S$GLB, | Performed by: NURSE PRACTITIONER

## 2019-01-31 PROCEDURE — 3045F PR MOST RECENT HEMOGLOBIN A1C LEVEL 7.0-9.0%: CPT | Mod: HCNC,CPTII,S$GLB, | Performed by: NURSE PRACTITIONER

## 2019-01-31 RX ORDER — ATORVASTATIN CALCIUM 10 MG/1
10 TABLET, FILM COATED ORAL DAILY
Qty: 90 TABLET | Refills: 2 | Status: SHIPPED | OUTPATIENT
Start: 2019-01-31 | End: 2020-03-04 | Stop reason: SDUPTHER

## 2019-01-31 NOTE — PROGRESS NOTES
CC: This 68 y.o. White male  is here for evaluation of  T2DM along with comorbidities indicated in the Visit Diagnosis section of this encounter.    HPI: Moe Ren was diagnosed with T2DM in ~ 2010.  Metformin started at time of diagnosis.     He has a history of squamos cell oral CA, Spinal stenosis, chronic pancreatitis.      Prior visit on 10/10/18  He is being sent to Cardiology by his PCP Dr. Ramon for evaluation for syncope; it was not associated with hypoglycemia.   a1c up from 6.3 to 7.1%.   He will be going on a 2 week vacation and will take a cruise around [place]. He is interested in starting Dexcom CGM.   His BG is dropping to 40s midday on Sundays. He wakes up about 2-3 hours earlier than usual and eats breakfast, takes usually dose for breakfast but BG drops ~ 3 pm.   He has been drinking 3 drinks/day.   Plan Make sure to test BG at bedtime. Your fasting blood sugars could be high due to high blood sugars after dinner.   Reduce Novolog dose by about 2 units before breakfast on Sundays.   Will get Dexcom to contact you regarding coverage for Dexcom  Continuous Glucose Monitoring (CGM) sensor.   Test blood sugar 6x/day.   rtc in 3 mo with labs prior.       Interval history  a1c up to 7.5%. Recent glucoses higher after back surgery.   He was advised last week through [facility] to increase ICR at breakfast to 8 and at lunch to 12.   Overall glucoses have improved especially before lunch and dinner (although less so). He continues to have high FBGs. No bedtime snacks.     On Sundays, he gets up early to go to [employer] and finds his BG tends to drop lower on Sundays. Therefore, he usually cuts back his usual Novolog dose by about 2units to avoid hypoglycemia. BG of 57 this past Sunday r/t NOT cutting his usual dose back.        LAST DIABETES EDUCATION: 6/2016; 2/2018    PRESCRIBED DIABETES MEDICATIONS: Novolog ac; he rounds up on his dosing of Novolog   5:15 AM - ICR 8   12 PM - ICR 12       ISF  1:40, goal 115     Misses medication doses - No    DM COMPLICATIONS: peripheral neuropathy    SIGNIFICANT DIABETES MED HISTORY:   Tresiba gradually titrated down until it was stopped mid 2017 d/t hypoglycemia.   Novolog stopped at ov 2/5/18 and metformin and tresiba were started. tresiba dc'd about a week later d/t hypo  repaglinide started at ov 2/28/18, and stopped about a month later d/t low efficacy       SELF MONITORING BLOOD GLUCOSE: Checks blood glucose at home 5x/day. See pt's email from today with uploaded BG logbook.     Not using Dexcom right now because he is wearing a back brace.     HYPOGLYCEMIC EPISODES: symptoms start in the 60s but definitely in the 50s. Corrects with coke.      CURRENT DIET: 3 meal/day - Eats at 10 am, 2 pm, and 6 pm. Eats about 60 to 130 carbs per meal.   He supplements with Ensure.     CURRENT EXERCISE: none recent    SOCIAL: part time contract . 10 hours.  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       /70 (BP Location: Right arm, Patient Position: Sitting, BP Method: Large (Automatic))   Pulse 84   Ht 6' (1.829 m)   Wt 73 kg (161 lb)   BMI 21.84 kg/m²       ROS:   CONSTITUTIONAL: Appetite ok, denies fatigue  RESPIRATORY: No shortness of breath  MS: + back ache       PHYSICAL EXAM:  GENERAL: Well developed, well nourished. No acute distress.   PSYCH: AAOx3, appropriate mood and affect, conversant, well-groomed. Judgement and insight good.   NEURO: Cranial nerves grossly intact. Speech clear, no tremor.   CHEST: Respirations even and unlabored.      Hemoglobin A1C   Date Value Ref Range Status   01/09/2019 7.5 (H) 4.0 - 5.6 % Final     Comment:     ADA Screening Guidelines:  5.7-6.4%  Consistent with prediabetes  >or=6.5%  Consistent with diabetes  High levels of fetal hemoglobin interfere with the HbA1C  assay. Heterozygous hemoglobin variants (HbS, HgC, etc)do  not significantly  interfere with this assay.   However, presence of multiple variants may affect accuracy.     01/07/2019 7.2 (H) 4.0 - 5.6 % Final     Comment:     ADA Screening Guidelines:  5.7-6.4%  Consistent with prediabetes  >or=6.5%  Consistent with diabetes  High levels of fetal hemoglobin interfere with the HbA1C  assay. Heterozygous hemoglobin variants (HbS, HgC, etc)do  not significantly interfere with this assay.   However, presence of multiple variants may affect accuracy.     10/03/2018 7.1 (H) 4.0 - 5.6 % Final     Comment:     ADA Screening Guidelines:  5.7-6.4%  Consistent with prediabetes  >or=6.5%  Consistent with diabetes  High levels of fetal hemoglobin interfere with the HbA1C  assay. Heterozygous hemoglobin variants (HbS, HgC, etc)do  not significantly interfere with this assay.   However, presence of multiple variants may affect accuracy.             Chemistry        Component Value Date/Time     (L) 01/12/2019 0428    K 4.4 01/12/2019 0428    CL 98 01/12/2019 0428    CO2 26 01/12/2019 0428    BUN 8 01/12/2019 0428    CREATININE 1.1 01/12/2019 0428     (H) 01/12/2019 0428        Component Value Date/Time    CALCIUM 9.4 01/12/2019 0428    ALKPHOS 76 07/09/2018 1304    AST 34 07/09/2018 1304    ALT 22 07/09/2018 1304    BILITOT 0.3 07/09/2018 1304    ESTGFRAFRICA >60.0 01/12/2019 0428    EGFRNONAA >60.0 01/12/2019 0428          Lab Results   Component Value Date    LDLCALC 32.2 (L) 01/09/2019        Ref. Range 1/9/2019 08:04   Cholesterol Latest Ref Range: 120 - 199 mg/dL 131   HDL Latest Ref Range: 40 - 75 mg/dL 60   HDL/Chol Ratio Latest Ref Range: 20.0 - 50.0 % 45.8   LDL Cholesterol Latest Ref Range: 63.0 - 159.0 mg/dL 32.2 (L)   Non-HDL Cholesterol Latest Units: mg/dL 71   Total Cholesterol/HDL Ratio Latest Ref Range: 2.0 - 5.0  2.2   Triglycerides Latest Ref Range: 30 - 150 mg/dL 194 (H)         Lab Results   Component Value Date    MICALBCREAT 69.4 (H) 04/02/2018         STANDARDS of CARE:         ASA:               Last eye exam:       ASSESSMENT and PLAN:    A1C GOAL: < 7 %     1. DM type 2 with diabetic peripheral neuropathy  Increase carb ratio before lunch and dinner to 10.   Please take a correction dose of Novolog at bedtime as needed.     Test blood sugar 6x/day.     Return to clinic in 5 months with labs prior.         Hemoglobin A1c   2. Hyperlipidemia LDL goal <70  atorvastatin (LIPITOR) 10 MG tablet    Lipid panel   3. Microalbuminuria  Microalbumin/creatinine urine ratio          Orders Placed This Encounter   Procedures    Lipid panel     Standing Status:   Future     Standing Expiration Date:   1/31/2020    Hemoglobin A1c     Standing Status:   Future     Standing Expiration Date:   3/31/2020    Microalbumin/creatinine urine ratio     Standing Status:   Future     Standing Expiration Date:   3/31/2020     Order Specific Question:   Specimen Source     Answer:   Urine        Follow-up in about 5 months (around 6/30/2019).

## 2019-01-31 NOTE — PATIENT INSTRUCTIONS
Increase carb ratio before lunch and dinner to 10.   Please take a correction dose of Novolog at bedtime as needed.     Test blood sugar 6x/day.     Return to clinic in 5 months with labs prior.

## 2019-02-08 DIAGNOSIS — E11.42 DM TYPE 2 WITH DIABETIC PERIPHERAL NEUROPATHY: ICD-10-CM

## 2019-02-21 ENCOUNTER — LAB VISIT (OUTPATIENT)
Dept: LAB | Facility: HOSPITAL | Age: 69
End: 2019-02-21
Attending: PHYSICIAN ASSISTANT
Payer: MEDICARE

## 2019-02-21 DIAGNOSIS — Z12.5 PROSTATE CANCER SCREENING: ICD-10-CM

## 2019-02-21 LAB — COMPLEXED PSA SERPL-MCNC: 1.5 NG/ML

## 2019-02-21 PROCEDURE — 36415 COLL VENOUS BLD VENIPUNCTURE: CPT | Mod: HCNC

## 2019-02-21 PROCEDURE — 84153 ASSAY OF PSA TOTAL: CPT | Mod: HCNC

## 2019-02-27 ENCOUNTER — TELEPHONE (OUTPATIENT)
Dept: NEUROSURGERY | Facility: CLINIC | Age: 69
End: 2019-02-27

## 2019-02-27 ENCOUNTER — OFFICE VISIT (OUTPATIENT)
Dept: NEUROSURGERY | Facility: CLINIC | Age: 69
End: 2019-02-27
Payer: MEDICARE

## 2019-02-27 ENCOUNTER — HOSPITAL ENCOUNTER (OUTPATIENT)
Dept: RADIOLOGY | Facility: HOSPITAL | Age: 69
Discharge: HOME | End: 2019-02-27
Attending: NEUROLOGICAL SURGERY
Payer: MEDICARE

## 2019-02-27 VITALS
BODY MASS INDEX: 22.13 KG/M2 | TEMPERATURE: 98 F | HEART RATE: 63 BPM | WEIGHT: 163.13 LBS | SYSTOLIC BLOOD PRESSURE: 101 MMHG | DIASTOLIC BLOOD PRESSURE: 58 MMHG

## 2019-02-27 DIAGNOSIS — M47.816 LUMBAR SPONDYLOSIS: ICD-10-CM

## 2019-02-27 DIAGNOSIS — M51.16 LUMBAR DISC HERNIATION WITH RADICULOPATHY: ICD-10-CM

## 2019-02-27 DIAGNOSIS — M48.062 SPINAL STENOSIS OF LUMBAR REGION WITH NEUROGENIC CLAUDICATION: ICD-10-CM

## 2019-02-27 DIAGNOSIS — M43.17 SPONDYLOLISTHESIS OF LUMBOSACRAL REGION: ICD-10-CM

## 2019-02-27 DIAGNOSIS — M43.16 SPONDYLOLISTHESIS OF LUMBAR REGION: Primary | ICD-10-CM

## 2019-02-27 DIAGNOSIS — M48.062 SPINAL STENOSIS, LUMBAR REGION, WITH NEUROGENIC CLAUDICATION: Primary | ICD-10-CM

## 2019-02-27 PROCEDURE — 72100 XR LUMBAR SPINE AP AND LATERAL: ICD-10-PCS | Mod: 26,HCNC,, | Performed by: RADIOLOGY

## 2019-02-27 PROCEDURE — 99024 POSTOP FOLLOW-UP VISIT: CPT | Mod: HCNC,S$GLB,, | Performed by: NEUROLOGICAL SURGERY

## 2019-02-27 PROCEDURE — 99024 PR POST-OP FOLLOW-UP VISIT: ICD-10-PCS | Mod: HCNC,S$GLB,, | Performed by: NEUROLOGICAL SURGERY

## 2019-02-27 PROCEDURE — 72100 X-RAY EXAM L-S SPINE 2/3 VWS: CPT | Mod: TC,HCNC

## 2019-02-27 PROCEDURE — 99999 PR PBB SHADOW E&M-EST. PATIENT-LVL III: CPT | Mod: PBBFAC,HCNC,, | Performed by: NEUROLOGICAL SURGERY

## 2019-02-27 PROCEDURE — 99999 PR PBB SHADOW E&M-EST. PATIENT-LVL III: ICD-10-PCS | Mod: PBBFAC,HCNC,, | Performed by: NEUROLOGICAL SURGERY

## 2019-02-27 PROCEDURE — 72100 X-RAY EXAM L-S SPINE 2/3 VWS: CPT | Mod: 26,HCNC,, | Performed by: RADIOLOGY

## 2019-02-27 RX ORDER — TRIAMCINOLONE ACETONIDE 1 MG/ML
LOTION TOPICAL
Status: ON HOLD | COMMUNITY
Start: 2019-02-18 | End: 2021-01-01 | Stop reason: HOSPADM

## 2019-02-27 NOTE — PROGRESS NOTES
Subjective:   I, Ny Thibodeaux, attest that this documentation has been prepared under the direction and in the presence of Josias Ling MD.     Patient ID: Moe Ren is a 68 y.o. male     Chief Complaint: Follow-up      HPI  Mr. Moe Ren is a pleasant 68 y.o. gentleman with lumbar spondylosis and spondylolisthesis who is s/p MIS left-sided TLIF at L4-5 and L5-S1 on 01/10/2019 and presents today for his postoperative follow up. The pt has been under my care for back pain that is worse on the right than the right. He had progressively worsening of his back pain and significant limitations of his physical activities because of his back pain and weakness in the RLE. He had a work up with an MRI of his lumbar spine which revealed he had a grade I spondylolisthesis at L4-L5 and spondylosis with facet arthritis at L4-L5 and L5-S1 which were indications for surgical intervention.     Pt is pleased with his postsurgical state. He reports the preoperative pain in his RLE has resolved. He is able to walk 0.5 miles before he becomes very tired and is not very active otherwise. He states he woke up with calf pain several days ago which resolved with compression socks. He has no additional complaints at this time.    Review of Systems   Constitutional: Negative for activity change, appetite change, fatigue, fever and unexpected weight change.   HENT: Negative for facial swelling.    Eyes: Negative.    Respiratory: Negative.    Cardiovascular: Negative.    Gastrointestinal: Negative for diarrhea, nausea and vomiting.   Endocrine: Negative.    Genitourinary: Negative.    Musculoskeletal: Negative for back pain, joint swelling, myalgias and neck pain.   Neurological: Negative for dizziness, weakness, numbness and headaches.   Psychiatric/Behavioral: Negative.       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 He appears well-nourished.   HENT:   Head: Normocephalic and atraumatic.   Neck: Neck supple.   Neurological: He is alert and oriented to person, place, and time. No cranial nerve deficit. He displays a negative Romberg sign. GCS eye subscore is 4. GCS verbal subscore is 5. GCS motor subscore is 6.        IMAGING:  X-Ray Lumbar Spine AP And Lateral (02/27/2019) shows postoperative changes of MIS left-sided TLIF at L4-5 and L5-S1 with proper hardware placement and alignment.    I have personally reviewed the images with the pt.      I, Dr. Josias Ling, personally performed the services described in this documentation. All medical record entries made by the scribeNy, were at my direction and in my presence.  I have reviewed the chart and agree that the record reflects my personal performance and is accurate and complete. Josias Ling MD. 02/27/2019    Assessment:       Lumbar spondylosis.  Herniated lumbar disc.     Plan:   I have personally reviewed the X-ray of lumbar spine  with the pt which shows postoperative changes of MIS left-sided TLIF at L4-5 and L5-S1 with proper hardware placement and alignment.    I will schedule the patient for 6 month follow up with CT of lumbar spine.

## 2019-02-27 NOTE — PATIENT INSTRUCTIONS
I have personally reviewed the X-ray of lumbar spine  with the pt which shows postoperative changes of MIS left-sided TLIF at L4-5 and L5-S1 with proper hardware placement and alignment.    I will schedule the patient for 6 month follow up with CT of lumbar spine.

## 2019-03-07 ENCOUNTER — OFFICE VISIT (OUTPATIENT)
Dept: CARDIOLOGY | Facility: CLINIC | Age: 69
End: 2019-03-07
Payer: MEDICARE

## 2019-03-07 VITALS
HEIGHT: 72 IN | HEART RATE: 64 BPM | WEIGHT: 171.94 LBS | BODY MASS INDEX: 23.29 KG/M2 | DIASTOLIC BLOOD PRESSURE: 66 MMHG | SYSTOLIC BLOOD PRESSURE: 117 MMHG

## 2019-03-07 DIAGNOSIS — E78.1 HYPERTRIGLYCERIDEMIA: Chronic | ICD-10-CM

## 2019-03-07 DIAGNOSIS — R55 SYNCOPE AND COLLAPSE: Primary | ICD-10-CM

## 2019-03-07 DIAGNOSIS — E11.42 DM TYPE 2 WITH DIABETIC PERIPHERAL NEUROPATHY: Chronic | ICD-10-CM

## 2019-03-07 DIAGNOSIS — Z72.0 CONTINUOUS TOBACCO ABUSE: ICD-10-CM

## 2019-03-07 PROCEDURE — 3045F PR MOST RECENT HEMOGLOBIN A1C LEVEL 7.0-9.0%: ICD-10-PCS | Mod: HCNC,CPTII,S$GLB, | Performed by: INTERNAL MEDICINE

## 2019-03-07 PROCEDURE — 1101F PR PT FALLS ASSESS DOC 0-1 FALLS W/OUT INJ PAST YR: ICD-10-PCS | Mod: HCNC,CPTII,S$GLB, | Performed by: INTERNAL MEDICINE

## 2019-03-07 PROCEDURE — 3045F PR MOST RECENT HEMOGLOBIN A1C LEVEL 7.0-9.0%: CPT | Mod: HCNC,CPTII,S$GLB, | Performed by: INTERNAL MEDICINE

## 2019-03-07 PROCEDURE — 99214 PR OFFICE/OUTPT VISIT, EST, LEVL IV, 30-39 MIN: ICD-10-PCS | Mod: HCNC,S$GLB,, | Performed by: INTERNAL MEDICINE

## 2019-03-07 PROCEDURE — 99999 PR PBB SHADOW E&M-EST. PATIENT-LVL IV: CPT | Mod: PBBFAC,HCNC,, | Performed by: INTERNAL MEDICINE

## 2019-03-07 PROCEDURE — 99999 PR PBB SHADOW E&M-EST. PATIENT-LVL IV: ICD-10-PCS | Mod: PBBFAC,HCNC,, | Performed by: INTERNAL MEDICINE

## 2019-03-07 PROCEDURE — 99214 OFFICE O/P EST MOD 30 MIN: CPT | Mod: HCNC,S$GLB,, | Performed by: INTERNAL MEDICINE

## 2019-03-07 PROCEDURE — 1101F PT FALLS ASSESS-DOCD LE1/YR: CPT | Mod: HCNC,CPTII,S$GLB, | Performed by: INTERNAL MEDICINE

## 2019-03-07 NOTE — PROGRESS NOTES
Subjective:    Patient ID:  Moe Ren is a 68 y.o. male who presents for follow-up of Chest Pain      HPI     Referred by Dr Yenny Rosa is a 67 y.o. male, last appointment with this clinic was 10/3/2018.     Last Thursday, he went out to eat with his wife.  Had 3 alcoholic drinks which is an improvement for him over previous.  He typically drinks and Ensure and he was at home, in the kitchen, drinking the Ensure, tilted his head back to drink it, and then the next thing he knew he was waking up on the floor.  The he had apparently fallen onto his right side, hit the right hip and hit his right elbow which jammed into the right chest wall with resultant rib pain.     Does not recall any prodrome preceding this, no aura, no lightheadedness, no sensation of palpitations or presyncope.  Has never had anything like this before.  This was unwitnessed.  By the time his wife came into the room he was getting himself up off of the floor.     No further episodes since then.  I previously evaluated him for irregular heartbeat, EKG showing PACs.  No sensation of palpitations.     He has had some bruising on his right hip and bruising on the right elbow.  No bruising on the ribs.  It hurts to take a deep breath and coughing is extremely painful.     He has been having sinus congestion for the past 6 months by his estimate, with discomfort and pressure which never seems to fully resolve.     He reports that the pharmacy was out of hydroxyzine.  I had gotten notice on another patient that 25 mg are in short supply and I will give him a prescription for 50 mg and instructions to take half of a pill.     EKG 10/8/18 NSR with frequent PVCs     Echo 11/14/18   · Low normal left ventricular systolic function. The estimated ejection fraction is 50%  · No wall motion abnormalities.  · Mild mitral regurgitation.     Stress test 11/14/18  · Abnormal Usha MPI  · There is a small amount of mild ischemia in the apex wall(s).  · Rest  Ejection Fraction is 66 %.  · LV cavity size at rest is normal.  · Resting wall motion is physiologic.     Carotid US 11/14/18  · Mild bilateral internal carotid artery plaque with no hemodynamically significant stenosis      Holter 11/14/18  Abnormal Holter  Frequent PVCs and PACs  Diary not returned   There were very frequent PVCs totalling 15873 and averaging 637.82 per hour. There were 8 couplets. There were 484 bigeminal cycles. There were 4 triplets.  There were very frequent PACs totalling 2495 and averaging 104.05 per hour.      11/26/18 Stopped smoking/drinking and feels much better  Denies dizziness or syncope   I discussed his abnormal stress and holter findings  Given his lack of symptoms he would like to try medical Rx before considering a [facility]  Will add toprol XL 25 qd for frequent PVCs    Denies CP, SOB, or syncope  Had back surgery and did well from a cardiac standpoint          Review of Systems   Constitution: Negative for decreased appetite.   HENT: Negative for ear discharge.    Eyes: Negative for blurred vision.   Endocrine: Negative for polyphagia.   Skin: Negative for nail changes.   Genitourinary: Negative for bladder incontinence.   Neurological: Negative for aphonia.   Psychiatric/Behavioral: Negative for hallucinations.   Allergic/Immunologic: Negative for hives.        Objective:    Physical Exam   Constitutional: He is oriented to person, place, and time. He appears well-developed and well-nourished.   HENT:   Head: Normocephalic and atraumatic.   Eyes: Conjunctivae are normal. Pupils are equal, round, and reactive to light.   Neck: Normal range of motion. Neck supple.   Cardiovascular: Normal rate, normal heart sounds and intact distal pulses.   Pulmonary/Chest: Effort normal and breath sounds normal.   Abdominal: Soft. Bowel sounds are normal.   Musculoskeletal: Normal range of motion.   Neurological: He is alert and oriented to person, place, and time.   Skin: Skin is warm and dry.          Assessment:       1. Syncope and collapse    2. Continuous tobacco abuse    3. Hypertriglyceridemia    4. DM type 2 with diabetic peripheral neuropathy         Plan:       Wishes to continue medical Rx for possible CAD  OV 6 months

## 2019-03-25 ENCOUNTER — PATIENT MESSAGE (OUTPATIENT)
Dept: FAMILY MEDICINE | Facility: CLINIC | Age: 69
End: 2019-03-25

## 2019-03-25 DIAGNOSIS — F41.9 ANXIETY DISORDER, UNSPECIFIED TYPE: ICD-10-CM

## 2019-03-26 ENCOUNTER — TELEPHONE (OUTPATIENT)
Dept: FAMILY MEDICINE | Facility: CLINIC | Age: 69
End: 2019-03-26

## 2019-03-26 RX ORDER — HYDROCODONE BITARTRATE AND ACETAMINOPHEN 10; 325 MG/1; MG/1
1 TABLET ORAL EVERY 6 HOURS PRN
Qty: 60 TABLET | Refills: 0 | Status: SHIPPED | OUTPATIENT
Start: 2019-03-26 | End: 2019-04-08

## 2019-03-26 RX ORDER — ALPRAZOLAM 0.5 MG/1
0.5 TABLET ORAL DAILY PRN
Qty: 30 TABLET | Refills: 0 | Status: SHIPPED | OUTPATIENT
Start: 2019-03-26 | End: 2019-04-08

## 2019-03-26 NOTE — TELEPHONE ENCOUNTER
Spoke with Tracy and he needed diagnosis code for use of pain medication Norco and what will be his duration on the medication for corporate information.  Was given diagnosis code M48.062 for Spinal Stenosis for Lumbar region with Neurogenic claudication . Informed him that patient is to follow up with Neurosurgeon in 6 month with repeat CT and has follow up appointment with provider next month. Is unable to give him future treatment plan for patient at this time.

## 2019-03-26 NOTE — TELEPHONE ENCOUNTER
----- Message from Carmen Locke sent at 3/26/2019  9:05 AM CDT -----  Contact: [facility]   Pharmacy says they need documentation for pain management and drug interactions and what the patient is being treated for. Asking to please call then at [phone].    ALPRAZolam (XANAX) 0.5 MG tablet  HYDROcodone-acetaminophen (NORCO)  mg per tablet       [address] [phone] (Phone)  [phone] (Fax)

## 2019-03-28 ENCOUNTER — PATIENT MESSAGE (OUTPATIENT)
Dept: ENDOCRINOLOGY | Facility: CLINIC | Age: 69
End: 2019-03-28

## 2019-04-05 ENCOUNTER — TELEPHONE (OUTPATIENT)
Dept: NEUROSURGERY | Facility: CLINIC | Age: 69
End: 2019-04-05

## 2019-04-05 ENCOUNTER — PATIENT MESSAGE (OUTPATIENT)
Dept: NEUROSURGERY | Facility: CLINIC | Age: 69
End: 2019-04-05

## 2019-04-08 ENCOUNTER — OFFICE VISIT (OUTPATIENT)
Dept: FAMILY MEDICINE | Facility: CLINIC | Age: 69
End: 2019-04-08
Payer: MEDICARE

## 2019-04-08 VITALS
BODY MASS INDEX: 23.41 KG/M2 | WEIGHT: 172.81 LBS | OXYGEN SATURATION: 98 % | SYSTOLIC BLOOD PRESSURE: 122 MMHG | TEMPERATURE: 98 F | HEIGHT: 72 IN | DIASTOLIC BLOOD PRESSURE: 60 MMHG | HEART RATE: 80 BPM

## 2019-04-08 DIAGNOSIS — K13.0 LIP LESION: Primary | ICD-10-CM

## 2019-04-08 DIAGNOSIS — K86.0 ALCOHOL-INDUCED CHRONIC PANCREATITIS: ICD-10-CM

## 2019-04-08 DIAGNOSIS — I65.23 BILATERAL CAROTID ARTERY STENOSIS: ICD-10-CM

## 2019-04-08 DIAGNOSIS — Z23 NEED FOR SHINGLES VACCINE: ICD-10-CM

## 2019-04-08 DIAGNOSIS — F11.90 CHRONIC NARCOTIC USE: ICD-10-CM

## 2019-04-08 DIAGNOSIS — M47.816 FACET ARTHRITIS OF LUMBAR REGION: ICD-10-CM

## 2019-04-08 DIAGNOSIS — I70.0 ABDOMINAL AORTIC ATHEROSCLEROSIS: ICD-10-CM

## 2019-04-08 DIAGNOSIS — F41.9 ANXIETY DISORDER, UNSPECIFIED TYPE: ICD-10-CM

## 2019-04-08 DIAGNOSIS — I25.10 CORONARY ARTERY DISEASE INVOLVING NATIVE CORONARY ARTERY OF NATIVE HEART WITHOUT ANGINA PECTORIS: ICD-10-CM

## 2019-04-08 PROBLEM — Z12.11 SPECIAL SCREENING FOR MALIGNANT NEOPLASMS, COLON: Status: RESOLVED | Noted: 2017-04-11 | Resolved: 2019-04-08

## 2019-04-08 PROBLEM — Z12.11 COLON CANCER SCREENING: Status: RESOLVED | Noted: 2018-06-18 | Resolved: 2019-04-08

## 2019-04-08 PROBLEM — S22.42XA CLOSED FRACTURE OF MULTIPLE RIBS OF LEFT SIDE: Status: RESOLVED | Noted: 2018-01-01 | Resolved: 2019-04-08

## 2019-04-08 PROCEDURE — 99214 PR OFFICE/OUTPT VISIT, EST, LEVL IV, 30-39 MIN: ICD-10-PCS | Mod: HCNC,S$GLB,, | Performed by: INTERNAL MEDICINE

## 2019-04-08 PROCEDURE — 99214 OFFICE O/P EST MOD 30 MIN: CPT | Mod: HCNC,S$GLB,, | Performed by: INTERNAL MEDICINE

## 2019-04-08 PROCEDURE — 99999 PR PBB SHADOW E&M-EST. PATIENT-LVL III: ICD-10-PCS | Mod: PBBFAC,HCNC,, | Performed by: INTERNAL MEDICINE

## 2019-04-08 PROCEDURE — 99999 PR PBB SHADOW E&M-EST. PATIENT-LVL III: CPT | Mod: PBBFAC,HCNC,, | Performed by: INTERNAL MEDICINE

## 2019-04-08 PROCEDURE — 1101F PR PT FALLS ASSESS DOC 0-1 FALLS W/OUT INJ PAST YR: ICD-10-PCS | Mod: HCNC,CPTII,S$GLB, | Performed by: INTERNAL MEDICINE

## 2019-04-08 PROCEDURE — 1101F PT FALLS ASSESS-DOCD LE1/YR: CPT | Mod: HCNC,CPTII,S$GLB, | Performed by: INTERNAL MEDICINE

## 2019-04-08 RX ORDER — HYDROCODONE BITARTRATE AND ACETAMINOPHEN 10; 325 MG/1; MG/1
1 TABLET ORAL
Qty: 30 TABLET | Refills: 0 | Status: SHIPPED | OUTPATIENT
Start: 2019-04-08 | End: 2019-06-10

## 2019-04-08 RX ORDER — VALACYCLOVIR HYDROCHLORIDE 1 G/1
1000 TABLET, FILM COATED ORAL EVERY 12 HOURS
Qty: 14 TABLET | Refills: 0 | Status: SHIPPED | OUTPATIENT
Start: 2019-04-08 | End: 2020-09-02

## 2019-04-08 RX ORDER — ALPRAZOLAM 0.25 MG/1
0.25 TABLET ORAL DAILY PRN
Qty: 30 TABLET | Refills: 0 | Status: SHIPPED | OUTPATIENT
Start: 2019-04-08 | End: 2019-06-10 | Stop reason: SDUPTHER

## 2019-04-08 NOTE — PATIENT INSTRUCTIONS
GET THE CBT-I TERENCE FOR INSOMNIA.    The [facility] requires the patients the call and make their own appointments.  Please see the below phone numbers:    [facility] [place]: [phone]    [facility] [place]:  [facility] - [phone]

## 2019-04-08 NOTE — PROGRESS NOTES
This note was created by combination of typed  and Dragon dictation.  Transcription errors may be present.  If there are any questions, please contact me.    Assessment & Plan:   Lip lesion  -nonhealing for 3 weeks.  Trial of Valtrex.  If no improvement-ENT versus Dermatology.  I think it may be fasting for him to see Dermatology.  Dr. Wharton.  He was seeing Dr. De Anda who is retiring.  -     valACYclovir (VALTREX) 1000 MG tablet; Take 1 tablet (1,000 mg total) by mouth every 12 (twelve) hours. for 7 doses  Dispense: 14 tablet; Refill: 0    Need for shingles vaccine  -prescription should the pharmacy.  -     varicella-zoster gE-AS01B, PF, (SHINGRIX, PF,) 50 mcg/0.5 mL injection; Inject 0.5 mLs into the muscle once. Repeat in 2 months for 1 dose  Dispense: 0.5 mL; Refill: 1    Alcohol-induced chronic pancreatitis  Anxiety disorder, unspecified type  Chronic narcotic use  Facet arthritis of lumbar region  -status post lumbar fusion in January.  Is on a slow course of recovery managed by Neurosurgery.  Alcohol use, history of remission, history of rehab, with relapses.    He and I had a very long discussion.  We talked about the high risk nature of these medications individually as well as in combination and in addition to his alcohol.  We have been slowly weaning him down with the pause for his surgery.  He is not interested in rehabilitation for alcohol use.  [facility]  We talked about therapy and counseling and I have given him the contact information for our counselor.  For insomnia, I have recommended he get the CBT-I alessia.  I am going to wean down his alprazolam.  He gets 0.5 mg number 30.  He is not due for refill but when he is, I will reduce it to 0.25 mg number 30.  And following that, 15.  Would give him 15.  For 2 months and then stop.  Similarly, refilled his narcotic for number 30 for next refill.  And then 15.  After that.  Would give  him 15 tablets a month for 2 months and then stop.  -     HYDROcodone-acetaminophen (NORCO)  mg per tablet; Take 1 tablet by mouth every 24 hours as needed for Pain. Reduced #; continued wean  Dispense: 30 tablet; Refill: 0  -     ALPRAZolam (XANAX) 0.25 MG tablet; Take 1 tablet (0.25 mg total) by mouth daily as needed for Anxiety. Reduced dose  Dispense: 30 tablet; Refill: 0    Abdominal aortic atherosclerosis  CAD  Carotid stenosis bilaterally  -on statin.    Depression-feels like his current regimen Lexapro 20 is stable.  Again we talked about seeing therapy/counseling versus Psychiatry.    Hyponatremia-could be due to alcohol use.  Chest x-ray was done in January, negative.    Medications Discontinued During This Encounter   Medication Reason    ALPRAZolam (XANAX) 0.5 MG tablet     HYDROcodone-acetaminophen (NORCO)  mg per tablet        meds sent this encounter:  Medications Ordered This Encounter   Medications    ALPRAZolam (XANAX) 0.25 MG tablet     Sig: Take 1 tablet (0.25 mg total) by mouth daily as needed for Anxiety. Reduced dose     Dispense:  30 tablet     Refill:  0    HYDROcodone-acetaminophen (NORCO)  mg per tablet     Sig: Take 1 tablet by mouth every 24 hours as needed for Pain. Reduced #; continued wean     Dispense:  30 tablet     Refill:  0    valACYclovir (VALTREX) 1000 MG tablet     Sig: Take 1 tablet (1,000 mg total) by mouth every 12 (twelve) hours. for 7 doses     Dispense:  14 tablet     Refill:  0    varicella-zoster gE-AS01B, PF, (SHINGRIX, PF,) 50 mcg/0.5 mL injection     Sig: Inject 0.5 mLs into the muscle once. Repeat in 2 months for 1 dose     Dispense:  0.5 mL     Refill:  1       Follow Up: No follow-ups on file.  Follow-up 3 months    Subjective:     Chief Complaint   Patient presents with    Pain And Symptom Management       KRISTIN Rosa is a 68 y.o. male, last appointment with this clinic was Visit date not found.    Diabetes nurse practitioner changed his  "insulin, lowered his insulin with lunch.    He saw cardiology in early March in follow-up for syncope.  No changes.  CAD with abn stress test 11/2018, asymptomatic, med mgmt unless sx show.    11/2018 holter negative  11/14/2018 nuclear medicine stress test abnormal, small amount of mild ischemia in the apical walls.  EF 66%.  11/14/2018 TTE LVEF 50%.  Normal wall thickness.  Normal diastolic function.  No wall motion abnormalities.  Carotid US 11/14/18 Mild bilateral internal carotid artery plaque with no hemodynamically significant stenosis    Hospitalized in [facility] for 6 days. EGD with duodenal ulcers.  Transfused 3 units.     Had stopped smoking and drinking for a period of time and then restarted, stopped for surgery and and then restarted again.  Re: etoh and smoking - hx of rehab didn't find it helpful.     1/11/19 L spine fusion L4/5 L5S1, left, minimally invasive.  The surgery improved the severe pain in the back.  But with restrictions and feels like overall he is losing overall strength. Wearing back brace.  Restarted NSAIDS.   Has been taking 1/2 tablet of pain medication.      3/26 percocet #60  3/26 xanax #30    Ulceration on the right lower lip, uncomfortable and burns.  3 weeks duration[place] Never cold sore before.  Was seeing Dr. De Anda but he retired.  He sees Naveed Wharton.     Needs the shingrix. I had sent to [facility] but he no longer goes there.  Will send to [facility].     Hydroxyzine - taking it 1/2 tablet of 50 mg every 6 hours. It's "different".  Denies drowsiness or sedation.     We talked at length about his chronic narcotic and chronic benzodiazepine use.  I believe I have discussed with him before my concerns given his alcohol use in the past and high risk nature of these medicines individually as well as in combination.  I do recommend that we work to eliminate these medications given his risk factors.    He denies suicidal or homicidal ideations.  Feels like " the Lexapro is doing okay.    Patient Care Team:  Damian Ramon MD as PCP - General (Internal Medicine)  Kandice Bonilla RD (Inactive) as Dietitian (Diabetes)  Josias Ling MD as Consulting Physician (Neurosurgery)  Aime De Anda MD as Consulting Physician (Otolaryngology)    Patient Active Problem List    Diagnosis Date Noted    Bilateral carotid artery stenosis mild on US 11/2018 04/08/2019     Carotid US 11/14/18 Mild bilateral internal carotid artery plaque with no hemodynamically significant stenosis      Lumbar stenosis 1/11/19 FUSION, SPINE, LUMBAR, TLIF, MINIMALLY INVASIVE @L4/5 & L5/S1 (Left) 01/10/2019     1/11/19 FUSION, SPINE, LUMBAR, TLIF, MINIMALLY INVASIVE @L4/5 & L5/S1 (Left)          Hyponatremia 01/07/2019    Asymptomatic varicose veins of left lower extremity 01/07/2019    Coronary artery disease involving native coronary artery of native heart without angina pectoris with mildly abn stress test 11/2018; med mgmt 01/06/2019 11/14/2018 nuclear medicine stress test abnormal, small amount of mild ischemia in the apical walls.  EF 66%.      NSAID-induced duodenal ulcer while traveling. EGD done in [place], [facility] 10/2018 with duodenal ulcers 11/09/2018    Syncope and collapse 11/05/2018    Schatzki's ring of distal esophagus 6/2018 s/p dilitation 06/19/2018    Hiatal hernia on EGD 6/2018; biopsies chronic gastritis, H pylori negative. 06/19/2018    Calculus of gallbladder without cholecystitis without obstruction on RUQ US 5/2018 05/21/2018    Chronic seasonal allergic rhinitis 03/01/2018    Alcohol-induced chronic pancreatitis 01/01/2018    Abdominal aortic atherosclerosis 11/14/2017     On CXR 4/5/2016 and on 11/2/2012 XR L spine  On AAA screening 3/2018      Chronic obstructive bronchitis with pulmonary emphysema 11/14/2017 12/2017 PFTs showed mild airflow obstruction.  Lung volume showing air trapping.  DLCO reduced. no O2 desat.  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 09/11/2017    Chronic narcotic use 08/02/2017    Deviated nasal septum 04/19/2016    Chronic back pain greater than 3 months duration 01/06/2016    Small fiber neuropathy 07/29/2015     Likely related to alcohol use      Facet arthritis of lumbar region 10/08/2014    Spondylosis without myelopathy 08/13/2014    Degeneration of lumbar or lumbosacral intervertebral disc 08/13/2014    Tubular adenoma of colon 4/2017 04/22/2014 4/11/2017 colonoscopy with transverse tubular adenoma      DM type 2 with diabetic peripheral neuropathy 07/24/2013    Hypertriglyceridemia 06/13/2013    Anxiety disorder 06/12/2013     Trazodone ineffective.      Alcohol use disorder, mild, in controlled environment 06/12/2013    Continuous tobacco abuse 06/12/2013       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    Cataract     Diabetes mellitus     Gastric ulcer     GERD (gastroesophageal reflux disease)     Pancreatitis 2008    2 Times       PAST SURGICAL HISTORY:  Past Surgical History:   Procedure Laterality Date     thumb surgery      Dead Skin cell tumor Rt thumb    CAUTERIZATION OF TURBINATES N/A 4/19/2016    [facility]    COLONOSCOPY N/A 4/11/2017    Performed by Meet Quesada MD at [facility] ENDO    ANDREA-TRANSFORAMINAL Right 11/26/2014    [facility]    ANDREA-TRANSFORAMINAL Right 11/5/2014    [facility]    ESOPHAGOGASTRODUODENOSCOPY (EGD) N/A 6/18/2018    Performed by Marco Antonio Gonsalves MD at [facility] ENDO    ESOPHAGOGASTRODUODENOSCOPY (EGD) N/A 5/30/2014    Performed by Meet Quesada MD at [facility] ENDO    FUSION, SPINE, LUMBAR, TLIF, MINIMALLY INVASIVE @L4/5 & L5/S1 Left 1/10/2019     [facility]    MOUTH FLOOR MASS EXCISION  2009    NASAL SEPTUM SURGERY      RECONSTRUCTION-NASAL N/A 4/19/2016    [facility]    SKIN SURGERY         SOCIAL HISTORY:  Social History     Socioeconomic History    Marital status:      Spouse name: Not on file    Number of children: Not on file    Years of education: Not on file    Highest education level: Not on file   Occupational History    Occupation: , self employed   Social Needs    Financial resource strain: Not hard at all    Food insecurity:     Worry: Never true     Inability: Never true    Transportation needs:     Medical: No     Non-medical: No   Tobacco Use    Smoking status: Current Every Day Smoker     Packs/day: 0.25     Years: 45.00     Pack years: 11.25     Types: Cigarettes    Smokeless tobacco: Never Used   Substance and Sexual Activity    Alcohol use: Yes     Alcohol/week: 0.5 oz     Types: 1 Standard drinks or equivalent per week     Frequency: 4 or more times a week     Drinks per session: 5 or 6     Binge frequency: Weekly     Comment: 4 drinks a week    Drug use: No    Sexual activity: Yes     Partners: Female   Lifestyle    Physical activity:     Days per week: 0 days     Minutes per session: 0 min    Stress: To some extent   Relationships    Social connections:     Talks on phone: Three times a week     Gets together: Three times a week     Attends [employer] service: Not on file     Active member of club or organization: Yes     Attends meetings of clubs or organizations: More than 4 times per year     Relationship status:    Other Topics Concern    Not on file   Social History Narrative    , 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  One child committed suicide.       ALLERGIES AND MEDICATIONS: updated and reviewed.  Review of patient's allergies indicates:   Allergen  Reactions    Amoxicillin Nausea And Vomiting    Adhesive Rash     Can only use paper tape   Has problem with Band aid    Metformin Diarrhea     Appetite loss      Current Outpatient Medications   Medication Sig Dispense Refill    ALPRAZolam (XANAX) 0.5 MG tablet Take 1 tablet (0.5 mg total) by mouth daily as needed for Anxiety. 30 tablet 0    atorvastatin (LIPITOR) 10 MG tablet Take 1 tablet (10 mg total) by mouth once daily. 90 tablet 2    blood sugar diagnostic (ACCU-CHEK KM PLUS TEST STRP) Strp 1 strip by Misc.(Non-Drug; Combo Route) route 6 (six) times daily. To be used with Accu-Chek Km Plus glucometer 600 strip 3    blood-glucose meter kit Use as instructed, Accu-Chek Km Plus glucometer 1 each 0    desoximetasone (TOPICORT) 0.25 % cream Apply topically 2 (two) times daily as needed.       escitalopram oxalate (LEXAPRO) 20 MG tablet Take 0.5 tablets (10 mg total) by mouth once daily. 15 tablet 5    fluticasone (FLONASE) 50 mcg/actuation nasal spray 2 sprays (100 mcg total) by Each Nare route daily as needed. 16 g 2    HYDROcodone-acetaminophen (NORCO)  mg per tablet Take 1 tablet by mouth every 6 (six) hours as needed for Pain. 60 tablet 0    hydrOXYzine (ATARAX) 50 MG tablet 1/2 tablet every 4-6 hours as needed for anxiety 30 tablet 3    insulin aspart U-100 (NOVOLOG FLEXPEN U-100 INSULIN) 100 unit/mL InPn pen Sliding scale up to 20 units TID. MAX 50 UNITS/DAY. 45 mL 5    ipratropium (ATROVENT HFA) 17 mcg/actuation inhaler Inhale 2 puffs into the lungs every 6 (six) hours. Rescue 12.9 g 5    lancets (ACCU-CHEK FASTCLIX) Misc 1 lancet by Misc.(Non-Drug; Combo Route) route 6 (six) times daily. To be used with Accu-Chek Km Plus glucometer 600 each 3    lisinopril (PRINIVIL,ZESTRIL) 5 MG tablet Take 1 tablet (5 mg total) by mouth once daily. 90 tablet 2    metoprolol succinate (TOPROL-XL) 25 MG 24 hr tablet Take 1 tablet (25 mg total) by mouth once daily. (Patient taking  "differently: Take 25 mg by mouth every morning. ) 30 tablet 11    montelukast (SINGULAIR) 10 mg tablet Take 1 tablet (10 mg total) by mouth every evening. 30 tablet 5    multivitamin (THERAGRAN) per tablet Take 2 tablets by mouth once daily.      omeprazole (PRILOSEC) 40 MG capsule Take 1 capsule (40 mg total) by mouth 2 (two) times daily before meals. 60 capsule 3    pen needle, diabetic (BD ULTRA-FINE RENETTA PEN NEEDLE) 32 gauge x 5/32" Ndle Use 3x/day with Novolog 300 each 3    tamsulosin (FLOMAX) 0.4 mg Cap Take 1 capsule (0.4 mg total) by mouth once daily. 30 capsule 3    triamcinolone acetonide 0.1% (KENALOG) 0.1 % Lotn        No current facility-administered medications for this visit.        Review of Systems   HENT: Positive for hearing loss.    Eyes: Negative for discharge.   Respiratory: Negative for wheezing.    Cardiovascular: Negative for chest pain and palpitations.   Gastrointestinal: Negative for blood in stool, constipation, diarrhea and vomiting.   Genitourinary: Negative for hematuria and urgency.   Musculoskeletal: Negative for neck pain.   Neurological: Positive for headaches. Negative for weakness.   Endo/Heme/Allergies: Positive for polydipsia.   All other systems reviewed and are negative.      Objective:   Physical Exam   Vitals:    04/08/19 1450   BP: 122/60   Pulse: 80   Temp: 98 °F (36.7 °C)   SpO2: 98%   Weight: 78.4 kg (172 lb 13.5 oz)   Height: 6' (1.829 m)    Body mass index is 23.44 kg/m².  Weight: 78.4 kg (172 lb 13.5 oz)   Height: 6' (182.9 cm)     Physical Exam   Constitutional: He is oriented to person, place, and time. He appears well-developed and well-nourished. No distress.   HENT:   Mouth/Throat:       On the lower lip to the right of midline, is a small shallow ulceration   Eyes: EOM are normal.   Cardiovascular: Normal rate, regular rhythm and normal heart sounds.   No murmur heard.  Pulmonary/Chest: Effort normal and breath sounds normal.   Neurological: He is alert " and oriented to person, place, and time. Coordination normal.   Skin: Skin is warm and dry.   Psychiatric: He has a normal mood and affect. His behavior is normal. Thought content normal.

## 2019-04-11 DIAGNOSIS — F41.9 ANXIETY DISORDER, UNSPECIFIED TYPE: ICD-10-CM

## 2019-04-11 RX ORDER — ESCITALOPRAM OXALATE 20 MG/1
20 TABLET ORAL DAILY
Qty: 90 TABLET | Refills: 1 | Status: SHIPPED | OUTPATIENT
Start: 2019-04-11 | End: 2019-10-21 | Stop reason: SDUPTHER

## 2019-04-11 RX ORDER — HYDROXYZINE HYDROCHLORIDE 50 MG/1
TABLET, FILM COATED ORAL
Qty: 90 TABLET | Refills: 1 | Status: SHIPPED | OUTPATIENT
Start: 2019-04-11 | End: 2019-11-20 | Stop reason: SDUPTHER

## 2019-04-11 RX ORDER — ESCITALOPRAM OXALATE 20 MG/1
20 TABLET ORAL DAILY
Qty: 90 TABLET | Refills: 1
Start: 2019-04-11 | End: 2019-04-11 | Stop reason: SDUPTHER

## 2019-05-07 RX ORDER — METOPROLOL SUCCINATE 25 MG/1
25 TABLET, EXTENDED RELEASE ORAL DAILY
Qty: 30 TABLET | Refills: 11 | Status: SHIPPED | OUTPATIENT
Start: 2019-05-07 | End: 2019-09-13

## 2019-05-09 ENCOUNTER — OFFICE VISIT (OUTPATIENT)
Dept: PODIATRY | Facility: CLINIC | Age: 69
End: 2019-05-09
Payer: MEDICARE

## 2019-05-09 VITALS — DIASTOLIC BLOOD PRESSURE: 70 MMHG | SYSTOLIC BLOOD PRESSURE: 130 MMHG

## 2019-05-09 DIAGNOSIS — M21.6X2 ACQUIRED BILATERAL PES CAVUS: ICD-10-CM

## 2019-05-09 DIAGNOSIS — M20.42 HAMMER TOES OF BOTH FEET: ICD-10-CM

## 2019-05-09 DIAGNOSIS — M20.5X2 HALLUX LIMITUS, LEFT: ICD-10-CM

## 2019-05-09 DIAGNOSIS — M21.6X1 ACQUIRED BILATERAL PES CAVUS: ICD-10-CM

## 2019-05-09 DIAGNOSIS — E11.9 COMPREHENSIVE DIABETIC FOOT EXAMINATION, TYPE 2 DM, ENCOUNTER FOR: Primary | ICD-10-CM

## 2019-05-09 DIAGNOSIS — M20.41 HAMMER TOES OF BOTH FEET: ICD-10-CM

## 2019-05-09 DIAGNOSIS — R20.2 PARESTHESIA OF BOTH FEET: ICD-10-CM

## 2019-05-09 DIAGNOSIS — E11.49 TYPE II DIABETES MELLITUS WITH NEUROLOGICAL MANIFESTATIONS: ICD-10-CM

## 2019-05-09 PROCEDURE — 3045F PR MOST RECENT HEMOGLOBIN A1C LEVEL 7.0-9.0%: ICD-10-PCS | Mod: HCNC,CPTII,S$GLB, | Performed by: PODIATRIST

## 2019-05-09 PROCEDURE — 99214 OFFICE O/P EST MOD 30 MIN: CPT | Mod: HCNC,S$GLB,, | Performed by: PODIATRIST

## 2019-05-09 PROCEDURE — 99999 PR PBB SHADOW E&M-EST. PATIENT-LVL III: ICD-10-PCS | Mod: PBBFAC,HCNC,, | Performed by: PODIATRIST

## 2019-05-09 PROCEDURE — 99999 PR PBB SHADOW E&M-EST. PATIENT-LVL III: CPT | Mod: PBBFAC,HCNC,, | Performed by: PODIATRIST

## 2019-05-09 PROCEDURE — 1101F PR PT FALLS ASSESS DOC 0-1 FALLS W/OUT INJ PAST YR: ICD-10-PCS | Mod: HCNC,CPTII,S$GLB, | Performed by: PODIATRIST

## 2019-05-09 PROCEDURE — 1101F PT FALLS ASSESS-DOCD LE1/YR: CPT | Mod: HCNC,CPTII,S$GLB, | Performed by: PODIATRIST

## 2019-05-09 PROCEDURE — 3045F PR MOST RECENT HEMOGLOBIN A1C LEVEL 7.0-9.0%: CPT | Mod: HCNC,CPTII,S$GLB, | Performed by: PODIATRIST

## 2019-05-09 PROCEDURE — 99214 PR OFFICE/OUTPT VISIT, EST, LEVL IV, 30-39 MIN: ICD-10-PCS | Mod: HCNC,S$GLB,, | Performed by: PODIATRIST

## 2019-05-09 NOTE — PROGRESS NOTES
Subjective:      Patient ID: Moe Ren is a 68 y.o. male.    Chief Complaint: Diabetic Foot Exam (pcp Dair); Diabetes Mellitus; and Nail Care    Moe is a 68 y.o. male who presents to the clinic for evaluation and treatment of high risk feet. Moe has a past medical history of Abnormal heart rhythm, Arthritis, Basal cell carcinoma (1990s), Cancer, Cataract, Diabetes mellitus, Gastric ulcer, GERD (gastroesophageal reflux disease), and Pancreatitis (2008).  The patient has no major complaints with feet. Chief concern is how to care for feet as a diabetic.   This patient has documented high risk feet requiring routine maintenance secondary to diabetes mellitis and those secondary complications of diabetes, as mentioned..    Patient relates he had back surgery in January 2019 and now has paresthesia to LE, R>L    Chief Complaint   Patient presents with    Diabetic Foot Exam     pcp Dair    Diabetes Mellitus    Nail Care       Current shoe gear:  [employer]ics tennis shoes    Hemoglobin A1C   Date Value Ref Range Status   01/09/2019 7.5 (H) 4.0 - 5.6 % Final     Comment:     ADA Screening Guidelines:  5.7-6.4%  Consistent with prediabetes  >or=6.5%  Consistent with diabetes  High levels of fetal hemoglobin interfere with the HbA1C  assay. Heterozygous hemoglobin variants (HbS, HgC, etc)do  not significantly interfere with this assay.   However, presence of multiple variants may affect accuracy.     01/07/2019 7.2 (H) 4.0 - 5.6 % Final     Comment:     ADA Screening Guidelines:  5.7-6.4%  Consistent with prediabetes  >or=6.5%  Consistent with diabetes  High levels of fetal hemoglobin interfere with the HbA1C  assay. Heterozygous hemoglobin variants (HbS, HgC, etc)do  not significantly interfere with this assay.   However, presence of multiple variants may affect accuracy.     10/03/2018 7.1 (H) 4.0 - 5.6 % Final     Comment:     ADA Screening Guidelines:  5.7-6.4%  Consistent with prediabetes  >or=6.5%  Consistent with  diabetes  High levels of fetal hemoglobin interfere with the HbA1C  assay. Heterozygous hemoglobin variants (HbS, HgC, etc)do  not significantly interfere with this assay.   However, presence of multiple variants may affect accuracy.         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    Syncope and collapse    NSAID-induced duodenal ulcer while traveling. EGD done in [place], [facility] 10/2018 with duodenal ulcers    Coronary artery disease involving native coronary artery of native heart without angina pectoris with mildly abn stress test 11/2018; med mgmt    Hyponatremia    Asymptomatic varicose veins of left lower extremity    Lumbar stenosis 1/11/19 FUSION, SPINE, LUMBAR, TLIF, MINIMALLY INVASIVE @L4/5 & L5/S1 (Left)    Bilateral carotid artery stenosis mild on US 11/2018     Current Outpatient Medications on File Prior to Visit   Medication Sig Dispense Refill    ALPRAZolam (XANAX) 0.25 MG tablet Take 1 tablet (0.25 mg total) by mouth daily as needed for Anxiety. Reduced dose 30 tablet 0  "   atorvastatin (LIPITOR) 10 MG tablet Take 1 tablet (10 mg total) by mouth once daily. 90 tablet 2    blood sugar diagnostic (ACCU-CHEK KM PLUS TEST STRP) Strp 1 strip by Misc.(Non-Drug; Combo Route) route 6 (six) times daily. To be used with Accu-Chek Km Plus glucometer 600 strip 3    blood-glucose meter kit Use as instructed, Accu-Chek Km Plus glucometer 1 each 0    desoximetasone (TOPICORT) 0.25 % cream Apply topically 2 (two) times daily as needed.       escitalopram oxalate (LEXAPRO) 20 MG tablet Take 1 tablet (20 mg total) by mouth once daily. 90 tablet 1    fluticasone (FLONASE) 50 mcg/actuation nasal spray 2 sprays (100 mcg total) by Each Nare route daily as needed. 16 g 2    HYDROcodone-acetaminophen (NORCO)  mg per tablet Take 1 tablet by mouth every 24 hours as needed for Pain. Reduced #; continued wean 30 tablet 0    hydrOXYzine (ATARAX) 50 MG tablet TAKE 1/2 (ONE-HALF) TABLET BY MOUTH EVERY 4 TO 6 HOURS AS NEEDED FOR ANXIETY 90 tablet 1    insulin aspart U-100 (NOVOLOG FLEXPEN U-100 INSULIN) 100 unit/mL InPn pen Sliding scale up to 20 units TID. MAX 50 UNITS/DAY. 45 mL 5    ipratropium (ATROVENT HFA) 17 mcg/actuation inhaler Inhale 2 puffs into the lungs every 6 (six) hours. Rescue 12.9 g 5    lancets (ACCU-CHEK FASTCLIX) Misc 1 lancet by Misc.(Non-Drug; Combo Route) route 6 (six) times daily. To be used with Accu-Chek Km Plus glucometer 600 each 3    lisinopril (PRINIVIL,ZESTRIL) 5 MG tablet Take 1 tablet (5 mg total) by mouth once daily. 90 tablet 2    metoprolol succinate (TOPROL-XL) 25 MG 24 hr tablet Take 1 tablet (25 mg total) by mouth once daily. 30 tablet 11    multivitamin (THERAGRAN) per tablet Take 2 tablets by mouth once daily.      omeprazole (PRILOSEC) 40 MG capsule Take 1 capsule (40 mg total) by mouth 2 (two) times daily before meals. 60 capsule 3    pen needle, diabetic (BD ULTRA-FINE RENETTA PEN NEEDLE) 32 gauge x 5/32" Ndle Use 3x/day with Novolog 300 " each 3    tamsulosin (FLOMAX) 0.4 mg Cap Take 1 capsule (0.4 mg total) by mouth once daily. 30 capsule 3    triamcinolone acetonide 0.1% (KENALOG) 0.1 % Lotn       valACYclovir (VALTREX) 1000 MG tablet Take 1 tablet (1,000 mg total) by mouth every 12 (twelve) hours. for 7 doses 14 tablet 0     No current facility-administered medications on file prior to visit.        Review of patient's allergies indicates:   Allergen Reactions    Amoxicillin Nausea And Vomiting    Adhesive Rash     Paper tape only     Past Surgical History:   Procedure Laterality Date     thumb surgery      Dead Skin cell tumor Rt thumb    CAUTERIZATION OF TURBINATES N/A 4/19/2016    [facility]    COLONOSCOPY N/A 4/11/2017    Performed by Meet Quesada MD at [facility] ENDO    ANDREA-TRANSFORAMINAL Right 11/26/2014    [facility]    ANDREA-TRANSFORAMINAL Right 11/5/2014    [facility] at [facility]    ESOPHAGOGASTRODUODENOSCOPY (EGD) N/A 6/18/2018    Performed by Marco Antonio Gonsalves [facility] at [facility] ENDO    ESOPHAGOGASTRODUODENOSCOPY (EGD) N/A 5/30/2014    Performed by Meet Quesada [facility] at [facility] ENDO    FUSION, SPINE, LUMBAR, TLIF, MINIMALLY INVASIVE @L4/5 & L5/S1 Left 1/10/2019    [facility] F[facility]    MOUTH FLOOR MASS EXCISION  2009    NASAL SEPTUM SURGERY      RECONSTRUCTION-NASAL N/A 4/19/2016    [facility] OR    SKIN SURGERY       Family History   Problem Relation Age of Onset    Cataracts Mother     Leukemia Mother     Lung cancer Brother     Mental illness Father         suicide    No Known Problems Daughter     No Known Problems Sister      Social History     Socioeconomic History    Marital status:      Spouse name: Not on file    Number of children: Not on file    Years of education: Not on file    Highest education level: Not on file   Occupational History     Occupation: , self employed   Social Needs    Financial resource strain: Not hard at all    Food insecurity:     Worry: Never true     Inability: Never true    Transportation needs:     Medical: No     Non-medical: No   Tobacco Use    Smoking status: Current Every Day Smoker     Packs/day: 0.25     Years: 45.00     Pack years: 11.25     Types: Cigarettes    Smokeless tobacco: Never Used   Substance and Sexual Activity    Alcohol use: Yes     Alcohol/week: 0.5 oz     Types: 1 Standard drinks or equivalent per week     Frequency: 4 or more times a week     Drinks per session: 5 or 6     Binge frequency: Weekly     Comment: 4 drinks a week    Drug use: No    Sexual activity: Yes     Partners: Female   Lifestyle    Physical activity:     Days per week: 0 days     Minutes per session: 0 min    Stress: To some extent   Relationships    Social connections:     Talks on phone: Three times a week     Gets together: Three times a week     Attends Rastafarian service: Not on file     Active member of club or organization: Yes     Attends meetings of clubs or organizations: More than 4 times per year     Relationship status:    Other Topics Concern    Not on file   Social History Narrative    , 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  One child committed suicide.       Review of Systems   Constitution: Negative for chills, diaphoresis, fever, malaise/fatigue and night sweats.   Cardiovascular: Negative for claudication, cyanosis, leg swelling and syncope.   Respiratory: Negative for shortness of breath.    Hematologic/Lymphatic: Does not bruise/bleed easily.   Skin: Negative for color change, dry skin, nail changes, rash, suspicious lesions and unusual hair distribution.   Musculoskeletal: Positive for arthritis, back pain, joint pain and muscle cramps. Negative for falls, joint swelling, muscle weakness and stiffness.   Gastrointestinal:  Negative for constipation, diarrhea, nausea and vomiting.   Neurological: Positive for numbness, paresthesias and sensory change. Negative for brief paralysis, disturbances in coordination, focal weakness and tremors.   Psychiatric/Behavioral: Negative for altered mental status and hallucinations. The patient is not nervous/anxious.           Objective:     Vitals:    05/09/19 1537   BP: 130/70   PainSc:   7   PainLoc: Foot       Physical Exam   Constitutional: He appears well-developed and well-nourished. He is cooperative. No distress.   Cardiovascular:   Pulses:       Dorsalis pedis pulses are 1+ on the right side, and 1+ on the left side.        Posterior tibial pulses are 1+ on the right side, and 1+ on the left side.   Capillary refill 3-5 seconds all toes/distal feet, all toes/both feet warm to touch.      varicosities noted   Musculoskeletal:        Right ankle: Normal. No tenderness. Achilles tendon normal.        Left ankle: Normal. No tenderness. Achilles tendon normal.   Patient has bilateral heel varus. There is a bilateral anterior cavus foot deformity Gait analysis reveals an early heel off with the fore foot bilateral striking longer in midstance. Patient shoes demonstrated medial heel counter wear bilateral.      All ten toes without clubbing, cyanosis, or signs of ischemia.      Patient has hammertoes of digits    2-5 bilateral               partially reducible without symptom today.  Range of motion, stability, muscle strength, and muscle tone normal bilateral feet and legs.    Decreased first MPJ range of motion both weightbearing and nonweightbearing, no crepitus observed the first MP joint, +dorsal flag sign.Mild  bunion deformity is observed .      Lymphadenopathy:   Negative lymphadenopathy bilateral popliteal fossa and tarsal tunnel.   Neurological: He is alert. He has normal strength. He displays no atrophy and no tremor. No sensory deficit. He exhibits normal muscle tone. He displays no  seizure activity. Gait (decreased stride length, early heel of, moderate toe off bilateral) abnormal. Coordination normal.   Reflex Scores:       Patellar reflexes are 1+ on the right side and 1+ on the left side.       Achilles reflexes are 1+ on the right side and 1+ on the left side.  Poynette-Raina 5.07 monofilament is intact bilateral feet. Sharp/dull sensation is also intact Bilateral feet.    Paresthesias, and hyperesthesia bilateral feet with no clearly identified trigger or source.   Skin: No bruising noted. No cyanosis. Nails show no clubbing.   Skin is normal age and health appropriate color, turgor, texture, and temperature bilateral lower extremities without ulceration, hyperpigmentation, discoloration, masses nodules or cords palpated.  No ecchymosis, erythema, edema, or cardinal signs of infection bilateral lower extremities.       Assessment:       Encounter Diagnoses   Name Primary?    Type II diabetes mellitus with neurological manifestations     Comprehensive diabetic foot examination, type 2 DM, encounter for Yes    Paresthesia of both feet     Hallux limitus, left     Hammer toes of both feet     Acquired bilateral pes cavus          Plan:       Moe was seen today for diabetic foot exam, diabetes mellitus and nail care.    Diagnoses and all orders for this visit:    Comprehensive diabetic foot examination, type 2 DM, encounter for    Type II diabetes mellitus with neurological manifestations  -     DIABETIC SHOES FOR HOME USE    Paresthesia of both feet  -     DIABETIC SHOES FOR HOME USE    Hallux limitus, left  -     DIABETIC SHOES FOR HOME USE    Hammer toes of both feet  -     DIABETIC SHOES FOR HOME USE    Acquired bilateral pes cavus  -     DIABETIC SHOES FOR HOME USE      I counseled the patient on his conditions, their implications and medical management.     Shoe inspection. Diabetic Foot Education. Patient reminded of the importance of good nutrition and blood sugar control to  help prevent podiatric complications of diabetes. Patient instructed on proper foot hygeine. We discussed wearing proper shoe gear, daily foot inspections, never walking without protective shoe gear, never putting sharp instruments to feet.     Neuro Exam in office was otherwise normal however subjective sensation loss and paresthesia likely to sensory neuropathy and low back pathology. Will defer to neurosurgeon    I did  the patient in detail regarding surgical and conservative treatment measures for hallux limitus. I informed the  patient that the majority of pain is secondary to an arthritic joint with decreased joint spaces. Informed patient that outside of surgical intervention the main goal of therapy is to decreased the  range of motion at the first MPJ joint. This can be done so by utilizing either and extremely hard soled nonflexible shoe or a rocker bottom shoe such as a RedLasso shape up     Rx diabetic shoes for protection and support    He will continue to monitor the areas daily, inspect his feet, wear protective shoe gear when ambulatory, moisturizer to maintain skin integrity and follow in this office in approximately 3-6 months, sooner p.r.n. Patient prefers yearly visits

## 2019-05-13 ENCOUNTER — PATIENT MESSAGE (OUTPATIENT)
Dept: NEUROSURGERY | Facility: CLINIC | Age: 69
End: 2019-05-13

## 2019-05-15 ENCOUNTER — TELEPHONE (OUTPATIENT)
Dept: NEUROSURGERY | Facility: CLINIC | Age: 69
End: 2019-05-15

## 2019-05-15 DIAGNOSIS — N40.0 BENIGN PROSTATIC HYPERPLASIA, UNSPECIFIED WHETHER LOWER URINARY TRACT SYMPTOMS PRESENT: Primary | ICD-10-CM

## 2019-05-15 DIAGNOSIS — M51.37 DEGENERATION OF LUMBAR OR LUMBOSACRAL INTERVERTEBRAL DISC: ICD-10-CM

## 2019-05-15 DIAGNOSIS — M48.062 SPINAL STENOSIS OF LUMBAR REGION WITH NEUROGENIC CLAUDICATION: Primary | ICD-10-CM

## 2019-05-15 RX ORDER — TAMSULOSIN HYDROCHLORIDE 0.4 MG/1
0.4 CAPSULE ORAL DAILY
Qty: 90 CAPSULE | Refills: 1 | Status: SHIPPED | OUTPATIENT
Start: 2019-05-15 | End: 2019-05-16 | Stop reason: SDUPTHER

## 2019-05-15 RX ORDER — TAMSULOSIN HYDROCHLORIDE 0.4 MG/1
0.4 CAPSULE ORAL DAILY
Qty: 90 CAPSULE | Refills: 1 | Status: SHIPPED | OUTPATIENT
Start: 2019-05-15 | End: 2019-05-15 | Stop reason: SDUPTHER

## 2019-05-16 RX ORDER — TAMSULOSIN HYDROCHLORIDE 0.4 MG/1
0.4 CAPSULE ORAL DAILY
Qty: 90 CAPSULE | Refills: 1 | Status: SHIPPED | OUTPATIENT
Start: 2019-05-16 | End: 2020-03-04 | Stop reason: SDUPTHER

## 2019-05-27 ENCOUNTER — PATIENT OUTREACH (OUTPATIENT)
Dept: ADMINISTRATIVE | Facility: HOSPITAL | Age: 69
End: 2019-05-27

## 2019-05-27 ENCOUNTER — PATIENT MESSAGE (OUTPATIENT)
Dept: ENDOCRINOLOGY | Facility: CLINIC | Age: 69
End: 2019-05-27

## 2019-05-28 ENCOUNTER — OFFICE VISIT (OUTPATIENT)
Dept: ENDOCRINOLOGY | Facility: CLINIC | Age: 69
End: 2019-05-28
Payer: MEDICARE

## 2019-05-28 ENCOUNTER — LAB VISIT (OUTPATIENT)
Dept: LAB | Facility: HOSPITAL | Age: 69
End: 2019-05-28
Attending: NURSE PRACTITIONER
Payer: MEDICARE

## 2019-05-28 VITALS — WEIGHT: 167.56 LBS | DIASTOLIC BLOOD PRESSURE: 56 MMHG | BODY MASS INDEX: 22.72 KG/M2 | SYSTOLIC BLOOD PRESSURE: 98 MMHG

## 2019-05-28 DIAGNOSIS — E11.649 HYPOGLYCEMIA ASSOCIATED WITH DIABETES: ICD-10-CM

## 2019-05-28 DIAGNOSIS — I25.10 CORONARY ARTERY DISEASE INVOLVING NATIVE CORONARY ARTERY OF NATIVE HEART WITHOUT ANGINA PECTORIS: ICD-10-CM

## 2019-05-28 DIAGNOSIS — F10.10 ETOH ABUSE: ICD-10-CM

## 2019-05-28 DIAGNOSIS — Z72.0 TOBACCO ABUSE: ICD-10-CM

## 2019-05-28 DIAGNOSIS — E78.5 HYPERLIPIDEMIA LDL GOAL <70: ICD-10-CM

## 2019-05-28 DIAGNOSIS — E11.42 DM TYPE 2 WITH DIABETIC PERIPHERAL NEUROPATHY: Primary | ICD-10-CM

## 2019-05-28 DIAGNOSIS — E11.42 DM TYPE 2 WITH DIABETIC PERIPHERAL NEUROPATHY: Chronic | ICD-10-CM

## 2019-05-28 LAB
CHOLEST SERPL-MCNC: 149 MG/DL (ref 120–199)
CHOLEST/HDLC SERPL: 2 {RATIO} (ref 2–5)
HDLC SERPL-MCNC: 75 MG/DL (ref 40–75)
HDLC SERPL: 50.3 % (ref 20–50)
LDLC SERPL CALC-MCNC: 47.2 MG/DL (ref 63–159)
NONHDLC SERPL-MCNC: 74 MG/DL
TRIGL SERPL-MCNC: 134 MG/DL (ref 30–150)

## 2019-05-28 PROCEDURE — 80061 LIPID PANEL: CPT | Mod: HCNC

## 2019-05-28 PROCEDURE — 99999 PR PBB SHADOW E&M-EST. PATIENT-LVL IV: CPT | Mod: PBBFAC,HCNC,, | Performed by: NURSE PRACTITIONER

## 2019-05-28 PROCEDURE — 99999 PR PBB SHADOW E&M-EST. PATIENT-LVL IV: ICD-10-PCS | Mod: PBBFAC,HCNC,, | Performed by: NURSE PRACTITIONER

## 2019-05-28 PROCEDURE — 99214 PR OFFICE/OUTPT VISIT, EST, LEVL IV, 30-39 MIN: ICD-10-PCS | Mod: HCNC,S$GLB,, | Performed by: NURSE PRACTITIONER

## 2019-05-28 PROCEDURE — 1101F PR PT FALLS ASSESS DOC 0-1 FALLS W/OUT INJ PAST YR: ICD-10-PCS | Mod: HCNC,CPTII,S$GLB, | Performed by: NURSE PRACTITIONER

## 2019-05-28 PROCEDURE — 99214 OFFICE O/P EST MOD 30 MIN: CPT | Mod: HCNC,S$GLB,, | Performed by: NURSE PRACTITIONER

## 2019-05-28 PROCEDURE — 1101F PT FALLS ASSESS-DOCD LE1/YR: CPT | Mod: HCNC,CPTII,S$GLB, | Performed by: NURSE PRACTITIONER

## 2019-05-28 PROCEDURE — 36415 COLL VENOUS BLD VENIPUNCTURE: CPT | Mod: HCNC,PN

## 2019-05-28 PROCEDURE — 3045F PR MOST RECENT HEMOGLOBIN A1C LEVEL 7.0-9.0%: ICD-10-PCS | Mod: HCNC,CPTII,S$GLB, | Performed by: NURSE PRACTITIONER

## 2019-05-28 PROCEDURE — 3045F PR MOST RECENT HEMOGLOBIN A1C LEVEL 7.0-9.0%: CPT | Mod: HCNC,CPTII,S$GLB, | Performed by: NURSE PRACTITIONER

## 2019-05-28 PROCEDURE — 83036 HEMOGLOBIN GLYCOSYLATED A1C: CPT | Mod: HCNC

## 2019-05-28 RX ORDER — VALSARTAN 40 MG/1
40 TABLET ORAL DAILY
Qty: 90 TABLET | Refills: 3 | Status: SHIPPED | OUTPATIENT
Start: 2019-05-28 | End: 2020-01-24

## 2019-05-28 NOTE — PROGRESS NOTES
liCC: This 68 y.o. White male  is here for evaluation of  T2DM along with comorbidities indicated in the Visit Diagnosis section of this encounter.    HPI: Moe Ren was diagnosed with T2DM in ~ 2010.  Metformin started at time of diagnosis.     He has a history of squamos cell oral CA, Spinal stenosis, chronic pancreatitis.      Prior visit on 1/31/19  a1c up to 7.5%. Recent glucoses higher after back surgery.   He was advised last week through [facility] to increase ICR at breakfast to 8 and at lunch to 12.   Overall glucoses have improved especially before lunch and dinner (although less so). He continues to have high FBGs. No bedtime snacks.   On Sundays, he gets up early to go to [employer] and finds his BG tends to drop lower on Sundays. Therefore, he usually cuts back his usual Novolog dose by about 2units to avoid hypoglycemia. BG of 57 this past Sunday r/t NOT cutting his usual dose back.   Plan Increase carb ratio before lunch and dinner to 10.   Please take a correction dose of Novolog at bedtime as needed.   Test blood sugar 6x/day. Return to clinic in 5 months with labs prior.     Interval history  Returns today with fairly stable glucose control. Back to smoking however at 1 ppd; has quit 2x in the last few months with current relapse now. Also drinking alcohol again - about 7-8 oz of scotch last night      LAST DIABETES EDUCATION: 6/2016; 2/2018    PRESCRIBED DIABETES MEDICATIONS: Novolog ac; he rounds up on his dosing of Novolog   ICR   0515 - 8   1130 - 12  1600 - 10     ISF 1:40, goal 115     Subtracts 2 units of Novolog from Sunday breakfast and lunch since he wakes up late.     Misses medication doses - No    DM COMPLICATIONS: peripheral neuropathy    SIGNIFICANT DIABETES MED HISTORY:   Tresiba gradually titrated down until it was stopped mid 2017 d/t hypoglycemia.   Novolog stopped at ov 2/5/18 and metformin and tresiba were started. tresiba dc'd about a week later d/t hypo  repaglinide started  at ov 2/28/18, and stopped about a month later d/t low efficacy     Avoid incretin mimetics d/t h/o chronic pancreatitis     SELF MONITORING BLOOD GLUCOSE: Checks blood glucose at home 5x/day. See pt's email from today with uploaded BG logbook.     Not using Dexcom right now because he is wearing a back brace.     HYPOGLYCEMIC EPISODES: symptoms start in the 60s but definitely in the 50s. Corrects with coke.    Overall less frequent, generally occurs after meals presumably d/t overcounting carb intake. Has had about 1-2 episodes per week.     CURRENT DIET: 3 meal/day - Eats at 10 am, 2 pm, and 6 pm. Eats about 60 to 130 carbs per meal.   He supplements with Ensure.     CURRENT EXERCISE: none recent    SOCIAL: retired  - consultant now.  Has a 2 yo grandson      BP (!) 98/56 (BP Location: Left arm, Patient Position: Sitting, BP Method: Large (Manual))   Wt 76 kg (167 lb 8.8 oz)   BMI 22.72 kg/m²       ROS:   CONSTITUTIONAL: Appetite ok, denies fatigue  RESPIRATORY: No shortness of breath  MS: pain to feet       PHYSICAL EXAM:  GENERAL: Well developed, well nourished. No acute distress.   PSYCH: AAOx3, appropriate mood and affect, conversant, well-groomed. Judgement and insight good.   NEURO: Cranial nerves grossly intact. Speech clear, no tremor.   CHEST: Respirations even and unlabored.      Hemoglobin A1C   Date Value Ref Range Status   01/09/2019 7.5 (H) 4.0 - 5.6 % Final     Comment:     ADA Screening Guidelines:  5.7-6.4%  Consistent with prediabetes  >or=6.5%  Consistent with diabetes  High levels of fetal hemoglobin interfere with the HbA1C  assay. Heterozygous hemoglobin variants (HbS, HgC, etc)do  not significantly interfere with this assay.   However, presence of multiple variants may affect accuracy.     01/07/2019 7.2 (H) 4.0 - 5.6 % Final     Comment:     ADA Screening Guidelines:  5.7-6.4%  Consistent with prediabetes  >or=6.5%  Consistent with diabetes  High levels of fetal  hemoglobin interfere with the HbA1C  assay. Heterozygous hemoglobin variants (HbS, HgC, etc)do  not significantly interfere with this assay.   However, presence of multiple variants may affect accuracy.     10/03/2018 7.1 (H) 4.0 - 5.6 % Final     Comment:     ADA Screening Guidelines:  5.7-6.4%  Consistent with prediabetes  >or=6.5%  Consistent with diabetes  High levels of fetal hemoglobin interfere with the HbA1C  assay. Heterozygous hemoglobin variants (HbS, HgC, etc)do  not significantly interfere with this assay.   However, presence of multiple variants may affect accuracy.             Chemistry        Component Value Date/Time     (L) 01/12/2019 0428    K 4.4 01/12/2019 0428    CL 98 01/12/2019 0428    CO2 26 01/12/2019 0428    BUN 8 01/12/2019 0428    CREATININE 1.1 01/12/2019 0428     (H) 01/12/2019 0428        Component Value Date/Time    CALCIUM 9.4 01/12/2019 0428    ALKPHOS 76 07/09/2018 1304    AST 34 07/09/2018 1304    ALT 22 07/09/2018 1304    BILITOT 0.3 07/09/2018 1304    ESTGFRAFRICA >60.0 01/12/2019 0428    EGFRNONAA >60.0 01/12/2019 0428          Lab Results   Component Value Date    LDLCALC 32.2 (L) 01/09/2019        Ref. Range 1/9/2019 08:04   Cholesterol Latest Ref Range: 120 - 199 mg/dL 131   HDL Latest Ref Range: 40 - 75 mg/dL 60   HDL/Chol Ratio Latest Ref Range: 20.0 - 50.0 % 45.8   LDL Cholesterol Latest Ref Range: 63.0 - 159.0 mg/dL 32.2 (L)   Non-HDL Cholesterol Latest Units: mg/dL 71   Total Cholesterol/HDL Ratio Latest Ref Range: 2.0 - 5.0  2.2   Triglycerides Latest Ref Range: 30 - 150 mg/dL 194 (H)         Lab Results   Component Value Date    MICALBCREAT 69.4 (H) 04/02/2018               ASSESSMENT and PLAN:    A1C GOAL: < 7 %     1. DM type 2 with diabetic peripheral neuropathy  Continue current therapy.   Has appt with Neurology in a few weeks.   Test glucoses 5x/day.   rtc in 4 mo with labs prior.     Hemoglobin A1c   2. Tobacco abuse  valsartan (DIOVAN) 40 MG  tablet   3. Hypoglycemia associated with diabetes  Improved    4. ETOH abuse  Encouraged him to stop smoking and drinking alcohol.    5. Coronary artery disease involving native coronary artery of native heart without angina pectoris with mildly abn stress test 11/2018; med mgmt  valsartan (DIOVAN) 40 MG tablet           Orders Placed This Encounter   Procedures    Hemoglobin A1c     Standing Status:   Future     Standing Expiration Date:   7/26/2020        Follow up in about 4 months (around 9/28/2019).

## 2019-05-29 LAB
ESTIMATED AVG GLUCOSE: 174 MG/DL (ref 68–131)
HBA1C MFR BLD HPLC: 7.7 % (ref 4–5.6)

## 2019-06-04 ENCOUNTER — OFFICE VISIT (OUTPATIENT)
Dept: OPTOMETRY | Facility: CLINIC | Age: 69
End: 2019-06-04
Payer: COMMERCIAL

## 2019-06-04 DIAGNOSIS — Z01.00 EYE EXAM, ROUTINE: Primary | ICD-10-CM

## 2019-06-04 DIAGNOSIS — H52.03 HYPEROPIA WITH PRESBYOPIA OF BOTH EYES: ICD-10-CM

## 2019-06-04 DIAGNOSIS — H52.4 HYPEROPIA WITH PRESBYOPIA OF BOTH EYES: ICD-10-CM

## 2019-06-04 LAB
LEFT EYE DM RETINOPATHY: NEGATIVE
RIGHT EYE DM RETINOPATHY: NEGATIVE

## 2019-06-04 PROCEDURE — 92014 PR EYE EXAM, EST PATIENT,COMPREHESV: ICD-10-PCS | Mod: HCNC,S$GLB,, | Performed by: OPTOMETRIST

## 2019-06-04 PROCEDURE — 99999 PR PBB SHADOW E&M-EST. PATIENT-LVL II: ICD-10-PCS | Mod: PBBFAC,HCNC,, | Performed by: OPTOMETRIST

## 2019-06-04 PROCEDURE — 99999 PR PBB SHADOW E&M-EST. PATIENT-LVL II: CPT | Mod: PBBFAC,HCNC,, | Performed by: OPTOMETRIST

## 2019-06-04 PROCEDURE — 92014 COMPRE OPH EXAM EST PT 1/>: CPT | Mod: HCNC,S$GLB,, | Performed by: OPTOMETRIST

## 2019-06-04 PROCEDURE — 92015 DETERMINE REFRACTIVE STATE: CPT | Mod: HCNC,S$GLB,, | Performed by: OPTOMETRIST

## 2019-06-04 PROCEDURE — 92015 PR REFRACTION: ICD-10-PCS | Mod: HCNC,S$GLB,, | Performed by: OPTOMETRIST

## 2019-06-04 NOTE — PROGRESS NOTES
HPI     Dls: 4/24/18 Dr. Ramsey     67 y/o male presents today for diabetic eyemed vision exam.   Pt states no changes in vision. Pt wears otc readers.      this am     + tearing  No itching  No burning  No pain  + ha's  + floaters  No flashes    Eye meds  otc gtts ou p rn     Hemoglobin A1C       Date                     Value               Ref Range             Status                05/28/2019               7.7 (H)             4.0 - 5.6 %           Final                  01/09/2019               7.5 (H)             4.0 - 5.6 %           Final                  01/07/2019               7.2 (H)             4.0 - 5.6 %           Final                  Last edited by Leonardo Ramsey, OD on 6/4/2019  1:31 PM. (History)            Assessment /Plan     For exam results, see Encounter Report.    Eye exam, routine  -No retinopathy noted today.  Continued control with primary care physician and annual comprehensive eye exam.  -Eyemed    Hyperopia with presbyopia of both eyes  -optional  Eyeglass Final Rx     Eyeglass Final Rx       Sphere Cylinder Axis Dist [facility]    Right +0.50 Sphere  20/20-    Left [place] +0.50 175 20/20--    Type:  SVL    Expiration Date:  6/4/2020                  RTC 1 yr

## 2019-06-10 ENCOUNTER — OFFICE VISIT (OUTPATIENT)
Dept: FAMILY MEDICINE | Facility: CLINIC | Age: 69
End: 2019-06-10
Payer: MEDICARE

## 2019-06-10 VITALS
TEMPERATURE: 99 F | SYSTOLIC BLOOD PRESSURE: 126 MMHG | BODY MASS INDEX: 23.19 KG/M2 | WEIGHT: 171.19 LBS | OXYGEN SATURATION: 97 % | DIASTOLIC BLOOD PRESSURE: 62 MMHG | HEART RATE: 73 BPM | HEIGHT: 72 IN

## 2019-06-10 DIAGNOSIS — F11.90 CHRONIC NARCOTIC USE: ICD-10-CM

## 2019-06-10 DIAGNOSIS — F10.20 ALCOHOL USE DISORDER, MODERATE, DEPENDENCE: ICD-10-CM

## 2019-06-10 DIAGNOSIS — M48.062 SPINAL STENOSIS OF LUMBAR REGION WITH NEUROGENIC CLAUDICATION: Primary | ICD-10-CM

## 2019-06-10 DIAGNOSIS — F41.9 ANXIETY DISORDER, UNSPECIFIED TYPE: ICD-10-CM

## 2019-06-10 DIAGNOSIS — G62.9 SMALL FIBER NEUROPATHY: ICD-10-CM

## 2019-06-10 PROCEDURE — 99214 PR OFFICE/OUTPT VISIT, EST, LEVL IV, 30-39 MIN: ICD-10-PCS | Mod: HCNC,S$GLB,, | Performed by: INTERNAL MEDICINE

## 2019-06-10 PROCEDURE — 1101F PR PT FALLS ASSESS DOC 0-1 FALLS W/OUT INJ PAST YR: ICD-10-PCS | Mod: HCNC,CPTII,S$GLB, | Performed by: INTERNAL MEDICINE

## 2019-06-10 PROCEDURE — 99214 OFFICE O/P EST MOD 30 MIN: CPT | Mod: HCNC,S$GLB,, | Performed by: INTERNAL MEDICINE

## 2019-06-10 PROCEDURE — 99999 PR PBB SHADOW E&M-EST. PATIENT-LVL III: ICD-10-PCS | Mod: PBBFAC,HCNC,, | Performed by: INTERNAL MEDICINE

## 2019-06-10 PROCEDURE — 1101F PT FALLS ASSESS-DOCD LE1/YR: CPT | Mod: HCNC,CPTII,S$GLB, | Performed by: INTERNAL MEDICINE

## 2019-06-10 PROCEDURE — 99999 PR PBB SHADOW E&M-EST. PATIENT-LVL III: CPT | Mod: PBBFAC,HCNC,, | Performed by: INTERNAL MEDICINE

## 2019-06-10 RX ORDER — ALPRAZOLAM 0.25 MG/1
TABLET ORAL
Qty: 15 TABLET | Refills: 0
Start: 2019-06-10 | End: 2019-09-13

## 2019-06-10 RX ORDER — HYDROCODONE BITARTRATE AND ACETAMINOPHEN 5; 325 MG/1; MG/1
0.5 TABLET ORAL
Qty: 30 TABLET | Refills: 0 | Status: SHIPPED | OUTPATIENT
Start: 2019-06-10 | End: 2019-08-21

## 2019-06-10 RX ORDER — AMITRIPTYLINE HYDROCHLORIDE 25 MG/1
25 TABLET, FILM COATED ORAL NIGHTLY
Qty: 30 TABLET | Refills: 11 | Status: SHIPPED | OUTPATIENT
Start: 2019-06-10 | End: 2020-01-24

## 2019-06-10 NOTE — PROGRESS NOTES
This note was created by combination of typed  and Dragon dictation.  Transcription errors may be present.  If there are any questions, please contact me.    Assessment & Plan:   Spinal stenosis of lumbar region with neurogenic claudication  Chronic narcotic use  Alcohol use disorder, moderate, dependence  -he has been cutting down his use and I have applauded him on his attempts.  I will further reduce him, he has been taking half of Norco 10, I will reduce it to Norco 5, half tablet once daily, 30 pills should last him 60 days as it has currently.  Will alcohol use, dependency, had tried abstinence on his own without long-term success.  I have again offered him use disorder clinic and he is not quite ready to commit to that.  -     HYDROcodone-acetaminophen (NORCO) 5-325 mg per tablet; Take 0.5 tablets by mouth every 24 hours as needed for Pain. Reducing dose; this should last 60 days  Dispense: 30 tablet; Refill: 0    Anxiety disorder, unspecified type  -high risk with chronic narcotic therapy and alcohol use.  Further reduction in alprazolam.  He is not requesting refill at this time.  Not taking it every night.  He is on the lower dose of alprazolam and next refill will be for alprazolam 0.25, to take half tablet as needed, 15 pills should last 30 days  I updated the instructions but I did not send in the prescription today.  -     ALPRAZolam (XANAX) 0.25 MG tablet; 1/2 tablet daily PRN; reduced dose. This should last at least 30 days  Dispense: 15 tablet; Refill: 0    Small fiber neuropathy  -gabapentin upset his stomach.  He has got an upcoming consult with Neurology.  We talked about the effects of alcohol on his neuropathy.  His previous B12 was normal and his previous TSH was normal.  Trial of amitriptyline  -     amitriptyline (ELAVIL) 25 MG tablet; Take 1 tablet (25 mg total) by mouth every evening.  Dispense: 30 tablet; Refill: 11    Medications Discontinued During This Encounter   Medication  Reason    HYDROcodone-acetaminophen (NORCO)  mg per tablet     ALPRAZolam (XANAX) 0.25 MG tablet Reorder       meds sent this encounter:  Medications Ordered This Encounter   Medications    ALPRAZolam (XANAX) 0.25 MG tablet     Si/2 tablet daily PRN; reduced dose. This should last at least 30 days     Dispense:  15 tablet     Refill:  0    amitriptyline (ELAVIL) 25 MG tablet     Sig: Take 1 tablet (25 mg total) by mouth every evening.     Dispense:  30 tablet     Refill:  11    HYDROcodone-acetaminophen (NORCO) 5-325 mg per tablet     Sig: Take 0.5 tablets by mouth every 24 hours as needed for Pain. Reducing dose; this should last 60 days     Dispense:  30 tablet     Refill:  0       Follow Up: No follow-ups on file.  Follow-up 3 months.    Subjective:     Chief Complaint   Patient presents with    Diabetes     Follow up       HPI  Moe is a 68 y.o. male, last appointment with this clinic was 2019.    Last visit with me in mid April.  Lip lesion, nonhealing, trial of Valtrex and if no improvement follow-up with Dermatology about this.  Chronic narcotic use.  In the setting of alcohol use disorder.  And insomnia.  Plan last visit was to wean down the alprazolam from 0.5-0.25 and then half tablet of 0.25  And plan to wean down narcotics.  Plan was refill number 30 and then 15., 15 tablets for 3 months total and then stop.  He saw diabetes nurse practitioner in late May.  No change in regimen.  His A1c did go up.  7.7 from 7.5  Eye exam , no retinopathy.  He saw Podiatry, he was having pain in the feet persisting and worsening, felt that this could be due to low back pathology and sensory neuropathy. contacted Neurosurgery.  Ordered EMG.     the alprazolam 0.25 mg on .  That is the 1st month of 0.25.  So next refill should be 15 tablets  2019 hydrocodone 10 mg number 30    2019 CBC normocytic anemia would check an iron and ferritin    Small fiber neuropathy starting in the  legs towards the later part of the day and escalating in intensity as the night goes on.  More intense in the right above the arch of the foot, and in the left foot as well.  he has got a consultation with Neurology upcoming.  TSH done in the last 2 years was normal, B12 was also normal as well.    Hydrocodone 1/2 tablet at a time when he takes it.  And xanax not nightly.     Abebe with SE of tears his stomach up. Never amitriptyline.     Working in consulting capacity.  So this is a significant adjustment for him.  Still gets a lot of stress and feels a lot of responsibility nonetheless.  But it is a lesser schedule than previous.  So some days he does not know quite what to do with himself.    Reports he quit smoking and drinking in October.  But relapsed and restarted.  He drinks 4-6 drinks daily.  We talked about how this may be a significant contributor to his neuropathy.  We talked again about referral to substance use disorder clinic.  Long discussion with the patient.  He is not quite ready to commit to that yet.    Answers for HPI/ROS submitted by the patient on 6/3/2019   activity change: Yes  unexpected weight change: No  rhinorrhea: No  trouble swallowing: No  visual disturbance: No  chest tightness: No  polyuria: No  difficulty urinating: No  joint swelling: Yes  arthralgias: Yes  confusion: No  dysphoric mood: No      Patient Care Team:  Damian Ramon MD as PCP - General (Internal Medicine)  Kandice Bonilla RD (Inactive) as Dietitian (Diabetes)  Josias Ling MD as Consulting Physician (Neurosurgery)  Aime De Anda MD as Consulting Physician (Otolaryngology)  Blu Neil MA as Care Coordinator    Patient Active Problem List    Diagnosis Date Noted    Bilateral carotid artery stenosis mild on US 11/2018 04/08/2019     Carotid US 11/14/18 Mild bilateral internal carotid artery plaque with no hemodynamically significant stenosis      Lumbar stenosis 1/11/19 FUSION, SPINE, LUMBAR, TLIF,  MINIMALLY INVASIVE @L4/5 & L5/S1 (Left) 01/10/2019     1/11/19 FUSION, SPINE, LUMBAR, TLIF, MINIMALLY INVASIVE @L4/5 & L5/S1 (Left)          Hyponatremia 01/07/2019    Asymptomatic varicose veins of left lower extremity 01/07/2019    Coronary artery disease involving native coronary artery of native heart without angina pectoris with mildly abn stress test 11/2018; med mgmt 01/06/2019 11/14/2018 nuclear medicine stress test abnormal, small amount of mild ischemia in the apical walls.  EF 66%.      NSAID-induced duodenal ulcer while traveling. EGD done in [place], [facility] 10/2018 with duodenal ulcers 11/09/2018    Syncope and collapse 11/05/2018    Schatzki's ring of distal esophagus 6/2018 s/p dilitation 06/19/2018    Hiatal hernia on EGD 6/2018; biopsies chronic gastritis, H pylori negative. 06/19/2018    Calculus of gallbladder without cholecystitis without obstruction on RUQ US 5/2018 05/21/2018    Chronic seasonal allergic rhinitis 03/01/2018    Alcohol-induced chronic pancreatitis 01/01/2018    Abdominal aortic atherosclerosis 11/14/2017     On CXR 4/5/2016 and on 11/2/2012 XR L spine  On AAA screening 3/2018      Chronic obstructive bronchitis with pulmonary emphysema 11/14/2017 12/2017 PFTs showed mild airflow obstruction.  Lung volume showing air trapping.  DLCO reduced. no O2 desat.  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    Chronic narcotic use 08/02/2017    Deviated nasal septum 04/19/2016    Chronic back pain greater than 3 months duration 01/06/2016    Small fiber neuropathy 07/29/2015     Likely related to alcohol use      Facet arthritis of lumbar region 10/08/2014    Spondylosis without myelopathy 08/13/2014    Degeneration of lumbar or lumbosacral intervertebral disc 08/13/2014    Tubular adenoma of colon 4/2017 04/22/2014 4/11/2017 colonoscopy with transverse tubular adenoma      DM type 2 with diabetic peripheral  neuropathy 07/24/2013    Hypertriglyceridemia 06/13/2013    Anxiety disorder 06/12/2013     Trazodone ineffective.      Alcohol use disorder, mild, in controlled environment 06/12/2013    Continuous tobacco abuse 06/12/2013       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    Cataract     Diabetes mellitus     Gastric ulcer     GERD (gastroesophageal reflux disease)     Pancreatitis 2008    2 Times       PAST SURGICAL HISTORY:  Past Surgical History:   Procedure Laterality Date     thumb surgery      Dead Skin cell tumor Rt thumb    CAUTERIZATION OF TURBINATES N/A 4/19/2016    [facility]    COLONOSCOPY N/A 4/11/2017    Performed by Meet Quesada [facility] at [facility] ENDO    ANDREA-TRANSFORAMINAL Right 11/26/2014    [facility]    ANDREA-TRANSFORAMINAL Right 11/5/2014    [facility] at [facility]    ESOPHAGOGASTRODUODENOSCOPY (EGD) N/A 6/18/2018    Per[facility] Marco [facility]alves [facility] at [facility] ENDO    ESOPHAGOGASTRODUODENOSCOPY (EGD) N/A 5/30/2014    [facility] at [facility] ENDO    FUSION, SPINE, LUMBAR, TLIF, MINIMALLY INVASIVE @L4/5 & L5/S1 Left 1/10/2019    [facility]    MOUTH FLOOR MASS EXCISION  2009    NASAL SEPTUM SURGERY      RECONSTRUCTION-NASAL N/A 4/19/2016    [facility]    SKIN SURGERY         SOCIAL HISTORY:  Social History     Socioeconomic History    Marital status:      Spouse name: Not on file    Number of children: Not on file    Years of education: Not on file    Highest education level: Not on file   Occupational History    Occupation: , self employed    Social Needs    Financial resource strain:  Not hard at all    Food insecurity:     Worry: Never true     Inability: Never true    Transportation needs:     Medical: No     Non-medical: No   Tobacco Use    Smoking status: Current Every Day Smoker     Packs/day: 0.25     Years: 45.00     Pack years: 11.25     Types: Cigarettes    Smokeless tobacco: Never Used   Substance and Sexual Activity    Alcohol use: Yes     Alcohol/week: 0.5 oz     Types: 1 Standard drinks or equivalent per week     Frequency: 4 or more times a week     Drinks per session: 5 or 6     Binge frequency: Weekly     Comment: 4 drinks a week    Drug use: No    Sexual activity: Yes     Partners: Female   Lifestyle    Physical activity:     Days per week: 0 days     Minutes per session: 0 min    Stress: Only a little   Relationships    Social connections:     Talks on phone: Three times a week     Gets together: Three times a week     Attends [employer] service: Not on file     Active member of club or organization: Yes     Attends meetings of clubs or organizations: More than 4 times per year     Relationship status:    Other Topics Concern    Not on file   Social History Narrative    , 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  One child committed suicide.       ALLERGIES AND MEDICATIONS: updated and reviewed.  Review of patient's allergies indicates:   Allergen Reactions    Amoxicillin Nausea And Vomiting    Adhesive Rash     Can only use paper tape   Has problem with Band aid    Metformin Diarrhea     Appetite loss      Current Outpatient Medications   Medication Sig Dispense Refill    ALPRAZolam (XANAX) 0.25 MG tablet Take 1 tablet (0.25 mg total) by mouth daily as needed for Anxiety. Reduced dose 30 tablet 0    atorvastatin (LIPITOR) 10 MG tablet Take 1 tablet (10 mg total) by mouth once daily. 90 tablet 2    blood sugar diagnostic (ACCU-CHEK KM PLUS TEST STRP) Strp 1 strip by Misc.(Non-Drug; Combo Route) route 6  "(six) times daily. To be used with Accu-Chek Amanda Plus glucometer 600 strip 3    blood-glucose meter kit Use as instructed, Accu-Chek Amanda Plus glucometer 1 each 0    desoximetasone (TOPICORT) 0.25 % cream Apply topically 2 (two) times daily as needed.       escitalopram oxalate (LEXAPRO) 20 MG tablet Take 1 tablet (20 mg total) by mouth once daily. 90 tablet 1    fluticasone (FLONASE) 50 mcg/actuation nasal spray 2 sprays (100 mcg total) by Each Nare route daily as needed. 16 g 2    HYDROcodone-acetaminophen (NORCO)  mg per tablet Take 1 tablet by mouth every 24 hours as needed for Pain. Reduced #; continued wean 30 tablet 0    hydrOXYzine (ATARAX) 50 MG tablet TAKE 1/2 (ONE-HALF) TABLET BY MOUTH EVERY 4 TO 6 HOURS AS NEEDED FOR ANXIETY 90 tablet 1    insulin aspart U-100 (NOVOLOG FLEXPEN U-100 INSULIN) 100 unit/mL InPn pen Sliding scale up to 20 units TID. MAX 50 UNITS/DAY. 45 mL 5    ipratropium (ATROVENT HFA) 17 mcg/actuation inhaler Inhale 2 puffs into the lungs every 6 (six) hours. Rescue 12.9 g 5    lancets (ACCU-CHEK FASTCLIX) Misc 1 lancet by Misc.(Non-Drug; Combo Route) route 6 (six) times daily. To be used with Accu-Chek Amanda Plus glucometer 600 each 3    metoprolol succinate (TOPROL-XL) 25 MG 24 hr tablet Take 1 tablet (25 mg total) by mouth once daily. 30 tablet 11    multivitamin (THERAGRAN) per tablet Take 2 tablets by mouth once daily.      omeprazole (PRILOSEC) 40 MG capsule Take 1 capsule (40 mg total) by mouth 2 (two) times daily before meals. 60 capsule 3    pen needle, diabetic (BD ULTRA-FINE RENETTA PEN NEEDLE) 32 gauge x 5/32" Ndle Use 3x/day with Novolog 300 each 3    tamsulosin (FLOMAX) 0.4 mg Cap Take 1 capsule (0.4 mg total) by mouth once daily. 90 capsule 1    triamcinolone acetonide 0.1% (KENALOG) 0.1 % Lotn       valsartan (DIOVAN) 40 MG tablet Take 1 tablet (40 mg total) by mouth once daily. 90 tablet 3    valACYclovir (VALTREX) 1000 MG tablet Take 1 tablet " (1,000 mg total) by mouth every 12 (twelve) hours. for 7 doses 14 tablet 0     No current facility-administered medications for this visit.        Review of Systems   HENT: Negative for hearing loss.    Eyes: Negative for discharge.   Respiratory: Negative for wheezing.    Cardiovascular: Negative for chest pain and palpitations.   Gastrointestinal: Negative for blood in stool, constipation, diarrhea and vomiting.   Genitourinary: Negative for hematuria and urgency.   Musculoskeletal: Negative for neck pain.   Neurological: Negative for weakness and headaches.   Endo/Heme/Allergies: Negative for polydipsia.       Objective:   Physical Exam   Vitals:    06/10/19 1445   BP: 126/62   BP Location: Left arm   Patient Position: Sitting   BP Method: Medium (Manual)   Pulse: 73   Temp: 98.7 °F (37.1 °C)   TempSrc: Oral   SpO2: 97%   Weight: 77.7 kg (171 lb 3 oz)   Height: 6' (1.829 m)    Body mass index is 23.22 kg/m².  Weight: 77.7 kg (171 lb 3 oz)   Height: 6' (182.9 cm)     Physical Exam   Constitutional: He is oriented to person, place, and time. He appears well-developed and well-nourished. No distress.   Eyes: EOM are normal.   Cardiovascular: Normal rate, regular rhythm and normal heart sounds.   No murmur heard.  Pulmonary/Chest: Effort normal and breath sounds normal.   Musculoskeletal: Normal range of motion. He exhibits no edema.   Neurological: He is alert and oriented to person, place, and time. Coordination normal.   Resting tremor in both hands   Skin: Skin is warm and dry.   Psychiatric: He has a normal mood and affect. His behavior is normal. Thought content normal.

## 2019-06-20 ENCOUNTER — PATIENT MESSAGE (OUTPATIENT)
Dept: FAMILY MEDICINE | Facility: CLINIC | Age: 69
End: 2019-06-20

## 2019-06-20 DIAGNOSIS — R09.81 CHRONIC NASAL CONGESTION: Primary | ICD-10-CM

## 2019-06-21 ENCOUNTER — PROCEDURE VISIT (OUTPATIENT)
Dept: NEUROLOGY | Facility: CLINIC | Age: 69
End: 2019-06-21
Payer: MEDICARE

## 2019-06-21 DIAGNOSIS — M48.062 SPINAL STENOSIS OF LUMBAR REGION WITH NEUROGENIC CLAUDICATION: ICD-10-CM

## 2019-06-21 DIAGNOSIS — M51.37 DEGENERATION OF LUMBAR OR LUMBOSACRAL INTERVERTEBRAL DISC: ICD-10-CM

## 2019-06-21 PROCEDURE — 95909 PR NERVE CONDUCTION STUDY; 5-6 STUDIES: ICD-10-PCS | Mod: HCNC,S$GLB,, | Performed by: NEUROLOGICAL SURGERY

## 2019-06-21 PROCEDURE — 95909 NRV CNDJ TST 5-6 STUDIES: CPT | Mod: HCNC,S$GLB,, | Performed by: NEUROLOGICAL SURGERY

## 2019-06-21 PROCEDURE — 95885 PR MUSC TST DONE W/NERV TST LIM: ICD-10-PCS | Mod: HCNC,S$GLB,, | Performed by: NEUROLOGICAL SURGERY

## 2019-06-21 PROCEDURE — 95885 MUSC TST DONE W/NERV TST LIM: CPT | Mod: HCNC,S$GLB,, | Performed by: NEUROLOGICAL SURGERY

## 2019-06-23 NOTE — PROCEDURES
Chief Complaint   Patient presents with    Other Misc     EMG        Moe Ren is a 68 y.o. male with a history of multiple medical diagnoses as listed below that presents for pain numbness and tingling in the lower back radiating into the legs.  Patient has a history of spinal stenosis and also has a history of alcohol use.  In addition to alcohol use the patient has chronic opioid dependence as well.  These issues have been monitored closely by his primary care doctor at and he has been doing well in reducing his doses as discussed with his primary.  He was sent today for EMG/nerve conduction studies to evaluate his extremity complaints.    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    Cataract     Diabetes mellitus     Gastric ulcer     GERD (gastroesophageal reflux disease)     Pancreatitis 2008    2 Times       PAST SURGICAL HISTORY:  Past Surgical History:   Procedure Laterality Date     thumb surgery      Dead Skin cell tumor Rt thumb    CAUTERIZATION OF TURBINATES N/A 4/19/2016    [facility] at [facility]    COLONOSCOPY N/A 4/11/2017    Performed by Meet Quesada MD at [facility] ENDO    ANDREA-TRANSFORAMINAL Right 11/26/2014    [facility]    ANDREA-TRANSFORAMINAL Right 11/5/2014    [facility] at [facility]    ESOPHAGOGASTRODUODENOSCOPY (EGD) N/A 6/18/2018    Performed by Marco Antonio [facility]onsalves MD at [facility] ENDO    ESOPHAGOGASTRODUODENOSCOPY (EGD) N/A 5/30/2014    Performed by Meet Quesada [facility] at [facility] ENDO    FUSION, SPINE, LUMBAR, TLIF, MINIMALLY INVASIVE @L4/5 & L5/S1 Left 1/10/2019    [facility] at [facility] OR 2ND FLR    MOUTH FLOOR MASS EXCISION  2009    NASAL SEPTUM SURGERY      RECONSTRUCTION-NASAL N/A  4/19/2016    [facility]    SKIN SURGERY         SOCIAL HISTORY:  Social History     Socioeconomic History    Marital status:      Spouse name: Not on file    Number of children: Not on file    Years of education: Not on file    Highest education level: Not on file   Occupational History    Occupation: , self employed    Social Needs    Financial resource strain: Not hard at all    Food insecurity:     Worry: Never true     Inability: Never true    Transportation needs:     Medical: No     Non-medical: No   Tobacco Use    Smoking status: Current Every Day Smoker     Packs/day: 0.25     Years: 45.00     Pack years: 11.25     Types: Cigarettes    Smokeless tobacco: Never Used   Substance and Sexual Activity    Alcohol use: Yes     Alcohol/week: 0.5 oz     Types: 1 Standard drinks or equivalent per week     Frequency: 4 or more times a week     Drinks per session: 5 or 6     Binge frequency: Weekly     Comment: 4 drinks a week    Drug use: No    Sexual activity: Yes     Partners: Female   Lifestyle    Physical activity:     Days per week: 0 days     Minutes per session: 0 min    Stress: Only a little   Relationships    Social connections:     Talks on phone: Three times a week     Gets together: Three times a week     Attends Bahai service: Not on file     Active member of club or organization: Yes     Attends meetings of clubs or organizations: More than 4 times per year     Relationship status:    Other Topics Concern    Not on file   Social History Narrative    , 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  One child committed suicide.       FAMILY HISTORY:  Family History   Problem Relation Age of Onset    Cataracts Mother     Leukemia Mother     Lung cancer Brother     Mental illness Father         suicide    No Known Problems Daughter     No Known Problems Sister     No Known  Problems Son     No Known Problems Maternal Aunt     No Known Problems Maternal Uncle     No Known Problems Paternal Aunt     No Known Problems Paternal Uncle     No Known Problems Maternal Grandmother     No Known Problems Maternal Grandfather     No Known Problems Paternal Grandmother     No Known Problems Paternal Grandfather     Amblyopia Neg Hx     Blindness Neg Hx     Cancer Neg Hx     Diabetes Neg Hx     Glaucoma Neg Hx     Hypertension Neg Hx     Macular degeneration Neg Hx     Retinal detachment Neg Hx     Strabismus Neg Hx     Stroke Neg Hx     Thyroid disease Neg Hx        ALLERGIES AND MEDICATIONS: updated and reviewed.  Review of patient's allergies indicates:   Allergen Reactions    Amoxicillin Nausea And Vomiting    Adhesive Rash     Can only use paper tape   Has problem with Band aid    Metformin Diarrhea     Appetite loss      Current Outpatient Medications   Medication Sig Dispense Refill    ALPRAZolam (XANAX) 0.25 MG tablet 1/2 tablet daily PRN; reduced dose. This should last at least 30 days 15 tablet 0    amitriptyline (ELAVIL) 25 MG tablet Take 1 tablet (25 mg total) by mouth every evening. 30 tablet 11    atorvastatin (LIPITOR) 10 MG tablet Take 1 tablet (10 mg total) by mouth once daily. 90 tablet 2    blood sugar diagnostic (ACCU-CHEK KM PLUS TEST STRP) Strp 1 strip by Misc.(Non-Drug; Combo Route) route 6 (six) times daily. To be used with Accu-Chek Km Plus glucometer 600 strip 3    blood-glucose meter kit Use as instructed, Accu-Chek Km Plus glucometer 1 each 0    desoximetasone (TOPICORT) 0.25 % cream Apply topically 2 (two) times daily as needed.       escitalopram oxalate (LEXAPRO) 20 MG tablet Take 1 tablet (20 mg total) by mouth once daily. 90 tablet 1    fluticasone (FLONASE) 50 mcg/actuation nasal spray 2 sprays (100 mcg total) by Each Nare route daily as needed. 16 g 2    HYDROcodone-acetaminophen (NORCO) 5-325 mg per tablet Take 0.5 tablets  "by mouth every 24 hours as needed for Pain. Reducing dose; this should last 60 days 30 tablet 0    hydrOXYzine (ATARAX) 50 MG tablet TAKE 1/2 (ONE-HALF) TABLET BY MOUTH EVERY 4 TO 6 HOURS AS NEEDED FOR ANXIETY 90 tablet 1    insulin aspart U-100 (NOVOLOG FLEXPEN U-100 INSULIN) 100 unit/mL InPn pen Sliding scale up to 20 units TID. MAX 50 UNITS/DAY. 45 mL 5    ipratropium (ATROVENT HFA) 17 mcg/actuation inhaler Inhale 2 puffs into the lungs every 6 (six) hours. Rescue 12.9 g 5    lancets (ACCU-CHEK FASTCLIX) Misc 1 lancet by Misc.(Non-Drug; Combo Route) route 6 (six) times daily. To be used with Accu-Chek Amanda Plus glucometer 600 each 3    metoprolol succinate (TOPROL-XL) 25 MG 24 hr tablet Take 1 tablet (25 mg total) by mouth once daily. 30 tablet 11    multivitamin (THERAGRAN) per tablet Take 2 tablets by mouth once daily.      omeprazole (PRILOSEC) 40 MG capsule Take 1 capsule (40 mg total) by mouth 2 (two) times daily before meals. 60 capsule 3    pen needle, diabetic (BD ULTRA-FINE RENETTA PEN NEEDLE) 32 gauge x 5/32" Ndle Use 3x/day with Novolog 300 each 3    tamsulosin (FLOMAX) 0.4 mg Cap Take 1 capsule (0.4 mg total) by mouth once daily. 90 capsule 1    triamcinolone acetonide 0.1% (KENALOG) 0.1 % Lotn       valACYclovir (VALTREX) 1000 MG tablet Take 1 tablet (1,000 mg total) by mouth every 12 (twelve) hours. for 7 doses 14 tablet 0    valsartan (DIOVAN) 40 MG tablet Take 1 tablet (40 mg total) by mouth once daily. 90 tablet 3     No current facility-administered medications for this visit.        Review of Systems   Constitutional: Negative for activity change, fatigue and unexpected weight change.   HENT: Negative for trouble swallowing and voice change.    Eyes: Negative for photophobia, pain and visual disturbance.   Respiratory: Negative for apnea and shortness of breath.    Cardiovascular: Negative for chest pain and palpitations.   Gastrointestinal: Negative for constipation, nausea and " vomiting.   Genitourinary: Negative for difficulty urinating.   Musculoskeletal: Negative for arthralgias, back pain, gait problem, myalgias and neck pain.   Skin: Negative for color change and rash.   Neurological: Positive for weakness and numbness. Negative for dizziness, seizures, syncope, speech difficulty, light-headedness and headaches.   Psychiatric/Behavioral: Negative for agitation, behavioral problems and confusion.       Neurologic Exam     Mental Status   Oriented to person, place, and time.   Registration: recalls 3 of 3 objects.   Attention: normal. Concentration: normal.   Speech: speech is normal   Level of consciousness: alert  Knowledge: good.     Cranial Nerves     CN II   Visual fields full to confrontation.   Right visual field deficit: none  Left visual field deficit: none     CN III, IV, VI   Pupils are equal, round, and reactive to light.  Extraocular motions are normal.   Right pupil: Size: 3 mm. Shape: regular. Accommodation: intact.   Left pupil: Size: 3 mm. Shape: regular. Accommodation: intact.   CN III: no CN III palsy  CN VI: no CN VI palsy  Nystagmus: none   Diplopia: none  Ophthalmoparesis: none  Upgaze: normal  Downgaze: normal  Conjugate gaze: present    CN V   Facial sensation intact.   Right facial sensation deficit: none  Left facial sensation deficit: none    CN VII   Facial expression full, symmetric.   Right facial weakness: none  Left facial weakness: none    CN VIII   CN VIII normal.     CN IX, X   CN IX normal.   CN X normal.   Palate: symmetric    CN XI   CN XI normal.   Right sternocleidomastoid strength: normal  Left sternocleidomastoid strength: normal  Right trapezius strength: normal  Left trapezius strength: normal    CN XII   CN XII normal.   Tongue deviation: none    Motor Exam   Muscle bulk: normal  Overall muscle tone: normal  Right arm tone: normal  Left arm tone: normal  Right leg tone: normal  Left leg tone: normal    Strength   Strength 5/5 throughout.      Sensory Exam   Right arm light touch: normal  Left arm light touch: normal  Right leg light touch: decreased from toes  Left leg light touch: decreased from toes  Right arm vibration: normal  Left arm vibration: normal  Right leg vibration: decreased from toes  Left leg vibration: decreased from toes  Right arm proprioception: normal  Left arm proprioception: normal  Right leg proprioception: normal  Left leg proprioception: normal  Right arm pinprick: normal  Left arm pinprick: normal  Right leg pinprick: decreased from toes  Left leg pinprick: decreased from toes    Gait, Coordination, and Reflexes     Gait  Gait: normal    Coordination   Romberg: negative  Finger to nose coordination: normal  Heel to shin coordination: normal  Tandem walking coordination: normal    Tremor   Resting tremor: absent    Reflexes   Right brachioradialis: 2+  Left brachioradialis: 2+  Right biceps: 2+  Left biceps: 2+  Right triceps: 2+  Left triceps: 2+  Right patellar: 2+  Left patellar: 2+  Right achilles: 1+  Left achilles: 1+  Right plantar: normal  Left plantar: normal      Physical Exam   Constitutional: He is oriented to person, place, and time. He appears well-developed and well-nourished.   HENT:   Head: Normocephalic and atraumatic.   Eyes: Pupils are equal, round, and reactive to light. EOM are normal.   Cardiovascular: Intact distal pulses.   Pulmonary/Chest: Effort normal. No respiratory distress.   Musculoskeletal: Normal range of motion.   Neurological: He is alert and oriented to person, place, and time. He has normal strength. He has a normal Finger-Nose-Finger Test, a normal Heel to Shin Test, a normal Romberg Test and a normal Tandem Gait Test. Gait normal.   Reflex Scores:       Tricep reflexes are 2+ on the right side and 2+ on the left side.       Bicep reflexes are 2+ on the right side and 2+ on the left side.       Brachioradialis reflexes are 2+ on the right side and 2+ on the left side.       Patellar  reflexes are 2+ on the right side and 2+ on the left side.       Achilles reflexes are 1+ on the right side and 1+ on the left side.  Skin: Skin is warm and dry.   Psychiatric: He has a normal mood and affect. His speech is normal and behavior is normal.       Vitals:    06/21/19 1404   Temp: (!) 89.6 °F (32 °C)   TempSrc: Skin   Weight: 77.6 kg (171 lb)   Height: 6' (1.829 m)       Assessment & Plan:    Problem List Items Addressed This Visit     Degeneration of lumbar or lumbosacral intervertebral disc    Lumbar stenosis 1/11/19 FUSION, SPINE, LUMBAR, TLIF, MINIMALLY INVASIVE @L4/5 & L5/S1 (Left)    Overview     1/11/19 FUSION, SPINE, LUMBAR, TLIF, MINIMALLY INVASIVE @L4/5 & L5/S1 (Left)                   Follow-up: Follow up if symptoms worsen or fail to improve.

## 2019-06-25 VITALS — WEIGHT: 171 LBS | HEIGHT: 72 IN | TEMPERATURE: 90 F | BODY MASS INDEX: 23.16 KG/M2

## 2019-06-25 DIAGNOSIS — E11.21 TYPE 2 DIABETES WITH NEPHROPATHY: ICD-10-CM

## 2019-06-25 DIAGNOSIS — E13.42 DIABETIC POLYNEUROPATHY ASSOCIATED WITH OTHER SPECIFIED DIABETES MELLITUS: ICD-10-CM

## 2019-06-25 RX ORDER — LANCETS
1 EACH MISCELLANEOUS
Qty: 600 EACH | Refills: 3 | Status: SHIPPED | OUTPATIENT
Start: 2019-06-25

## 2019-06-25 RX ORDER — PEN NEEDLE, DIABETIC 30 GX3/16"
NEEDLE, DISPOSABLE MISCELLANEOUS
Qty: 300 EACH | Refills: 3 | Status: SHIPPED | OUTPATIENT
Start: 2019-06-25 | End: 2019-09-27 | Stop reason: SDUPTHER

## 2019-06-26 ENCOUNTER — OFFICE VISIT (OUTPATIENT)
Dept: OTOLARYNGOLOGY | Facility: CLINIC | Age: 69
End: 2019-06-26
Payer: MEDICARE

## 2019-06-26 VITALS
HEART RATE: 42 BPM | BODY MASS INDEX: 23.17 KG/M2 | SYSTOLIC BLOOD PRESSURE: 140 MMHG | DIASTOLIC BLOOD PRESSURE: 84 MMHG | WEIGHT: 171.06 LBS | HEIGHT: 72 IN

## 2019-06-26 DIAGNOSIS — J32.9 CHRONIC RECURRENT SINUSITIS: ICD-10-CM

## 2019-06-26 DIAGNOSIS — J30.9 ALLERGIC RHINITIS, UNSPECIFIED SEASONALITY, UNSPECIFIED TRIGGER: Primary | ICD-10-CM

## 2019-06-26 PROCEDURE — 1101F PT FALLS ASSESS-DOCD LE1/YR: CPT | Mod: HCNC,CPTII,S$GLB, | Performed by: OTOLARYNGOLOGY

## 2019-06-26 PROCEDURE — 1101F PR PT FALLS ASSESS DOC 0-1 FALLS W/OUT INJ PAST YR: ICD-10-PCS | Mod: HCNC,CPTII,S$GLB, | Performed by: OTOLARYNGOLOGY

## 2019-06-26 PROCEDURE — 99204 PR OFFICE/OUTPT VISIT, NEW, LEVL IV, 45-59 MIN: ICD-10-PCS | Mod: HCNC,S$GLB,, | Performed by: OTOLARYNGOLOGY

## 2019-06-26 PROCEDURE — 99999 PR PBB SHADOW E&M-EST. PATIENT-LVL III: CPT | Mod: PBBFAC,HCNC,, | Performed by: OTOLARYNGOLOGY

## 2019-06-26 PROCEDURE — 99204 OFFICE O/P NEW MOD 45 MIN: CPT | Mod: HCNC,S$GLB,, | Performed by: OTOLARYNGOLOGY

## 2019-06-26 PROCEDURE — 99999 PR PBB SHADOW E&M-EST. PATIENT-LVL III: ICD-10-PCS | Mod: PBBFAC,HCNC,, | Performed by: OTOLARYNGOLOGY

## 2019-06-26 NOTE — LETTER
June 26, 2019      Damian Ramon MD  [address]  [facility] [address]           [facility] - Otorhinolaryngology  [address]  [facility] [address]  Phone: [phone]  Fax: [phone]          Patient: Moe Ren   MR Number: [id number]   YOB: 1950   Date of Visit: 6/26/2019       Dear Dr. Damian Ramon:    Thank you for referring Moe Ren to me for evaluation. Attached you will find relevant portions of my assessment and plan of care.    If you have questions, please do not hesitate to call me. I look forward to following Moe Ren along with you.    Sincerely,    Geneva YULIET Parks III, MD    Enclosure  CC:  No Recipients    If you would like to receive this communication electronically, please contact [email or web address] or [phone] to request more information on [employer] access.    For providers and/or their staff who would like to refer a patient to [facility], please contact us through our one-stop-shop provider referral line, [facility], at [phone].    If you feel you have received this communication in error or would no longer like to receive these types of communications, please e-mail [email or web address]

## 2019-06-27 ENCOUNTER — PATIENT MESSAGE (OUTPATIENT)
Dept: ENDOCRINOLOGY | Facility: CLINIC | Age: 69
End: 2019-06-27

## 2019-06-27 NOTE — CONSULTS
Mr. Ren presents referred by Dr. Ramon for consultation.    VITAL SIGNS:  Per nurses' notes.    CHIEF COMPLAINT:  Sinus pressure and headache.    HISTORY OF PRESENT ILLNESS:  This is a 68-year-old white male who states for the   last week or so, he has had some sinus pressure pain in the frontal regions.    He states his nasal discharge every morning is clear.  His NOSE score is 40.  He   does use Flonase regularly, but has not been using sinus rinses.  He does have   a history of two, possibly three sinus infections annually.  These have been   treated with antibiotics in the past.  He has seen Dr. De Anda who has since   retired on the [employer] and most recently approximately a year ago had a   septoplasty performed.    ADDITIONAL PAST MEDICAL HISTORY:  Positive for abnormal heart rhythm, basal cell   carcinomas, diabetes, reflux disease, pancreatitis.    PAST SURGICAL HISTORY:  Include skin cancer surgery, floor of mouth excision of   the previously mentioned septoplasty, colonoscopy, EGD and lumbar fusion.    FAMILY HISTORY:  Positive for lung disease, cataracts, leukemia.    SOCIAL HISTORY:  He is a current smoker.  Regular drinker.    MEDICATIONS AND ALLERGIES:  Per Med[employer].    PHYSICAL EXAMINATION:  General Appearance:  Well-developed, well-nourished 68-year-old white male in no   apparent distress.  Communication ability is good.  External auditory canals and TMs are clear.  Hearing grossly intact.  His   external nose shows a marked C-shaped deformity to the left side.  Intranasally,   he has an extremely severe septal deviation to the right side with 100%   obstruction.  Turbinate visualized is 3+.  Lips, teeth, and gums normal.    Tonsils are absent.  The remainder of exam is normal.  Inspection is normal.  He   is nontender to palpation.  Salivary glands are normal.  Ocular mobility   normal.  NECK:  Supple.  Thyroid:  No masses.  LYMPHATICS:  No nodes.  RESPIRATORY:  Effort normal.  Cranial nerves 2-12  are grossly intact.    PERTINENT FINDINGS:  Bilateral external auditory canal shows cerumen impaction.    I have referred him to one of our ear specialists for cleaning.  External nose   shows C-shaped deformity to the left.  He does state he had a trauma many years   ago.  Intranasally, his septum is straight, 1 to 2+ turbinates.  Airway is   clear.  Intraorally, tonsils are minimum.  Palate normal.  He is somewhat tender   to palpation in the frontal and maxillary regions.    IMPRESSION:  A 68-year-old white male with history of recurrent sinus infections   with sinus pressure over the last week.  I have discussed my findings with him   in detail as well as my recommendations for treatment.  My recommendation would   be for sinus rinses utilizing distilled water and I have given him literature on   this and described its use in detail.  I have also encouraged him to continue   with his Flonase.  He will do the rinses for one month.  If he continues to have   symptoms of sinus infection, he will return to clinic and we will obtain a CT   scan of his sinuses.  Upon further discussion, he states that Dr. De Anda did   perform a CT little over a year ago, which showed his sinuses to be clear;   however, did confirm his marked septal deviation at that time.  He will return   to clinic here pcecil KRISHNAMURTHY/GARRY  dd: 06/26/2019 15:52:03 (CDT)  td: 06/27/2019 06:06:22 (CDT)  Doc ID   #[id number]  Job ID #[id number]    CC:

## 2019-07-09 RX ORDER — MONTELUKAST SODIUM 10 MG/1
TABLET ORAL
Qty: 30 TABLET | Refills: 5 | Status: SHIPPED | OUTPATIENT
Start: 2019-07-09 | End: 2020-07-06 | Stop reason: SDUPTHER

## 2019-08-20 ENCOUNTER — PATIENT OUTREACH (OUTPATIENT)
Dept: ADMINISTRATIVE | Facility: HOSPITAL | Age: 69
End: 2019-08-20

## 2019-08-21 ENCOUNTER — HOSPITAL ENCOUNTER (OUTPATIENT)
Dept: RADIOLOGY | Facility: HOSPITAL | Age: 69
Discharge: HOME | End: 2019-08-21
Attending: NEUROLOGICAL SURGERY
Payer: MEDICARE

## 2019-08-21 ENCOUNTER — OFFICE VISIT (OUTPATIENT)
Dept: NEUROSURGERY | Facility: CLINIC | Age: 69
End: 2019-08-21
Payer: MEDICARE

## 2019-08-21 ENCOUNTER — TELEPHONE (OUTPATIENT)
Dept: NEUROSURGERY | Facility: CLINIC | Age: 69
End: 2019-08-21

## 2019-08-21 VITALS
TEMPERATURE: 98 F | HEART RATE: 42 BPM | SYSTOLIC BLOOD PRESSURE: 145 MMHG | WEIGHT: 170.63 LBS | BODY MASS INDEX: 23.14 KG/M2 | DIASTOLIC BLOOD PRESSURE: 67 MMHG

## 2019-08-21 DIAGNOSIS — Z98.1 STATUS POST LUMBAR SPINAL FUSION: ICD-10-CM

## 2019-08-21 DIAGNOSIS — M48.062 SPINAL STENOSIS, LUMBAR REGION, WITH NEUROGENIC CLAUDICATION: ICD-10-CM

## 2019-08-21 DIAGNOSIS — M48.062 SPINAL STENOSIS OF LUMBAR REGION WITH NEUROGENIC CLAUDICATION: Primary | ICD-10-CM

## 2019-08-21 DIAGNOSIS — M51.16 LUMBAR DISC HERNIATION WITH RADICULOPATHY: ICD-10-CM

## 2019-08-21 DIAGNOSIS — M48.062 SPINAL STENOSIS OF LUMBAR REGION WITH NEUROGENIC CLAUDICATION: ICD-10-CM

## 2019-08-21 PROCEDURE — 72131 CT LUMBAR SPINE W/O DYE: CPT | Mod: TC,HCNC

## 2019-08-21 PROCEDURE — 3288F FALL RISK ASSESSMENT DOCD: CPT | Mod: HCNC,CPTII,S$GLB, | Performed by: NEUROLOGICAL SURGERY

## 2019-08-21 PROCEDURE — 3288F PR FALLS RISK ASSESSMENT DOCUMENTED: ICD-10-PCS | Mod: HCNC,CPTII,S$GLB, | Performed by: NEUROLOGICAL SURGERY

## 2019-08-21 PROCEDURE — 99999 PR PBB SHADOW E&M-EST. PATIENT-LVL III: CPT | Mod: PBBFAC,HCNC,, | Performed by: NEUROLOGICAL SURGERY

## 2019-08-21 PROCEDURE — 1100F PTFALLS ASSESS-DOCD GE2>/YR: CPT | Mod: HCNC,CPTII,S$GLB, | Performed by: NEUROLOGICAL SURGERY

## 2019-08-21 PROCEDURE — 1100F PR PT FALLS ASSESS DOC 2+ FALLS/FALL W/INJURY/YR: ICD-10-PCS | Mod: HCNC,CPTII,S$GLB, | Performed by: NEUROLOGICAL SURGERY

## 2019-08-21 PROCEDURE — 99999 PR PBB SHADOW E&M-EST. PATIENT-LVL III: ICD-10-PCS | Mod: PBBFAC,HCNC,, | Performed by: NEUROLOGICAL SURGERY

## 2019-08-21 PROCEDURE — 99214 PR OFFICE/OUTPT VISIT, EST, LEVL IV, 30-39 MIN: ICD-10-PCS | Mod: HCNC,S$GLB,, | Performed by: NEUROLOGICAL SURGERY

## 2019-08-21 PROCEDURE — 72131 CT LUMBAR SPINE WITHOUT CONTRAST: ICD-10-PCS | Mod: 26,HCNC,, | Performed by: RADIOLOGY

## 2019-08-21 PROCEDURE — 72131 CT LUMBAR SPINE W/O DYE: CPT | Mod: 26,HCNC,, | Performed by: RADIOLOGY

## 2019-08-21 PROCEDURE — 99214 OFFICE O/P EST MOD 30 MIN: CPT | Mod: HCNC,S$GLB,, | Performed by: NEUROLOGICAL SURGERY

## 2019-08-21 RX ORDER — GABAPENTIN 300 MG/1
300 CAPSULE ORAL 3 TIMES DAILY
Qty: 90 CAPSULE | Refills: 11 | Status: SHIPPED | OUTPATIENT
Start: 2019-08-21 | End: 2020-10-05

## 2019-08-21 NOTE — PROGRESS NOTES
Subjective:   I, Ny Thibodeaux, attest that this documentation has been prepared under the direction and in the presence of Josias Ling MD.     Patient ID: Moe Ren is a 68 y.o. male     Chief Complaint: No chief complaint on file.      HPI  Mr. Moe Ren is a pleasant 68 y.o. gentleman who is s/p MIS left-sided TLIF at L4-5 and L5-S1 for lumbar spondylosis and spondylolisthesis on 01/10/2019 and presents today for his presents today for his 6 month follow up with CT of lumbar spine. Hx: The pt has been under my care for back pain that is worse on the left than the right. He had progressively worsening of his back pain and significant limitations of his physical activities because of his back pain and weakness in the RLE. He had a work up with an MRI of his lumbar spine which revealed he had a grade I spondylolisthesis at L4-L5 and spondylosis with facet arthritis at L4-L5 and L5-S1 which were surgical in nature. We proceeded and pt did well postoperatively. He was seen postoperatively in clinic on 02/27/2019; at that time he reported resolution of his RLE pain and being able to walk 0.5 miles before becoming fatigued.    Pt states he has been doing fairly well, however, he has been having pain in the plantar aspect of his left foot and medial dorsal aspect of his right foot. He states the pain began approximately 4-5 months ago. He describes it as an aching pain at its best, and a throbbing/sharp pain at its worst. He denies a pinpoint area that elicits the pain. Pt states he has tried Gabapentin in the past but was unable to tolerate it due to stomach discomfort. He admits that his diet is substandard at baseline.     Review of Systems   Constitutional: Negative for activity change, appetite change, fatigue, fever and unexpected weight change.   HENT: Negative for facial swelling.    Eyes: Negative.    Respiratory: Negative.    Cardiovascular: Negative.    Gastrointestinal: Negative for diarrhea, nausea  and vomiting.   Endocrine: Negative.    Genitourinary: Negative.    Musculoskeletal: Negative for back pain, joint swelling, myalgias and neck pain.   Neurological: Negative for dizziness, weakness, numbness and headaches.        Positive for bilateral foot pain.   Psychiatric/Behavioral: Negative.       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 He appears well-nourished.   HENT:   Head: Normocephalic and atraumatic.   Neck: Neck supple.   Neurological: He is alert and oriented to person, place, and time. No cranial nerve deficit. He displays a negative Romberg sign. GCS eye subscore is 4. GCS verbal subscore is 5. GCS motor subscore is 6.   Decreased vibratory sensation in the feet.          IMAGING:  CT Lumbar Spine Without Contrast (08/21/2019)  shows postoperative changes of MIS left-sided TLIF at L4-5 and L5-S1 with proper hardware placement and alignment and bone growth into the cages.    Hemoglobin A1c (05/28/2019): elevated at 7.7.    I have personally reviewed the images with the pt.      I, Dr. Josias Ling, personally performed the services described in this documentation. All medical record entries made by the scribeNy, were at my direction and in my presence.  I have reviewed the chart and agree that the record reflects my personal performance and is accurate and complete. Josias Ling MD. 08/21/2019    Assessment:       1. Spinal stenosis of lumbar region with neurogenic claudication    2. Spinal stenosis, lumbar  region, with neurogenic claudication         Plan:   I have personally reviewed the CT Lumbar Spine with the pt which shows postoperative changes of MIS left-sided TLIF at L4-5 and L5-S1 with proper hardware placement and alignment and bone growth into the cages.    Pt cleared to remove the back brace but may continue to use it for comfort.    Pt instructed to remain active but to refrain from lifting more than 30 lbs at a time.    Foot Neuropathy: Pt prescribed Gabapentin 300 mg TID.    Continue to use the bone growth stimulator for the next 6 months.     I will schedule the patient for 1 year follow up with CT Lumbar Spine

## 2019-08-21 NOTE — PATIENT INSTRUCTIONS
I have personally reviewed the CT Lumbar Spine with the pt which shows postoperative changes of MIS left-sided TLIF at L4-5 and L5-S1 with proper hardware placement and alignment and bone growth into the cages    Pt cleared to remove the back brace and may continue to use it for comfort.    Pt instructed to remain active but to refrain from lifting more than 30 lbs at a time.    Foot Neuropathy: Pt prescribed Gabapentin 300 mg TID.    Continue to use the bone growth stimulator for the next 6 months.     I will schedule the patient for 1 year follow up with CT Lumbar Spine

## 2019-09-12 NOTE — PROGRESS NOTES
This note was created by combination of typed  and Dragon dictation.  Transcription errors may be present.  If there are any questions, please contact me.    Assessment & Plan:   Syncope and collapse  Premature atrial contraction  -certainly could be due to alcohol use as it seems to only occur in the early evenings after E he has had a few drinks.  I did express my concern about this and he understands but is not quite ready to proceed with action plan for quitting.  He does have orthostasis from sitting to standing today.  Flomax has been helpful for him for urination.  That has been on since January.  He is taking Toprol for CAD.  Try 1/2 tablet  Stay on low dose valsartan asked him to confirm dose.    Chronic narcotic use  -weaning to goal of off.     Coronary artery disease involving native coronary artery of native heart without angina pectoris with mildly abn stress test 11/2018; med mgmt  Premature atrial contraction  -check labs.  -     Comprehensive metabolic panel; Future; Expected date: 09/13/2019  -     Lipid panel; Future; Expected date: 09/13/2019    DM type 2 with diabetic peripheral neuropathy  -check labs. Followed by endo NP  -     CBC auto differential; Future; Expected date: 09/13/2019  -     Lipid panel; Future; Expected date: 09/13/2019    Anxiety disorder, unspecified type  -upcoming dentist and derm and he finds these stressful.  Refilled xanax #15, 1/2 tablet at a time, and should last at least 30 days. Last RF was 5/2019.   I would like for goal to be off completely by January 2020.  -     ALPRAZolam (XANAX) 0.25 MG tablet; 1/2 tablet daily PRN; reduced dose. This should last at least 30 days  Dispense: 15 tablet; Refill: 0    Essential hypertension  -reduce to 1/2 tablet.  -     metoprolol succinate (TOPROL-XL) 25 MG 24 hr tablet; Take 0.5 tablets (12.5 mg total) by mouth once daily.  Dispense: 15 tablet; Refill: 11    Needs flu shot  -     Influenza - High Dose (65+) (PF)  (IM)        Medications Discontinued During This Encounter   Medication Reason    metoprolol succinate (TOPROL-XL) 25 MG 24 hr tablet     ALPRAZolam (XANAX) 0.25 MG tablet        meds sent this encounter:  Medications Ordered This Encounter   Medications    ALPRAZolam (XANAX) 0.25 MG tablet     Si/2 tablet daily PRN; reduced dose. This should last at least 30 days     Dispense:  15 tablet     Refill:  0    metoprolol succinate (TOPROL-XL) 25 MG 24 hr tablet     Sig: Take 0.5 tablets (12.5 mg total) by mouth once daily.     Dispense:  15 tablet     Refill:  11       Follow Up: No follow-ups on file.    Subjective:     Chief Complaint   Patient presents with    Hyperlipidemia       HPI  Moe is a 68 y.o. male, last appointment with this clinic was 6/10/2019.    He saw neuro surgery in follow-up in mid August.  CT lumbar spine showing postop changes with proper hardware placement and alignments and bone growth.  He was okay to remove back brace  He saw ENT for recurrent sinus infections and sinus pressure.  Recommendations were sinus rinses and Flonase.  If no improvement, CT sinus  Diabetes nurse practitioner changed insulin and decrease the carb ratio to avoid afternoon hypoglycemia.    Last visit with me in , plan was to continue weaning down narcotic, last visit Norco 5 mg, half tablet daily  And weaning down the alprazolam as well.  Plan was next refill to be alprazolam 0.25 half tablet #15.  He was supposed to see neuro for the small fiber neuropathy.  Don't see that he did.      norco 5 mg #15  5/6 xanax 0.25 #30    He notes in the past several weeks he has been falling.  Maybe 3 times.  Feels like he is maybe getting up too quickly.  Usually this happens from getting up from sitting to standing.  Twice I believe he was getting out of a car and this happened.  He does not see stars but he would just feel weak and have to try and sit down and if he does not, he may fall.  It happened last  Wednesday.  Getting out of a car.  Last night getting out of the car to go get some any eat he felt faint but did not pass out.  Last Friday he did.    No chest pain, no headache, no unilateral weakness.    This seems to happen in the early evening.    He is still drinking to excess by his own admission.  Maybe 6 drinks a day.  Typically he drinks in the evening.  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  When he is working he does not drink but starting in the evening he will drink, and it is usually after having a few drinks that this will occur.    He has been taking Flomax since January.  But this only really started in earnest a few weeks ago.  I had previously seen him back in October for syncopal episodes, he had a Holter showing PVCs and PACs, plan was if this persists, implanted loop recorder.    Does not feel sensation of tachycardia.  He has skipped beats but he notes that this is not new.    In regards to alcohol use, pre contemplative stage of quitting.  I previously given him referral resources for substance use and he tells me he has them but is not ready to proceed.  He notes his wife is also concerned about his alcohol use.    He has been spacing out his Xanax and his hydrocodone use.  Has not completely run out of either and last fill was back in May.  These asking refill for a small prescription for Xanax, he has got some upcoming dental work and he finds it extremely stressful.  And similarly has Dermatology appointment and finds that extremely stressful as well.  I did discuss with him that long-term I want him off of this and he completely understands.    Reconciling the med list, looks like at 1 point he possibly was taking the valsartan twice daily though he is unsure.  I have asked him to verify.  He should be on it once a day.    Answers for HPI/ROS submitted by the patient on 9/11/2019   activity change: No  unexpected weight change: No  rhinorrhea: No  trouble  swallowing: No  visual disturbance: No  chest tightness: No  polyuria: No  difficulty urinating: No  joint swelling: No  arthralgias: No  confusion: No  dysphoric mood: No      Patient Care Team:  Damian Ramon MD as PCP - General (Internal Medicine)  Kandice Bonilla RD (Inactive) as Dietitian (Diabetes)  Josias Ling MD as Consulting Physician (Neurosurgery)  Aime De Anda MD (Inactive) as Consulting Physician (Otolaryngology)  Blu Neil MA as Care Coordinator    Patient Active Problem List    Diagnosis Date Noted    Bilateral carotid artery stenosis mild on US 11/2018 04/08/2019     Carotid US 11/14/18 Mild bilateral internal carotid artery plaque with no hemodynamically significant stenosis      Lumbar stenosis 1/11/19 FUSION, SPINE, LUMBAR, TLIF, MINIMALLY INVASIVE @L4/5 & L5/S1 (Left) 01/10/2019     1/11/19 FUSION, SPINE, LUMBAR, TLIF, MINIMALLY INVASIVE @L4/5 & L5/S1 (Left)          Hyponatremia 01/07/2019    Asymptomatic varicose veins of left lower extremity 01/07/2019    Coronary artery disease involving native coronary artery of native heart without angina pectoris with mildly abn stress test 11/2018; med mgmt 01/06/2019 11/14/2018 nuclear medicine stress test abnormal, small amount of mild ischemia in the apical walls.  EF 66%.      NSAID-induced duodenal ulcer while traveling. EGD done in [place], [facility] 10/2018 with duodenal ulcers 11/09/2018    Syncope and collapse 11/05/2018    Schatzki's ring of distal esophagus 6/2018 s/p dilitation 06/19/2018    Hiatal hernia on EGD 6/2018; biopsies chronic gastritis, H pylori negative. 06/19/2018    Calculus of gallbladder without cholecystitis without obstruction on RUQ US 5/2018 05/21/2018    Chronic seasonal allergic rhinitis 03/01/2018    Alcohol-induced chronic pancreatitis 01/01/2018    Abdominal aortic atherosclerosis 11/14/2017     On CXR 4/5/2016 and on 11/2/2012 XR L spine  On AAA screening 3/2018      Chronic  obstructive bronchitis with pulmonary emphysema 11/14/2017 12/2017 PFTs showed mild airflow obstruction.  Lung volume showing air trapping.  DLCO reduced. no O2 desat.  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    Chronic narcotic use 08/02/2017    Deviated nasal septum 04/19/2016    Chronic back pain greater than 3 months duration 01/06/2016    Small fiber neuropathy 07/29/2015     Likely related to alcohol use      Facet arthritis of lumbar region 10/08/2014    Spondylosis without myelopathy 08/13/2014    Degeneration of lumbar or lumbosacral intervertebral disc 08/13/2014    Tubular adenoma of colon 4/2017 04/22/2014 4/11/2017 colonoscopy with transverse tubular adenoma      DM type 2 with diabetic peripheral neuropathy 07/24/2013    Hypertriglyceridemia 06/13/2013    Anxiety disorder 06/12/2013     Trazodone ineffective.      Alcohol use disorder, mild, in controlled environment 06/12/2013    Continuous tobacco abuse 06/12/2013       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    Cataract     Diabetes mellitus     Gastric ulcer     GERD (gastroesophageal reflux disease)     Pancreatitis 2008    2 Times       PAST SURGICAL HISTORY:  Past Surgical History:   Procedure Laterality Date     thumb surgery      Dead Skin cell tumor Rt thumb    CAUTERIZATION OF TURBINATES N/A 4/19/2016    [facility]    COLONOSCOPY N/A 4/11/2017    Performed by Meet Quesada [facility] at [facility] ENDO    ANDREA-TRANSFORAMINAL Right 11/26/2014    [facility]    ANDREA-TRANSFORAMINAL Right 11/5/2014    [facility]    ESOPHAGOGASTRODUODENOSCOPY (EGD) N/A 6/18/2018    Performed by Marco Antonio HORVATH  [facility] ENDO    ESOPHAGOGASTRODUODENOSCOPY (EGD) N/A 5/30/2014    Per[facility] by [facility]ada [facility] at [facility] ENDO    FUSION, SPINE, LUMBAR, TLIF, MINIMALLY INVASIVE @L4/5 & L5/S1 Left 1/10/2019    [facility]    MOUTH FLOOR MASS EXCISION  2009    NASAL SEPTUM SURGERY      RECONSTRUCTION-NASAL N/A 4/19/2016    [facility]    SKIN SURGERY         SOCIAL HISTORY:  Social History     Socioeconomic History    Marital status:      Spouse name: Not on file    Number of children: Not on file    Years of education: Not on file    Highest education level: Not on file   Occupational History    Occupation: , self employed    Social Needs    Financial resource strain: Not hard at all    Food insecurity:     Worry: Never true     Inability: Never true    Transportation needs:     Medical: No     Non-medical: No   Tobacco Use    Smoking status: Current Every Day Smoker     Packs/day: 0.25     Years: 45.00     Pack years: 11.25     Types: Cigarettes    Smokeless tobacco: Never Used   Substance and Sexual Activity    Alcohol use: Yes     Alcohol/week: 0.5 oz     Types: 1 Standard drinks or equivalent per week     Frequency: 4 or more times a week     Drinks per session: 5 or 6     Binge frequency: Weekly     Comment: 4 drinks a week    Drug use: No    Sexual activity: Yes     Partners: Female   Lifestyle    Physical activity:     Days per week: 0 days     Minutes per session: 0 min    Stress: Only a little   Relationships    Social connections:     Talks on phone: Three times a week     Gets together: Three times a week     Attends Sabianist service: Not on file     Active member of club or organization: Yes     Attends meetings of clubs or organizations: More than 4 times per year     Relationship status:    Other Topics Concern    Not on file   Social History Narrative    ,  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  One child committed suicide.       ALLERGIES AND MEDICATIONS: updated and reviewed.  Review of patient's allergies indicates:   Allergen Reactions    Amoxicillin Nausea And Vomiting    Adhesive Rash     Can only use paper tape   Has problem with Band aid    Gabapentin      Upset stomach    Metformin Diarrhea     Appetite loss      Current Outpatient Medications   Medication Sig Dispense Refill    ALPRAZolam (XANAX) 0.25 MG tablet 1/2 tablet daily PRN; reduced dose. This should last at least 30 days 15 tablet 0    amitriptyline (ELAVIL) 25 MG tablet Take 1 tablet (25 mg total) by mouth every evening. 30 tablet 11    atorvastatin (LIPITOR) 10 MG tablet Take 1 tablet (10 mg total) by mouth once daily. 90 tablet 2    blood sugar diagnostic (ACCU-CHEK KM PLUS TEST STRP) Strp 1 strip by Misc.(Non-Drug; Combo Route) route 6 (six) times daily. To be used with Accu-Chek Km Plus glucometer 600 strip 3    blood-glucose meter kit Use as instructed, Accu-Chek Km Plus glucometer 1 each 0    desoximetasone (TOPICORT) 0.25 % cream Apply topically 2 (two) times daily as needed.       escitalopram oxalate (LEXAPRO) 20 MG tablet Take 1 tablet (20 mg total) by mouth once daily. 90 tablet 1    fluticasone (FLONASE) 50 mcg/actuation nasal spray 2 sprays (100 mcg total) by Each Nare route daily as needed. 16 g 2    gabapentin (NEURONTIN) 300 MG capsule Take 1 capsule (300 mg total) by mouth 3 (three) times daily. 90 capsule 11    hydrOXYzine (ATARAX) 50 MG tablet TAKE 1/2 (ONE-HALF) TABLET BY MOUTH EVERY 4 TO 6 HOURS AS NEEDED FOR ANXIETY 90 tablet 1    insulin aspart U-100 (NOVOLOG FLEXPEN U-100 INSULIN) 100 unit/mL InPn pen Sliding scale up to 20 units TID. MAX 50 UNITS/DAY. 45 mL 5    ipratropium (ATROVENT HFA) 17 mcg/actuation inhaler Inhale 2 puffs into the lungs every 6 (six) hours. Rescue 12.9 g 5    lancets (ACCU-CHEK FASTCLIX  "LANCING DEV) Misc 1 lancet by Misc.(Non-Drug; Combo Route) route 6 (six) times daily. To be used with Accu-Chek Amanda Plus glucometer 600 each 3    metoprolol succinate (TOPROL-XL) 25 MG 24 hr tablet Take 1 tablet (25 mg total) by mouth once daily. 30 tablet 11    montelukast (SINGULAIR) 10 mg tablet TAKE 1 TABLET BY MOUTH ONCE DAILY IN THE EVENING 30 tablet 5    multivitamin (THERAGRAN) per tablet Take 2 tablets by mouth once daily.      omeprazole (PRILOSEC) 40 MG capsule Take 1 capsule (40 mg total) by mouth 2 (two) times daily before meals. (Patient taking differently: Take 40 mg by mouth once daily. ) 60 capsule 3    pen needle, diabetic (BD ULTRA-FINE RENETTA PEN NEEDLE) 32 gauge x 5/32" Ndle Use 3x/day with Novolog 300 each 3    sodium chloride (SALINE NOSE NASL) by Nasal route.      tamsulosin (FLOMAX) 0.4 mg Cap Take 1 capsule (0.4 mg total) by mouth once daily. 90 capsule 1    triamcinolone acetonide 0.1% (KENALOG) 0.1 % Lotn       valsartan (DIOVAN) 40 MG tablet Take 1 tablet (40 mg total) by mouth once daily. (Patient taking differently: Take 40 mg by mouth 2 (two) times daily. ) 90 tablet 3    valACYclovir (VALTREX) 1000 MG tablet Take 1 tablet (1,000 mg total) by mouth every 12 (twelve) hours. for 7 doses 14 tablet 0     No current facility-administered medications for this visit.        Review of Systems   HENT: Negative for hearing loss.    Eyes: Negative for discharge.   Respiratory: Negative for wheezing.    Cardiovascular: Negative for chest pain and palpitations.   Gastrointestinal: Negative for blood in stool, constipation, diarrhea and vomiting.   Genitourinary: Negative for hematuria and urgency.   Musculoskeletal: Negative for neck pain.   Neurological: Negative for weakness and headaches.   Endo/Heme/Allergies: Negative for polydipsia.       Objective:   Physical Exam   Vitals:    09/13/19 1456 09/13/19 1526 09/13/19 1527 09/13/19 1528   BP: (!) 146/64 (!) 150/80 (!) 142/80 110/70 "   BP Location: Right arm Left arm Left arm Left arm   Patient Position: Sitting Lying Sitting Standing   BP Method: Medium (Manual) Medium (Manual) Medium (Manual) Medium (Manual)   Pulse: 70 60 60 60   Temp: 98.2 °F (36.8 °C)      TempSrc: Oral      SpO2: 95%      Weight: 79.8 kg (176 lb)      Height: 6' (1.829 m)       Body mass index is 23.87 kg/m².  Weight: 79.8 kg (176 lb)   Height: 6' (182.9 cm)     Physical Exam   Constitutional: He is oriented to person, place, and time. He appears well-developed and well-nourished. No distress.   Eyes: EOM are normal.   Cardiovascular: Normal heart sounds.   No murmur heard.  Bradycardic, frequent ectopy   Pulmonary/Chest: Effort normal and breath sounds normal.   Musculoskeletal: Normal range of motion. He exhibits no edema.   Neurological: He is alert and oriented to person, place, and time. Coordination normal.   Skin: Skin is warm and dry.   Psychiatric: He has a normal mood and affect. His behavior is normal. Thought content normal.

## 2019-09-13 ENCOUNTER — OFFICE VISIT (OUTPATIENT)
Dept: FAMILY MEDICINE | Facility: CLINIC | Age: 69
End: 2019-09-13
Payer: MEDICARE

## 2019-09-13 ENCOUNTER — LAB VISIT (OUTPATIENT)
Dept: LAB | Facility: HOSPITAL | Age: 69
End: 2019-09-13
Payer: MEDICARE

## 2019-09-13 VITALS
OXYGEN SATURATION: 95 % | HEIGHT: 72 IN | BODY MASS INDEX: 23.84 KG/M2 | DIASTOLIC BLOOD PRESSURE: 70 MMHG | TEMPERATURE: 98 F | WEIGHT: 176 LBS | SYSTOLIC BLOOD PRESSURE: 110 MMHG | HEART RATE: 60 BPM

## 2019-09-13 DIAGNOSIS — I10 ESSENTIAL HYPERTENSION: ICD-10-CM

## 2019-09-13 DIAGNOSIS — Z23 NEEDS FLU SHOT: ICD-10-CM

## 2019-09-13 DIAGNOSIS — F41.9 ANXIETY DISORDER, UNSPECIFIED TYPE: Chronic | ICD-10-CM

## 2019-09-13 DIAGNOSIS — R55 SYNCOPE AND COLLAPSE: Primary | ICD-10-CM

## 2019-09-13 DIAGNOSIS — I25.10 CORONARY ARTERY DISEASE INVOLVING NATIVE CORONARY ARTERY OF NATIVE HEART WITHOUT ANGINA PECTORIS: ICD-10-CM

## 2019-09-13 DIAGNOSIS — E11.42 DM TYPE 2 WITH DIABETIC PERIPHERAL NEUROPATHY: ICD-10-CM

## 2019-09-13 DIAGNOSIS — F11.90 CHRONIC NARCOTIC USE: ICD-10-CM

## 2019-09-13 DIAGNOSIS — I49.1 PREMATURE ATRIAL CONTRACTION: ICD-10-CM

## 2019-09-13 LAB
BASOPHILS # BLD AUTO: 0.14 K/UL (ref 0–0.2)
BASOPHILS NFR BLD: 1.4 % (ref 0–1.9)
DIFFERENTIAL METHOD: ABNORMAL
EOSINOPHIL # BLD AUTO: 0.8 K/UL (ref 0–0.5)
EOSINOPHIL NFR BLD: 8 % (ref 0–8)
ERYTHROCYTE [DISTWIDTH] IN BLOOD BY AUTOMATED COUNT: 12.9 % (ref 11.5–14.5)
HCT VFR BLD AUTO: 41.3 % (ref 40–54)
HGB BLD-MCNC: 13.4 G/DL (ref 14–18)
IMM GRANULOCYTES # BLD AUTO: 0.04 K/UL (ref 0–0.04)
IMM GRANULOCYTES NFR BLD AUTO: 0.4 % (ref 0–0.5)
LYMPHOCYTES # BLD AUTO: 2.7 K/UL (ref 1–4.8)
LYMPHOCYTES NFR BLD: 27.4 % (ref 18–48)
MCH RBC QN AUTO: 32.4 PG (ref 27–31)
MCHC RBC AUTO-ENTMCNC: 32.4 G/DL (ref 32–36)
MCV RBC AUTO: 100 FL (ref 82–98)
MONOCYTES # BLD AUTO: 1.3 K/UL (ref 0.3–1)
MONOCYTES NFR BLD: 13 % (ref 4–15)
NEUTROPHILS # BLD AUTO: 5 K/UL (ref 1.8–7.7)
NEUTROPHILS NFR BLD: 49.8 % (ref 38–73)
NRBC BLD-RTO: 0 /100 WBC
PLATELET # BLD AUTO: 245 K/UL (ref 150–350)
PMV BLD AUTO: 10.8 FL (ref 9.2–12.9)
RBC # BLD AUTO: 4.13 M/UL (ref 4.6–6.2)
WBC # BLD AUTO: 9.98 K/UL (ref 3.9–12.7)

## 2019-09-13 PROCEDURE — 99999 PR PBB SHADOW E&M-EST. PATIENT-LVL IV: CPT | Mod: PBBFAC,HCNC,, | Performed by: INTERNAL MEDICINE

## 2019-09-13 PROCEDURE — 90662 FLU VACCINE - HIGH DOSE (65+) PRESERVATIVE FREE IM: ICD-10-PCS | Mod: HCNC,S$GLB,, | Performed by: INTERNAL MEDICINE

## 2019-09-13 PROCEDURE — 83036 HEMOGLOBIN GLYCOSYLATED A1C: CPT | Mod: HCNC

## 2019-09-13 PROCEDURE — 80061 LIPID PANEL: CPT | Mod: HCNC

## 2019-09-13 PROCEDURE — G0008 FLU VACCINE - HIGH DOSE (65+) PRESERVATIVE FREE IM: ICD-10-PCS | Mod: HCNC,S$GLB,, | Performed by: INTERNAL MEDICINE

## 2019-09-13 PROCEDURE — 36415 COLL VENOUS BLD VENIPUNCTURE: CPT | Mod: HCNC,PO

## 2019-09-13 PROCEDURE — 3045F PR MOST RECENT HEMOGLOBIN A1C LEVEL 7.0-9.0%: CPT | Mod: HCNC,CPTII,S$GLB, | Performed by: INTERNAL MEDICINE

## 2019-09-13 PROCEDURE — 99999 PR PBB SHADOW E&M-EST. PATIENT-LVL IV: ICD-10-PCS | Mod: PBBFAC,HCNC,, | Performed by: INTERNAL MEDICINE

## 2019-09-13 PROCEDURE — 85025 COMPLETE CBC W/AUTO DIFF WBC: CPT | Mod: HCNC

## 2019-09-13 PROCEDURE — G0008 ADMIN INFLUENZA VIRUS VAC: HCPCS | Mod: HCNC,S$GLB,, | Performed by: INTERNAL MEDICINE

## 2019-09-13 PROCEDURE — 90662 IIV NO PRSV INCREASED AG IM: CPT | Mod: HCNC,S$GLB,, | Performed by: INTERNAL MEDICINE

## 2019-09-13 PROCEDURE — 3045F PR MOST RECENT HEMOGLOBIN A1C LEVEL 7.0-9.0%: ICD-10-PCS | Mod: HCNC,CPTII,S$GLB, | Performed by: INTERNAL MEDICINE

## 2019-09-13 PROCEDURE — 99214 PR OFFICE/OUTPT VISIT, EST, LEVL IV, 30-39 MIN: ICD-10-PCS | Mod: 25,HCNC,S$GLB, | Performed by: INTERNAL MEDICINE

## 2019-09-13 PROCEDURE — 1101F PT FALLS ASSESS-DOCD LE1/YR: CPT | Mod: HCNC,CPTII,S$GLB, | Performed by: INTERNAL MEDICINE

## 2019-09-13 PROCEDURE — 1101F PR PT FALLS ASSESS DOC 0-1 FALLS W/OUT INJ PAST YR: ICD-10-PCS | Mod: HCNC,CPTII,S$GLB, | Performed by: INTERNAL MEDICINE

## 2019-09-13 PROCEDURE — 80053 COMPREHEN METABOLIC PANEL: CPT | Mod: HCNC

## 2019-09-13 PROCEDURE — 99214 OFFICE O/P EST MOD 30 MIN: CPT | Mod: 25,HCNC,S$GLB, | Performed by: INTERNAL MEDICINE

## 2019-09-13 RX ORDER — METOPROLOL SUCCINATE 25 MG/1
12.5 TABLET, EXTENDED RELEASE ORAL DAILY
Qty: 15 TABLET | Refills: 11
Start: 2019-09-13 | End: 2020-01-01

## 2019-09-13 RX ORDER — ALPRAZOLAM 0.25 MG/1
TABLET ORAL
Qty: 15 TABLET | Refills: 0 | Status: SHIPPED | OUTPATIENT
Start: 2019-09-13 | End: 2019-10-21 | Stop reason: SDUPTHER

## 2019-09-13 NOTE — PATIENT INSTRUCTIONS
METOPROLOL XL 25 MG - CUT DOWN DOSE - TAKE 1/2 TABLET DAILY    VALSARTAN - SHOULD BE ON ONE A DAY, NOT TWICE A DAY, PLEASE CONFIRM    IF STILL FALLING - FOLLOW UP WITH CARDIOLOGY.

## 2019-09-14 LAB
ALBUMIN SERPL BCP-MCNC: 3.2 G/DL (ref 3.5–5.2)
ALP SERPL-CCNC: 114 U/L (ref 55–135)
ALT SERPL W/O P-5'-P-CCNC: 14 U/L (ref 10–44)
ANION GAP SERPL CALC-SCNC: 9 MMOL/L (ref 8–16)
AST SERPL-CCNC: 36 U/L (ref 10–40)
BILIRUB SERPL-MCNC: 0.4 MG/DL (ref 0.1–1)
BUN SERPL-MCNC: 9 MG/DL (ref 8–23)
CALCIUM SERPL-MCNC: 9 MG/DL (ref 8.7–10.5)
CHLORIDE SERPL-SCNC: 99 MMOL/L (ref 95–110)
CHOLEST SERPL-MCNC: 129 MG/DL (ref 120–199)
CHOLEST/HDLC SERPL: 2 {RATIO} (ref 2–5)
CO2 SERPL-SCNC: 27 MMOL/L (ref 23–29)
CREAT SERPL-MCNC: 1.3 MG/DL (ref 0.5–1.4)
EST. GFR  (AFRICAN AMERICAN): >60 ML/MIN/1.73 M^2
EST. GFR  (NON AFRICAN AMERICAN): 56.9 ML/MIN/1.73 M^2
ESTIMATED AVG GLUCOSE: 163 MG/DL (ref 68–131)
GLUCOSE SERPL-MCNC: 75 MG/DL (ref 70–110)
HBA1C MFR BLD HPLC: 7.3 % (ref 4–5.6)
HDLC SERPL-MCNC: 64 MG/DL (ref 40–75)
HDLC SERPL: 49.6 % (ref 20–50)
LDLC SERPL CALC-MCNC: 41.2 MG/DL (ref 63–159)
NONHDLC SERPL-MCNC: 65 MG/DL
POTASSIUM SERPL-SCNC: 4.9 MMOL/L (ref 3.5–5.1)
PROT SERPL-MCNC: 6.1 G/DL (ref 6–8.4)
SODIUM SERPL-SCNC: 135 MMOL/L (ref 136–145)
TRIGL SERPL-MCNC: 119 MG/DL (ref 30–150)

## 2019-09-16 NOTE — PROGRESS NOTES
CMP with mildly low albumin.  Mild hyponatremia seen previously.  At risk alcohol use.  CBC hemoglobin improved.  Platelet okay.  Lipid profile good on Lipitor 10  Results to patient via my [facility].  We had previously discussed cutting down alcohol use at his visit.  Stay on current medicines.

## 2019-09-18 ENCOUNTER — PATIENT MESSAGE (OUTPATIENT)
Dept: FAMILY MEDICINE | Facility: CLINIC | Age: 69
End: 2019-09-18

## 2019-09-18 DIAGNOSIS — R07.81 RIB PAIN: Primary | ICD-10-CM
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
## 2019-09-19 ENCOUNTER — HOSPITAL ENCOUNTER (OUTPATIENT)
Dept: RADIOLOGY | Facility: HOSPITAL | Age: 69
Discharge: HOME | End: 2019-09-19
Attending: INTERNAL MEDICINE
Payer: MEDICARE

## 2019-09-19 DIAGNOSIS — R07.81 RIB PAIN: ICD-10-CM
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
## 2019-09-19 PROCEDURE — 71100 X-RAY EXAM RIBS UNI 2 VIEWS: CPT | Mod: 26,HCNC,RT, | Performed by: RADIOLOGY

## 2019-09-19 PROCEDURE — 71100 XR RIBS 2 VIEW RIGHT: ICD-10-PCS | Mod: 26,HCNC,RT, | Performed by: RADIOLOGY

## 2019-09-19 PROCEDURE — 71100 X-RAY EXAM RIBS UNI 2 VIEWS: CPT | Mod: TC,HCNC,FY,PO,RT

## 2019-09-19 NOTE — PROGRESS NOTES
1. Acute minimally displaced fractures of the anterior right 7th and 8th ribs as above.  2. Minimal subsegmental atelectasis right costophrenic angle.    Rib fractures. Results to pt via my [facility]. Slow but steady resolution. High risk for narcotics

## 2019-09-23 ENCOUNTER — PATIENT MESSAGE (OUTPATIENT)
Dept: FAMILY MEDICINE | Facility: CLINIC | Age: 69
End: 2019-09-23

## 2019-09-23 ENCOUNTER — PATIENT MESSAGE (OUTPATIENT)
Dept: ENDOCRINOLOGY | Facility: CLINIC | Age: 69
End: 2019-09-23

## 2019-09-26 ENCOUNTER — OFFICE VISIT (OUTPATIENT)
Dept: ENDOCRINOLOGY | Facility: CLINIC | Age: 69
End: 2019-09-26
Payer: MEDICARE

## 2019-09-26 VITALS
SYSTOLIC BLOOD PRESSURE: 146 MMHG | BODY MASS INDEX: 22.97 KG/M2 | WEIGHT: 169.38 LBS | HEART RATE: 60 BPM | DIASTOLIC BLOOD PRESSURE: 70 MMHG

## 2019-09-26 DIAGNOSIS — I25.10 CORONARY ARTERY DISEASE INVOLVING NATIVE CORONARY ARTERY OF NATIVE HEART WITHOUT ANGINA PECTORIS: ICD-10-CM

## 2019-09-26 DIAGNOSIS — Z72.0 TOBACCO ABUSE: ICD-10-CM

## 2019-09-26 DIAGNOSIS — F10.10 ETOH ABUSE: ICD-10-CM

## 2019-09-26 DIAGNOSIS — E78.5 HYPERLIPIDEMIA LDL GOAL <70: ICD-10-CM

## 2019-09-26 DIAGNOSIS — E11.42 DM TYPE 2 WITH DIABETIC PERIPHERAL NEUROPATHY: Primary | ICD-10-CM

## 2019-09-26 DIAGNOSIS — E11.649 HYPOGLYCEMIA ASSOCIATED WITH DIABETES: ICD-10-CM

## 2019-09-26 PROCEDURE — 3045F PR MOST RECENT HEMOGLOBIN A1C LEVEL 7.0-9.0%: CPT | Mod: HCNC,CPTII,S$GLB, | Performed by: NURSE PRACTITIONER

## 2019-09-26 PROCEDURE — 99499 UNLISTED E&M SERVICE: CPT | Mod: HCNC,S$GLB,, | Performed by: NURSE PRACTITIONER

## 2019-09-26 PROCEDURE — 99214 OFFICE O/P EST MOD 30 MIN: CPT | Mod: HCNC,S$GLB,, | Performed by: NURSE PRACTITIONER

## 2019-09-26 PROCEDURE — 99999 PR PBB SHADOW E&M-EST. PATIENT-LVL V: ICD-10-PCS | Mod: PBBFAC,HCNC,, | Performed by: NURSE PRACTITIONER

## 2019-09-26 PROCEDURE — 1101F PT FALLS ASSESS-DOCD LE1/YR: CPT | Mod: HCNC,CPTII,S$GLB, | Performed by: NURSE PRACTITIONER

## 2019-09-26 PROCEDURE — 3045F PR MOST RECENT HEMOGLOBIN A1C LEVEL 7.0-9.0%: ICD-10-PCS | Mod: HCNC,CPTII,S$GLB, | Performed by: NURSE PRACTITIONER

## 2019-09-26 PROCEDURE — 99499 RISK ADDL DX/OHS AUDIT: ICD-10-PCS | Mod: HCNC,S$GLB,, | Performed by: NURSE PRACTITIONER

## 2019-09-26 PROCEDURE — 99999 PR PBB SHADOW E&M-EST. PATIENT-LVL V: CPT | Mod: PBBFAC,HCNC,, | Performed by: NURSE PRACTITIONER

## 2019-09-26 PROCEDURE — 99214 PR OFFICE/OUTPT VISIT, EST, LEVL IV, 30-39 MIN: ICD-10-PCS | Mod: HCNC,S$GLB,, | Performed by: NURSE PRACTITIONER

## 2019-09-26 PROCEDURE — 1101F PR PT FALLS ASSESS DOC 0-1 FALLS W/OUT INJ PAST YR: ICD-10-PCS | Mod: HCNC,CPTII,S$GLB, | Performed by: NURSE PRACTITIONER

## 2019-09-26 NOTE — PROGRESS NOTES
liCC: This 68 y.o. White male  is here for evaluation of  T2DM along with comorbidities indicated in the Visit Diagnosis section of this encounter.    HPI: Moe Ren was diagnosed with T2DM in ~ 2010.  Metformin started at time of diagnosis.     He has a history of squamos cell oral CA, Spinal stenosis, chronic pancreatitis.      Prior visit on 1/31/19  a1c up to 7.5%. Recent glucoses higher after back surgery.   He was advised last week through [facility] to increase ICR at breakfast to 8 and at lunch to 12.   Overall glucoses have improved especially before lunch and dinner (although less so). He continues to have high FBGs. No bedtime snacks.   On Sundays, he gets up early to go to [employer] and finds his BG tends to drop lower on Sundays. Therefore, he usually cuts back his usual Novolog dose by about 2units to avoid hypoglycemia. BG of 57 this past Sunday r/t NOT cutting his usual dose back.   Plan Increase carb ratio before lunch and dinner to 10.   Please take a correction dose of Novolog at bedtime as needed.   Test blood sugar 6x/day. Return to clinic in 5 months with labs prior.     Prior visit 5/28/19  Returns today with fairly stable glucose control. Back to smoking however at 1 ppd; has quit 2x in the last few months with current relapse now. Also drinking alcohol again - about 7-8 oz of scotch last night    Plan Continue current therapy.   Has appt with Neurology in a few weeks.   Test glucoses 5x/day.   rtc in 4 mo with labs prior.     Interval history  a1c down from 7.7 to 7.3%.   Lunch carb ratio is up from 12 to 14.     FBGs are higher now because he snacks at night. He is calculating how much insulin he may need for his after-dinner sancks and taking that combined with dinner insulin dose.     Back to drinking Scotch on ice - about 6 a day. Trying to cut back to 3.   Also back to smoking - 1 ppd.         LAST DIABETES EDUCATION: 6/2016; 2/2018    PRESCRIBED DIABETES MEDICATIONS: Novolog ac; he  rounds up on his dosing of Novolog   [facility]   0515 - 8   1130 - 14  1600 - 10     ISF 1:40, goal 115     Subtracts 2 units of Novolog from Sunday breakfast and lunch since he wakes up late. -- but this last Sunday BG dropped dto 35.     Misses medication doses - No    DM COMPLICATIONS: peripheral neuropathy    SIGNIFICANT DIABETES MED HISTORY:   Tresiba gradually titrated down until it was stopped mid 2017 d/t hypoglycemia.   Novolog stopped at ov 2/5/18 and metformin and tresiba were started. tresiba dc'd about a week later d/t hypo  repaglinide started at ov 2/28/18, and stopped about a month later d/t low efficacy     Avoid incretin mimetics d/t h/o chronic pancreatitis     SELF MONITORING BLOOD GLUCOSE: Checks blood glucose at home 6x/day. See pt's email from today with uploaded BG logbook.     Not using Dexcom right now because he is wearing a back brace.     HYPOGLYCEMIC EPISODES: symptoms start in the 60s but definitely in the 50s. Corrects with coke.    Overall less frequent, generally occurs after meals presumably d/t overcounting carb intake. Has had about 1-2 episodes per week.     CURRENT DIET: 3 meal/day - Eats at 10 am, 2 pm, and 6 pm. Eats about 60 to 130 carbs per meal.   He supplements with Ensure.     CURRENT EXERCISE: none recent    SOCIAL: retired  - consultant now.  Has a 2 yo grandson      BP (!) 146/70 (BP Location: Left arm, Patient Position: Sitting, BP Method: Large (Automatic))   Pulse 60   Wt 76.8 kg (169 lb 6.4 oz)   BMI 22.97 kg/m²       ROS:   CONSTITUTIONAL: Appetite ok, denies fatigue  RESPIRATORY: No shortness of breath  CV: NO CHEST Pain     PHYSICAL EXAM:  GENERAL: Well developed, well nourished. No acute distress.   PSYCH: AAOx3, appropriate mood and affect, conversant, well-groomed. Judgement and insight good.   NEURO: Cranial nerves grossly intact. Speech clear, no tremor.   CHEST: Respirations even and unlabored.      Hemoglobin A1C   Date Value Ref Range  Status   09/13/2019 7.3 (H) 4.0 - 5.6 % Final     Comment:     ADA Screening Guidelines:  5.7-6.4%  Consistent with prediabetes  >or=6.5%  Consistent with diabetes  High levels of fetal hemoglobin interfere with the HbA1C  assay. Heterozygous hemoglobin variants (HbS, HgC, etc)do  not significantly interfere with this assay.   However, presence of multiple variants may affect accuracy.     05/28/2019 7.7 (H) 4.0 - 5.6 % Final     Comment:     ADA Screening Guidelines:  5.7-6.4%  Consistent with prediabetes  >or=6.5%  Consistent with diabetes  High levels of fetal hemoglobin interfere with the HbA1C  assay. Heterozygous hemoglobin variants (HbS, HgC, etc)do  not significantly interfere with this assay.   However, presence of multiple variants may affect accuracy.     01/09/2019 7.5 (H) 4.0 - 5.6 % Final     Comment:     ADA Screening Guidelines:  5.7-6.4%  Consistent with prediabetes  >or=6.5%  Consistent with diabetes  High levels of fetal hemoglobin interfere with the HbA1C  assay. Heterozygous hemoglobin variants (HbS, HgC, etc)do  not significantly interfere with this assay.   However, presence of multiple variants may affect accuracy.             Chemistry        Component Value Date/Time     (L) 09/13/2019 1545    K 4.9 09/13/2019 1545    CL 99 09/13/2019 1545    CO2 27 09/13/2019 1545    BUN 9 09/13/2019 1545    CREATININE 1.3 09/13/2019 1545    GLU 75 09/13/2019 1545        Component Value Date/Time    CALCIUM 9.0 09/13/2019 1545    ALKPHOS 114 09/13/2019 1545    AST 36 09/13/2019 1545    ALT 14 09/13/2019 1545    BILITOT 0.4 09/13/2019 1545    ESTGFRAFRICA >60.0 09/13/2019 1545    EGFRNONAA 56.9 (A) 09/13/2019 1545          Lab Results   Component Value Date    LDLCALC 41.2 (L) 09/13/2019        Ref. Range 9/13/2019 15:45   Cholesterol Latest Ref Range: 120 - 199 mg/dL 129   HDL Latest Ref Range: 40 - 75 mg/dL 64   Hdl/Cholesterol Ratio Latest Ref Range: 20.0 - 50.0 % 49.6   LDL Cholesterol External  Latest Ref Range: 63.0 - 159.0 mg/dL 41.2 (L)   Non-HDL Cholesterol Latest Units: mg/dL 65   Total Cholesterol/HDL Ratio Latest Ref Range: 2.0 - 5.0  2.0   Triglycerides Latest Ref Range: 30 - 150 mg/dL 119           Lab Results   Component Value Date    MICALBCREAT 65.9 (H) 05/28/2019               ASSESSMENT and PLAN:    A1C GOAL: < 7 %     1. DM type 2 with diabetic peripheral neuropathy  Will order Dexcom G6  Continuous Glucose Monitoring (CGM). Please call office if no one contacts you regarding Dexcom in the next couple of weeks. Once you do receive Dexcom supplies, please call office so that diabetes education training visit can be scheduled for you.     Reduce carb ratio at breakfast from 8 to now 10.   Reduce carb ratio at lunch from 14 to now 16.   If blood sugars are still low, please call or message.       Monitor blood sugar 4x/day.   Return to clinic in 3 months with labs prior.       Patient is testing blood sugars 4x/day and taking 3 injections of insulin a day.   Patient needs to use Dexcom CGM readings to help adjust insulin doses.           Ambulatory Referral to Diabetes Education    Hemoglobin A1c    Basic metabolic panel   2. Tobacco abuse  Encouraged cessation    3. ETOH abuse  Reduce alcohol intake    4. Hypoglycemia associated with diabetes  Please makes sure to dose insulin before dinner and before bedtime snack separately. This may help to avoid low glucoses.   Also cut down on alcohol intake.    5. Coronary artery disease involving native coronary artery of native heart without angina pectoris with mildly abn stress test 11/2018; med mgmt  Avoid hypoglycemia.      6. Hyperlipidemia LDL goal <70  At goal              Orders Placed This Encounter   Procedures    Hemoglobin A1c     Standing Status:   Future     Standing Expiration Date:   11/24/2020    Basic metabolic panel     Standing Status:   Future     Standing Expiration Date:   11/24/2020    Ambulatory Referral to Diabetes  Education     Standing Status:   Future     Standing Expiration Date:   9/26/2020     Referral Priority:   Routine     Referral Type:   Consultation     Referral Reason:   Specialty Services Required     Requested Specialty:   Endocrinology     Number of Visits Requested:   1     Expiration Date:   9/26/2020        Follow up in about 3 months (around 12/26/2019).

## 2019-09-26 NOTE — PATIENT INSTRUCTIONS
Will order Dexcom G6  Continuous Glucose Monitoring (CGM). Please call office if no one contacts you regarding Dexcom in the next couple of weeks. Once you do receive Dexcom supplies, please call office so that diabetes education training visit can be scheduled for you.     Reduce carb ratio at breakfast from 8 to now 10.   Reduce carb ratio at lunch from 14 to now 16.   If blood sugars are still low, please call or message.   Please makes sure to dose insulin before dinner and before bedtime snack separately. This may help to avoid low glucoses.   Also cut down on alcohol intake.     Monitor blood sugar 4x/day.   Return to clinic in 3 months with labs prior.

## 2019-09-27 ENCOUNTER — PATIENT MESSAGE (OUTPATIENT)
Dept: ENDOCRINOLOGY | Facility: CLINIC | Age: 69
End: 2019-09-27

## 2019-09-27 DIAGNOSIS — E13.42 DIABETIC POLYNEUROPATHY ASSOCIATED WITH OTHER SPECIFIED DIABETES MELLITUS: ICD-10-CM

## 2019-09-27 DIAGNOSIS — E11.21 TYPE 2 DIABETES WITH NEPHROPATHY: ICD-10-CM

## 2019-09-27 RX ORDER — PEN NEEDLE, DIABETIC 30 GX3/16"
NEEDLE, DISPOSABLE MISCELLANEOUS
Qty: 400 EACH | Refills: 3 | Status: SHIPPED | OUTPATIENT
Start: 2019-09-27 | End: 2020-01-01

## 2019-10-01 ENCOUNTER — PATIENT MESSAGE (OUTPATIENT)
Dept: ENDOCRINOLOGY | Facility: CLINIC | Age: 69
End: 2019-10-01

## 2019-10-17 ENCOUNTER — PATIENT MESSAGE (OUTPATIENT)
Dept: ENDOCRINOLOGY | Facility: CLINIC | Age: 69
End: 2019-10-17

## 2019-10-21 ENCOUNTER — PATIENT MESSAGE (OUTPATIENT)
Dept: FAMILY MEDICINE | Facility: CLINIC | Age: 69
End: 2019-10-21

## 2019-10-21 DIAGNOSIS — F41.9 ANXIETY DISORDER, UNSPECIFIED TYPE: ICD-10-CM

## 2019-10-21 RX ORDER — ESCITALOPRAM OXALATE 20 MG/1
20 TABLET ORAL DAILY
Qty: 90 TABLET | Refills: 1 | Status: SHIPPED | OUTPATIENT
Start: 2019-10-21 | End: 2020-04-12 | Stop reason: SDUPTHER

## 2019-10-21 RX ORDER — ALPRAZOLAM 0.25 MG/1
TABLET ORAL
Qty: 15 TABLET | Refills: 0 | Status: SHIPPED | OUTPATIENT
Start: 2019-10-21 | End: 2020-03-23 | Stop reason: SDUPTHER

## 2019-10-21 NOTE — TELEPHONE ENCOUNTER
committed suicide. fam hx of suicide.  Pt scheduled with LCSW, and requesting temporary rx for xanax, will send in #15 for use in the AM PRN

## 2019-10-22 ENCOUNTER — PATIENT MESSAGE (OUTPATIENT)
Dept: ENDOCRINOLOGY | Facility: CLINIC | Age: 69
End: 2019-10-22

## 2019-10-24 ENCOUNTER — PATIENT MESSAGE (OUTPATIENT)
Dept: NEUROSURGERY | Facility: CLINIC | Age: 69
End: 2019-10-24

## 2019-10-24 ENCOUNTER — TELEPHONE (OUTPATIENT)
Dept: ENDOCRINOLOGY | Facility: CLINIC | Age: 69
End: 2019-10-24

## 2019-10-24 NOTE — TELEPHONE ENCOUNTER
----- Message from Ellyn Meza NP sent at 10/24/2019  1:16 PM CDT -----  Fax demographic page to Gina at [facility] [phone]. Thanks.

## 2019-11-07 ENCOUNTER — PATIENT MESSAGE (OUTPATIENT)
Dept: ENDOCRINOLOGY | Facility: CLINIC | Age: 69
End: 2019-11-07

## 2019-11-08 ENCOUNTER — OFFICE VISIT (OUTPATIENT)
Dept: PSYCHIATRY | Facility: CLINIC | Age: 69
End: 2019-11-08
Payer: MEDICARE

## 2019-11-08 DIAGNOSIS — F41.9 ANXIETY DISORDER, UNSPECIFIED TYPE: ICD-10-CM

## 2019-11-08 DIAGNOSIS — F10.20 ALCOHOL USE DISORDER, SEVERE, DEPENDENCE: Primary | ICD-10-CM

## 2019-11-08 PROCEDURE — 90791 PSYCH DIAGNOSTIC EVALUATION: CPT | Mod: HCNC,S$GLB,, | Performed by: SOCIAL WORKER

## 2019-11-08 PROCEDURE — 99499 RISK ADDL DX/OHS AUDIT: ICD-10-PCS | Mod: HCNC,S$GLB,, | Performed by: SOCIAL WORKER

## 2019-11-08 PROCEDURE — 90791 PR PSYCHIATRIC DIAGNOSTIC EVALUATION: ICD-10-PCS | Mod: HCNC,S$GLB,, | Performed by: SOCIAL WORKER

## 2019-11-08 PROCEDURE — 99499 UNLISTED E&M SERVICE: CPT | Mod: HCNC,S$GLB,, | Performed by: SOCIAL WORKER
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
## 2019-11-13 ENCOUNTER — CLINICAL SUPPORT (OUTPATIENT)
Dept: DIABETES | Facility: CLINIC | Age: 69
End: 2019-11-13
Payer: MEDICARE

## 2019-11-13 VITALS — BODY MASS INDEX: 23.08 KG/M2 | WEIGHT: 170.19 LBS

## 2019-11-13 DIAGNOSIS — E11.42 DM TYPE 2 WITH DIABETIC PERIPHERAL NEUROPATHY: ICD-10-CM

## 2019-11-13 PROCEDURE — G0108 DIAB MANAGE TRN  PER INDIV: HCPCS | Mod: HCNC,S$GLB,, | Performed by: DIETITIAN, REGISTERED

## 2019-11-13 PROCEDURE — G0108 PR DIAB MANAGE TRN  PER INDIV: ICD-10-PCS | Mod: HCNC,S$GLB,, | Performed by: DIETITIAN, REGISTERED

## 2019-11-13 PROCEDURE — 99999 PR PBB SHADOW E&M-EST. PATIENT-LVL I: ICD-10-PCS | Mod: PBBFAC,HCNC,, | Performed by: DIETITIAN, REGISTERED

## 2019-11-13 PROCEDURE — 99999 PR PBB SHADOW E&M-EST. PATIENT-LVL I: CPT | Mod: PBBFAC,HCNC,, | Performed by: DIETITIAN, REGISTERED

## 2019-11-13 NOTE — PROGRESS NOTES
Diabetes Education  Author: Rose Sloan, JUAN M  Date: 11/13/2019    Diabetes Care Management Summary  Pt visit today was to start Dexcom G6 but pt did not have a  and could not remember his password to get into his phone to download alessia from apple store. Thus, pt will attempt to re-set his phone password and download alessia at home. Discussed once alessia download complete,how to set up the alessia and place correct alerts and alarms, and how to enter correct sensor and transmitter codes and pair/start the sensor. Reviewed videos on proper placement  and removal of sensor and transmitter. Pt provided with invite code to share his data on [facility] portal w JACOB Meza  Diabetes Education Record Assessment/Progress: Comprehensive/Group(8th Diab educ visit since 2013)  Current Diabetes Risk Level: Moderate     Last A1c:   Lab Results   Component Value Date    HGBA1C 7.3 (H) 09/13/2019     Last visit with Diabetes Educator: : 11/13/2019      Diabetes Type  Diabetes Type : Type II    Diabetes History  Diabetes Diagnosis: 5-10 years  Current Treatment: Diet, Insulin(Novolog 1:10 at B and D and 1:20 at L)  Reviewed Problem List with Patient: Yes    Health Maintenance was reviewed today with patient. Discussed with patient importance of routine eye exams, foot exams/foot care, blood work (i.e.: A1c, microalbumin, and lipid), dental visits, yearly flu vaccine, and pneumonia vaccine as indicated by PCP. Patient verbalized understanding.     Health Maintenance Topics with due status: Not Due       Topic Last Completion Date    TETANUS VACCINE 06/22/2015    Colonoscopy 04/11/2017    Foot Exam 05/19/2019    Urine Microalbumin 05/28/2019    Eye Exam 06/04/2019    Lipid Panel 09/13/2019    Hemoglobin A1c 09/13/2019    High Dose Statin 09/26/2019     There are no preventive care reminders to display for this patient.    Monitoring   Monitoring: Other  Self Monitoring : BG 4-6x/day  Blood Glucose Logs: Yes  Do you use a personal  continuous glucose monitor?: Yes  What kind of glucose monitor do you use?: Dexcom(in past was on Dexcom G4; dc due to difficult sensor application)  Current Diabetes Treatment   Current Treatment: Diet, Insulin(Novolog 1:10 at B and D and 1:20 at L)  Social History  Occupation: retired   Smoking Status: Current Smoker  Alcohol Use: Weekly  Barriers to Change: None  Learning Challenges : None  Readiness to Learn   Readiness to Learn : Acceptance    Diabetes Education Assessment/Progress  Diabetes Disease Process (diabetes disease process and treatment options): Not Covered/Deferred(session focused on Starting Dexcom G6)  Nutrition (Incorporating nutritional management into one's lifestyle): Discussion, Individual Session, Demonstrates Understanding/Competency (verbalizes/demonstrates)  -pt given carb counting book to remind pt of carb values of foods. Pt states weight is staying steady    Physical Activity (incorporating physical activity into one's lifestyle): Not Covered/Deferred(session focused on Starting Dexcom G6)  Medications (states correct name, dose, onset, peak, duration, side effects & timing of meds): Discussion  -Pt using 1:10 carb ratio for breakfast and dinner and 1:20 for lunch. Pt states all going well w diet and understands carbs counting    Monitoring (monitoring blood glucose/other parameters & using results): Discussion, Individual Session, Written Materials Provided, Demonstration, Instructed, Demonstrates Understanding/Competency (verbalizes/demonstrates), Video  -Rec'd pt look into Carb Manager alessia to help w carb counting/tracking    Patient  here in clinic today for initial start of DexcomG6 continuous glucose monitoring system however unable to start since pt did not have a  nor could connect w his iphone bc forgot password and login  and unwilling to modify info while in office. Pt stated he felt comfortable with starting sytem so we reviewed the following steps  -Viewed  YouTube video titled  Dexcom G6- Getting Started and Setting up Gagandeep/  . Verbally reviewed the entire process discussed in video  -Patient instructed on   sensor insertion on abdomen and how to insert transmitter. Recommendations for high/low alerts settings as following:Low alert changed from < 100 to < 80, repeat every 15 minutes, High alert turned on and set for alert > 300, repeat every 1 hour, Rise alert turned on for rise at rate of 3 mg/dL/min ,Fall rate turned on for drop at rate of 3 mg/dL/min   -Patient provided w share data code with [facility] via Dexcom CLARITY  -Patient provided with phone number for 24-hour help line if needed; located in Get Start Guide  - Discussed various types of possible alarms and what to do to control BG.  --Patient made aware of 2-hour warm-up and need to keep  within 20 feet of sensor during this warm-up period  -Questions addressed. Initialization finished. No futher questions.    Acute Complications (preventing, detecting, and treating acute complications): Discussion, Individual Session, Instructed, Demonstrates Understanding/Competency (verbalizes/demonstrates)  Chronic Complications (preventing, detecting, and treating chronic complications): Not Covered/Deferred(session focused on Starting Dexcom G6)  Clinical (diabetes, other pertinent medical history, and relevant comorbidities reviewed during visit): Not Covered/Deferred(session focused on Starting Dexcom G6)  Cognitive (knowledge of self-management skills, functional health literacy): Not Covered/Deferred(session focused on Starting Dexcom G6)  Psychosocial (emotional response to diabetes): Not Covered/Deferred(session focused on Starting Dexcom G6)  Diabetes Distress and Support Systems: Demonstrates Understanding/Competency (verbalizes/demonstrates)(session focused on Starting Dexcom G6)  Behavioral (readiness for change, lifestyle practices, self-care behaviors): Not Covered/Deferred(session focused  on Starting Dexcom G6)    Diabetes Care Plan/Intervention  Education Plan/Intervention: Individual Follow-Up DSMT(pt will schedule follow up as needed)    Diabetes Meal Plan  Restrictions: Restricted Carbohydrate  Carbohydrate Per Meal: 45-60g, 60-75g  Carbohydrate Per Snack : 15-30g    Today's Self-Management Care Plan was developed with the patient's input and is based on barriers identified during today's assessment.    The long and short-term goals in the care plan were written with the patient/caregiver's input. The patient has agreed to work toward these goals to improve his overall diabetes control.      The patient received a copy of today's self-management plan and verbalized understanding of the care plan, goals, and all of today's instructions.      The patient was encouraged to communicate with his physician and care team regarding his condition(s) and treatment.  I provided the patient with my contact information today and encouraged him to contact me via phone or patient portal as needed.     Education Units of Time   Time Spent: 60 min

## 2019-11-20 DIAGNOSIS — F41.9 ANXIETY DISORDER, UNSPECIFIED TYPE: ICD-10-CM

## 2019-11-20 RX ORDER — HYDROXYZINE HYDROCHLORIDE 50 MG/1
TABLET, FILM COATED ORAL
Qty: 90 TABLET | Refills: 1 | Status: SHIPPED | OUTPATIENT
Start: 2019-11-20 | End: 2020-04-15 | Stop reason: ALTCHOICE

## 2019-12-09 ENCOUNTER — LAB VISIT (OUTPATIENT)
Dept: LAB | Facility: HOSPITAL | Age: 69
End: 2019-12-09
Attending: NURSE PRACTITIONER
Payer: MEDICARE

## 2019-12-09 DIAGNOSIS — E11.42 DM TYPE 2 WITH DIABETIC PERIPHERAL NEUROPATHY: ICD-10-CM

## 2019-12-09 LAB
ANION GAP SERPL CALC-SCNC: 8 MMOL/L (ref 8–16)
BUN SERPL-MCNC: 7 MG/DL (ref 8–23)
CALCIUM SERPL-MCNC: 9 MG/DL (ref 8.7–10.5)
CHLORIDE SERPL-SCNC: 101 MMOL/L (ref 95–110)
CO2 SERPL-SCNC: 28 MMOL/L (ref 23–29)
CREAT SERPL-MCNC: 1.3 MG/DL (ref 0.5–1.4)
EST. GFR  (AFRICAN AMERICAN): >60 ML/MIN/1.73 M^2
EST. GFR  (NON AFRICAN AMERICAN): 56 ML/MIN/1.73 M^2
GLUCOSE SERPL-MCNC: 187 MG/DL (ref 70–110)
POTASSIUM SERPL-SCNC: 4.1 MMOL/L (ref 3.5–5.1)
SODIUM SERPL-SCNC: 137 MMOL/L (ref 136–145)

## 2019-12-09 PROCEDURE — 80048 BASIC METABOLIC PNL TOTAL CA: CPT | Mod: HCNC

## 2019-12-09 PROCEDURE — 83036 HEMOGLOBIN GLYCOSYLATED A1C: CPT | Mod: HCNC

## 2019-12-09 PROCEDURE — 36415 COLL VENOUS BLD VENIPUNCTURE: CPT | Mod: HCNC,PN

## 2019-12-10 LAB
ESTIMATED AVG GLUCOSE: 186 MG/DL (ref 68–131)
HBA1C MFR BLD HPLC: 8.1 % (ref 4–5.6)

## 2019-12-12 ENCOUNTER — PATIENT MESSAGE (OUTPATIENT)
Dept: ENDOCRINOLOGY | Facility: CLINIC | Age: 69
End: 2019-12-12

## 2019-12-13 NOTE — PROGRESS NOTES
Wound Check   Neurosurgery      Mr Moe Ren is a very pleasant 68 year old male s/p TLIF L4-5, L5-S1 on 1/10/19 by Dr. Ling. He presents to clinic for his 2 week follow up. (For complete diagnosis and procedure, see OP note.) Patients gait is steady, in NAD, wearing his brace. He denies any fever, chills, night sweats or N/V. Denies incontinence.     Patient states pre-op symptoms of pain down right leg is gone, the the surgery did what it was supposed to do. He describes surgical pain in low back that is relieved by pain meds. Pt has weaned himself from narcotics, though had a lot of pain this am and took a pill.    Pt is active in his home and working at his computer. Advised to increase walking and to take position breaks while working.    Incisions on back appear to be healing well, CDI. All skin edges are completely approximated. Monocryl suture lines intact. Pt c/o incisions itching and has been applying cortisone cream. Advised pt to stop cortisone as it can interfere with healing. He verbalized understanding. Knots were clipped with sterile scissors for patient comfort.      Discussed posture, brace rationale and normal healing / fusion process. Educated in activities to reduce DVT (foot pumps) and pneumonia (deep breathing, blow out forcefully as through a straw) and discussed proper nutrition and rationale. Pt verbalized understanding.    Patient given written instructions which include:  - Keep incision open to air.   - Wear brace when out of bed.  - No need to put bacitracin ointment on wound   - Can shower and get incision wet, just pat dry and no vigorous scrubbing. Do not submerge incision for another 6 weeks.   - No lifting more than 10 lbs or excessive bending/twisting.   - Follow up in clinic in 4 weeks with x-rays  - Encouraged patient to call if they have any questions or concerns prior to next follow up.      Mary Mata RN  Neurosurgery  
25-Feb-2019

## 2019-12-14 NOTE — PROGRESS NOTES
CC: This 67 y.o. White male  is here for evaluation of  T2DM along with comorbidities indicated in the Visit Diagnosis section of this encounter.    HPI: Moe Ren was diagnosed with T2DM in ~ 2010.  Metformin started at time of diagnosis.     He has a history of squamos cell oral CA, Spinal stenosis, chronic pancreatitis.        Prior visit on 5/4/18  Has changed ICR - Using 1:6 for breakfast, 1:9 for lunch and 1:12 for dinner -- which gives him less insulin for breakfast and lunch.   Now using an [employer] Amanda meter because his Expert broke. Would like to get HCP code for the [employer] alessia so that he can use the insulin dose calculator.   Has cut down from 5-6 scotch per day to now 2 a day. Now is working on cutting back on smoking. Grandson just born yesterday.   a1c basically the same from 7.1 to 7.2%.   Plan Continue current Novolog bolus ratios.   Test blood sugar before meals and bedtime. Will try to get pt the HCP code but otherwise he can use a standard insulin dose calculator alessia.  rtc in 2 mo with labs prior.     Interval history  a1c is down from 7.2 to 6.3%.   Has a lot of hypoglycemia after lunch - he attributes this to not eating his whole meal at times. Last week he was walking a lot on vacation and this contributed to hypoglycemia.     Drinking and smoking more r/t uncontrolled anxiety.       LAST DIABETES EDUCATION: 6/2016; 2/2018    PRESCRIBED DIABETES MEDICATIONS: Novolog ac;   6:15 AM - ICR 6   11:45 AM - ICR 9  4 PM - ICR 12    ISF 1:50, goal 115     Misses medication doses - No    DM COMPLICATIONS: peripheral neuropathy    SIGNIFICANT DIABETES MED HISTORY:   Tresiba gradually titrated down until it was stopped mid 2017 d/t hypoglycemia.   Novolog stopped at ov 2/5/18 and metformin and tresiba were started. tresiba dc'd about a week later d/t hypo  repaglinide started at ov 2/28/18, and stopped about a month later d/t low efficacy       SELF MONITORING BLOOD GLUCOSE: Checks blood glucose  at home 6x/day. See pt's email from today with uploaded BG logbook. Average BG is 121 +/-48    HYPOGLYCEMIC EPISODES: symptoms start in the 60s. Corrects with coke.      CURRENT DIET: 3 meal/day - Eats at 10 am, 2 pm, and 6 pm. Eats about 60 to 100 carbs per meal.   Appetite has been low recently - has been supplementing with Ensure.     CURRENT EXERCISE: none recent; previously - goes twice a week to gym - 1 hour of cardio/weights    SOCIAL: part time contract . 10 hours.  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       /70   Pulse 72   Ht 6' (1.829 m)   Wt 66.6 kg (146 lb 13.2 oz)   BMI 19.91 kg/m²       ROS:   CONSTITUTIONAL: Appetite ok, denies fatigue  RESPIRATORY: No shortness of breath; + cough; + talbert   PSYCH: + anxiety       PHYSICAL EXAM:  GENERAL: Well developed, well nourished. No acute distress.   PSYCH: AAOx3, appropriate mood and affect, conversant, well-groomed. Judgement and insight good.   NEURO: Cranial nerves grossly intact. Speech clear, no tremor.   CHEST: Respirations even and unlabored.      Hemoglobin A1C   Date Value Ref Range Status   07/09/2018 6.3 (H) 4.0 - 5.6 % Final     Comment:     ADA Screening Guidelines:  5.7-6.4%  Consistent with prediabetes  >or=6.5%  Consistent with diabetes  High levels of fetal hemoglobin interfere with the HbA1C  assay. Heterozygous hemoglobin variants (HbS, HgC, etc)do  not significantly interfere with this assay.   However, presence of multiple variants may affect accuracy.     04/02/2018 7.2 (H) 4.0 - 5.6 % Final     Comment:     According to ADA guidelines, hemoglobin A1c <7.0% represents  optimal control in non-pregnant diabetic patients. Different  metrics may apply to specific patient populations.   Standards of Medical Care in Diabetes-2016.  For the purpose of screening for the presence of diabetes:  <5.7%     Consistent with the absence of diabetes  5.7-6.4%   Consistent with increasing risk for diabetes   (prediabetes)  >or=6.5%  Consistent with diabetes  Currently, no consensus exists for use of hemoglobin A1c  for diagnosis of diabetes for children.  This Hemoglobin A1c assay has significant interference with fetal   hemoglobin   (HbF). The results are invalid for patients with abnormal amounts of   HbF,   including those with known Hereditary Persistence   of Fetal Hemoglobin. Heterozygous hemoglobin variants (HbAS, HbAC,   HbAD, HbAE, HbA2) do not significantly interfere with this assay;   however, presence of multiple variants in a sample may impact the %   interference.     01/22/2018 7.1 (H) 4.0 - 5.6 % Final     Comment:     According to ADA guidelines, hemoglobin A1c <7.0% represents  optimal control in non-pregnant diabetic patients. Different  metrics may apply to specific patient populations.   Standards of Medical Care in Diabetes-2016.  For the purpose of screening for the presence of diabetes:  <5.7%     Consistent with the absence of diabetes  5.7-6.4%  Consistent with increasing risk for diabetes   (prediabetes)  >or=6.5%  Consistent with diabetes  Currently, no consensus exists for use of hemoglobin A1c  for diagnosis of diabetes for children.  This Hemoglobin A1c assay has significant interference with fetal   hemoglobin   (HbF). The results are invalid for patients with abnormal amounts of   HbF,   including those with known Hereditary Persistence   of Fetal Hemoglobin. Heterozygous hemoglobin variants (HbAS, HbAC,   HbAD, HbAE, HbA2) do not significantly interfere with this assay;   however, presence of multiple variants in a sample may impact the %   interference.             Chemistry        Component Value Date/Time     (L) 07/09/2018 1304    K 4.4 07/09/2018 1304     07/09/2018 1304    CO2 20 (L) 07/09/2018 1304    BUN 9 07/09/2018 1304    CREATININE 1.2 07/09/2018 1304     (H) 07/09/2018 1304        Component Value Date/Time     CALCIUM 9.3 07/09/2018 1304    ALKPHOS 76 07/09/2018 1304    AST 34 07/09/2018 1304    ALT 22 07/09/2018 1304    BILITOT 0.3 07/09/2018 1304    ESTGFRAFRICA >60 07/09/2018 1304    EGFRNONAA >60 07/09/2018 1304          Lab Results   Component Value Date    LDLCALC 82.8 01/22/2018        Ref. Range 1/22/2018 09:15   Cholesterol Latest Ref Range: 120 - 199 mg/dL 169   HDL Latest Ref Range: 40 - 75 mg/dL 65   LDL Cholesterol Latest Ref Range: 63.0 - 159.0 mg/dL 82.8   Total Cholesterol/HDL Ratio Latest Ref Range: 2.0 - 5.0  2.6   Triglycerides Latest Ref Range: 30 - 150 mg/dL 106     Lab Results   Component Value Date    MICALBCREAT 69.4 (H) 04/02/2018         STANDARDS of CARE:        ASA:               Last eye exam:       ASSESSMENT and PLAN:    A1C GOAL: < 7 %     1. Uncontrolled type 2 diabetes mellitus with complication, with long-term current use of insulin  Change ICR at lunch from 9 to 12 to avoid midday hypoglycemia.   Test bg ac/pp/hs, 6x/day.   rtc in 3 mo with labs prior.     Hemoglobin A1c   2. Diabetic polyneuropathy associated with other specified diabetes mellitus  He will f/u with [facility] and have them send orders here.    3. Microalbuminuria  Improve glycemic control.   Continue lisinopril.    4. Hypoglycemia associated with diabetes  As above          Orders Placed This Encounter   Procedures    Hemoglobin A1c     Standing Status:   Future     Standing Expiration Date:   9/8/2019        Follow-up in about 3 months (around 10/10/2018).      Alert and oriented to person, place and time

## 2020-01-01 ENCOUNTER — PATIENT MESSAGE (OUTPATIENT)
Dept: FAMILY MEDICINE | Facility: CLINIC | Age: 70
End: 2020-01-01

## 2020-01-01 ENCOUNTER — CLINICAL SUPPORT (OUTPATIENT)
Dept: DIABETES | Facility: CLINIC | Age: 70
End: 2020-01-01
Payer: MEDICARE

## 2020-01-01 ENCOUNTER — TELEPHONE (OUTPATIENT)
Dept: ENDOCRINOLOGY | Facility: CLINIC | Age: 70
End: 2020-01-01

## 2020-01-01 ENCOUNTER — PATIENT MESSAGE (OUTPATIENT)
Dept: ENDOCRINOLOGY | Facility: CLINIC | Age: 70
End: 2020-01-01

## 2020-01-01 ENCOUNTER — TELEPHONE (OUTPATIENT)
Dept: DIABETES | Facility: CLINIC | Age: 70
End: 2020-01-01

## 2020-01-01 ENCOUNTER — OFFICE VISIT (OUTPATIENT)
Dept: FAMILY MEDICINE | Facility: CLINIC | Age: 70
End: 2020-01-01
Payer: MEDICARE

## 2020-01-01 ENCOUNTER — HOSPITAL ENCOUNTER (EMERGENCY)
Facility: HOSPITAL | Age: 70
Discharge: HOME | End: 2020-11-16
Attending: EMERGENCY MEDICINE
Payer: MEDICARE

## 2020-01-01 ENCOUNTER — HOSPITAL ENCOUNTER (INPATIENT)
Facility: HOSPITAL | Age: 70
LOS: 2 days | Discharge: HOME | DRG: 378 | End: 2020-12-18
Attending: EMERGENCY MEDICINE | Admitting: STUDENT IN AN ORGANIZED HEALTH CARE EDUCATION/TRAINING PROGRAM
Payer: MEDICARE

## 2020-01-01 ENCOUNTER — PATIENT OUTREACH (OUTPATIENT)
Dept: ADMINISTRATIVE | Facility: CLINIC | Age: 70
End: 2020-01-01

## 2020-01-01 ENCOUNTER — ANESTHESIA (OUTPATIENT)
Dept: ENDOSCOPY | Facility: HOSPITAL | Age: 70
DRG: 378 | End: 2020-01-01
Payer: MEDICARE

## 2020-01-01 ENCOUNTER — ANESTHESIA EVENT (OUTPATIENT)
Dept: ENDOSCOPY | Facility: HOSPITAL | Age: 70
DRG: 378 | End: 2020-01-01
Payer: MEDICARE

## 2020-01-01 ENCOUNTER — TELEPHONE (OUTPATIENT)
Dept: ORTHOPEDICS | Facility: CLINIC | Age: 70
End: 2020-01-01

## 2020-01-01 ENCOUNTER — OFFICE VISIT (OUTPATIENT)
Dept: ENDOCRINOLOGY | Facility: CLINIC | Age: 70
End: 2020-01-01
Payer: MEDICARE

## 2020-01-01 VITALS
HEART RATE: 75 BPM | TEMPERATURE: 98 F | OXYGEN SATURATION: 100 % | DIASTOLIC BLOOD PRESSURE: 85 MMHG | WEIGHT: 148 LBS | HEIGHT: 72 IN | RESPIRATION RATE: 20 BRPM | BODY MASS INDEX: 20.05 KG/M2 | SYSTOLIC BLOOD PRESSURE: 150 MMHG

## 2020-01-01 VITALS
TEMPERATURE: 98 F | SYSTOLIC BLOOD PRESSURE: 122 MMHG | HEART RATE: 94 BPM | WEIGHT: 140 LBS | WEIGHT: 145 LBS | OXYGEN SATURATION: 94 % | DIASTOLIC BLOOD PRESSURE: 70 MMHG | HEIGHT: 72 IN | DIASTOLIC BLOOD PRESSURE: 62 MMHG | HEART RATE: 88 BPM | SYSTOLIC BLOOD PRESSURE: 125 MMHG | BODY MASS INDEX: 18.99 KG/M2 | TEMPERATURE: 99 F | BODY MASS INDEX: 19.64 KG/M2

## 2020-01-01 VITALS
DIASTOLIC BLOOD PRESSURE: 79 MMHG | TEMPERATURE: 97 F | SYSTOLIC BLOOD PRESSURE: 140 MMHG | HEIGHT: 72 IN | OXYGEN SATURATION: 94 % | BODY MASS INDEX: 19.14 KG/M2 | HEART RATE: 92 BPM | RESPIRATION RATE: 18 BRPM | WEIGHT: 141.31 LBS

## 2020-01-01 DIAGNOSIS — I49.1 PREMATURE ATRIAL CONTRACTION: ICD-10-CM

## 2020-01-01 DIAGNOSIS — L89.302 PRESSURE INJURY OF BUTTOCK, STAGE 2, UNSPECIFIED LATERALITY: ICD-10-CM

## 2020-01-01 DIAGNOSIS — E11.29 TYPE 2 DIABETES MELLITUS WITH MICROALBUMINURIA, WITH LONG-TERM CURRENT USE OF INSULIN: Primary | ICD-10-CM

## 2020-01-01 DIAGNOSIS — I10 ESSENTIAL HYPERTENSION: ICD-10-CM

## 2020-01-01 DIAGNOSIS — E11.21 TYPE 2 DIABETES WITH NEPHROPATHY: ICD-10-CM

## 2020-01-01 DIAGNOSIS — Z46.81 COUNSELING FOR INSULIN PUMP: ICD-10-CM

## 2020-01-01 DIAGNOSIS — K86.0 ALCOHOL-INDUCED CHRONIC PANCREATITIS: ICD-10-CM
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
## 2020-01-01 DIAGNOSIS — I25.10 CORONARY ARTERY DISEASE INVOLVING NATIVE CORONARY ARTERY OF NATIVE HEART WITHOUT ANGINA PECTORIS: ICD-10-CM

## 2020-01-01 DIAGNOSIS — E11.42 DM TYPE 2 WITH DIABETIC PERIPHERAL NEUROPATHY: Chronic | ICD-10-CM

## 2020-01-01 DIAGNOSIS — Z79.4 TYPE 2 DIABETES MELLITUS WITH MICROALBUMINURIA, WITH LONG-TERM CURRENT USE OF INSULIN: Primary | ICD-10-CM

## 2020-01-01 DIAGNOSIS — K92.2 ACUTE UPPER GI BLEEDING: Primary | ICD-10-CM

## 2020-01-01 DIAGNOSIS — I65.23 BILATERAL CAROTID ARTERY STENOSIS: ICD-10-CM

## 2020-01-01 DIAGNOSIS — R80.9 TYPE 2 DIABETES MELLITUS WITH MICROALBUMINURIA, WITH LONG-TERM CURRENT USE OF INSULIN: Primary | ICD-10-CM

## 2020-01-01 DIAGNOSIS — Z12.2 ENCOUNTER FOR SCREENING FOR MALIGNANT NEOPLASM OF RESPIRATORY ORGANS: ICD-10-CM

## 2020-01-01 DIAGNOSIS — F10.20 ALCOHOL USE DISORDER, MODERATE, DEPENDENCE: ICD-10-CM

## 2020-01-01 DIAGNOSIS — S42.032A CLOSED DISPLACED FRACTURE OF ACROMIAL END OF LEFT CLAVICLE, INITIAL ENCOUNTER: Primary | ICD-10-CM

## 2020-01-01 DIAGNOSIS — K26.9 NSAID-INDUCED DUODENAL ULCER: Primary | ICD-10-CM

## 2020-01-01 DIAGNOSIS — T39.395A NSAID-INDUCED DUODENAL ULCER: Primary | ICD-10-CM

## 2020-01-01 DIAGNOSIS — E78.1 HYPERTRIGLYCERIDEMIA: Chronic | ICD-10-CM

## 2020-01-01 DIAGNOSIS — I70.0 ABDOMINAL AORTIC ATHEROSCLEROSIS: ICD-10-CM

## 2020-01-01 DIAGNOSIS — J43.9 CHRONIC OBSTRUCTIVE BRONCHITIS WITH PULMONARY EMPHYSEMA: ICD-10-CM

## 2020-01-01 DIAGNOSIS — Z87.891 PERSONAL HISTORY OF NICOTINE DEPENDENCE: ICD-10-CM

## 2020-01-01 DIAGNOSIS — F41.9 ANXIETY DISORDER, UNSPECIFIED TYPE: ICD-10-CM

## 2020-01-01 DIAGNOSIS — J44.89 CHRONIC OBSTRUCTIVE BRONCHITIS WITH PULMONARY EMPHYSEMA: ICD-10-CM

## 2020-01-01 LAB
ABO + RH BLD: NORMAL
ALBUMIN SERPL BCP-MCNC: 3.1 G/DL (ref 3.5–5.2)
ALP SERPL-CCNC: 131 U/L (ref 55–135)
ALT SERPL W/O P-5'-P-CCNC: 26 U/L (ref 10–44)
ANION GAP SERPL CALC-SCNC: 14 MMOL/L (ref 8–16)
ANION GAP SERPL CALC-SCNC: 8 MMOL/L (ref 8–16)
APTT BLDCRRT: 26.9 SEC (ref 21–32)
AST SERPL-CCNC: 43 U/L (ref 10–40)
BASOPHILS # BLD AUTO: 0.07 K/UL (ref 0–0.2)
BASOPHILS # BLD AUTO: 0.1 K/UL (ref 0–0.2)
BASOPHILS NFR BLD: 0.5 % (ref 0–1.9)
BASOPHILS NFR BLD: 0.7 % (ref 0–1.9)
BASOPHILS NFR BLD: 0.9 % (ref 0–1.9)
BASOPHILS NFR BLD: 1.1 % (ref 0–1.9)
BILIRUB SERPL-MCNC: 0.5 MG/DL (ref 0.1–1)
BLD GP AB SCN CELLS X3 SERPL QL: NORMAL
BUN SERPL-MCNC: 12 MG/DL (ref 8–23)
BUN SERPL-MCNC: 9 MG/DL (ref 8–23)
CALCIUM SERPL-MCNC: 7.8 MG/DL (ref 8.7–10.5)
CALCIUM SERPL-MCNC: 9.2 MG/DL (ref 8.7–10.5)
CHLORIDE SERPL-SCNC: 102 MMOL/L (ref 95–110)
CHLORIDE SERPL-SCNC: 95 MMOL/L (ref 95–110)
CO2 SERPL-SCNC: 24 MMOL/L (ref 23–29)
CO2 SERPL-SCNC: 24 MMOL/L (ref 23–29)
CREAT SERPL-MCNC: 1.2 MG/DL (ref 0.5–1.4)
CREAT SERPL-MCNC: 1.3 MG/DL (ref 0.5–1.4)
CTP QC/QA: YES
DIFFERENTIAL METHOD: ABNORMAL
EOSINOPHIL # BLD AUTO: 0 K/UL (ref 0–0.5)
EOSINOPHIL # BLD AUTO: 0.1 K/UL (ref 0–0.5)
EOSINOPHIL # BLD AUTO: 0.2 K/UL (ref 0–0.5)
EOSINOPHIL # BLD AUTO: 0.3 K/UL (ref 0–0.5)
EOSINOPHIL NFR BLD: 0.2 % (ref 0–8)
EOSINOPHIL NFR BLD: 0.4 % (ref 0–8)
EOSINOPHIL NFR BLD: 2.8 % (ref 0–8)
EOSINOPHIL NFR BLD: 3.4 % (ref 0–8)
ERYTHROCYTE [DISTWIDTH] IN BLOOD BY AUTOMATED COUNT: 12.8 % (ref 11.5–14.5)
ERYTHROCYTE [DISTWIDTH] IN BLOOD BY AUTOMATED COUNT: 13.2 % (ref 11.5–14.5)
EST. GFR  (AFRICAN AMERICAN): >60 ML/MIN/1.73 M^2
EST. GFR  (AFRICAN AMERICAN): >60 ML/MIN/1.73 M^2
EST. GFR  (NON AFRICAN AMERICAN): 56 ML/MIN/1.73 M^2
EST. GFR  (NON AFRICAN AMERICAN): >60 ML/MIN/1.73 M^2
FINAL PATHOLOGIC DIAGNOSIS: NORMAL
GLUCOSE SERPL-MCNC: 192 MG/DL (ref 70–110)
GLUCOSE SERPL-MCNC: 205 MG/DL (ref 70–110)
GROSS: NORMAL
HCT VFR BLD AUTO: 26.1 % (ref 40–54)
HCT VFR BLD AUTO: 26.3 % (ref 40–54)
HCT VFR BLD AUTO: 29.3 % (ref 40–54)
HCT VFR BLD AUTO: 33.5 % (ref 40–54)
HGB BLD-MCNC: 10.1 G/DL (ref 14–18)
HGB BLD-MCNC: 11.6 G/DL (ref 14–18)
HGB BLD-MCNC: 8.8 G/DL (ref 14–18)
HGB BLD-MCNC: 9 G/DL (ref 14–18)
IMM GRANULOCYTES # BLD AUTO: 0.04 K/UL (ref 0–0.04)
IMM GRANULOCYTES # BLD AUTO: 0.04 K/UL (ref 0–0.04)
IMM GRANULOCYTES # BLD AUTO: 0.06 K/UL (ref 0–0.04)
IMM GRANULOCYTES # BLD AUTO: 0.07 K/UL (ref 0–0.04)
IMM GRANULOCYTES NFR BLD AUTO: 0.5 % (ref 0–0.5)
IMM GRANULOCYTES NFR BLD AUTO: 0.6 % (ref 0–0.5)
INR PPP: 1 (ref 0.8–1.2)
LIPASE SERPL-CCNC: 12 U/L (ref 4–60)
LYMPHOCYTES # BLD AUTO: 1.5 K/UL (ref 1–4.8)
LYMPHOCYTES # BLD AUTO: 2 K/UL (ref 1–4.8)
LYMPHOCYTES # BLD AUTO: 2 K/UL (ref 1–4.8)
LYMPHOCYTES # BLD AUTO: 2.1 K/UL (ref 1–4.8)
LYMPHOCYTES NFR BLD: 15 % (ref 18–48)
LYMPHOCYTES NFR BLD: 15.7 % (ref 18–48)
LYMPHOCYTES NFR BLD: 23.5 % (ref 18–48)
LYMPHOCYTES NFR BLD: 24.2 % (ref 18–48)
Lab: NORMAL
MCH RBC QN AUTO: 32.8 PG (ref 27–31)
MCH RBC QN AUTO: 33.2 PG (ref 27–31)
MCHC RBC AUTO-ENTMCNC: 33.7 G/DL (ref 32–36)
MCHC RBC AUTO-ENTMCNC: 34.2 G/DL (ref 32–36)
MCHC RBC AUTO-ENTMCNC: 34.5 G/DL (ref 32–36)
MCHC RBC AUTO-ENTMCNC: 34.6 G/DL (ref 32–36)
MCV RBC AUTO: 95 FL (ref 82–98)
MCV RBC AUTO: 96 FL (ref 82–98)
MCV RBC AUTO: 97 FL (ref 82–98)
MCV RBC AUTO: 99 FL (ref 82–98)
MONOCYTES # BLD AUTO: 0.7 K/UL (ref 0.3–1)
MONOCYTES # BLD AUTO: 0.9 K/UL (ref 0.3–1)
MONOCYTES # BLD AUTO: 1.1 K/UL (ref 0.3–1)
MONOCYTES # BLD AUTO: 1.3 K/UL (ref 0.3–1)
MONOCYTES NFR BLD: 10.2 % (ref 4–15)
MONOCYTES NFR BLD: 11.1 % (ref 4–15)
MONOCYTES NFR BLD: 11.2 % (ref 4–15)
MONOCYTES NFR BLD: 7.5 % (ref 4–15)
NEUTROPHILS # BLD AUTO: 5 K/UL (ref 1.8–7.7)
NEUTROPHILS # BLD AUTO: 5.9 K/UL (ref 1.8–7.7)
NEUTROPHILS # BLD AUTO: 6.7 K/UL (ref 1.8–7.7)
NEUTROPHILS # BLD AUTO: 9.4 K/UL (ref 1.8–7.7)
NEUTROPHILS NFR BLD: 60.5 % (ref 38–73)
NEUTROPHILS NFR BLD: 67.2 % (ref 38–73)
NEUTROPHILS NFR BLD: 69.1 % (ref 38–73)
NEUTROPHILS NFR BLD: 72.7 % (ref 38–73)
NRBC BLD-RTO: 0 /100 WBC
PLATELET # BLD AUTO: 191 K/UL (ref 150–350)
PLATELET # BLD AUTO: 221 K/UL (ref 150–350)
PLATELET # BLD AUTO: 228 K/UL (ref 150–350)
PLATELET # BLD AUTO: 295 K/UL (ref 150–350)
PMV BLD AUTO: 10.2 FL (ref 9.2–12.9)
PMV BLD AUTO: 10.5 FL (ref 9.2–12.9)
PMV BLD AUTO: 9.3 FL (ref 9.2–12.9)
PMV BLD AUTO: 9.9 FL (ref 9.2–12.9)
POCT GLUCOSE: 183 MG/DL (ref 70–110)
POCT GLUCOSE: 190 MG/DL (ref 70–110)
POCT GLUCOSE: 208 MG/DL (ref 70–110)
POCT GLUCOSE: 234 MG/DL (ref 70–110)
POCT GLUCOSE: 263 MG/DL (ref 70–110)
POCT GLUCOSE: 273 MG/DL (ref 70–110)
POCT GLUCOSE: 276 MG/DL (ref 70–110)
POCT GLUCOSE: 287 MG/DL (ref 70–110)
POCT GLUCOSE: 334 MG/DL (ref 70–110)
POTASSIUM SERPL-SCNC: 3.5 MMOL/L (ref 3.5–5.1)
POTASSIUM SERPL-SCNC: 4.5 MMOL/L (ref 3.5–5.1)
PROT SERPL-MCNC: 6.3 G/DL (ref 6–8.4)
PROTHROMBIN TIME: 10.5 SEC (ref 9–12.5)
RBC # BLD AUTO: 2.65 M/UL (ref 4.6–6.2)
RBC # BLD AUTO: 2.71 M/UL (ref 4.6–6.2)
RBC # BLD AUTO: 3.04 M/UL (ref 4.6–6.2)
RBC # BLD AUTO: 3.54 M/UL (ref 4.6–6.2)
SARS-COV-2 RDRP RESP QL NAA+PROBE: NEGATIVE
SODIUM SERPL-SCNC: 133 MMOL/L (ref 136–145)
SODIUM SERPL-SCNC: 134 MMOL/L (ref 136–145)
WBC # BLD AUTO: 12.9 K/UL (ref 3.9–12.7)
WBC # BLD AUTO: 8.19 K/UL (ref 3.9–12.7)
WBC # BLD AUTO: 8.75 K/UL (ref 3.9–12.7)
WBC # BLD AUTO: 9.73 K/UL (ref 3.9–12.7)

## 2020-01-01 PROCEDURE — 99214 PR OFFICE/OUTPT VISIT, EST, LEVL IV, 30-39 MIN: ICD-10-PCS | Mod: HCNC,S$GLB,, | Performed by: NURSE PRACTITIONER

## 2020-01-01 PROCEDURE — 25000242 PHARM REV CODE 250 ALT 637 W/ HCPCS: Mod: HCNC | Performed by: ANESTHESIOLOGY

## 2020-01-01 PROCEDURE — 99999 PR PBB SHADOW E&M-EST. PATIENT-LVL III: ICD-10-PCS | Mod: PBBFAC,HCNC,, | Performed by: NURSE PRACTITIONER

## 2020-01-01 PROCEDURE — S4991 NICOTINE PATCH NONLEGEND: HCPCS | Mod: HCNC | Performed by: STUDENT IN AN ORGANIZED HEALTH CARE EDUCATION/TRAINING PROGRAM

## 2020-01-01 PROCEDURE — C9113 INJ PANTOPRAZOLE SODIUM, VIA: HCPCS | Mod: HCNC | Performed by: STUDENT IN AN ORGANIZED HEALTH CARE EDUCATION/TRAINING PROGRAM

## 2020-01-01 PROCEDURE — 3052F HG A1C>EQUAL 8.0%<EQUAL 9.0%: CPT | Mod: HCNC,CPTII,S$GLB, | Performed by: NURSE PRACTITIONER

## 2020-01-01 PROCEDURE — 25000003 PHARM REV CODE 250: Mod: HCNC | Performed by: STUDENT IN AN ORGANIZED HEALTH CARE EDUCATION/TRAINING PROGRAM

## 2020-01-01 PROCEDURE — 99499 RISK ADDL DX/OHS AUDIT: ICD-10-PCS | Mod: S$GLB,,, | Performed by: INTERNAL MEDICINE

## 2020-01-01 PROCEDURE — 3008F BODY MASS INDEX DOCD: CPT | Mod: HCNC,CPTII,S$GLB, | Performed by: INTERNAL MEDICINE

## 2020-01-01 PROCEDURE — 63600175 PHARM REV CODE 636 W HCPCS: Mod: HCNC | Performed by: STUDENT IN AN ORGANIZED HEALTH CARE EDUCATION/TRAINING PROGRAM

## 2020-01-01 PROCEDURE — 99999 PR PBB SHADOW E&M-EST. PATIENT-LVL I: ICD-10-PCS | Mod: PBBFAC,HCNC,, | Performed by: DIETITIAN, REGISTERED

## 2020-01-01 PROCEDURE — 99499 RISK ADDL DX/OHS AUDIT: ICD-10-PCS | Mod: S$GLB,,, | Performed by: NURSE PRACTITIONER

## 2020-01-01 PROCEDURE — 1125F PR PAIN SEVERITY QUANTIFIED, PAIN PRESENT: ICD-10-PCS | Mod: HCNC,S$GLB,, | Performed by: NURSE PRACTITIONER

## 2020-01-01 PROCEDURE — 99495 TCM SERVICES (MODERATE COMPLEXITY): ICD-10-PCS | Mod: HCNC,S$GLB,, | Performed by: INTERNAL MEDICINE

## 2020-01-01 PROCEDURE — 82962 GLUCOSE BLOOD TEST: CPT | Mod: HCNC

## 2020-01-01 PROCEDURE — 99999 PR PBB SHADOW E&M-EST. PATIENT-LVL I: CPT | Mod: PBBFAC,HCNC,, | Performed by: DIETITIAN, REGISTERED

## 2020-01-01 PROCEDURE — 88305 TISSUE EXAM BY PATHOLOGIST: CPT | Mod: HCNC | Performed by: PATHOLOGY

## 2020-01-01 PROCEDURE — 1159F PR MEDICATION LIST DOCUMENTED IN MEDICAL RECORD: ICD-10-PCS | Mod: HCNC,S$GLB,, | Performed by: NURSE PRACTITIONER

## 2020-01-01 PROCEDURE — 86900 BLOOD TYPING SEROLOGIC ABO: CPT | Mod: HCNC

## 2020-01-01 PROCEDURE — 36415 COLL VENOUS BLD VENIPUNCTURE: CPT | Mod: HCNC

## 2020-01-01 PROCEDURE — 11000001 HC ACUTE MED/SURG PRIVATE ROOM: Mod: HCNC

## 2020-01-01 PROCEDURE — 94761 N-INVAS EAR/PLS OXIMETRY MLT: CPT | Mod: HCNC

## 2020-01-01 PROCEDURE — 80053 COMPREHEN METABOLIC PANEL: CPT | Mod: HCNC

## 2020-01-01 PROCEDURE — 96374 THER/PROPH/DIAG INJ IV PUSH: CPT | Mod: HCNC

## 2020-01-01 PROCEDURE — 85025 COMPLETE CBC W/AUTO DIFF WBC: CPT | Mod: HCNC

## 2020-01-01 PROCEDURE — D9220A PRA ANESTHESIA: ICD-10-PCS | Mod: HCNC,CRNA,, | Performed by: NURSE ANESTHETIST, CERTIFIED REGISTERED

## 2020-01-01 PROCEDURE — 25000003 PHARM REV CODE 250: Mod: HCNC | Performed by: PHYSICIAN ASSISTANT

## 2020-01-01 PROCEDURE — G0108 DIAB MANAGE TRN  PER INDIV: HCPCS | Mod: HCNC,S$GLB,, | Performed by: DIETITIAN, REGISTERED

## 2020-01-01 PROCEDURE — 1159F MED LIST DOCD IN RCRD: CPT | Mod: HCNC,S$GLB,, | Performed by: NURSE PRACTITIONER

## 2020-01-01 PROCEDURE — 25000003 PHARM REV CODE 250: Mod: HCNC | Performed by: NURSE ANESTHETIST, CERTIFIED REGISTERED

## 2020-01-01 PROCEDURE — 3078F DIAST BP <80 MM HG: CPT | Mod: HCNC,CPTII,S$GLB, | Performed by: NURSE PRACTITIONER

## 2020-01-01 PROCEDURE — 3008F PR BODY MASS INDEX (BMI) DOCUMENTED: ICD-10-PCS | Mod: HCNC,CPTII,S$GLB, | Performed by: INTERNAL MEDICINE

## 2020-01-01 PROCEDURE — 83690 ASSAY OF LIPASE: CPT | Mod: HCNC

## 2020-01-01 PROCEDURE — 63600175 PHARM REV CODE 636 W HCPCS: Mod: HCNC | Performed by: EMERGENCY MEDICINE

## 2020-01-01 PROCEDURE — 94640 AIRWAY INHALATION TREATMENT: CPT | Mod: HCNC

## 2020-01-01 PROCEDURE — 3008F BODY MASS INDEX DOCD: CPT | Mod: HCNC,CPTII,S$GLB, | Performed by: NURSE PRACTITIONER

## 2020-01-01 PROCEDURE — 3072F LOW RISK FOR RETINOPATHY: CPT | Mod: HCNC,S$GLB,, | Performed by: NURSE PRACTITIONER

## 2020-01-01 PROCEDURE — 99283 EMERGENCY DEPT VISIT LOW MDM: CPT | Mod: 25,HCNC

## 2020-01-01 PROCEDURE — 99495 TRANSJ CARE MGMT MOD F2F 14D: CPT | Mod: HCNC,S$GLB,, | Performed by: INTERNAL MEDICINE

## 2020-01-01 PROCEDURE — 88305 TISSUE EXAM BY PATHOLOGIST: CPT | Mod: 26,HCNC,, | Performed by: PATHOLOGY

## 2020-01-01 PROCEDURE — 1125F AMNT PAIN NOTED PAIN PRSNT: CPT | Mod: HCNC,S$GLB,, | Performed by: NURSE PRACTITIONER

## 2020-01-01 PROCEDURE — 97802 MEDICAL NUTRITION INDIV IN: CPT | Mod: HCNC

## 2020-01-01 PROCEDURE — 27201012 HC FORCEPS, HOT/COLD, DISP: Mod: HCNC | Performed by: INTERNAL MEDICINE

## 2020-01-01 PROCEDURE — 3078F PR MOST RECENT DIASTOLIC BLOOD PRESSURE < 80 MM HG: ICD-10-PCS | Mod: HCNC,CPTII,S$GLB, | Performed by: NURSE PRACTITIONER

## 2020-01-01 PROCEDURE — 96375 TX/PRO/DX INJ NEW DRUG ADDON: CPT | Mod: HCNC

## 2020-01-01 PROCEDURE — C9113 INJ PANTOPRAZOLE SODIUM, VIA: HCPCS | Mod: HCNC | Performed by: EMERGENCY MEDICINE

## 2020-01-01 PROCEDURE — 63600175 PHARM REV CODE 636 W HCPCS: Mod: HCNC | Performed by: NURSE ANESTHETIST, CERTIFIED REGISTERED

## 2020-01-01 PROCEDURE — 3008F PR BODY MASS INDEX (BMI) DOCUMENTED: ICD-10-PCS | Mod: HCNC,CPTII,S$GLB, | Performed by: NURSE PRACTITIONER

## 2020-01-01 PROCEDURE — 80048 BASIC METABOLIC PNL TOTAL CA: CPT | Mod: HCNC

## 2020-01-01 PROCEDURE — 82272 OCCULT BLD FECES 1-3 TESTS: CPT | Mod: HCNC

## 2020-01-01 PROCEDURE — U0002 COVID-19 LAB TEST NON-CDC: HCPCS | Mod: HCNC | Performed by: EMERGENCY MEDICINE

## 2020-01-01 PROCEDURE — 3074F PR MOST RECENT SYSTOLIC BLOOD PRESSURE < 130 MM HG: ICD-10-PCS | Mod: HCNC,CPTII,S$GLB, | Performed by: NURSE PRACTITIONER

## 2020-01-01 PROCEDURE — 88305 TISSUE EXAM BY PATHOLOGIST: ICD-10-PCS | Mod: 26,HCNC,, | Performed by: PATHOLOGY

## 2020-01-01 PROCEDURE — 99999 PR PBB SHADOW E&M-EST. PATIENT-LVL III: CPT | Mod: PBBFAC,HCNC,, | Performed by: NURSE PRACTITIONER

## 2020-01-01 PROCEDURE — 3072F PR LOW RISK FOR RETINOPATHY: ICD-10-PCS | Mod: HCNC,S$GLB,, | Performed by: INTERNAL MEDICINE

## 2020-01-01 PROCEDURE — 99291 CRITICAL CARE FIRST HOUR: CPT | Mod: 25,HCNC

## 2020-01-01 PROCEDURE — 43239 EGD BIOPSY SINGLE/MULTIPLE: CPT | Mod: HCNC | Performed by: INTERNAL MEDICINE

## 2020-01-01 PROCEDURE — 99214 OFFICE O/P EST MOD 30 MIN: CPT | Mod: HCNC,S$GLB,, | Performed by: NURSE PRACTITIONER

## 2020-01-01 PROCEDURE — 99999 PR PBB SHADOW E&M-EST. PATIENT-LVL V: CPT | Mod: PBBFAC,HCNC,, | Performed by: INTERNAL MEDICINE

## 2020-01-01 PROCEDURE — 95251 PR GLUCOSE MONITOR, 72 HOUR, PHYS INTERP: ICD-10-PCS | Mod: HCNC,S$GLB,, | Performed by: NURSE PRACTITIONER

## 2020-01-01 PROCEDURE — 96361 HYDRATE IV INFUSION ADD-ON: CPT | Mod: HCNC

## 2020-01-01 PROCEDURE — D9220A PRA ANESTHESIA: ICD-10-PCS | Mod: HCNC,ANES,, | Performed by: ANESTHESIOLOGY

## 2020-01-01 PROCEDURE — 25000003 PHARM REV CODE 250: Mod: HCNC | Performed by: EMERGENCY MEDICINE

## 2020-01-01 PROCEDURE — 85610 PROTHROMBIN TIME: CPT | Mod: HCNC

## 2020-01-01 PROCEDURE — 99499 UNLISTED E&M SERVICE: CPT | Mod: S$GLB,,, | Performed by: NURSE PRACTITIONER

## 2020-01-01 PROCEDURE — 99999 PR PBB SHADOW E&M-EST. PATIENT-LVL V: ICD-10-PCS | Mod: PBBFAC,HCNC,, | Performed by: INTERNAL MEDICINE

## 2020-01-01 PROCEDURE — 1126F PR PAIN SEVERITY QUANTIFIED, NO PAIN PRESENT: ICD-10-PCS | Mod: HCNC,S$GLB,, | Performed by: INTERNAL MEDICINE

## 2020-01-01 PROCEDURE — 99499 UNLISTED E&M SERVICE: CPT | Mod: S$GLB,,, | Performed by: INTERNAL MEDICINE

## 2020-01-01 PROCEDURE — 37000009 HC ANESTHESIA EA ADD 15 MINS: Mod: HCNC | Performed by: INTERNAL MEDICINE

## 2020-01-01 PROCEDURE — 3072F LOW RISK FOR RETINOPATHY: CPT | Mod: HCNC,S$GLB,, | Performed by: INTERNAL MEDICINE

## 2020-01-01 PROCEDURE — 25000242 PHARM REV CODE 250 ALT 637 W/ HCPCS: Mod: HCNC | Performed by: STUDENT IN AN ORGANIZED HEALTH CARE EDUCATION/TRAINING PROGRAM

## 2020-01-01 PROCEDURE — 3052F PR MOST RECENT HEMOGLOBIN A1C LEVEL 8.0 - < 9.0%: ICD-10-PCS | Mod: HCNC,CPTII,S$GLB, | Performed by: NURSE PRACTITIONER

## 2020-01-01 PROCEDURE — D9220A PRA ANESTHESIA: Mod: HCNC,CRNA,, | Performed by: NURSE ANESTHETIST, CERTIFIED REGISTERED

## 2020-01-01 PROCEDURE — G0108 PR DIAB MANAGE TRN  PER INDIV: ICD-10-PCS | Mod: HCNC,S$GLB,, | Performed by: DIETITIAN, REGISTERED

## 2020-01-01 PROCEDURE — D9220A PRA ANESTHESIA: Mod: HCNC,ANES,, | Performed by: ANESTHESIOLOGY

## 2020-01-01 PROCEDURE — 95251 CONT GLUC MNTR ANALYSIS I&R: CPT | Mod: HCNC,S$GLB,, | Performed by: NURSE PRACTITIONER

## 2020-01-01 PROCEDURE — 3074F SYST BP LT 130 MM HG: CPT | Mod: HCNC,CPTII,S$GLB, | Performed by: NURSE PRACTITIONER

## 2020-01-01 PROCEDURE — 3072F PR LOW RISK FOR RETINOPATHY: ICD-10-PCS | Mod: HCNC,S$GLB,, | Performed by: NURSE PRACTITIONER

## 2020-01-01 PROCEDURE — 1126F AMNT PAIN NOTED NONE PRSNT: CPT | Mod: HCNC,S$GLB,, | Performed by: INTERNAL MEDICINE

## 2020-01-01 PROCEDURE — 37000008 HC ANESTHESIA 1ST 15 MINUTES: Mod: HCNC | Performed by: INTERNAL MEDICINE

## 2020-01-01 PROCEDURE — 85730 THROMBOPLASTIN TIME PARTIAL: CPT | Mod: HCNC

## 2020-01-01 RX ORDER — LIDOCAINE HYDROCHLORIDE 20 MG/ML
INJECTION INTRAVENOUS
Status: DISCONTINUED | OUTPATIENT
Start: 2020-01-01 | End: 2020-01-01

## 2020-01-01 RX ORDER — GABAPENTIN 300 MG/1
300 CAPSULE ORAL 3 TIMES DAILY
Status: DISCONTINUED | OUTPATIENT
Start: 2020-01-01 | End: 2020-01-01

## 2020-01-01 RX ORDER — PROPOFOL 10 MG/ML
INJECTION, EMULSION INTRAVENOUS
Status: DISPENSED
Start: 2020-01-01 | End: 2020-01-01

## 2020-01-01 RX ORDER — MUPIROCIN 20 MG/G
OINTMENT TOPICAL 2 TIMES DAILY
Status: DISCONTINUED | OUTPATIENT
Start: 2020-01-01 | End: 2020-01-01 | Stop reason: HOSPADM

## 2020-01-01 RX ORDER — ALBUTEROL SULFATE 90 UG/1
AEROSOL, METERED RESPIRATORY (INHALATION)
Status: DISCONTINUED | OUTPATIENT
Start: 2020-01-01 | End: 2020-01-01

## 2020-01-01 RX ORDER — ALBUTEROL SULFATE 90 UG/1
AEROSOL, METERED RESPIRATORY (INHALATION)
Status: COMPLETED
Start: 2020-01-01 | End: 2020-01-01

## 2020-01-01 RX ORDER — GABAPENTIN 300 MG/1
300 CAPSULE ORAL 3 TIMES DAILY
Qty: 270 CAPSULE | Refills: 3 | Status: SHIPPED | OUTPATIENT
Start: 2020-01-01

## 2020-01-01 RX ORDER — ALBUTEROL SULFATE 90 UG/1
2 AEROSOL, METERED RESPIRATORY (INHALATION) EVERY 4 HOURS PRN
Status: DISCONTINUED | OUTPATIENT
Start: 2020-01-01 | End: 2020-01-01 | Stop reason: HOSPADM

## 2020-01-01 RX ORDER — ALBUTEROL SULFATE 90 UG/1
2 AEROSOL, METERED RESPIRATORY (INHALATION) EVERY 6 HOURS PRN
Status: DISCONTINUED | OUTPATIENT
Start: 2020-01-01 | End: 2020-01-01

## 2020-01-01 RX ORDER — IBUPROFEN 200 MG
24 TABLET ORAL
Status: DISCONTINUED | OUTPATIENT
Start: 2020-01-01 | End: 2020-01-01 | Stop reason: HOSPADM

## 2020-01-01 RX ORDER — ALPRAZOLAM 0.5 MG/1
1 TABLET ORAL 2 TIMES DAILY PRN
Status: DISCONTINUED | OUTPATIENT
Start: 2020-01-01 | End: 2020-01-01 | Stop reason: HOSPADM

## 2020-01-01 RX ORDER — PROPOFOL 10 MG/ML
VIAL (ML) INTRAVENOUS
Status: DISCONTINUED | OUTPATIENT
Start: 2020-01-01 | End: 2020-01-01

## 2020-01-01 RX ORDER — INSULIN ASPART 100 [IU]/ML
0-5 INJECTION, SOLUTION INTRAVENOUS; SUBCUTANEOUS
Status: DISCONTINUED | OUTPATIENT
Start: 2020-01-01 | End: 2020-01-01 | Stop reason: HOSPADM

## 2020-01-01 RX ORDER — METOPROLOL SUCCINATE 25 MG/1
12.5 TABLET, EXTENDED RELEASE ORAL DAILY
Qty: 90 TABLET | Refills: 3
Start: 2020-01-01 | End: 2021-01-01 | Stop reason: SDUPTHER

## 2020-01-01 RX ORDER — ALPRAZOLAM 0.5 MG/1
1 TABLET ORAL NIGHTLY PRN
Status: DISCONTINUED | OUTPATIENT
Start: 2020-01-01 | End: 2020-01-01

## 2020-01-01 RX ORDER — ESCITALOPRAM OXALATE 10 MG/1
20 TABLET ORAL DAILY
Status: DISCONTINUED | OUTPATIENT
Start: 2020-01-01 | End: 2020-01-01 | Stop reason: HOSPADM

## 2020-01-01 RX ORDER — PANTOPRAZOLE SODIUM 40 MG/10ML
40 INJECTION, POWDER, LYOPHILIZED, FOR SOLUTION INTRAVENOUS EVERY 12 HOURS
Status: DISCONTINUED | OUTPATIENT
Start: 2020-01-01 | End: 2020-01-01 | Stop reason: HOSPADM

## 2020-01-01 RX ORDER — GABAPENTIN 300 MG/1
300 CAPSULE ORAL 3 TIMES DAILY
Status: DISCONTINUED | OUTPATIENT
Start: 2020-01-01 | End: 2020-01-01 | Stop reason: HOSPADM

## 2020-01-01 RX ORDER — IBUPROFEN 200 MG
16 TABLET ORAL
Status: DISCONTINUED | OUTPATIENT
Start: 2020-01-01 | End: 2020-01-01 | Stop reason: HOSPADM

## 2020-01-01 RX ORDER — TAMSULOSIN HYDROCHLORIDE 0.4 MG/1
0.4 CAPSULE ORAL DAILY
Status: DISCONTINUED | OUTPATIENT
Start: 2020-01-01 | End: 2020-01-01 | Stop reason: HOSPADM

## 2020-01-01 RX ORDER — IBUPROFEN 200 MG
1 TABLET ORAL DAILY
Status: DISCONTINUED | OUTPATIENT
Start: 2020-01-01 | End: 2020-01-01 | Stop reason: HOSPADM

## 2020-01-01 RX ORDER — GLUCAGON 1 MG
1 KIT INJECTION
Status: DISCONTINUED | OUTPATIENT
Start: 2020-01-01 | End: 2020-01-01 | Stop reason: HOSPADM

## 2020-01-01 RX ORDER — SODIUM CHLORIDE 9 MG/ML
INJECTION, SOLUTION INTRAVENOUS CONTINUOUS PRN
Status: DISCONTINUED | OUTPATIENT
Start: 2020-01-01 | End: 2020-01-01

## 2020-01-01 RX ORDER — ESCITALOPRAM OXALATE 20 MG/1
20 TABLET ORAL DAILY
Qty: 90 TABLET | Refills: 3 | Status: SHIPPED | OUTPATIENT
Start: 2020-01-01

## 2020-01-01 RX ORDER — INSULIN ASPART 100 [IU]/ML
0-5 INJECTION, SOLUTION INTRAVENOUS; SUBCUTANEOUS EVERY 6 HOURS PRN
Status: DISCONTINUED | OUTPATIENT
Start: 2020-01-01 | End: 2020-01-01

## 2020-01-01 RX ORDER — PANTOPRAZOLE SODIUM 40 MG/10ML
80 INJECTION, POWDER, LYOPHILIZED, FOR SOLUTION INTRAVENOUS
Status: COMPLETED | OUTPATIENT
Start: 2020-01-01 | End: 2020-01-01

## 2020-01-01 RX ORDER — LIDOCAINE HYDROCHLORIDE 20 MG/ML
INJECTION, SOLUTION EPIDURAL; INFILTRATION; INTRACAUDAL; PERINEURAL
Status: DISPENSED
Start: 2020-01-01 | End: 2020-01-01

## 2020-01-01 RX ORDER — HYDROCODONE BITARTRATE AND ACETAMINOPHEN 5; 325 MG/1; MG/1
1 TABLET ORAL
Status: COMPLETED | OUTPATIENT
Start: 2020-01-01 | End: 2020-01-01

## 2020-01-01 RX ORDER — HYDROCODONE BITARTRATE AND ACETAMINOPHEN 5; 325 MG/1; MG/1
1 TABLET ORAL EVERY 6 HOURS PRN
Qty: 10 TABLET | Refills: 0 | Status: SHIPPED | OUTPATIENT
Start: 2020-01-01 | End: 2021-01-01 | Stop reason: SDUPTHER

## 2020-01-01 RX ORDER — METOCLOPRAMIDE HYDROCHLORIDE 5 MG/ML
10 INJECTION INTRAMUSCULAR; INTRAVENOUS
Status: COMPLETED | OUTPATIENT
Start: 2020-01-01 | End: 2020-01-01

## 2020-01-01 RX ORDER — BLOOD-GLUCOSE METER
EACH MISCELLANEOUS
COMMUNITY
Start: 2020-10-21

## 2020-01-01 RX ADMIN — MUPIROCIN: 20 OINTMENT TOPICAL at 09:12

## 2020-01-01 RX ADMIN — PROPOFOL 40 MG: 10 INJECTION, EMULSION INTRAVENOUS at 04:12

## 2020-01-01 RX ADMIN — ALPRAZOLAM 1 MG: 0.5 TABLET ORAL at 08:12

## 2020-01-01 RX ADMIN — ALBUTEROL SULFATE 2 PUFF: 90 AEROSOL, METERED RESPIRATORY (INHALATION) at 07:12

## 2020-01-01 RX ADMIN — ALBUTEROL SULFATE 2 PUFF: 90 AEROSOL, METERED RESPIRATORY (INHALATION) at 02:12

## 2020-01-01 RX ADMIN — PANTOPRAZOLE SODIUM 40 MG: 40 INJECTION, POWDER, FOR SOLUTION INTRAVENOUS at 08:12

## 2020-01-01 RX ADMIN — SODIUM CHLORIDE 1000 ML: 0.9 INJECTION, SOLUTION INTRAVENOUS at 10:12

## 2020-01-01 RX ADMIN — INSULIN ASPART 4 UNITS: 100 INJECTION, SOLUTION INTRAVENOUS; SUBCUTANEOUS at 12:12

## 2020-01-01 RX ADMIN — MUPIROCIN: 20 OINTMENT TOPICAL at 08:12

## 2020-01-01 RX ADMIN — PROPOFOL 60 MG: 10 INJECTION, EMULSION INTRAVENOUS at 04:12

## 2020-01-01 RX ADMIN — METOCLOPRAMIDE 10 MG: 5 INJECTION, SOLUTION INTRAMUSCULAR; INTRAVENOUS at 10:12

## 2020-01-01 RX ADMIN — PANTOPRAZOLE SODIUM 40 MG: 40 INJECTION, POWDER, FOR SOLUTION INTRAVENOUS at 09:12

## 2020-01-01 RX ADMIN — NICOTINE 1 PATCH: 21 PATCH, EXTENDED RELEASE TRANSDERMAL at 08:12

## 2020-01-01 RX ADMIN — NICOTINE 1 PATCH: 21 PATCH, EXTENDED RELEASE TRANSDERMAL at 02:12

## 2020-01-01 RX ADMIN — DEXTROSE 8 MG/HR: 50 INJECTION, SOLUTION INTRAVENOUS at 01:12

## 2020-01-01 RX ADMIN — INSULIN ASPART 3 UNITS: 100 INJECTION, SOLUTION INTRAVENOUS; SUBCUTANEOUS at 05:12

## 2020-01-01 RX ADMIN — ALPRAZOLAM 1 MG: 0.5 TABLET ORAL at 10:12

## 2020-01-01 RX ADMIN — GABAPENTIN 300 MG: 300 CAPSULE ORAL at 04:12

## 2020-01-01 RX ADMIN — INSULIN ASPART 3 UNITS: 100 INJECTION, SOLUTION INTRAVENOUS; SUBCUTANEOUS at 12:12

## 2020-01-01 RX ADMIN — TAMSULOSIN HYDROCHLORIDE 0.4 MG: 0.4 CAPSULE ORAL at 09:12

## 2020-01-01 RX ADMIN — PANTOPRAZOLE SODIUM 80 MG: 40 INJECTION, POWDER, FOR SOLUTION INTRAVENOUS at 10:12

## 2020-01-01 RX ADMIN — GABAPENTIN 300 MG: 300 CAPSULE ORAL at 09:12

## 2020-01-01 RX ADMIN — INSULIN ASPART 1 UNITS: 100 INJECTION, SOLUTION INTRAVENOUS; SUBCUTANEOUS at 09:12

## 2020-01-01 RX ADMIN — GABAPENTIN 300 MG: 300 CAPSULE ORAL at 02:12

## 2020-01-01 RX ADMIN — TAMSULOSIN HYDROCHLORIDE 0.4 MG: 0.4 CAPSULE ORAL at 08:12

## 2020-01-01 RX ADMIN — ESCITALOPRAM OXALATE 20 MG: 10 TABLET ORAL at 09:12

## 2020-01-01 RX ADMIN — PROPOFOL 80 MG: 10 INJECTION, EMULSION INTRAVENOUS at 04:12

## 2020-01-01 RX ADMIN — NICOTINE 1 PATCH: 21 PATCH, EXTENDED RELEASE TRANSDERMAL at 09:12

## 2020-01-01 RX ADMIN — SODIUM CHLORIDE: 0.9 INJECTION, SOLUTION INTRAVENOUS at 03:12

## 2020-01-01 RX ADMIN — GABAPENTIN 300 MG: 300 CAPSULE ORAL at 08:12

## 2020-01-01 RX ADMIN — INSULIN ASPART 1 UNITS: 100 INJECTION, SOLUTION INTRAVENOUS; SUBCUTANEOUS at 08:12

## 2020-01-01 RX ADMIN — ESCITALOPRAM OXALATE 20 MG: 10 TABLET ORAL at 08:12

## 2020-01-01 RX ADMIN — ALBUTEROL SULFATE 3 PUFF: 90 AEROSOL, METERED RESPIRATORY (INHALATION) at 03:12

## 2020-01-01 RX ADMIN — Medication 100 MG: at 04:12

## 2020-01-01 RX ADMIN — ALBUTEROL SULFATE 2 PUFF: 90 AEROSOL, METERED RESPIRATORY (INHALATION) at 11:12

## 2020-01-01 RX ADMIN — HYDROCODONE BITARTRATE AND ACETAMINOPHEN 1 TABLET: 5; 325 TABLET ORAL at 10:11

## 2020-01-01 RX ADMIN — TAMSULOSIN HYDROCHLORIDE 0.4 MG: 0.4 CAPSULE ORAL at 01:12

## 2020-01-06 ENCOUNTER — HOSPITAL ENCOUNTER (OUTPATIENT)
Dept: RADIOLOGY | Facility: HOSPITAL | Age: 70
Discharge: HOME | End: 2020-01-06
Attending: PODIATRIST
Payer: MEDICARE

## 2020-01-06 ENCOUNTER — OFFICE VISIT (OUTPATIENT)
Dept: PODIATRY | Facility: CLINIC | Age: 70
End: 2020-01-06
Payer: MEDICARE

## 2020-01-06 VITALS — HEIGHT: 72 IN | BODY MASS INDEX: 23.05 KG/M2 | WEIGHT: 170.19 LBS

## 2020-01-06 DIAGNOSIS — M79.671 PAIN OF RIGHT FOOT: ICD-10-CM

## 2020-01-06 DIAGNOSIS — R22.41 LOCALIZED SWELLING OF RIGHT FOOT: ICD-10-CM

## 2020-01-06 DIAGNOSIS — M79.671 PAIN OF RIGHT FOOT: Primary | ICD-10-CM

## 2020-01-06 PROCEDURE — 73630 XR FOOT COMPLETE 3 VIEW RIGHT: ICD-10-PCS | Mod: 26,HCNC,RT, | Performed by: RADIOLOGY

## 2020-01-06 PROCEDURE — 1159F MED LIST DOCD IN RCRD: CPT | Mod: HCNC,S$GLB,, | Performed by: PODIATRIST

## 2020-01-06 PROCEDURE — 99999 PR PBB SHADOW E&M-EST. PATIENT-LVL III: ICD-10-PCS | Mod: PBBFAC,HCNC,, | Performed by: PODIATRIST

## 2020-01-06 PROCEDURE — 73630 X-RAY EXAM OF FOOT: CPT | Mod: TC,HCNC,FY,PO,RT

## 2020-01-06 PROCEDURE — 1101F PR PT FALLS ASSESS DOC 0-1 FALLS W/OUT INJ PAST YR: ICD-10-PCS | Mod: HCNC,CPTII,S$GLB, | Performed by: PODIATRIST

## 2020-01-06 PROCEDURE — 99214 PR OFFICE/OUTPT VISIT, EST, LEVL IV, 30-39 MIN: ICD-10-PCS | Mod: HCNC,S$GLB,, | Performed by: PODIATRIST

## 2020-01-06 PROCEDURE — 1159F PR MEDICATION LIST DOCUMENTED IN MEDICAL RECORD: ICD-10-PCS | Mod: HCNC,S$GLB,, | Performed by: PODIATRIST

## 2020-01-06 PROCEDURE — 99214 OFFICE O/P EST MOD 30 MIN: CPT | Mod: HCNC,S$GLB,, | Performed by: PODIATRIST

## 2020-01-06 PROCEDURE — 1126F PR PAIN SEVERITY QUANTIFIED, NO PAIN PRESENT: ICD-10-PCS | Mod: HCNC,S$GLB,, | Performed by: PODIATRIST

## 2020-01-06 PROCEDURE — 73630 X-RAY EXAM OF FOOT: CPT | Mod: 26,HCNC,RT, | Performed by: RADIOLOGY

## 2020-01-06 PROCEDURE — 99999 PR PBB SHADOW E&M-EST. PATIENT-LVL III: CPT | Mod: PBBFAC,HCNC,, | Performed by: PODIATRIST

## 2020-01-06 PROCEDURE — 1126F AMNT PAIN NOTED NONE PRSNT: CPT | Mod: HCNC,S$GLB,, | Performed by: PODIATRIST

## 2020-01-06 PROCEDURE — 1101F PT FALLS ASSESS-DOCD LE1/YR: CPT | Mod: HCNC,CPTII,S$GLB, | Performed by: PODIATRIST

## 2020-01-06 RX ORDER — MELOXICAM 15 MG/1
15 TABLET ORAL DAILY
Qty: 30 TABLET | Refills: 1 | Status: SHIPPED | OUTPATIENT
Start: 2020-01-06 | End: 2020-02-25 | Stop reason: ALTCHOICE

## 2020-01-06 NOTE — PATIENT INSTRUCTIONS
Strain, Sprain, or Contusion  Strains, sprains, and contusions are common injuries. These injuries are similar, but involve different types of body tissue. Most of these injuries happen during sports or active play. But they can occur at any time. A strain, sprain, or contusion can be painful. With the right treatment, most heal with no lasting problems.              What is a strain?  A strain is an injury to a muscle or to a tendon (tissue that connects muscle to bone). It is sometimes called a pulled muscle. A strain happens when a muscle or tendon is stretched too far or is partially torn. Symptoms of a strain are pain, swelling, and having a problem moving or using the injured area. The hamstring (thigh muscle), calf muscle, and Achilles tendon are commonly strained.   What is a sprain?  A sprain is an injury to a ligament (tissue that connects bones to other bones). Joints contain many ligaments. A sprain happens when a joint is twisted or pulled and the ligament stretches or tears. Symptoms of a sprain are pain, swelling, and having a problem moving or using the injured area. Ankles, knees, and wrists are the joints most commonly sprained.   What is a contusion?  A contusion is commonly called a bruise. It is injury to tissue that causes bleeding without breaking the skin. It is often a result of being hit by a blunt object such as a ball or bat. Symptoms of a contusion are discoloration of the skin, pain (which can be severe), and swelling. Contusions usually arent serious and dont need medical attention. But a large, painful, or very swollen bruise, or a bruise that limits movement of a joint such as the knee should be seen by a doctor.   How are strains, sprains, and contusions diagnosed?  An examination is also done. An X-ray (test that creates images of bones) may be done to rule out broken bones.  How are strains, sprains, and contusions treated?  · Strains and sprains can take up to months to  heal. If not treated and allowed to heal, a strain or sprain can lead to long-term problems. These include lasting pain and stiffness. So it is important to follow the doctors instructions.  · The pain of a contusion often resolves within the first week. But the swelling and discoloration may take weeks to go away.  Treatment consists of one or more of the following:  · RICE (which stands for Rest, Ice, Compression, and Elevation)  ¨ Rest. As much as possible, you should not use the injured area.  ¨ Ice. Put ice on the injured area 3-4 times a day for 20 minutes at a time. Use an ice pack or bag of frozen peas wrapped in a thin towel.   ¨ Compression. If instructed, wrap the area to keep swelling down. Use an elastic bandage. .  ¨ Elevation.  Raise the injured body part above the level of your heart.  · Medications to relieve inflammation and pain. These will likely be NSAIDs (non-steroidal anti-inflammatory drugs). NSAIDs include ibuprofen and naproxen.   · Physical therapy (PT) to strengthen the injured area. This is especially helpful for moderate to severe strains or sprains.  · Casting of the affected area to keep it still and allow the strain or sprain to heal.  · Surgery may be needed if the strain or sprain is severe and there is tearing. During surgery, the torn muscle, tendon, or ligament is repaired.  What are the long-term concerns?  If allowed to heal, most strains, sprains, and contusions cause no further problems. Strains or sprains that are not treated and dont heal properly can lead to pain or stiffness that doesnt go away. Be sure to follow your childs treatment plan. Your childs doctor can tell you more about the expected outcome based on your childs injury.     Preventing strains, sprains, and contusions  If playing sports or doing other athletic activity, be sure you:  · Has proper training.  · Wears protective gear.  · Warms up before activity and cools down afterward.  · Uses proper  equipment.   © 5751-2924 [employer]. [address] [place] [address], [place] [address]. All rights reserved. This information is not intended as a substitute for professional medical care. Always follow your healthcare professional's instructions.

## 2020-01-06 NOTE — PROGRESS NOTES
Subjective:      Patient ID: Moe Ren is a 69 y.o. male.    Chief Complaint: Foot Swelling (both feet, ov 9/13/19 Dr Ramon PCP)    Moe is a 69 y.o. male who presents to the clinic for evaluation and treatment of high risk feet. Moe has a past medical history of Abnormal heart rhythm, Arthritis, Basal cell carcinoma (1990s), Cancer, Cataract, Diabetes mellitus, Gastric ulcer, GERD (gastroesophageal reflux disease), and Pancreatitis (2008).  The patient has no major complaints with feet. Chief concern is how to care for feet as a diabetic.   This patient has documented high risk feet requiring routine maintenance secondary to diabetes mellitis and those secondary complications of diabetes, as mentioned..    01/06/2020 Patients chief complaint is  right midfoot pain. Description: moderate Nature: aching and throbbing Onset of the symptoms was 2 weeks ago. Precipitating event: injured Patient relates he dropped a can on to the foot.  Current symptoms include: ability to bear weight, but with some pain, bruising and swelling. Alleviating factors: rest Symptoms have progressed to a point and plateaued. Patient has had prior foot problems. Evaluation to date: none. Treatment to date: none. Patients rates pain 4/10 on pain scale.      Patient relates he had back surgery in January 2019 and now has paresthesia to LE, R>L    Chief Complaint   Patient presents with    Foot Swelling     both feet, ov 9/13/19 Dr Ramon PCP       Current shoe gear:  rx shoes    Hemoglobin A1C   Date Value Ref Range Status   12/09/2019 8.1 (H) 4.0 - 5.6 % Final     Comment:     ADA Screening Guidelines:  5.7-6.4%  Consistent with prediabetes  >or=6.5%  Consistent with diabetes  High levels of fetal hemoglobin interfere with the HbA1C  assay. Heterozygous hemoglobin variants (HbS, HgC, etc)do  not significantly interfere with this assay.   However, presence of multiple variants may affect accuracy.     09/13/2019 7.3 (H) 4.0 - 5.6 % Final      Comment:     ADA Screening Guidelines:  5.7-6.4%  Consistent with prediabetes  >or=6.5%  Consistent with diabetes  High levels of fetal hemoglobin interfere with the HbA1C  assay. Heterozygous hemoglobin variants (HbS, HgC, etc)do  not significantly interfere with this assay.   However, presence of multiple variants may affect accuracy.     05/28/2019 7.7 (H) 4.0 - 5.6 % Final     Comment:     ADA Screening Guidelines:  5.7-6.4%  Consistent with prediabetes  >or=6.5%  Consistent with diabetes  High levels of fetal hemoglobin interfere with the HbA1C  assay. Heterozygous hemoglobin variants (HbS, HgC, etc)do  not significantly interfere with this assay.   However, presence of multiple variants may affect accuracy.         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    Syncope and collapse    NSAID-induced duodenal ulcer while traveling. EGD done in [place], [facility] 10/2018 with duodenal ulcers    Coronary artery disease involving native coronary artery of native heart without angina pectoris with  mildly abn stress test 11/2018; med mgmt    Hyponatremia    Asymptomatic varicose veins of left lower extremity    Lumbar stenosis 1/11/19 FUSION, SPINE, LUMBAR, TLIF, MINIMALLY INVASIVE @L4/5 & L5/S1 (Left)    Bilateral carotid artery stenosis mild on US 11/2018     Current Outpatient Medications on File Prior to Visit   Medication Sig Dispense Refill    ALPRAZolam (XANAX) 0.25 MG tablet 1 tablet daily PRN. 15 tablet 0    amitriptyline (ELAVIL) 25 MG tablet Take 1 tablet (25 mg total) by mouth every evening. 30 tablet 11    atorvastatin (LIPITOR) 10 MG tablet Take 1 tablet (10 mg total) by mouth once daily. 90 tablet 2    blood sugar diagnostic Strp Check blood glucose 6x/day for accuchek guide meter. E11.65 400 strip 3    blood-glucose meter kit Use as instructed, Accu-Chek Amanda Plus glucometer 1 each 0    blood-glucose meter,continuous (DEXCOM G6 ) Misc Use with dexcom G6 system 1 each 0    blood-glucose sensor (DEXCOM G6 SENSOR) Vivi Change sensor every 10 days 3 Device 12    blood-glucose transmitter (DEXCOM G6 TRANSMITTER) Vivi Change every 3 months 1 Device 3    desoximetasone (TOPICORT) 0.25 % cream Apply topically 2 (two) times daily as needed.       escitalopram oxalate (LEXAPRO) 20 MG tablet Take 1 tablet (20 mg total) by mouth once daily. 90 tablet 1    fluticasone (FLONASE) 50 mcg/actuation nasal spray 2 sprays (100 mcg total) by Each Nare route daily as needed. 16 g 2    gabapentin (NEURONTIN) 300 MG capsule Take 1 capsule (300 mg total) by mouth 3 (three) times daily. 90 capsule 11    hydrOXYzine (ATARAX) 50 MG tablet TAKE 1/2 (ONE-HALF) TABLET BY MOUTH EVERY 4 TO 6 HOURS AS NEEDED FOR ANXIETY 90 tablet 1    insulin aspart U-100 (NOVOLOG FLEXPEN U-100 INSULIN) 100 unit/mL InPn pen Sliding scale up to 20 units TID. MAX 50 UNITS/DAY. 45 mL 5    ipratropium (ATROVENT HFA) 17 mcg/actuation inhaler Inhale 2 puffs into the lungs every 6 (six) hours. Rescue 12.9 g 5    lancets  "(ACCU-CHEK FASTCLIX LANCING DEV) Misc 1 lancet by Misc.(Non-Drug; Combo Route) route 6 (six) times daily. To be used with Accu-Chek Amanda Plus glucometer 600 each 3    metoprolol succinate (TOPROL-XL) 25 MG 24 hr tablet Take 0.5 tablets (12.5 mg total) by mouth once daily. 15 tablet 11    montelukast (SINGULAIR) 10 mg tablet TAKE 1 TABLET BY MOUTH ONCE DAILY IN THE EVENING 30 tablet 5    multivitamin (THERAGRAN) per tablet Take 2 tablets by mouth once daily.      omeprazole (PRILOSEC) 40 MG capsule Take 1 capsule (40 mg total) by mouth 2 (two) times daily before meals. (Patient taking differently: Take 40 mg by mouth once daily. ) 60 capsule 3    pen needle, diabetic (BD ULTRA-FINE RENETTA PEN NEEDLE) 32 gauge x 5/32" Ndle Use 4x/day with Novolog 400 each 3    sodium chloride (SALINE NOSE NASL) by Nasal route.      tamsulosin (FLOMAX) 0.4 mg Cap Take 1 capsule (0.4 mg total) by mouth once daily. 90 capsule 1    triamcinolone acetonide 0.1% (KENALOG) 0.1 % Lotn       valsartan (DIOVAN) 40 MG tablet Take 1 tablet (40 mg total) by mouth once daily. (Patient taking differently: Take 40 mg by mouth 2 (two) times daily. ) 90 tablet 3    valACYclovir (VALTREX) 1000 MG tablet Take 1 tablet (1,000 mg total) by mouth every 12 (twelve) hours. for 7 doses 14 tablet 0     No current facility-administered medications on file prior to visit.        Review of patient's allergies indicates:   Allergen Reactions    Amoxicillin Nausea And Vomiting    Adhesive Rash     Paper tape only     Past Surgical History:   Procedure Laterality Date     thumb surgery      Dead Skin cell tumor Rt thumb    COLONOSCOPY N/A 4/11/2017    Procedure: COLONOSCOPY;  Surgeon: Meet Quesada MD;  Location: [facility];  Service: Endoscopy;  Laterality: N/A;    ESOPHAGOGASTRODUODENOSCOPY N/A 6/18/2018    Procedure: ESOPHAGOGASTRODUODENOSCOPY (EGD);  Surgeon: Marco Antonio Gonsalves MD;  Location: [facility];  Service: Endoscopy;  Laterality: N/A;    " MINIMALLY INVASIVE TRANSFORAMINAL LUMBAR INTERBODY FUSION (TLIF) Left 1/10/2019    Procedure: FUSION, SPINE, LUMBAR, TLIF, MINIMALLY INVASIVE @L4/5 & L5/S1;  Surgeon: Josias Ling MD;  Location: [facility] OR [address] [place] [address];  Service: Neurosurgery;  Laterality: Left;    MOUTH FLOOR MASS EXCISION  2009    NASAL SEPTUM SURGERY      SKIN SURGERY       Family History   Problem Relation Age of Onset    Cataracts Mother     Leukemia Mother     Lung cancer Brother     Mental illness Father         suicide    No Known Problems Daughter     No Known Problems Sister     No Known Problems Son     No Known Problems Maternal Aunt     No Known Problems Maternal Uncle     No Known Problems Paternal Aunt     No Known Problems Paternal Uncle     No Known Problems Maternal Grandmother     No Known Problems Maternal Grandfather     No Known Problems Paternal Grandmother     No Known Problems Paternal Grandfather     Amblyopia Neg Hx     Blindness Neg Hx     Cancer Neg Hx     Diabetes Neg Hx     Glaucoma Neg Hx     Hypertension Neg Hx     Macular degeneration Neg Hx     Retinal detachment Neg Hx     Strabismus Neg Hx     Stroke Neg Hx     Thyroid disease Neg Hx      Social History     Socioeconomic History    Marital status:      Spouse name: Not on file    Number of children: Not on file    Years of education: Not on file    Highest education level: Not on file   Occupational History    Occupation: , self employed    Social Needs    Financial resource strain: Not hard at all    Food insecurity:     Worry: Never true     Inability: Never true    Transportation needs:     Medical: No     Non-medical: No   Tobacco Use    Smoking status: Current Every Day Smoker     Packs/day: 0.25     Years: 45.00     Pack years: 11.25     Types: Cigarettes    Smokeless tobacco: Never Used   Substance and Sexual Activity    Alcohol use: Yes     Alcohol/week: 0.8 standard drinks     Types: 1 Standard  drinks or equivalent per week     Frequency: 4 or more times a week     Drinks per session: 5 or 6     Binge frequency: Weekly     Comment: 4 drinks a week    Drug use: No    Sexual activity: Yes     Partners: Female   Lifestyle    Physical activity:     Days per week: 0 days     Minutes per session: 0 min    Stress: Only a little   Relationships    Social connections:     Talks on phone: Three times a week     Gets together: Three times a week     Attends Shinto service: Not on file     Active member of club or organization: Yes     Attends meetings of clubs or organizations: More than 4 times per year     Relationship status:    Other Topics Concern    Not on file   Social History Narrative    , 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  One child committed suicide.       Review of Systems   Constitution: Negative for chills, diaphoresis, fever, malaise/fatigue and night sweats.   Cardiovascular: Positive for leg swelling. Negative for claudication, cyanosis and syncope.   Respiratory: Negative for shortness of breath.    Hematologic/Lymphatic: Does not bruise/bleed easily.   Skin: Negative for color change, dry skin, nail changes, rash, suspicious lesions and unusual hair distribution.   Musculoskeletal: Positive for arthritis, back pain, joint pain and muscle cramps. Negative for falls, joint swelling, muscle weakness and stiffness.   Gastrointestinal: Negative for constipation, diarrhea, nausea and vomiting.   Neurological: Positive for numbness, paresthesias and sensory change. Negative for brief paralysis, disturbances in coordination, focal weakness and tremors.   Psychiatric/Behavioral: Negative for altered mental status and hallucinations. The patient is not nervous/anxious.           Objective:     Vitals:    01/06/20 1603   Weight: 77.2 kg (170 lb 3.1 oz)   Height: 6' (1.829 m)   PainSc: 0-No pain       Physical Exam   Constitutional: He  appears well-developed and well-nourished. He is cooperative. No distress.   Cardiovascular:   Pulses:       Dorsalis pedis pulses are 1+ on the right side, and 1+ on the left side.        Posterior tibial pulses are 1+ on the right side, and 1+ on the left side.   Capillary refill 3-5 seconds all toes/distal feet, all toes/both feet warm to touch.      varicosities noted   Musculoskeletal:        Right ankle: Normal. No tenderness. Achilles tendon normal.        Left ankle: Normal. No tenderness. Achilles tendon normal.        Right foot: There is tenderness (dorsal midfoot), bony tenderness and swelling.   Patient has bilateral heel varus. There is a bilateral anterior cavus foot deformity Gait analysis reveals an early heel off with the fore foot bilateral striking longer in midstance. Patient shoes demonstrated medial heel counter wear bilateral.      All ten toes without clubbing, cyanosis, or signs of ischemia.      Patient has hammertoes of digits    2-5 bilateral               partially reducible without symptom today.  Range of motion, stability, muscle strength, and muscle tone normal bilateral feet and legs.    Decreased first MPJ range of motion both weightbearing and nonweightbearing, no crepitus observed the first MP joint, +dorsal flag sign. Mild  bunion deformity is observed .      Lymphadenopathy:   Negative lymphadenopathy bilateral popliteal fossa and tarsal tunnel.   Neurological: He is alert. He has normal strength. He displays no atrophy and no tremor. No sensory deficit. He exhibits normal muscle tone. He displays no seizure activity. Gait (decreased stride length, early heel of, moderate toe off bilateral) abnormal. Coordination normal.   Reflex Scores:       Patellar reflexes are 1+ on the right side and 1+ on the left side.       Achilles reflexes are 1+ on the right side and 1+ on the left side.  Or[place]-Raina 5.07 monofilament is intact bilateral feet. Sharp/dull sensation is also  intact Bilateral feet.    Paresthesias, and hyperesthesia bilateral feet with no clearly identified trigger or source.   Skin: No bruising noted. No cyanosis. Nails show no clubbing.   Skin is normal age and health appropriate color, turgor, texture, and temperature bilateral lower extremities without ulceration, hyperpigmentation, discoloration, masses nodules or cords palpated.  No ecchymosis, erythema, edema, or cardinal signs of infection bilateral lower extremities.       Assessment:       Encounter Diagnoses   Name Primary?    Pain of right foot Yes    Localized swelling of right foot          Plan:       Moe was seen today for foot swelling.    Diagnoses and all orders for this visit:    Pain of right foot  -     X-Ray Foot Complete Right; Future    Localized swelling of right foot  -     X-Ray Foot Complete Right; Future    Other orders  -     meloxicam (MOBIC) 15 MG tablet; Take 1 tablet (15 mg total) by mouth once daily. 1 pill per day everyday for the next 3 weeks with food      I counseled the patient on his conditions, their implications and medical management.     Shoe inspection. Diabetic Foot Education. Patient reminded of the importance of good nutrition and blood sugar control to help prevent podiatric complications of diabetes. Patient instructed on proper foot hygeine. We discussed wearing proper shoe gear, daily foot inspections, never walking without protective shoe gear, never putting sharp instruments to feet.     Personally reviewed imaging with patient. It appears as though he has some increased diastasis at the medial intermediate cuneiform as commonly seen in the Lisfranc's injury.  Without past comparative imaging and given his symptoms will treat it as such.    1. CAM Boot for immobilization  2. Patient instructed to rest affected limb, avoid excessive WB and use crutch or walker assistance if needed.  3. Instructions on elevation to reduce pain and swelling. When sleeping, place a  pillow under the injured leg. When sitting, support the injured leg so it is level with your waist.   4. Patient instructed on adequate icing techniques. Patient should ice the affected area at least once per day x 10 minutes for 10 days . I advised the  patient that extra icing would also be beneficial to ensure adequate anti inflammatory effect   5. Rx Mobic prescribed. Patient was instructed on dosing information. Discontinue if adverse effects occur     6. RTC in 7-10 weeks, sooner PRN

## 2020-01-14 ENCOUNTER — PATIENT OUTREACH (OUTPATIENT)
Dept: ADMINISTRATIVE | Facility: HOSPITAL | Age: 70
End: 2020-01-14

## 2020-01-21 ENCOUNTER — OFFICE VISIT (OUTPATIENT)
Dept: ENDOCRINOLOGY | Facility: CLINIC | Age: 70
End: 2020-01-21
Payer: MEDICARE

## 2020-01-21 VITALS
SYSTOLIC BLOOD PRESSURE: 181 MMHG | BODY MASS INDEX: 22.89 KG/M2 | DIASTOLIC BLOOD PRESSURE: 70 MMHG | HEART RATE: 42 BPM | WEIGHT: 168.81 LBS

## 2020-01-21 DIAGNOSIS — F10.10 ETOH ABUSE: ICD-10-CM

## 2020-01-21 DIAGNOSIS — E11.21 TYPE 2 DIABETES WITH NEPHROPATHY: Primary | ICD-10-CM

## 2020-01-21 DIAGNOSIS — I25.10 CORONARY ARTERY DISEASE INVOLVING NATIVE CORONARY ARTERY OF NATIVE HEART WITHOUT ANGINA PECTORIS: ICD-10-CM

## 2020-01-21 DIAGNOSIS — E11.649 HYPOGLYCEMIA ASSOCIATED WITH DIABETES: ICD-10-CM

## 2020-01-21 DIAGNOSIS — Z72.0 TOBACCO ABUSE: ICD-10-CM

## 2020-01-21 PROCEDURE — 1125F AMNT PAIN NOTED PAIN PRSNT: CPT | Mod: HCNC,S$GLB,, | Performed by: NURSE PRACTITIONER

## 2020-01-21 PROCEDURE — 3077F SYST BP >= 140 MM HG: CPT | Mod: HCNC,CPTII,S$GLB, | Performed by: NURSE PRACTITIONER

## 2020-01-21 PROCEDURE — 1101F PT FALLS ASSESS-DOCD LE1/YR: CPT | Mod: HCNC,CPTII,S$GLB, | Performed by: NURSE PRACTITIONER

## 2020-01-21 PROCEDURE — 3078F PR MOST RECENT DIASTOLIC BLOOD PRESSURE < 80 MM HG: ICD-10-PCS | Mod: HCNC,CPTII,S$GLB, | Performed by: NURSE PRACTITIONER

## 2020-01-21 PROCEDURE — 99214 OFFICE O/P EST MOD 30 MIN: CPT | Mod: HCNC,S$GLB,, | Performed by: NURSE PRACTITIONER

## 2020-01-21 PROCEDURE — 95251 PR GLUCOSE MONITOR, 72 HOUR, PHYS INTERP: ICD-10-PCS | Mod: HCNC,S$GLB,, | Performed by: NURSE PRACTITIONER

## 2020-01-21 PROCEDURE — 99999 PR PBB SHADOW E&M-EST. PATIENT-LVL V: CPT | Mod: PBBFAC,HCNC,, | Performed by: NURSE PRACTITIONER

## 2020-01-21 PROCEDURE — 1159F MED LIST DOCD IN RCRD: CPT | Mod: HCNC,S$GLB,, | Performed by: NURSE PRACTITIONER

## 2020-01-21 PROCEDURE — 1125F PR PAIN SEVERITY QUANTIFIED, PAIN PRESENT: ICD-10-PCS | Mod: HCNC,S$GLB,, | Performed by: NURSE PRACTITIONER

## 2020-01-21 PROCEDURE — 99999 PR PBB SHADOW E&M-EST. PATIENT-LVL V: ICD-10-PCS | Mod: PBBFAC,HCNC,, | Performed by: NURSE PRACTITIONER

## 2020-01-21 PROCEDURE — 99214 PR OFFICE/OUTPT VISIT, EST, LEVL IV, 30-39 MIN: ICD-10-PCS | Mod: HCNC,S$GLB,, | Performed by: NURSE PRACTITIONER

## 2020-01-21 PROCEDURE — 1101F PR PT FALLS ASSESS DOC 0-1 FALLS W/OUT INJ PAST YR: ICD-10-PCS | Mod: HCNC,CPTII,S$GLB, | Performed by: NURSE PRACTITIONER

## 2020-01-21 PROCEDURE — 99499 UNLISTED E&M SERVICE: CPT | Mod: HCNC,S$GLB,, | Performed by: NURSE PRACTITIONER

## 2020-01-21 PROCEDURE — 95251 CONT GLUC MNTR ANALYSIS I&R: CPT | Mod: HCNC,S$GLB,, | Performed by: NURSE PRACTITIONER

## 2020-01-21 PROCEDURE — 3078F DIAST BP <80 MM HG: CPT | Mod: HCNC,CPTII,S$GLB, | Performed by: NURSE PRACTITIONER

## 2020-01-21 PROCEDURE — 1159F PR MEDICATION LIST DOCUMENTED IN MEDICAL RECORD: ICD-10-PCS | Mod: HCNC,S$GLB,, | Performed by: NURSE PRACTITIONER

## 2020-01-21 PROCEDURE — 99499 RISK ADDL DX/OHS AUDIT: ICD-10-PCS | Mod: HCNC,S$GLB,, | Performed by: NURSE PRACTITIONER

## 2020-01-21 PROCEDURE — 3077F PR MOST RECENT SYSTOLIC BLOOD PRESSURE >= 140 MM HG: ICD-10-PCS | Mod: HCNC,CPTII,S$GLB, | Performed by: NURSE PRACTITIONER

## 2020-01-21 NOTE — PATIENT INSTRUCTIONS
On Sundays, use carb ratio of 20 for breakfast and lunch.   Decrease carb ratio from 12 to 15 for breakfast.   Monitor blood sugar 4x/day. Return to clinic in 3 months with labs prior. Call/message with any issues especially if blood sugars continue to be too low. Work on really cutting back on bedtime snacking. Recommend using a carb ratio of 15 to 20 for bedtime snacks.

## 2020-01-21 NOTE — PROGRESS NOTES
"liCC: This 69 y.o. White male  is here for evaluation of  T2DM along with comorbidities indicated in the Visit Diagnosis section of this encounter.    HPI: Moe Ren was diagnosed with T2DM in ~ 2010.  Metformin started at time of diagnosis.     He has a history of squamos cell oral CA, Spinal stenosis, chronic pancreatitis.        Prior visit 9/26/19  a1c down from 7.7 to 7.3%.   Lunch carb ratio is up from 12 to 14.   FBGs are higher now because he snacks at night. He is calculating how much insulin he may need for his after-dinner sancks and taking that combined with dinner insulin dose.   Back to drinking Scotch on ice - about 6 a day. Trying to cut back to 3.   Also back to smoking - 1 ppd.   Plan Will order Dexcom G6  Continuous Glucose Monitoring (CGM). Please call office if no one contacts you regarding Dexcom in the next couple of weeks. Once you do receive Dexcom supplies, please call office so that diabetes education training visit can be scheduled for you.   Reduce carb ratio at breakfast from 8 to now 10.   Reduce carb ratio at lunch from 14 to now 16.   If blood sugars are still low, please call or message.   Monitor blood sugar 4x/day.   Return to clinic in 3 months with labs prior.       Interval history  Carb ratio decreased further from 10 to 12 a month ago d/t low BGs after breakfast. But he still c/o that BG have been dropping after breakfast insulin dose. Also skips lunch and then finds his BG starts dropping by dinner.   He's been using Dexcom G6 but doesn't "trust it" bc he notes that his BG is sometimes 30-40 mg/dL different.     a1c was up from 7.3% in Sept to 8.1% in Jan.     Has cut back to no more than 4 drinks of Scotch a night. Previously drinking 6-8.  Down from 2 ppd to 1.5 ppd.     CGM report: glucoses lowest overnight and in the evenings. A couple of overnight lows - unsure cause but likely carb to insulin mismatch. BG tends to drop low by 3 hours after breakfast insulin dose. "   Average 14 day glucose 162 with SD 65.       LAST DIABETES EDUCATION: 6/2016; 2/2018    PRESCRIBED DIABETES MEDICATIONS: Novolog ac; he rounds up on his dosing of Novolog   ICR   0515 - 12   1130 - 20   1600 - 10     ISF 1:40, goal 115     Subtracts 2 units of Novolog from Sunday breakfast and lunch since he wakes up early and notes BGs tend to be lower on Sundays. 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     Misses medication doses - No    DM COMPLICATIONS: peripheral neuropathy    SIGNIFICANT DIABETES MED HISTORY:   Tresiba gradually titrated down until it was stopped mid 2017 d/t hypoglycemia.   Novolog stopped at ov 2/5/18 and metformin and tresiba were started. tresiba dc'd about a week later d/t hypo  repaglinide started at ov 2/28/18, and stopped about a month later d/t ineffectiveness      Avoid incretin mimetics d/t h/o chronic pancreatitis   Avoid insulin pump because patient does not require basal insulin delivery     SELF MONITORING BLOOD GLUCOSE: Checks blood glucose at home 6x/day. See Media for CGM report.     HYPOGLYCEMIC EPISODES: symptoms start in the 60s but definitely in the 50s. Corrects with coke.       CURRENT DIET: 3 meal/day - Eats at 10 am, 2 pm, and 6 pm. Eats about 60 to 130 carbs per meal.   He supplements with Ensure.     CURRENT EXERCISE: none recent    SOCIAL: retired  - consultant now.  Has a 2 yo grandson      BP (!) 181/70 (BP Location: Right arm, Patient Position: Sitting, BP Method: Large (Automatic)) Comment: Pt is not having any SOB, chest pains or headaches  Pulse (!) 42   Wt 76.6 kg (168 lb 12.8 oz)   BMI 22.89 kg/m²       ROS:   CONSTITUTIONAL: Appetite ok, denies fatigue  RESPIRATORY: No shortness of breath  CV: NO CHEST Pain       PHYSICAL EXAM:  GENERAL: Well developed, well nourished. No acute distress.   PSYCH: AAOx3, appropriate mood and affect, conversant, well-groomed. Judgement and insight good.   NEURO: Cranial  nerves grossly intact. Speech clear, no tremor.   CHEST: Respirations even and unlabored.      Hemoglobin A1C   Date Value Ref Range Status   12/09/2019 8.1 (H) 4.0 - 5.6 % Final     Comment:     ADA Screening Guidelines:  5.7-6.4%  Consistent with prediabetes  >or=6.5%  Consistent with diabetes  High levels of fetal hemoglobin interfere with the HbA1C  assay. Heterozygous hemoglobin variants (HbS, HgC, etc)do  not significantly interfere with this assay.   However, presence of multiple variants may affect accuracy.     09/13/2019 7.3 (H) 4.0 - 5.6 % Final     Comment:     ADA Screening Guidelines:  5.7-6.4%  Consistent with prediabetes  >or=6.5%  Consistent with diabetes  High levels of fetal hemoglobin interfere with the HbA1C  assay. Heterozygous hemoglobin variants (HbS, HgC, etc)do  not significantly interfere with this assay.   However, presence of multiple variants may affect accuracy.     05/28/2019 7.7 (H) 4.0 - 5.6 % Final     Comment:     ADA Screening Guidelines:  5.7-6.4%  Consistent with prediabetes  >or=6.5%  Consistent with diabetes  High levels of fetal hemoglobin interfere with the HbA1C  assay. Heterozygous hemoglobin variants (HbS, HgC, etc)do  not significantly interfere with this assay.   However, presence of multiple variants may affect accuracy.             Chemistry        Component Value Date/Time     12/09/2019 1303    K 4.1 12/09/2019 1303     12/09/2019 1303    CO2 28 12/09/2019 1303    BUN 7 (L) 12/09/2019 1303    CREATININE 1.3 12/09/2019 1303     (H) 12/09/2019 1303        Component Value Date/Time    CALCIUM 9.0 12/09/2019 1303    ALKPHOS 114 09/13/2019 1545    AST 36 09/13/2019 1545    ALT 14 09/13/2019 1545    BILITOT 0.4 09/13/2019 1545    ESTGFRAFRICA >60 12/09/2019 1303    EGFRNONAA 56 (A) 12/09/2019 1303          Lab Results   Component Value Date    LDLCALC 41.2 (L) 09/13/2019        Ref. Range 9/13/2019 15:45   Cholesterol Latest Ref Range: 120 - 199 mg/dL  129   HDL Latest Ref Range: 40 - 75 mg/dL 64   Hdl/Cholesterol Ratio Latest Ref Range: 20.0 - 50.0 % 49.6   LDL Cholesterol External Latest Ref Range: 63.0 - 159.0 mg/dL 41.2 (L)   Non-HDL Cholesterol Latest Units: mg/dL 65   Total Cholesterol/HDL Ratio Latest Ref Range: 2.0 - 5.0  2.0   Triglycerides Latest Ref Range: 30 - 150 mg/dL 119           Lab Results   Component Value Date    MICALBCREAT 65.9 (H) 05/28/2019               ASSESSMENT and PLAN:    A1C GOAL: < 7 %       1. Type 2 diabetes with nephropathy  On Sundays, use carb ratio of 20 for breakfast and lunch.   Decrease carb ratio from 12 to 15 for breakfast.   Monitor blood sugar 4x/day. Return to clinic in 3 months with labs prior. Call/message with any issues especially if blood sugars continue to be too low. Work on really cutting back on bedtime snacking. Recommend using a carb ratio of 15 to 20 for bedtime snacks.           Hemoglobin A1c   2. Coronary artery disease involving native coronary artery of native heart without angina pectoris with mildly abn stress test 11/2018; med mgmt  Improve glycemic control.   Avoid hypoglycemia.      3. ETOH abuse  Encouraged cutting back alcohol intake and abstaining.    4. Hypoglycemia associated with diabetes  As above.    5. Tobacco abuse  Encouraged cessation.              Orders Placed This Encounter   Procedures    Hemoglobin A1c     Standing Status:   Future     Standing Expiration Date:   3/21/2021        Follow up in about 3 months (around 4/21/2020).

## 2020-01-24 ENCOUNTER — OFFICE VISIT (OUTPATIENT)
Dept: FAMILY MEDICINE | Facility: CLINIC | Age: 70
End: 2020-01-24
Payer: MEDICARE

## 2020-01-24 VITALS
HEIGHT: 72 IN | OXYGEN SATURATION: 97 % | HEART RATE: 46 BPM | BODY MASS INDEX: 22.89 KG/M2 | SYSTOLIC BLOOD PRESSURE: 144 MMHG | DIASTOLIC BLOOD PRESSURE: 60 MMHG | TEMPERATURE: 98 F | WEIGHT: 169 LBS

## 2020-01-24 DIAGNOSIS — I70.0 ABDOMINAL AORTIC ATHEROSCLEROSIS: ICD-10-CM

## 2020-01-24 DIAGNOSIS — I25.10 CORONARY ARTERY DISEASE INVOLVING NATIVE CORONARY ARTERY OF NATIVE HEART WITHOUT ANGINA PECTORIS: ICD-10-CM

## 2020-01-24 DIAGNOSIS — K86.0 ALCOHOL-INDUCED CHRONIC PANCREATITIS: ICD-10-CM

## 2020-01-24 DIAGNOSIS — J43.9 CHRONIC OBSTRUCTIVE BRONCHITIS WITH PULMONARY EMPHYSEMA: ICD-10-CM

## 2020-01-24 DIAGNOSIS — F41.9 ANXIETY DISORDER, UNSPECIFIED TYPE: Chronic | ICD-10-CM

## 2020-01-24 DIAGNOSIS — E78.1 HYPERTRIGLYCERIDEMIA: Chronic | ICD-10-CM

## 2020-01-24 DIAGNOSIS — G62.9 SMALL FIBER NEUROPATHY: ICD-10-CM

## 2020-01-24 DIAGNOSIS — Z72.0 TOBACCO ABUSE: ICD-10-CM

## 2020-01-24 DIAGNOSIS — J44.89 CHRONIC OBSTRUCTIVE BRONCHITIS WITH PULMONARY EMPHYSEMA: ICD-10-CM

## 2020-01-24 DIAGNOSIS — F10.20 ALCOHOL USE DISORDER, MODERATE, DEPENDENCE: Primary | ICD-10-CM

## 2020-01-24 PROCEDURE — 1125F AMNT PAIN NOTED PAIN PRSNT: CPT | Mod: HCNC,S$GLB,, | Performed by: INTERNAL MEDICINE

## 2020-01-24 PROCEDURE — 3077F SYST BP >= 140 MM HG: CPT | Mod: HCNC,CPTII,S$GLB, | Performed by: INTERNAL MEDICINE

## 2020-01-24 PROCEDURE — 3078F DIAST BP <80 MM HG: CPT | Mod: HCNC,CPTII,S$GLB, | Performed by: INTERNAL MEDICINE

## 2020-01-24 PROCEDURE — 1101F PR PT FALLS ASSESS DOC 0-1 FALLS W/OUT INJ PAST YR: ICD-10-PCS | Mod: HCNC,CPTII,S$GLB, | Performed by: INTERNAL MEDICINE

## 2020-01-24 PROCEDURE — 1159F PR MEDICATION LIST DOCUMENTED IN MEDICAL RECORD: ICD-10-PCS | Mod: HCNC,S$GLB,, | Performed by: INTERNAL MEDICINE

## 2020-01-24 PROCEDURE — 1101F PT FALLS ASSESS-DOCD LE1/YR: CPT | Mod: HCNC,CPTII,S$GLB, | Performed by: INTERNAL MEDICINE

## 2020-01-24 PROCEDURE — 99214 OFFICE O/P EST MOD 30 MIN: CPT | Mod: HCNC,S$GLB,, | Performed by: INTERNAL MEDICINE

## 2020-01-24 PROCEDURE — 99214 PR OFFICE/OUTPT VISIT, EST, LEVL IV, 30-39 MIN: ICD-10-PCS | Mod: HCNC,S$GLB,, | Performed by: INTERNAL MEDICINE

## 2020-01-24 PROCEDURE — 99499 RISK ADDL DX/OHS AUDIT: ICD-10-PCS | Mod: HCNC,S$GLB,, | Performed by: INTERNAL MEDICINE

## 2020-01-24 PROCEDURE — 99999 PR PBB SHADOW E&M-EST. PATIENT-LVL III: ICD-10-PCS | Mod: PBBFAC,HCNC,, | Performed by: INTERNAL MEDICINE

## 2020-01-24 PROCEDURE — 1159F MED LIST DOCD IN RCRD: CPT | Mod: HCNC,S$GLB,, | Performed by: INTERNAL MEDICINE

## 2020-01-24 PROCEDURE — 3077F PR MOST RECENT SYSTOLIC BLOOD PRESSURE >= 140 MM HG: ICD-10-PCS | Mod: HCNC,CPTII,S$GLB, | Performed by: INTERNAL MEDICINE

## 2020-01-24 PROCEDURE — 1125F PR PAIN SEVERITY QUANTIFIED, PAIN PRESENT: ICD-10-PCS | Mod: HCNC,S$GLB,, | Performed by: INTERNAL MEDICINE

## 2020-01-24 PROCEDURE — 99999 PR PBB SHADOW E&M-EST. PATIENT-LVL III: CPT | Mod: PBBFAC,HCNC,, | Performed by: INTERNAL MEDICINE

## 2020-01-24 PROCEDURE — 99499 UNLISTED E&M SERVICE: CPT | Mod: HCNC,S$GLB,, | Performed by: INTERNAL MEDICINE

## 2020-01-24 PROCEDURE — 3078F PR MOST RECENT DIASTOLIC BLOOD PRESSURE < 80 MM HG: ICD-10-PCS | Mod: HCNC,CPTII,S$GLB, | Performed by: INTERNAL MEDICINE

## 2020-01-24 RX ORDER — AMITRIPTYLINE HYDROCHLORIDE 25 MG/1
50 TABLET, FILM COATED ORAL NIGHTLY
Qty: 60 TABLET | Refills: 11 | Status: SHIPPED | OUTPATIENT
Start: 2020-01-24 | End: 2021-01-01 | Stop reason: SDUPTHER

## 2020-01-24 RX ORDER — VALSARTAN 40 MG/1
80 TABLET ORAL DAILY
Qty: 180 TABLET | Refills: 3 | Status: SHIPPED | OUTPATIENT
Start: 2020-01-24 | End: 2020-01-27

## 2020-01-24 NOTE — PROGRESS NOTES
This note was created by combination of typed  and M-Modal dictation.  Transcription errors may be present.  If there are any questions, please contact me.    Assessment & Plan:   Alcohol use disorder, moderate, dependence  Alcohol-induced chronic pancreatitis  -LCSW had recommended addiction behavior unit.  He is not committed to that.  Wants to continue to try do this on his own.  Finds the location of the ABU to be inconvenient.  Does not have a timetable in place for cutting down further.  Avoid narcotics and benzodiazepines.  He has a few left for emergencies.  If I were to give him a refill he would be for 5 tablets at a time.    Anxiety disorder, unspecified type  -is prescribed the amitriptyline for small fiber neuropathy but does find that it does have some calming effect.  Seems to be better than the hydroxyzine.  Increase the amitriptyline to 50 mg.  -     amitriptyline (ELAVIL) 25 MG tablet; Take 2 tablets (50 mg total) by mouth every evening. Increased dose  Dispense: 60 tablet; Refill: 11    Coronary artery disease involving native coronary artery of native heart without angina pectoris with mildly abn stress test 11/2018; med mgmt  -BP not to goal today.  Heart rate is showing he is on maximum tolerated dose of beta-blocker.  Increase the valsartan to 80.  -     valsartan (DIOVAN) 40 MG tablet; Take 2 tablets (80 mg total) by mouth once daily.  Dispense: 180 tablet; Refill: 3    Hypertriglyceridemia  Abdominal aortic atherosclerosis  -on statin    Tobacco abuse  Chronic obstructive bronchitis with pulmonary emphysema  -work on cutting down on EtOH.    Small fiber neuropathy  -increase the amitriptyline.  -     amitriptyline (ELAVIL) 25 MG tablet; Take 2 tablets (50 mg total) by mouth every evening. Increased dose  Dispense: 60 tablet; Refill: 11    Medications Discontinued During This Encounter   Medication Reason    amitriptyline (ELAVIL) 25 MG tablet     valsartan (DIOVAN) 40 MG tablet         meds sent this encounter:  Medications Ordered This Encounter   Medications    amitriptyline (ELAVIL) 25 MG tablet     Sig: Take 2 tablets (50 mg total) by mouth every evening. Increased dose     Dispense:  60 tablet     Refill:  11    valsartan (DIOVAN) 40 MG tablet     Sig: Take 2 tablets (80 mg total) by mouth once daily.     Dispense:  180 tablet     Refill:  3       Follow Up: No follow-ups on file. OV 3 months    Subjective:     Chief Complaint   Patient presents with    Hyperlipidemia    Foot Pain     bilat       KRISTIN Rosa is a 69 y.o. male, last appointment with this clinic was 9/13/2019.    Last visit with me - chronic narcotic use, plan was wean to goal of stopping completely.  Anxiety, weaning the alprazolam.     Recent visit with endo for DM  Recent visit with podiatry for foot pain. XR negative for fracture.  Saw LCSW for fam hx of suicide and for alcohol use disorder. Was recommended to contact rehabilitation/ABU    Unfortunately his  committed suicide.  I sent in a small prescription for him for the xanax. At the time.  On SSRI     10/21 xanax #15  9/13 xanax #15  6/12 norco #15    Still under lot of stress.  Work stress.  And holidays.  His son had committed suicide around [place] time years ago and so that is of course a difficult time for him.  And work stress.  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  So the patient still has company work to do but is doing it behind the scenes.  And the company to which they are sub contractors is based here in [place] but they are thinking of relocating there where house to another city.  The patient has a few employees at work underneath him and he will have to decide whether he has to move his employees or give them temporary housing.    Continues to smoke and continues to drink alcohol.   Reports he is down to 4 drinks a day from previous of 8.    We discussed his recent visit with the LCSW.  And her recommendations to see the addictive behavior unit.  He is not committed to doing so at this time.  He finds their location inconvenient and if he were to do an outpatient day program the travel back and forth would be unpredictable.    He does not have a time table in place to continue reducing alcohol intake.  I did reiterate with him that I would recommend the ABU    9/2019 lipid good  12/9 a1c 8.1  Cr borderline, CKD 2 vs 3    Saving a few narcotics for dentist and derm in case.    The hydroxyzine has not been taking.  Did not find helpful but no SE.  50 mg tablet. 1/2 tablet at a time. He can try a full tablet at night.    But sounds like he finds the elavil more helpful.  I will increase the dose and see if we can get his anxiety down and hopefully reduce EtOH.    Notes his blood pressure has been high this week.  High on intake and remains high on repeat.  Takes the maximum tolerated dose of metoprolol, half tablet, with heart rate in the low 60s.  I will increase the valsartan.      Answers for HPI/ROS submitted by the patient on 1/17/2020   activity change: Yes  unexpected weight change: No  rhinorrhea: No  trouble swallowing: No  visual disturbance: No  chest tightness: No  polyuria: No  difficulty urinating: No  joint swelling: No  arthralgias: Yes  confusion: No  dysphoric mood: No      Patient Care Team:  Damian Ramon MD as PCP - General (Internal Medicine)  Kandice Bonilla RD (Inactive) as Dietitian (Diabetes)  Josias Ling MD as Consulting Physician (Neurosurgery)  Aime De Anda MD (Inactive) as Consulting Physician (Otolaryngology)  Blu Neil MA as Care Coordinator    Patient Active Problem List    Diagnosis Date Noted    Bilateral carotid artery stenosis mild on US 11/2018 04/08/2019     Carotid US 11/14/18 Mild bilateral internal carotid artery plaque with no  hemodynamically significant stenosis      Lumbar stenosis 1/11/19 FUSION, SPINE, LUMBAR, TLIF, MINIMALLY INVASIVE @L4/5 & L5/S1 (Left) 01/10/2019     1/11/19 FUSION, SPINE, LUMBAR, TLIF, MINIMALLY INVASIVE @L4/5 & L5/S1 (Left)          Hyponatremia 01/07/2019    Asymptomatic varicose veins of left lower extremity 01/07/2019    Coronary artery disease involving native coronary artery of native heart without angina pectoris with mildly abn stress test 11/2018; med mgmt 01/06/2019 11/14/2018 nuclear medicine stress test abnormal, small amount of mild ischemia in the apical walls.  EF 66%.      NSAID-induced duodenal ulcer while traveling. EGD done in [place], [facility] 10/2018 with duodenal ulcers 11/09/2018    Syncope and collapse 11/05/2018    Schatzki's ring of distal esophagus 6/2018 s/p dilitation 06/19/2018    Hiatal hernia on EGD 6/2018; biopsies chronic gastritis, H pylori negative. 06/19/2018    Calculus of gallbladder without cholecystitis without obstruction on RUQ US 5/2018 05/21/2018    Chronic seasonal allergic rhinitis 03/01/2018    Alcohol-induced chronic pancreatitis 01/01/2018    Abdominal aortic atherosclerosis 11/14/2017     On CXR 4/5/2016 and on 11/2/2012 XR L spine  On AAA screening 3/2018      Chronic obstructive bronchitis with pulmonary emphysema 11/14/2017 12/2017 PFTs showed mild airflow obstruction.  Lung volume showing air trapping.  DLCO reduced. no O2 desat.  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    Chronic narcotic use 08/02/2017    Deviated nasal septum 04/19/2016    Chronic back pain greater than 3 months duration 01/06/2016    Small fiber neuropathy 07/29/2015     Likely related to alcohol use      Facet arthritis of lumbar region 10/08/2014    Spondylosis without myelopathy 08/13/2014    Degeneration of lumbar or lumbosacral intervertebral disc 08/13/2014    Tubular adenoma of colon 4/2017 04/22/2014      4/11/2017 colonoscopy with transverse tubular adenoma      DM type 2 with diabetic peripheral neuropathy 07/24/2013    Hypertriglyceridemia 06/13/2013    Anxiety disorder 06/12/2013     Trazodone ineffective.      Alcohol use disorder, moderate, dependence 06/12/2013    Continuous tobacco abuse 06/12/2013       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    Cataract     Diabetes mellitus     Gastric ulcer     GERD (gastroesophageal reflux disease)     Pancreatitis 2008    2 Times       PAST SURGICAL HISTORY:  Past Surgical History:   Procedure Laterality Date     thumb surgery      Dead Skin cell tumor Rt thumb    COLONOSCOPY N/A 4/11/2017    Procedure: COLONOSCOPY;  Surgeon: Meet Quesada MD;  Location: [facility];  Service: Endoscopy;  Laterality: N/A;    ESOPHAGOGASTRODUODENOSCOPY N/A 6/18/2018    Procedure: ESOPHAGOGASTRODUODENOSCOPY (EGD);  Surgeon: Marco Antonio Gonsalves MD;  Location: [facility];  Service: Endoscopy;  Laterality: N/A;    MINIMALLY INVASIVE TRANSFORAMINAL LUMBAR INTERBODY FUSION (TLIF) Left 1/10/2019    Procedure: FUSION, SPINE, LUMBAR, TLIF, MINIMALLY INVASIVE @L4/5 & L5/S1;  Surgeon: Josias Ling MD;  Location: [address];  Service: Neurosurgery;  Laterality: Left;    MOUTH FLOOR MASS EXCISION  2009    NASAL SEPTUM SURGERY      SKIN SURGERY         SOCIAL HISTORY:  Social History     Socioeconomic History    Marital status:      Spouse name: Not on file    Number of children: Not on file    Years of education: Not on file    Highest education level: Not on file   Occupational History    Occupation: , self employed    Social Needs    Financial resource strain: Not hard at all    Food insecurity:     Worry: Never true     Inability: Never true     Transportation needs:     Medical: No     Non-medical: No   Tobacco Use    Smoking status: Current Every Day Smoker     Packs/day: 0.25     Years: 45.00     Pack years: 11.25     Types: Cigarettes    Smokeless tobacco: Never Used   Substance and Sexual Activity    Alcohol use: Yes     Alcohol/week: 0.8 standard drinks     Types: 1 Standard drinks or equivalent per week     Frequency: 4 or more times a week     Drinks per session: 5 or 6     Binge frequency: Weekly     Comment: 4 drinks a week    Drug use: No    Sexual activity: Yes     Partners: Female   Lifestyle    Physical activity:     Days per week: 0 days     Minutes per session: 0 min    Stress: Only a little   Relationships    Social connections:     Talks on phone: Three times a week     Gets together: Three times a week     Attends Taoist service: Not on file     Active member of club or organization: Yes     Attends meetings of clubs or organizations: More than 4 times per year     Relationship status:    Other Topics Concern    Not on file   Social History Narrative    , 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  One child committed suicide.       ALLERGIES AND MEDICATIONS: updated and reviewed.  Review of patient's allergies indicates:   Allergen Reactions    Amoxicillin Nausea And Vomiting    Adhesive Rash     Can only use paper tape   Has problem with Band aid    Gabapentin      Upset stomach    Metformin Diarrhea     Appetite loss      Current Outpatient Medications   Medication Sig Dispense Refill    ALPRAZolam (XANAX) 0.25 MG tablet 1 tablet daily PRN. 15 tablet 0    amitriptyline (ELAVIL) 25 MG tablet Take 1 tablet (25 mg total) by mouth every evening. 30 tablet 11    atorvastatin (LIPITOR) 10 MG tablet Take 1 tablet (10 mg total) by mouth once daily. 90 tablet 2    blood-glucose meter,continuous (DEXCOM G6 ) Misc Use with dexcom G6 system 1 each 0     "blood-glucose sensor (DEXCOM G6 SENSOR) Vivi Change sensor every 10 days 3 Device 12    blood-glucose transmitter (DEXCOM G6 TRANSMITTER) Vivi Change every 3 months 1 Device 3    desoximetasone (TOPICORT) 0.25 % cream Apply topically 2 (two) times daily as needed.       escitalopram oxalate (LEXAPRO) 20 MG tablet Take 1 tablet (20 mg total) by mouth once daily. 90 tablet 1    fluticasone (FLONASE) 50 mcg/actuation nasal spray 2 sprays (100 mcg total) by Each Nare route daily as needed. 16 g 2    gabapentin (NEURONTIN) 300 MG capsule Take 1 capsule (300 mg total) by mouth 3 (three) times daily. 90 capsule 11    hydrOXYzine (ATARAX) 50 MG tablet TAKE 1/2 (ONE-HALF) TABLET BY MOUTH EVERY 4 TO 6 HOURS AS NEEDED FOR ANXIETY 90 tablet 1    insulin aspart U-100 (NOVOLOG FLEXPEN U-100 INSULIN) 100 unit/mL InPn pen Sliding scale up to 20 units TID. MAX 50 UNITS/DAY. 45 mL 5    meloxicam (MOBIC) 15 MG tablet Take 1 tablet (15 mg total) by mouth once daily. 1 pill per day everyday for the next 3 weeks with food 30 tablet 1    metoprolol succinate (TOPROL-XL) 25 MG 24 hr tablet Take 0.5 tablets (12.5 mg total) by mouth once daily. 15 tablet 11    montelukast (SINGULAIR) 10 mg tablet TAKE 1 TABLET BY MOUTH ONCE DAILY IN THE EVENING 30 tablet 5    multivitamin (THERAGRAN) per tablet Take 2 tablets by mouth once daily.      pen needle, diabetic (BD ULTRA-FINE RENETTA PEN NEEDLE) 32 gauge x 5/32" Ndle Use 4x/day with Novolog 400 each 3    sodium chloride (SALINE NOSE NASL) by Nasal route.      tamsulosin (FLOMAX) 0.4 mg Cap Take 1 capsule (0.4 mg total) by mouth once daily. 90 capsule 1    triamcinolone acetonide 0.1% (KENALOG) 0.1 % Lotn       valsartan (DIOVAN) 40 MG tablet Take 1 tablet (40 mg total) by mouth once daily. (Patient taking differently: Take 40 mg by mouth 2 (two) times daily. ) 90 tablet 3    blood sugar diagnostic Strp Check blood glucose 6x/day for accuchek guide meter. E11.65 (Patient not taking: " Reported on 1/21/2020) 400 strip 3    blood-glucose meter kit Use as instructed, Accu-Chek Amanda Plus glucometer (Patient not taking: Reported on 1/21/2020) 1 each 0    ipratropium (ATROVENT HFA) 17 mcg/actuation inhaler Inhale 2 puffs into the lungs every 6 (six) hours. Rescue 12.9 g 5    lancets (ACCU-CHEK FASTCLIX LANCING DEV) Misc 1 lancet by Misc.(Non-Drug; Combo Route) route 6 (six) times daily. To be used with Accu-Chek Amanda Plus glucometer (Patient not taking: Reported on 1/21/2020) 600 each 3    omeprazole (PRILOSEC) 40 MG capsule Take 1 capsule (40 mg total) by mouth 2 (two) times daily before meals. (Patient taking differently: Take 40 mg by mouth once daily. ) 60 capsule 3    valACYclovir (VALTREX) 1000 MG tablet Take 1 tablet (1,000 mg total) by mouth every 12 (twelve) hours. for 7 doses 14 tablet 0     No current facility-administered medications for this visit.        Review of Systems   HENT: Negative for hearing loss.    Eyes: Negative for discharge.   Respiratory: Negative for wheezing.    Cardiovascular: Negative for chest pain and palpitations.   Gastrointestinal: Negative for blood in stool, constipation, diarrhea and vomiting.   Genitourinary: Negative for hematuria and urgency.   Musculoskeletal: Negative for neck pain.   Neurological: Negative for weakness and headaches.   Endo/Heme/Allergies: Negative for polydipsia.       Objective:   Physical Exam   Vitals:    01/24/20 1456 01/24/20 1519   BP: (!) 164/70 (!) 144/60   BP Location: Right arm Left arm   Patient Position: Sitting Sitting   BP Method: Small (Manual) Medium (Manual)   Pulse: (!) 46    Temp: 97.8 °F (36.6 °C)    TempSrc: Oral    SpO2: 97%    Weight: 76.7 kg (169 lb)    Height: 6' (1.829 m)     Body mass index is 22.92 kg/m².  Weight: 76.7 kg (169 lb)   Height: 6' (182.9 cm)     Physical Exam   Constitutional: He is oriented to person, place, and time. He appears well-developed and well-nourished. No distress.   Eyes: EOM  are normal.   Cardiovascular: Regular rhythm and normal heart sounds.   No murmur heard.  Bradycardic   Pulmonary/Chest: Effort normal and breath sounds normal.   Musculoskeletal: Normal range of motion.   Neurological: He is alert and oriented to person, place, and time. Coordination normal.   Skin: Skin is warm and dry.   Psychiatric: He has a normal mood and affect. His behavior is normal. Thought content normal.

## 2020-01-27 ENCOUNTER — TELEPHONE (OUTPATIENT)
Dept: FAMILY MEDICINE | Facility: CLINIC | Age: 70
End: 2020-01-27

## 2020-01-27 DIAGNOSIS — I25.10 CORONARY ARTERY DISEASE INVOLVING NATIVE CORONARY ARTERY OF NATIVE HEART WITHOUT ANGINA PECTORIS: ICD-10-CM

## 2020-01-27 RX ORDER — VALSARTAN 80 MG/1
80 TABLET ORAL DAILY
Qty: 90 TABLET | Refills: 3 | Status: SHIPPED | OUTPATIENT
Start: 2020-01-27 | End: 2020-04-15

## 2020-01-27 NOTE — TELEPHONE ENCOUNTER
----- Message from Al Parikh sent at 1/27/2020  3:30 PM CST -----  Contact: [facility]  Type: Patient Call Back    Who called:    What is the request in detail:She is requesting prescription for valsartan (DIOVAN) 40 MG tablet. She states 40mg is on back order, she is suggesting 80 mg instead (1per day instead of 2per day). Please advise    Can the clinic reply by [facility]?No    Would the patient rather a call back or a response via [facility]? Call    Best call back number:[phone]    Additional Information:n/a

## 2020-01-27 NOTE — TELEPHONE ENCOUNTER
----- Message from Al Parikh sent at 1/27/2020  3:30 PM CST -----  Contact: [facility]  Type: Patient Call Back    Who called:    What is the request in detail:She is requesting prescription for valsartan (DIOVAN) 40 MG tablet. She states 40mg is on back order, she is suggesting 80 mg instead (1per day instead of 2per day). Please advise    Can the clinic reply by [facility]?No    Would the patient rather a call back or a response via [facility]? Call    Best call back number:[phone]    Additional Information:n/a

## 2020-01-30 ENCOUNTER — TELEPHONE (OUTPATIENT)
Dept: ENDOCRINOLOGY | Facility: CLINIC | Age: 70
End: 2020-01-30

## 2020-01-30 DIAGNOSIS — E11.21 TYPE 2 DIABETES WITH NEPHROPATHY: Primary | ICD-10-CM

## 2020-01-31 ENCOUNTER — PATIENT MESSAGE (OUTPATIENT)
Dept: ENDOCRINOLOGY | Facility: CLINIC | Age: 70
End: 2020-01-31

## 2020-02-05 ENCOUNTER — LAB VISIT (OUTPATIENT)
Dept: LAB | Facility: HOSPITAL | Age: 70
End: 2020-02-05
Attending: NURSE PRACTITIONER
Payer: MEDICARE

## 2020-02-05 DIAGNOSIS — E11.21 TYPE 2 DIABETES WITH NEPHROPATHY: ICD-10-CM

## 2020-02-05 LAB — GLUCOSE SERPL-MCNC: 226 MG/DL (ref 70–110)

## 2020-02-05 PROCEDURE — 36415 COLL VENOUS BLD VENIPUNCTURE: CPT | Mod: HCNC,PN

## 2020-02-05 PROCEDURE — 84681 ASSAY OF C-PEPTIDE: CPT | Mod: HCNC

## 2020-02-05 PROCEDURE — 82947 ASSAY GLUCOSE BLOOD QUANT: CPT | Mod: HCNC

## 2020-02-06 LAB — C PEPTIDE SERPL-MCNC: 0.57 NG/ML (ref 0.78–5.19)

## 2020-02-07 DIAGNOSIS — E11.21 TYPE 2 DIABETES WITH NEPHROPATHY: Primary | ICD-10-CM

## 2020-02-10 ENCOUNTER — OFFICE VISIT (OUTPATIENT)
Dept: PODIATRY | Facility: CLINIC | Age: 70
End: 2020-02-10
Payer: MEDICARE

## 2020-02-10 VITALS — HEIGHT: 72 IN | WEIGHT: 169 LBS | BODY MASS INDEX: 22.89 KG/M2

## 2020-02-10 DIAGNOSIS — M20.41 HAMMER TOES OF BOTH FEET: ICD-10-CM

## 2020-02-10 DIAGNOSIS — E11.9 COMPREHENSIVE DIABETIC FOOT EXAMINATION, TYPE 2 DM, ENCOUNTER FOR: ICD-10-CM

## 2020-02-10 DIAGNOSIS — M20.42 HAMMER TOES OF BOTH FEET: ICD-10-CM

## 2020-02-10 DIAGNOSIS — M21.6X2 ACQUIRED BILATERAL PES CAVUS: ICD-10-CM

## 2020-02-10 DIAGNOSIS — M79.671 PAIN OF RIGHT FOOT: Primary | ICD-10-CM

## 2020-02-10 DIAGNOSIS — M21.6X1 ACQUIRED BILATERAL PES CAVUS: ICD-10-CM

## 2020-02-10 DIAGNOSIS — R20.2 PARESTHESIA OF BOTH FEET: ICD-10-CM

## 2020-02-10 DIAGNOSIS — M20.5X2 HALLUX LIMITUS, LEFT: ICD-10-CM

## 2020-02-10 PROCEDURE — 99999 PR PBB SHADOW E&M-EST. PATIENT-LVL V: ICD-10-PCS | Mod: PBBFAC,HCNC,, | Performed by: PODIATRIST

## 2020-02-10 PROCEDURE — 1101F PT FALLS ASSESS-DOCD LE1/YR: CPT | Mod: HCNC,CPTII,S$GLB, | Performed by: PODIATRIST

## 2020-02-10 PROCEDURE — 1159F PR MEDICATION LIST DOCUMENTED IN MEDICAL RECORD: ICD-10-PCS | Mod: HCNC,S$GLB,, | Performed by: PODIATRIST

## 2020-02-10 PROCEDURE — 1159F MED LIST DOCD IN RCRD: CPT | Mod: HCNC,S$GLB,, | Performed by: PODIATRIST

## 2020-02-10 PROCEDURE — 99213 PR OFFICE/OUTPT VISIT, EST, LEVL III, 20-29 MIN: ICD-10-PCS | Mod: HCNC,S$GLB,, | Performed by: PODIATRIST

## 2020-02-10 PROCEDURE — 3052F HG A1C>EQUAL 8.0%<EQUAL 9.0%: CPT | Mod: HCNC,CPTII,S$GLB, | Performed by: PODIATRIST

## 2020-02-10 PROCEDURE — 99213 OFFICE O/P EST LOW 20 MIN: CPT | Mod: HCNC,S$GLB,, | Performed by: PODIATRIST

## 2020-02-10 PROCEDURE — 1125F PR PAIN SEVERITY QUANTIFIED, PAIN PRESENT: ICD-10-PCS | Mod: HCNC,S$GLB,, | Performed by: PODIATRIST

## 2020-02-10 PROCEDURE — 1101F PR PT FALLS ASSESS DOC 0-1 FALLS W/OUT INJ PAST YR: ICD-10-PCS | Mod: HCNC,CPTII,S$GLB, | Performed by: PODIATRIST

## 2020-02-10 PROCEDURE — 99999 PR PBB SHADOW E&M-EST. PATIENT-LVL V: CPT | Mod: PBBFAC,HCNC,, | Performed by: PODIATRIST

## 2020-02-10 PROCEDURE — 3052F PR MOST RECENT HEMOGLOBIN A1C LEVEL 8.0 - < 9.0%: ICD-10-PCS | Mod: HCNC,CPTII,S$GLB, | Performed by: PODIATRIST

## 2020-02-10 PROCEDURE — 1125F AMNT PAIN NOTED PAIN PRSNT: CPT | Mod: HCNC,S$GLB,, | Performed by: PODIATRIST

## 2020-02-10 NOTE — PROGRESS NOTES
Subjective:      Patient ID: Moe Ren is a 69 y.o. male.    Chief Complaint: Foot Problem (left foot great toe Pcp  1/24/20); Foot Pain; Foot Injury; and Wound Care    Moe is a 69 y.o. male who presents to the clinic for evaluation and treatment of high risk feet. Moe has a past medical history of Abnormal heart rhythm, Arthritis, Basal cell carcinoma (1990s), Cancer, Cataract, Diabetes mellitus, Gastric ulcer, GERD (gastroesophageal reflux disease), and Pancreatitis (2008).  The patient has no major complaints with feet. Chief concern is how to care for feet as a diabetic.   This patient has documented high risk feet requiring routine maintenance secondary to diabetes mellitis and those secondary complications of diabetes, as mentioned..    01/06/2020 Patients chief complaint is  right midfoot pain. Description: moderate Nature: aching and throbbing Onset of the symptoms was 2 weeks ago. Precipitating event: injured Patient relates he dropped a can on to the foot.  Current symptoms include: ability to bear weight, but with some pain, bruising and swelling. Alleviating factors: rest Symptoms have progressed to a point and plateaued. Patient has had prior foot problems. Evaluation to date: none. Treatment to date: none. Patients rates pain 4/10 on pain scale.    02/10/2020 He has been some icing and wearing of the boot and relates 75 % improvement.  He admits that he does not wear boot with all ambulation.  He has instead been when a compression type stocking and Ace wrap to provide support in his tennis shoes.  He      Patient relates he had back surgery in January 2019 and now has paresthesia to LE, R>L    Chief Complaint   Patient presents with    Foot Problem     left foot great toe Pcp  1/24/20    Foot Pain    Foot Injury    Wound Care       Current shoe gear:  rx shoes    Hemoglobin A1C   Date Value Ref Range Status   12/09/2019 8.1 (H) 4.0 - 5.6 % Final     Comment:     ADA Screening  Guidelines:  5.7-6.4%  Consistent with prediabetes  >or=6.5%  Consistent with diabetes  High levels of fetal hemoglobin interfere with the HbA1C  assay. Heterozygous hemoglobin variants (HbS, HgC, etc)do  not significantly interfere with this assay.   However, presence of multiple variants may affect accuracy.     09/13/2019 7.3 (H) 4.0 - 5.6 % Final     Comment:     ADA Screening Guidelines:  5.7-6.4%  Consistent with prediabetes  >or=6.5%  Consistent with diabetes  High levels of fetal hemoglobin interfere with the HbA1C  assay. Heterozygous hemoglobin variants (HbS, HgC, etc)do  not significantly interfere with this assay.   However, presence of multiple variants may affect accuracy.     05/28/2019 7.7 (H) 4.0 - 5.6 % Final     Comment:     ADA Screening Guidelines:  5.7-6.4%  Consistent with prediabetes  >or=6.5%  Consistent with diabetes  High levels of fetal hemoglobin interfere with the HbA1C  assay. Heterozygous hemoglobin variants (HbS, HgC, etc)do  not significantly interfere with this assay.   However, presence of multiple variants may affect accuracy.         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    Syncope and collapse    NSAID-induced duodenal ulcer while traveling. EGD done in [place], [facility] 10/2018 with duodenal ulcers    Coronary artery disease involving native coronary artery of native heart without angina pectoris with mildly abn stress test 11/2018; med mgmt    Hyponatremia    Asymptomatic varicose veins of left lower extremity    Lumbar stenosis 1/11/19 FUSION, SPINE, LUMBAR, TLIF, MINIMALLY INVASIVE @L4/5 & L5/S1 (Left)    Bilateral carotid artery stenosis mild on US 11/2018     Current Outpatient Medications on File Prior to Visit   Medication Sig Dispense Refill    ALPRAZolam (XANAX) 0.25 MG tablet 1 tablet daily PRN. 15 tablet 0    amitriptyline (ELAVIL) 25 MG tablet Take 2 tablets (50 mg total) by mouth every evening. Increased dose 60 tablet 11    blood sugar diagnostic Strp Check blood glucose 6x/day for accuchek guide meter. E11.65 400 strip 3    blood-glucose meter kit Use as instructed, Accu-Chek Amanda Plus glucometer 1 each 0    blood-glucose meter,continuous (DEXCOM G6 ) Misc Use with dexcom G6 system 1 each 0    blood-glucose sensor (DEXCOM G6 SENSOR) Vivi Change sensor every 10 days 3 Device 12    blood-glucose transmitter (DEXCOM G6 TRANSMITTER) Vivi Change every 3 months 1 Device 3    desoximetasone (TOPICORT) 0.25 % cream Apply topically 2 (two) times daily as needed.       escitalopram oxalate (LEXAPRO) 20 MG tablet Take 1 tablet (20 mg total) by mouth once daily. 90 tablet 1    fluticasone (FLONASE) 50 mcg/actuation nasal spray 2 sprays (100 mcg total) by Each Nare route daily as needed. 16 g 2    gabapentin (NEURONTIN) 300 MG capsule Take 1 capsule (300 mg total) by mouth 3 (three) times daily. 90 capsule 11    hydrOXYzine (ATARAX) 50 MG tablet TAKE 1/2 (ONE-HALF) TABLET BY MOUTH EVERY 4 TO 6 HOURS AS NEEDED FOR ANXIETY 90 tablet 1    insulin  "aspart U-100 (NOVOLOG FLEXPEN U-100 INSULIN) 100 unit/mL InPn pen Sliding scale up to 20 units TID. MAX 50 UNITS/DAY. 45 mL 5    lancets (ACCU-CHEK FASTCLIX LANCING DEV) Misc 1 lancet by Misc.(Non-Drug; Combo Route) route 6 (six) times daily. To be used with Accu-Chek Amanda Plus glucometer 600 each 3    meloxicam (MOBIC) 15 MG tablet Take 1 tablet (15 mg total) by mouth once daily. 1 pill per day everyday for the next 3 weeks with food 30 tablet 1    metoprolol succinate (TOPROL-XL) 25 MG 24 hr tablet Take 0.5 tablets (12.5 mg total) by mouth once daily. 15 tablet 11    montelukast (SINGULAIR) 10 mg tablet TAKE 1 TABLET BY MOUTH ONCE DAILY IN THE EVENING 30 tablet 5    multivitamin (THERAGRAN) per tablet Take 2 tablets by mouth once daily.      pen needle, diabetic (BD ULTRA-FINE RENETTA PEN NEEDLE) 32 gauge x 5/32" Ndle Use 4x/day with Novolog 400 each 3    sodium chloride (SALINE NOSE NASL) by Nasal route.      tamsulosin (FLOMAX) 0.4 mg Cap Take 1 capsule (0.4 mg total) by mouth once daily. 90 capsule 1    triamcinolone acetonide 0.1% (KENALOG) 0.1 % Lotn       valsartan (DIOVAN) 80 MG tablet Take 1 tablet (80 mg total) by mouth once daily. 90 tablet 3    atorvastatin (LIPITOR) 10 MG tablet Take 1 tablet (10 mg total) by mouth once daily. 90 tablet 2    ipratropium (ATROVENT HFA) 17 mcg/actuation inhaler Inhale 2 puffs into the lungs every 6 (six) hours. Rescue 12.9 g 5    omeprazole (PRILOSEC) 40 MG capsule Take 1 capsule (40 mg total) by mouth 2 (two) times daily before meals. (Patient taking differently: Take 40 mg by mouth once daily. ) 60 capsule 3    valACYclovir (VALTREX) 1000 MG tablet Take 1 tablet (1,000 mg total) by mouth every 12 (twelve) hours. for 7 doses 14 tablet 0     No current facility-administered medications on file prior to visit.        Review of patient's allergies indicates:   Allergen Reactions    Amoxicillin Nausea And Vomiting    Adhesive Rash     Paper tape only     Past " Surgical History:   Procedure Laterality Date     thumb surgery      Dead Skin cell tumor Rt thumb    COLONOSCOPY N/A 4/11/2017    Procedure: COLONOSCOPY;  Surgeon: Meet Quesada MD;  Location: [facility] ENDO;  Service: Endoscopy;  Laterality: N/A;    ESOPHAGOGASTRODUODENOSCOPY N/A 6/18/2018    Procedure: ESOPHAGOGASTRODUODENOSCOPY (EGD);  Surgeon: Marco Antonio Gonsalves MD;  Location: [facility] ENDO;  Service: Endoscopy;  Laterality: N/A;    MINIMALLY INVASIVE TRANSFORAMINAL LUMBAR INTERBODY FUSION (TLIF) Left 1/10/2019    Procedure: FUSION, SPINE, LUMBAR, TLIF, MINIMALLY INVASIVE @L4/5 & L5/S1;  Surgeon: Josias Ling MD;  Location: [facility] OR [address] [place] [address];  Service: Neurosurgery;  Laterality: Left;    MOUTH FLOOR MASS EXCISION  2009    NASAL SEPTUM SURGERY      SKIN SURGERY       Family History   Problem Relation Age of Onset    Cataracts Mother     Leukemia Mother     Lung cancer Brother     Mental illness Father         suicide    No Known Problems Daughter     No Known Problems Sister     No Known Problems Son     No Known Problems Maternal Aunt     No Known Problems Maternal Uncle     No Known Problems Paternal Aunt     No Known Problems Paternal Uncle     No Known Problems Maternal Grandmother     No Known Problems Maternal Grandfather     No Known Problems Paternal Grandmother     No Known Problems Paternal Grandfather     Amblyopia Neg Hx     Blindness Neg Hx     Cancer Neg Hx     Diabetes Neg Hx     Glaucoma Neg Hx     Hypertension Neg Hx     Macular degeneration Neg Hx     Retinal detachment Neg Hx     Strabismus Neg Hx     Stroke Neg Hx     Thyroid disease Neg Hx      Social History     Socioeconomic History    Marital status:      Spouse name: Not on file    Number of children: Not on file    Years of education: Not on file    Highest education level: Not on file   Occupational History    Occupation: , self employed    Social Needs    Financial resource  strain: Not hard at all    Food insecurity:     Worry: Never true     Inability: Never true    Transportation needs:     Medical: No     Non-medical: No   Tobacco Use    Smoking status: Current Every Day Smoker     Packs/day: 0.25     Years: 45.00     Pack years: 11.25     Types: Cigarettes    Smokeless tobacco: Never Used   Substance and Sexual Activity    Alcohol use: Yes     Alcohol/week: 0.8 standard drinks     Types: 1 Standard drinks or equivalent per week     Frequency: 4 or more times a week     Drinks per session: 5 or 6     Binge frequency: Weekly     Comment: 4 drinks a week    Drug use: No    Sexual activity: Yes     Partners: Female   Lifestyle    Physical activity:     Days per week: 0 days     Minutes per session: 0 min    Stress: Only a little   Relationships    Social connections:     Talks on phone: Three times a week     Gets together: Three times a week     Attends Faith service: Not on file     Active member of club or organization: Yes     Attends meetings of clubs or organizations: More than 4 times per year     Relationship status:    Other Topics Concern    Not on file   Social History Narrative    , 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  One child committed suicide.       Review of Systems   Constitution: Negative for chills, diaphoresis, fever, malaise/fatigue and night sweats.   Cardiovascular: Positive for leg swelling. Negative for claudication, cyanosis and syncope.   Respiratory: Negative for shortness of breath.    Hematologic/Lymphatic: Does not bruise/bleed easily.   Skin: Negative for color change, dry skin, nail changes, rash, suspicious lesions and unusual hair distribution.   Musculoskeletal: Positive for arthritis, back pain, joint pain and muscle cramps. Negative for falls, joint swelling, muscle weakness and stiffness.   Gastrointestinal: Negative for constipation, diarrhea, nausea and vomiting.    Neurological: Positive for numbness, paresthesias and sensory change. Negative for brief paralysis, disturbances in coordination, focal weakness and tremors.   Psychiatric/Behavioral: Negative for altered mental status and hallucinations. The patient is not nervous/anxious.           Objective:     Vitals:    02/10/20 1630   Weight: 76.7 kg (169 lb)   Height: 6' (1.829 m)   PainSc:   3   PainLoc: Foot       Physical Exam   Constitutional: He appears well-developed and well-nourished. He is cooperative. No distress.   Cardiovascular:   Pulses:       Dorsalis pedis pulses are 1+ on the right side, and 1+ on the left side.        Posterior tibial pulses are 1+ on the right side, and 1+ on the left side.   Capillary refill 3-5 seconds all toes/distal feet, all toes/both feet warm to touch.      varicosities noted   Musculoskeletal:        Right ankle: Normal. No tenderness. Achilles tendon normal.        Left ankle: Normal. No tenderness. Achilles tendon normal.        Right foot: There is tenderness (dorsal midfoot), bony tenderness and swelling.   Patient has bilateral heel varus. There is a bilateral anterior cavus foot deformity Gait analysis reveals an early heel off with the fore foot bilateral striking longer in midstance. Patient shoes demonstrated medial heel counter wear bilateral.      All ten toes without clubbing, cyanosis, or signs of ischemia.      Patient has hammertoes of digits    2-5 bilateral               partially reducible without symptom today.  Range of motion, stability, muscle strength, and muscle tone normal bilateral feet and legs.    Decreased first MPJ range of motion both weightbearing and nonweightbearing, no crepitus observed the first MP joint, +dorsal flag sign. Mild  bunion deformity is observed .      Lymphadenopathy:   Negative lymphadenopathy bilateral popliteal fossa and tarsal tunnel.   Neurological: He is alert. He has normal strength. He displays no atrophy and no tremor. No  sensory deficit. He exhibits normal muscle tone. He displays no seizure activity. Gait (decreased stride length, early heel of, moderate toe off bilateral) abnormal. Coordination normal.   Reflex Scores:       Patellar reflexes are 1+ on the right side and 1+ on the left side.       Achilles reflexes are 1+ on the right side and 1+ on the left side.  [place] 5.07 monofilament is intact bilateral feet. Sharp/dull sensation is also intact Bilateral feet.    Paresthesias, and hyperesthesia bilateral feet with no clearly identified trigger or source.   Skin: No bruising noted. No cyanosis. Nails show no clubbing.   Skin is normal age and health appropriate color, turgor, texture, and temperature bilateral lower extremities without ulceration, hyperpigmentation, discoloration, masses nodules or cords palpated.  No ecchymosis, erythema, edema, or cardinal signs of infection bilateral lower extremities.       Assessment:       Encounter Diagnoses   Name Primary?    Pain of right foot Yes    Comprehensive diabetic foot examination, type 2 DM, encounter for     Paresthesia of both feet     Hallux limitus, left     Hammer toes of both feet     Acquired bilateral pes cavus          Plan:       Moe was seen today for foot problem, foot pain, foot injury and wound care.    Diagnoses and all orders for this visit:    Pain of right foot    Comprehensive diabetic foot examination, type 2 DM, encounter for    Paresthesia of both feet    Hallux limitus, left    Hammer toes of both feet    Acquired bilateral pes cavus      I counseled the patient on his conditions, their implications and medical management.     Shoe inspection. Diabetic Foot Education. Patient reminded of the importance of good nutrition and blood sugar control to help prevent podiatric complications of diabetes. Patient instructed on proper foot hygeine. We discussed wearing proper shoe gear, daily foot inspections, never walking without protective  shoe gear, never putting sharp instruments to feet.     Significant difference in pain reaction on physical exam today than on previous encounter.    Encouraged patient to stretch aggressively. Continue to ice as needed. Mobic PRN pain.  Discussed wearing appropriate shoe gear and avoiding flats, slippers, sandals, and going barefoot.    RTC 12 months, PRN

## 2020-02-11 ENCOUNTER — PATIENT MESSAGE (OUTPATIENT)
Dept: ENDOCRINOLOGY | Facility: CLINIC | Age: 70
End: 2020-02-11

## 2020-02-11 ENCOUNTER — PATIENT MESSAGE (OUTPATIENT)
Dept: PODIATRY | Facility: CLINIC | Age: 70
End: 2020-02-11

## 2020-02-13 ENCOUNTER — LAB VISIT (OUTPATIENT)
Dept: LAB | Facility: HOSPITAL | Age: 70
End: 2020-02-13
Payer: MEDICARE

## 2020-02-13 DIAGNOSIS — E11.21 TYPE 2 DIABETES WITH NEPHROPATHY: ICD-10-CM

## 2020-02-13 LAB
C PEPTIDE SERPL-MCNC: 0.27 NG/ML (ref 0.78–5.19)
GLUCOSE SERPL-MCNC: 187 MG/DL (ref 70–110)

## 2020-02-13 PROCEDURE — 36415 COLL VENOUS BLD VENIPUNCTURE: CPT | Mod: HCNC,PN

## 2020-02-13 PROCEDURE — 84681 ASSAY OF C-PEPTIDE: CPT | Mod: HCNC

## 2020-02-13 PROCEDURE — 82947 ASSAY GLUCOSE BLOOD QUANT: CPT | Mod: HCNC

## 2020-02-25 ENCOUNTER — OFFICE VISIT (OUTPATIENT)
Dept: URGENT CARE | Facility: CLINIC | Age: 70
End: 2020-02-25
Payer: MEDICARE

## 2020-02-25 VITALS
RESPIRATION RATE: 18 BRPM | SYSTOLIC BLOOD PRESSURE: 122 MMHG | TEMPERATURE: 98 F | OXYGEN SATURATION: 100 % | DIASTOLIC BLOOD PRESSURE: 69 MMHG | HEIGHT: 72 IN | HEART RATE: 80 BPM | WEIGHT: 170 LBS | BODY MASS INDEX: 23.03 KG/M2

## 2020-02-25 DIAGNOSIS — R07.81 RIB PAIN ON LEFT SIDE: Primary | ICD-10-CM

## 2020-02-25 PROCEDURE — 99214 PR OFFICE/OUTPT VISIT, EST, LEVL IV, 30-39 MIN: ICD-10-PCS | Mod: S$GLB,,, | Performed by: INTERNAL MEDICINE

## 2020-02-25 PROCEDURE — 71046 XR CHEST PA AND LATERAL: ICD-10-PCS | Mod: S$GLB,,, | Performed by: RADIOLOGY

## 2020-02-25 PROCEDURE — 71046 X-RAY EXAM CHEST 2 VIEWS: CPT | Mod: S$GLB,,, | Performed by: RADIOLOGY

## 2020-02-25 PROCEDURE — 99214 OFFICE O/P EST MOD 30 MIN: CPT | Mod: S$GLB,,, | Performed by: INTERNAL MEDICINE

## 2020-02-25 RX ORDER — DICLOFENAC SODIUM 10 MG/G
2 GEL TOPICAL 3 TIMES DAILY
Qty: 100 G | Refills: 0 | Status: ON HOLD | OUTPATIENT
Start: 2020-02-25 | End: 2021-01-01 | Stop reason: HOSPADM

## 2020-02-25 RX ORDER — METHOCARBAMOL 500 MG/1
500 TABLET, FILM COATED ORAL 4 TIMES DAILY
Qty: 40 TABLET | Refills: 0 | Status: SHIPPED | OUTPATIENT
Start: 2020-02-25 | End: 2020-02-25

## 2020-02-25 RX ORDER — METHOCARBAMOL 500 MG/1
500 TABLET, FILM COATED ORAL 3 TIMES DAILY
Qty: 21 TABLET | Refills: 0 | Status: SHIPPED | OUTPATIENT
Start: 2020-02-25 | End: 2020-03-03

## 2020-02-25 NOTE — PATIENT INSTRUCTIONS
Rib Contusion     A rib contusion is a bruise to one or more rib bones. It may cause pain, tenderness, swelling and a purplish discoloration. There may be a sharp pain while breathing.  You will be assessed for other injuries. You will likely be given pain medicine. Rib contusions heal on their own, without further treatment. However, pain may take weeks to months to go away.   Note that a small crack (fracture) in the rib may cause the same symptoms as a rib contusion. The small crack may not be seen on a chest X-ray. However, the conditions are managed in the same way.  Home care  · Rest. Avoid heavy lifting, strenuous exertion, or any activity that causes pain.  · Ice the area to reduce pain and swelling. Put ice cubes in a plastic bag or use a cold pack. (Wrap the cold source in a thin towel. Do not place it directly on your skin.) Ice the injured area for 20 minutes every 1 to 2 hours the first day. Continue with ice packs 3 to 4 times a day for the next 2 days, then as needed for the relief of pain and swelling.  · Take any prescribed pain medicine as directed by your healthcare provider. If none was prescribed, take acetaminophen, ibuprofen, or naproxen to control pain.  · If you have a significant injury, you may be given a device called an incentive spirometer to keep your lungs healthy. Use as directed.  Follow-up care  Follow up with your healthcare provider during the next week or as directed.  When to seek medical advice  Call your healthcare provider for any of the following:  · Shortness of breath or trouble breathing  · Increasing chest pain with breathing  · Coughing  · Dizziness, weakness, or fainting  · New or worsening pain  · Fever of 100.4°F (38ºC) or higher, or as directed by your healthcare provider  Date Last Reviewed: 2/1/2017  © 4677-2300 [employer]. [address] [place] [address], [place] [address]. All rights reserved. This information is not intended as a substitute for  professional medical care. Always follow your healthcare professional's instructions.        Rib Fracture    You broke one or more ribs. This is called a rib fracture. Rib fractures do not require a cast like other bones. They will heal by themselves in about 4-6 weeks. The first 3-4 weeks will be the most painful because deep breathing, coughing, or changing position from sitting to lying down, may cause the broken ends to move slightly.  Home care  · Rest. You should not be doing any heavy lifting or strenuous exertion until the pain goes away.  · It hurts to breathe when you have a broken rib. This puts you at risk of getting pneumonia from poor airflow through your lungs. To prevent this:  ¨ Take several very deep breaths once an hour while you're awake. Exhale through pursed lips as if you are blowing up a balloon. If possible, actually blow up a balloon or a rubber glove. This exercise builds up pressure inside the lung and prevents collapse of the small air sacs of the lung. This exercise may cause some pain at the site of injury, which is normal.  ¨ You may have gotten a breathing exercise device called an incentive spirometer. Use it at least 4 times a day, or as directed.  · Apply an ice pack over the injured area for 15 to 20 minutes every 1 to 2 hours. You should do this for the first 24 to 48 hours. You can make an ice pack by filling a plastic bag that seals at the top with ice cubes and then wrapping it with a thin towel. Continue with ice packs as needed for the relief of pain and swelling.  · You may use over-the-counter pain medicine to control pain, unless another pain medicine was prescribed. If you have chronic liver or kidney disease or ever had a stomach ulcer or GI bleeding, talk with your healthcare provider before using these medicines.  · If your pain is not controlled, contact your healthcare provider. Sometimes a stronger pain medicine may be needed. A nerve block can be done in case of  severe pain. It will numb the nerve between the ribs.  Follow-up care  Follow up with your healthcare provider, or as advised. Rarely, a broken rib will cause complications within the first few days that may not be evident during your initial exam. This can include collapsed lung, bleeding around the lung or into the abdomen, or pneumonia. Therefore, watch for the signs below.  If X-rays were taken, you will be told of any new findings that may affect your care.  Call 911  Call 911 if you have:  · Dizziness, weakness or fainting  · Shortness of breath with or without chest discomfort  · New or worsening abdominal pain  · Discomfort in other areas of your upper body such as your shoulders, jaw, neck, or arms  When to seek medical advice  Call your healthcare provider right away if any of these occur:  · Increasing chest pain with breathing  · Fever of 100.4°F (38°C) or above lasting for 24 to 48 hours  · Congested cough  Date Last Reviewed: 12/3/2015  © 1101-8127 [employer]. [address] [place] [address]. All rights reserved. This information is not intended as a substitute for professional medical care. Always follow your healthcare professional's instructions.    - your chest xray showed healing rib fracture over your left 7th and 8th ribs  - Apply voltaren to area as needed for pain  - You have been prescribed a muscle relaxer called Robaxen. Do not drive or operate machinery while taking this medication.   - if you develop any fever, acute shortness of breath, or worsening chest pain, please go immediately to the ED for reevaluation.    - please follow up with your PCP in 1-2 weeks for reevaluation of rib pain if symptoms or pain worsens

## 2020-02-25 NOTE — PROGRESS NOTES
Subjective:       Patient ID: Moe Ren is a 69 y.o. male.    Vitals:  height is 6' (1.829 m) and weight is 77.1 kg (170 lb). His temperature is 98 °F (36.7 °C). His blood pressure is 122/69 and his pulse is 80. His respiration is 18 and oxygen saturation is 100%.     Chief Complaint: Pain    Pt was leaning over a chair with an arm rest, and states that on his left side where the arm rest came into contact with his ribs hurt his ribs. He states that happened last week and that pain has got better but then he hit the same area with his car door and is now having pain again.     70 y/o male with pertinent PMHx of HTN, Diabetes Mellitus, COPD, aortic atherosclerosis presents to urgent care c/o left sided rib pain that started last week after he leaned over a chair with an arm rest and worsened yesterday after his car door hit him in the same side. Pt states he feels cracking when he presses on the area. Pt denies recent illness/travel, fever, chills, sore throat, difficulty swallowing, nasal congestion, cough, SOB, leg pain/swelling, N/V/D, abdominal pain, back pain, neck pain, headache, and vision changes.      Pain   This is a new problem. The current episode started 1 to 4 weeks ago. Pertinent negatives include no arthralgias, chest pain, chills, congestion, coughing, fatigue, fever, headaches, joint swelling, myalgias, nausea, rash, sore throat, vertigo or vomiting.       Constitution: Negative for chills, fatigue and fever.   HENT: Negative for congestion and sore throat.    Neck: Negative for painful lymph nodes.   Cardiovascular: Negative for chest pain and leg swelling.   Eyes: Negative for double vision and blurred vision.   Respiratory: Negative for cough and shortness of breath.    Gastrointestinal: Negative for nausea, vomiting and diarrhea.   Genitourinary: Negative for dysuria, frequency and urgency.   Musculoskeletal: Positive for pain. Negative for joint pain, joint swelling, muscle cramps and  muscle ache.   Skin: Negative for color change, pale and rash.   Allergic/Immunologic: Negative for seasonal allergies.   Neurological: Negative for dizziness, history of vertigo, light-headedness, passing out and headaches.   Hematologic/Lymphatic: Negative for swollen lymph nodes, easy bruising/bleeding and history of blood clots. Does not bruise/bleed easily.   Psychiatric/Behavioral: Negative for nervous/anxious, sleep disturbance and depression. The patient is not nervous/anxious.        Objective:      Physical Exam   Constitutional: He is oriented to person, place, and time. He appears well-developed and well-nourished. He is cooperative.  Non-toxic appearance. He does not have a sickly appearance. He does not appear ill. No distress.   HENT:   Head: Normocephalic and atraumatic.   Right Ear: Hearing, tympanic membrane, external ear and ear canal normal.   Left Ear: Hearing, tympanic membrane, external ear and ear canal normal.   Nose: Nose normal. No mucosal edema, rhinorrhea or nasal deformity. No epistaxis. Right sinus exhibits no maxillary sinus tenderness and no frontal sinus tenderness. Left sinus exhibits no maxillary sinus tenderness and no frontal sinus tenderness.   Mouth/Throat: Uvula is midline, oropharynx is clear and moist and mucous membranes are normal. No trismus in the jaw. Normal dentition. No uvula swelling. No oropharyngeal exudate, posterior oropharyngeal edema or posterior oropharyngeal erythema.   Eyes: Conjunctivae, EOM and lids are normal. No scleral icterus.   Neck: Trachea normal, full passive range of motion without pain and phonation normal. Neck supple. No neck rigidity. No tracheal deviation, no edema and no erythema present.   Cardiovascular: Normal rate, regular rhythm, normal heart sounds, intact distal pulses and normal pulses.   No murmur heard.  Pulmonary/Chest: Effort normal and breath sounds normal. No respiratory distress. He has no decreased breath sounds. He has no  wheezes. He has no rhonchi. He has no rales. He exhibits tenderness.   Abdominal: Soft. Normal appearance. There is no tenderness.   Musculoskeletal: Normal range of motion. He exhibits no edema or deformity.        Arms:  Lymphadenopathy:     He has no cervical adenopathy.   Neurological: He is alert and oriented to person, place, and time. He exhibits normal muscle tone. Coordination normal.   Skin: Skin is warm, dry, intact, not diaphoretic and not pale.   Psychiatric: He has a normal mood and affect. His speech is normal and behavior is normal. Judgment and thought content normal. Cognition and memory are normal.   Nursing note and vitals reviewed.        Assessment:       1. Rib pain on left side        Plan:         Rib pain on left side  -     XR CHEST PA AND LATERAL; Future; Expected date: 02/25/2020  -     Discontinue: methocarbamol (ROBAXIN) 500 MG Tab; Take 1 tablet (500 mg total) by mouth 4 (four) times daily. for 10 days  Dispense: 40 tablet; Refill: 0  -     diclofenac sodium (VOLTAREN) 1 % Gel; Apply 2 g topically 3 (three) times daily.  Dispense: 100 g; Refill: 0  -     methocarbamol (ROBAXIN) 500 MG Tab; Take 1 tablet (500 mg total) by mouth 3 (three) times daily. for 7 days  Dispense: 21 tablet; Refill: 0    Xr Chest Pa And Lateral    Result Date: 2/25/2020  EXAMINATION: XR CHEST PA AND LATERAL CLINICAL HISTORY: Pleurodynia TECHNIQUE: PA and lateral views of the chest were performed. COMPARISON: 01/12/2019 and rib series 09/19/2019 FINDINGS: Remote healed fracture deformity of the left 8 rib posteriorly with evolving subacute fracture deformity of the right 7th and 8th ribs anterior laterally.  No new lung opacity.  No pneumothorax.  There is trace opacity left costophrenic angle which may represent scarring small chronic effusion not excluded.  Continued atherosclerotic aorta.  Heart size within normal limits.  Degenerative change in the visualized spine.     See above Electronically signed  by: Greg Reynolds DO Date:    02/25/2020 Time:    14:32       Patient Instructions     Rib Contusion     A rib contusion is a bruise to one or more rib bones. It may cause pain, tenderness, swelling and a purplish discoloration. There may be a sharp pain while breathing.  You will be assessed for other injuries. You will likely be given pain medicine. Rib contusions heal on their own, without further treatment. However, pain may take weeks to months to go away.   Note that a small crack (fracture) in the rib may cause the same symptoms as a rib contusion. The small crack may not be seen on a chest X-ray. However, the conditions are managed in the same way.  Home care  · Rest. Avoid heavy lifting, strenuous exertion, or any activity that causes pain.  · Ice the area to reduce pain and swelling. Put ice cubes in a plastic bag or use a cold pack. (Wrap the cold source in a thin towel. Do not place it directly on your skin.) Ice the injured area for 20 minutes every 1 to 2 hours the first day. Continue with ice packs 3 to 4 times a day for the next 2 days, then as needed for the relief of pain and swelling.  · Take any prescribed pain medicine as directed by your healthcare provider. If none was prescribed, take acetaminophen, ibuprofen, or naproxen to control pain.  · If you have a significant injury, you may be given a device called an incentive spirometer to keep your lungs healthy. Use as directed.  Follow-up care  Follow up with your healthcare provider during the next week or as directed.  When to seek medical advice  Call your healthcare provider for any of the following:  · Shortness of breath or trouble breathing  · Increasing chest pain with breathing  · Coughing  · Dizziness, weakness, or fainting  · New or worsening pain  · Fever of 100.4°F (38ºC) or higher, or as directed by your healthcare provider  Date Last Reviewed: 2/1/2017  © 2642-1538 [employer]. [address] [place] [address], [place]  [address]. All rights reserved. This information is not intended as a substitute for professional medical care. Always follow your healthcare professional's instructions.        Rib Fracture    You broke one or more ribs. This is called a rib fracture. Rib fractures do not require a cast like other bones. They will heal by themselves in about 4-6 weeks. The first 3-4 weeks will be the most painful because deep breathing, coughing, or changing position from sitting to lying down, may cause the broken ends to move slightly.  Home care  · Rest. You should not be doing any heavy lifting or strenuous exertion until the pain goes away.  · It hurts to breathe when you have a broken rib. This puts you at risk of getting pneumonia from poor airflow through your lungs. To prevent this:  ¨ Take several very deep breaths once an hour while you're awake. Exhale through pursed lips as if you are blowing up a balloon. If possible, actually blow up a balloon or a rubber glove. This exercise builds up pressure inside the lung and prevents collapse of the small air sacs of the lung. This exercise may cause some pain at the site of injury, which is normal.  ¨ You may have gotten a breathing exercise device called an incentive spirometer. Use it at least 4 times a day, or as directed.  · Apply an ice pack over the injured area for 15 to 20 minutes every 1 to 2 hours. You should do this for the first 24 to 48 hours. You can make an ice pack by filling a plastic bag that seals at the top with ice cubes and then wrapping it with a thin towel. Continue with ice packs as needed for the relief of pain and swelling.  · You may use over-the-counter pain medicine to control pain, unless another pain medicine was prescribed. If you have chronic liver or kidney disease or ever had a stomach ulcer or GI bleeding, talk with your healthcare provider before using these medicines.  · If your pain is not controlled, contact your healthcare provider.  Sometimes a stronger pain medicine may be needed. A nerve block can be done in case of severe pain. It will numb the nerve between the ribs.  Follow-up care  Follow up with your healthcare provider, or as advised. Rarely, a broken rib will cause complications within the first few days that may not be evident during your initial exam. This can include collapsed lung, bleeding around the lung or into the abdomen, or pneumonia. Therefore, watch for the signs below.  If X-rays were taken, you will be told of any new findings that may affect your care.  Call 911  Call 911 if you have:  · Dizziness, weakness or fainting  · Shortness of breath with or without chest discomfort  · New or worsening abdominal pain  · Discomfort in other areas of your upper body such as your shoulders, jaw, neck, or arms  When to seek medical advice  Call your healthcare provider right away if any of these occur:  · Increasing chest pain with breathing  · Fever of 100.4°F (38°C) or above lasting for 24 to 48 hours  · Congested cough  Date Last Reviewed: 12/3/2015  © 4404-2046 [employer]. [address] [place] [address]. All rights reserved. This information is not intended as a substitute for professional medical care. Always follow your healthcare professional's instructions.    - your chest xray showed healing rib fracture over your left 7th and 8th ribs  - Apply voltaren to area as needed for pain  - You have been prescribed a muscle relaxer called Robaxen. Do not drive or operate machinery while taking this medication.   - if you develop any fever, acute shortness of breath, or worsening chest pain, please go immediately to the ED for reevaluation.    - please follow up with your PCP in 1-2 weeks for reevaluation of rib pain if symptoms or pain worsens

## 2020-03-04 DIAGNOSIS — E78.5 HYPERLIPIDEMIA LDL GOAL <70: ICD-10-CM

## 2020-03-04 DIAGNOSIS — N40.0 BENIGN PROSTATIC HYPERPLASIA, UNSPECIFIED WHETHER LOWER URINARY TRACT SYMPTOMS PRESENT: ICD-10-CM

## 2020-03-04 RX ORDER — ATORVASTATIN CALCIUM 10 MG/1
10 TABLET, FILM COATED ORAL DAILY
Qty: 90 TABLET | Refills: 2 | Status: SHIPPED | OUTPATIENT
Start: 2020-03-04 | End: 2021-01-01

## 2020-03-04 RX ORDER — TAMSULOSIN HYDROCHLORIDE 0.4 MG/1
0.4 CAPSULE ORAL DAILY
Qty: 90 CAPSULE | Refills: 1 | Status: SHIPPED | OUTPATIENT
Start: 2020-03-04 | End: 2020-11-02 | Stop reason: SDUPTHER

## 2020-03-23 ENCOUNTER — PATIENT MESSAGE (OUTPATIENT)
Dept: FAMILY MEDICINE | Facility: CLINIC | Age: 70
End: 2020-03-23

## 2020-03-23 DIAGNOSIS — F41.9 ANXIETY DISORDER, UNSPECIFIED TYPE: ICD-10-CM

## 2020-03-23 RX ORDER — ALPRAZOLAM 0.25 MG/1
TABLET ORAL
Qty: 15 TABLET | Refills: 0 | Status: SHIPPED | OUTPATIENT
Start: 2020-03-23 | End: 2020-10-05 | Stop reason: SDUPTHER

## 2020-03-31 ENCOUNTER — PATIENT MESSAGE (OUTPATIENT)
Dept: FAMILY MEDICINE | Facility: CLINIC | Age: 70
End: 2020-03-31

## 2020-04-12 DIAGNOSIS — F41.9 ANXIETY DISORDER, UNSPECIFIED TYPE: ICD-10-CM

## 2020-04-12 RX ORDER — ESCITALOPRAM OXALATE 20 MG/1
20 TABLET ORAL DAILY
Qty: 90 TABLET | Refills: 1 | Status: SHIPPED | OUTPATIENT
Start: 2020-04-12 | End: 2020-10-25

## 2020-04-15 ENCOUNTER — OFFICE VISIT (OUTPATIENT)
Dept: FAMILY MEDICINE | Facility: CLINIC | Age: 70
End: 2020-04-15
Payer: MEDICARE

## 2020-04-15 ENCOUNTER — PATIENT MESSAGE (OUTPATIENT)
Dept: FAMILY MEDICINE | Facility: CLINIC | Age: 70
End: 2020-04-15

## 2020-04-15 VITALS
WEIGHT: 162 LBS | BODY MASS INDEX: 21.97 KG/M2 | SYSTOLIC BLOOD PRESSURE: 182 MMHG | TEMPERATURE: 98 F | DIASTOLIC BLOOD PRESSURE: 79 MMHG

## 2020-04-15 DIAGNOSIS — F10.20 ALCOHOL USE DISORDER, MODERATE, DEPENDENCE: ICD-10-CM

## 2020-04-15 DIAGNOSIS — I25.10 CORONARY ARTERY DISEASE INVOLVING NATIVE CORONARY ARTERY OF NATIVE HEART WITHOUT ANGINA PECTORIS: ICD-10-CM

## 2020-04-15 DIAGNOSIS — I10 ESSENTIAL HYPERTENSION: Primary | ICD-10-CM

## 2020-04-15 DIAGNOSIS — F41.9 ANXIETY DISORDER, UNSPECIFIED TYPE: Chronic | ICD-10-CM

## 2020-04-15 DIAGNOSIS — Z71.89 COUNSELING REGARDING END OF LIFE DECISION MAKING: ICD-10-CM

## 2020-04-15 DIAGNOSIS — D64.9 ANEMIA, UNSPECIFIED TYPE: ICD-10-CM

## 2020-04-15 PROCEDURE — 3077F PR MOST RECENT SYSTOLIC BLOOD PRESSURE >= 140 MM HG: ICD-10-PCS | Mod: HCNC,CPTII,95, | Performed by: INTERNAL MEDICINE

## 2020-04-15 PROCEDURE — 1159F PR MEDICATION LIST DOCUMENTED IN MEDICAL RECORD: ICD-10-PCS | Mod: HCNC,95,, | Performed by: INTERNAL MEDICINE

## 2020-04-15 PROCEDURE — 1101F PR PT FALLS ASSESS DOC 0-1 FALLS W/OUT INJ PAST YR: ICD-10-PCS | Mod: HCNC,CPTII,95, | Performed by: INTERNAL MEDICINE

## 2020-04-15 PROCEDURE — 1101F PT FALLS ASSESS-DOCD LE1/YR: CPT | Mod: HCNC,CPTII,95, | Performed by: INTERNAL MEDICINE

## 2020-04-15 PROCEDURE — 1159F MED LIST DOCD IN RCRD: CPT | Mod: HCNC,95,, | Performed by: INTERNAL MEDICINE

## 2020-04-15 PROCEDURE — 3078F DIAST BP <80 MM HG: CPT | Mod: HCNC,CPTII,95, | Performed by: INTERNAL MEDICINE

## 2020-04-15 PROCEDURE — 99213 OFFICE O/P EST LOW 20 MIN: CPT | Mod: HCNC,95,, | Performed by: INTERNAL MEDICINE

## 2020-04-15 PROCEDURE — 3078F PR MOST RECENT DIASTOLIC BLOOD PRESSURE < 80 MM HG: ICD-10-PCS | Mod: HCNC,CPTII,95, | Performed by: INTERNAL MEDICINE

## 2020-04-15 PROCEDURE — 99213 PR OFFICE/OUTPT VISIT, EST, LEVL III, 20-29 MIN: ICD-10-PCS | Mod: HCNC,95,, | Performed by: INTERNAL MEDICINE

## 2020-04-15 PROCEDURE — 99499 UNLISTED E&M SERVICE: CPT | Mod: HCNC,95,, | Performed by: INTERNAL MEDICINE

## 2020-04-15 PROCEDURE — 3077F SYST BP >= 140 MM HG: CPT | Mod: HCNC,CPTII,95, | Performed by: INTERNAL MEDICINE

## 2020-04-15 PROCEDURE — 99499 RISK ADDL DX/OHS AUDIT: ICD-10-PCS | Mod: HCNC,95,, | Performed by: INTERNAL MEDICINE

## 2020-04-15 RX ORDER — VALSARTAN 160 MG/1
160 TABLET ORAL DAILY
Qty: 90 TABLET | Refills: 3 | Status: ON HOLD | OUTPATIENT
Start: 2020-04-15 | End: 2021-01-01 | Stop reason: HOSPADM

## 2020-04-15 NOTE — PROGRESS NOTES
This note was created by combination of typed  and M-Modal dictation.  Transcription errors may be present.  If there are any questions, please contact me.    Assessment & Plan:   Essential hypertension  -not to goal increase diovan to 160  mychart BP entry discussed  -     [facility] Patient Entered Blood Pressure  -     valsartan (DIOVAN) 160 MG tablet; Take 1 tablet (160 mg total) by mouth once daily.  Dispense: 90 tablet; Refill: 3    Alcohol use disorder, moderate, dependence  -again declines referral to behavioral health unit/addiction medicine unit    Anemia, unspecified type  -check with future labs CBC and ferritin  -     Ferritin; Future; Expected date: 05/15/2020  -     CBC auto differential; Future; Expected date: 05/15/2020    Coronary artery disease involving native coronary artery of native heart without angina pectoris with mildly abn stress test 11/2018; med mgmt  -stable    Anxiety disorder, unspecified type  -on lexapro. Limited number of xanax recently sent in light of coronavirus pandemic    Counseling regarding end of life decision making  -talked about importance of having papers on file and I sent him a link to paperwork.    Medications Discontinued During This Encounter   Medication Reason    hydrOXYzine (ATARAX) 50 MG tablet Alternate therapy    valsartan (DIOVAN) 80 MG tablet        meds sent this encounter:  Medications Ordered This Encounter   Medications    valsartan (DIOVAN) 160 MG tablet     Sig: Take 1 tablet (160 mg total) by mouth once daily.     Dispense:  90 tablet     Refill:  3       Follow Up: No follow-ups on file.    Subjective:     Chief Complaint   Patient presents with    Hypertension    Anxiety       HPI  Moe is a 69 y.o. male, last appointment with this clinic was 1/24/2020.    The patient location is: [place]  The chief complaint leading to consultation is: HTN, anxiety  Visit type: Virtual visit with synchronous audio and video  Total time spent with  patient: 15 min  Each patient to whom he or she provides medical services by telemedicine is:  (1) informed of the relationship between the physician and patient and the respective role of any other health care provider with respect to management of the patient; and (2) notified that he or she may decline to receive medical services by telemedicine and may withdraw from such care at any time.    Notes:  Last visit with me late January  EtOH use disorder, hx of pancreatitis. He declined rec's in the past for rehab.  I wanted to avoid narcotics and BZD  Anxiety amitriptyline increased dose. Hydroxyzine ineffective.  Small fiber neuropathy, increased amlodipine  HTN, increased dose of valsartan to 80 at that time    [facility] visit 2/25 for fall and rib injury. After leaning over a chair. voltaren topical, robaxin  XR: Remote healed fracture deformity of the left 8 rib posteriorly with evolving subacute fracture deformity of the right 7th and 8th ribs anterior laterally.  No new lung opacity.  No pneumothorax.  There is trace opacity left costophrenic angle which may represent scarring small chronic effusion not excluded.  Continued atherosclerotic aorta.    Recent email from pt 3/31:  I need some guidance concerning this virus.   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   About 2 1/2 weeks ago, she had a friend visit in her home.   She states there wasn't any close contact, other than letting the person in and out of the house.   Now the friend is showing mild symptoms, and the friends partner has bad symptoms.   My employee comes to my house about once a week, to work for about 3 or 4 hours.   During the work, we come in fairly close contact to answer questions and give directions.   I do not feel any flu like symptoms or fever.   Please provide your thoughts about this.     I had recommended he self quarantine.     3/23 xanax 0.25 mg #15  2/18 norco 5 mg #16  10/21 xanax #15    9/2019 CBC mild  macrocytic anemia.  Mild anemia longstanding.    Reports that he just checked his blood pressure and his weight and temperature.  Afebrile.  Blood pressure high 182/79 but he reports he did not take his medicines this morning.  He checks his blood pressures periodically, not on a regular basis maybe once a week or even last period he thinks his blood pressure is high because he did not take his medicines this morning but he still notes that his blood pressures are in the 140 range.  He is taking valsartan 80 and metoprolol 25.  Denies obvious side effects of the medicines.    Has been self quarantine Ng.  Very anxious.  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  His son has taken over most of the day-to-day operations.  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  And is still responsible for payroll.  With the current Coronavirus pandemic and shelter in place directive, they still have to pay his employees.    He continues to drink alcohol to excess.  Had previously been down to 4 drinks daily but with recent anxiety and stressors, increase to 5 drinks a day.  We talked again about going to the behavioral health unit for this and he declines again.    I had given him a small prescription refill for the alprazolam in mid March.  He reports he still has a few tablets left.    Upcoming visit with derm.  Gets very nervous with those visits.  Dr. Wharton with [facility]    Answers for HPI/ROS submitted by the patient on 4/15/2020   activity change: No  unexpected weight change: No  rhinorrhea: No  trouble swallowing: No  visual disturbance: No  chest tightness: No  polyuria: No  difficulty urinating: No  joint swelling: No  arthralgias: Yes  confusion: No  dysphoric mood: Yes    Patient Care Team:  Damian Ramon MD as PCP - General (Internal Medicine)  Kandice Bonilla RD (Inactive) as Dietitian (Diabetes)  Josias Ling MD as Consulting Physician (Neurosurgery)  Aime De Anda MD as  Consulting Physician (Otolaryngology)  Blu Neil MA as Care Coordinator  Naveed Wharton MD as Consulting Physician (Dermatology)    Patient Active Problem List    Diagnosis Date Noted    Bilateral carotid artery stenosis mild on US 11/2018 04/08/2019     Carotid US 11/14/18 Mild bilateral internal carotid artery plaque with no hemodynamically significant stenosis      Lumbar stenosis 1/11/19 FUSION, SPINE, LUMBAR, TLIF, MINIMALLY INVASIVE @L4/5 & L5/S1 (Left) 01/10/2019     1/11/19 FUSION, SPINE, LUMBAR, TLIF, MINIMALLY INVASIVE @L4/5 & L5/S1 (Left)          Hyponatremia 01/07/2019    Asymptomatic varicose veins of left lower extremity 01/07/2019    Coronary artery disease involving native coronary artery of native heart without angina pectoris with mildly abn stress test 11/2018; med mgmt 01/06/2019 11/14/2018 nuclear medicine stress test abnormal, small amount of mild ischemia in the apical walls.  EF 66%.      NSAID-induced duodenal ulcer while traveling. EGD done in [place], [facility] 10/2018 with duodenal ulcers 11/09/2018    Syncope and collapse 11/05/2018    Schatzki's ring of distal esophagus 6/2018 s/p dilitation 06/19/2018    Hiatal hernia on EGD 6/2018; biopsies chronic gastritis, H pylori negative. 06/19/2018    Calculus of gallbladder without cholecystitis without obstruction on RUQ US 5/2018 05/21/2018    Chronic seasonal allergic rhinitis 03/01/2018    Alcohol-induced chronic pancreatitis 01/01/2018    Abdominal aortic atherosclerosis 11/14/2017     On CXR 4/5/2016 and on 11/2/2012 XR L spine  On AAA screening 3/2018      Chronic obstructive bronchitis with pulmonary emphysema 11/14/2017 12/2017 PFTs showed mild airflow obstruction.  Lung volume showing air trapping.  DLCO reduced. no O2 desat.  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    Chronic narcotic use 08/02/2017    Deviated nasal septum 04/19/2016    Chronic back pain  greater than 3 months duration 01/06/2016    Small fiber neuropathy 07/29/2015     Likely related to alcohol use      Facet arthritis of lumbar region 10/08/2014    Spondylosis without myelopathy 08/13/2014    Degeneration of lumbar or lumbosacral intervertebral disc 08/13/2014    Tubular adenoma of colon 4/2017 04/22/2014 4/11/2017 colonoscopy with transverse tubular adenoma      DM type 2 with diabetic peripheral neuropathy 07/24/2013    Hypertriglyceridemia 06/13/2013    Anxiety disorder 06/12/2013     Trazodone ineffective.      Alcohol use disorder, moderate, dependence 06/12/2013    Continuous tobacco abuse 06/12/2013       PAST MEDICAL HISTORY:  Past Medical History:   Diagnosis Date    Abdominal aortic atherosclerosis     Abnormal heart rhythm     frequent PVCs and PACs    Arthritis     Basal cell carcinoma 1990s    back     CAD (coronary artery disease)     Cancer     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    Cataract     COPD (chronic obstructive pulmonary disease) with emphysema     Diabetes mellitus     Gastric ulcer     GERD (gastroesophageal reflux disease)     Pancreatitis 2008    2 Times       PAST SURGICAL HISTORY:  Past Surgical History:   Procedure Laterality Date     thumb surgery      Dead Skin cell tumor Rt thumb    COLONOSCOPY N/A 4/11/2017    Procedure: COLONOSCOPY;  Surgeon: Meet Quesada MD;  Location: [facility];  Service: Endoscopy;  Laterality: N/A;    ESOPHAGOGASTRODUODENOSCOPY N/A 6/18/2018    Procedure: ESOPHAGOGASTRODUODENOSCOPY (EGD);  Surgeon: Marco Antonio Gonsalves MD;  Location: [facility];  Service: Endoscopy;  Laterality: N/A;    MINIMALLY INVASIVE TRANSFORAMINAL LUMBAR INTERBODY FUSION (TLIF) Left 1/10/2019    Procedure: FUSION, SPINE, LUMBAR, TLIF, MINIMALLY INVASIVE @L4/5 & L5/S1;  Surgeon: Josias Ling MD;  Location: [address];  Service: Neurosurgery;  Laterality: Left;    MOUTH FLOOR MASS EXCISION  2009    NASAL SEPTUM SURGERY       SKIN SURGERY         SOCIAL HISTORY:  Social History     Socioeconomic History    Marital status:      Spouse name: Not on file    Number of children: Not on file    Years of education: Not on file    Highest education level: Not on file   Occupational History    Occupation: , self employed    Social Needs    Financial resource strain: Not hard at all    Food insecurity:     Worry: Never true     Inability: Never true    Transportation needs:     Medical: No     Non-medical: No   Tobacco Use    Smoking status: Current Every Day Smoker     Packs/day: 0.25     Years: 45.00     Pack years: 11.25     Types: Cigarettes    Smokeless tobacco: Never Used   Substance and Sexual Activity    Alcohol use: Yes     Alcohol/week: 0.8 standard drinks     Types: 1 Standard drinks or equivalent per week     Frequency: 4 or more times a week     Drinks per session: 5 or 6     Binge frequency: Weekly     Comment: 4 drinks a week    Drug use: No    Sexual activity: Yes     Partners: Female   Lifestyle    Physical activity:     Days per week: 0 days     Minutes per session: 0 min    Stress: Only a little   Relationships    Social connections:     Talks on phone: Three times a week     Gets together: Three times a week     Attends Congregation service: Not on file     Active member of club or organization: Yes     Attends meetings of clubs or organizations: More than 4 times per year     Relationship status:    Other Topics Concern    Not on file   Social History Narrative    , 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  One child committed suicide.       ALLERGIES AND MEDICATIONS: updated and reviewed.  Review of patient's allergies indicates:   Allergen Reactions    Amoxicillin Nausea And Vomiting    Adhesive Rash     Can only use paper tape   Has problem with Band aid    Gabapentin      Upset stomach    Metformin Diarrhea     Appetite  loss      Current Outpatient Medications   Medication Sig Dispense Refill    ALPRAZolam (XANAX) 0.25 MG tablet 1 tablet daily PRN. 15 tablet 0    amitriptyline (ELAVIL) 25 MG tablet Take 2 tablets (50 mg total) by mouth every evening. Increased dose 60 tablet 11    atorvastatin (LIPITOR) 10 MG tablet Take 1 tablet (10 mg total) by mouth once daily. 90 tablet 2    blood sugar diagnostic Strp Check blood glucose 6x/day for accuchek guide meter. E11.65 400 strip 3    blood-glucose meter kit Use as instructed, Accu-Chek Amanda Plus glucometer 1 each 0    blood-glucose meter,continuous (DEXCOM G6 ) Misc Use with dexcom G6 system 1 each 0    blood-glucose sensor (DEXCOM G6 SENSOR) Vivi Change sensor every 10 days 3 Device 12    blood-glucose transmitter (DEXCOM G6 TRANSMITTER) Vivi Change every 3 months 1 Device 3    desoximetasone (TOPICORT) 0.25 % cream Apply topically 2 (two) times daily as needed.       diclofenac sodium (VOLTAREN) 1 % Gel Apply 2 g topically 3 (three) times daily. 100 g 0    escitalopram oxalate (LEXAPRO) 20 MG tablet Take 1 tablet (20 mg total) by mouth once daily. 90 tablet 1    fluticasone (FLONASE) 50 mcg/actuation nasal spray 2 sprays (100 mcg total) by Each Nare route daily as needed. 16 g 2    gabapentin (NEURONTIN) 300 MG capsule Take 1 capsule (300 mg total) by mouth 3 (three) times daily. 90 capsule 11    hydrOXYzine (ATARAX) 50 MG tablet TAKE 1/2 (ONE-HALF) TABLET BY MOUTH EVERY 4 TO 6 HOURS AS NEEDED FOR ANXIETY 90 tablet 1    insulin aspart U-100 (NOVOLOG FLEXPEN U-100 INSULIN) 100 unit/mL InPn pen Sliding scale up to 20 units TID. MAX 50 UNITS/DAY. 45 mL 5    ipratropium (ATROVENT HFA) 17 mcg/actuation inhaler Inhale 2 puffs into the lungs every 6 (six) hours. Rescue 12.9 g 5    lancets (ACCU-CHEK FASTCLIX LANCING DEV) Misc 1 lancet by Misc.(Non-Drug; Combo Route) route 6 (six) times daily. To be used with Accu-Chek Amanda Plus glucometer 600 each 3    metoprolol  "succinate (TOPROL-XL) 25 MG 24 hr tablet Take 0.5 tablets (12.5 mg total) by mouth once daily. 15 tablet 11    montelukast (SINGULAIR) 10 mg tablet TAKE 1 TABLET BY MOUTH ONCE DAILY IN THE EVENING 30 tablet 5    multivitamin (THERAGRAN) per tablet Take 2 tablets by mouth once daily.      omeprazole (PRILOSEC) 40 MG capsule Take 1 capsule (40 mg total) by mouth 2 (two) times daily before meals. (Patient taking differently: Take 40 mg by mouth once daily. ) 60 capsule 3    pen needle, diabetic (BD ULTRA-FINE RENETTA PEN NEEDLE) 32 gauge x 5/32" Ndle Use 4x/day with Novolog 400 each 3    sodium chloride (SALINE NOSE NASL) by Nasal route.      tamsulosin (FLOMAX) 0.4 mg Cap Take 1 capsule (0.4 mg total) by mouth once daily. 90 capsule 1    triamcinolone acetonide 0.1% (KENALOG) 0.1 % Lotn       valACYclovir (VALTREX) 1000 MG tablet Take 1 tablet (1,000 mg total) by mouth every 12 (twelve) hours. for 7 doses 14 tablet 0    valsartan (DIOVAN) 80 MG tablet Take 1 tablet (80 mg total) by mouth once daily. 90 tablet 3     No current facility-administered medications for this visit.        Review of Systems   HENT: Negative for hearing loss.    Eyes: Negative for discharge.   Respiratory: Negative for wheezing.    Cardiovascular: Negative for chest pain and palpitations.   Gastrointestinal: Negative for blood in stool, constipation, diarrhea and vomiting.   Genitourinary: Negative for hematuria and urgency.   Musculoskeletal: Negative for neck pain.   Neurological: Positive for headaches. Negative for weakness.   Endo/Heme/Allergies: Negative for polydipsia.       Objective:   Physical Exam   There were no vitals filed for this visit. There is no height or weight on file to calculate BMI.            Physical Exam   Constitutional: He is oriented to person, place, and time. He appears well-developed and well-nourished. No distress.   HENT:   Head: Normocephalic and atraumatic.   Pulmonary/Chest: Effort normal. "   Neurological: He is alert and oriented to person, place, and time.   Psychiatric: He has a normal mood and affect. His behavior is normal. Thought content normal.

## 2020-04-21 ENCOUNTER — TELEPHONE (OUTPATIENT)
Dept: ENDOCRINOLOGY | Facility: CLINIC | Age: 70
End: 2020-04-21

## 2020-04-21 ENCOUNTER — PATIENT MESSAGE (OUTPATIENT)
Dept: FAMILY MEDICINE | Facility: CLINIC | Age: 70
End: 2020-04-21

## 2020-04-21 NOTE — TELEPHONE ENCOUNTER
Called pt and explained NP Susana would like to reschedule 4/24/2020 visit to a 3 month f/u with labs prior . Offered first opening 7/21/2020. Pt accept and preferred 2 pm apt with 7/16/2020 at 1 pm labs . Wanted scheduled at [facility]

## 2020-05-07 ENCOUNTER — LAB VISIT (OUTPATIENT)
Dept: PRIMARY CARE CLINIC | Facility: CLINIC | Age: 70
End: 2020-05-07
Payer: MEDICARE

## 2020-05-07 DIAGNOSIS — R05.9 COUGH: Primary | ICD-10-CM

## 2020-05-07 PROCEDURE — U0002 COVID-19 LAB TEST NON-CDC: HCPCS | Mod: HCNC

## 2020-05-08 LAB — SARS-COV-2 RNA RESP QL NAA+PROBE: NOT DETECTED

## 2020-05-13 ENCOUNTER — PATIENT MESSAGE (OUTPATIENT)
Dept: ENDOCRINOLOGY | Facility: CLINIC | Age: 70
End: 2020-05-13

## 2020-05-13 ENCOUNTER — TELEPHONE (OUTPATIENT)
Dept: ENDOCRINOLOGY | Facility: CLINIC | Age: 70
End: 2020-05-13

## 2020-05-13 NOTE — TELEPHONE ENCOUNTER
Tried contacting to ask if he wanted to do a virtual/audio visit per Ellyn to discuss his glucose numbers. LVM to return call.

## 2020-05-14 ENCOUNTER — PATIENT OUTREACH (OUTPATIENT)
Dept: ADMINISTRATIVE | Facility: OTHER | Age: 70
End: 2020-05-14

## 2020-05-14 ENCOUNTER — PATIENT OUTREACH (OUTPATIENT)
Dept: ADMINISTRATIVE | Facility: HOSPITAL | Age: 70
End: 2020-05-14

## 2020-05-15 ENCOUNTER — OFFICE VISIT (OUTPATIENT)
Dept: ENDOCRINOLOGY | Facility: CLINIC | Age: 70
End: 2020-05-15
Payer: MEDICARE

## 2020-05-15 DIAGNOSIS — F10.10 ETOH ABUSE: ICD-10-CM

## 2020-05-15 DIAGNOSIS — E11.42 DM TYPE 2 WITH DIABETIC PERIPHERAL NEUROPATHY: Primary | ICD-10-CM

## 2020-05-15 DIAGNOSIS — Z71.9 VISIT FOR COUNSELING: ICD-10-CM

## 2020-05-15 DIAGNOSIS — E11.649 HYPOGLYCEMIA ASSOCIATED WITH DIABETES: ICD-10-CM

## 2020-05-15 PROCEDURE — 99499 UNLISTED E&M SERVICE: CPT | Mod: HCNC,95,, | Performed by: NURSE PRACTITIONER

## 2020-05-15 PROCEDURE — 3052F HG A1C>EQUAL 8.0%<EQUAL 9.0%: CPT | Mod: HCNC,CPTII,, | Performed by: NURSE PRACTITIONER

## 2020-05-15 PROCEDURE — 99214 PR OFFICE/OUTPT VISIT, EST, LEVL IV, 30-39 MIN: ICD-10-PCS | Mod: HCNC,95,, | Performed by: NURSE PRACTITIONER

## 2020-05-15 PROCEDURE — 3052F PR MOST RECENT HEMOGLOBIN A1C LEVEL 8.0 - < 9.0%: ICD-10-PCS | Mod: HCNC,CPTII,, | Performed by: NURSE PRACTITIONER

## 2020-05-15 PROCEDURE — 1101F PT FALLS ASSESS-DOCD LE1/YR: CPT | Mod: HCNC,CPTII,95, | Performed by: NURSE PRACTITIONER

## 2020-05-15 PROCEDURE — 1159F MED LIST DOCD IN RCRD: CPT | Mod: HCNC,95,, | Performed by: NURSE PRACTITIONER

## 2020-05-15 PROCEDURE — 1159F PR MEDICATION LIST DOCUMENTED IN MEDICAL RECORD: ICD-10-PCS | Mod: HCNC,95,, | Performed by: NURSE PRACTITIONER

## 2020-05-15 PROCEDURE — 99499 RISK ADDL DX/OHS AUDIT: ICD-10-PCS | Mod: HCNC,95,, | Performed by: NURSE PRACTITIONER

## 2020-05-15 PROCEDURE — 99214 OFFICE O/P EST MOD 30 MIN: CPT | Mod: HCNC,95,, | Performed by: NURSE PRACTITIONER

## 2020-05-15 PROCEDURE — 1101F PR PT FALLS ASSESS DOC 0-1 FALLS W/OUT INJ PAST YR: ICD-10-PCS | Mod: HCNC,CPTII,95, | Performed by: NURSE PRACTITIONER

## 2020-05-15 NOTE — PATIENT INSTRUCTIONS
Continue carb ratios for now.   Increase goal from 115 to 140.   Ideally eat 3 meals/day to help regulate glucoses and to help avoid overeating by dinner.   Suspect hypoglycemia in the evenings is related to alcohol intake. Recommend cutting down alcohol with goal of complete abstinence. He is not ready for counseling.   Encouraged more physical activity - especially outside to help with mood.   Test glucose 4x/day.

## 2020-05-15 NOTE — PROGRESS NOTES
The patient location is: home  The chief complaint leading to consultation is: type 2 diabetes   Visit type: audiovisual  Total time spent with patient: 25 minutes   Each patient to whom he or she provides medical services by telemedicine is:  (1) informed of the relationship between the physician and patient and the respective role of any other health care provider with respect to management of the patient; and (2) notified that he or she may decline to receive medical services by telemedicine and may withdraw from such care at any time.    liCC: This 69 y.o. White male  is here for evaluation of  T2DM along with comorbidities indicated in the Visit Diagnosis section of this encounter.    HPI: Moe Ren was diagnosed with T2DM in ~ 2010.  Metformin started at time of diagnosis.     He has a history of squamos cell oral CA, Spinal stenosis, chronic pancreatitis.        Prior visit 9/26/19  a1c down from 7.7 to 7.3%.   Lunch carb ratio is up from 12 to 14.   FBGs are higher now because he snacks at night. He is calculating how much insulin he may need for his after-dinner sancks and taking that combined with dinner insulin dose.   Back to drinking Scotch on ice - about 6 a day. Trying to cut back to 3.   Also back to smoking - 1 ppd.   Plan Will order Dexcom G6  Continuous Glucose Monitoring (CGM). Please call office if no one contacts you regarding Dexcom in the next couple of weeks. Once you do receive Dexcom supplies, please call office so that diabetes education training visit can be scheduled for you.   Reduce carb ratio at breakfast from 8 to now 10.   Reduce carb ratio at lunch from 14 to now 16.   If blood sugars are still low, please call or message.   Monitor blood sugar 4x/day.   Return to clinic in 3 months with labs prior.       Prior visit 1/2020  Carb ratio decreased further from 10 to 12 a month ago d/t low BGs after breakfast. But he still c/o that BG have been dropping after breakfast insulin  "dose. Also skips lunch and then finds his BG starts dropping by dinner.   He's been using Dexcom G6 but doesn't "trust it" bc he notes that his BG is sometimes 30-40 mg/dL different.   a1c was up from 7.3% in Sept to 8.1% in Jan.   Has cut back to no more than 4 drinks of Scotch a night. Previously drinking 6-8.  Down from 2 ppd to 1.5 ppd.   CGM report: glucoses lowest overnight and in the evenings. A couple of overnight lows - unsure cause but likely carb to insulin mismatch. BG tends to drop low by 3 hours after breakfast insulin dose.   Average 14 day glucose 162 with SD 65.   Plan On Sundays, use carb ratio of 20 for breakfast and lunch.   Decrease carb ratio from 12 to 15 for breakfast.   Monitor blood sugar 4x/day. Return to clinic in 3 months with labs prior. Call/message with any issues especially if blood sugars continue to be too low. Work on really cutting back on bedtime snacking. Recommend using a carb ratio of 15 to 20 for bedtime snacks.       Interval history  He lowered his carb ratio from 10 to 15 for dinner starting Monday.   He c/o low glucoses after dinner. He continues to drink alcohol and tries to aim for no more than 4 drinks/day but has been drinking 6-10 drinks per night lately. Drinks Scotch with water. Sometimes snacks at night if BG is on the low side.     Tries to drink Glucerna for breakfast slowly to avoid fast spike in BG.   /67 today at home.     2 week CGM report: one mild hypoglycemic episode noted after dinner since carb ratio was increased earlier this week. Postprandial highs noted during the day usually into the mid to high 200s. Not eating lunch.   Average glucose 179 SD 58       LAST DIABETES EDUCATION: 6/2016; 2/2018    PRESCRIBED DIABETES MEDICATIONS: Novolog ac; he rounds up on his dosing of Novolog   ICR   0515 - 15 (uses 20 if he goes to [employer] on Sundays)   1130 - 20   1600 - 10     ISF 1:40, goal 115     Subtracts 2 units of Novolog from Sunday breakfast and " lunch since he wakes up early and notes BGs tend to be lower on Sundays. 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     Misses medication doses - No    DM COMPLICATIONS: peripheral neuropathy    SIGNIFICANT DIABETES MED HISTORY:   Tresiba gradually titrated down until it was stopped mid 2017 d/t hypoglycemia.   Novolog stopped at ov 2/5/18 and metformin and tresiba were started. tresiba dc'd about a week later d/t hypo  repaglinide started at ov 2/28/18, and stopped about a month later d/t ineffectiveness      Avoid incretin mimetics d/t h/o chronic pancreatitis   Avoid insulin pump because patient does not require basal insulin delivery     SELF MONITORING BLOOD GLUCOSE: Checks blood glucose at home 6x/day. See Media for CGM report.     HYPOGLYCEMIC EPISODES: symptoms start in the 60s but definitely in the 50s. Corrects with coke.   Notes that any type of physical activity drops his BGs.     CURRENT DIET: tries to eat 3 meal/day - Eats at 10 am, 2 pm, and 6 pm. Eats about 60 to 130 carbs per meal.   He supplements with Ensure. Lately hasn't eaten lunch.     CURRENT EXERCISE: none recent    SOCIAL: retired  - consultant now.  Has a young grandson      There were no vitals taken for this visit.            Hemoglobin A1C   Date Value Ref Range Status   12/09/2019 8.1 (H) 4.0 - 5.6 % Final     Comment:     ADA Screening Guidelines:  5.7-6.4%  Consistent with prediabetes  >or=6.5%  Consistent with diabetes  High levels of fetal hemoglobin interfere with the HbA1C  assay. Heterozygous hemoglobin variants (HbS, HgC, etc)do  not significantly interfere with this assay.   However, presence of multiple variants may affect accuracy.     09/13/2019 7.3 (H) 4.0 - 5.6 % Final     Comment:     ADA Screening Guidelines:  5.7-6.4%  Consistent with prediabetes  >or=6.5%  Consistent with diabetes  High levels of fetal hemoglobin interfere with the HbA1C  assay. Heterozygous hemoglobin  variants (HbS, HgC, etc)do  not significantly interfere with this assay.   However, presence of multiple variants may affect accuracy.     05/28/2019 7.7 (H) 4.0 - 5.6 % Final     Comment:     ADA Screening Guidelines:  5.7-6.4%  Consistent with prediabetes  >or=6.5%  Consistent with diabetes  High levels of fetal hemoglobin interfere with the HbA1C  assay. Heterozygous hemoglobin variants (HbS, HgC, etc)do  not significantly interfere with this assay.   However, presence of multiple variants may affect accuracy.            Ref. Range 2/13/2020 10:08 2/13/2020 10:09   Glucose, Fasting Latest Ref Range: 70 - 110 mg/dL  187 (H)   C-Peptide Latest Ref Range: 0.78 - 5.19 ng/mL 0.27 (L)          Chemistry        Component Value Date/Time     12/09/2019 1303    K 4.1 12/09/2019 1303     12/09/2019 1303    CO2 28 12/09/2019 1303    BUN 7 (L) 12/09/2019 1303    CREATININE 1.3 12/09/2019 1303     (H) 12/09/2019 1303        Component Value Date/Time    CALCIUM 9.0 12/09/2019 1303    ALKPHOS 114 09/13/2019 1545    AST 36 09/13/2019 1545    ALT 14 09/13/2019 1545    BILITOT 0.4 09/13/2019 1545    ESTGFRAFRICA >60 12/09/2019 1303    EGFRNONAA 56 (A) 12/09/2019 1303          Lab Results   Component Value Date    LDLCALC 41.2 (L) 09/13/2019        Ref. Range 9/13/2019 15:45   Cholesterol Latest Ref Range: 120 - 199 mg/dL 129   HDL Latest Ref Range: 40 - 75 mg/dL 64   Hdl/Cholesterol Ratio Latest Ref Range: 20.0 - 50.0 % 49.6   LDL Cholesterol External Latest Ref Range: 63.0 - 159.0 mg/dL 41.2 (L)   Non-HDL Cholesterol Latest Units: mg/dL 65   Total Cholesterol/HDL Ratio Latest Ref Range: 2.0 - 5.0  2.0   Triglycerides Latest Ref Range: 30 - 150 mg/dL 119           Lab Results   Component Value Date    MICALBCREAT 65.9 (H) 05/28/2019               ASSESSMENT and PLAN:    A1C GOAL: < 7 %     1. DM type 2 with diabetic peripheral neuropathy  Continue carb ratios for now.   Increase goal from 115 to 140.   Ideally  eat 3 meals/day to help regulate glucoses and to help avoid overeating by dinner.   Suspect hypoglycemia in the evenings is related to alcohol intake. Recommend cutting down alcohol with goal of complete abstinence. He is not ready for counseling.   Encouraged more physical activity - especially outside to help with mood.   Test glucose 4x/day.   Keep appt in July with labs prior.     Microalbumin/creatinine urine ratio   2. Hypoglycemia associated with diabetes  As above    3.  ETOH abuse  As above.        Spent 25 minutes with patient with > 50% time spent in counseling, as noted in # 1-3.       Orders Placed This Encounter   Procedures    Microalbumin/creatinine urine ratio     Standing Status:   Future     Standing Expiration Date:   7/14/2021     Order Specific Question:   Specimen Source     Answer:   Urine        Follow up in about 2 months (around 7/15/2020).

## 2020-05-27 ENCOUNTER — TELEPHONE (OUTPATIENT)
Dept: OTOLARYNGOLOGY | Facility: CLINIC | Age: 70
End: 2020-05-27

## 2020-05-27 DIAGNOSIS — J30.9 ALLERGIC RHINITIS, UNSPECIFIED SEASONALITY, UNSPECIFIED TRIGGER: Primary | ICD-10-CM

## 2020-05-27 DIAGNOSIS — J32.9 CHRONIC RECURRENT SINUSITIS: ICD-10-CM

## 2020-05-28 ENCOUNTER — TELEPHONE (OUTPATIENT)
Dept: OTOLARYNGOLOGY | Facility: CLINIC | Age: 70
End: 2020-05-28

## 2020-05-29 ENCOUNTER — PATIENT MESSAGE (OUTPATIENT)
Dept: ADMINISTRATIVE | Facility: HOSPITAL | Age: 70
End: 2020-05-29

## 2020-06-04 ENCOUNTER — PATIENT MESSAGE (OUTPATIENT)
Dept: FAMILY MEDICINE | Facility: CLINIC | Age: 70
End: 2020-06-04

## 2020-06-04 DIAGNOSIS — K29.70 GASTRITIS, PRESENCE OF BLEEDING UNSPECIFIED, UNSPECIFIED CHRONICITY, UNSPECIFIED GASTRITIS TYPE: ICD-10-CM

## 2020-06-04 DIAGNOSIS — M48.062 SPINAL STENOSIS OF LUMBAR REGION WITH NEUROGENIC CLAUDICATION: Primary | ICD-10-CM

## 2020-06-04 RX ORDER — BACLOFEN 10 MG/1
10 TABLET ORAL 2 TIMES DAILY
Qty: 60 TABLET | Refills: 5 | Status: SHIPPED | OUTPATIENT
Start: 2020-06-04 | End: 2021-01-01

## 2020-06-04 RX ORDER — OMEPRAZOLE 40 MG/1
40 CAPSULE, DELAYED RELEASE ORAL
Qty: 60 CAPSULE | Refills: 5 | Status: ON HOLD | OUTPATIENT
Start: 2020-06-04 | End: 2021-01-01

## 2020-06-20 ENCOUNTER — CLINICAL SUPPORT (OUTPATIENT)
Dept: URGENT CARE | Facility: CLINIC | Age: 70
End: 2020-06-20
Payer: MEDICARE

## 2020-06-20 DIAGNOSIS — J30.9 ALLERGIC RHINITIS, UNSPECIFIED SEASONALITY, UNSPECIFIED TRIGGER: ICD-10-CM

## 2020-06-20 DIAGNOSIS — J32.9 CHRONIC RECURRENT SINUSITIS: ICD-10-CM

## 2020-06-20 PROCEDURE — U0003 INFECTIOUS AGENT DETECTION BY NUCLEIC ACID (DNA OR RNA); SEVERE ACUTE RESPIRATORY SYNDROME CORONAVIRUS 2 (SARS-COV-2) (CORONAVIRUS DISEASE [COVID-19]), AMPLIFIED PROBE TECHNIQUE, MAKING USE OF HIGH THROUGHPUT TECHNOLOGIES AS DESCRIBED BY CMS-2020-01-R: HCPCS | Mod: HCNC

## 2020-06-22 ENCOUNTER — PATIENT OUTREACH (OUTPATIENT)
Dept: ADMINISTRATIVE | Facility: OTHER | Age: 70
End: 2020-06-22

## 2020-06-22 LAB — SARS-COV-2 RNA RESP QL NAA+PROBE: NOT DETECTED

## 2020-06-23 ENCOUNTER — OFFICE VISIT (OUTPATIENT)
Dept: OTOLARYNGOLOGY | Facility: CLINIC | Age: 70
End: 2020-06-23
Payer: MEDICARE

## 2020-06-23 VITALS
DIASTOLIC BLOOD PRESSURE: 86 MMHG | SYSTOLIC BLOOD PRESSURE: 150 MMHG | HEIGHT: 72 IN | BODY MASS INDEX: 21.37 KG/M2 | WEIGHT: 157.75 LBS | TEMPERATURE: 98 F

## 2020-06-23 DIAGNOSIS — J30.1 SEASONAL ALLERGIC RHINITIS DUE TO POLLEN: ICD-10-CM

## 2020-06-23 DIAGNOSIS — Z85.819 HISTORY OF ORAL CANCER: ICD-10-CM

## 2020-06-23 DIAGNOSIS — R05.9 COUGH: ICD-10-CM

## 2020-06-23 DIAGNOSIS — H61.23 BILATERAL IMPACTED CERUMEN: Primary | ICD-10-CM

## 2020-06-23 PROCEDURE — 31575 PR LARYNGOSCOPY, FLEXIBLE; DIAGNOSTIC: ICD-10-PCS | Mod: S$GLB,,, | Performed by: OTOLARYNGOLOGY

## 2020-06-23 PROCEDURE — 99214 OFFICE O/P EST MOD 30 MIN: CPT | Mod: 25,S$GLB,, | Performed by: OTOLARYNGOLOGY

## 2020-06-23 PROCEDURE — 1126F PR PAIN SEVERITY QUANTIFIED, NO PAIN PRESENT: ICD-10-PCS | Mod: S$GLB,,, | Performed by: OTOLARYNGOLOGY

## 2020-06-23 PROCEDURE — 1101F PR PT FALLS ASSESS DOC 0-1 FALLS W/OUT INJ PAST YR: ICD-10-PCS | Mod: CPTII,S$GLB,, | Performed by: OTOLARYNGOLOGY

## 2020-06-23 PROCEDURE — 3077F PR MOST RECENT SYSTOLIC BLOOD PRESSURE >= 140 MM HG: ICD-10-PCS | Mod: CPTII,S$GLB,, | Performed by: OTOLARYNGOLOGY

## 2020-06-23 PROCEDURE — 99214 PR OFFICE/OUTPT VISIT, EST, LEVL IV, 30-39 MIN: ICD-10-PCS | Mod: 25,S$GLB,, | Performed by: OTOLARYNGOLOGY

## 2020-06-23 PROCEDURE — 3079F DIAST BP 80-89 MM HG: CPT | Mod: CPTII,S$GLB,, | Performed by: OTOLARYNGOLOGY

## 2020-06-23 PROCEDURE — 3008F BODY MASS INDEX DOCD: CPT | Mod: CPTII,S$GLB,, | Performed by: OTOLARYNGOLOGY

## 2020-06-23 PROCEDURE — 31575 DIAGNOSTIC LARYNGOSCOPY: CPT | Mod: S$GLB,,, | Performed by: OTOLARYNGOLOGY

## 2020-06-23 PROCEDURE — 1126F AMNT PAIN NOTED NONE PRSNT: CPT | Mod: S$GLB,,, | Performed by: OTOLARYNGOLOGY

## 2020-06-23 PROCEDURE — 1159F PR MEDICATION LIST DOCUMENTED IN MEDICAL RECORD: ICD-10-PCS | Mod: S$GLB,,, | Performed by: OTOLARYNGOLOGY

## 2020-06-23 PROCEDURE — 69210 PR REMOVAL IMPACTED CERUMEN REQUIRING INSTRUMENTATION, UNILATERAL: ICD-10-PCS | Mod: 51,S$GLB,, | Performed by: OTOLARYNGOLOGY

## 2020-06-23 PROCEDURE — 1101F PT FALLS ASSESS-DOCD LE1/YR: CPT | Mod: CPTII,S$GLB,, | Performed by: OTOLARYNGOLOGY

## 2020-06-23 PROCEDURE — 3008F PR BODY MASS INDEX (BMI) DOCUMENTED: ICD-10-PCS | Mod: CPTII,S$GLB,, | Performed by: OTOLARYNGOLOGY

## 2020-06-23 PROCEDURE — 3077F SYST BP >= 140 MM HG: CPT | Mod: CPTII,S$GLB,, | Performed by: OTOLARYNGOLOGY

## 2020-06-23 PROCEDURE — 1159F MED LIST DOCD IN RCRD: CPT | Mod: S$GLB,,, | Performed by: OTOLARYNGOLOGY

## 2020-06-23 PROCEDURE — 3079F PR MOST RECENT DIASTOLIC BLOOD PRESSURE 80-89 MM HG: ICD-10-PCS | Mod: CPTII,S$GLB,, | Performed by: OTOLARYNGOLOGY

## 2020-06-23 PROCEDURE — 69210 REMOVE IMPACTED EAR WAX UNI: CPT | Mod: 51,S$GLB,, | Performed by: OTOLARYNGOLOGY

## 2020-06-23 RX ORDER — AZELASTINE 1 MG/ML
1 SPRAY, METERED NASAL 2 TIMES DAILY
Qty: 30 ML | Refills: 3 | Status: SHIPPED | OUTPATIENT
Start: 2020-06-23

## 2020-06-23 NOTE — PROGRESS NOTES
OTOLARYNGOLOGY CLINIC NOTE  Date:  06/23/2020     Chief complaint:  Chief Complaint   Patient presents with    Sinus Problem    Ear Fullness     both ears       History of Present Illness  Moe Ren is a 69 y.o. male  presenting today for a new evaluation and treatment of ear fullness and nasal congestion . He gets headaches and pressure over his nose and nasal congestion. He has had allergy testing a long time ago and remembers that he was allergic to dust mite, ragweed and pollen. He did allergy shots but didn't get to maintenance dose.  Spring time and fall are the worst seasons for him. Exhaust fumes also worsen his symptoms.  He had septoplasty 4/2016 by Dr. De Anda for deviated septum for right sided nasal congestion.   He takes singulair at night and in the morning he uses flonase.    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 I do not have any of his medical records from surgery He has also had cutaneous malignancy (basal cell ca ).     He does not have problems swallowing solid food or liquids but he does have some difficulty with swallowing pills (but only with larger pills). He notes it is easier to swallow pills when he  takes them with milk. No hoarseness. No odynophagia. No hemoptysis. No unintentional weight loss. He underwent EGD with dilation in 2014 and in 2018. EGD in 2018  showed schatzki ring, a tortuous esophagus, non-erosive gastritis, and a large hiatal hernia.Duodenal ulcers seen on EGD in 2014 were no longer present.  Gastric biopsy showed findings consistent with chronic gastritis in both 2018 and 2014 . No evidence of eosinophilic esophagitis.    +tobacco and alcohol use.He has significant dry mouth and has to drink water frequently.      Left ear fullness. No issues with right ear. No ear pain. He has a history of acute hearing loss as a teen - he was water skiing and fell  and his head hit the  Water and he has had hearing loss since that time. + tinnitus Denies qtip usage. Dr de anda checked his hearing several years ago, no changes noted since last hearing test and no recent changes to his hearing. He is unsure of what hearing test showed. Unfortunately his records from Dr. De Anda have still not been uploaded by [email or web address] into epic.    Past Medical History  Past Medical History:   Diagnosis Date    Abdominal aortic atherosclerosis     Abnormal heart rhythm-frequent PVCs and PACs     Arthritis     Basal cell carcinoma- back 1990s    CAD (coronary artery disease)     Cancer- squamous cell Ca floor of mouth     Cataract     COPD (chronic obstructive pulmonary disease) with emphysema     Diabetes mellitus     Gastric ulcer     GERD (gastroesophageal reflux disease)     Pancreatitis 2008        Past Surgical History  Past Surgical History:   Procedure Laterality Date    Chest tube insertion for ptx and effusion after rib fracture   2008     thumb surgery      COLONOSCOPY N/A 4/11/2017    ESOPHAGOGASTRODUODENOSCOPY N/A 6/18/2018    MINIMALLY INVASIVE TRANSFORAMINAL LUMBAR INTERBODY FUSION (TLIF) Left 1/10/2019     FLOOR  OF MOUTH MASS EXCISION  2009    NASAL SEPTUM SURGERY      SKIN SURGERY          Medications  Current Outpatient Medications on File Prior to Visit   Medication Sig Dispense Refill    ALPRAZolam (XANAX) 0.25 MG tablet 1 tablet daily PRN. 15 tablet 0    amitriptyline (ELAVIL) 25 MG tablet Take 2 tablets (50 mg total) by mouth every evening. Increased dose 60 tablet 11    atorvastatin (LIPITOR) 10 MG tablet Take 1 tablet (10 mg total) by mouth once daily. 90 tablet 2    baclofen (LIORESAL) 10 MG tablet Take 1 tablet (10 mg total) by mouth 2 (two) times daily. 60 tablet 5    blood sugar diagnostic Strp Check blood glucose 6x/day for accuchek guide meter. E11.65 400 strip 3    blood-glucose meter kit Use as instructed, Accu-Chek Amanda Plus glucometer 1 each 0  "   blood-glucose meter,continuous (DEXCOM G6 ) Misc Use with dexcom G6 system 1 each 0    blood-glucose sensor (DEXCOM G6 SENSOR) Vivi Change sensor every 10 days 3 Device 12    blood-glucose transmitter (DEXCOM G6 TRANSMITTER) Vivi Change every 3 months 1 Device 3    desoximetasone (TOPICORT) 0.25 % cream Apply topically 2 (two) times daily as needed.       diclofenac sodium (VOLTAREN) 1 % Gel Apply 2 g topically 3 (three) times daily. 100 g 0    escitalopram oxalate (LEXAPRO) 20 MG tablet Take 1 tablet (20 mg total) by mouth once daily. 90 tablet 1    fluticasone (FLONASE) 50 mcg/actuation nasal spray 2 sprays (100 mcg total) by Each Nare route daily as needed. 16 g 2    gabapentin (NEURONTIN) 300 MG capsule Take 1 capsule (300 mg total) by mouth 3 (three) times daily. 90 capsule 11    insulin aspart U-100 (NOVOLOG FLEXPEN U-100 INSULIN) 100 unit/mL InPn pen Sliding scale up to 20 units TID. MAX 50 UNITS/DAY. 45 mL 5    ipratropium (ATROVENT HFA) 17 mcg/actuation inhaler Inhale 2 puffs into the lungs every 6 (six) hours. Rescue 12.9 g 5    lancets (ACCU-CHEK FASTCLIX LANCING DEV) Misc 1 lancet by Misc.(Non-Drug; Combo Route) route 6 (six) times daily. To be used with Accu-Chek Amanda Plus glucometer 600 each 3    metoprolol succinate (TOPROL-XL) 25 MG 24 hr tablet Take 0.5 tablets (12.5 mg total) by mouth once daily. 15 tablet 11    montelukast (SINGULAIR) 10 mg tablet TAKE 1 TABLET BY MOUTH ONCE DAILY IN THE EVENING 30 tablet 5    multivitamin (THERAGRAN) per tablet Take 2 tablets by mouth once daily.      omeprazole (PRILOSEC) 40 MG capsule Take 1 capsule (40 mg total) by mouth 2 (two) times daily before meals. 60 capsule 5    pen needle, diabetic (BD ULTRA-FINE RENETTA PEN NEEDLE) 32 gauge x 5/32" Ndle Use 4x/day with Novolog 400 each 3    sodium chloride (SALINE NOSE NASL) by Nasal route.      tamsulosin (FLOMAX) 0.4 mg Cap Take 1 capsule (0.4 mg total) by mouth once daily. 90 capsule 1 "    triamcinolone acetonide 0.1% (KENALOG) 0.1 % Lotn       valACYclovir (VALTREX) 1000 MG tablet Take 1 tablet (1,000 mg total) by mouth every 12 (twelve) hours. for 7 doses 14 tablet 0    valsartan (DIOVAN) 160 MG tablet Take 1 tablet (160 mg total) by mouth once daily. 90 tablet 3         Review of Systems  Review of Systems   Constitutional: Negative for fever.   HENT: Positive for tinnitus.    Respiratory: Positive for cough. Negative for hemoptysis and shortness of breath.    Gastrointestinal: Positive for heartburn.   Musculoskeletal: Negative for neck pain.   Skin: Positive for itching (with allergies).   Endo/Heme/Allergies: Positive for environmental allergies.        Social History   reports that he has been smoking cigarettes. He has a 11.25 pack-year smoking history. He has never used smokeless tobacco. He reports current alcohol use of about 0.8 standard drinks of alcohol per week. He reports that he does not use drugs.     Family History  Family History   Problem Relation Age of Onset    Cataracts Mother     Leukemia Mother     Lung cancer Brother     Mental illness Father         suicide        Physical Exam   Vitals:    06/23/20 0835   BP: (!) 150/86   Temp: 97.7 °F (36.5 °C)    Body mass index is 21.39 kg/m².          GENERAL: alert, no acute distress.  HEAD: normocephalic.   SKIN: no lesions of skin of head and neck area.  EYES: lids and lashes normal. Pupils equal and reactive to light. Extraocular motions intact. No proptosis  EARS: external ear without lesion, normal pinna shape and position.  Bilateral cerumen impaction- see microscope note  NOSE: external nose without abnormality, septum grossly midline on anterior rhinoscopy. See endoscopy note  ORAL CAVITY/OROPHARYNX: dentition fair, no mass or lesion of gingiva/buccal mucosa/floor of mouth/tongue. tongue midline and mobile.  Symmetric palate rise.base of tongue and floor of mouth soft to palpation.  Uvula midline.   NECK:supple,.   trachea midline. No discrete palpable thyroid nodule.  No submandibular gland mass nor tenderness. Anterior apron incisional scar.   LYMPH NODES:No cervical lymphadenopathy.  RESPIRATORY: no stridor, no stertor. Voice normal. Respirations nonlabored.  NEURO: alert, responds to questions appropriately.   Cranial nerve exam as indicated in above sections and additionally showed  Facial movement symmetric with good eye closure and symmetric smile.   PSYCH:mood appropriate    Procedure Note   Procedure performed:microscopic examination of ears with cerumen disimpaction    Indication for procedure: bilateral cerumen impaction     Description of procedure:  After verbal consent was obtained, the patient was positioned in semi recumbent position and speculum was placed in the right ear and microscope brought into the field.  The microscope was positioned and magnification adjusted for appropriate visualization. Otologic instruments including various size otologic suctions and curette were used to remove the cerumen from the right external auditory canals under visualization with the operating microscope. After cleaning, visualization was again performed and at various levels of higher magnification to optimize views of the ear canal, tympanic membrane, ossicles and middle ear space. The same procedure was then repeated for the left ear. Findings as indicated below. All portions of the procedure and examination by otomicroscopy were tolerated well without complication.     Findings:  Right ear: Complete cerumen impaction removed entirely revealing normal external auditory canal; tympanic membrane without bulging, retraction, or perforation; no evidence of middle ear fluid or effusion.   Left ear: Complete cerumen impaction removed entirely revealing normal external auditory canal; tympanic membrane without bulging, retraction, or perforation; no evidence of middle ear fluid or effusion.         PROCEDURE NOTE  NAME OF  PROCEDURE: Flexible Laryngoscopy, diagnostic  INDICATIONS: throat pain/post nasal drainage/tobacco abuse; need to differentiate between  allergic rhinitis or LPR as cause of symptoms and also rule out malignancy   FINDINGS: findings more consistent with allergic rhinitis not LPR    Consent: After procedure was explained in detail and all questions answered, verbal consent was obtained for performing flexible laryngoscopy.  Anesthesia:none required  Procedure: With patient in seated position, the scope was inserted into the bilateral nasal passageways and then it was advanced along the left nasal passageway atraumatically into the nasopharynx . Findings of  following structures as listed below  Nasal cavity: boggy turbinates with dry mucosa and crusts on head of middle turbinate. Septum midline with slight amount of blood anteriorly on right side.no active bleeding.  Nasopharynx: no mass or lesion noted in nasopharynx.   Oropharynx: base of tongue without  mass or ulceration. Lingual tonsils normal in appearance  Hypopharynx: posterior pharyngeal wall without mass or lesion. No pooling of secretions. Pyriform sinuses visible without mass or lesion  Larynx: epiglottis normal without lesion. False vocal folds without edema/erythema/lesion. True vocal folds mobile and without lesion. No interarytenoid edema nor erythema . Postcricoid region without edema or lesion  Subglottis: visualized portion of subglottis normal in appearance    After examination performed, the scope was removed atraumatically . The patient tolerated the procedure well.photodocumentation obtained and uploaded into media section of epic.    Imaging:  The patient does not have any pertinent and/or recent imaging of the head and neck.     Labs:  COVID19 screen negative  No other recent labs    Assessment  1. Bilateral impacted cerumen    2. Seasonal allergic rhinitis due to pollen    3. History of oral cancer    4. Cough       Plan:  Discussed plan of  care with patient in detail and all questions answered. Patient reported understanding of plan of care.     Cerumen impaction: removed. Can use debrox prn  Suspect some of his ear fullness symptoms are related to eustachian tube dysfunction given the fact he has only noted left ear fullness but had bilateral cerumen impactions. Nasal sprays being rx'd for allergic rhinitis will help treat ETD if there is a component of this as well. If continues to have aural fullness after ears cleaned he will need audiologic evaluation.    Allergic rhinitis: Will start dual nasal spray therapy as combo of steroid and antihistamine nasal spray has been shown in evidence based studies to be better than either alone. He is already using flonase and does not need any refills . astelin rx sent to pharmacy.  Discussed medication administration technique ( point toward outer corner of eye and not towards nasal septum) and use nasal saline prior to medication sprays.  Does not want allergy referral now, may in the future. He is curious about trying IT again if he is a candidate, but he would like to see how he does with nasal sprays first    Throat pain, intermittent: No evidence of cancer , counseled on tobacco cessation .some of symptoms pt has can be seen with allergic rhinitis and with LPR.  Did not see any overt evidence of LPR on scope exam today . He is on a PPI at a dosage regimen recommended for tx of LPR with esophageal symptoms however he does have a history of hiatal hernia . Counseled pt on extremely increased risk of second primary cancer in patients with history of head and neck SCCA that continue to smoke.    follow up in 6 months for ear check and to see how he is doing on sprays- can reschedule for later if doing ok (usually calls to schedule apptmt when he notices ears are feeling full)

## 2020-06-23 NOTE — PATIENT INSTRUCTIONS
"Information and instructions from your visit with me today:    · Start using the following medication nasal sprays:   · Fluticasone spray:    This medication is a steroid spray. It stays within the nose and does not have absorption into the body that leads to side effects that one has with oral steroid medication. Fluticasone nasal spray is the same as the Flonase brand nasal spray. Discuss with your pharmacist if the price is lower over the counter or with a prescription ( this varies depending on insurance). The medication that is over the counter is the same as the prescription medication. Use this medication as instructed on the prescription, 2 sprays on each side of your nose once daily.     · Azelastine  spray:  This medication is an antihistamine used to treat nasal symptoms of allergy, which works specifically in the nose unlike antihistamine pills which have more of an effect on the whole body. Use this medication as instructed on the prescription, 1 spray on each side of your nose twice daily.     Additional instructions for medication sprays  Place the tip of the medication bottle in your nose and aim slightly up and out on each side to get medication high and deep into your nose and sinuses, and not have it all deposit in the very front of your nose. Aim the tip of the nozzle towards the outer corner of your eye . You can imagine aiming towards the back of your eyeball on each side for this, as opposed to straight back to the center of your nose and head.     You need to use this medication every day regardless of symptoms, as it takes time ( a few weeks) to work and get the benefits. It does not work on an "as needed" basis like taking a decongestant. If your symptoms only occur in a particular season, then the medication can be used seasonally instead of year long. For seasonal symptoms, you should start using the spray twice daily a month before when you normally have symptoms ( for example, if " symptoms start in August, should start at the end of June).     · Start nasal irrigations with saline solution:    SALINE SINUS RINSE ([employer] Med brand): You should do a full bottle, half on one side of your nose and half on the other, 1-2 times per day (or more if able to, you cannot do this too much). Follow the instructions on the box: mix the salt packet with clean water (bottle, previously boiled, distilled, etc -- not tap water) to the line on the bottle to make the irrigation.     NASAL SALINE SPRAY ( 0.9%) There are several different brands found in the cold and flu aisle of the pharmacy. You can also use simply saline or other brand of saline spray that delivers saline by gentle mist if you have difficulty or discomfort with neilmed rinse. Always rinse your nose with saline prior to using medication sprays and wait a couple of hours before using again.      · Do not use nasal decongestant sprays such as Afrin or similar products.  This can cause long term physical nasal addiction. Afrin should only be used if having nose bleeds and should not be used for more than 2-3 days in a row . It is a not a medication that should be used for a long period of time.     It was nice meeting you today, and I look forward to helping you feel better soon. Please don't hesitate to call if you have any other questions or concerns, or if I can be of any assistance in the meantime.      Maria Alejandra New MD    [facility]    Phone  [phone]    Fax      [phone]        Maria Alejandra New MD  Otorhinolaryngology

## 2020-07-06 RX ORDER — MONTELUKAST SODIUM 10 MG/1
10 TABLET ORAL NIGHTLY
Qty: 30 TABLET | Refills: 11 | Status: ON HOLD | OUTPATIENT
Start: 2020-07-06 | End: 2021-01-01 | Stop reason: HOSPADM

## 2020-07-16 ENCOUNTER — LAB VISIT (OUTPATIENT)
Dept: LAB | Facility: HOSPITAL | Age: 70
End: 2020-07-16
Attending: NURSE PRACTITIONER
Payer: MEDICARE

## 2020-07-16 DIAGNOSIS — E11.21 TYPE 2 DIABETES WITH NEPHROPATHY: ICD-10-CM

## 2020-07-16 PROCEDURE — 36415 COLL VENOUS BLD VENIPUNCTURE: CPT | Mod: HCNC,PN

## 2020-07-16 PROCEDURE — 83036 HEMOGLOBIN GLYCOSYLATED A1C: CPT | Mod: HCNC

## 2020-07-17 LAB
ESTIMATED AVG GLUCOSE: 192 MG/DL (ref 68–131)
HBA1C MFR BLD HPLC: 8.3 % (ref 4–5.6)

## 2020-07-20 ENCOUNTER — PATIENT OUTREACH (OUTPATIENT)
Dept: ADMINISTRATIVE | Facility: OTHER | Age: 70
End: 2020-07-20

## 2020-07-20 NOTE — PROGRESS NOTES
Requested updates within Care Everywhere.  Patient's chart was reviewed for overdue SERINA topics.  Immunizations reconciled.

## 2020-07-21 ENCOUNTER — OFFICE VISIT (OUTPATIENT)
Dept: ENDOCRINOLOGY | Facility: CLINIC | Age: 70
End: 2020-07-21
Payer: MEDICARE

## 2020-07-21 VITALS
SYSTOLIC BLOOD PRESSURE: 110 MMHG | HEART RATE: 40 BPM | DIASTOLIC BLOOD PRESSURE: 59 MMHG | TEMPERATURE: 99 F | BODY MASS INDEX: 21.43 KG/M2 | WEIGHT: 158 LBS

## 2020-07-21 DIAGNOSIS — Z71.9 VISIT FOR COUNSELING: ICD-10-CM

## 2020-07-21 DIAGNOSIS — F10.10 ETOH ABUSE: ICD-10-CM

## 2020-07-21 DIAGNOSIS — E11.649 HYPOGLYCEMIA ASSOCIATED WITH DIABETES: ICD-10-CM

## 2020-07-21 DIAGNOSIS — E11.42 DM TYPE 2 WITH DIABETIC PERIPHERAL NEUROPATHY: Primary | ICD-10-CM

## 2020-07-21 PROCEDURE — 3052F HG A1C>EQUAL 8.0%<EQUAL 9.0%: CPT | Mod: HCNC,CPTII,S$GLB, | Performed by: NURSE PRACTITIONER

## 2020-07-21 PROCEDURE — 99215 PR OFFICE/OUTPT VISIT, EST, LEVL V, 40-54 MIN: ICD-10-PCS | Mod: HCNC,S$GLB,, | Performed by: NURSE PRACTITIONER

## 2020-07-21 PROCEDURE — 3074F SYST BP LT 130 MM HG: CPT | Mod: HCNC,CPTII,S$GLB, | Performed by: NURSE PRACTITIONER

## 2020-07-21 PROCEDURE — 1159F PR MEDICATION LIST DOCUMENTED IN MEDICAL RECORD: ICD-10-PCS | Mod: HCNC,S$GLB,, | Performed by: NURSE PRACTITIONER

## 2020-07-21 PROCEDURE — 3078F PR MOST RECENT DIASTOLIC BLOOD PRESSURE < 80 MM HG: ICD-10-PCS | Mod: HCNC,CPTII,S$GLB, | Performed by: NURSE PRACTITIONER

## 2020-07-21 PROCEDURE — 1126F PR PAIN SEVERITY QUANTIFIED, NO PAIN PRESENT: ICD-10-PCS | Mod: HCNC,S$GLB,, | Performed by: NURSE PRACTITIONER

## 2020-07-21 PROCEDURE — 1101F PT FALLS ASSESS-DOCD LE1/YR: CPT | Mod: HCNC,CPTII,S$GLB, | Performed by: NURSE PRACTITIONER

## 2020-07-21 PROCEDURE — 1159F MED LIST DOCD IN RCRD: CPT | Mod: HCNC,S$GLB,, | Performed by: NURSE PRACTITIONER

## 2020-07-21 PROCEDURE — 3052F PR MOST RECENT HEMOGLOBIN A1C LEVEL 8.0 - < 9.0%: ICD-10-PCS | Mod: HCNC,CPTII,S$GLB, | Performed by: NURSE PRACTITIONER

## 2020-07-21 PROCEDURE — 99215 OFFICE O/P EST HI 40 MIN: CPT | Mod: HCNC,S$GLB,, | Performed by: NURSE PRACTITIONER

## 2020-07-21 PROCEDURE — 99999 PR PBB SHADOW E&M-EST. PATIENT-LVL IV: ICD-10-PCS | Mod: PBBFAC,HCNC,, | Performed by: NURSE PRACTITIONER

## 2020-07-21 PROCEDURE — 3008F BODY MASS INDEX DOCD: CPT | Mod: HCNC,CPTII,S$GLB, | Performed by: NURSE PRACTITIONER

## 2020-07-21 PROCEDURE — 3074F PR MOST RECENT SYSTOLIC BLOOD PRESSURE < 130 MM HG: ICD-10-PCS | Mod: HCNC,CPTII,S$GLB, | Performed by: NURSE PRACTITIONER

## 2020-07-21 PROCEDURE — 99999 PR PBB SHADOW E&M-EST. PATIENT-LVL IV: CPT | Mod: PBBFAC,HCNC,, | Performed by: NURSE PRACTITIONER

## 2020-07-21 PROCEDURE — 1126F AMNT PAIN NOTED NONE PRSNT: CPT | Mod: HCNC,S$GLB,, | Performed by: NURSE PRACTITIONER

## 2020-07-21 PROCEDURE — 3078F DIAST BP <80 MM HG: CPT | Mod: HCNC,CPTII,S$GLB, | Performed by: NURSE PRACTITIONER

## 2020-07-21 PROCEDURE — 3008F PR BODY MASS INDEX (BMI) DOCUMENTED: ICD-10-PCS | Mod: HCNC,CPTII,S$GLB, | Performed by: NURSE PRACTITIONER

## 2020-07-21 PROCEDURE — 1101F PR PT FALLS ASSESS DOC 0-1 FALLS W/OUT INJ PAST YR: ICD-10-PCS | Mod: HCNC,CPTII,S$GLB, | Performed by: NURSE PRACTITIONER

## 2020-07-21 NOTE — PATIENT INSTRUCTIONS
Try the No-Sting Skin Prep to help prevent rash from Dexcom.   Recommend waiting no longer than 5 hours to avoid low blood sugars, as well as incorporating more protein, fat, and non-starchy vegetables into your meals.   Will send orders for [employer] Tslim insulin pump.   Return to clinic in 3 months with labs prior. Return to clinic sooner if insulin pump is started. Contact our office if you don't get a call from [employer] regarding insulin pump.

## 2020-07-21 NOTE — PROGRESS NOTES
"CC: This 69 y.o. White male  is here for evaluation of  T2DM along with comorbidities indicated in the Visit Diagnosis section of this encounter.    HPI: Moe Ren was diagnosed with T2DM in ~ 2010.  Metformin started at time of diagnosis.     He has a history of squamos cell oral CA, Spinal stenosis, chronic pancreatitis.        Prior visit 1/2020  Carb ratio decreased further from 10 to 12 a month ago d/t low BGs after breakfast. But he still c/o that BG have been dropping after breakfast insulin dose. Also skips lunch and then finds his BG starts dropping by dinner.   He's been using Dexcom G6 but doesn't "trust it" bc he notes that his BG is sometimes 30-40 mg/dL different.   a1c was up from 7.3% in Sept to 8.1% in Jan.   Has cut back to no more than 4 drinks of Scotch a night. Previously drinking 6-8.  Down from 2 ppd to 1.5 ppd.   CGM report: glucoses lowest overnight and in the evenings. A couple of overnight lows - unsure cause but likely carb to insulin mismatch. BG tends to drop low by 3 hours after breakfast insulin dose.   Average 14 day glucose 162 with SD 65.   Plan On Sundays, use carb ratio of 20 for breakfast and lunch.   Decrease carb ratio from 12 to 15 for breakfast.   Monitor blood sugar 4x/day. Return to clinic in 3 months with labs prior. Call/message with any issues especially if blood sugars continue to be too low. Work on really cutting back on bedtime snacking. Recommend using a carb ratio of 15 to 20 for bedtime snacks.       Prior visit 5/1520 virtual visit   He lowered his carb ratio from 10 to 15 for dinner starting Monday.   He c/o low glucoses after dinner. He continues to drink alcohol and tries to aim for no more than 4 drinks/day but has been drinking 6-10 drinks per night lately. Drinks Scotch with water. Sometimes snacks at night if BG is on the low side.   Tries to drink Glucerna for breakfast slowly to avoid fast spike in BG.   /67 today at home.   2 week CGM " report: one mild hypoglycemic episode noted after dinner since carb ratio was increased earlier this week. Postprandial highs noted during the day usually into the mid to high 200s. Not eating lunch.   Average glucose 179 SD 58   Plan Continue carb ratios for now.   Increase goal from 115 to 140.   Ideally eat 3 meals/day to help regulate glucoses and to help avoid overeating by dinner.   Suspect hypoglycemia in the evenings is related to alcohol intake. Recommend cutting down alcohol with goal of complete abstinence. He is not ready for counseling.   Encouraged more physical activity - especially outside to help with mood.   Test glucose 4x/day.   Keep appt in July with labs prior.       Interval history  a1c is up from 8.1% in Dec to now 8.3%.   C/o having hypoglycemia before lunch or dinner, for no known reason. Also sometimes in the morning as well.   He gets a rash when he takes off his Dexcom sensor. Skin does not break and does not itch.   He has cut down on drinking from 10 to now 4-5 drinks/night. His goal is to get down to 3 drinks/day.         LAST DIABETES EDUCATION: 6/2016; 2/2018    PRESCRIBED DIABETES MEDICATIONS: Novolog ac; he rounds up on his dosing of Novolog   ICR   0515 - 15 (uses 20 if he goes to [employer] on Sundays)   1130 - 20   1600 - 20     ISF 1:40, goal 140     Subtracts 2 units of Novolog from Sunday breakfast and lunch since he wakes up early and notes BGs tend to be lower on Sundays. 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     Misses medication doses - No    DM COMPLICATIONS: peripheral neuropathy    SIGNIFICANT DIABETES MED HISTORY:   Tresiba gradually titrated down until it was stopped mid 2017 d/t hypoglycemia.   Novolog stopped at ov 2/5/18 and metformin and tresiba were started. tresiba dc'd about a week later d/t hypo  repaglinide started at ov 2/28/18, and stopped about a month later d/t ineffectiveness      Avoid incretin mimetics d/t h/o chronic  pancreatitis   Avoid insulin pump because patient does not require basal insulin delivery     SELF MONITORING BLOOD GLUCOSE: Checks blood glucose at home 6x/day with Dexcom. Uses phone as the reader. See Media for CGM report.     HYPOGLYCEMIC EPISODES: symptoms start in the 60s but definitely in the 50s. Corrects with coke.   Notes that any type of physical activity drops his BGs.     CURRENT DIET: tries to eat 3 meal/day - Eats at 10 am, 2 pm, and 6 pm. Eats about 60 to 130 carbs per meal.   He supplements with Ensure. Lately hasn't eaten lunch.   Breakfast is typically 2 cups of coffee and an Ensure (50 gram CHO) which he counts total as 80 gm CHO.     CURRENT EXERCISE: none recent    SOCIAL: retired  - consultant now.  Has a young grandson      BP (!) 151/66 (BP Location: Right arm, Patient Position: Sitting, BP Method: Large (Automatic))   Pulse (!) 54   Temp 99.9 °F (37.7 °C) (Temporal)   Wt 71.7 kg (158 lb)   BMI 21.43 kg/m²       ROS:   CONSTITUTIONAL: Appetite good, denies fatigue  SKIN: + rash     PHYSICAL EXAM:  GENERAL: Well developed, well nourished. No acute distress.   PSYCH: AAOx3, appropriate mood and affect, conversant, well-groomed. Judgement and insight good.   NEURO: Cranial nerves grossly intact. Speech clear, no tremor.   CHEST: Respirations even and unlabored.  SKIN: + flat rash noted to LLQ from Dexcom sensor             Hemoglobin A1C   Date Value Ref Range Status   07/16/2020 8.3 (H) 4.0 - 5.6 % Final     Comment:     ADA Screening Guidelines:  5.7-6.4%  Consistent with prediabetes  >or=6.5%  Consistent with diabetes  High levels of fetal hemoglobin interfere with the HbA1C  assay. Heterozygous hemoglobin variants (HbS, HgC, etc)do  not significantly interfere with this assay.   However, presence of multiple variants may affect accuracy.     12/09/2019 8.1 (H) 4.0 - 5.6 % Final     Comment:     ADA Screening Guidelines:  5.7-6.4%  Consistent with prediabetes  >or=6.5%   Consistent with diabetes  High levels of fetal hemoglobin interfere with the HbA1C  assay. Heterozygous hemoglobin variants (HbS, HgC, etc)do  not significantly interfere with this assay.   However, presence of multiple variants may affect accuracy.     09/13/2019 7.3 (H) 4.0 - 5.6 % Final     Comment:     ADA Screening Guidelines:  5.7-6.4%  Consistent with prediabetes  >or=6.5%  Consistent with diabetes  High levels of fetal hemoglobin interfere with the HbA1C  assay. Heterozygous hemoglobin variants (HbS, HgC, etc)do  not significantly interfere with this assay.   However, presence of multiple variants may affect accuracy.            Ref. Range 2/13/2020 10:08 2/13/2020 10:09   Glucose, Fasting Latest Ref Range: 70 - 110 mg/dL  187 (H)   C-Peptide Latest Ref Range: 0.78 - 5.19 ng/mL 0.27 (L)          Chemistry        Component Value Date/Time     12/09/2019 1303    K 4.1 12/09/2019 1303     12/09/2019 1303    CO2 28 12/09/2019 1303    BUN 7 (L) 12/09/2019 1303    CREATININE 1.3 12/09/2019 1303     (H) 12/09/2019 1303        Component Value Date/Time    CALCIUM 9.0 12/09/2019 1303    ALKPHOS 114 09/13/2019 1545    AST 36 09/13/2019 1545    ALT 14 09/13/2019 1545    BILITOT 0.4 09/13/2019 1545    ESTGFRAFRICA >60 12/09/2019 1303    EGFRNONAA 56 (A) 12/09/2019 1303          Lab Results   Component Value Date    LDLCALC 41.2 (L) 09/13/2019        Ref. Range 9/13/2019 15:45   Cholesterol Latest Ref Range: 120 - 199 mg/dL 129   HDL Latest Ref Range: 40 - 75 mg/dL 64   Hdl/Cholesterol Ratio Latest Ref Range: 20.0 - 50.0 % 49.6   LDL Cholesterol External Latest Ref Range: 63.0 - 159.0 mg/dL 41.2 (L)   Non-HDL Cholesterol Latest Units: mg/dL 65   Total Cholesterol/HDL Ratio Latest Ref Range: 2.0 - 5.0  2.0   Triglycerides Latest Ref Range: 30 - 150 mg/dL 119           Lab Results   Component Value Date    MICALBCREAT 84.2 (H) 07/16/2020               ASSESSMENT and PLAN:    A1C GOAL: < 7 %     1. DM  type 2 with diabetic peripheral neuropathy  Try the No-Sting Skin Prep to help prevent rash from Dexcom.   To avoid low blood glucoses between meals - Recommend waiting no longer than 5 hours to avoid low blood sugars, as well as incorporating more protein, fat, and non-starchy vegetables into your meals.   Will send orders for [employer] Tslim insulin pump.   Return to clinic in 3 months with labs prior. Return to clinic sooner if insulin pump is started. Contact our office if you don't get a call from [employer] regarding insulin pump.          2. Hypoglycemia associated with diabetes  As above    3. ETOH abuse  Reduce alcohol intake      Spent 50minutes with patient with >50% time spent in counseling, as noted in # 1-3.            No orders of the defined types were placed in this encounter.       No follow-ups on file.

## 2020-08-12 ENCOUNTER — TELEPHONE (OUTPATIENT)
Dept: NEUROSURGERY | Facility: CLINIC | Age: 70
End: 2020-08-12

## 2020-08-12 DIAGNOSIS — M48.062 SPINAL STENOSIS OF LUMBAR REGION WITH NEUROGENIC CLAUDICATION: ICD-10-CM

## 2020-08-12 DIAGNOSIS — Z98.1 S/P LUMBAR FUSION: ICD-10-CM

## 2020-08-12 DIAGNOSIS — M51.37 DEGENERATION OF LUMBAR OR LUMBOSACRAL INTERVERTEBRAL DISC: Primary | ICD-10-CM

## 2020-08-31 ENCOUNTER — PATIENT OUTREACH (OUTPATIENT)
Dept: ADMINISTRATIVE | Facility: OTHER | Age: 70
End: 2020-08-31

## 2020-08-31 NOTE — PROGRESS NOTES
Health Maintenance Due   Topic Date Due    Aspirin/Antiplatelet Therapy  10/16/1968     Updates were requested from care everywhere.  Chart was reviewed for overdue Proactive [facility] Encounters ([facility]) topics (CRS, Breast Cancer Screening, Eye exam)  Health Maintenance has been updated.  LINKS immunization registry triggered.  Immunizations were reconciled.

## 2020-09-01 ENCOUNTER — OFFICE VISIT (OUTPATIENT)
Dept: OPTOMETRY | Facility: CLINIC | Age: 70
End: 2020-09-01
Payer: MEDICARE

## 2020-09-01 DIAGNOSIS — H52.4 HYPEROPIA WITH PRESBYOPIA OF BOTH EYES: ICD-10-CM

## 2020-09-01 DIAGNOSIS — Z01.00 EYE EXAM, ROUTINE: Primary | ICD-10-CM

## 2020-09-01 DIAGNOSIS — H52.03 HYPEROPIA WITH PRESBYOPIA OF BOTH EYES: ICD-10-CM

## 2020-09-01 LAB
LEFT EYE DM RETINOPATHY: NEGATIVE
RIGHT EYE DM RETINOPATHY: NEGATIVE

## 2020-09-01 PROCEDURE — 99999 PR PBB SHADOW E&M-EST. PATIENT-LVL II: CPT | Mod: PBBFAC,HCNC,, | Performed by: OPTOMETRIST

## 2020-09-01 PROCEDURE — 92014 COMPRE OPH EXAM EST PT 1/>: CPT | Mod: HCNC,S$GLB,, | Performed by: OPTOMETRIST

## 2020-09-01 PROCEDURE — 99999 PR PBB SHADOW E&M-EST. PATIENT-LVL II: ICD-10-PCS | Mod: PBBFAC,HCNC,, | Performed by: OPTOMETRIST

## 2020-09-01 PROCEDURE — 92015 PR REFRACTION: ICD-10-PCS | Mod: HCNC,S$GLB,, | Performed by: OPTOMETRIST

## 2020-09-01 PROCEDURE — 92015 DETERMINE REFRACTIVE STATE: CPT | Mod: HCNC,S$GLB,, | Performed by: OPTOMETRIST

## 2020-09-01 PROCEDURE — 92014 PR EYE EXAM, EST PATIENT,COMPREHESV: ICD-10-PCS | Mod: HCNC,S$GLB,, | Performed by: OPTOMETRIST

## 2020-09-01 NOTE — PROGRESS NOTES
HPI     Annual [facility]emed vision exam   Patients wears OTC readers +2.00 computer  +1.75 Near   (-)Flashes (-)Floaters  (-)Itch, (-)tear, (-)burn, (+)Dryness. (+) OTC Drops Sytane & always drops     (-)Photophobia  (-)Glare (-)diplopia (-) headaches          Last edited by Leonardo Ramsey, OD on 9/1/2020  2:56 PM. (History)            Assessment /Plan     For exam results, see Encounter Report.    Eye exam, routine  -[facility]  -No retinopathy noted today.  Continued control with primary care physician and annual comprehensive eye exam.    Hyperopia with presbyopia of both eyes  Eyeglass Final Rx     Eyeglass Final Rx       Sphere Cylinder Axis Dist VA Add    Right +0.50 Sphere  20/25- +2.50    Left Sprague +0.75 175 20/30+ +2.50    Type: Optional                  RTC 1 yr

## 2020-09-02 ENCOUNTER — HOSPITAL ENCOUNTER (OUTPATIENT)
Dept: RADIOLOGY | Facility: HOSPITAL | Age: 70
Discharge: HOME | End: 2020-09-02
Attending: NEUROLOGICAL SURGERY
Payer: MEDICARE

## 2020-09-02 ENCOUNTER — OFFICE VISIT (OUTPATIENT)
Dept: NEUROSURGERY | Facility: CLINIC | Age: 70
End: 2020-09-02
Payer: MEDICARE

## 2020-09-02 VITALS
TEMPERATURE: 99 F | WEIGHT: 153.75 LBS | HEART RATE: 41 BPM | SYSTOLIC BLOOD PRESSURE: 146 MMHG | BODY MASS INDEX: 20.86 KG/M2 | DIASTOLIC BLOOD PRESSURE: 82 MMHG

## 2020-09-02 DIAGNOSIS — M48.062 SPINAL STENOSIS OF LUMBAR REGION WITH NEUROGENIC CLAUDICATION: ICD-10-CM

## 2020-09-02 DIAGNOSIS — M51.37 DEGENERATION OF LUMBAR OR LUMBOSACRAL INTERVERTEBRAL DISC: ICD-10-CM

## 2020-09-02 DIAGNOSIS — M51.37 DEGENERATION OF LUMBAR OR LUMBOSACRAL INTERVERTEBRAL DISC: Primary | ICD-10-CM

## 2020-09-02 DIAGNOSIS — Z98.1 S/P LUMBAR FUSION: ICD-10-CM

## 2020-09-02 DIAGNOSIS — M43.17 SPONDYLOLISTHESIS OF LUMBOSACRAL REGION: ICD-10-CM

## 2020-09-02 PROCEDURE — 99999 PR PBB SHADOW E&M-EST. PATIENT-LVL V: CPT | Mod: PBBFAC,HCNC,, | Performed by: NEUROLOGICAL SURGERY

## 2020-09-02 PROCEDURE — 72131 CT LUMBAR SPINE W/O DYE: CPT | Mod: 26,HCNC,, | Performed by: RADIOLOGY

## 2020-09-02 PROCEDURE — 99214 OFFICE O/P EST MOD 30 MIN: CPT | Mod: HCNC,S$GLB,, | Performed by: NEUROLOGICAL SURGERY

## 2020-09-02 PROCEDURE — 3008F BODY MASS INDEX DOCD: CPT | Mod: HCNC,CPTII,S$GLB, | Performed by: NEUROLOGICAL SURGERY

## 2020-09-02 PROCEDURE — 3077F SYST BP >= 140 MM HG: CPT | Mod: HCNC,CPTII,S$GLB, | Performed by: NEUROLOGICAL SURGERY

## 2020-09-02 PROCEDURE — 1126F AMNT PAIN NOTED NONE PRSNT: CPT | Mod: HCNC,S$GLB,, | Performed by: NEUROLOGICAL SURGERY

## 2020-09-02 PROCEDURE — 72131 CT LUMBAR SPINE WITHOUT CONTRAST: ICD-10-PCS | Mod: 26,HCNC,, | Performed by: RADIOLOGY

## 2020-09-02 PROCEDURE — 1101F PR PT FALLS ASSESS DOC 0-1 FALLS W/OUT INJ PAST YR: ICD-10-PCS | Mod: HCNC,CPTII,S$GLB, | Performed by: NEUROLOGICAL SURGERY

## 2020-09-02 PROCEDURE — 1126F PR PAIN SEVERITY QUANTIFIED, NO PAIN PRESENT: ICD-10-PCS | Mod: HCNC,S$GLB,, | Performed by: NEUROLOGICAL SURGERY

## 2020-09-02 PROCEDURE — 99499 UNLISTED E&M SERVICE: CPT | Mod: HCNC,S$GLB,, | Performed by: NEUROLOGICAL SURGERY

## 2020-09-02 PROCEDURE — 1101F PT FALLS ASSESS-DOCD LE1/YR: CPT | Mod: HCNC,CPTII,S$GLB, | Performed by: NEUROLOGICAL SURGERY

## 2020-09-02 PROCEDURE — 3008F PR BODY MASS INDEX (BMI) DOCUMENTED: ICD-10-PCS | Mod: HCNC,CPTII,S$GLB, | Performed by: NEUROLOGICAL SURGERY

## 2020-09-02 PROCEDURE — 1159F PR MEDICATION LIST DOCUMENTED IN MEDICAL RECORD: ICD-10-PCS | Mod: HCNC,S$GLB,, | Performed by: NEUROLOGICAL SURGERY

## 2020-09-02 PROCEDURE — 99999 PR PBB SHADOW E&M-EST. PATIENT-LVL V: ICD-10-PCS | Mod: PBBFAC,HCNC,, | Performed by: NEUROLOGICAL SURGERY

## 2020-09-02 PROCEDURE — 99214 PR OFFICE/OUTPT VISIT, EST, LEVL IV, 30-39 MIN: ICD-10-PCS | Mod: HCNC,S$GLB,, | Performed by: NEUROLOGICAL SURGERY

## 2020-09-02 PROCEDURE — 72131 CT LUMBAR SPINE W/O DYE: CPT | Mod: TC,HCNC

## 2020-09-02 PROCEDURE — 3077F PR MOST RECENT SYSTOLIC BLOOD PRESSURE >= 140 MM HG: ICD-10-PCS | Mod: HCNC,CPTII,S$GLB, | Performed by: NEUROLOGICAL SURGERY

## 2020-09-02 PROCEDURE — 3079F PR MOST RECENT DIASTOLIC BLOOD PRESSURE 80-89 MM HG: ICD-10-PCS | Mod: HCNC,CPTII,S$GLB, | Performed by: NEUROLOGICAL SURGERY

## 2020-09-02 PROCEDURE — 3079F DIAST BP 80-89 MM HG: CPT | Mod: HCNC,CPTII,S$GLB, | Performed by: NEUROLOGICAL SURGERY

## 2020-09-02 PROCEDURE — 1159F MED LIST DOCD IN RCRD: CPT | Mod: HCNC,S$GLB,, | Performed by: NEUROLOGICAL SURGERY

## 2020-09-02 PROCEDURE — 99499 RISK ADDL DX/OHS AUDIT: ICD-10-PCS | Mod: HCNC,S$GLB,, | Performed by: NEUROLOGICAL SURGERY

## 2020-09-02 NOTE — PATIENT INSTRUCTIONS
I have personally reviewed the CT of lumbar spine with the pt which shows postoperative changes from MIS left-sided TLIF at L4-5 and L5-S1 with proper hardware placement and bone growth through the cages at both levels.    Pt encouraged to walk on a regular basis.    Pt will follow up with me on an as-needed basis and was advised to contact us with any questions, concerns, or if he experiences any new or worsening symptoms.

## 2020-09-02 NOTE — PROGRESS NOTES
Subjective:   I, Ny Thibodeaux, attest that this documentation has been prepared under the direction and in the presence of Josias Ling MD.     Patient ID: Moe Ren is a 69 y.o. male     Chief Complaint: No chief complaint on file.          HPI  Mr. Moe Ren is a pleasant 69 y.o. gentleman who is s/p MIS left-sided TLIF at L4-5 and L5-S1 for lumbar spondylosis and spondylolisthesis on 01/10/2019 and presents today for his 1 year follow up with CT of lumbar spine.  Hx: The pt has been under my care for back pain that is worse on the left than the right. He had progressively worsening of his back pain and significant limitations of his physical activities because of his back pain and weakness in the RLE. He had a work up with an MRI of his lumbar spine which revealed he had a grade I spondylolisthesis at L4-L5 and spondylosis with facet arthritis at L4-L5 and L5-S1 which were surgical in nature. We proceeded and pt did well postoperatively. He was seen postoperatively in clinic on 08/21/2019, at which time he complained of pain in the plantar aspect of his left foot and medial dorsal aspect of his right foot that began 4 months prior; described as aching at its best, and throbbing/sharp at its worst.     Pt states he has been doing well in the interim and has been fairly inactive. He has no complaints at this time.       Review of Systems   Constitutional: Negative for activity change, appetite change, fatigue, fever and unexpected weight change.   HENT: Negative for facial swelling.    Eyes: Negative.    Respiratory: Negative.    Cardiovascular: Negative.    Gastrointestinal: Negative for diarrhea, nausea and vomiting.   Endocrine: Negative.    Genitourinary: Negative.    Musculoskeletal: Negative for back pain, joint swelling, myalgias and neck pain.   Neurological: Negative for dizziness, seizures, weakness, numbness and headaches.   Psychiatric/Behavioral: Negative.       Past Medical History:    Diagnosis Date    Abdominal aortic atherosclerosis     Abnormal heart rhythm     frequent PVCs and PACs    Arthritis     Basal cell carcinoma 1990s    back     CAD (coronary artery disease)     Cancer     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    Cataract     Colon polyp     Colon polyp     COPD (chronic obstructive pulmonary disease) with emphysema     Diabetes mellitus     Gastric ulcer     GERD (gastroesophageal reflux disease)     Pancreatitis 2008    2 Times       Objective:      Vitals:    09/02/20 1240   BP: (!) 146/82   Pulse: (!) 41   Temp: 98.6 °F (37 °C)      Physical Exam  Constitutional:       General: He is not in acute distress.  HENT:      Head: Normocephalic and atraumatic.   Neck:      Musculoskeletal: Neck supple.   Pulmonary:      Effort: Pulmonary effort is normal.   Neurological:      Mental Status: He is alert and oriented to person, place, and time.      GCS: GCS eye subscore is 4. GCS verbal subscore is 5. GCS motor subscore is 6.      Cranial Nerves: No cranial nerve deficit.      Motor: Motor function is intact.      Gait: Gait is intact.            IMAGING:  CT Lumbar Spine Without Contrast (09/02/2020) shows postoperative changes from MIS left-sided TLIF at L4-5 and L5-S1 with proper hardware placement and bone growth through the cages at both levels.    I have personally reviewed the images with the pt.      I, Dr. Josias Ling, personally performed the services described in this documentation. All medical record entries made by the scribe, Ny Thibodeaux, were at my direction and in my presence.  I have reviewed the chart and agree that the record reflects my personal performance and is accurate and complete. Josias Ling MD. 09/02/2020    Assessment:       Lumbar spondylosis.     Plan:   I have personally reviewed the CT of lumbar spine with the pt which shows postoperative changes from MIS left-sided TLIF at L4-5 and L5-S1 with proper hardware placement and bone  growth through the cages at both levels.    Pt encouraged to walk on a regular basis.    Pt will follow up with me on an as-needed basis and was advised to contact us with any questions, concerns, or if he experiences any new or worsening symptoms.

## 2020-09-04 ENCOUNTER — TELEPHONE (OUTPATIENT)
Dept: FAMILY MEDICINE | Facility: CLINIC | Age: 70
End: 2020-09-04

## 2020-09-04 DIAGNOSIS — E11.42 DM TYPE 2 WITH DIABETIC PERIPHERAL NEUROPATHY: Primary | Chronic | ICD-10-CM

## 2020-09-04 RX ORDER — INSULIN ASPART 100 [IU]/ML
INJECTION, SOLUTION INTRAVENOUS; SUBCUTANEOUS
Qty: 45 ML | Refills: 5 | Status: SHIPPED | OUTPATIENT
Start: 2020-09-04 | End: 2020-10-23 | Stop reason: SDUPTHER

## 2020-09-04 RX ORDER — INSULIN ASPART 100 [IU]/ML
INJECTION, SOLUTION INTRAVENOUS; SUBCUTANEOUS
Qty: 45 ML | Refills: 5 | Status: SHIPPED | OUTPATIENT
Start: 2020-09-04 | End: 2020-09-04

## 2020-10-05 ENCOUNTER — PATIENT MESSAGE (OUTPATIENT)
Dept: FAMILY MEDICINE | Facility: CLINIC | Age: 70
End: 2020-10-05

## 2020-10-05 ENCOUNTER — PATIENT MESSAGE (OUTPATIENT)
Dept: ADMINISTRATIVE | Facility: HOSPITAL | Age: 70
End: 2020-10-05

## 2020-10-05 DIAGNOSIS — F41.9 ANXIETY DISORDER, UNSPECIFIED TYPE: ICD-10-CM

## 2020-10-05 RX ORDER — ALPRAZOLAM 0.25 MG/1
TABLET ORAL
Qty: 15 TABLET | Refills: 0 | Status: SHIPPED | OUTPATIENT
Start: 2020-10-05 | End: 2021-01-01 | Stop reason: SDUPTHER

## 2020-10-06 ENCOUNTER — PATIENT OUTREACH (OUTPATIENT)
Dept: ADMINISTRATIVE | Facility: HOSPITAL | Age: 70
End: 2020-10-06

## 2020-10-14 ENCOUNTER — LAB VISIT (OUTPATIENT)
Dept: LAB | Facility: HOSPITAL | Age: 70
End: 2020-10-14
Attending: INTERNAL MEDICINE
Payer: MEDICARE

## 2020-10-14 DIAGNOSIS — D64.9 ANEMIA, UNSPECIFIED TYPE: ICD-10-CM

## 2020-10-14 DIAGNOSIS — E11.42 DM TYPE 2 WITH DIABETIC PERIPHERAL NEUROPATHY: ICD-10-CM

## 2020-10-14 LAB
BASOPHILS # BLD AUTO: 0.13 K/UL (ref 0–0.2)
BASOPHILS NFR BLD: 1.7 % (ref 0–1.9)
CHOLEST SERPL-MCNC: 138 MG/DL (ref 120–199)
CHOLEST/HDLC SERPL: 1.9 {RATIO} (ref 2–5)
DIFFERENTIAL METHOD: ABNORMAL
EOSINOPHIL # BLD AUTO: 0.8 K/UL (ref 0–0.5)
EOSINOPHIL NFR BLD: 10.4 % (ref 0–8)
ERYTHROCYTE [DISTWIDTH] IN BLOOD BY AUTOMATED COUNT: 13.4 % (ref 11.5–14.5)
FERRITIN SERPL-MCNC: 103 NG/ML (ref 20–300)
HCT VFR BLD AUTO: 41.6 % (ref 40–54)
HDLC SERPL-MCNC: 71 MG/DL (ref 40–75)
HDLC SERPL: 51.4 % (ref 20–50)
HGB BLD-MCNC: 13.9 G/DL (ref 14–18)
IMM GRANULOCYTES # BLD AUTO: 0.03 K/UL (ref 0–0.04)
IMM GRANULOCYTES NFR BLD AUTO: 0.4 % (ref 0–0.5)
LDLC SERPL CALC-MCNC: 45 MG/DL (ref 63–159)
LYMPHOCYTES # BLD AUTO: 2.6 K/UL (ref 1–4.8)
LYMPHOCYTES NFR BLD: 33.5 % (ref 18–48)
MCH RBC QN AUTO: 32.7 PG (ref 27–31)
MCHC RBC AUTO-ENTMCNC: 33.4 G/DL (ref 32–36)
MCV RBC AUTO: 98 FL (ref 82–98)
MONOCYTES # BLD AUTO: 1 K/UL (ref 0.3–1)
MONOCYTES NFR BLD: 12.1 % (ref 4–15)
NEUTROPHILS # BLD AUTO: 3.3 K/UL (ref 1.8–7.7)
NEUTROPHILS NFR BLD: 41.9 % (ref 38–73)
NONHDLC SERPL-MCNC: 67 MG/DL
NRBC BLD-RTO: 0 /100 WBC
PLATELET # BLD AUTO: 279 K/UL (ref 150–350)
PMV BLD AUTO: 10.3 FL (ref 9.2–12.9)
RBC # BLD AUTO: 4.25 M/UL (ref 4.6–6.2)
TRIGL SERPL-MCNC: 110 MG/DL (ref 30–150)
WBC # BLD AUTO: 7.82 K/UL (ref 3.9–12.7)

## 2020-10-14 PROCEDURE — 82728 ASSAY OF FERRITIN: CPT | Mod: HCNC

## 2020-10-14 PROCEDURE — 80061 LIPID PANEL: CPT | Mod: HCNC

## 2020-10-14 PROCEDURE — 83036 HEMOGLOBIN GLYCOSYLATED A1C: CPT | Mod: HCNC

## 2020-10-14 PROCEDURE — 85025 COMPLETE CBC W/AUTO DIFF WBC: CPT | Mod: HCNC

## 2020-10-14 PROCEDURE — 36415 COLL VENOUS BLD VENIPUNCTURE: CPT | Mod: HCNC,PN

## 2020-10-15 LAB
ESTIMATED AVG GLUCOSE: 203 MG/DL (ref 68–131)
HBA1C MFR BLD HPLC: 8.7 % (ref 4–5.6)

## 2020-10-21 ENCOUNTER — OFFICE VISIT (OUTPATIENT)
Dept: ENDOCRINOLOGY | Facility: CLINIC | Age: 70
End: 2020-10-21
Payer: MEDICARE

## 2020-10-21 VITALS
HEART RATE: 46 BPM | TEMPERATURE: 99 F | WEIGHT: 147.38 LBS | BODY MASS INDEX: 19.99 KG/M2 | SYSTOLIC BLOOD PRESSURE: 170 MMHG | DIASTOLIC BLOOD PRESSURE: 76 MMHG

## 2020-10-21 DIAGNOSIS — Z72.0 TOBACCO ABUSE: ICD-10-CM

## 2020-10-21 DIAGNOSIS — E11.649 HYPOGLYCEMIA ASSOCIATED WITH DIABETES: ICD-10-CM

## 2020-10-21 DIAGNOSIS — E08.00 DIABETES MELLITUS DUE TO UNDERLYING CONDITION WITH HYPEROSMOLARITY WITHOUT COMA, WITHOUT LONG-TERM CURRENT USE OF INSULIN: Primary | ICD-10-CM

## 2020-10-21 PROCEDURE — 99999 PR PBB SHADOW E&M-EST. PATIENT-LVL V: CPT | Mod: PBBFAC,HCNC,, | Performed by: NURSE PRACTITIONER

## 2020-10-21 PROCEDURE — 99499 UNLISTED E&M SERVICE: CPT | Mod: HCNC,S$GLB,, | Performed by: NURSE PRACTITIONER

## 2020-10-21 PROCEDURE — 99999 PR PBB SHADOW E&M-EST. PATIENT-LVL V: ICD-10-PCS | Mod: PBBFAC,HCNC,, | Performed by: NURSE PRACTITIONER

## 2020-10-21 PROCEDURE — 99499 NO LOS: ICD-10-PCS | Mod: HCNC,S$GLB,, | Performed by: NURSE PRACTITIONER

## 2020-10-21 RX ORDER — INSULIN PUMP SYRINGE, 3 ML
EACH MISCELLANEOUS
Qty: 1 EACH | Refills: 0 | Status: SHIPPED | OUTPATIENT
Start: 2020-10-21

## 2020-10-21 NOTE — PROGRESS NOTES
CC: This 70 y.o. White male  is here for evaluation of  T2DM along with comorbidities indicated in the Visit Diagnosis section of this encounter.    HPI: Moe Ren was diagnosed with T2DM in ~ 2010.  Metformin started at time of diagnosis.     He has a history of squamos cell oral CA, Spinal stenosis, chronic pancreatitis.        Prior visit 7/21/20  a1c is up from 8.1% in Dec to now 8.3%.   C/o having hypoglycemia before lunch or dinner, for no known reason. Also sometimes in the morning as well.   He gets a rash when he takes off his Dexcom sensor. Skin does not break and does not itch.   He has cut down on drinking from 10 to now 4-5 drinks/night. His goal is to get down to 3 drinks/day.   Plan Try the No-Sting Skin Prep to help prevent rash from Dexcom.   To avoid low blood glucoses between meals - Recommend waiting no longer than 5 hours to avoid low blood sugars, as well as incorporating more protein, fat, and non-starchy vegetables into your meals.   Will send orders for [employer]andem Tslim insulin pump.   Return to clinic in 3 months with labs prior. Return to clinic sooner if insulin pump is started. Contact our office if you don't get a call from [employer] regarding insulin pump.       Interval history  a1c is up from 8.3 to 8.7%.   He changed to lower carb and higher protein diet. He's using only about 6 units of insulin all day.     Physical activity restrictions lifted s/p back surgery.     He's changed carb ratio from 15 to 20 for breakfast - so the same carb ratio all day     Finds that correction drops his BG too low.   Wondering if he should start insulin pump. Worried about how Dexcom CGM sometimes reads lower than his glucose meter.       LAST DIABETES EDUCATION: 6/2016; 2/2018    PRESCRIBED DIABETES MEDICATIONS: Novolog ac; he rounds up on his dosing of Novolog   ICR 20  ISF 1:40, goal 140     Subtracts 2 units of Novolog from Sunday breakfast and lunch since he wakes up early and notes BGs tend to be  lower on Sundays. 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     Misses medication doses - No    DM COMPLICATIONS: peripheral neuropathy    SIGNIFICANT DIABETES MED HISTORY:   Tresiba gradually titrated down until it was stopped mid 2017 d/t hypoglycemia.   Novolog stopped at ov 2/5/18 and metformin and tresiba were started. tresiba dc'd about a week later d/t hypo  repaglinide started at ov 2/28/18, and stopped about a month later d/t ineffectiveness      Avoid incretin mimetics d/t h/o chronic pancreatitis   Avoid insulin pump because patient does not require basal insulin delivery     SELF MONITORING BLOOD GLUCOSE: Checks blood glucose at home 6x/day with Dexcom. Uses phone as the reader. See Media for CGM report.     HYPOGLYCEMIC EPISODES: symptoms start in the 60s but definitely in the 50s. Corrects with coke.   Notes that any type of physical activity drops his BGs.     CURRENT DIET: tries to eat 3 meal/day - Eats at 10 am, 2 pm, and 6 pm. Eats about 60 to 130 carbs per meal.   He has stopped Ensure but he will resume and drink it gradually to delay BG spike.     Drinks 2 cups of coffee in the morning and doesn't eat until 10 am to 2 pm - eggs, milk, 1 slice of toast with a little jelly ~ 40 grams CHO. Always eats supper.       CURRENT EXERCISE: none recent    SOCIAL: retired  - consultant now.  Has a young grandson      BP (!) 170/76 (BP Location: Right arm, Patient Position: Sitting, BP Method: Large (Automatic))   Pulse (!) 46   Temp 98.7 °F (37.1 °C) (Oral)   Wt 66.9 kg (147 lb 6.4 oz)   BMI 19.99 kg/m²       ROS:   CONSTITUTIONAL: Appetite good, denies fatigue  MS: NO BACK PAIN     PHYSICAL EXAM:  GENERAL: Well developed, well nourished. No acute distress.   PSYCH: AAOx3, appropriate mood and affect, conversant, well-groomed. Judgement and insight good.   NEURO: Cranial nerves grossly intact. Speech clear, no tremor.   CHEST: Respirations even and  unlabored.  SKIN: + flat rash noted to LLQ from Dexcom sensor             Hemoglobin A1C   Date Value Ref Range Status   10/14/2020 8.7 (H) 4.0 - 5.6 % Final     Comment:     ADA Screening Guidelines:  5.7-6.4%  Consistent with prediabetes  >or=6.5%  Consistent with diabetes  High levels of fetal hemoglobin interfere with the HbA1C  assay. Heterozygous hemoglobin variants (HbS, HgC, etc)do  not significantly interfere with this assay.   However, presence of multiple variants may affect accuracy.     07/16/2020 8.3 (H) 4.0 - 5.6 % Final     Comment:     ADA Screening Guidelines:  5.7-6.4%  Consistent with prediabetes  >or=6.5%  Consistent with diabetes  High levels of fetal hemoglobin interfere with the HbA1C  assay. Heterozygous hemoglobin variants (HbS, HgC, etc)do  not significantly interfere with this assay.   However, presence of multiple variants may affect accuracy.     12/09/2019 8.1 (H) 4.0 - 5.6 % Final     Comment:     ADA Screening Guidelines:  5.7-6.4%  Consistent with prediabetes  >or=6.5%  Consistent with diabetes  High levels of fetal hemoglobin interfere with the HbA1C  assay. Heterozygous hemoglobin variants (HbS, HgC, etc)do  not significantly interfere with this assay.   However, presence of multiple variants may affect accuracy.            Ref. Range 2/13/2020 10:08 2/13/2020 10:09   Glucose, Fasting Latest Ref Range: 70 - 110 mg/dL  187 (H)   C-Peptide Latest Ref Range: 0.78 - 5.19 ng/mL 0.27 (L)          Chemistry        Component Value Date/Time     12/09/2019 1303    K 4.1 12/09/2019 1303     12/09/2019 1303    CO2 28 12/09/2019 1303    BUN 7 (L) 12/09/2019 1303    CREATININE 1.3 12/09/2019 1303     (H) 12/09/2019 1303        Component Value Date/Time    CALCIUM 9.0 12/09/2019 1303    ALKPHOS 114 09/13/2019 1545    AST 36 09/13/2019 1545    ALT 14 09/13/2019 1545    BILITOT 0.4 09/13/2019 1545    ESTGFRAFRICA >60 12/09/2019 1303    EGFRNONAA 56 (A) 12/09/2019 1308           Lab Results   Component Value Date    LDLCALC 45.0 (L) 10/14/2020        Ref. Range 10/14/2020 09:19   Cholesterol Latest Ref Range: 120 - 199 mg/dL 138   HDL Latest Ref Range: 40 - 75 mg/dL 71   Hdl/Cholesterol Ratio Latest Ref Range: 20.0 - 50.0 % 51.4 (H)   LDL Cholesterol External Latest Ref Range: 63.0 - 159.0 mg/dL 45.0 (L)   Non-HDL Cholesterol Latest Units: mg/dL 67   Total Cholesterol/HDL Ratio Latest Ref Range: 2.0 - 5.0  1.9 (L)   Triglycerides Latest Ref Range: 30 - 150 mg/dL 110         Lab Results   Component Value Date    MICALBCREAT 84.2 (H) 07/16/2020               ASSESSMENT and PLAN:    A1C GOAL: < 7 %       1. Diabetes mellitus due to underlying condition with hyperosmolarity without coma, without long-term current use of insulin  Change correction factor to 50.   With insulin pump start, will start with carb ratio of 24, ISF of 60 and BG goal of 140.   Test glucose 4x/day at least.   Arrange for insulin pump training and return to clinic 1-2 weeks after training.      2. Tobacco abuse  Pt not ready to quit.    3. Hypoglycemia associated with diabetes  Decrease ISF as above.                 No orders of the defined types were placed in this encounter.       No follow-ups on file.

## 2020-10-21 NOTE — PATIENT INSTRUCTIONS
Change correction factor to 50.   With insulin pump start, will start with carb ratio of 24, ISF of 60 and BG goal of 140.   Test glucose 4x/day at least.   Arrange for insulin pump training and return to clinic 1-2 weeks after training.

## 2020-10-21 NOTE — PROGRESS NOTES
CBC mild anemia, improving  Ferritin normal - AoCD  Hx of alcohol use disorder  a1c high followed by DM NP  Lipid good  Results to pt via mychart.  Monitor.

## 2020-10-22 DIAGNOSIS — E08.00 DIABETES MELLITUS DUE TO UNDERLYING CONDITION WITH HYPEROSMOLARITY WITHOUT COMA, WITHOUT LONG-TERM CURRENT USE OF INSULIN: Primary | ICD-10-CM

## 2020-10-22 RX ORDER — INSULIN ASPART 100 [IU]/ML
INJECTION, SOLUTION INTRAVENOUS; SUBCUTANEOUS
Qty: 1 VIAL | Refills: 0 | Status: SHIPPED | OUTPATIENT
Start: 2020-10-22 | End: 2020-11-11

## 2020-10-23 ENCOUNTER — TELEPHONE (OUTPATIENT)
Dept: FAMILY MEDICINE | Facility: CLINIC | Age: 70
End: 2020-10-23

## 2020-10-23 DIAGNOSIS — E08.00 DIABETES MELLITUS DUE TO UNDERLYING CONDITION WITH HYPEROSMOLARITY WITHOUT COMA, WITHOUT LONG-TERM CURRENT USE OF INSULIN: ICD-10-CM

## 2020-10-23 DIAGNOSIS — E11.42 DM TYPE 2 WITH DIABETIC PERIPHERAL NEUROPATHY: Chronic | ICD-10-CM

## 2020-10-23 RX ORDER — INSULIN ASPART 100 [IU]/ML
INJECTION, SOLUTION INTRAVENOUS; SUBCUTANEOUS
Qty: 45 ML | Refills: 5 | Status: ON HOLD | OUTPATIENT
Start: 2020-10-23 | End: 2021-01-01 | Stop reason: HOSPADM

## 2020-10-23 NOTE — TELEPHONE ENCOUNTER
"----- Message from Melania Escobar LPN sent at 10/23/2020  3:26 PM CDT -----  Contact: Tati Galindo" [phone]    ----- Message -----  From: Sonali Bergeron  Sent: 10/23/2020   3:19 PM CDT  To: Yenny Salgado Staff    Type: Patient Call Back    Who called:Tati Galindo"    What is the request in detail:Pt insurance states that for his medication insulin aspart U-100 (NOVOLOG U-100 INSULIN ASPART) 100 unit/mL injection they need prior authorization. In they system they have part "D" and needs it to be part "B"    Can the clinic reply by [facility]? Call back     Would the patient rather a call back or a response via [facility]? Call back    Best call back number: [phone]        Thank You      "

## 2020-11-11 DIAGNOSIS — E08.00 DIABETES MELLITUS DUE TO UNDERLYING CONDITION WITH HYPEROSMOLARITY WITHOUT COMA, WITHOUT LONG-TERM CURRENT USE OF INSULIN: ICD-10-CM

## 2020-11-11 RX ORDER — INSULIN ASPART 100 [IU]/ML
INJECTION, SOLUTION INTRAVENOUS; SUBCUTANEOUS
Qty: 1 VIAL | Refills: 2 | Status: SHIPPED | OUTPATIENT
Start: 2020-11-11 | End: 2021-01-01

## 2020-11-13 NOTE — TELEPHONE ENCOUNTER
----- Message from Alexey Lopez sent at 11/13/2020 12:38 PM CST -----  Type: Patient Call Back    Who called: Josie Duffy     What is the request in detail: Rep states she has be trying to get prior authorization since Wednesday on insulin aspart U-100 (NOVOLOG FLEXPEN U-100 INSULIN) 100 unit/mL (3 mL) InPn pen. Rep states that she needs the authorization  for (NOVOLOG FLEXPEN U-100 INSULIN) . Rep states when the order was placed online they chose insulin aspart U-100, she stated they should've chose NOVOLOG. She states the pt needs the insulin that goes inside the pump, not the pin.     Can the clinic reply by [facility]? No     Would the patient rather a call back or a response via [facility]?  Call back     Best call back number: [phone]    Additional Information: Pt has an upcoming appt on Monday to teach him how to use the insulin, but the pt does not have the medication.       Thank You

## 2020-11-16 NOTE — ED PROVIDER NOTES
71yo Encounter Date: 11/15/2020       History     Chief Complaint   Patient presents with    Fall     Patient c/o left shoulder pain secondary to tripping falling to the ground while walking x 1 hour pta.  Denies hitting head, neck pain, or back pain.  No obvious deformity noted.  Patient ambulatory in triage.  A&O x 4 with GCS 15.     71yo M smoker with chief complaint L shoulder pain after mechanical fall pta.    Pt accidentally left his phone on the roof of his car; he drove off, realized his mistake, went to look for phone. While walking on a sidewalk, bending over looking for his phone, he tripped on uneven pavement and fell directly onto his L shoulder. Denies head injury or LOC. No new neck or back pain. No pain to pelvis, hips, or legs with ambulation. No CP or SOB. No pleuritic pain.  No history of shoulder dislocation.    Pt with pain to L shoulder and lateral clavicle. No previous injury. No radiculopathy or paresthesia to upper back or to L arm.  Denies any numbness to left hand.  Denies any arm weakness. He states unable to move shoulder at all, without severe pain.  He is left handed.  No open wounds.        Review of patient's allergies indicates:   Allergen Reactions    Amoxicillin Nausea And Vomiting    Adhesive Rash     Can only use paper tape   Has problem with Band aid    Metformin Diarrhea     Appetite loss      Past Medical History:   Diagnosis Date    Abdominal aortic atherosclerosis     Abnormal heart rhythm     frequent PVCs and PACs    Arthritis     Basal cell carcinoma 1990s    back     CAD (coronary artery disease)     Cancer     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    Cataract     Colon polyp     Colon polyp     COPD (chronic obstructive pulmonary disease) with emphysema     Diabetes mellitus     Gastric ulcer     GERD (gastroesophageal reflux disease)     Pancreatitis 2008    2 Times     Past Surgical History:   Procedure Laterality Date     thumb surgery       Dead Skin cell tumor Rt thumb    COLONOSCOPY N/A 4/11/2017    Procedure: COLONOSCOPY;  Surgeon: Meet Quesada MD;  Location: [facility] ENDO;  Service: Endoscopy;  Laterality: N/A;    ESOPHAGOGASTRODUODENOSCOPY N/A 6/18/2018    Procedure: ESOPHAGOGASTRODUODENOSCOPY (EGD);  Surgeon: Marco Antonio Gonsalves MD;  Location: [facility] ENDO;  Service: Endoscopy;  Laterality: N/A;    MINIMALLY INVASIVE TRANSFORAMINAL LUMBAR INTERBODY FUSION (TLIF) Left 1/10/2019    Procedure: FUSION, SPINE, LUMBAR, TLIF, MINIMALLY INVASIVE @L4/5 & L5/S1;  Surgeon: Josias Ling MD;  Location: [facility] OR [address] [place] [address];  Service: Neurosurgery;  Laterality: Left;    MOUTH FLOOR MASS EXCISION  2009    NASAL SEPTUM SURGERY      SKIN SURGERY       Family History   Problem Relation Age of Onset    Cataracts Mother     Leukemia Mother     Lung cancer Brother     Mental illness Father         suicide    No Known Problems Daughter     No Known Problems Sister     No Known Problems Son     No Known Problems Maternal Aunt     No Known Problems Maternal Uncle     No Known Problems Paternal Aunt     No Known Problems Paternal Uncle     No Known Problems Maternal Grandmother     No Known Problems Maternal Grandfather     No Known Problems Paternal Grandmother     No Known Problems Paternal Grandfather     Amblyopia Neg Hx     Blindness Neg Hx     Cancer Neg Hx     Diabetes Neg Hx     Glaucoma Neg Hx     Hypertension Neg Hx     Macular degeneration Neg Hx     Retinal detachment Neg Hx     Strabismus Neg Hx     Stroke Neg Hx     Thyroid disease Neg Hx      Social History     Tobacco Use    Smoking status: Current Every Day Smoker     Packs/day: 0.25     Years: 45.00     Pack years: 11.25     Types: Cigarettes    Smokeless tobacco: Never Used   Substance Use Topics    Alcohol use: Yes     Alcohol/week: 0.8 standard drinks     Types: 1 Standard drinks or equivalent per week     Frequency: 4 or more times a week     Drinks per session: 5 or 6      Binge frequency: Weekly     Comment: 4 drinks a week    Drug use: No     Review of Systems   Constitutional: Negative for fever.   Respiratory: Negative for shortness of breath.    Cardiovascular: Negative for chest pain.   Gastrointestinal: Negative for nausea.   Musculoskeletal: Positive for arthralgias. Negative for back pain, gait problem, joint swelling, neck pain and neck stiffness.   Skin: Negative for color change, rash and wound.   Neurological: Negative for dizziness, weakness, light-headedness, numbness and headaches.       Physical Exam     Initial Vitals [11/15/20 2126]   BP Pulse Resp Temp SpO2   (!) 160/77 69 18 97.8 °F (36.6 °C) 99 %      MAP       --         Physical Exam    Nursing note and vitals reviewed.  Constitutional: He appears well-developed and well-nourished. He is not diaphoretic. No distress.   HENT:   Head: Normocephalic and atraumatic.   No Kiser's sign, no raccoon eyes   Eyes: EOM are normal.   Neck: Neck supple.   Cardiovascular: Intact distal pulses.   1+ radial bilaterally   Pulmonary/Chest: No respiratory distress.   Musculoskeletal:      Comments: TTP left distal clavicle, AC joint region.  No tenderness to the coracoid region.  No tenderness to the scapula, or the lateral aspect of the bony shoulder.  No muscular deltoid tenderness.  There is mild TTP to the soft tissue just above the lateral clavicle.  No palpable crepitus.  No skin tenting.  Limited active range of motion secondary to pain.  Severe discomfort with any attempted passive range of motion of the shoulder.  No tenderness to the his lateral humerus, the elbow, the wrist.  There is no open wound.  No joint erythema or warmth.    No midline spinal tenderness.    Neurological: He is alert and oriented to person, place, and time. GCS score is 15. GCS eye subscore is 4. GCS verbal subscore is 5. GCS motor subscore is 6.   Skin: Skin is warm. Capillary refill takes less than 2 seconds.   Psychiatric: He has a  normal mood and affect. Thought content normal.         ED Course   Procedures  Labs Reviewed   POCT GLUCOSE - Abnormal; Notable for the following components:       Result Value    POCT Glucose 208 (*)     All other components within normal limits          Imaging Results          X-Ray Clavicle Left (Final result)  Result time 11/15/20 23:39:47    Final result by Andrew Gill MD (11/15/20 23:39:47)                 Impression:      Acute minimally displaced fracture involving the distal third of the left clavicle.    No evidence of a dislocation of the left shoulder.      Electronically signed by: Andrew Gill MD  Date:    11/15/2020  Time:    23:39             Narrative:    EXAMINATION:  XR SHOULDER COMPLETE 2 OR MORE VIEWS LEFT; XR CLAVICLE LEFT    CLINICAL HISTORY:  fall onto L shoulder; clavicular ttp distal clavicle;Unspecified fall, initial encounter    TECHNIQUE:  Two or three views of the left shoulder were performed.    Two x-ray views of the left clavicle were obtained.    COMPARISON:  Chest x-ray dated 02/25/2020.    FINDINGS:  The bone mineralization is within normal limits.  There is an acute minimally displaced fracture involving the distal third of the left clavicle.  The remainder of the osseous structures are intact.    The glenohumeral articulation is maintained. There is arthropathy of the acromioclavicular joint.    There are suspected chronic deformities of the left-sided ribs.  No acute rib fracture is present.  There is no evidence of a pneumothorax.  No pulmonary contusion is identified.    There are postoperative changes in the left base of the neck.                               X-Ray Shoulder 2 or More Views Left (Final result)  Result time 11/15/20 23:39:47    Final result by Andrew Gill MD (11/15/20 23:39:47)                 Impression:      Acute minimally displaced fracture involving the distal third of the left clavicle.    No evidence of a dislocation of the left  shoulder.      Electronically signed by: Andrew Gill MD  Date:    11/15/2020  Time:    23:39             Narrative:    EXAMINATION:  XR SHOULDER COMPLETE 2 OR MORE VIEWS LEFT; XR CLAVICLE LEFT    CLINICAL HISTORY:  fall onto L shoulder; clavicular ttp distal clavicle;Unspecified fall, initial encounter    TECHNIQUE:  Two or three views of the left shoulder were performed.    Two x-ray views of the left clavicle were obtained.    COMPARISON:  Chest x-ray dated 02/25/2020.    FINDINGS:  The bone mineralization is within normal limits.  There is an acute minimally displaced fracture involving the distal third of the left clavicle.  The remainder of the osseous structures are intact.    The glenohumeral articulation is maintained. There is arthropathy of the acromioclavicular joint.    There are suspected chronic deformities of the left-sided ribs.  No acute rib fracture is present.  There is no evidence of a pneumothorax.  No pulmonary contusion is identified.    There are postoperative changes in the left base of the neck.                                 Medical Decision Making:   Initial Assessment:   70-year-old male with chief complaint left shoulder pain after fall prior to arrival.  Denies any other injuries.  Differential Diagnosis:   Clavicular fracture, contusion, humerus fracture, shoulder dislocation  Clinical Tests:   Radiological Study: Ordered and Reviewed  ED Management:  Acromial end left clavicular fracture, displaced.  No tenting.  Low suspicion for brachial plexus or vascular injury at this time.  Distracting injury, however denies head trauma, denies neck or back pain, denies pain with ambulation, no chest pain or shortness of breath, no pleuritic pain.  Denies any other injuries.  We have discussed reasons for return to the ED. Placed in sling.  He will follow up with Orthopedics.                             Clinical Impression:       ICD-10-CM ICD-9-CM   1. Closed displaced fracture of acromial  end of left clavicle, initial encounter  S42.032A 810.03   2. Fall  W19.XXXA E888.9                      Disposition:   Disposition: Discharged  Condition: Stable     ED Disposition Condition    Discharge Stable        ED Prescriptions     Medication Sig Dispense Start Date End Date Auth. Provider    HYDROcodone-acetaminophen (NORCO) 5-325 mg per tablet Take 1 tablet by mouth every 6 (six) hours as needed. 10 tablet 11/15/2020  Brando Vidal PA-C        Follow-up Information     Follow up With Specialties Details Why Contact Info    Diego Patterson III, MD Orthopedic Surgery Schedule an appointment as soon as possible for a visit in 1 day For reevaluation [address]  [address]  [facility] [address]  [phone]                                         Brando Vidal PA-C  11/16/20 0105

## 2020-11-16 NOTE — DISCHARGE INSTRUCTIONS
Follow-up with Orthopedics as planned.    Norco to help with pain. Be aware, this medication is sedating.  Do not mix with alcohol or any other sedating medications.  Do not drive or operate machinery when taking this medication.     Please return to this ED if you begin with cold, discolored fingers unrelated to Raynaud's, if you begin with arm swelling, if you begin with arm weakness, if you begin with severe neck or back pain, if you begin with severe headache, if worsening pain despite treatment, if any other problems occur.

## 2020-11-16 NOTE — ED TRIAGE NOTES
Pt arrived to ED via personal transport c/o L shoulder pain secondary to fall that occurred about 1 hour PTA. Pt states he tripped on uneven surface, falling and landing onto L shoulder on grass. Pt denies hitting head, LOC, neck pain, back pain, numbness/tingling to fingers of L hand. Pt rates pain 4/10. AAO x 4. In no acute distress. Pt wife at bedside.

## 2020-11-17 NOTE — TELEPHONE ENCOUNTER
Spoke with pt.   States he was seen in the ED of [facility] on Sunday and instructed to follow up with Dr Patterson [facility] today    Pt was seen by their office this morning.

## 2020-12-08 NOTE — PROGRESS NOTES
"TANDEM T-SLIM:X2 with Control IQ INSULIN PUMP START    Pump Serial # [id number]     Patient is here today for insulin pump training and will be starting a T-Slim:X2 Insulin pump with Control IQ and Dexcom G6 integration during next 2 visits   Pump training was provided per Tandem protocol.      Details of pump therapy were covered with the patient.  Instructed and assisted in programming pump following T-slim "Reference Guide" step by step guide.  Pump "Options" menu on home screen was covered in detail.      Practiced with patient   Filling and loading T-slim cartridge, filling tubing, and filling canula after inserting infusion set.  Insertion of Auto soft 90 infusion set; connecting and disconnecting infusion set.   Use of bolus menu: entering Blood glucose and carbs, and delivering bolus.  Dexcom transmitter information unavailable due to loss of phone, thus unable to synced with pump.  However pt states he will restart his Dexcom once he get new phone/alessia programed. Pt knowledgeable about process  Troubleshooting reviewed with patient including treatment of hyper/hypoglycemia, changing infusion site if kink is suspected, checking glucose 1-2 hours after site change, use glucose meter if readings on CGM do not match symptoms.     Due to unable to sync w CGM, Details of Control IQ feature were not covered at this appt with the patient. In 2 weeks when pt returns for f/u will complete discussionon  the following:  - uses predicted CGM values to adjust delivery rates  - increases, decreases, or stops basal insulin delivery when sensor glucose predicted to be above/below pre-defined thresholds  - automatically delivers correction boluses up to once every 60 min   -Reviewed all Control IQ icons and visual indicators including: control IQ stella icon, pump status icon, suspend bar, and activity icon.      Discussed activity features: sleep and exercise. Set up sleep schedules based on current sleep habits.     Control IQ " "turned OFF today in clinic.      INITIAL SETTINGS per current pump settings:     Basal rate:   10     ISF: 60  CHO RATIO: 24  Blood glucose goal: 140 mg/dL This will change to 110 with Control IQ   Active insulin time: 5 hrs     Max Bolus   Max basal preset in pump to = twice the basal profile but will not exceed 10 units   Auto off: off     Tandem T-slim 24 hour support line provided.  Patient instructed on setting up home tconnect account using " Getting Started Guide " .     Follow-up Plan:   Patient will continue w Dexcom G6 reinstatement and set up t:connect  home account  download program .   FU call Thursday 12/10/20 with appt to complete Control IQ on 12/21/2020  Patient instructed with HCP and/or educator.     Patient verbalized understanding of all instructions given.  Instruction material was provided.    "

## 2020-12-10 NOTE — TELEPHONE ENCOUNTER
Unable to reach pt to follow-up on TSlim Insulin Pump  infusion set change.will attempt to contact again in morning

## 2020-12-11 NOTE — TELEPHONE ENCOUNTER
Called pt to discuss how he is adapting to new pump. He states he was able to successfully connect new infusion set, load cartridge and add Dexcom to pump. Pt states he is comfortable w pump use. Follow up appt scheduled for 12/21/2020 at 130pm

## 2020-12-16 PROBLEM — K92.2 ACUTE UPPER GI BLEEDING: Status: ACTIVE | Noted: 2020-01-01

## 2020-12-16 PROBLEM — I10 ESSENTIAL HYPERTENSION: Status: ACTIVE | Noted: 2020-01-01

## 2020-12-16 NOTE — ANESTHESIA POSTPROCEDURE EVALUATION
Anesthesia Post Evaluation    Patient: Moe Ren    Procedure(s) Performed: Procedure(s) (LRB):  EGD (ESOPHAGOGASTRODUODENOSCOPY) (N/A)    Final Anesthesia Type: general    Patient location during evaluation: GI PACU  Patient participation: Yes- Able to Participate  Level of consciousness: awake and alert  Post-procedure vital signs: reviewed and stable  Pain management: adequate  Airway patency: patent    PONV status at discharge: No PONV  Anesthetic complications: no      Cardiovascular status: blood pressure returned to baseline  Respiratory status: unassisted and spontaneous ventilation  Hydration status: euvolemic  Follow-up not needed.          Vitals Value Taken Time   /72 12/16/20 1714   Temp 36.8 °C (98.2 °F) 12/16/20 1644   Pulse 92 12/16/20 1714   Resp 18 12/16/20 1714   SpO2 98 % 12/16/20 1714         No case tracking events are documented in the log.      Pain/Darwin Score: Darwin Score: 10 (12/16/2020  4:59 PM)

## 2020-12-16 NOTE — TRANSFER OF CARE
Anesthesia Transfer of Care Note    Patient: Moe Ren    Procedure(s) Performed: Procedure(s) (LRB):  EGD (ESOPHAGOGASTRODUODENOSCOPY) (N/A)    Patient location: GI    Anesthesia Type: general    Transport from OR: Transported from OR on room air with adequate spontaneous ventilation    Post pain: adequate analgesia    Post assessment: no apparent anesthetic complications and tolerated procedure well    Level of consciousness: sedated    Nausea/Vomiting: no nausea/vomiting    Complications: none    Transfer of care protocol was followed      Last vitals:   Visit Vitals  /70 (BP Location: Left arm, Patient Position: Lying)   Pulse 103   Temp 36.8 °C (98.2 °F) (Axillary)   Resp 16   Ht 6' (1.829 m)   Wt 72.6 kg (160 lb)   SpO2 96%   BMI 21.70 kg/m²

## 2020-12-16 NOTE — ANESTHESIA PREPROCEDURE EVALUATION
12/16/2020  Moe Ren is a 70 y.o., male.  Pre-operative evaluation for Procedure(s) (LRB):  EGD (ESOPHAGOGASTRODUODENOSCOPY) (N/A)    NPO >8h  METS >4    ED for GI bleed (melena x1 day) hx of PUD.        Vitals:    12/16/20 1232 12/16/20 1302 12/16/20 1332 12/16/20 1520   BP: 136/63 (!) 153/77 (!) 153/72 (!) 160/73   BP Location:    Left arm   Patient Position:    Lying   Pulse: 103 102 99 98   Resp: 20 (!) 21  20   Temp:    37.1 °C (98.7 °F)   TempSrc:    Oral   SpO2: 100% 100% (!) 81% 97%   Weight:       Height:           Patient Active Problem List   Diagnosis    Anxiety disorder    Alcohol use disorder, moderate, dependence    Continuous tobacco abuse    Hypertriglyceridemia    DM type 2 with diabetic peripheral neuropathy    Tubular adenoma of colon 4/2017    Spondylosis without myelopathy    Degeneration of lumbar or lumbosacral intervertebral disc    Facet arthritis of lumbar region    Small fiber neuropathy    Chronic back pain greater than 3 months duration    Deviated nasal septum    Chronic narcotic use    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    Syncope and collapse    NSAID-induced duodenal ulcer while traveling. EGD done in [place], [facility] 10/2018 with duodenal ulcers    Coronary artery disease involving native coronary artery of native heart without angina pectoris with mildly abn stress test 11/2018; med mgmt    Hyponatremia    Asymptomatic varicose veins of left  lower extremity    Lumbar stenosis 1/11/19 FUSION, SPINE, LUMBAR, TLIF, MINIMALLY INVASIVE @L4/5 & L5/S1 (Left)    Bilateral carotid artery stenosis mild on US 11/2018    Acute upper GI bleeding    Essential hypertension       Review of patient's allergies indicates:   Allergen Reactions    Amoxicillin Nausea And Vomiting    Adhesive Rash     Can only use paper tape   Has problem with Band aid    Metformin Diarrhea     Appetite loss        No current facility-administered medications on file prior to encounter.      Current Outpatient Medications on File Prior to Encounter   Medication Sig Dispense Refill    ALPRAZolam (XANAX) 0.25 MG tablet 1 tablet daily PRN. 15 tablet 0    amitriptyline (ELAVIL) 25 MG tablet Take 2 tablets (50 mg total) by mouth every evening. Increased dose 60 tablet 11    atorvastatin (LIPITOR) 10 MG tablet Take 1 tablet (10 mg total) by mouth once daily. 90 tablet 2    azelastine (ASTELIN) 137 mcg (0.1 %) nasal spray 1 spray (137 mcg total) by Nasal route 2 (two) times daily. 30 mL 3    baclofen (LIORESAL) 10 MG tablet Take 1 tablet (10 mg total) by mouth 2 (two) times daily. (Patient taking differently: Take 10 mg by mouth 2 (two) times daily as needed. ) 60 tablet 5    blood sugar diagnostic Strp Check blood glucose 6x/day for accuchek guide meter. E11.65 400 strip 3    blood-glucose meter kit Use as instructed, Accu-Chek Amanda Plus glucometer 1 each 0    blood-glucose meter,continuous (DEXCOM G6 ) Misc Use with dexcom G6 system 1 each 0    blood-glucose sensor (DEXCOM G6 SENSOR) Vivi Change sensor every 10 days 3 Device 12    blood-glucose transmitter (DEXCOM G6 TRANSMITTER) Vivi Change every 3 months 1 Device 3    desoximetasone (TOPICORT) 0.25 % cream Apply topically 2 (two) times daily as needed.       diclofenac sodium (VOLTAREN) 1 % Gel Apply 2 g topically 3 (three) times daily. (Patient not taking: Reported on 9/1/2020) 100 g 0    escitalopram oxalate  "(LEXAPRO) 20 MG tablet Take 1 tablet by mouth once daily 90 tablet 0    fluticasone (FLONASE) 50 mcg/actuation nasal spray 2 sprays (100 mcg total) by Each Nare route daily as needed. 16 g 2    gabapentin (NEURONTIN) 300 MG capsule TAKE 1 CAPSULE BY MOUTH THREE TIMES DAILY 90 capsule 10    HYDROcodone-acetaminophen (NORCO) 5-325 mg per tablet Take 1 tablet by mouth every 6 (six) hours as needed. 10 tablet 0    insulin aspart U-100 (NOVOLOG FLEXPEN U-100 INSULIN) 100 unit/mL (3 mL) InPn pen Sliding scale up to 20 units TID. MAX 60 UNITS/DAY. 45 mL 5    insulin aspart U-100 (NOVOLOG U-100 INSULIN ASPART) 100 unit/mL injection Use max 30 units/day 1 vial 2    ipratropium (ATROVENT HFA) 17 mcg/actuation inhaler Inhale 2 puffs into the lungs every 6 (six) hours. Rescue 12.9 g 5    lancets (ACCU-CHEK FASTCLIX LANCING DEV) Misc 1 lancet by Misc.(Non-Drug; Combo Route) route 6 (six) times daily. To be used with Accu-Chek Amanda Plus glucometer 600 each 3    metoprolol succinate (TOPROL-XL) 25 MG 24 hr tablet Take 1 tablet by mouth once daily 90 tablet 3    montelukast (SINGULAIR) 10 mg tablet Take 1 tablet (10 mg total) by mouth every evening. 30 tablet 11    multivitamin (THERAGRAN) per tablet Take 2 tablets by mouth once daily.      omeprazole (PRILOSEC) 40 MG capsule Take 1 capsule (40 mg total) by mouth 2 (two) times daily before meals. (Patient taking differently: Take 40 mg by mouth once daily. ) 60 capsule 5    pen needle, diabetic (BD ULTRA-FINE RENETTA PEN NEEDLE) 32 gauge x 5/32" Ndle USE 1 PEN NEEDLE 4 TIMES DAILY WITH  NOVOLOG 400 each 0    sodium chloride (SALINE NOSE NASL) by Nasal route.      tamsulosin (FLOMAX) 0.4 mg Cap Take 1 capsule (0.4 mg total) by mouth once daily. 90 capsule 1    triamcinolone acetonide 0.1% (KENALOG) 0.1 % Lotn       valsartan (DIOVAN) 160 MG tablet Take 1 tablet (160 mg total) by mouth once daily. 90 tablet 3       Past Surgical History:   Procedure Laterality Date     " "thumb surgery      Dead Skin cell tumor Rt thumb    COLONOSCOPY N/A 4/11/2017    Procedure: COLONOSCOPY;  Surgeon: Meet Quesada MD;  Location: [facility] ENDO;  Service: Endoscopy;  Laterality: N/A;    ESOPHAGOGASTRODUODENOSCOPY N/A 6/18/2018    Procedure: ESOPHAGOGASTRODUODENOSCOPY (EGD);  Surgeon: Marco Antonio Gonsalves MD;  Location: [facility] ENDO;  Service: Endoscopy;  Laterality: N/A;    MINIMALLY INVASIVE TRANSFORAMINAL LUMBAR INTERBODY FUSION (TLIF) Left 1/10/2019    Procedure: FUSION, SPINE, LUMBAR, TLIF, MINIMALLY INVASIVE @L4/5 & L5/S1;  Surgeon: Josias Ling MD;  Location: [facility] OR [address] [place] [address];  Service: Neurosurgery;  Laterality: Left;    MOUTH FLOOR MASS EXCISION  2009    NASAL SEPTUM SURGERY      SKIN SURGERY         Social History     Socioeconomic History    Marital status:      Spouse name: Not on file    Number of children: Not on file    Years of education: Not on file    Highest education level: Not on file   Occupational History    Occupation: , self employed    Social Needs    Financial resource strain: Not hard at all    Food insecurity     Worry: Never true     Inability: Never true    Transportation needs     Medical: No     Non-medical: No   Tobacco Use    Smoking status: Current Every Day Smoker     Packs/day: 1.00     Years: 45.00     Pack years: 45.00     Types: Cigarettes    Smokeless tobacco: Never Used   Substance and Sexual Activity    Alcohol use: Yes     Alcohol/week: 36.0 standard drinks     Types: 1 Standard drinks or equivalent, 35 Shots of liquor per week     Frequency: 4 or more times a week     Drinks per session: 5 or 6     Binge frequency: Weekly     Comment: 12/16/20:  "5 scotch wiskies a day"    Drug use: Yes     Types: Marijuana     Comment: 12/16/20:  "sometimes, not everyday"    Sexual activity: Yes     Partners: Female   Lifestyle    Physical activity     Days per week: 0 days     Minutes per session: 0 min    Stress: Only a little "   Relationships    Social connections     Talks on phone: Three times a week     Gets together: Three times a week     Attends Jehovah's witness service: Not on file     Active member of club or organization: Yes     Attends meetings of clubs or organizations: More than 4 times per year     Relationship status:    Other Topics Concern    Not on file   Social History Narrative    , 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  One child committed suicide.         CBC:   Recent Labs     20  0915   WBC 8.75   RBC 3.54*   HGB 11.6*   HCT 33.5*      MCV 95   MCH 32.8*   MCHC 34.6       CMP:   Recent Labs     20  0915   *   K 4.5   CL 95   CO2 24   BUN 12   CREATININE 1.3   *   CALCIUM 9.2   ALBUMIN 3.1*   PROT 6.3   ALKPHOS 131   ALT 26   AST 43*   BILITOT 0.5       INR  Recent Labs     20  0915   INR 1.0   APTT 26.9           Diagnostic Studies:      EKD Echo:  No results found for this or any previous visit.      Anesthesia Evaluation    I have reviewed the Patient Summary Reports.    I have reviewed the Nursing Notes. I have reviewed the NPO Status.   I have reviewed the Medications.     Review of Systems  Anesthesia Hx:  No problems with previous Anesthesia  History of prior surgery of interest to airway management or planning:  Denies Personal Hx of Anesthesia complications.   Social:  Alcohol Use, Smoker THC use, ETOH abuse   Hematology/Oncology:         -- Anemia: Current/Recent Cancer. Other (see Oncology comments) Oncology Comments: BCC skin    Cardiovascular:   Exercise tolerance: good Hypertension CAD      Pulmonary:   COPD    Hepatic/GI:   PUD, Hiatal Hernia, GERD GI bleed   Musculoskeletal:   Arthritis     Neurological:   Neuromuscular Disease,    Endocrine:   Diabetes, type 2    Psych:   Psychiatric History          Physical Exam  General:  Well nourished     Eyes/Ears/Nose:  EYES/EARS/NOSE FINDINGS: Normal          Mental Status:  Mental Status Findings: Normal        Anesthesia Plan  Type of Anesthesia, risks & benefits discussed:  Anesthesia Type:  general  Patient's Preference:   Intra-op Monitoring Plan: standard ASA monitors  Intra-op Monitoring Plan Comments:   Post Op Pain Control Plan: multimodal analgesia  Post Op Pain Control Plan Comments:   Induction:   IV  Beta Blocker:  Patient is on a Beta-Blocker and has received one dose within the past 24 hours (No further documentation required).       Informed Consent: Patient understands risks and agrees with Anesthesia plan.  Questions answered. Anesthesia consent signed with patient.  ASA Score: 3     Day of Surgery Review of History & Physical:  There are no significant changes.          Ready For Surgery From Anesthesia Perspective.

## 2020-12-18 PROBLEM — K92.2 ACUTE UPPER GI BLEEDING: Status: RESOLVED | Noted: 2020-01-01 | Resolved: 2020-01-01

## 2020-12-21 NOTE — PATIENT INSTRUCTIONS
Lower GI Bleeding (Stable)  You have signs of blood in your stool. This is called rectal bleeding. The bleeding may have begun in another part of your gastrointestinal (GI) tract. If the blood is bright red, it is likely coming from the lower part of the GI tract. If the blood is black or dark, it might be coming from higher up in the GI tract. Very small amounts of GI bleeding may not be visible and can only be discovered during a test on your stool. Possible causes of lower GI bleeding include:  · Hemorrhoids  · Anal fissures  · Diverticulitis  · Inflammatory bowel disease (Crohn's disease or ulcerative colitis)  · Polyps (growths) in the intestine  Note: Iron supplements and medicines for diarrhea or upset stomach can cause black stools. Foods such as licorice and red beets can also discolor the stool and be mistaken for bleeding. These are not bleeding and are not a cause for alarm.  Home care  You have not lost a large amount of blood and your condition appears stable at this time. You may resume normal activity as long as you feel well.  Avoid NSAIDs, such as aspirin, ibuprofen, or naproxen. They can irritate the stomach and cause further bleeding. If you are taking these medicines for other medical reasons, talk to your healthcare provider before you stop them.   Follow-up care  Follow up with your healthcare provider as advised. Further tests may be needed to find the cause of your bleeding.  When to seek medical advice  Call your healthcare provider for any of the following:  · Large amount of rectal bleeding   · Increasing abdominal pain  · Weakness, dizziness  Call 911  Get emergency medical care if any of the following occur:  · Loss of consciousness  · Vomiting blood  Date Last Reviewed: 6/24/2015  © 9787-8545 [employer]. [address] [place] [address]. All rights reserved. This information is not intended as a substitute for professional medical care. Always follow your  healthcare professional's instructions.

## 2020-12-21 NOTE — PROGRESS NOTES
Diabetes Care Specialist Progress Note  Author: Rose Sloan RD  Date: 12/21/2020    Program Intake  Individual: Device Training  Device Training: Insulin Pump Upgrade  Current diabetes risk level:: moderate  In the last 12 months, have you:: none  Permission to speak with others about care:: yes    Diabetes Self-Management Skills Assessment     Medications  Patient is able to describe current diabetes management routine.: yes  Medication Skills Assessment Completed:: Yes  Assessment indicates:: Adequate understanding  Area of need?: Yes(see care plan)    Diabetes Self Support Plan  Assessment Summary and Plan    Based on today's diabetes care assessment, the following areas of need were identified:    Clinical 12/7/2020   Nutritional Status No    no change    Diabetes Self-Management Skills 12/21/2020   Medication Yes   Home Blood Glucose Monitoring -    continued and Completed the start of Control IQ on Tandem StSlim x2 insulin pump (initial pump training 12/7/2020 but could not complete until CGM connected)  -Discussed required information to start- Weight, Total Daily Insulin and connection to DexcomG6  -reviewed icons and explained technology. Discussed turn-on/start and off/stop features and when to do both  -Reviewed trouble shooting and treating hyperglycemia  Discussed downloading T-Connect mobile Gagandeep (already has [email or web address]); handout provided  Reminded pt to keep pump charged and to replace DexcomG6 sensors and transmitters as noted (due 12/27/2020)  -transmitter [id number]  -current sensor [id number]  Pump setting will stay the same for now (basal-0, CF 60, I:C 24, Target , insulin duration 5 hrs)    Today's interventions were provided through individual discussion, instruction, and written materials were provided.    Patient verbalized understanding of instruction and written materials.  Pt was able to return back demonstration of instructions today. Patient understood key points,  needs reinforcement and further instruction.     Diabetes Self-Management Care Plan:    Today's Diabetes Self-Management Care Plan was developed with Moe's input. Moe has agreed to work toward the following goal(s) to improve his/her overall diabetes control.    There are no recently modified care plans to display for this patient.      Follow Up Plan     Follow up in about 6 months (around 6/21/2021).  Today's care plan and follow up schedule was discussed with patient.  Moe verbalized understanding of the care plan, goals, and agrees to follow up plan.     The patient was encouraged to communicate with his/her health care provider/physician and care team regarding his/her condition(s) and treatment.  I provided the patient with my contact information today and encouraged to contact me via phone or [facility] Patient Portal as needed.     Length of Visit   Total Time: 60 Minutes

## 2020-12-21 NOTE — PROGRESS NOTES
This note was created by combination of typed  and M-Modal dictation.  Transcription errors may be present.  If there are any questions, please contact me.    Assessment and Plan:   NSAID-induced duodenal ulcer. hx EGD [place], [facility] 10/2018 with duodenal ulcers; 12/2020 EGD antral erythema, nonbleeding duodenal ulcer  Alcohol use disorder, moderate, dependence  Alcohol-induced chronic pancreatitis  -ulcers multifactorial including alcohol as well as NSAID use.  He reports that he has been off of alcohol for the past week period and has refrain from NSAIDs.  NSAIDs for arthritis.  Primarily in his hands.  Sometimes also in his upper back.  Has a known history of chronic low back pain but does not seem to be a complaint of his.  Gabapentin helps out with neuropathic pain but does not seem to help as much with the arthritic pain.  Long discussion with the patient.  I have again recommended that he see rehab for continuing abstinence.  He again declines.  Discussed the benefits including having a trained professional to system in maintaining sobriety.  He will consider it but is not ready for action.  Avoid NSAIDs.  Recommended topical Voltaren gel.  He can now get that over-the-counter.    DM type 2 with diabetic peripheral neuropathy  -diabetes managed by DM NP. Gabapentin for neuropathic pain.  Refill to pharmacy.  -     gabapentin (NEURONTIN) 300 MG capsule; Take 1 capsule (300 mg total) by mouth 3 (three) times daily.  Dispense: 270 capsule; Refill: 3    Chronic obstructive bronchitis with pulmonary emphysema  -stable.    Essential hypertension  Premature atrial contraction  -stable.  Takes half of Toprol XL 25.  Valsartan 160.  -     metoprolol succinate (TOPROL-XL) 25 MG 24 hr tablet; Take 0.5 tablets (12.5 mg total) by mouth once daily.  Dispense: 90 tablet; Refill: 3    Hypertriglyceridemia  Coronary artery disease involving native coronary artery of native heart without angina pectoris with mildly  abn stress test 11/2018; med mgmt  Bilateral carotid artery stenosis mild on US 11/2018  Abdominal aortic atherosclerosis  -on statin    Encounter for screening for malignant neoplasm of respiratory organs  Personal history of nicotine dependence   -aware of the need to quit smoking. Lung CT screening ordered.  -     CT Chest Lung Screening Low Dose; Future; Expected date: 12/22/2020    Anxiety disorder, unspecified type  -really needs to avoid benzodiazepine use.  Continue Lexapro.  Continue Elavil.  He has some leftover Xanax.  He estimates maybe 5.  Biggest stressors/triggers are dental work as well as dermatology cryotherapy.  I would like to have him make Xanax 7 pills last 3 months at least.  He reports he has 5 left at home and I want this to last him until around March.  Similarly limit narcotics to 5-7 tablets every 3 months or so.  -     escitalopram oxalate (LEXAPRO) 20 MG tablet; Take 1 tablet (20 mg total) by mouth once daily.  Dispense: 90 tablet; Refill: 3    Pressure injury of buttock, stage 2, unspecified laterality  -OTC zinc oxide and OTC lantiseptic.      Medications Discontinued During This Encounter   Medication Reason    metoprolol succinate (TOPROL-XL) 25 MG 24 hr tablet Reorder    gabapentin (NEURONTIN) 300 MG capsule Reorder    escitalopram oxalate (LEXAPRO) 20 MG tablet Reorder       meds sent this encounter:  Medications Ordered This Encounter   Medications    escitalopram oxalate (LEXAPRO) 20 MG tablet     Sig: Take 1 tablet (20 mg total) by mouth once daily.     Dispense:  90 tablet     Refill:  3    gabapentin (NEURONTIN) 300 MG capsule     Sig: Take 1 capsule (300 mg total) by mouth 3 (three) times daily.     Dispense:  270 capsule     Refill:  3    metoprolol succinate (TOPROL-XL) 25 MG 24 hr tablet     Sig: Take 0.5 tablets (12.5 mg total) by mouth once daily.     Dispense:  90 tablet     Refill:  3     .       Follow Up: No follow-ups on file. OV 6 months, recall  entered.    Subjective:     Chief Complaint   Patient presents with    Hospital Follow Up       KRISTIN Rosa is a 70 y.o. male, last appointment with this clinic was Visit date not found.    Family and/or Caretaker present at visit?  No.  Diagnostic tests reviewed/disposition: No diagnosic tests pending after this hospitalization.  Disease/illness education: none  Home health/community services discussion/referrals: Patient does not have home health established from hospital visit.  They do not need home health.  If needed, we will set up home health for the patient.   Establishment or re-establishment of referral orders for community resources: No other necessary community resources.   Discussion with other health care providers: No discussion with other health care providers necessary.     Last visit with me in April.  Hypertension at that time not to goal increased Di[place].  Alcohol use disorder, he declined referral to rehab.  Coronary artery disease stable.  Anxiety, Lexapro.  Limited number of Xanax sent in light of current coronavirus pandemic.  Saw ENT in mid June for allergies.  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  Did not require adjuvant chemotherapy or radiation therapy.    Saw diabetes nurse practitioner.  Continues glucose monitor.    Saw neuro surgery in follow-up in September.  For history of minimal invasive surgery of the left-sided TLIF L4-5, L5-S1 1/2019    ER 11/15 for fall with clavicle fracture. After fall.     Hospitalization 12/16 through 12/18 for melena    Hospital Course:   Mr. Ren is a 70-year old man with a history of duodenal ulcers who presented with melena, and was found to have dark, blood-tinged stool while in the emergency room. His Hgb was 11, about 3 points below prior baseline. He was started on IV protonix, and GI was consulted. Emergent endoscopy was performed which showed some erythematous gastric tissue as well as  duodenal ulcers with no stigmata of bleeding. His hemoglobin was 8 the next morning, and he had two small stools that were darker than usual. By the following day his stools had returned to normal color, and his hemoglobin was 10. He was discharged on BID PPI, and advised on the importance of avoiding NSAIDs (he takes Aleve BID) as well as alcohol cessation. He was told to return immediately if he experienced any recurrence of melena or bleeding. He will follow up with his PCP Dr. Ramon as well as GI in the outpatient setting.     Biopsy showing mild chronic inflammation without acute activity.  H pylori negative.    On neda.  Hx of GI SE.     Since his discharge-Woke up on this past Saturday with neck pain, left side, then on the right side, now worse in intensity, between shoulder blade and the neck. Today is slightly improved.   He has some leftover narcotic and he used it on Saturday. Leftover from previous surgery. Estimates he has 8 of those left. And some hydrocodone 5 from previous clavicle fracture.  Was worried about meningitis. But no headache no fever.   Overall feeling much better however.    And a few xanax left. Upcoming dental surgery in February. It's a one day procedure.   Dermatology makes him nervous as well. It hurts.   So mainly it is the dentist as well as dermatology with cryotherapy procedures that make him the most nervous.  Talked about high risk nature of these medications.  Coupled with his history of alcohol use disorder, I would really try and minimize his exposure to benzodiazepines as well as narcotics.  He states he has maybe 5 of the benzodiazepines left not want him to make that last for the next 3 months.  I can refill a small amount in the future, 5-7 tablets, but I need those to last him at least 90 days.  Similar with narcotic medication.    High stress.  Financial strains.  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  So he had  a personal line of credit that he was extended out 50,000 dollars up until recently.  Amitriptyline takes in the evening, finds it helpful.  Taking lexapro.    Reviewed his medications.  He is taking his medicines as prescribed.  Toprol XL he has been taking half tablet ever since September of last year.  I had instructed him to reduce the dose.    No EtOH x 1 week.   Still smoking.   Is still not interested in rehab therapy.  Thinks that wife and  are good enough support.  Despite them not being trained professionals in regards to substance use.    Gastritis from NSAIDs as well as alcohol use.  NSAIDs notes primarily for arthritis in his hands.  Was taking Aleve b.i.d..  Was told that he should find an alternative.  He gets pain in the fingers, and his upper back/shoulder areas.  Has other arthritic areas but these seem to be the main complaint areas.  Gabapentin helps with the neuropathic pain but not really with arthritic pain.    And getting irritation in the ischial tuberosity with sitting.  Enough to skin breakdown. Is going to try and get himself more cushioning with sitting.       Patient Care Team:  Damian Ramon MD as PCP - General (Internal Medicine)  Josias Ling MD as Consulting Physician (Neurosurgery)  Aime De Anda MD (Inactive) as Consulting Physician (Otolaryngology)  Blu Neil MA as Care Coordinator  Naveed Wharton MD as Consulting Physician (Dermatology)  Meet Quesada MD as Consulting Physician (Gastroenterology)    Patient Active Problem List    Diagnosis Date Noted    Essential hypertension 12/16/2020    Bilateral carotid artery stenosis mild on US 11/2018 04/08/2019     Carotid US 11/14/18 Mild bilateral internal carotid artery plaque with no hemodynamically significant stenosis      Lumbar stenosis 1/11/19 FUSION, SPINE, LUMBAR, TLIF, MINIMALLY INVASIVE @L4/5 & L5/S1 (Left) 01/10/2019     1/11/19 FUSION, SPINE, LUMBAR, TLIF, MINIMALLY INVASIVE @L4/5 & L5/S1  (Left)          Hyponatremia 01/07/2019    Asymptomatic varicose veins of left lower extremity 01/07/2019    Coronary artery disease involving native coronary artery of native heart without angina pectoris with mildly abn stress test 11/2018; med mgmt 01/06/2019 11/14/2018 nuclear medicine stress test abnormal, small amount of mild ischemia in the apical walls.  EF 66%.      NSAID-induced duodenal ulcer. hx EGD [facility] 10/2018 with duodenal ulcers; 12/2020 EGD antral erythema, nonbleeding duodenal ulcer 11/09/2018 12/16/2020 EGD normal esophagus.  erythematous mucosa in the antrum.  Nonbleeding duodenal ulcers with no stigmata of bleeding.  Previous duodenal ulcers appeared to have somewhat created a duodenal bulb deformity from scarring but this was traversed into the 2nd portion of the duodenum.      Syncope and collapse 11/05/2018    Schatzki's ring of distal esophagus 6/2018 s/p dilitation 06/19/2018    Hiatal hernia on EGD 6/2018; biopsies chronic gastritis, H pylori negative. 06/19/2018    Calculus of gallbladder without cholecystitis without obstruction on RUQ US 5/2018 05/21/2018    Chronic seasonal allergic rhinitis 03/01/2018    Alcohol-induced chronic pancreatitis 01/01/2018    Abdominal aortic atherosclerosis 11/14/2017     On CXR 4/5/2016 and on 11/2/2012 XR L spine  On AAA screening 3/2018      Chronic obstructive bronchitis with pulmonary emphysema 11/14/2017 12/2017 PFTs showed mild airflow obstruction.  Lung volume showing air trapping.  DLCO reduced. no O2 desat.  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    Chronic narcotic use 08/02/2017    Deviated nasal septum 04/19/2016    Chronic back pain greater than 3 months duration 01/06/2016    Small fiber neuropathy 07/29/2015     Likely related to alcohol use      Facet arthritis of lumbar region 10/08/2014    Spondylosis without myelopathy 08/13/2014    Degeneration of  "lumbar or lumbosacral intervertebral disc 08/13/2014    Tubular adenoma of colon 4/2017 04/22/2014 4/11/2017 colonoscopy with transverse tubular adenoma      DM type 2 with diabetic peripheral neuropathy 07/24/2013    Hypertriglyceridemia 06/13/2013    Anxiety disorder 06/12/2013     Trazodone ineffective.      Alcohol use disorder, moderate, dependence 06/12/2013    Continuous tobacco abuse 06/12/2013       PAST MEDICAL HISTORY:  Past Medical History:   Diagnosis Date    Abdominal aortic atherosclerosis     Abnormal heart rhythm     frequent PVCs and PACs    Alcohol abuse, daily use     12/16/20:  "5 scotch wiskies a day"    Arthritis     Basal cell carcinoma 1990s    back     CAD (coronary artery disease)     Cancer     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    Cataract     Cigarette smoker     12/16/20:  1ppd    Colon polyp     Colon polyp     COPD (chronic obstructive pulmonary disease) with emphysema     Diabetes mellitus     Essential hypertension 12/16/2020    Gastric ulcer     GERD (gastroesophageal reflux disease)     Pancreatitis 2008    2 Times       PAST SURGICAL HISTORY:  Past Surgical History:   Procedure Laterality Date     thumb surgery      Dead Skin cell tumor Rt thumb    COLONOSCOPY N/A 4/11/2017    Procedure: COLONOSCOPY;  Surgeon: Meet Quesada MD;  Location: [facility];  Service: Endoscopy;  Laterality: N/A;    ESOPHAGOGASTRODUODENOSCOPY N/A 6/18/2018    Procedure: ESOPHAGOGASTRODUODENOSCOPY (EGD);  Surgeon: Marco Antonio Gonsalves MD;  Location: [facility];  Service: Endoscopy;  Laterality: N/A;    ESOPHAGOGASTRODUODENOSCOPY N/A 12/16/2020    Procedure: EGD (ESOPHAGOGASTRODUODENOSCOPY);  Surgeon: Meet Quesada MD;  Location: [facility];  Service: Endoscopy;  Laterality: N/A;    MINIMALLY INVASIVE TRANSFORAMINAL LUMBAR INTERBODY FUSION (TLIF) Left 1/10/2019    Procedure: FUSION, SPINE, LUMBAR, TLIF, MINIMALLY INVASIVE @L4/5 & L5/S1;  Surgeon: Josias Ling, " "MD;  Location: [address];  Service: Neurosurgery;  Laterality: Left;    MOUTH FLOOR MASS EXCISION  2009    NASAL SEPTUM SURGERY      SKIN SURGERY         SOCIAL HISTORY:  Social History     Socioeconomic History    Marital status:      Spouse name: Not on file    Number of children: Not on file    Years of education: Not on file    Highest education level: Not on file   Occupational History    Occupation: , self employed    Social Needs    Financial resource strain: Not hard at all    Food insecurity     Worry: Never true     Inability: Never true    Transportation needs     Medical: No     Non-medical: No   Tobacco Use    Smoking status: Current Every Day Smoker     Packs/day: 1.00     Years: 45.00     Pack years: 45.00     Types: Cigarettes    Smokeless tobacco: Never Used   Substance and Sexual Activity    Alcohol use: Yes     Alcohol/week: 36.0 standard drinks     Types: 1 Standard drinks or equivalent, 35 Shots of liquor per week     Frequency: 4 or more times a week     Drinks per session: 5 or 6     Binge frequency: Weekly     Comment: 12/16/20:  "5 scotch wiskies a day"    Drug use: Yes     Types: Marijuana     Comment: 12/16/20:  "sometimes, not everyday"    Sexual activity: Yes     Partners: Female   Lifestyle    Physical activity     Days per week: 0 days     Minutes per session: 0 min    Stress: Only a little   Relationships    Social connections     Talks on phone: Three times a week     Gets together: Three times a week     Attends [employer] service: Not on file     Active member of club or organization: Yes     Attends meetings of clubs or organizations: More than 4 times per year     Relationship status:    Other Topics Concern    Not on file   Social History Narrative    , 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  One child committed suicide.       ALLERGIES AND MEDICATIONS: updated and " reviewed.  Review of patient's allergies indicates:   Allergen Reactions    Amoxicillin Nausea And Vomiting    Adhesive Rash     Can only use paper tape   Has problem with Band aid    Metformin Diarrhea     Appetite loss        Medication List with Changes/Refills   Current Medications    ACCU-CHEK GUIDE ME GLUCOSE MTR MISC    USE 1 TO CHECK GLUCOSE SIX TIMES DAILY    ALPRAZOLAM (XANAX) 0.25 MG TABLET    1 tablet daily PRN.    AMITRIPTYLINE (ELAVIL) 25 MG TABLET    Take 2 tablets (50 mg total) by mouth every evening. Increased dose    ATORVASTATIN (LIPITOR) 10 MG TABLET    Take 1 tablet (10 mg total) by mouth once daily.    AZELASTINE (ASTELIN) 137 MCG (0.1 %) NASAL SPRAY    1 spray (137 mcg total) by Nasal route 2 (two) times daily.    BACLOFEN (LIORESAL) 10 MG TABLET    Take 1 tablet (10 mg total) by mouth 2 (two) times daily.    BLOOD SUGAR DIAGNOSTIC STRP    Check blood glucose 6x/day for accuchek guide meter. E11.65    BLOOD-GLUCOSE METER KIT    Use as instructed, Accu-Chek Amanda Plus glucometer    BLOOD-GLUCOSE METER,CONTINUOUS (DEXCOM G6 ) MISC    Use with dexcom G6 system    BLOOD-GLUCOSE SENSOR (DEXCOM G6 SENSOR) FERNANDO    Change sensor every 10 days    BLOOD-GLUCOSE TRANSMITTER (DEXCOM G6 TRANSMITTER) FERNANDO    Change every 3 months    DESOXIMETASONE (TOPICORT) 0.25 % CREAM    Apply topically 2 (two) times daily as needed.     DICLOFENAC SODIUM (VOLTAREN) 1 % GEL    Apply 2 g topically 3 (three) times daily.    ESCITALOPRAM OXALATE (LEXAPRO) 20 MG TABLET    Take 1 tablet by mouth once daily    FLUTICASONE (FLONASE) 50 MCG/ACTUATION NASAL SPRAY    2 sprays (100 mcg total) by Each Nare route daily as needed.    GABAPENTIN (NEURONTIN) 300 MG CAPSULE    TAKE 1 CAPSULE BY MOUTH THREE TIMES DAILY    HYDROCODONE-ACETAMINOPHEN (NORCO) 5-325 MG PER TABLET    Take 1 tablet by mouth every 6 (six) hours as needed.    INSULIN ASPART U-100 (NOVOLOG FLEXPEN U-100 INSULIN) 100 UNIT/ML (3 ML) INPN PEN    Sliding  "scale up to 20 units TID. MAX 60 UNITS/DAY.    INSULIN ASPART U-100 (NOVOLOG U-100 INSULIN ASPART) 100 UNIT/ML INJECTION    Use max 30 units/day    IPRATROPIUM (ATROVENT HFA) 17 MCG/ACTUATION INHALER    Inhale 2 puffs into the lungs every 6 (six) hours. Rescue    LANCETS (ACCU-CHEK FASTCLIX LANCING DEV) MISC    1 lancet by Misc.(Non-Drug; Combo Route) route 6 (six) times daily. To be used with Accu-Chek Amanda Plus glucometer    METOPROLOL SUCCINATE (TOPROL-XL) 25 MG 24 HR TABLET    Take 1 tablet by mouth once daily    MONTELUKAST (SINGULAIR) 10 MG TABLET    Take 1 tablet (10 mg total) by mouth every evening.    MULTIVITAMIN (THERAGRAN) PER TABLET    Take 2 tablets by mouth once daily.    OMEPRAZOLE (PRILOSEC) 40 MG CAPSULE    Take 1 capsule (40 mg total) by mouth 2 (two) times daily before meals.    PEN NEEDLE, DIABETIC (BD ULTRA-FINE RENETTA PEN NEEDLE) 32 GAUGE X 5/32" NDLE    USE 1 PEN NEEDLE 4 TIMES DAILY WITH  NOVOLOG    SODIUM CHLORIDE (SALINE NOSE NASL)    by Nasal route.    TAMSULOSIN (FLOMAX) 0.4 MG CAP    Take 1 capsule (0.4 mg total) by mouth once daily.    TRIAMCINOLONE ACETONIDE 0.1% (KENALOG) 0.1 % LOTN        VALSARTAN (DIOVAN) 160 MG TABLET    Take 1 tablet (160 mg total) by mouth once daily.        Review of Systems   All other systems reviewed and are negative.      Objective:   Physical Exam   Vitals:    12/22/20 1305   BP: 122/62   BP Location: Right arm   Patient Position: Sitting   BP Method: Small (Manual)   Pulse: 88   Temp: 97.7 °F (36.5 °C)   TempSrc: Temporal   SpO2: (!) 94%   Weight: 65.8 kg (145 lb)   Height: 6' (1.829 m)    Body mass index is 19.67 kg/m².  Weight: 65.8 kg (145 lb)   Height: 6' (182.9 cm)     Physical Exam  Constitutional:       General: He is not in acute distress.     Appearance: He is well-developed.   Cardiovascular:      Rate and Rhythm: Normal rate and regular rhythm.      Heart sounds: Normal heart sounds. No murmur.   Pulmonary:      Effort: Pulmonary effort is " normal.      Breath sounds: Normal breath sounds.   Musculoskeletal: Normal range of motion.      Right lower leg: No edema.      Left lower leg: No edema.   Skin:     General: Skin is warm and dry.      Comments: On the ischial tuberosities of the buttocks with stage 1 about 3 cm diameter with about 2 mm area of stage 2 decubitus, no fluctuance no drainage, nontender.   Neurological:      Mental Status: He is alert and oriented to person, place, and time.      Coordination: Coordination normal.   Psychiatric:         Behavior: Behavior normal.         Thought Content: Thought content normal.

## 2020-12-21 NOTE — PROGRESS NOTES
CC: This 70 y.o. White male  is here for evaluation of  T2DM along with comorbidities indicated in the Visit Diagnosis section of this encounter.    HPI: Moe Ren was diagnosed with T2DM in ~ 2010.  Metformin started at time of diagnosis.     He has a history of squamos cell oral CA, Spinal stenosis, chronic pancreatitis.          Prior visit 10/21/20  a1c is up from 8.3 to 8.7%.   He changed to lower carb and higher protein diet. He's using only about 6 units of insulin all day.   Physical activity restrictions lifted s/p back surgery.   He's changed carb ratio from 15 to 20 for breakfast - so the same carb ratio all day   Finds that correction drops his BG too low.   Wondering if he should start insulin pump. Worried about how Dexcom CGM sometimes reads lower than his glucose meter.   Plan Change correction factor to 50.   With insulin pump start, will start with carb ratio of 24, ISF of 60 and BG goal of 140.   Test glucose 4x/day at least.   Arrange for insulin pump training and return to clinic 1-2 weeks after training    Interval history  Patient returns for insulin pump review.   He c/o neck pain after hospital visit for GI bleed.     He has started the insulin pump. He's had issues with the insulin pump displaying his BG readings from Dexcom. It appears that he unlinked the Dexcom from his insulin pump when he connected back to phone. However, he does see the benefit of being able to receive very small increments of insulin through a pump that an insulin pen can't do.     LAST DIABETES EDUCATION: 6/2016; 2/2018    PRESCRIBED DIABETES MEDICATIONS: Tslim X2 insulin pump   ICR 24, ISF 60 with BG goal of 140.   Duration of action of 5 hours     Misses medication doses - No    DM COMPLICATIONS: peripheral neuropathy    SIGNIFICANT DIABETES MED HISTORY:   Tresiba gradually titrated down until it was stopped mid 2017 d/t hypoglycemia.   Novolog stopped at ov 2/5/18 and metformin and tresiba were started.  maddison palacios[place] about a week later d/t hypo  repaglinide started at ov 2/28/18, and stopped about a month later d/t ineffectiveness      Avoid incretin mimetics d/t h/o chronic pancreatitis   Avoid insulin pump because patient does not require basal insulin delivery     SELF MONITORING BLOOD GLUCOSE: Checks blood glucose at home 6x/day with Dexcom. Uses phone as the reader. See Media for CGM report.   CGM interpretation: he often has marked postprandial excursions 2 hours after eating but BG often returns back to baseline about 4 hours after insulin dose.     HYPOGLYCEMIC EPISODES: infrequent     CURRENT DIET: tries to eat 3 meal/day - Eats at 10 am, 2 pm, and 6 pm. Eats about 40 to 60 carbs per meal.       CURRENT EXERCISE: none recent    SOCIAL: retired  - consultant now.  Has a young grandson      /70 (BP Location: Left arm, Patient Position: Sitting, BP Method: Medium (Manual))   Pulse 94   Temp 98.6 °F (37 °C) (Oral)   Wt 63.5 kg (140 lb)   BMI 18.99 kg/m²       ROS:   CONSTITUTIONAL: Appetite good, + fatigue  MS:+ neck apin      PHYSICAL EXAM:  GENERAL: appears thin, chronically ill.  No acute distress.   PSYCH: AAOx3, conversant, well-groomed. Judgement and insight good. Worried, anxious   NEURO: Cranial nerves grossly intact. Speech clear, no tremor.   CHEST: Respirations even and unlabored.      Hemoglobin A1C   Date Value Ref Range Status   10/14/2020 8.7 (H) 4.0 - 5.6 % Final     Comment:     ADA Screening Guidelines:  5.7-6.4%  Consistent with prediabetes  >or=6.5%  Consistent with diabetes  High levels of fetal hemoglobin interfere with the HbA1C  assay. Heterozygous hemoglobin variants (HbS, HgC, etc)do  not significantly interfere with this assay.   However, presence of multiple variants may affect accuracy.     07/16/2020 8.3 (H) 4.0 - 5.6 % Final     Comment:     ADA Screening Guidelines:  5.7-6.4%  Consistent with prediabetes  >or=6.5%  Consistent with diabetes  High  levels of fetal hemoglobin interfere with the HbA1C  assay. Heterozygous hemoglobin variants (HbS, HgC, etc)do  not significantly interfere with this assay.   However, presence of multiple variants may affect accuracy.     12/09/2019 8.1 (H) 4.0 - 5.6 % Final     Comment:     ADA Screening Guidelines:  5.7-6.4%  Consistent with prediabetes  >or=6.5%  Consistent with diabetes  High levels of fetal hemoglobin interfere with the HbA1C  assay. Heterozygous hemoglobin variants (HbS, HgC, etc)do  not significantly interfere with this assay.   However, presence of multiple variants may affect accuracy.            Ref. Range 2/13/2020 10:08 2/13/2020 10:09   Glucose, Fasting Latest Ref Range: 70 - 110 mg/dL  187 (H)   C-Peptide Latest Ref Range: 0.78 - 5.19 ng/mL 0.27 (L)          Chemistry        Component Value Date/Time     (L) 12/17/2020 0403    K 3.5 12/17/2020 0403     12/17/2020 0403    CO2 24 12/17/2020 0403    BUN 9 12/17/2020 0403    CREATININE 1.2 12/17/2020 0403     (H) 12/17/2020 0403        Component Value Date/Time    CALCIUM 7.8 (L) 12/17/2020 0403    ALKPHOS 131 12/16/2020 0915    AST 43 (H) 12/16/2020 0915    ALT 26 12/16/2020 0915    BILITOT 0.5 12/16/2020 0915    ESTGFRAFRICA >60 12/17/2020 0403    EGFRNONAA >60 12/17/2020 0403          Lab Results   Component Value Date    LDLCALC 45.0 (L) 10/14/2020        Ref. Range 10/14/2020 09:19   Cholesterol Latest Ref Range: 120 - 199 mg/dL 138   HDL Latest Ref Range: 40 - 75 mg/dL 71   Hdl/Cholesterol Ratio Latest Ref Range: 20.0 - 50.0 % 51.4 (H)   LDL Cholesterol External Latest Ref Range: 63.0 - 159.0 mg/dL 45.0 (L)   Non-HDL Cholesterol Latest Units: mg/dL 67   Total Cholesterol/HDL Ratio Latest Ref Range: 2.0 - 5.0  1.9 (L)   Triglycerides Latest Ref Range: 30 - 150 mg/dL 110         Lab Results   Component Value Date    MICALBCREAT 84.2 (H) 07/16/2020               ASSESSMENT and PLAN:    A1C GOAL: < 7 %     1. Type 2 diabetes mellitus  with microalbuminuria, with long-term current use of insulin  Change active insulin time from 5 to 4 hours.   No changes at this time.   Start Control IQ. This should help to help to control high glucoses immediately after and between meal doses.   Return to clinic in 2 weeks.    2. Counseling for insulin pump           No orders of the defined types were placed in this encounter.       Follow up in about 2 weeks (around 1/4/2021).

## 2020-12-22 NOTE — PATIENT INSTRUCTIONS
Try Voltaren topical for arthritis.    And lantiseptic for skin breakdown to prevent rash and sores from developing.    I would like to see xanax 5-7 tablets last 3 months.   Similar with narcotic pain medications.        Please call the radiology department to schedule your test at [phone]        PLEASE CALL THE [facility] AT     Address: [address] [place] [address] [address] [facility], [place], [place], [address]    Phone Number: [phone]

## 2021-01-01 ENCOUNTER — LAB VISIT (OUTPATIENT)
Dept: LAB | Facility: HOSPITAL | Age: 71
End: 2021-01-01
Attending: INTERNAL MEDICINE
Payer: MEDICARE

## 2021-01-01 ENCOUNTER — HOSPITAL ENCOUNTER (OUTPATIENT)
Dept: RADIOLOGY | Facility: HOSPITAL | Age: 71
Discharge: HOME | End: 2021-01-12
Attending: INTERNAL MEDICINE
Payer: MEDICARE

## 2021-01-01 ENCOUNTER — PATIENT MESSAGE (OUTPATIENT)
Dept: ENDOCRINOLOGY | Facility: CLINIC | Age: 71
End: 2021-01-01

## 2021-01-01 ENCOUNTER — TELEPHONE (OUTPATIENT)
Dept: SPEECH THERAPY | Facility: HOSPITAL | Age: 71
End: 2021-01-01

## 2021-01-01 ENCOUNTER — OFFICE VISIT (OUTPATIENT)
Dept: OTOLARYNGOLOGY | Facility: CLINIC | Age: 71
End: 2021-01-01
Payer: MEDICARE

## 2021-01-01 ENCOUNTER — CLINICAL SUPPORT (OUTPATIENT)
Dept: SPEECH THERAPY | Facility: HOSPITAL | Age: 71
End: 2021-01-01
Attending: OTOLARYNGOLOGY
Payer: MEDICARE

## 2021-01-01 ENCOUNTER — LAB VISIT (OUTPATIENT)
Dept: LAB | Facility: HOSPITAL | Age: 71
End: 2021-01-01
Attending: UROLOGY
Payer: MEDICARE

## 2021-01-01 ENCOUNTER — CLINICAL SUPPORT (OUTPATIENT)
Dept: REHABILITATION | Facility: HOSPITAL | Age: 71
End: 2021-01-01
Attending: OTOLARYNGOLOGY
Payer: MEDICARE

## 2021-01-01 ENCOUNTER — PATIENT OUTREACH (OUTPATIENT)
Dept: ADMINISTRATIVE | Facility: OTHER | Age: 71
End: 2021-01-01

## 2021-01-01 ENCOUNTER — OFFICE VISIT (OUTPATIENT)
Dept: GASTROENTEROLOGY | Facility: CLINIC | Age: 71
End: 2021-01-01
Payer: MEDICARE

## 2021-01-01 ENCOUNTER — OFFICE VISIT (OUTPATIENT)
Dept: OPTOMETRY | Facility: CLINIC | Age: 71
End: 2021-01-01
Payer: COMMERCIAL

## 2021-01-01 ENCOUNTER — PATIENT MESSAGE (OUTPATIENT)
Dept: ADMINISTRATIVE | Facility: HOSPITAL | Age: 71
End: 2021-01-01

## 2021-01-01 ENCOUNTER — PATIENT MESSAGE (OUTPATIENT)
Dept: FAMILY MEDICINE | Facility: CLINIC | Age: 71
End: 2021-01-01

## 2021-01-01 ENCOUNTER — OFFICE VISIT (OUTPATIENT)
Dept: ENDOCRINOLOGY | Facility: CLINIC | Age: 71
End: 2021-01-01
Payer: MEDICARE

## 2021-01-01 ENCOUNTER — HOSPITAL ENCOUNTER (INPATIENT)
Facility: HOSPITAL | Age: 71
LOS: 1 days | DRG: 871 | End: 2021-11-13
Attending: EMERGENCY MEDICINE | Admitting: HOSPITALIST
Payer: MEDICARE

## 2021-01-01 ENCOUNTER — HOSPITAL ENCOUNTER (OUTPATIENT)
Dept: CARDIOLOGY | Facility: HOSPITAL | Age: 71
Discharge: HOME | End: 2021-10-15
Attending: INTERNAL MEDICINE
Payer: MEDICARE

## 2021-01-01 ENCOUNTER — HOSPITAL ENCOUNTER (OUTPATIENT)
Dept: RADIOLOGY | Facility: HOSPITAL | Age: 71
Discharge: HOME | End: 2021-09-21
Attending: PODIATRIST
Payer: MEDICARE

## 2021-01-01 ENCOUNTER — OFFICE VISIT (OUTPATIENT)
Dept: CARDIOLOGY | Facility: CLINIC | Age: 71
End: 2021-01-01
Payer: MEDICARE

## 2021-01-01 ENCOUNTER — PATIENT MESSAGE (OUTPATIENT)
Dept: OTOLARYNGOLOGY | Facility: CLINIC | Age: 71
End: 2021-01-01

## 2021-01-01 ENCOUNTER — TELEPHONE (OUTPATIENT)
Dept: ENDOCRINOLOGY | Facility: CLINIC | Age: 71
End: 2021-01-01

## 2021-01-01 ENCOUNTER — HOSPITAL ENCOUNTER (OUTPATIENT)
Dept: RADIOLOGY | Facility: HOSPITAL | Age: 71
Discharge: HOME | End: 2021-09-16
Attending: PODIATRIST
Payer: MEDICARE

## 2021-01-01 ENCOUNTER — OFFICE VISIT (OUTPATIENT)
Dept: FAMILY MEDICINE | Facility: CLINIC | Age: 71
End: 2021-01-01
Payer: MEDICARE

## 2021-01-01 ENCOUNTER — TELEPHONE (OUTPATIENT)
Dept: REHABILITATION | Facility: HOSPITAL | Age: 71
End: 2021-01-01

## 2021-01-01 ENCOUNTER — HOSPITAL ENCOUNTER (OUTPATIENT)
Dept: RADIOLOGY | Facility: HOSPITAL | Age: 71
Discharge: HOME | End: 2021-10-28
Attending: PHYSICIAN ASSISTANT
Payer: MEDICARE

## 2021-01-01 ENCOUNTER — DOCUMENTATION ONLY (OUTPATIENT)
Dept: REHABILITATION | Facility: HOSPITAL | Age: 71
End: 2021-01-01

## 2021-01-01 ENCOUNTER — TELEPHONE (OUTPATIENT)
Dept: UROLOGY | Facility: HOSPITAL | Age: 71
End: 2021-01-01

## 2021-01-01 ENCOUNTER — TELEPHONE (OUTPATIENT)
Dept: HEPATOLOGY | Facility: CLINIC | Age: 71
End: 2021-01-01
Payer: MEDICARE

## 2021-01-01 ENCOUNTER — TELEPHONE (OUTPATIENT)
Dept: UROLOGY | Facility: CLINIC | Age: 71
End: 2021-01-01

## 2021-01-01 ENCOUNTER — LAB VISIT (OUTPATIENT)
Dept: LAB | Facility: HOSPITAL | Age: 71
End: 2021-01-01
Payer: MEDICARE

## 2021-01-01 ENCOUNTER — HOSPITAL ENCOUNTER (INPATIENT)
Facility: HOSPITAL | Age: 71
LOS: 22 days | Discharge: NURSING, REHAB OR LONG-TERM CARE | DRG: 177 | End: 2021-06-11
Attending: EMERGENCY MEDICINE | Admitting: HOSPITALIST
Payer: MEDICARE

## 2021-01-01 ENCOUNTER — TELEPHONE (OUTPATIENT)
Dept: FAMILY MEDICINE | Facility: CLINIC | Age: 71
End: 2021-01-01

## 2021-01-01 ENCOUNTER — HOSPITAL ENCOUNTER (OUTPATIENT)
Dept: RADIOLOGY | Facility: HOSPITAL | Age: 71
Discharge: HOME | End: 2021-04-06
Attending: INTERNAL MEDICINE
Payer: MEDICARE

## 2021-01-01 ENCOUNTER — PATIENT OUTREACH (OUTPATIENT)
Dept: ADMINISTRATIVE | Facility: HOSPITAL | Age: 71
End: 2021-01-01

## 2021-01-01 ENCOUNTER — OFFICE VISIT (OUTPATIENT)
Dept: UROLOGY | Facility: CLINIC | Age: 71
End: 2021-01-01
Payer: MEDICARE

## 2021-01-01 ENCOUNTER — OFFICE VISIT (OUTPATIENT)
Dept: HEPATOLOGY | Facility: CLINIC | Age: 71
End: 2021-01-01
Payer: MEDICARE

## 2021-01-01 ENCOUNTER — HOSPITAL ENCOUNTER (OUTPATIENT)
Dept: RADIOLOGY | Facility: HOSPITAL | Age: 71
Discharge: HOME | End: 2021-03-10
Attending: INTERNAL MEDICINE
Payer: MEDICARE

## 2021-01-01 ENCOUNTER — OFFICE VISIT (OUTPATIENT)
Dept: URGENT CARE | Facility: CLINIC | Age: 71
End: 2021-01-01
Payer: MEDICARE

## 2021-01-01 ENCOUNTER — HOSPITAL ENCOUNTER (OUTPATIENT)
Dept: RADIOLOGY | Facility: HOSPITAL | Age: 71
Discharge: HOME | End: 2021-03-09
Attending: OTOLARYNGOLOGY
Payer: MEDICARE

## 2021-01-01 ENCOUNTER — DOCUMENTATION ONLY (OUTPATIENT)
Dept: OTOLARYNGOLOGY | Facility: CLINIC | Age: 71
End: 2021-01-01

## 2021-01-01 ENCOUNTER — LAB VISIT (OUTPATIENT)
Dept: FAMILY MEDICINE | Facility: CLINIC | Age: 71
End: 2021-01-01
Payer: MEDICARE

## 2021-01-01 ENCOUNTER — TELEPHONE (OUTPATIENT)
Dept: OTOLARYNGOLOGY | Facility: CLINIC | Age: 71
End: 2021-01-01

## 2021-01-01 ENCOUNTER — TELEPHONE (OUTPATIENT)
Dept: DIABETES | Facility: CLINIC | Age: 71
End: 2021-01-01

## 2021-01-01 ENCOUNTER — OFFICE VISIT (OUTPATIENT)
Dept: INFECTIOUS DISEASES | Facility: CLINIC | Age: 71
End: 2021-01-01
Payer: MEDICARE

## 2021-01-01 ENCOUNTER — HOSPITAL ENCOUNTER (OUTPATIENT)
Dept: RADIOLOGY | Facility: HOSPITAL | Age: 71
Discharge: HOME | DRG: 177 | End: 2021-05-20
Attending: INTERNAL MEDICINE
Payer: MEDICARE

## 2021-01-01 ENCOUNTER — OFFICE VISIT (OUTPATIENT)
Dept: PODIATRY | Facility: CLINIC | Age: 71
End: 2021-01-01
Payer: MEDICARE

## 2021-01-01 ENCOUNTER — TELEPHONE (OUTPATIENT)
Dept: PULMONOLOGY | Facility: CLINIC | Age: 71
End: 2021-01-01

## 2021-01-01 ENCOUNTER — PATIENT MESSAGE (OUTPATIENT)
Dept: ADMINISTRATIVE | Facility: HOSPITAL | Age: 71
End: 2021-01-01
Payer: MEDICARE

## 2021-01-01 ENCOUNTER — LAB VISIT (OUTPATIENT)
Dept: PRIMARY CARE CLINIC | Facility: CLINIC | Age: 71
End: 2021-01-01
Payer: MEDICARE

## 2021-01-01 ENCOUNTER — OFFICE VISIT (OUTPATIENT)
Dept: CARDIOTHORACIC SURGERY | Facility: CLINIC | Age: 71
End: 2021-01-01
Payer: MEDICARE

## 2021-01-01 ENCOUNTER — OFFICE VISIT (OUTPATIENT)
Dept: PULMONOLOGY | Facility: CLINIC | Age: 71
End: 2021-01-01
Payer: MEDICARE

## 2021-01-01 ENCOUNTER — TELEPHONE (OUTPATIENT)
Dept: FAMILY MEDICINE | Facility: CLINIC | Age: 71
End: 2021-01-01
Payer: MEDICARE

## 2021-01-01 ENCOUNTER — CLINICAL SUPPORT (OUTPATIENT)
Dept: URGENT CARE | Facility: CLINIC | Age: 71
End: 2021-01-01
Payer: MEDICARE

## 2021-01-01 ENCOUNTER — PATIENT MESSAGE (OUTPATIENT)
Dept: PODIATRY | Facility: CLINIC | Age: 71
End: 2021-01-01

## 2021-01-01 ENCOUNTER — HOSPITAL ENCOUNTER (EMERGENCY)
Facility: HOSPITAL | Age: 71
Discharge: HOME | End: 2021-06-25
Attending: EMERGENCY MEDICINE
Payer: MEDICARE

## 2021-01-01 ENCOUNTER — HOSPITAL ENCOUNTER (OUTPATIENT)
Dept: RADIOLOGY | Facility: HOSPITAL | Age: 71
Discharge: HOME | End: 2021-10-18
Attending: INTERNAL MEDICINE
Payer: MEDICARE

## 2021-01-01 ENCOUNTER — EXTERNAL HOME HEALTH (OUTPATIENT)
Dept: HOME HEALTH SERVICES | Facility: HOSPITAL | Age: 71
End: 2021-01-01
Payer: MEDICARE

## 2021-01-01 ENCOUNTER — HOSPITAL ENCOUNTER (OUTPATIENT)
Dept: RADIOLOGY | Facility: HOSPITAL | Age: 71
Discharge: HOME | End: 2021-03-16
Attending: INTERNAL MEDICINE
Payer: MEDICARE

## 2021-01-01 ENCOUNTER — HOSPITAL ENCOUNTER (OUTPATIENT)
Dept: RADIOLOGY | Facility: HOSPITAL | Age: 71
Discharge: HOME | End: 2021-03-22
Attending: OTOLARYNGOLOGY
Payer: MEDICARE

## 2021-01-01 ENCOUNTER — HOSPITAL ENCOUNTER (EMERGENCY)
Facility: HOSPITAL | Age: 71
Discharge: HOME | End: 2021-05-14
Attending: EMERGENCY MEDICINE
Payer: MEDICARE

## 2021-01-01 VITALS
BODY MASS INDEX: 16.78 KG/M2 | SYSTOLIC BLOOD PRESSURE: 149 MMHG | HEIGHT: 72 IN | HEIGHT: 72 IN | WEIGHT: 149.06 LBS | HEART RATE: 93 BPM | DIASTOLIC BLOOD PRESSURE: 80 MMHG | TEMPERATURE: 98 F | BODY MASS INDEX: 20.19 KG/M2 | OXYGEN SATURATION: 98 % | WEIGHT: 123.88 LBS

## 2021-01-01 VITALS
DIASTOLIC BLOOD PRESSURE: 65 MMHG | BODY MASS INDEX: 16.3 KG/M2 | HEIGHT: 72 IN | RESPIRATION RATE: 18 BRPM | HEIGHT: 72 IN | BODY MASS INDEX: 15.03 KG/M2 | WEIGHT: 111 LBS | WEIGHT: 120.38 LBS | OXYGEN SATURATION: 100 % | SYSTOLIC BLOOD PRESSURE: 112 MMHG | TEMPERATURE: 98 F | HEART RATE: 89 BPM

## 2021-01-01 VITALS
HEIGHT: 72 IN | DIASTOLIC BLOOD PRESSURE: 60 MMHG | WEIGHT: 146 LBS | OXYGEN SATURATION: 98 % | BODY MASS INDEX: 19.77 KG/M2 | SYSTOLIC BLOOD PRESSURE: 130 MMHG | HEART RATE: 77 BPM | TEMPERATURE: 98 F

## 2021-01-01 VITALS
HEART RATE: 94 BPM | BODY MASS INDEX: 16.66 KG/M2 | HEIGHT: 72 IN | WEIGHT: 123 LBS | OXYGEN SATURATION: 95 % | DIASTOLIC BLOOD PRESSURE: 82 MMHG | SYSTOLIC BLOOD PRESSURE: 135 MMHG

## 2021-01-01 VITALS
OXYGEN SATURATION: 94 % | DIASTOLIC BLOOD PRESSURE: 66 MMHG | HEART RATE: 109 BPM | HEIGHT: 72 IN | WEIGHT: 133.94 LBS | SYSTOLIC BLOOD PRESSURE: 118 MMHG | BODY MASS INDEX: 18.14 KG/M2 | TEMPERATURE: 99 F

## 2021-01-01 VITALS
TEMPERATURE: 98 F | DIASTOLIC BLOOD PRESSURE: 80 MMHG | BODY MASS INDEX: 19.26 KG/M2 | SYSTOLIC BLOOD PRESSURE: 160 MMHG | HEART RATE: 86 BPM | WEIGHT: 142 LBS

## 2021-01-01 VITALS
TEMPERATURE: 91 F | SYSTOLIC BLOOD PRESSURE: 71 MMHG | HEART RATE: 29 BPM | WEIGHT: 125 LBS | HEIGHT: 72 IN | DIASTOLIC BLOOD PRESSURE: 52 MMHG | OXYGEN SATURATION: 76 % | RESPIRATION RATE: 18 BRPM | BODY MASS INDEX: 16.93 KG/M2

## 2021-01-01 VITALS
SYSTOLIC BLOOD PRESSURE: 154 MMHG | RESPIRATION RATE: 18 BRPM | WEIGHT: 123 LBS | WEIGHT: 123 LBS | BODY MASS INDEX: 16.66 KG/M2 | TEMPERATURE: 98 F | HEIGHT: 72 IN | HEART RATE: 80 BPM | HEIGHT: 72 IN | TEMPERATURE: 98 F | DIASTOLIC BLOOD PRESSURE: 76 MMHG | SYSTOLIC BLOOD PRESSURE: 128 MMHG | DIASTOLIC BLOOD PRESSURE: 81 MMHG | BODY MASS INDEX: 16.66 KG/M2 | OXYGEN SATURATION: 100 %

## 2021-01-01 VITALS
BODY MASS INDEX: 15.98 KG/M2 | HEIGHT: 72 IN | SYSTOLIC BLOOD PRESSURE: 122 MMHG | SYSTOLIC BLOOD PRESSURE: 110 MMHG | OXYGEN SATURATION: 95 % | DIASTOLIC BLOOD PRESSURE: 66 MMHG | HEART RATE: 80 BPM | WEIGHT: 117.94 LBS | BODY MASS INDEX: 15.05 KG/M2 | TEMPERATURE: 98 F | HEIGHT: 72 IN | DIASTOLIC BLOOD PRESSURE: 64 MMHG

## 2021-01-01 VITALS
HEART RATE: 97 BPM | WEIGHT: 110.69 LBS | BODY MASS INDEX: 15.01 KG/M2 | TEMPERATURE: 99 F | SYSTOLIC BLOOD PRESSURE: 118 MMHG | DIASTOLIC BLOOD PRESSURE: 72 MMHG

## 2021-01-01 VITALS
BODY MASS INDEX: 15.85 KG/M2 | DIASTOLIC BLOOD PRESSURE: 80 MMHG | HEART RATE: 80 BPM | SYSTOLIC BLOOD PRESSURE: 124 MMHG | OXYGEN SATURATION: 95 % | WEIGHT: 117 LBS | HEIGHT: 72 IN

## 2021-01-01 VITALS
WEIGHT: 141.38 LBS | HEART RATE: 109 BPM | DIASTOLIC BLOOD PRESSURE: 72 MMHG | TEMPERATURE: 98 F | BODY MASS INDEX: 19.18 KG/M2 | SYSTOLIC BLOOD PRESSURE: 120 MMHG

## 2021-01-01 VITALS
HEIGHT: 72 IN | WEIGHT: 110.69 LBS | HEIGHT: 72 IN | OXYGEN SATURATION: 97 % | HEART RATE: 101 BPM | TEMPERATURE: 98 F | SYSTOLIC BLOOD PRESSURE: 130 MMHG | WEIGHT: 115 LBS | DIASTOLIC BLOOD PRESSURE: 70 MMHG | DIASTOLIC BLOOD PRESSURE: 80 MMHG | SYSTOLIC BLOOD PRESSURE: 120 MMHG | BODY MASS INDEX: 14.99 KG/M2 | BODY MASS INDEX: 15.58 KG/M2

## 2021-01-01 VITALS
HEIGHT: 72 IN | TEMPERATURE: 98 F | WEIGHT: 115 LBS | BODY MASS INDEX: 15.58 KG/M2 | HEIGHT: 72 IN | BODY MASS INDEX: 15.58 KG/M2 | SYSTOLIC BLOOD PRESSURE: 106 MMHG | OXYGEN SATURATION: 97 % | DIASTOLIC BLOOD PRESSURE: 66 MMHG | WEIGHT: 115.06 LBS | HEART RATE: 100 BPM | RESPIRATION RATE: 12 BRPM

## 2021-01-01 VITALS
WEIGHT: 144.81 LBS | BODY MASS INDEX: 19.61 KG/M2 | DIASTOLIC BLOOD PRESSURE: 64 MMHG | TEMPERATURE: 98 F | HEIGHT: 72 IN | SYSTOLIC BLOOD PRESSURE: 118 MMHG

## 2021-01-01 VITALS
DIASTOLIC BLOOD PRESSURE: 70 MMHG | WEIGHT: 145.06 LBS | BODY MASS INDEX: 19.65 KG/M2 | TEMPERATURE: 99 F | HEIGHT: 72 IN | SYSTOLIC BLOOD PRESSURE: 124 MMHG

## 2021-01-01 VITALS
WEIGHT: 142 LBS | DIASTOLIC BLOOD PRESSURE: 72 MMHG | BODY MASS INDEX: 19.23 KG/M2 | SYSTOLIC BLOOD PRESSURE: 136 MMHG | HEIGHT: 72 IN

## 2021-01-01 VITALS — WEIGHT: 122 LBS | BODY MASS INDEX: 16.52 KG/M2 | HEIGHT: 72 IN

## 2021-01-01 VITALS
DIASTOLIC BLOOD PRESSURE: 71 MMHG | TEMPERATURE: 98 F | SYSTOLIC BLOOD PRESSURE: 108 MMHG | TEMPERATURE: 98 F | BODY MASS INDEX: 17.64 KG/M2 | OXYGEN SATURATION: 95 % | HEART RATE: 85 BPM | HEIGHT: 72 IN | WEIGHT: 130.06 LBS | OXYGEN SATURATION: 98 % | SYSTOLIC BLOOD PRESSURE: 117 MMHG | DIASTOLIC BLOOD PRESSURE: 70 MMHG | WEIGHT: 130 LBS | HEART RATE: 99 BPM | RESPIRATION RATE: 18 BRPM | BODY MASS INDEX: 17.61 KG/M2

## 2021-01-01 VITALS
HEART RATE: 92 BPM | WEIGHT: 122.25 LBS | HEIGHT: 72 IN | TEMPERATURE: 98 F | BODY MASS INDEX: 16.56 KG/M2 | DIASTOLIC BLOOD PRESSURE: 68 MMHG | SYSTOLIC BLOOD PRESSURE: 116 MMHG | OXYGEN SATURATION: 98 %

## 2021-01-01 VITALS
WEIGHT: 120 LBS | DIASTOLIC BLOOD PRESSURE: 52 MMHG | SYSTOLIC BLOOD PRESSURE: 112 MMHG | BODY MASS INDEX: 16.25 KG/M2 | HEIGHT: 72 IN

## 2021-01-01 VITALS
SYSTOLIC BLOOD PRESSURE: 125 MMHG | OXYGEN SATURATION: 98 % | BODY MASS INDEX: 20.19 KG/M2 | HEART RATE: 81 BPM | HEIGHT: 72 IN | DIASTOLIC BLOOD PRESSURE: 74 MMHG | RESPIRATION RATE: 18 BRPM | WEIGHT: 149.06 LBS | TEMPERATURE: 97 F

## 2021-01-01 VITALS
WEIGHT: 111.13 LBS | BODY MASS INDEX: 15.07 KG/M2 | HEART RATE: 99 BPM | DIASTOLIC BLOOD PRESSURE: 70 MMHG | SYSTOLIC BLOOD PRESSURE: 122 MMHG | TEMPERATURE: 99 F

## 2021-01-01 VITALS — BODY MASS INDEX: 19.64 KG/M2 | WEIGHT: 144.81 LBS

## 2021-01-01 DIAGNOSIS — R13.10 DYSPHAGIA, UNSPECIFIED TYPE: Primary | ICD-10-CM

## 2021-01-01 DIAGNOSIS — E46 PROTEIN MALNUTRITION: ICD-10-CM

## 2021-01-01 DIAGNOSIS — E78.1 HYPERTRIGLYCERIDEMIA: ICD-10-CM

## 2021-01-01 DIAGNOSIS — R13.19 OTHER DYSPHAGIA: Primary | ICD-10-CM

## 2021-01-01 DIAGNOSIS — C02.9 TONGUE CARCINOMA: ICD-10-CM

## 2021-01-01 DIAGNOSIS — I25.10 CORONARY ARTERY DISEASE INVOLVING NATIVE CORONARY ARTERY OF NATIVE HEART WITHOUT ANGINA PECTORIS: ICD-10-CM

## 2021-01-01 DIAGNOSIS — I10 ESSENTIAL HYPERTENSION: ICD-10-CM

## 2021-01-01 DIAGNOSIS — R19.7 DIARRHEA, UNSPECIFIED TYPE: ICD-10-CM

## 2021-01-01 DIAGNOSIS — R60.0 PEDAL EDEMA: ICD-10-CM

## 2021-01-01 DIAGNOSIS — Z45.2 ENCOUNTER FOR CENTRAL LINE PLACEMENT: ICD-10-CM

## 2021-01-01 DIAGNOSIS — R20.2 PARESTHESIA OF BOTH FEET: ICD-10-CM

## 2021-01-01 DIAGNOSIS — R06.02 SOB (SHORTNESS OF BREATH): ICD-10-CM

## 2021-01-01 DIAGNOSIS — J43.9 CHRONIC OBSTRUCTIVE BRONCHITIS WITH PULMONARY EMPHYSEMA: ICD-10-CM

## 2021-01-01 DIAGNOSIS — H52.4 HYPEROPIA WITH PRESBYOPIA OF BOTH EYES: ICD-10-CM

## 2021-01-01 DIAGNOSIS — E87.1 HYPONATREMIA: ICD-10-CM

## 2021-01-01 DIAGNOSIS — N31.9 NEUROGENIC BLADDER: ICD-10-CM

## 2021-01-01 DIAGNOSIS — Z87.09 HISTORY OF PLEURAL EMPYEMA: ICD-10-CM

## 2021-01-01 DIAGNOSIS — J18.9 PNEUMONIA OF LEFT LOWER LOBE DUE TO INFECTIOUS ORGANISM: Primary | ICD-10-CM

## 2021-01-01 DIAGNOSIS — M21.6X1 ACQUIRED BILATERAL PES CAVUS: ICD-10-CM

## 2021-01-01 DIAGNOSIS — R60.9 EDEMA, UNSPECIFIED TYPE: Primary | ICD-10-CM

## 2021-01-01 DIAGNOSIS — R80.9 TYPE 2 DIABETES MELLITUS WITH MICROALBUMINURIA, WITH LONG-TERM CURRENT USE OF INSULIN: ICD-10-CM

## 2021-01-01 DIAGNOSIS — R13.10 DYSPHAGIA, UNSPECIFIED TYPE: ICD-10-CM

## 2021-01-01 DIAGNOSIS — F10.20 ALCOHOL USE DISORDER, SEVERE, DEPENDENCE: ICD-10-CM

## 2021-01-01 DIAGNOSIS — M79.604 PAIN IN BOTH LOWER EXTREMITIES: ICD-10-CM

## 2021-01-01 DIAGNOSIS — K29.70 GASTRITIS, PRESENCE OF BLEEDING UNSPECIFIED, UNSPECIFIED CHRONICITY, UNSPECIFIED GASTRITIS TYPE: ICD-10-CM

## 2021-01-01 DIAGNOSIS — M47.816 FACET ARTHRITIS OF LUMBAR REGION: ICD-10-CM

## 2021-01-01 DIAGNOSIS — E11.649 HYPOGLYCEMIA ASSOCIATED WITH DIABETES: ICD-10-CM

## 2021-01-01 DIAGNOSIS — R07.9 CHEST PAIN: ICD-10-CM

## 2021-01-01 DIAGNOSIS — J39.2 OROPHARYNGEAL MASS: ICD-10-CM

## 2021-01-01 DIAGNOSIS — F41.9 ANXIETY DISORDER, UNSPECIFIED TYPE: Chronic | ICD-10-CM

## 2021-01-01 DIAGNOSIS — E11.49 TYPE II DIABETES MELLITUS WITH NEUROLOGICAL MANIFESTATIONS: Primary | ICD-10-CM

## 2021-01-01 DIAGNOSIS — S79.912A HIP INJURY, LEFT, INITIAL ENCOUNTER: Primary | ICD-10-CM

## 2021-01-01 DIAGNOSIS — M20.5X2 HALLUX LIMITUS, LEFT: ICD-10-CM

## 2021-01-01 DIAGNOSIS — R79.89 ELEVATED LFTS: ICD-10-CM

## 2021-01-01 DIAGNOSIS — R63.4 WEIGHT LOSS: ICD-10-CM

## 2021-01-01 DIAGNOSIS — K26.0 ACUTE DUODENAL ULCER WITH HEMORRHAGE AND OBSTRUCTION: ICD-10-CM

## 2021-01-01 DIAGNOSIS — R93.2 ABNORMAL FINDINGS ON DIAGNOSTIC IMAGING OF LIVER AND BILIARY TRACT: ICD-10-CM

## 2021-01-01 DIAGNOSIS — I65.23 BILATERAL CAROTID ARTERY STENOSIS: ICD-10-CM

## 2021-01-01 DIAGNOSIS — R33.9 URINARY RETENTION: Primary | ICD-10-CM

## 2021-01-01 DIAGNOSIS — E11.49 TYPE II DIABETES MELLITUS WITH NEUROLOGICAL MANIFESTATIONS: ICD-10-CM

## 2021-01-01 DIAGNOSIS — J86.9 EMPYEMA OF LEFT PLEURAL SPACE: Primary | ICD-10-CM

## 2021-01-01 DIAGNOSIS — E44.0 MODERATE PROTEIN-CALORIE MALNUTRITION: ICD-10-CM

## 2021-01-01 DIAGNOSIS — E11.42 DM TYPE 2 WITH DIABETIC PERIPHERAL NEUROPATHY: Chronic | ICD-10-CM

## 2021-01-01 DIAGNOSIS — J44.9 CHRONIC OBSTRUCTIVE LUNG DISEASE: ICD-10-CM

## 2021-01-01 DIAGNOSIS — Z20.822 ENCOUNTER FOR LABORATORY TESTING FOR COVID-19 VIRUS: ICD-10-CM

## 2021-01-01 DIAGNOSIS — R30.0 DYSURIA: ICD-10-CM

## 2021-01-01 DIAGNOSIS — T85.9XXA: ICD-10-CM

## 2021-01-01 DIAGNOSIS — E11.29 TYPE 2 DIABETES MELLITUS WITH MICROALBUMINURIA, WITH LONG-TERM CURRENT USE OF INSULIN: ICD-10-CM

## 2021-01-01 DIAGNOSIS — R41.3 MEMORY LOSS: ICD-10-CM

## 2021-01-01 DIAGNOSIS — J86.9 EMPYEMA OF LEFT PLEURAL SPACE: ICD-10-CM

## 2021-01-01 DIAGNOSIS — J44.89 CHRONIC OBSTRUCTIVE BRONCHITIS WITH PULMONARY EMPHYSEMA: ICD-10-CM

## 2021-01-01 DIAGNOSIS — I70.0 ABDOMINAL AORTIC ATHEROSCLEROSIS: ICD-10-CM

## 2021-01-01 DIAGNOSIS — R47.02 DYSPHASIA: ICD-10-CM

## 2021-01-01 DIAGNOSIS — F41.9 ANXIETY DISORDER, UNSPECIFIED TYPE: ICD-10-CM

## 2021-01-01 DIAGNOSIS — R33.9 URINARY RETENTION: ICD-10-CM

## 2021-01-01 DIAGNOSIS — I49.1 PREMATURE ATRIAL CONTRACTION: ICD-10-CM

## 2021-01-01 DIAGNOSIS — T07.XXXA ABRASIONS OF MULTIPLE SITES: ICD-10-CM

## 2021-01-01 DIAGNOSIS — R63.6 UNDERWEIGHT: ICD-10-CM

## 2021-01-01 DIAGNOSIS — R80.9 TYPE 2 DIABETES MELLITUS WITH MICROALBUMINURIA, WITH LONG-TERM CURRENT USE OF INSULIN: Primary | ICD-10-CM

## 2021-01-01 DIAGNOSIS — Z87.891 PERSONAL HISTORY OF NICOTINE DEPENDENCE: ICD-10-CM

## 2021-01-01 DIAGNOSIS — S41.112A SKIN TEAR OF LEFT UPPER ARM WITHOUT COMPLICATION, INITIAL ENCOUNTER: Primary | ICD-10-CM

## 2021-01-01 DIAGNOSIS — R82.81 PYURIA: ICD-10-CM

## 2021-01-01 DIAGNOSIS — B37.0 THRUSH: Primary | ICD-10-CM

## 2021-01-01 DIAGNOSIS — R60.0 EDEMA OF BOTH LOWER LEGS: ICD-10-CM

## 2021-01-01 DIAGNOSIS — S09.90XA INJURY OF HEAD, INITIAL ENCOUNTER: Primary | ICD-10-CM

## 2021-01-01 DIAGNOSIS — Z72.0 TOBACCO ABUSE: ICD-10-CM

## 2021-01-01 DIAGNOSIS — K94.20 GASTROSTOMY COMPLICATION, UNSPECIFIED: ICD-10-CM

## 2021-01-01 DIAGNOSIS — A41.9 SEPTIC SHOCK: Primary | ICD-10-CM

## 2021-01-01 DIAGNOSIS — R91.8 LEFT PULMONARY INFILTRATE ON CXR: ICD-10-CM

## 2021-01-01 DIAGNOSIS — E46 PROTEIN MALNUTRITION: Primary | ICD-10-CM

## 2021-01-01 DIAGNOSIS — M79.605 PAIN IN BOTH LOWER EXTREMITIES: ICD-10-CM

## 2021-01-01 DIAGNOSIS — M79.662 PAIN IN BOTH LOWER LEGS: ICD-10-CM

## 2021-01-01 DIAGNOSIS — R13.10 DYSPHAGIA: ICD-10-CM

## 2021-01-01 DIAGNOSIS — R47.89 OTHER SPEECH DISTURBANCES: ICD-10-CM

## 2021-01-01 DIAGNOSIS — F10.20 ALCOHOL USE DISORDER, MODERATE, DEPENDENCE: ICD-10-CM

## 2021-01-01 DIAGNOSIS — K86.0 ALCOHOL-INDUCED CHRONIC PANCREATITIS: ICD-10-CM

## 2021-01-01 DIAGNOSIS — Z01.00 EYE EXAM, ROUTINE: Primary | ICD-10-CM

## 2021-01-01 DIAGNOSIS — M48.062 SPINAL STENOSIS OF LUMBAR REGION WITH NEUROGENIC CLAUDICATION: ICD-10-CM

## 2021-01-01 DIAGNOSIS — M79.89 FOOT SWELLING: ICD-10-CM

## 2021-01-01 DIAGNOSIS — Z93.1 STATUS POST INSERTION OF PERCUTANEOUS ENDOSCOPIC GASTROSTOMY (PEG) TUBE: ICD-10-CM

## 2021-01-01 DIAGNOSIS — E78.1 HYPERTRIGLYCERIDEMIA: Chronic | ICD-10-CM

## 2021-01-01 DIAGNOSIS — R13.12 OROPHARYNGEAL DYSPHAGIA: ICD-10-CM

## 2021-01-01 DIAGNOSIS — M79.661 PAIN IN BOTH LOWER LEGS: ICD-10-CM

## 2021-01-01 DIAGNOSIS — J44.89 CHRONIC OBSTRUCTIVE BRONCHITIS WITH PULMONARY EMPHYSEMA: Primary | ICD-10-CM

## 2021-01-01 DIAGNOSIS — K86.1 PANCREATITIS, CHRONIC: ICD-10-CM

## 2021-01-01 DIAGNOSIS — Z86.010 PERSONAL HISTORY OF COLONIC POLYPS: ICD-10-CM

## 2021-01-01 DIAGNOSIS — M21.6X2 ACQUIRED BILATERAL PES CAVUS: ICD-10-CM

## 2021-01-01 DIAGNOSIS — B37.0 THRUSH: ICD-10-CM

## 2021-01-01 DIAGNOSIS — B37.0 THRUSH, ORAL: ICD-10-CM

## 2021-01-01 DIAGNOSIS — Z96.89 CHEST TUBE IN PLACE: Primary | ICD-10-CM

## 2021-01-01 DIAGNOSIS — R79.89 ELEVATED LFTS: Primary | ICD-10-CM

## 2021-01-01 DIAGNOSIS — I73.9 PERIPHERAL ARTERY DISEASE: Primary | ICD-10-CM

## 2021-01-01 DIAGNOSIS — K13.70 ORAL LESION: ICD-10-CM

## 2021-01-01 DIAGNOSIS — J69.0 ASPIRATION PNEUMONITIS: ICD-10-CM

## 2021-01-01 DIAGNOSIS — H52.03 HYPEROPIA WITH PRESBYOPIA OF BOTH EYES: ICD-10-CM

## 2021-01-01 DIAGNOSIS — C44.91 BASAL CELL CARCINOMA OF SKIN: ICD-10-CM

## 2021-01-01 DIAGNOSIS — G62.9 SMALL FIBER NEUROPATHY: ICD-10-CM

## 2021-01-01 DIAGNOSIS — N40.1 BENIGN PROSTATIC HYPERPLASIA WITH LOWER URINARY TRACT SYMPTOMS, SYMPTOM DETAILS UNSPECIFIED: ICD-10-CM

## 2021-01-01 DIAGNOSIS — R60.1 ANASARCA: ICD-10-CM

## 2021-01-01 DIAGNOSIS — I25.10 CORONARY ARTERIOSCLEROSIS: ICD-10-CM

## 2021-01-01 DIAGNOSIS — E08.00 DIABETES MELLITUS DUE TO UNDERLYING CONDITION WITH HYPEROSMOLARITY WITHOUT COMA, WITHOUT LONG-TERM CURRENT USE OF INSULIN: ICD-10-CM

## 2021-01-01 DIAGNOSIS — Z12.2 ENCOUNTER FOR SCREENING FOR MALIGNANT NEOPLASM OF RESPIRATORY ORGANS: ICD-10-CM

## 2021-01-01 DIAGNOSIS — M20.41 HAMMER TOES OF BOTH FEET: ICD-10-CM

## 2021-01-01 DIAGNOSIS — Z46.81 COUNSELING FOR INSULIN PUMP: ICD-10-CM

## 2021-01-01 DIAGNOSIS — E11.29 TYPE 2 DIABETES MELLITUS WITH MICROALBUMINURIA, WITH LONG-TERM CURRENT USE OF INSULIN: Primary | ICD-10-CM

## 2021-01-01 DIAGNOSIS — I25.10 CORONARY ATHEROSCLEROSIS OF NATIVE CORONARY ARTERY: ICD-10-CM

## 2021-01-01 DIAGNOSIS — J86.9 EMPYEMA OF LUNG: ICD-10-CM

## 2021-01-01 DIAGNOSIS — H61.23 EXCESSIVE CERUMEN IN BOTH EAR CANALS: Primary | ICD-10-CM

## 2021-01-01 DIAGNOSIS — T39.395A NSAID-INDUCED DUODENAL ULCER: ICD-10-CM

## 2021-01-01 DIAGNOSIS — Z72.0 CONTINUOUS TOBACCO ABUSE: ICD-10-CM

## 2021-01-01 DIAGNOSIS — R63.4 ABNORMAL WEIGHT LOSS: Primary | ICD-10-CM

## 2021-01-01 DIAGNOSIS — D64.9 ANEMIA, UNSPECIFIED TYPE: ICD-10-CM

## 2021-01-01 DIAGNOSIS — J18.9 PNEUMONIA OF LEFT LOWER LOBE DUE TO INFECTIOUS ORGANISM: ICD-10-CM

## 2021-01-01 DIAGNOSIS — K76.9 LIVER DISEASE, UNSPECIFIED: ICD-10-CM

## 2021-01-01 DIAGNOSIS — K92.1 BLOOD IN STOOL: Primary | ICD-10-CM

## 2021-01-01 DIAGNOSIS — J18.9 CHRONIC PNEUMONIA: Primary | ICD-10-CM

## 2021-01-01 DIAGNOSIS — Z20.822 EXPOSURE TO COVID-19 VIRUS: Primary | ICD-10-CM

## 2021-01-01 DIAGNOSIS — B37.81 CANDIDA ESOPHAGITIS: ICD-10-CM

## 2021-01-01 DIAGNOSIS — K76.9 LIVER LESION: Primary | ICD-10-CM

## 2021-01-01 DIAGNOSIS — F10.11 ALCOHOL ABUSE, IN REMISSION: ICD-10-CM

## 2021-01-01 DIAGNOSIS — Z93.1 PRESENCE OF EXTERNALLY REMOVABLE PERCUTANEOUS ENDOSCOPIC GASTROSTOMY (PEG) TUBE: ICD-10-CM

## 2021-01-01 DIAGNOSIS — Z85.819 HISTORY OF ORAL CANCER: ICD-10-CM

## 2021-01-01 DIAGNOSIS — R13.12 OROPHARYNGEAL DYSPHAGIA: Primary | ICD-10-CM

## 2021-01-01 DIAGNOSIS — T68.XXXA HYPOTHERMIA, INITIAL ENCOUNTER: ICD-10-CM

## 2021-01-01 DIAGNOSIS — E11.9 DIABETES MELLITUS: ICD-10-CM

## 2021-01-01 DIAGNOSIS — Z79.4 TYPE 2 DIABETES MELLITUS WITH MICROALBUMINURIA, WITH LONG-TERM CURRENT USE OF INSULIN: ICD-10-CM

## 2021-01-01 DIAGNOSIS — J18.9 CHRONIC PNEUMONIA: ICD-10-CM

## 2021-01-01 DIAGNOSIS — E11.42 DM TYPE 2 WITH DIABETIC PERIPHERAL NEUROPATHY: ICD-10-CM

## 2021-01-01 DIAGNOSIS — E11.9 TYPE 2 DIABETES MELLITUS WITHOUT COMPLICATION: ICD-10-CM

## 2021-01-01 DIAGNOSIS — M25.552 LEFT HIP PAIN: ICD-10-CM

## 2021-01-01 DIAGNOSIS — M79.89 LEFT LEG SWELLING: ICD-10-CM

## 2021-01-01 DIAGNOSIS — R60.9 EDEMA, UNSPECIFIED TYPE: ICD-10-CM

## 2021-01-01 DIAGNOSIS — R19.7 DIARRHEA OF PRESUMED INFECTIOUS ORIGIN: Primary | ICD-10-CM

## 2021-01-01 DIAGNOSIS — R63.4 WEIGHT LOSS: Primary | ICD-10-CM

## 2021-01-01 DIAGNOSIS — R49.0 HOARSE: ICD-10-CM

## 2021-01-01 DIAGNOSIS — Z86.010 HX OF COLONIC POLYPS: ICD-10-CM

## 2021-01-01 DIAGNOSIS — Z79.4 TYPE 2 DIABETES MELLITUS WITH MICROALBUMINURIA, WITH LONG-TERM CURRENT USE OF INSULIN: Primary | ICD-10-CM

## 2021-01-01 DIAGNOSIS — J39.2 OROPHARYNGEAL MASS: Primary | ICD-10-CM

## 2021-01-01 DIAGNOSIS — R22.0 SWELLING OF FACE: ICD-10-CM

## 2021-01-01 DIAGNOSIS — I83.92 ASYMPTOMATIC VARICOSE VEINS OF LEFT LOWER EXTREMITY: ICD-10-CM

## 2021-01-01 DIAGNOSIS — M71.22 BAKER'S CYST OF KNEE, LEFT: ICD-10-CM

## 2021-01-01 DIAGNOSIS — R74.8 ELEVATED ALKALINE PHOSPHATASE LEVEL: ICD-10-CM

## 2021-01-01 DIAGNOSIS — M20.42 HAMMER TOES OF BOTH FEET: ICD-10-CM

## 2021-01-01 DIAGNOSIS — R49.0 HOARSENESS: Primary | ICD-10-CM

## 2021-01-01 DIAGNOSIS — K92.1 MELENA: ICD-10-CM

## 2021-01-01 DIAGNOSIS — J86.9 EMPYEMA, LEFT: ICD-10-CM

## 2021-01-01 DIAGNOSIS — K26.9 NSAID-INDUCED DUODENAL ULCER: ICD-10-CM

## 2021-01-01 DIAGNOSIS — A41.9 SEPSIS: ICD-10-CM

## 2021-01-01 DIAGNOSIS — E87.20 METABOLIC ACIDOSIS: ICD-10-CM

## 2021-01-01 DIAGNOSIS — I73.9 PERIPHERAL ARTERY DISEASE: ICD-10-CM

## 2021-01-01 DIAGNOSIS — R65.21 SEPTIC SHOCK: Primary | ICD-10-CM

## 2021-01-01 DIAGNOSIS — I10 HYPERTENSION, UNSPECIFIED TYPE: ICD-10-CM

## 2021-01-01 DIAGNOSIS — J43.9 CHRONIC OBSTRUCTIVE BRONCHITIS WITH PULMONARY EMPHYSEMA: Primary | ICD-10-CM

## 2021-01-01 DIAGNOSIS — R60.0 PEDAL EDEMA: Primary | ICD-10-CM

## 2021-01-01 DIAGNOSIS — E44.0 MODERATE PROTEIN-CALORIE MALNUTRITION: Primary | ICD-10-CM

## 2021-01-01 DIAGNOSIS — Z11.9 ENCOUNTER FOR SCREENING EXAMINATION FOR INFECTIOUS DISEASE: Primary | ICD-10-CM

## 2021-01-01 DIAGNOSIS — Z20.822 EXPOSURE TO COVID-19 VIRUS: ICD-10-CM

## 2021-01-01 DIAGNOSIS — R68.2 DRY MOUTH: ICD-10-CM

## 2021-01-01 DIAGNOSIS — M25.572 LEFT ANKLE PAIN: ICD-10-CM

## 2021-01-01 DIAGNOSIS — S59.909A ELBOW INJURY, INITIAL ENCOUNTER: ICD-10-CM

## 2021-01-01 DIAGNOSIS — E11.9 DIABETES MELLITUS WITHOUT COMPLICATION: ICD-10-CM

## 2021-01-01 DIAGNOSIS — E11.9 COMPREHENSIVE DIABETIC FOOT EXAMINATION, TYPE 2 DM, ENCOUNTER FOR: Primary | ICD-10-CM

## 2021-01-01 DIAGNOSIS — J86.9 EMPYEMA: ICD-10-CM

## 2021-01-01 DIAGNOSIS — K56.1 COLONIC INTUSSUSCEPTION: ICD-10-CM

## 2021-01-01 DIAGNOSIS — J30.1 SEASONAL ALLERGIC RHINITIS DUE TO POLLEN: ICD-10-CM

## 2021-01-01 LAB
A1AT SERPL-MCNC: 114 MG/DL (ref 100–190)
ABO + RH BLD: NORMAL
AFP SERPL-MCNC: <2 NG/ML (ref 0–8.4)
ALBUMIN SERPL BCP-MCNC: 0.8 G/DL (ref 3.5–5.2)
ALBUMIN SERPL BCP-MCNC: 1.2 G/DL (ref 3.5–5.2)
ALBUMIN SERPL BCP-MCNC: 1.2 G/DL (ref 3.5–5.2)
ALBUMIN SERPL BCP-MCNC: 1.6 G/DL (ref 3.5–5.2)
ALBUMIN SERPL BCP-MCNC: 1.7 G/DL (ref 3.5–5.2)
ALBUMIN SERPL BCP-MCNC: 1.8 G/DL (ref 3.5–5.2)
ALLENS TEST: ABNORMAL
ALP SERPL-CCNC: 158 U/L (ref 55–135)
ALP SERPL-CCNC: 228 U/L (ref 55–135)
ALP SERPL-CCNC: 289 U/L (ref 55–135)
ALP SERPL-CCNC: 302 U/L (ref 55–135)
ALP SERPL-CCNC: 492 U/L (ref 55–135)
ALP SERPL-CCNC: 549 U/L (ref 55–135)
ALT SERPL W/O P-5'-P-CCNC: 10 U/L (ref 10–44)
ALT SERPL W/O P-5'-P-CCNC: 19 U/L (ref 10–44)
ALT SERPL W/O P-5'-P-CCNC: 23 U/L (ref 10–44)
ALT SERPL W/O P-5'-P-CCNC: 26 U/L (ref 10–44)
ALT SERPL W/O P-5'-P-CCNC: 44 U/L (ref 10–44)
ALT SERPL W/O P-5'-P-CCNC: 74 U/L (ref 10–44)
ANA PATTERN 1: NORMAL
ANA SER QL IF: POSITIVE
ANA TITR SER IF: NORMAL {TITER}
ANION GAP SERPL CALC-SCNC: 11 MMOL/L (ref 8–16)
ANION GAP SERPL CALC-SCNC: 11 MMOL/L (ref 8–16)
ANION GAP SERPL CALC-SCNC: 12 MMOL/L (ref 8–16)
ANION GAP SERPL CALC-SCNC: 15 MMOL/L (ref 8–16)
ANION GAP SERPL CALC-SCNC: 4 MMOL/L (ref 8–16)
ANION GAP SERPL CALC-SCNC: 5 MMOL/L (ref 8–16)
ANION GAP SERPL CALC-SCNC: 6 MMOL/L (ref 8–16)
ANION GAP SERPL CALC-SCNC: 7 MMOL/L (ref 8–16)
ANION GAP SERPL CALC-SCNC: 8 MMOL/L (ref 8–16)
ANION GAP SERPL CALC-SCNC: 9 MMOL/L (ref 8–16)
ANISOCYTOSIS BLD QL SMEAR: SLIGHT
ANTI SM ANTIBODY: 0.11 RATIO (ref 0–0.99)
ANTI SM/RNP ANTIBODY: 0.22 RATIO (ref 0–0.99)
ANTI-SM INTERPRETATION: NEGATIVE
ANTI-SM/RNP INTERPRETATION: NEGATIVE
ANTI-SSA ANTIBODY: 0.06 RATIO (ref 0–0.99)
ANTI-SSA INTERPRETATION: NEGATIVE
ANTI-SSB ANTIBODY: 0.07 RATIO (ref 0–0.99)
ANTI-SSB INTERPRETATION: NEGATIVE
AST SERPL-CCNC: 14 U/L (ref 10–40)
AST SERPL-CCNC: 25 U/L (ref 10–40)
AST SERPL-CCNC: 39 U/L (ref 10–40)
AST SERPL-CCNC: 42 U/L (ref 10–40)
AST SERPL-CCNC: 52 U/L (ref 10–40)
AST SERPL-CCNC: 74 U/L (ref 10–40)
AV INDEX (PROSTH): 0.9
AV MEAN GRADIENT: 2 MMHG
AV PEAK GRADIENT: 3 MMHG
AV VALVE AREA: 3.17 CM2
AV VELOCITY RATIO: 0.97
BACTERIA #/AREA URNS HPF: ABNORMAL /HPF
BACTERIA BLD CULT: NORMAL
BACTERIA BLD CULT: NORMAL
BACTERIA SPEC AEROBE CULT: ABNORMAL
BACTERIA SPEC ANAEROBE CULT: NORMAL
BACTERIA UR CULT: NO GROWTH
BASOPHILS # BLD AUTO: 0.03 K/UL (ref 0–0.2)
BASOPHILS # BLD AUTO: 0.04 K/UL (ref 0–0.2)
BASOPHILS # BLD AUTO: 0.04 K/UL (ref 0–0.2)
BASOPHILS # BLD AUTO: 0.05 K/UL (ref 0–0.2)
BASOPHILS # BLD AUTO: 0.05 K/UL (ref 0–0.2)
BASOPHILS # BLD AUTO: 0.06 K/UL (ref 0–0.2)
BASOPHILS # BLD AUTO: 0.07 K/UL (ref 0–0.2)
BASOPHILS # BLD AUTO: 0.07 K/UL (ref 0–0.2)
BASOPHILS # BLD AUTO: 0.08 K/UL (ref 0–0.2)
BASOPHILS # BLD AUTO: 0.09 K/UL (ref 0–0.2)
BASOPHILS # BLD AUTO: 0.1 K/UL (ref 0–0.2)
BASOPHILS # BLD AUTO: 0.1 K/UL (ref 0–0.2)
BASOPHILS # BLD AUTO: 0.11 K/UL (ref 0–0.2)
BASOPHILS # BLD AUTO: 0.12 K/UL (ref 0–0.2)
BASOPHILS # BLD AUTO: 0.12 K/UL (ref 0–0.2)
BASOPHILS # BLD AUTO: 0.17 K/UL (ref 0–0.2)
BASOPHILS NFR BLD: 0 % (ref 0–1.9)
BASOPHILS NFR BLD: 0.2 % (ref 0–1.9)
BASOPHILS NFR BLD: 0.3 % (ref 0–1.9)
BASOPHILS NFR BLD: 0.4 % (ref 0–1.9)
BASOPHILS NFR BLD: 0.5 % (ref 0–1.9)
BASOPHILS NFR BLD: 0.6 % (ref 0–1.9)
BASOPHILS NFR BLD: 0.7 % (ref 0–1.9)
BASOPHILS NFR BLD: 0.7 % (ref 0–1.9)
BASOPHILS NFR BLD: 0.8 % (ref 0–1.9)
BASOPHILS NFR BLD: 0.9 % (ref 0–1.9)
BASOPHILS NFR BLD: 1 % (ref 0–1.9)
BASOPHILS NFR BLD: 1.1 % (ref 0–1.9)
BASOPHILS NFR BLD: 1.5 % (ref 0–1.9)
BILIRUB DIRECT SERPL-MCNC: 0.3 MG/DL (ref 0.1–0.3)
BILIRUB SERPL-MCNC: 0.1 MG/DL (ref 0.1–1)
BILIRUB SERPL-MCNC: 0.3 MG/DL (ref 0.1–1)
BILIRUB SERPL-MCNC: 0.3 MG/DL (ref 0.1–1)
BILIRUB SERPL-MCNC: 0.5 MG/DL (ref 0.1–1)
BILIRUB SERPL-MCNC: 0.6 MG/DL (ref 0.1–1)
BILIRUB SERPL-MCNC: 0.6 MG/DL (ref 0.1–1)
BILIRUB UR QL STRIP: NEGATIVE
BLD GP AB SCN CELLS X3 SERPL QL: NORMAL
BLD PROD TYP BPU: NORMAL
BLOOD UNIT EXPIRATION DATE: NORMAL
BLOOD UNIT TYPE CODE: 600
BLOOD UNIT TYPE CODE: 6200
BLOOD UNIT TYPE: NORMAL
BNP SERPL-MCNC: 1115 PG/ML (ref 0–99)
BNP SERPL-MCNC: 278 PG/ML (ref 0–99)
BSA FOR ECHO PROCEDURE: 1.6 M2
BUN SERPL-MCNC: 13 MG/DL (ref 8–23)
BUN SERPL-MCNC: 6 MG/DL (ref 8–23)
BUN SERPL-MCNC: 7 MG/DL (ref 8–23)
BUN SERPL-MCNC: 8 MG/DL (ref 8–23)
BUN SERPL-MCNC: 9 MG/DL (ref 8–23)
CALCIUM SERPL-MCNC: 6.8 MG/DL (ref 8.7–10.5)
CALCIUM SERPL-MCNC: 7.1 MG/DL (ref 8.7–10.5)
CALCIUM SERPL-MCNC: 7.2 MG/DL (ref 8.7–10.5)
CALCIUM SERPL-MCNC: 7.3 MG/DL (ref 8.7–10.5)
CALCIUM SERPL-MCNC: 7.4 MG/DL (ref 8.7–10.5)
CALCIUM SERPL-MCNC: 7.5 MG/DL (ref 8.7–10.5)
CALCIUM SERPL-MCNC: 7.6 MG/DL (ref 8.7–10.5)
CALCIUM SERPL-MCNC: 7.7 MG/DL (ref 8.7–10.5)
CALCIUM SERPL-MCNC: 7.8 MG/DL (ref 8.7–10.5)
CALCIUM SERPL-MCNC: 7.8 MG/DL (ref 8.7–10.5)
CALCIUM SERPL-MCNC: 7.9 MG/DL (ref 8.7–10.5)
CALCIUM SERPL-MCNC: 8 MG/DL (ref 8.7–10.5)
CALCIUM SERPL-MCNC: 8.3 MG/DL (ref 8.7–10.5)
CHLORIDE SERPL-SCNC: 100 MMOL/L (ref 95–110)
CHLORIDE SERPL-SCNC: 101 MMOL/L (ref 95–110)
CHLORIDE SERPL-SCNC: 102 MMOL/L (ref 95–110)
CHLORIDE SERPL-SCNC: 89 MMOL/L (ref 95–110)
CHLORIDE SERPL-SCNC: 91 MMOL/L (ref 95–110)
CHLORIDE SERPL-SCNC: 93 MMOL/L (ref 95–110)
CHLORIDE SERPL-SCNC: 95 MMOL/L (ref 95–110)
CHLORIDE SERPL-SCNC: 96 MMOL/L (ref 95–110)
CHLORIDE SERPL-SCNC: 97 MMOL/L (ref 95–110)
CHLORIDE SERPL-SCNC: 97 MMOL/L (ref 95–110)
CHLORIDE SERPL-SCNC: 98 MMOL/L (ref 95–110)
CHLORIDE SERPL-SCNC: 99 MMOL/L (ref 95–110)
CHOLEST SERPL-MCNC: 75 MG/DL (ref 120–199)
CHOLEST/HDLC SERPL: 1.7 {RATIO} (ref 2–5)
CLARITY UR: CLEAR
CO2 SERPL-SCNC: 15 MMOL/L (ref 23–29)
CO2 SERPL-SCNC: 24 MMOL/L (ref 23–29)
CO2 SERPL-SCNC: 25 MMOL/L (ref 23–29)
CO2 SERPL-SCNC: 25 MMOL/L (ref 23–29)
CO2 SERPL-SCNC: 26 MMOL/L (ref 23–29)
CO2 SERPL-SCNC: 27 MMOL/L (ref 23–29)
CO2 SERPL-SCNC: 28 MMOL/L (ref 23–29)
CO2 SERPL-SCNC: 29 MMOL/L (ref 23–29)
CO2 SERPL-SCNC: 30 MMOL/L (ref 23–29)
CO2 SERPL-SCNC: 30 MMOL/L (ref 23–29)
CO2 SERPL-SCNC: 31 MMOL/L (ref 23–29)
CO2 SERPL-SCNC: 31 MMOL/L (ref 23–29)
CODING SYSTEM: NORMAL
COLOR UR: YELLOW
CREAT SERPL-MCNC: 0.7 MG/DL (ref 0.5–1.4)
CREAT SERPL-MCNC: 0.7 MG/DL (ref 0.5–1.4)
CREAT SERPL-MCNC: 0.8 MG/DL (ref 0.5–1.4)
CREAT SERPL-MCNC: 0.9 MG/DL (ref 0.5–1.4)
CREAT SERPL-MCNC: 1 MG/DL (ref 0.5–1.4)
CREAT SERPL-MCNC: 1.1 MG/DL (ref 0.5–1.4)
CREAT SERPL-MCNC: 1.1 MG/DL (ref 0.5–1.4)
CREAT SERPL-MCNC: 1.2 MG/DL (ref 0.5–1.4)
CREAT SERPL-MCNC: 1.3 MG/DL (ref 0.5–1.4)
CREAT SERPL-MCNC: 1.5 MG/DL (ref 0.5–1.4)
CTP QC/QA: YES
CV ECHO LV RWT: 0.4 CM
DELSYS: ABNORMAL
DIFFERENTIAL METHOD: ABNORMAL
DISPENSE STATUS: NORMAL
DOP CALC AO PEAK VEL: 0.9 M/S
DOP CALC AO VTI: 14.39 CM
DOP CALC LVOT AREA: 3.5 CM2
DOP CALC LVOT DIAMETER: 2.12 CM
DOP CALC LVOT PEAK VEL: 0.87 M/S
DOP CALC LVOT STROKE VOLUME: 45.55 CM3
DOP CALCLVOT PEAK VEL VTI: 12.91 CM
DSDNA AB SER-ACNC: NORMAL [IU]/ML
E WAVE DECELERATION TIME: 260.07 MSEC
E/A RATIO: 0.55
E/E' RATIO: 8 M/S
ECHO LV POSTERIOR WALL: 0.89 CM (ref 0.6–1.1)
EJECTION FRACTION: 60 %
EOSINOPHIL # BLD AUTO: 0 K/UL (ref 0–0.5)
EOSINOPHIL # BLD AUTO: 0.1 K/UL (ref 0–0.5)
EOSINOPHIL # BLD AUTO: 0.2 K/UL (ref 0–0.5)
EOSINOPHIL # BLD AUTO: 0.3 K/UL (ref 0–0.5)
EOSINOPHIL # BLD AUTO: 0.3 K/UL (ref 0–0.5)
EOSINOPHIL NFR BLD: 0 % (ref 0–8)
EOSINOPHIL NFR BLD: 0.1 % (ref 0–8)
EOSINOPHIL NFR BLD: 0.2 % (ref 0–8)
EOSINOPHIL NFR BLD: 0.3 % (ref 0–8)
EOSINOPHIL NFR BLD: 0.7 % (ref 0–8)
EOSINOPHIL NFR BLD: 0.8 % (ref 0–8)
EOSINOPHIL NFR BLD: 0.8 % (ref 0–8)
EOSINOPHIL NFR BLD: 0.9 % (ref 0–8)
EOSINOPHIL NFR BLD: 0.9 % (ref 0–8)
EOSINOPHIL NFR BLD: 1.1 % (ref 0–8)
EOSINOPHIL NFR BLD: 1.3 % (ref 0–8)
EOSINOPHIL NFR BLD: 1.3 % (ref 0–8)
EOSINOPHIL NFR BLD: 1.5 % (ref 0–8)
EOSINOPHIL NFR BLD: 1.6 % (ref 0–8)
EOSINOPHIL NFR BLD: 1.7 % (ref 0–8)
EOSINOPHIL NFR BLD: 2.2 % (ref 0–8)
EOSINOPHIL NFR BLD: 2.9 % (ref 0–8)
EOSINOPHIL NFR BLD: 3 % (ref 0–8)
ERYTHROCYTE [DISTWIDTH] IN BLOOD BY AUTOMATED COUNT: 13 % (ref 11.5–14.5)
ERYTHROCYTE [DISTWIDTH] IN BLOOD BY AUTOMATED COUNT: 13.7 % (ref 11.5–14.5)
ERYTHROCYTE [DISTWIDTH] IN BLOOD BY AUTOMATED COUNT: 13.9 % (ref 11.5–14.5)
ERYTHROCYTE [DISTWIDTH] IN BLOOD BY AUTOMATED COUNT: 14.9 % (ref 11.5–14.5)
ERYTHROCYTE [DISTWIDTH] IN BLOOD BY AUTOMATED COUNT: 15.1 % (ref 11.5–14.5)
ERYTHROCYTE [DISTWIDTH] IN BLOOD BY AUTOMATED COUNT: 15.3 % (ref 11.5–14.5)
ERYTHROCYTE [DISTWIDTH] IN BLOOD BY AUTOMATED COUNT: 15.4 % (ref 11.5–14.5)
ERYTHROCYTE [DISTWIDTH] IN BLOOD BY AUTOMATED COUNT: 15.4 % (ref 11.5–14.5)
ERYTHROCYTE [DISTWIDTH] IN BLOOD BY AUTOMATED COUNT: 15.6 % (ref 11.5–14.5)
ERYTHROCYTE [DISTWIDTH] IN BLOOD BY AUTOMATED COUNT: 15.8 % (ref 11.5–14.5)
ERYTHROCYTE [DISTWIDTH] IN BLOOD BY AUTOMATED COUNT: 15.8 % (ref 11.5–14.5)
ERYTHROCYTE [DISTWIDTH] IN BLOOD BY AUTOMATED COUNT: 16.1 % (ref 11.5–14.5)
ERYTHROCYTE [DISTWIDTH] IN BLOOD BY AUTOMATED COUNT: 16.4 % (ref 11.5–14.5)
ERYTHROCYTE [DISTWIDTH] IN BLOOD BY AUTOMATED COUNT: 16.8 % (ref 11.5–14.5)
ERYTHROCYTE [DISTWIDTH] IN BLOOD BY AUTOMATED COUNT: 17 % (ref 11.5–14.5)
ERYTHROCYTE [DISTWIDTH] IN BLOOD BY AUTOMATED COUNT: 17.2 % (ref 11.5–14.5)
ERYTHROCYTE [DISTWIDTH] IN BLOOD BY AUTOMATED COUNT: 17.4 % (ref 11.5–14.5)
ERYTHROCYTE [DISTWIDTH] IN BLOOD BY AUTOMATED COUNT: 17.5 % (ref 11.5–14.5)
ERYTHROCYTE [DISTWIDTH] IN BLOOD BY AUTOMATED COUNT: 17.8 % (ref 11.5–14.5)
ERYTHROCYTE [DISTWIDTH] IN BLOOD BY AUTOMATED COUNT: 19.3 % (ref 11.5–14.5)
ERYTHROCYTE [DISTWIDTH] IN BLOOD BY AUTOMATED COUNT: 19.4 % (ref 11.5–14.5)
ERYTHROCYTE [DISTWIDTH] IN BLOOD BY AUTOMATED COUNT: 19.4 % (ref 11.5–14.5)
ERYTHROCYTE [DISTWIDTH] IN BLOOD BY AUTOMATED COUNT: 19.5 % (ref 11.5–14.5)
EST. GFR  (AFRICAN AMERICAN): 54 ML/MIN/1.73 M^2
EST. GFR  (AFRICAN AMERICAN): >60 ML/MIN/1.73 M^2
EST. GFR  (NON AFRICAN AMERICAN): 47 ML/MIN/1.73 M^2
EST. GFR  (NON AFRICAN AMERICAN): 56 ML/MIN/1.73 M^2
EST. GFR  (NON AFRICAN AMERICAN): >60 ML/MIN/1.73 M^2
ESTIMATED AVG GLUCOSE: 174 MG/DL (ref 68–131)
ESTIMATED AVG GLUCOSE: 260 MG/DL (ref 68–131)
FERRITIN SERPL-MCNC: 313 NG/ML (ref 20–300)
FRACTIONAL SHORTENING: 26 % (ref 28–44)
GLUCOSE SERPL-MCNC: 108 MG/DL (ref 70–110)
GLUCOSE SERPL-MCNC: 110 MG/DL (ref 70–110)
GLUCOSE SERPL-MCNC: 130 MG/DL (ref 70–110)
GLUCOSE SERPL-MCNC: 133 MG/DL (ref 70–110)
GLUCOSE SERPL-MCNC: 134 MG/DL (ref 70–110)
GLUCOSE SERPL-MCNC: 139 MG/DL (ref 70–110)
GLUCOSE SERPL-MCNC: 154 MG/DL (ref 70–110)
GLUCOSE SERPL-MCNC: 155 MG/DL (ref 70–110)
GLUCOSE SERPL-MCNC: 158 MG/DL (ref 70–110)
GLUCOSE SERPL-MCNC: 176 MG/DL (ref 70–110)
GLUCOSE SERPL-MCNC: 18 MG/DL (ref 70–110)
GLUCOSE SERPL-MCNC: 194 MG/DL (ref 70–110)
GLUCOSE SERPL-MCNC: 198 MG/DL (ref 70–110)
GLUCOSE SERPL-MCNC: 216 MG/DL (ref 70–110)
GLUCOSE SERPL-MCNC: 218 MG/DL (ref 70–110)
GLUCOSE SERPL-MCNC: 229 MG/DL (ref 70–110)
GLUCOSE SERPL-MCNC: 250 MG/DL (ref 70–110)
GLUCOSE SERPL-MCNC: 263 MG/DL (ref 70–110)
GLUCOSE SERPL-MCNC: 271 MG/DL (ref 70–110)
GLUCOSE SERPL-MCNC: 275 MG/DL (ref 70–110)
GLUCOSE SERPL-MCNC: 29 MG/DL (ref 70–110)
GLUCOSE SERPL-MCNC: 339 MG/DL (ref 70–110)
GLUCOSE SERPL-MCNC: 399 MG/DL (ref 70–110)
GLUCOSE SERPL-MCNC: 71 MG/DL (ref 70–110)
GLUCOSE UR QL STRIP: NEGATIVE
HBA1C MFR BLD: 10.7 % (ref 4–5.6)
HBA1C MFR BLD: 7.7 % (ref 4–5.6)
HBV SURFACE AG SERPL QL IA: NEGATIVE
HCO3 UR-SCNC: 13 MMOL/L (ref 24–28)
HCT VFR BLD AUTO: 19.9 % (ref 40–54)
HCT VFR BLD AUTO: 21.8 % (ref 40–54)
HCT VFR BLD AUTO: 22 % (ref 40–54)
HCT VFR BLD AUTO: 22.7 % (ref 40–54)
HCT VFR BLD AUTO: 23.2 % (ref 40–54)
HCT VFR BLD AUTO: 23.3 % (ref 40–54)
HCT VFR BLD AUTO: 24 % (ref 40–54)
HCT VFR BLD AUTO: 24.1 % (ref 40–54)
HCT VFR BLD AUTO: 24.4 % (ref 40–54)
HCT VFR BLD AUTO: 24.5 % (ref 40–54)
HCT VFR BLD AUTO: 24.9 % (ref 40–54)
HCT VFR BLD AUTO: 24.9 % (ref 40–54)
HCT VFR BLD AUTO: 25.1 % (ref 40–54)
HCT VFR BLD AUTO: 25.7 % (ref 40–54)
HCT VFR BLD AUTO: 26.4 % (ref 40–54)
HCT VFR BLD AUTO: 26.8 % (ref 40–54)
HCT VFR BLD AUTO: 27.3 % (ref 40–54)
HCT VFR BLD AUTO: 27.5 % (ref 40–54)
HCT VFR BLD AUTO: 27.7 % (ref 40–54)
HCT VFR BLD AUTO: 28 % (ref 40–54)
HCT VFR BLD AUTO: 28.3 % (ref 40–54)
HCT VFR BLD AUTO: 28.7 % (ref 40–54)
HCT VFR BLD AUTO: 28.7 % (ref 40–54)
HCT VFR BLD AUTO: 29 % (ref 40–54)
HCT VFR BLD AUTO: 29.2 % (ref 40–54)
HCV AB SERPL QL IA: NEGATIVE
HDLC SERPL-MCNC: 43 MG/DL (ref 40–75)
HDLC SERPL: 57.3 % (ref 20–50)
HGB BLD-MCNC: 10.1 G/DL (ref 14–18)
HGB BLD-MCNC: 6.5 G/DL (ref 14–18)
HGB BLD-MCNC: 7.1 G/DL (ref 14–18)
HGB BLD-MCNC: 7.1 G/DL (ref 14–18)
HGB BLD-MCNC: 7.3 G/DL (ref 14–18)
HGB BLD-MCNC: 7.6 G/DL (ref 14–18)
HGB BLD-MCNC: 7.6 G/DL (ref 14–18)
HGB BLD-MCNC: 7.7 G/DL (ref 14–18)
HGB BLD-MCNC: 7.8 G/DL (ref 14–18)
HGB BLD-MCNC: 7.8 G/DL (ref 14–18)
HGB BLD-MCNC: 7.9 G/DL (ref 14–18)
HGB BLD-MCNC: 8 G/DL (ref 14–18)
HGB BLD-MCNC: 8.1 G/DL (ref 14–18)
HGB BLD-MCNC: 8.3 G/DL (ref 14–18)
HGB BLD-MCNC: 8.4 G/DL (ref 14–18)
HGB BLD-MCNC: 8.5 G/DL (ref 14–18)
HGB BLD-MCNC: 8.6 G/DL (ref 14–18)
HGB BLD-MCNC: 8.7 G/DL (ref 14–18)
HGB BLD-MCNC: 8.9 G/DL (ref 14–18)
HGB BLD-MCNC: 8.9 G/DL (ref 14–18)
HGB BLD-MCNC: 9 G/DL (ref 14–18)
HGB BLD-MCNC: 9.2 G/DL (ref 14–18)
HGB BLD-MCNC: 9.3 G/DL (ref 14–18)
HGB BLD-MCNC: 9.6 G/DL (ref 14–18)
HGB BLD-MCNC: 9.6 G/DL (ref 14–18)
HGB UR QL STRIP: NEGATIVE
HIV 1+2 AB+HIV1 P24 AG SERPL QL IA: NEGATIVE
HYALINE CASTS #/AREA URNS LPF: 4 /LPF
HYPOCHROMIA BLD QL SMEAR: ABNORMAL
IGG SERPL-MCNC: 1532 MG/DL (ref 650–1600)
IMM GRANULOCYTES # BLD AUTO: 0.03 K/UL (ref 0–0.04)
IMM GRANULOCYTES # BLD AUTO: 0.04 K/UL (ref 0–0.04)
IMM GRANULOCYTES # BLD AUTO: 0.05 K/UL (ref 0–0.04)
IMM GRANULOCYTES # BLD AUTO: 0.06 K/UL (ref 0–0.04)
IMM GRANULOCYTES # BLD AUTO: 0.06 K/UL (ref 0–0.04)
IMM GRANULOCYTES # BLD AUTO: 0.07 K/UL (ref 0–0.04)
IMM GRANULOCYTES # BLD AUTO: 0.08 K/UL (ref 0–0.04)
IMM GRANULOCYTES # BLD AUTO: 0.1 K/UL (ref 0–0.04)
IMM GRANULOCYTES # BLD AUTO: 0.17 K/UL (ref 0–0.04)
IMM GRANULOCYTES # BLD AUTO: ABNORMAL K/UL (ref 0–0.04)
IMM GRANULOCYTES NFR BLD AUTO: 0.2 % (ref 0–0.5)
IMM GRANULOCYTES NFR BLD AUTO: 0.3 % (ref 0–0.5)
IMM GRANULOCYTES NFR BLD AUTO: 0.4 % (ref 0–0.5)
IMM GRANULOCYTES NFR BLD AUTO: 0.5 % (ref 0–0.5)
IMM GRANULOCYTES NFR BLD AUTO: 0.6 % (ref 0–0.5)
IMM GRANULOCYTES NFR BLD AUTO: 0.6 % (ref 0–0.5)
IMM GRANULOCYTES NFR BLD AUTO: 0.7 % (ref 0–0.5)
IMM GRANULOCYTES NFR BLD AUTO: 0.7 % (ref 0–0.5)
IMM GRANULOCYTES NFR BLD AUTO: 1 % (ref 0–0.5)
IMM GRANULOCYTES NFR BLD AUTO: ABNORMAL % (ref 0–0.5)
INR PPP: 1 (ref 0.8–1.2)
INR PPP: 1.1 (ref 0.8–1.2)
INR PPP: 3.1 (ref 0.8–1.2)
INTERVENTRICULAR SEPTUM: 0.93 CM (ref 0.6–1.1)
IVRT: 288.3 MSEC
KETONES UR QL STRIP: NEGATIVE
LA MAJOR: 3.37 CM
LACTATE SERPL-SCNC: 11.8 MMOL/L (ref 0.5–2.2)
LACTATE SERPL-SCNC: 3.3 MMOL/L (ref 0.5–2.2)
LACTATE SERPL-SCNC: 3.3 MMOL/L (ref 0.5–2.2)
LDLC SERPL CALC-MCNC: 21.6 MG/DL (ref 63–159)
LEFT ABI: 0.91
LEFT ANT TIBIAL SYS PSV: 64 CM/S
LEFT ARM BP: 152 MMHG
LEFT ATRIUM SIZE: 3.25 CM
LEFT CFA PSV: 130 CM/S
LEFT DORSALIS PEDIS: 138 MMHG
LEFT EXTERNAL ILIAC PSV: 90 CM/S
LEFT EYE DM RETINOPATHY: NEGATIVE
LEFT INTERNAL DIMENSION IN SYSTOLE: 3.25 CM (ref 2.1–4)
LEFT PERONEAL SYS PSV: 40 CM/S
LEFT POPLITEAL PSV: 45 CM/S
LEFT POST TIBIAL SYS PSV: 27 CM/S
LEFT POSTERIOR TIBIAL: 123 MMHG
LEFT PROFUNDA SYS PSV: 99 CM/S
LEFT SUPER FEMORAL DIST SYS PSV: 40 CM/S
LEFT SUPER FEMORAL MID SYS PSV: 94 CM/S
LEFT SUPER FEMORAL OSTIAL SYS PSV: 85 CM/S
LEFT SUPER FEMORAL PROX SYS PSV: 81 CM/S
LEFT TBI: 0.84
LEFT TIB/PER TRUNK SYS PSV: 59 CM/S
LEFT TOE PRESSURE: 128 MMHG
LEFT VENTRICLE DIASTOLIC VOLUME INDEX: 53.21 ML/M2
LEFT VENTRICLE DIASTOLIC VOLUME: 88.33 ML
LEFT VENTRICLE MASS INDEX: 79 G/M2
LEFT VENTRICLE SYSTOLIC VOLUME INDEX: 25.6 ML/M2
LEFT VENTRICLE SYSTOLIC VOLUME: 42.49 ML
LEFT VENTRICULAR INTERNAL DIMENSION IN DIASTOLE: 4.41 CM (ref 3.5–6)
LEFT VENTRICULAR MASS: 130.42 G
LEUKOCYTE ESTERASE UR QL STRIP: ABNORMAL
LIPASE SERPL-CCNC: 19 U/L (ref 4–60)
LV LATERAL E/E' RATIO: 7.33 M/S
LV SEPTAL E/E' RATIO: 8.8 M/S
LYMPHOCYTES # BLD AUTO: 1.7 K/UL (ref 1–4.8)
LYMPHOCYTES # BLD AUTO: 1.8 K/UL (ref 1–4.8)
LYMPHOCYTES # BLD AUTO: 2 K/UL (ref 1–4.8)
LYMPHOCYTES # BLD AUTO: 2.4 K/UL (ref 1–4.8)
LYMPHOCYTES # BLD AUTO: 2.4 K/UL (ref 1–4.8)
LYMPHOCYTES # BLD AUTO: 2.5 K/UL (ref 1–4.8)
LYMPHOCYTES # BLD AUTO: 2.6 K/UL (ref 1–4.8)
LYMPHOCYTES # BLD AUTO: 2.6 K/UL (ref 1–4.8)
LYMPHOCYTES # BLD AUTO: 2.7 K/UL (ref 1–4.8)
LYMPHOCYTES # BLD AUTO: 2.9 K/UL (ref 1–4.8)
LYMPHOCYTES # BLD AUTO: 3 K/UL (ref 1–4.8)
LYMPHOCYTES # BLD AUTO: 3.2 K/UL (ref 1–4.8)
LYMPHOCYTES # BLD AUTO: 3.2 K/UL (ref 1–4.8)
LYMPHOCYTES # BLD AUTO: 3.3 K/UL (ref 1–4.8)
LYMPHOCYTES # BLD AUTO: 3.4 K/UL (ref 1–4.8)
LYMPHOCYTES # BLD AUTO: 3.5 K/UL (ref 1–4.8)
LYMPHOCYTES # BLD AUTO: 3.6 K/UL (ref 1–4.8)
LYMPHOCYTES # BLD AUTO: 3.8 K/UL (ref 1–4.8)
LYMPHOCYTES # BLD AUTO: 4 K/UL (ref 1–4.8)
LYMPHOCYTES # BLD AUTO: 4 K/UL (ref 1–4.8)
LYMPHOCYTES NFR BLD: 10.2 % (ref 18–48)
LYMPHOCYTES NFR BLD: 13.8 % (ref 18–48)
LYMPHOCYTES NFR BLD: 16.4 % (ref 18–48)
LYMPHOCYTES NFR BLD: 17.2 % (ref 18–48)
LYMPHOCYTES NFR BLD: 17.4 % (ref 18–48)
LYMPHOCYTES NFR BLD: 19.1 % (ref 18–48)
LYMPHOCYTES NFR BLD: 20.2 % (ref 18–48)
LYMPHOCYTES NFR BLD: 22.7 % (ref 18–48)
LYMPHOCYTES NFR BLD: 23 % (ref 18–48)
LYMPHOCYTES NFR BLD: 23.2 % (ref 18–48)
LYMPHOCYTES NFR BLD: 23.6 % (ref 18–48)
LYMPHOCYTES NFR BLD: 24.2 % (ref 18–48)
LYMPHOCYTES NFR BLD: 26 % (ref 18–48)
LYMPHOCYTES NFR BLD: 26 % (ref 18–48)
LYMPHOCYTES NFR BLD: 27.2 % (ref 18–48)
LYMPHOCYTES NFR BLD: 28.3 % (ref 18–48)
LYMPHOCYTES NFR BLD: 28.6 % (ref 18–48)
LYMPHOCYTES NFR BLD: 29.9 % (ref 18–48)
LYMPHOCYTES NFR BLD: 30.6 % (ref 18–48)
LYMPHOCYTES NFR BLD: 31 % (ref 18–48)
LYMPHOCYTES NFR BLD: 31.6 % (ref 18–48)
LYMPHOCYTES NFR BLD: 31.6 % (ref 18–48)
LYMPHOCYTES NFR BLD: 31.8 % (ref 18–48)
LYMPHOCYTES NFR BLD: 34.7 % (ref 18–48)
LYMPHOCYTES NFR BLD: 35.4 % (ref 18–48)
MAGNESIUM SERPL-MCNC: 1.2 MG/DL (ref 1.6–2.6)
MAGNESIUM SERPL-MCNC: 1.5 MG/DL (ref 1.6–2.6)
MCH RBC QN AUTO: 27.7 PG (ref 27–31)
MCH RBC QN AUTO: 28.1 PG (ref 27–31)
MCH RBC QN AUTO: 28.1 PG (ref 27–31)
MCH RBC QN AUTO: 28.2 PG (ref 27–31)
MCH RBC QN AUTO: 28.4 PG (ref 27–31)
MCH RBC QN AUTO: 28.4 PG (ref 27–31)
MCH RBC QN AUTO: 28.5 PG (ref 27–31)
MCH RBC QN AUTO: 28.5 PG (ref 27–31)
MCH RBC QN AUTO: 28.6 PG (ref 27–31)
MCH RBC QN AUTO: 28.6 PG (ref 27–31)
MCH RBC QN AUTO: 28.7 PG (ref 27–31)
MCH RBC QN AUTO: 28.8 PG (ref 27–31)
MCH RBC QN AUTO: 29 PG (ref 27–31)
MCH RBC QN AUTO: 29.2 PG (ref 27–31)
MCH RBC QN AUTO: 29.4 PG (ref 27–31)
MCH RBC QN AUTO: 29.6 PG (ref 27–31)
MCH RBC QN AUTO: 29.7 PG (ref 27–31)
MCH RBC QN AUTO: 29.8 PG (ref 27–31)
MCH RBC QN AUTO: 30.4 PG (ref 27–31)
MCH RBC QN AUTO: 30.8 PG (ref 27–31)
MCH RBC QN AUTO: 31 PG (ref 27–31)
MCH RBC QN AUTO: 31 PG (ref 27–31)
MCHC RBC AUTO-ENTMCNC: 31.1 G/DL (ref 32–36)
MCHC RBC AUTO-ENTMCNC: 31.7 G/DL (ref 32–36)
MCHC RBC AUTO-ENTMCNC: 31.7 G/DL (ref 32–36)
MCHC RBC AUTO-ENTMCNC: 32 G/DL (ref 32–36)
MCHC RBC AUTO-ENTMCNC: 32 G/DL (ref 32–36)
MCHC RBC AUTO-ENTMCNC: 32.1 G/DL (ref 32–36)
MCHC RBC AUTO-ENTMCNC: 32.1 G/DL (ref 32–36)
MCHC RBC AUTO-ENTMCNC: 32.2 G/DL (ref 32–36)
MCHC RBC AUTO-ENTMCNC: 32.2 G/DL (ref 32–36)
MCHC RBC AUTO-ENTMCNC: 32.3 G/DL (ref 32–36)
MCHC RBC AUTO-ENTMCNC: 32.3 G/DL (ref 32–36)
MCHC RBC AUTO-ENTMCNC: 32.4 G/DL (ref 32–36)
MCHC RBC AUTO-ENTMCNC: 32.4 G/DL (ref 32–36)
MCHC RBC AUTO-ENTMCNC: 32.5 G/DL (ref 32–36)
MCHC RBC AUTO-ENTMCNC: 32.6 G/DL (ref 32–36)
MCHC RBC AUTO-ENTMCNC: 32.7 G/DL (ref 32–36)
MCHC RBC AUTO-ENTMCNC: 32.7 G/DL (ref 32–36)
MCHC RBC AUTO-ENTMCNC: 32.8 G/DL (ref 32–36)
MCHC RBC AUTO-ENTMCNC: 33 G/DL (ref 32–36)
MCHC RBC AUTO-ENTMCNC: 33.3 G/DL (ref 32–36)
MCHC RBC AUTO-ENTMCNC: 33.4 G/DL (ref 32–36)
MCHC RBC AUTO-ENTMCNC: 33.9 G/DL (ref 32–36)
MCHC RBC AUTO-ENTMCNC: 34.6 G/DL (ref 32–36)
MCV RBC AUTO: 86 FL (ref 82–98)
MCV RBC AUTO: 87 FL (ref 82–98)
MCV RBC AUTO: 88 FL (ref 82–98)
MCV RBC AUTO: 89 FL (ref 82–98)
MCV RBC AUTO: 90 FL (ref 82–98)
MCV RBC AUTO: 90 FL (ref 82–98)
MCV RBC AUTO: 91 FL (ref 82–98)
MCV RBC AUTO: 92 FL (ref 82–98)
MCV RBC AUTO: 93 FL (ref 82–98)
MCV RBC AUTO: 93 FL (ref 82–98)
MCV RBC AUTO: 94 FL (ref 82–98)
MCV RBC AUTO: 95 FL (ref 82–98)
MCV RBC AUTO: 95 FL (ref 82–98)
METAMYELOCYTES NFR BLD MANUAL: 8 %
MICROSCOPIC COMMENT: ABNORMAL
MITOCHONDRIA AB TITR SER IF: NORMAL {TITER}
MONOCYTES # BLD AUTO: 0.5 K/UL (ref 0.3–1)
MONOCYTES # BLD AUTO: 0.7 K/UL (ref 0.3–1)
MONOCYTES # BLD AUTO: 0.8 K/UL (ref 0.3–1)
MONOCYTES # BLD AUTO: 0.9 K/UL (ref 0.3–1)
MONOCYTES # BLD AUTO: 1 K/UL (ref 0.3–1)
MONOCYTES # BLD AUTO: 1.1 K/UL (ref 0.3–1)
MONOCYTES # BLD AUTO: 1.8 K/UL (ref 0.3–1)
MONOCYTES NFR BLD: 11 % (ref 4–15)
MONOCYTES NFR BLD: 12.6 % (ref 4–15)
MONOCYTES NFR BLD: 5.6 % (ref 4–15)
MONOCYTES NFR BLD: 6 % (ref 4–15)
MONOCYTES NFR BLD: 6.3 % (ref 4–15)
MONOCYTES NFR BLD: 6.4 % (ref 4–15)
MONOCYTES NFR BLD: 6.4 % (ref 4–15)
MONOCYTES NFR BLD: 6.5 % (ref 4–15)
MONOCYTES NFR BLD: 6.5 % (ref 4–15)
MONOCYTES NFR BLD: 6.6 % (ref 4–15)
MONOCYTES NFR BLD: 6.8 % (ref 4–15)
MONOCYTES NFR BLD: 7 % (ref 4–15)
MONOCYTES NFR BLD: 7.2 % (ref 4–15)
MONOCYTES NFR BLD: 7.4 % (ref 4–15)
MONOCYTES NFR BLD: 7.5 % (ref 4–15)
MONOCYTES NFR BLD: 7.8 % (ref 4–15)
MONOCYTES NFR BLD: 7.9 % (ref 4–15)
MONOCYTES NFR BLD: 7.9 % (ref 4–15)
MONOCYTES NFR BLD: 8 % (ref 4–15)
MONOCYTES NFR BLD: 8 % (ref 4–15)
MONOCYTES NFR BLD: 8.5 % (ref 4–15)
MONOCYTES NFR BLD: 8.7 % (ref 4–15)
MONOCYTES NFR BLD: 8.9 % (ref 4–15)
MONOCYTES NFR BLD: 9.3 % (ref 4–15)
MONOCYTES NFR BLD: 9.5 % (ref 4–15)
MV PEAK A VEL: 0.8 M/S
MV PEAK E VEL: 0.44 M/S
MV STENOSIS PRESSURE HALF TIME: 75.42 MS
MV VALVE AREA P 1/2 METHOD: 2.92 CM2
MYELOCYTES NFR BLD MANUAL: 8 %
NEUTROPHILS # BLD AUTO: 10.5 K/UL (ref 1.8–7.7)
NEUTROPHILS # BLD AUTO: 10.5 K/UL (ref 1.8–7.7)
NEUTROPHILS # BLD AUTO: 12.3 K/UL (ref 1.8–7.7)
NEUTROPHILS # BLD AUTO: 13.3 K/UL (ref 1.8–7.7)
NEUTROPHILS # BLD AUTO: 4.8 K/UL (ref 1.8–7.7)
NEUTROPHILS # BLD AUTO: 6.2 K/UL (ref 1.8–7.7)
NEUTROPHILS # BLD AUTO: 6.2 K/UL (ref 1.8–7.7)
NEUTROPHILS # BLD AUTO: 6.3 K/UL (ref 1.8–7.7)
NEUTROPHILS # BLD AUTO: 6.4 K/UL (ref 1.8–7.7)
NEUTROPHILS # BLD AUTO: 6.4 K/UL (ref 1.8–7.7)
NEUTROPHILS # BLD AUTO: 6.9 K/UL (ref 1.8–7.7)
NEUTROPHILS # BLD AUTO: 7 K/UL (ref 1.8–7.7)
NEUTROPHILS # BLD AUTO: 7.1 K/UL (ref 1.8–7.7)
NEUTROPHILS # BLD AUTO: 7.3 K/UL (ref 1.8–7.7)
NEUTROPHILS # BLD AUTO: 7.3 K/UL (ref 1.8–7.7)
NEUTROPHILS # BLD AUTO: 7.5 K/UL (ref 1.8–7.7)
NEUTROPHILS # BLD AUTO: 7.9 K/UL (ref 1.8–7.7)
NEUTROPHILS # BLD AUTO: 8.2 K/UL (ref 1.8–7.7)
NEUTROPHILS # BLD AUTO: 8.3 K/UL (ref 1.8–7.7)
NEUTROPHILS # BLD AUTO: 8.4 K/UL (ref 1.8–7.7)
NEUTROPHILS # BLD AUTO: 8.7 K/UL (ref 1.8–7.7)
NEUTROPHILS # BLD AUTO: 9.9 K/UL (ref 1.8–7.7)
NEUTROPHILS NFR BLD: 20 % (ref 38–73)
NEUTROPHILS NFR BLD: 55.4 % (ref 38–73)
NEUTROPHILS NFR BLD: 55.9 % (ref 38–73)
NEUTROPHILS NFR BLD: 56.1 % (ref 38–73)
NEUTROPHILS NFR BLD: 56.9 % (ref 38–73)
NEUTROPHILS NFR BLD: 58.9 % (ref 38–73)
NEUTROPHILS NFR BLD: 59.5 % (ref 38–73)
NEUTROPHILS NFR BLD: 59.7 % (ref 38–73)
NEUTROPHILS NFR BLD: 62.5 % (ref 38–73)
NEUTROPHILS NFR BLD: 62.6 % (ref 38–73)
NEUTROPHILS NFR BLD: 62.9 % (ref 38–73)
NEUTROPHILS NFR BLD: 63.9 % (ref 38–73)
NEUTROPHILS NFR BLD: 66 % (ref 38–73)
NEUTROPHILS NFR BLD: 66.5 % (ref 38–73)
NEUTROPHILS NFR BLD: 66.7 % (ref 38–73)
NEUTROPHILS NFR BLD: 67.4 % (ref 38–73)
NEUTROPHILS NFR BLD: 67.4 % (ref 38–73)
NEUTROPHILS NFR BLD: 67.5 % (ref 38–73)
NEUTROPHILS NFR BLD: 71.5 % (ref 38–73)
NEUTROPHILS NFR BLD: 71.6 % (ref 38–73)
NEUTROPHILS NFR BLD: 72 % (ref 38–73)
NEUTROPHILS NFR BLD: 72.2 % (ref 38–73)
NEUTROPHILS NFR BLD: 73.4 % (ref 38–73)
NEUTROPHILS NFR BLD: 76.8 % (ref 38–73)
NEUTROPHILS NFR BLD: 81.4 % (ref 38–73)
NEUTS BAND NFR BLD MANUAL: 19 %
NITRITE UR QL STRIP: NEGATIVE
NONHDLC SERPL-MCNC: 32 MG/DL
NRBC BLD-RTO: 0 /100 WBC
NUM UNITS TRANS FFP: NORMAL
NUM UNITS TRANS FFP: NORMAL
OHS CV LEFT LOWER EXTREMITY ABI (NO CALC): 0.8
OHS CV RIGHT ABI LOWER EXTREMITY (NO CALC): 0.6
PCO2 BLDA: 37 MMHG (ref 35–45)
PETH 16:0/18.1 (POPETH): 495 NG/ML
PETH 16:0/18.2 (PLPETH): 124 NG/ML
PH SMN: 7.15 [PH] (ref 7.35–7.45)
PH UR STRIP: 6 [PH] (ref 5–8)
PHOSPHATE SERPL-MCNC: 4 MG/DL (ref 2.7–4.5)
PISA TR MAX VEL: 2.55 M/S
PLATELET # BLD AUTO: 129 K/UL (ref 150–450)
PLATELET # BLD AUTO: 337 K/UL (ref 150–450)
PLATELET # BLD AUTO: 478 K/UL (ref 150–450)
PLATELET # BLD AUTO: 521 K/UL (ref 150–450)
PLATELET # BLD AUTO: 525 K/UL (ref 150–450)
PLATELET # BLD AUTO: 529 K/UL (ref 150–450)
PLATELET # BLD AUTO: 561 K/UL (ref 150–450)
PLATELET # BLD AUTO: 567 K/UL (ref 150–450)
PLATELET # BLD AUTO: 569 K/UL (ref 150–450)
PLATELET # BLD AUTO: 574 K/UL (ref 150–450)
PLATELET # BLD AUTO: 576 K/UL (ref 150–350)
PLATELET # BLD AUTO: 579 K/UL (ref 150–450)
PLATELET # BLD AUTO: 581 K/UL (ref 150–450)
PLATELET # BLD AUTO: 585 K/UL (ref 150–450)
PLATELET # BLD AUTO: 592 K/UL (ref 150–450)
PLATELET # BLD AUTO: 597 K/UL (ref 150–450)
PLATELET # BLD AUTO: 604 K/UL (ref 150–450)
PLATELET # BLD AUTO: 606 K/UL (ref 150–450)
PLATELET # BLD AUTO: 607 K/UL (ref 150–450)
PLATELET # BLD AUTO: 610 K/UL (ref 150–450)
PLATELET # BLD AUTO: 618 K/UL (ref 150–450)
PLATELET # BLD AUTO: 647 K/UL (ref 150–450)
PLATELET # BLD AUTO: 657 K/UL (ref 150–450)
PLATELET # BLD AUTO: 671 K/UL (ref 150–450)
PLATELET # BLD AUTO: 958 K/UL (ref 150–350)
PLATELET BLD QL SMEAR: ABNORMAL
PMV BLD AUTO: 10 FL (ref 9.2–12.9)
PMV BLD AUTO: 10.1 FL (ref 9.2–12.9)
PMV BLD AUTO: 10.1 FL (ref 9.2–12.9)
PMV BLD AUTO: 10.2 FL (ref 9.2–12.9)
PMV BLD AUTO: 10.3 FL (ref 9.2–12.9)
PMV BLD AUTO: 10.8 FL (ref 9.2–12.9)
PMV BLD AUTO: 9.2 FL (ref 9.2–12.9)
PMV BLD AUTO: 9.3 FL (ref 9.2–12.9)
PMV BLD AUTO: 9.4 FL (ref 9.2–12.9)
PMV BLD AUTO: 9.5 FL (ref 9.2–12.9)
PMV BLD AUTO: 9.5 FL (ref 9.2–12.9)
PMV BLD AUTO: 9.6 FL (ref 9.2–12.9)
PMV BLD AUTO: 9.6 FL (ref 9.2–12.9)
PMV BLD AUTO: 9.7 FL (ref 9.2–12.9)
PMV BLD AUTO: 9.8 FL (ref 9.2–12.9)
PMV BLD AUTO: 9.9 FL (ref 9.2–12.9)
PO2 BLDA: 134 MMHG (ref 80–100)
POC BE: -15 MMOL/L
POC SATURATED O2: 98 % (ref 95–100)
POC TCO2: 14 MMOL/L (ref 23–27)
POCT GLUCOSE: 110 MG/DL (ref 70–110)
POCT GLUCOSE: 113 MG/DL (ref 70–110)
POCT GLUCOSE: 118 MG/DL (ref 70–110)
POCT GLUCOSE: 126 MG/DL (ref 70–110)
POCT GLUCOSE: 130 MG/DL (ref 70–110)
POCT GLUCOSE: 140 MG/DL (ref 70–110)
POCT GLUCOSE: 145 MG/DL (ref 70–110)
POCT GLUCOSE: 148 MG/DL (ref 70–110)
POCT GLUCOSE: 149 MG/DL (ref 70–110)
POCT GLUCOSE: 152 MG/DL (ref 70–110)
POCT GLUCOSE: 155 MG/DL (ref 70–110)
POCT GLUCOSE: 162 MG/DL (ref 70–110)
POCT GLUCOSE: 163 MG/DL (ref 70–110)
POCT GLUCOSE: 166 MG/DL (ref 70–110)
POCT GLUCOSE: 168 MG/DL (ref 70–110)
POCT GLUCOSE: 168 MG/DL (ref 70–110)
POCT GLUCOSE: 169 MG/DL (ref 70–110)
POCT GLUCOSE: 172 MG/DL (ref 70–110)
POCT GLUCOSE: 173 MG/DL (ref 70–110)
POCT GLUCOSE: 182 MG/DL (ref 70–110)
POCT GLUCOSE: 184 MG/DL (ref 70–110)
POCT GLUCOSE: 189 MG/DL (ref 70–110)
POCT GLUCOSE: 191 MG/DL (ref 70–110)
POCT GLUCOSE: 196 MG/DL (ref 70–110)
POCT GLUCOSE: 201 MG/DL (ref 70–110)
POCT GLUCOSE: 201 MG/DL (ref 70–110)
POCT GLUCOSE: 202 MG/DL (ref 70–110)
POCT GLUCOSE: 205 MG/DL (ref 70–110)
POCT GLUCOSE: 208 MG/DL (ref 70–110)
POCT GLUCOSE: 211 MG/DL (ref 70–110)
POCT GLUCOSE: 214 MG/DL (ref 70–110)
POCT GLUCOSE: 216 MG/DL (ref 70–110)
POCT GLUCOSE: 217 MG/DL (ref 70–110)
POCT GLUCOSE: 220 MG/DL (ref 70–110)
POCT GLUCOSE: 222 MG/DL (ref 70–110)
POCT GLUCOSE: 222 MG/DL (ref 70–110)
POCT GLUCOSE: 227 MG/DL (ref 70–110)
POCT GLUCOSE: 229 MG/DL (ref 70–110)
POCT GLUCOSE: 236 MG/DL (ref 70–110)
POCT GLUCOSE: 240 MG/DL (ref 70–110)
POCT GLUCOSE: 240 MG/DL (ref 70–110)
POCT GLUCOSE: 241 MG/DL (ref 70–110)
POCT GLUCOSE: 244 MG/DL (ref 70–110)
POCT GLUCOSE: 244 MG/DL (ref 70–110)
POCT GLUCOSE: 247 MG/DL (ref 70–110)
POCT GLUCOSE: 248 MG/DL (ref 70–110)
POCT GLUCOSE: 248 MG/DL (ref 70–110)
POCT GLUCOSE: 249 MG/DL (ref 70–110)
POCT GLUCOSE: 252 MG/DL (ref 70–110)
POCT GLUCOSE: 252 MG/DL (ref 70–110)
POCT GLUCOSE: 256 MG/DL (ref 70–110)
POCT GLUCOSE: 260 MG/DL (ref 70–110)
POCT GLUCOSE: 260 MG/DL (ref 70–110)
POCT GLUCOSE: 263 MG/DL (ref 70–110)
POCT GLUCOSE: 263 MG/DL (ref 70–110)
POCT GLUCOSE: 271 MG/DL (ref 70–110)
POCT GLUCOSE: 275 MG/DL (ref 70–110)
POCT GLUCOSE: 279 MG/DL (ref 70–110)
POCT GLUCOSE: 284 MG/DL (ref 70–110)
POCT GLUCOSE: 286 MG/DL (ref 70–110)
POCT GLUCOSE: 287 MG/DL (ref 70–110)
POCT GLUCOSE: 289 MG/DL (ref 70–110)
POCT GLUCOSE: 292 MG/DL (ref 70–110)
POCT GLUCOSE: 292 MG/DL (ref 70–110)
POCT GLUCOSE: 293 MG/DL (ref 70–110)
POCT GLUCOSE: 295 MG/DL (ref 70–110)
POCT GLUCOSE: 300 MG/DL (ref 70–110)
POCT GLUCOSE: 303 MG/DL (ref 70–110)
POCT GLUCOSE: 305 MG/DL (ref 70–110)
POCT GLUCOSE: 312 MG/DL (ref 70–110)
POCT GLUCOSE: 314 MG/DL (ref 70–110)
POCT GLUCOSE: 317 MG/DL (ref 70–110)
POCT GLUCOSE: 319 MG/DL (ref 70–110)
POCT GLUCOSE: 32 MG/DL (ref 70–110)
POCT GLUCOSE: 322 MG/DL (ref 70–110)
POCT GLUCOSE: 323 MG/DL (ref 70–110)
POCT GLUCOSE: 323 MG/DL (ref 70–110)
POCT GLUCOSE: 330 MG/DL (ref 70–110)
POCT GLUCOSE: 333 MG/DL (ref 70–110)
POCT GLUCOSE: 340 MG/DL (ref 70–110)
POCT GLUCOSE: 345 MG/DL (ref 70–110)
POCT GLUCOSE: 356 MG/DL (ref 70–110)
POCT GLUCOSE: 359 MG/DL (ref 70–110)
POCT GLUCOSE: 367 MG/DL (ref 70–110)
POCT GLUCOSE: 370 MG/DL (ref 70–110)
POCT GLUCOSE: 373 MG/DL (ref 70–110)
POCT GLUCOSE: 375 MG/DL (ref 70–110)
POCT GLUCOSE: 42 MG/DL (ref 70–110)
POCT GLUCOSE: 44 MG/DL (ref 70–110)
POCT GLUCOSE: 490 MG/DL (ref 70–110)
POCT GLUCOSE: 50 MG/DL (ref 70–110)
POCT GLUCOSE: 52 MG/DL (ref 70–110)
POCT GLUCOSE: 73 MG/DL (ref 70–110)
POCT GLUCOSE: 73 MG/DL (ref 70–110)
POCT GLUCOSE: 80 MG/DL (ref 70–110)
POCT GLUCOSE: 81 MG/DL (ref 70–110)
POCT GLUCOSE: 95 MG/DL (ref 70–110)
POCT GLUCOSE: 96 MG/DL (ref 70–110)
POCT GLUCOSE: <20 MG/DL (ref 70–110)
POIKILOCYTOSIS BLD QL SMEAR: SLIGHT
POTASSIUM SERPL-SCNC: 3.2 MMOL/L (ref 3.5–5.1)
POTASSIUM SERPL-SCNC: 3.2 MMOL/L (ref 3.5–5.1)
POTASSIUM SERPL-SCNC: 3.3 MMOL/L (ref 3.5–5.1)
POTASSIUM SERPL-SCNC: 3.3 MMOL/L (ref 3.5–5.1)
POTASSIUM SERPL-SCNC: 3.4 MMOL/L (ref 3.5–5.1)
POTASSIUM SERPL-SCNC: 3.6 MMOL/L (ref 3.5–5.1)
POTASSIUM SERPL-SCNC: 3.8 MMOL/L (ref 3.5–5.1)
POTASSIUM SERPL-SCNC: 3.9 MMOL/L (ref 3.5–5.1)
POTASSIUM SERPL-SCNC: 4 MMOL/L (ref 3.5–5.1)
POTASSIUM SERPL-SCNC: 4.1 MMOL/L (ref 3.5–5.1)
POTASSIUM SERPL-SCNC: 4.2 MMOL/L (ref 3.5–5.1)
POTASSIUM SERPL-SCNC: 4.3 MMOL/L (ref 3.5–5.1)
POTASSIUM SERPL-SCNC: 4.3 MMOL/L (ref 3.5–5.1)
POTASSIUM SERPL-SCNC: 4.4 MMOL/L (ref 3.5–5.1)
PROCALCITONIN SERPL IA-MCNC: 0.12 NG/ML
PROCALCITONIN SERPL IA-MCNC: 1.23 NG/ML
PROT SERPL-MCNC: 3 G/DL (ref 6–8.4)
PROT SERPL-MCNC: 5 G/DL (ref 6–8.4)
PROT SERPL-MCNC: 5.3 G/DL (ref 6–8.4)
PROT SERPL-MCNC: 5.9 G/DL (ref 6–8.4)
PROT SERPL-MCNC: 6.7 G/DL (ref 6–8.4)
PROT SERPL-MCNC: 6.7 G/DL (ref 6–8.4)
PROT UR QL STRIP: NEGATIVE
PROTHROMBIN TIME: 10.9 SEC (ref 9–12.5)
PROTHROMBIN TIME: 11.4 SEC (ref 9–12.5)
PROTHROMBIN TIME: 30.3 SEC (ref 9–12.5)
PV PEAK VELOCITY: 0.65 CM/S
RA MAJOR: 4.41 CM
RA PRESSURE: 3 MMHG
RA WIDTH: 3.69 CM
RBC # BLD AUTO: 2.1 M/UL (ref 4.6–6.2)
RBC # BLD AUTO: 2.49 M/UL (ref 4.6–6.2)
RBC # BLD AUTO: 2.53 M/UL (ref 4.6–6.2)
RBC # BLD AUTO: 2.62 M/UL (ref 4.6–6.2)
RBC # BLD AUTO: 2.62 M/UL (ref 4.6–6.2)
RBC # BLD AUTO: 2.64 M/UL (ref 4.6–6.2)
RBC # BLD AUTO: 2.64 M/UL (ref 4.6–6.2)
RBC # BLD AUTO: 2.73 M/UL (ref 4.6–6.2)
RBC # BLD AUTO: 2.73 M/UL (ref 4.6–6.2)
RBC # BLD AUTO: 2.76 M/UL (ref 4.6–6.2)
RBC # BLD AUTO: 2.79 M/UL (ref 4.6–6.2)
RBC # BLD AUTO: 2.81 M/UL (ref 4.6–6.2)
RBC # BLD AUTO: 2.86 M/UL (ref 4.6–6.2)
RBC # BLD AUTO: 2.88 M/UL (ref 4.6–6.2)
RBC # BLD AUTO: 2.89 M/UL (ref 4.6–6.2)
RBC # BLD AUTO: 2.96 M/UL (ref 4.6–6.2)
RBC # BLD AUTO: 3.02 M/UL (ref 4.6–6.2)
RBC # BLD AUTO: 3.02 M/UL (ref 4.6–6.2)
RBC # BLD AUTO: 3.05 M/UL (ref 4.6–6.2)
RBC # BLD AUTO: 3.13 M/UL (ref 4.6–6.2)
RBC # BLD AUTO: 3.16 M/UL (ref 4.6–6.2)
RBC # BLD AUTO: 3.17 M/UL (ref 4.6–6.2)
RBC # BLD AUTO: 3.24 M/UL (ref 4.6–6.2)
RBC # BLD AUTO: 3.26 M/UL (ref 4.6–6.2)
RBC # BLD AUTO: 3.31 M/UL (ref 4.6–6.2)
RIGHT ABI: 0.62
RIGHT ANT TIBIAL SYS PSV: 32 CM/S
RIGHT ARM BP: 150 MMHG
RIGHT CFA PSV: 49 CM/S
RIGHT DORSALIS PEDIS: 94 MMHG
RIGHT EXTERNAL ILLIAC PSV: 54 CM/S
RIGHT EYE DM RETINOPATHY: NEGATIVE
RIGHT PERONEAL SYS PSV: 17 CM/S
RIGHT POPLITEAL PSV: 17 CM/S
RIGHT POST TIBIAL SYS PSV: 14 CM/S
RIGHT POSTERIOR TIBIAL: 89 MMHG
RIGHT PROFUNDA SYS PSV: 33 CM/S
RIGHT SUPER FEMORAL DIST SYS PSV: 43 CM/S
RIGHT SUPER FEMORAL MID SYS PSV: 39 CM/S
RIGHT SUPER FEMORAL OSTIAL SYS PSV: 40 CM/S
RIGHT SUPER FEMORAL PROX SYS PSV: 39 CM/S
RIGHT TBI: 0.46
RIGHT TIB/PER TRUNK SYS PSV: 34 CM/S
RIGHT TOE PRESSURE: 70 MMHG
RIGHT VENTRICULAR END-DIASTOLIC DIMENSION: 3.68 CM
RV TISSUE DOPPLER FREE WALL SYSTOLIC VELOCITY 1 (APICAL 4 CHAMBER VIEW): 14.72 CM/S
SAMPLE: ABNORMAL
SARS-COV-2 RDRP RESP QL NAA+PROBE: NEGATIVE
SARS-COV-2 RNA RESP QL NAA+PROBE: NOT DETECTED
SARS-COV-2- CYCLE NUMBER: -1
SINUS: 3.5 CM
SITE: ABNORMAL
SMOOTH MUSCLE AB TITR SER IF: NORMAL {TITER}
SODIUM SERPL-SCNC: 127 MMOL/L (ref 136–145)
SODIUM SERPL-SCNC: 128 MMOL/L (ref 136–145)
SODIUM SERPL-SCNC: 128 MMOL/L (ref 136–145)
SODIUM SERPL-SCNC: 129 MMOL/L (ref 136–145)
SODIUM SERPL-SCNC: 131 MMOL/L (ref 136–145)
SODIUM SERPL-SCNC: 131 MMOL/L (ref 136–145)
SODIUM SERPL-SCNC: 132 MMOL/L (ref 136–145)
SODIUM SERPL-SCNC: 133 MMOL/L (ref 136–145)
SODIUM SERPL-SCNC: 133 MMOL/L (ref 136–145)
SODIUM SERPL-SCNC: 134 MMOL/L (ref 136–145)
SODIUM SERPL-SCNC: 135 MMOL/L (ref 136–145)
SODIUM SERPL-SCNC: 136 MMOL/L (ref 136–145)
SODIUM SERPL-SCNC: 136 MMOL/L (ref 136–145)
SP GR UR STRIP: 1.01 (ref 1–1.03)
STJ: 2.46 CM
TARGETS BLD QL SMEAR: ABNORMAL
TDI LATERAL: 0.06 M/S
TDI SEPTAL: 0.05 M/S
TDI: 0.06 M/S
TOXIC GRANULES BLD QL SMEAR: PRESENT
TR MAX PG: 26 MMHG
TRANS ERYTHROCYTES VOL PATIENT: NORMAL ML
TRANS ERYTHROCYTES VOL PATIENT: NORMAL ML
TRICUSPID ANNULAR PLANE SYSTOLIC EXCURSION: 2.51 CM
TRIGL SERPL-MCNC: 52 MG/DL (ref 30–150)
TROPONIN I SERPL DL<=0.01 NG/ML-MCNC: 0.01 NG/ML (ref 0–0.03)
TROPONIN I SERPL DL<=0.01 NG/ML-MCNC: <0.006 NG/ML (ref 0–0.03)
TSH SERPL DL<=0.005 MIU/L-ACNC: 2.6 UIU/ML (ref 0.4–4)
TV REST PULMONARY ARTERY PRESSURE: 29 MMHG
URN SPEC COLLECT METH UR: ABNORMAL
UROBILINOGEN UR STRIP-ACNC: NEGATIVE EU/DL
VANCOMYCIN TROUGH SERPL-MCNC: 10.1 UG/ML (ref 10–22)
VANCOMYCIN TROUGH SERPL-MCNC: 15.1 UG/ML (ref 10–22)
VIT B1 BLD-MCNC: 69 UG/L (ref 38–122)
VIT B12 SERPL-MCNC: >2000 PG/ML (ref 210–950)
WBC # BLD AUTO: 10.13 K/UL (ref 3.9–12.7)
WBC # BLD AUTO: 10.25 K/UL (ref 3.9–12.7)
WBC # BLD AUTO: 10.34 K/UL (ref 3.9–12.7)
WBC # BLD AUTO: 10.48 K/UL (ref 3.9–12.7)
WBC # BLD AUTO: 10.6 K/UL (ref 3.9–12.7)
WBC # BLD AUTO: 10.65 K/UL (ref 3.9–12.7)
WBC # BLD AUTO: 10.72 K/UL (ref 3.9–12.7)
WBC # BLD AUTO: 11.14 K/UL (ref 3.9–12.7)
WBC # BLD AUTO: 11.3 K/UL (ref 3.9–12.7)
WBC # BLD AUTO: 11.38 K/UL (ref 3.9–12.7)
WBC # BLD AUTO: 11.39 K/UL (ref 3.9–12.7)
WBC # BLD AUTO: 11.41 K/UL (ref 3.9–12.7)
WBC # BLD AUTO: 11.57 K/UL (ref 3.9–12.7)
WBC # BLD AUTO: 11.8 K/UL (ref 3.9–12.7)
WBC # BLD AUTO: 12.95 K/UL (ref 3.9–12.7)
WBC # BLD AUTO: 13.12 K/UL (ref 3.9–12.7)
WBC # BLD AUTO: 13.15 K/UL (ref 3.9–12.7)
WBC # BLD AUTO: 14.51 K/UL (ref 3.9–12.7)
WBC # BLD AUTO: 14.63 K/UL (ref 3.9–12.7)
WBC # BLD AUTO: 14.64 K/UL (ref 3.9–12.7)
WBC # BLD AUTO: 16.06 K/UL (ref 3.9–12.7)
WBC # BLD AUTO: 16.33 K/UL (ref 3.9–12.7)
WBC # BLD AUTO: 2.28 K/UL (ref 3.9–12.7)
WBC # BLD AUTO: 8.08 K/UL (ref 3.9–12.7)
WBC # BLD AUTO: 9.91 K/UL (ref 3.9–12.7)
WBC #/AREA URNS HPF: 36 /HPF (ref 0–5)

## 2021-01-01 PROCEDURE — 3079F DIAST BP 80-89 MM HG: CPT | Mod: CPTII,S$GLB,, | Performed by: INTERNAL MEDICINE

## 2021-01-01 PROCEDURE — 1101F PT FALLS ASSESS-DOCD LE1/YR: CPT | Mod: HCNC,CPTII,S$GLB, | Performed by: PODIATRIST

## 2021-01-01 PROCEDURE — 86039 ANTINUCLEAR ANTIBODIES (ANA): CPT | Mod: HCNC | Performed by: PHYSICIAN ASSISTANT

## 2021-01-01 PROCEDURE — 74177 CT ABD & PELVIS W/CONTRAST: CPT | Mod: TC

## 2021-01-01 PROCEDURE — 1159F PR MEDICATION LIST DOCUMENTED IN MEDICAL RECORD: ICD-10-PCS | Mod: HCNC,S$GLB,, | Performed by: INTERNAL MEDICINE

## 2021-01-01 PROCEDURE — 97530 THERAPEUTIC ACTIVITIES: CPT | Mod: HCNC,CO

## 2021-01-01 PROCEDURE — 3072F PR LOW RISK FOR RETINOPATHY: ICD-10-PCS | Mod: HCNC,CPTII,S$GLB, | Performed by: INTERNAL MEDICINE

## 2021-01-01 PROCEDURE — 21400001 HC TELEMETRY ROOM: Mod: HCNC

## 2021-01-01 PROCEDURE — A4216 STERILE WATER/SALINE, 10 ML: HCPCS | Mod: HCNC | Performed by: HOSPITALIST

## 2021-01-01 PROCEDURE — 3051F PR MOST RECENT HEMOGLOBIN A1C LEVEL 7.0 - < 8.0%: ICD-10-PCS | Mod: HCNC,CPTII,S$GLB, | Performed by: PODIATRIST

## 2021-01-01 PROCEDURE — 97116 GAIT TRAINING THERAPY: CPT | Mod: HCNC,CQ

## 2021-01-01 PROCEDURE — 3074F PR MOST RECENT SYSTOLIC BLOOD PRESSURE < 130 MM HG: ICD-10-PCS | Mod: CPTII,S$GLB,, | Performed by: OTOLARYNGOLOGY

## 2021-01-01 PROCEDURE — 99499 UNLISTED E&M SERVICE: CPT | Mod: S$GLB,,, | Performed by: NURSE PRACTITIONER

## 2021-01-01 PROCEDURE — 3008F PR BODY MASS INDEX (BMI) DOCUMENTED: ICD-10-PCS | Mod: HCNC,CPTII,S$GLB, | Performed by: NURSE PRACTITIONER

## 2021-01-01 PROCEDURE — 3072F LOW RISK FOR RETINOPATHY: CPT | Mod: HCNC,CPTII,S$GLB, | Performed by: INTERNAL MEDICINE

## 2021-01-01 PROCEDURE — 82607 VITAMIN B-12: CPT | Mod: HCNC | Performed by: INTERNAL MEDICINE

## 2021-01-01 PROCEDURE — 25000003 PHARM REV CODE 250: Mod: HCNC | Performed by: HOSPITALIST

## 2021-01-01 PROCEDURE — 25000003 PHARM REV CODE 250: Mod: HCNC | Performed by: INTERNAL MEDICINE

## 2021-01-01 PROCEDURE — 80048 BASIC METABOLIC PNL TOTAL CA: CPT | Mod: HCNC | Performed by: HOSPITALIST

## 2021-01-01 PROCEDURE — 83036 HEMOGLOBIN GLYCOSYLATED A1C: CPT

## 2021-01-01 PROCEDURE — 63600175 PHARM REV CODE 636 W HCPCS: Mod: HCNC | Performed by: HOSPITALIST

## 2021-01-01 PROCEDURE — 99204 OFFICE O/P NEW MOD 45 MIN: CPT | Mod: HCNC,S$GLB,, | Performed by: THORACIC SURGERY (CARDIOTHORACIC VASCULAR SURGERY)

## 2021-01-01 PROCEDURE — 63600175 PHARM REV CODE 636 W HCPCS: Mod: HCNC | Performed by: INTERNAL MEDICINE

## 2021-01-01 PROCEDURE — 3008F BODY MASS INDEX DOCD: CPT | Mod: CPTII,S$GLB,, | Performed by: OTOLARYNGOLOGY

## 2021-01-01 PROCEDURE — 97530 THERAPEUTIC ACTIVITIES: CPT | Mod: HCNC

## 2021-01-01 PROCEDURE — 36415 COLL VENOUS BLD VENIPUNCTURE: CPT | Mod: HCNC | Performed by: HOSPITALIST

## 2021-01-01 PROCEDURE — 1125F AMNT PAIN NOTED PAIN PRSNT: CPT | Mod: S$GLB,,, | Performed by: NURSE PRACTITIONER

## 2021-01-01 PROCEDURE — 3008F PR BODY MASS INDEX (BMI) DOCUMENTED: ICD-10-PCS | Mod: CPTII,S$GLB,, | Performed by: NURSE PRACTITIONER

## 2021-01-01 PROCEDURE — 3072F LOW RISK FOR RETINOPATHY: CPT | Mod: CPTII,S$GLB,, | Performed by: NURSE PRACTITIONER

## 2021-01-01 PROCEDURE — 97110 THERAPEUTIC EXERCISES: CPT | Mod: HCNC,CQ

## 2021-01-01 PROCEDURE — 96361 HYDRATE IV INFUSION ADD-ON: CPT | Mod: HCNC

## 2021-01-01 PROCEDURE — 1101F PR PT FALLS ASSESS DOC 0-1 FALLS W/OUT INJ PAST YR: ICD-10-PCS | Mod: HCNC,CPTII,S$GLB, | Performed by: INTERNAL MEDICINE

## 2021-01-01 PROCEDURE — 3051F PR MOST RECENT HEMOGLOBIN A1C LEVEL 7.0 - < 8.0%: ICD-10-PCS | Mod: CPTII,S$GLB,, | Performed by: NURSE PRACTITIONER

## 2021-01-01 PROCEDURE — 1159F MED LIST DOCD IN RCRD: CPT | Mod: HCNC,S$GLB,, | Performed by: INTERNAL MEDICINE

## 2021-01-01 PROCEDURE — 80202 ASSAY OF VANCOMYCIN: CPT | Mod: HCNC | Performed by: HOSPITALIST

## 2021-01-01 PROCEDURE — 3288F FALL RISK ASSESSMENT DOCD: CPT | Mod: HCNC,CPTII,S$GLB, | Performed by: INTERNAL MEDICINE

## 2021-01-01 PROCEDURE — 99999 PR PBB SHADOW E&M-EST. PATIENT-LVL IV: CPT | Mod: PBBFAC,HCNC,, | Performed by: NURSE PRACTITIONER

## 2021-01-01 PROCEDURE — 25500020 PHARM REV CODE 255: Performed by: OTOLARYNGOLOGY

## 2021-01-01 PROCEDURE — 63600175 PHARM REV CODE 636 W HCPCS: Mod: HCNC | Performed by: EMERGENCY MEDICINE

## 2021-01-01 PROCEDURE — 92526 ORAL FUNCTION THERAPY: CPT | Mod: HCNC,PN

## 2021-01-01 PROCEDURE — 85025 COMPLETE CBC W/AUTO DIFF WBC: CPT | Mod: HCNC | Performed by: HOSPITALIST

## 2021-01-01 PROCEDURE — 94761 N-INVAS EAR/PLS OXIMETRY MLT: CPT | Mod: HCNC

## 2021-01-01 PROCEDURE — 3008F PR BODY MASS INDEX (BMI) DOCUMENTED: ICD-10-PCS | Mod: HCNC,CPTII,S$GLB, | Performed by: INTERNAL MEDICINE

## 2021-01-01 PROCEDURE — C9399 UNCLASSIFIED DRUGS OR BIOLOG: HCPCS | Mod: HCNC | Performed by: HOSPITALIST

## 2021-01-01 PROCEDURE — 86920 COMPATIBILITY TEST SPIN: CPT | Mod: HCNC | Performed by: EMERGENCY MEDICINE

## 2021-01-01 PROCEDURE — 4010F PR ACE/ARB THEARPY RXD/TAKEN: ICD-10-PCS | Mod: HCNC,CPTII,S$GLB, | Performed by: INTERNAL MEDICINE

## 2021-01-01 PROCEDURE — 3052F HG A1C>EQUAL 8.0%<EQUAL 9.0%: CPT | Mod: HCNC,CPTII,S$GLB, | Performed by: NURSE PRACTITIONER

## 2021-01-01 PROCEDURE — 99232 SBSQ HOSP IP/OBS MODERATE 35: CPT | Mod: HCNC,,, | Performed by: INTERNAL MEDICINE

## 2021-01-01 PROCEDURE — 1159F MED LIST DOCD IN RCRD: CPT | Mod: HCNC,CPTII,S$GLB, | Performed by: PODIATRIST

## 2021-01-01 PROCEDURE — 1126F PR PAIN SEVERITY QUANTIFIED, NO PAIN PRESENT: ICD-10-PCS | Mod: HCNC,S$GLB,, | Performed by: OPTOMETRIST

## 2021-01-01 PROCEDURE — 99900035 HC TECH TIME PER 15 MIN (STAT): Mod: HCNC

## 2021-01-01 PROCEDURE — 99232 PR SUBSEQUENT HOSPITAL CARE,LEVL II: ICD-10-PCS | Mod: HCNC,,, | Performed by: INTERNAL MEDICINE

## 2021-01-01 PROCEDURE — U0003 INFECTIOUS AGENT DETECTION BY NUCLEIC ACID (DNA OR RNA); SEVERE ACUTE RESPIRATORY SYNDROME CORONAVIRUS 2 (SARS-COV-2) (CORONAVIRUS DISEASE [COVID-19]), AMPLIFIED PROBE TECHNIQUE, MAKING USE OF HIGH THROUGHPUT TECHNOLOGIES AS DESCRIBED BY CMS-2020-01-R: HCPCS

## 2021-01-01 PROCEDURE — 87088 URINE BACTERIA CULTURE: CPT | Mod: HCNC | Performed by: EMERGENCY MEDICINE

## 2021-01-01 PROCEDURE — 71271 CT THORAX LUNG CANCER SCR C-: CPT | Mod: TC

## 2021-01-01 PROCEDURE — 1101F PR PT FALLS ASSESS DOC 0-1 FALLS W/OUT INJ PAST YR: ICD-10-PCS | Mod: HCNC,CPTII,S$GLB, | Performed by: PODIATRIST

## 2021-01-01 PROCEDURE — 99232 PR SUBSEQUENT HOSPITAL CARE,LEVL II: ICD-10-PCS | Mod: HCNC,,, | Performed by: UROLOGY

## 2021-01-01 PROCEDURE — 31575 DIAGNOSTIC LARYNGOSCOPY: CPT | Mod: S$GLB,,, | Performed by: OTOLARYNGOLOGY

## 2021-01-01 PROCEDURE — 99499 RISK ADDL DX/OHS AUDIT: ICD-10-PCS | Mod: HCNC,S$GLB,, | Performed by: NURSE PRACTITIONER

## 2021-01-01 PROCEDURE — P9021 RED BLOOD CELLS UNIT: HCPCS | Mod: HCNC | Performed by: EMERGENCY MEDICINE

## 2021-01-01 PROCEDURE — 3046F HEMOGLOBIN A1C LEVEL >9.0%: CPT | Mod: CPTII,S$GLB,, | Performed by: INTERNAL MEDICINE

## 2021-01-01 PROCEDURE — 1125F AMNT PAIN NOTED PAIN PRSNT: CPT | Mod: HCNC,CPTII,S$GLB, | Performed by: PHYSICIAN ASSISTANT

## 2021-01-01 PROCEDURE — 1101F PR PT FALLS ASSESS DOC 0-1 FALLS W/OUT INJ PAST YR: ICD-10-PCS | Mod: CPTII,S$GLB,, | Performed by: OTOLARYNGOLOGY

## 2021-01-01 PROCEDURE — 1159F PR MEDICATION LIST DOCUMENTED IN MEDICAL RECORD: ICD-10-PCS | Mod: HCNC,S$GLB,, | Performed by: STUDENT IN AN ORGANIZED HEALTH CARE EDUCATION/TRAINING PROGRAM

## 2021-01-01 PROCEDURE — 3078F DIAST BP <80 MM HG: CPT | Mod: CPTII,S$GLB,, | Performed by: PODIATRIST

## 2021-01-01 PROCEDURE — 99214 OFFICE O/P EST MOD 30 MIN: CPT | Mod: HCNC,S$GLB,, | Performed by: NURSE PRACTITIONER

## 2021-01-01 PROCEDURE — 73630 XR FOOT COMPLETE 3 VIEW BILATERAL: ICD-10-PCS | Mod: 26,50,HCNC, | Performed by: RADIOLOGY

## 2021-01-01 PROCEDURE — 99499 UNLISTED E&M SERVICE: CPT | Mod: HCNC,S$GLB,, | Performed by: NURSE PRACTITIONER

## 2021-01-01 PROCEDURE — 95251 PR GLUCOSE MONITOR, 72 HOUR, PHYS INTERP: ICD-10-PCS | Mod: HCNC,S$GLB,, | Performed by: NURSE PRACTITIONER

## 2021-01-01 PROCEDURE — 3288F PR FALLS RISK ASSESSMENT DOCUMENTED: ICD-10-PCS | Mod: HCNC,CPTII,S$GLB, | Performed by: UROLOGY

## 2021-01-01 PROCEDURE — 3078F PR MOST RECENT DIASTOLIC BLOOD PRESSURE < 80 MM HG: ICD-10-PCS | Mod: CPTII,S$GLB,, | Performed by: PODIATRIST

## 2021-01-01 PROCEDURE — 94640 AIRWAY INHALATION TREATMENT: CPT | Mod: HCNC

## 2021-01-01 PROCEDURE — 1159F MED LIST DOCD IN RCRD: CPT | Mod: S$GLB,,, | Performed by: OTOLARYNGOLOGY

## 2021-01-01 PROCEDURE — 86038 ANTINUCLEAR ANTIBODIES: CPT | Mod: HCNC | Performed by: PHYSICIAN ASSISTANT

## 2021-01-01 PROCEDURE — 36556 INSERT NON-TUNNEL CV CATH: CPT | Mod: HCNC

## 2021-01-01 PROCEDURE — S4991 NICOTINE PATCH NONLEGEND: HCPCS | Mod: HCNC | Performed by: HOSPITALIST

## 2021-01-01 PROCEDURE — 3051F HG A1C>EQUAL 7.0%<8.0%: CPT | Mod: HCNC,CPTII,S$GLB, | Performed by: INTERNAL MEDICINE

## 2021-01-01 PROCEDURE — 99214 PR OFFICE/OUTPT VISIT, EST, LEVL IV, 30-39 MIN: ICD-10-PCS | Mod: S$GLB,,, | Performed by: OTOLARYNGOLOGY

## 2021-01-01 PROCEDURE — 99499 RISK ADDL DX/OHS AUDIT: ICD-10-PCS | Mod: S$GLB,,, | Performed by: STUDENT IN AN ORGANIZED HEALTH CARE EDUCATION/TRAINING PROGRAM

## 2021-01-01 PROCEDURE — 99214 PR OFFICE/OUTPT VISIT, EST, LEVL IV, 30-39 MIN: ICD-10-PCS | Mod: HCNC,S$GLB,, | Performed by: UROLOGY

## 2021-01-01 PROCEDURE — 99499 UNLISTED E&M SERVICE: CPT | Mod: HCNC,95,, | Performed by: INTERNAL MEDICINE

## 2021-01-01 PROCEDURE — 51702 INSERT TEMP BLADDER CATH: CPT | Mod: HCNC

## 2021-01-01 PROCEDURE — U0002 COVID-19 LAB TEST NON-CDC: HCPCS | Mod: HCNC | Performed by: EMERGENCY MEDICINE

## 2021-01-01 PROCEDURE — 3288F FALL RISK ASSESSMENT DOCD: CPT | Mod: HCNC,CPTII,S$GLB, | Performed by: THORACIC SURGERY (CARDIOTHORACIC VASCULAR SURGERY)

## 2021-01-01 PROCEDURE — 3074F PR MOST RECENT SYSTOLIC BLOOD PRESSURE < 130 MM HG: ICD-10-PCS | Mod: CPTII,S$GLB,, | Performed by: NURSE PRACTITIONER

## 2021-01-01 PROCEDURE — 99499 RISK ADDL DX/OHS AUDIT: ICD-10-PCS | Mod: HCNC,S$GLB,, | Performed by: INTERNAL MEDICINE

## 2021-01-01 PROCEDURE — 85610 PROTHROMBIN TIME: CPT | Mod: HCNC | Performed by: INTERNAL MEDICINE

## 2021-01-01 PROCEDURE — 99999 PR PBB SHADOW E&M-EST. PATIENT-LVL V: ICD-10-PCS | Mod: PBBFAC,,, | Performed by: INTERNAL MEDICINE

## 2021-01-01 PROCEDURE — 1125F AMNT PAIN NOTED PAIN PRSNT: CPT | Mod: CPTII,S$GLB,, | Performed by: INTERNAL MEDICINE

## 2021-01-01 PROCEDURE — 86235 NUCLEAR ANTIGEN ANTIBODY: CPT | Mod: 59,HCNC | Performed by: PHYSICIAN ASSISTANT

## 2021-01-01 PROCEDURE — 74220 FL ESOPHAGRAM COMPLETE: ICD-10-PCS | Mod: 26,,, | Performed by: RADIOLOGY

## 2021-01-01 PROCEDURE — 99214 PR OFFICE/OUTPT VISIT, EST, LEVL IV, 30-39 MIN: ICD-10-PCS | Mod: HCNC,S$GLB,, | Performed by: INTERNAL MEDICINE

## 2021-01-01 PROCEDURE — 3079F PR MOST RECENT DIASTOLIC BLOOD PRESSURE 80-89 MM HG: ICD-10-PCS | Mod: CPTII,S$GLB,, | Performed by: INTERNAL MEDICINE

## 2021-01-01 PROCEDURE — 63600175 PHARM REV CODE 636 W HCPCS: Mod: HCNC | Performed by: RADIOLOGY

## 2021-01-01 PROCEDURE — 3288F PR FALLS RISK ASSESSMENT DOCUMENTED: ICD-10-PCS | Mod: HCNC,CPTII,S$GLB, | Performed by: STUDENT IN AN ORGANIZED HEALTH CARE EDUCATION/TRAINING PROGRAM

## 2021-01-01 PROCEDURE — 95251 CONT GLUC MNTR ANALYSIS I&R: CPT | Mod: HCNC,S$GLB,, | Performed by: NURSE PRACTITIONER

## 2021-01-01 PROCEDURE — 1101F PT FALLS ASSESS-DOCD LE1/YR: CPT | Mod: CPTII,S$GLB,, | Performed by: PODIATRIST

## 2021-01-01 PROCEDURE — 3078F DIAST BP <80 MM HG: CPT | Mod: HCNC,CPTII,S$GLB, | Performed by: STUDENT IN AN ORGANIZED HEALTH CARE EDUCATION/TRAINING PROGRAM

## 2021-01-01 PROCEDURE — G0180 MD CERTIFICATION HHA PATIENT: HCPCS | Mod: ,,, | Performed by: INTERNAL MEDICINE

## 2021-01-01 PROCEDURE — 3288F PR FALLS RISK ASSESSMENT DOCUMENTED: ICD-10-PCS | Mod: HCNC,CPTII,S$GLB, | Performed by: PODIATRIST

## 2021-01-01 PROCEDURE — 85027 COMPLETE CBC AUTOMATED: CPT | Mod: HCNC | Performed by: EMERGENCY MEDICINE

## 2021-01-01 PROCEDURE — 99214 PR OFFICE/OUTPT VISIT, EST, LEVL IV, 30-39 MIN: ICD-10-PCS | Mod: HCNC,S$GLB,, | Performed by: STUDENT IN AN ORGANIZED HEALTH CARE EDUCATION/TRAINING PROGRAM

## 2021-01-01 PROCEDURE — 99999 PR PBB SHADOW E&M-EST. PATIENT-LVL III: CPT | Mod: PBBFAC,HCNC,, | Performed by: INTERNAL MEDICINE

## 2021-01-01 PROCEDURE — 4010F PR ACE/ARB THEARPY RXD/TAKEN: ICD-10-PCS | Mod: HCNC,CPTII,S$GLB, | Performed by: NURSE PRACTITIONER

## 2021-01-01 PROCEDURE — 99999 PR PBB SHADOW E&M-EST. PATIENT-LVL III: CPT | Mod: PBBFAC,HCNC,, | Performed by: STUDENT IN AN ORGANIZED HEALTH CARE EDUCATION/TRAINING PROGRAM

## 2021-01-01 PROCEDURE — 99233 SBSQ HOSP IP/OBS HIGH 50: CPT | Mod: HCNC,,, | Performed by: INTERNAL MEDICINE

## 2021-01-01 PROCEDURE — 93970 EXTREMITY STUDY: CPT | Mod: TC,HCNC

## 2021-01-01 PROCEDURE — 3052F PR MOST RECENT HEMOGLOBIN A1C LEVEL 8.0 - < 9.0%: ICD-10-PCS | Mod: CPTII,S$GLB,, | Performed by: PODIATRIST

## 2021-01-01 PROCEDURE — 4010F ACE/ARB THERAPY RXD/TAKEN: CPT | Mod: HCNC,CPTII,S$GLB, | Performed by: INTERNAL MEDICINE

## 2021-01-01 PROCEDURE — 74160 CT ABDOMEN W/CONTRAST: CPT | Mod: 26,HCNC,, | Performed by: RADIOLOGY

## 2021-01-01 PROCEDURE — 92014 PR EYE EXAM, EST PATIENT,COMPREHESV: ICD-10-PCS | Mod: HCNC,S$GLB,, | Performed by: OPTOMETRIST

## 2021-01-01 PROCEDURE — 92612 ENDOSCOPY SWALLOW (FEES) VID: CPT | Mod: HCNC,PN | Performed by: SPEECH-LANGUAGE PATHOLOGIST

## 2021-01-01 PROCEDURE — 25000242 PHARM REV CODE 250 ALT 637 W/ HCPCS: Mod: HCNC | Performed by: INTERNAL MEDICINE

## 2021-01-01 PROCEDURE — U0005 INFEC AGEN DETEC AMPLI PROBE: HCPCS | Performed by: INTERNAL MEDICINE

## 2021-01-01 PROCEDURE — 3046F HEMOGLOBIN A1C LEVEL >9.0%: CPT | Mod: HCNC,CPTII,S$GLB, | Performed by: STUDENT IN AN ORGANIZED HEALTH CARE EDUCATION/TRAINING PROGRAM

## 2021-01-01 PROCEDURE — 1160F PR REVIEW ALL MEDS BY PRESCRIBER/CLIN PHARMACIST DOCUMENTED: ICD-10-PCS | Mod: HCNC,CPTII,S$GLB, | Performed by: PODIATRIST

## 2021-01-01 PROCEDURE — 90715 TDAP VACCINE 7 YRS/> IM: CPT | Mod: HCNC | Performed by: EMERGENCY MEDICINE

## 2021-01-01 PROCEDURE — 3052F HG A1C>EQUAL 8.0%<EQUAL 9.0%: CPT | Mod: CPTII,S$GLB,, | Performed by: PODIATRIST

## 2021-01-01 PROCEDURE — 93925 LOWER EXTREMITY STUDY: CPT | Mod: HCNC

## 2021-01-01 PROCEDURE — 99213 PR OFFICE/OUTPT VISIT, EST, LEVL III, 20-29 MIN: ICD-10-PCS | Mod: S$GLB,,, | Performed by: NURSE PRACTITIONER

## 2021-01-01 PROCEDURE — 1159F PR MEDICATION LIST DOCUMENTED IN MEDICAL RECORD: ICD-10-PCS | Mod: S$GLB,,, | Performed by: OTOLARYNGOLOGY

## 2021-01-01 PROCEDURE — 3078F PR MOST RECENT DIASTOLIC BLOOD PRESSURE < 80 MM HG: ICD-10-PCS | Mod: HCNC,CPTII,S$GLB, | Performed by: STUDENT IN AN ORGANIZED HEALTH CARE EDUCATION/TRAINING PROGRAM

## 2021-01-01 PROCEDURE — 36415 COLL VENOUS BLD VENIPUNCTURE: CPT | Mod: HCNC,PN | Performed by: INTERNAL MEDICINE

## 2021-01-01 PROCEDURE — 3078F PR MOST RECENT DIASTOLIC BLOOD PRESSURE < 80 MM HG: ICD-10-PCS | Mod: CPTII,S$GLB,, | Performed by: INTERNAL MEDICINE

## 2021-01-01 PROCEDURE — 3072F PR LOW RISK FOR RETINOPATHY: ICD-10-PCS | Mod: S$GLB,,, | Performed by: INTERNAL MEDICINE

## 2021-01-01 PROCEDURE — 93922 ANKLE BRACHIAL INDICES (ABI): ICD-10-PCS | Mod: 26,HCNC,, | Performed by: SURGERY

## 2021-01-01 PROCEDURE — 73502 X-RAY EXAM HIP UNI 2-3 VIEWS: CPT | Mod: LT,S$GLB,, | Performed by: RADIOLOGY

## 2021-01-01 PROCEDURE — 1101F PT FALLS ASSESS-DOCD LE1/YR: CPT | Mod: CPTII,S$GLB,, | Performed by: OTOLARYNGOLOGY

## 2021-01-01 PROCEDURE — 99999 PR PBB SHADOW E&M-EST. PATIENT-LVL IV: ICD-10-PCS | Mod: PBBFAC,HCNC,, | Performed by: UROLOGY

## 2021-01-01 PROCEDURE — 4010F ACE/ARB THERAPY RXD/TAKEN: CPT | Mod: CPTII,S$GLB,, | Performed by: OTOLARYNGOLOGY

## 2021-01-01 PROCEDURE — 99999 PR PBB SHADOW E&M-EST. PATIENT-LVL V: CPT | Mod: PBBFAC,HCNC,, | Performed by: NURSE PRACTITIONER

## 2021-01-01 PROCEDURE — 1159F PR MEDICATION LIST DOCUMENTED IN MEDICAL RECORD: ICD-10-PCS | Mod: HCNC,S$GLB,, | Performed by: NURSE PRACTITIONER

## 2021-01-01 PROCEDURE — 99999 PR PBB SHADOW E&M-EST. PATIENT-LVL V: ICD-10-PCS | Mod: PBBFAC,HCNC,, | Performed by: NURSE PRACTITIONER

## 2021-01-01 PROCEDURE — 99999 PR PBB SHADOW E&M-EST. PATIENT-LVL IV: ICD-10-PCS | Mod: PBBFAC,HCNC,, | Performed by: NURSE PRACTITIONER

## 2021-01-01 PROCEDURE — 3072F LOW RISK FOR RETINOPATHY: CPT | Mod: HCNC,CPTII,S$GLB, | Performed by: UROLOGY

## 2021-01-01 PROCEDURE — 4010F ACE/ARB THERAPY RXD/TAKEN: CPT | Mod: HCNC,CPTII,S$GLB, | Performed by: NURSE PRACTITIONER

## 2021-01-01 PROCEDURE — 3008F BODY MASS INDEX DOCD: CPT | Mod: HCNC,CPTII,S$GLB, | Performed by: NURSE PRACTITIONER

## 2021-01-01 PROCEDURE — 93970 US LOWER EXTREMITY VEINS BILATERAL: ICD-10-PCS | Mod: 26,HCNC,, | Performed by: RADIOLOGY

## 2021-01-01 PROCEDURE — 99214 PR OFFICE/OUTPT VISIT, EST, LEVL IV, 30-39 MIN: ICD-10-PCS | Mod: HCNC,S$GLB,, | Performed by: PODIATRIST

## 2021-01-01 PROCEDURE — 3008F BODY MASS INDEX DOCD: CPT | Mod: HCNC,CPTII,S$GLB, | Performed by: INTERNAL MEDICINE

## 2021-01-01 PROCEDURE — 1125F PR PAIN SEVERITY QUANTIFIED, PAIN PRESENT: ICD-10-PCS | Mod: HCNC,CPTII,S$GLB, | Performed by: PHYSICIAN ASSISTANT

## 2021-01-01 PROCEDURE — 3051F PR MOST RECENT HEMOGLOBIN A1C LEVEL 7.0 - < 8.0%: ICD-10-PCS | Mod: HCNC,CPTII,S$GLB, | Performed by: OPTOMETRIST

## 2021-01-01 PROCEDURE — 74177 CT ABD & PELVIS W/CONTRAST: CPT | Mod: 26,,, | Performed by: RADIOLOGY

## 2021-01-01 PROCEDURE — 99499 UNLISTED E&M SERVICE: CPT | Mod: S$GLB,,, | Performed by: INTERNAL MEDICINE

## 2021-01-01 PROCEDURE — 3072F PR LOW RISK FOR RETINOPATHY: ICD-10-PCS | Mod: HCNC,S$GLB,, | Performed by: THORACIC SURGERY (CARDIOTHORACIC VASCULAR SURGERY)

## 2021-01-01 PROCEDURE — 99213 OFFICE O/P EST LOW 20 MIN: CPT | Mod: HCNC,S$GLB,, | Performed by: NURSE PRACTITIONER

## 2021-01-01 PROCEDURE — 99999 PR PBB SHADOW E&M-EST. PATIENT-LVL V: CPT | Mod: PBBFAC,HCNC,, | Performed by: INTERNAL MEDICINE

## 2021-01-01 PROCEDURE — 1111F DSCHRG MED/CURRENT MED MERGE: CPT | Mod: CPTII,S$GLB,, | Performed by: OTOLARYNGOLOGY

## 2021-01-01 PROCEDURE — 97535 SELF CARE MNGMENT TRAINING: CPT | Mod: HCNC

## 2021-01-01 PROCEDURE — 96360 HYDRATION IV INFUSION INIT: CPT | Mod: HCNC

## 2021-01-01 PROCEDURE — 99499 UNLISTED E&M SERVICE: CPT | Mod: HCNC,S$GLB,, | Performed by: INTERNAL MEDICINE

## 2021-01-01 PROCEDURE — 3072F LOW RISK FOR RETINOPATHY: CPT | Mod: CPTII,S$GLB,, | Performed by: INTERNAL MEDICINE

## 2021-01-01 PROCEDURE — 3288F PR FALLS RISK ASSESSMENT DOCUMENTED: ICD-10-PCS | Mod: HCNC,CPTII,S$GLB, | Performed by: INTERNAL MEDICINE

## 2021-01-01 PROCEDURE — 4010F PR ACE/ARB THEARPY RXD/TAKEN: ICD-10-PCS | Mod: HCNC,CPTII,S$GLB, | Performed by: PHYSICIAN ASSISTANT

## 2021-01-01 PROCEDURE — 1100F PR PT FALLS ASSESS DOC 2+ FALLS/FALL W/INJURY/YR: ICD-10-PCS | Mod: HCNC,CPTII,S$GLB, | Performed by: NURSE PRACTITIONER

## 2021-01-01 PROCEDURE — 99499 NO LOS: ICD-10-PCS | Mod: HCNC,,, | Performed by: INTERNAL MEDICINE

## 2021-01-01 PROCEDURE — 86901 BLOOD TYPING SEROLOGIC RH(D): CPT | Mod: HCNC | Performed by: EMERGENCY MEDICINE

## 2021-01-01 PROCEDURE — 97110 THERAPEUTIC EXERCISES: CPT | Mod: HCNC

## 2021-01-01 PROCEDURE — A4217 STERILE WATER/SALINE, 500 ML: HCPCS | Mod: HCNC | Performed by: INTERNAL MEDICINE

## 2021-01-01 PROCEDURE — 3051F PR MOST RECENT HEMOGLOBIN A1C LEVEL 7.0 - < 8.0%: ICD-10-PCS | Mod: HCNC,CPTII,S$GLB, | Performed by: INTERNAL MEDICINE

## 2021-01-01 PROCEDURE — 94799 UNLISTED PULMONARY SVC/PX: CPT | Mod: HCNC

## 2021-01-01 PROCEDURE — 3008F BODY MASS INDEX DOCD: CPT | Mod: CPTII,S$GLB,, | Performed by: INTERNAL MEDICINE

## 2021-01-01 PROCEDURE — 3079F DIAST BP 80-89 MM HG: CPT | Mod: HCNC,CPTII,S$GLB, | Performed by: NURSE PRACTITIONER

## 2021-01-01 PROCEDURE — 3074F PR MOST RECENT SYSTOLIC BLOOD PRESSURE < 130 MM HG: ICD-10-PCS | Mod: HCNC,CPTII,S$GLB, | Performed by: PHYSICIAN ASSISTANT

## 2021-01-01 PROCEDURE — 99233 PR SUBSEQUENT HOSPITAL CARE,LEVL III: ICD-10-PCS | Mod: HCNC,,, | Performed by: INTERNAL MEDICINE

## 2021-01-01 PROCEDURE — 3288F PR FALLS RISK ASSESSMENT DOCUMENTED: ICD-10-PCS | Mod: HCNC,CPTII,S$GLB, | Performed by: THORACIC SURGERY (CARDIOTHORACIC VASCULAR SURGERY)

## 2021-01-01 PROCEDURE — 3078F DIAST BP <80 MM HG: CPT | Mod: HCNC,CPTII,S$GLB, | Performed by: NURSE PRACTITIONER

## 2021-01-01 PROCEDURE — 1159F PR MEDICATION LIST DOCUMENTED IN MEDICAL RECORD: ICD-10-PCS | Mod: HCNC,CPTII,S$GLB, | Performed by: INTERNAL MEDICINE

## 2021-01-01 PROCEDURE — 99214 PR OFFICE/OUTPT VISIT, EST, LEVL IV, 30-39 MIN: ICD-10-PCS | Mod: S$GLB,,, | Performed by: PODIATRIST

## 2021-01-01 PROCEDURE — 92526 ORAL FUNCTION THERAPY: CPT | Mod: HCNC,GP,ICN, | Performed by: SPEECH-LANGUAGE PATHOLOGIST

## 2021-01-01 PROCEDURE — 1159F MED LIST DOCD IN RCRD: CPT | Mod: S$GLB,,, | Performed by: PODIATRIST

## 2021-01-01 PROCEDURE — 99999 PR PBB SHADOW E&M-EST. PATIENT-LVL V: ICD-10-PCS | Mod: PBBFAC,HCNC,, | Performed by: INTERNAL MEDICINE

## 2021-01-01 PROCEDURE — 70491 CT SOFT TISSUE NECK W/DYE: CPT | Mod: TC

## 2021-01-01 PROCEDURE — 99499 RISK ADDL DX/OHS AUDIT: ICD-10-PCS | Mod: HCNC,S$GLB,, | Performed by: STUDENT IN AN ORGANIZED HEALTH CARE EDUCATION/TRAINING PROGRAM

## 2021-01-01 PROCEDURE — 3074F PR MOST RECENT SYSTOLIC BLOOD PRESSURE < 130 MM HG: ICD-10-PCS | Mod: HCNC,CPTII,S$GLB, | Performed by: INTERNAL MEDICINE

## 2021-01-01 PROCEDURE — 3288F FALL RISK ASSESSMENT DOCD: CPT | Mod: CPTII,S$GLB,, | Performed by: INTERNAL MEDICINE

## 2021-01-01 PROCEDURE — 27000207 HC ISOLATION: Mod: HCNC

## 2021-01-01 PROCEDURE — 99999 PR PBB SHADOW E&M-EST. PATIENT-LVL IV: CPT | Mod: PBBFAC,HCNC,, | Performed by: UROLOGY

## 2021-01-01 PROCEDURE — 99233 SBSQ HOSP IP/OBS HIGH 50: CPT | Mod: HCNC,,, | Performed by: STUDENT IN AN ORGANIZED HEALTH CARE EDUCATION/TRAINING PROGRAM

## 2021-01-01 PROCEDURE — 3008F PR BODY MASS INDEX (BMI) DOCUMENTED: ICD-10-PCS | Mod: CPTII,S$GLB,, | Performed by: INTERNAL MEDICINE

## 2021-01-01 PROCEDURE — 1100F PR PT FALLS ASSESS DOC 2+ FALLS/FALL W/INJURY/YR: ICD-10-PCS | Mod: HCNC,CPTII,S$GLB, | Performed by: UROLOGY

## 2021-01-01 PROCEDURE — 3008F PR BODY MASS INDEX (BMI) DOCUMENTED: ICD-10-PCS | Mod: CPTII,S$GLB,, | Performed by: OTOLARYNGOLOGY

## 2021-01-01 PROCEDURE — 1126F PR PAIN SEVERITY QUANTIFIED, NO PAIN PRESENT: ICD-10-PCS | Mod: HCNC,CPTII,S$GLB, | Performed by: INTERNAL MEDICINE

## 2021-01-01 PROCEDURE — 1159F PR MEDICATION LIST DOCUMENTED IN MEDICAL RECORD: ICD-10-PCS | Mod: S$GLB,,, | Performed by: INTERNAL MEDICINE

## 2021-01-01 PROCEDURE — 25000242 PHARM REV CODE 250 ALT 637 W/ HCPCS: Mod: HCNC | Performed by: HOSPITALIST

## 2021-01-01 PROCEDURE — 99214 OFFICE O/P EST MOD 30 MIN: CPT | Mod: HCNC,S$GLB,, | Performed by: INTERNAL MEDICINE

## 2021-01-01 PROCEDURE — 97530 THERAPEUTIC ACTIVITIES: CPT | Mod: HCNC,CQ

## 2021-01-01 PROCEDURE — 3079F PR MOST RECENT DIASTOLIC BLOOD PRESSURE 80-89 MM HG: ICD-10-PCS | Mod: HCNC,CPTII,S$GLB, | Performed by: NURSE PRACTITIONER

## 2021-01-01 PROCEDURE — 1125F AMNT PAIN NOTED PAIN PRSNT: CPT | Mod: S$GLB,,, | Performed by: OTOLARYNGOLOGY

## 2021-01-01 PROCEDURE — 3072F PR LOW RISK FOR RETINOPATHY: ICD-10-PCS | Mod: S$GLB,,, | Performed by: NURSE PRACTITIONER

## 2021-01-01 PROCEDURE — 36415 COLL VENOUS BLD VENIPUNCTURE: CPT | Mod: HCNC | Performed by: STUDENT IN AN ORGANIZED HEALTH CARE EDUCATION/TRAINING PROGRAM

## 2021-01-01 PROCEDURE — 74160 CT ABDOMEN W/CONTRAST: CPT | Mod: TC,HCNC

## 2021-01-01 PROCEDURE — 82103 ALPHA-1-ANTITRYPSIN TOTAL: CPT | Mod: HCNC | Performed by: PHYSICIAN ASSISTANT

## 2021-01-01 PROCEDURE — 3008F BODY MASS INDEX DOCD: CPT | Mod: HCNC,CPTII,S$GLB, | Performed by: PODIATRIST

## 2021-01-01 PROCEDURE — 92610 EVALUATE SWALLOWING FUNCTION: CPT | Mod: ,,, | Performed by: SPEECH-LANGUAGE PATHOLOGIST

## 2021-01-01 PROCEDURE — 99233 PR SUBSEQUENT HOSPITAL CARE,LEVL III: ICD-10-PCS | Mod: HCNC,,, | Performed by: STUDENT IN AN ORGANIZED HEALTH CARE EDUCATION/TRAINING PROGRAM

## 2021-01-01 PROCEDURE — 99214 OFFICE O/P EST MOD 30 MIN: CPT | Mod: S$GLB,,, | Performed by: NURSE PRACTITIONER

## 2021-01-01 PROCEDURE — 99214 OFFICE O/P EST MOD 30 MIN: CPT | Mod: 25,S$GLB,, | Performed by: OTOLARYNGOLOGY

## 2021-01-01 PROCEDURE — 93306 TTE W/DOPPLER COMPLETE: CPT | Mod: HCNC

## 2021-01-01 PROCEDURE — 99499 NO LOS: ICD-10-PCS | Mod: HCNC,95,, | Performed by: INTERNAL MEDICINE

## 2021-01-01 PROCEDURE — 1159F MED LIST DOCD IN RCRD: CPT | Mod: CPTII,S$GLB,, | Performed by: NURSE PRACTITIONER

## 2021-01-01 PROCEDURE — 3072F LOW RISK FOR RETINOPATHY: CPT | Mod: HCNC,S$GLB,, | Performed by: NURSE PRACTITIONER

## 2021-01-01 PROCEDURE — 25500020 PHARM REV CODE 255: Mod: HCNC | Performed by: HOSPITALIST

## 2021-01-01 PROCEDURE — 99999 PR PBB SHADOW E&M-EST. PATIENT-LVL V: ICD-10-PCS | Mod: PBBFAC,,, | Performed by: NURSE PRACTITIONER

## 2021-01-01 PROCEDURE — 1101F PT FALLS ASSESS-DOCD LE1/YR: CPT | Mod: HCNC,CPTII,S$GLB, | Performed by: INTERNAL MEDICINE

## 2021-01-01 PROCEDURE — 93922 UPR/L XTREMITY ART 2 LEVELS: CPT | Mod: HCNC

## 2021-01-01 PROCEDURE — 3288F FALL RISK ASSESSMENT DOCD: CPT | Mod: CPTII,S$GLB,, | Performed by: OTOLARYNGOLOGY

## 2021-01-01 PROCEDURE — 87077 CULTURE AEROBIC IDENTIFY: CPT | Mod: HCNC | Performed by: EMERGENCY MEDICINE

## 2021-01-01 PROCEDURE — 1159F MED LIST DOCD IN RCRD: CPT | Mod: HCNC,CPTII,S$GLB, | Performed by: NURSE PRACTITIONER

## 2021-01-01 PROCEDURE — 3072F PR LOW RISK FOR RETINOPATHY: ICD-10-PCS | Mod: HCNC,S$GLB,, | Performed by: INTERNAL MEDICINE

## 2021-01-01 PROCEDURE — 1160F PR REVIEW ALL MEDS BY PRESCRIBER/CLIN PHARMACIST DOCUMENTED: ICD-10-PCS | Mod: HCNC,CPTII,S$GLB, | Performed by: NURSE PRACTITIONER

## 2021-01-01 PROCEDURE — 3046F PR MOST RECENT HEMOGLOBIN A1C LEVEL > 9.0%: ICD-10-PCS | Mod: CPTII,S$GLB,, | Performed by: NURSE PRACTITIONER

## 2021-01-01 PROCEDURE — 3072F PR LOW RISK FOR RETINOPATHY: ICD-10-PCS | Mod: HCNC,S$GLB,, | Performed by: STUDENT IN AN ORGANIZED HEALTH CARE EDUCATION/TRAINING PROGRAM

## 2021-01-01 PROCEDURE — 63600175 PHARM REV CODE 636 W HCPCS: Mod: JG,HCNC | Performed by: INTERNAL MEDICINE

## 2021-01-01 PROCEDURE — 1101F PR PT FALLS ASSESS DOC 0-1 FALLS W/OUT INJ PAST YR: ICD-10-PCS | Mod: CPTII,S$GLB,, | Performed by: INTERNAL MEDICINE

## 2021-01-01 PROCEDURE — 1159F PR MEDICATION LIST DOCUMENTED IN MEDICAL RECORD: ICD-10-PCS | Mod: CPTII,S$GLB,, | Performed by: INTERNAL MEDICINE

## 2021-01-01 PROCEDURE — 1125F PR PAIN SEVERITY QUANTIFIED, PAIN PRESENT: ICD-10-PCS | Mod: HCNC,CPTII,S$GLB, | Performed by: PODIATRIST

## 2021-01-01 PROCEDURE — 74220 X-RAY XM ESOPHAGUS 1CNTRST: CPT | Mod: 26,,, | Performed by: RADIOLOGY

## 2021-01-01 PROCEDURE — 99999 PR PBB SHADOW E&M-EST. PATIENT-LVL V: CPT | Mod: PBBFAC,,, | Performed by: INTERNAL MEDICINE

## 2021-01-01 PROCEDURE — 80061 LIPID PANEL: CPT | Mod: HCNC | Performed by: INTERNAL MEDICINE

## 2021-01-01 PROCEDURE — 3075F SYST BP GE 130 - 139MM HG: CPT | Mod: CPTII,S$GLB,, | Performed by: INTERNAL MEDICINE

## 2021-01-01 PROCEDURE — 4010F PR ACE/ARB THEARPY RXD/TAKEN: ICD-10-PCS | Mod: CPTII,S$GLB,, | Performed by: INTERNAL MEDICINE

## 2021-01-01 PROCEDURE — 99283 EMERGENCY DEPT VISIT LOW MDM: CPT | Mod: 25,HCNC

## 2021-01-01 PROCEDURE — 97162 PT EVAL MOD COMPLEX 30 MIN: CPT | Mod: HCNC

## 2021-01-01 PROCEDURE — 3074F SYST BP LT 130 MM HG: CPT | Mod: CPTII,S$GLB,, | Performed by: NURSE PRACTITIONER

## 2021-01-01 PROCEDURE — 63600175 PHARM REV CODE 636 W HCPCS: Mod: HCNC | Performed by: STUDENT IN AN ORGANIZED HEALTH CARE EDUCATION/TRAINING PROGRAM

## 2021-01-01 PROCEDURE — 25000003 PHARM REV CODE 250: Mod: HCNC | Performed by: EMERGENCY MEDICINE

## 2021-01-01 PROCEDURE — 3008F BODY MASS INDEX DOCD: CPT | Mod: CPTII,S$GLB,, | Performed by: NURSE PRACTITIONER

## 2021-01-01 PROCEDURE — 1101F PT FALLS ASSESS-DOCD LE1/YR: CPT | Mod: HCNC,CPTII,S$GLB, | Performed by: NURSE PRACTITIONER

## 2021-01-01 PROCEDURE — 99214 OFFICE O/P EST MOD 30 MIN: CPT | Mod: HCNC,S$GLB,, | Performed by: PODIATRIST

## 2021-01-01 PROCEDURE — 3046F PR MOST RECENT HEMOGLOBIN A1C LEVEL > 9.0%: ICD-10-PCS | Mod: CPTII,S$GLB,, | Performed by: INTERNAL MEDICINE

## 2021-01-01 PROCEDURE — 3052F PR MOST RECENT HEMOGLOBIN A1C LEVEL 8.0 - < 9.0%: ICD-10-PCS | Mod: HCNC,CPTII,S$GLB, | Performed by: NURSE PRACTITIONER

## 2021-01-01 PROCEDURE — 84443 ASSAY THYROID STIM HORMONE: CPT

## 2021-01-01 PROCEDURE — 99214 PR OFFICE/OUTPT VISIT, EST, LEVL IV, 30-39 MIN: ICD-10-PCS | Mod: S$GLB,,, | Performed by: INTERNAL MEDICINE

## 2021-01-01 PROCEDURE — 3078F PR MOST RECENT DIASTOLIC BLOOD PRESSURE < 80 MM HG: ICD-10-PCS | Mod: CPTII,S$GLB,, | Performed by: NURSE PRACTITIONER

## 2021-01-01 PROCEDURE — 1101F PT FALLS ASSESS-DOCD LE1/YR: CPT | Mod: HCNC,CPTII,S$GLB, | Performed by: STUDENT IN AN ORGANIZED HEALTH CARE EDUCATION/TRAINING PROGRAM

## 2021-01-01 PROCEDURE — 99223 1ST HOSP IP/OBS HIGH 75: CPT | Mod: HCNC,,, | Performed by: INTERNAL MEDICINE

## 2021-01-01 PROCEDURE — 1160F RVW MEDS BY RX/DR IN RCRD: CPT | Mod: HCNC,CPTII,S$GLB, | Performed by: INTERNAL MEDICINE

## 2021-01-01 PROCEDURE — 3078F PR MOST RECENT DIASTOLIC BLOOD PRESSURE < 80 MM HG: ICD-10-PCS | Mod: HCNC,CPTII,S$GLB, | Performed by: PHYSICIAN ASSISTANT

## 2021-01-01 PROCEDURE — 3072F LOW RISK FOR RETINOPATHY: CPT | Mod: S$GLB,,, | Performed by: OTOLARYNGOLOGY

## 2021-01-01 PROCEDURE — 82105 ALPHA-FETOPROTEIN SERUM: CPT | Mod: HCNC | Performed by: PHYSICIAN ASSISTANT

## 2021-01-01 PROCEDURE — 1159F MED LIST DOCD IN RCRD: CPT | Mod: S$GLB,,, | Performed by: INTERNAL MEDICINE

## 2021-01-01 PROCEDURE — 93922 UPR/L XTREMITY ART 2 LEVELS: CPT | Mod: TC

## 2021-01-01 PROCEDURE — 3072F LOW RISK FOR RETINOPATHY: CPT | Mod: HCNC,CPTII,S$GLB, | Performed by: PHYSICIAN ASSISTANT

## 2021-01-01 PROCEDURE — 3072F PR LOW RISK FOR RETINOPATHY: ICD-10-PCS | Mod: S$GLB,,, | Performed by: OTOLARYNGOLOGY

## 2021-01-01 PROCEDURE — 1126F AMNT PAIN NOTED NONE PRSNT: CPT | Mod: HCNC,CPTII,S$GLB, | Performed by: NURSE PRACTITIONER

## 2021-01-01 PROCEDURE — 99214 PR OFFICE/OUTPT VISIT, EST, LEVL IV, 30-39 MIN: ICD-10-PCS | Mod: HCNC,25,S$GLB, | Performed by: NURSE PRACTITIONER

## 2021-01-01 PROCEDURE — 3072F LOW RISK FOR RETINOPATHY: CPT | Mod: HCNC,CPTII,S$GLB, | Performed by: NURSE PRACTITIONER

## 2021-01-01 PROCEDURE — 1159F PR MEDICATION LIST DOCUMENTED IN MEDICAL RECORD: ICD-10-PCS | Mod: S$GLB,,, | Performed by: NURSE PRACTITIONER

## 2021-01-01 PROCEDURE — 99212 OFFICE O/P EST SF 10 MIN: CPT | Mod: S$GLB,,, | Performed by: OTOLARYNGOLOGY

## 2021-01-01 PROCEDURE — 99499 UNLISTED E&M SERVICE: CPT | Mod: S$GLB,,, | Performed by: PHYSICIAN ASSISTANT

## 2021-01-01 PROCEDURE — 85007 BL SMEAR W/DIFF WBC COUNT: CPT | Mod: HCNC | Performed by: EMERGENCY MEDICINE

## 2021-01-01 PROCEDURE — 99212 PR OFFICE/OUTPT VISIT, EST, LEVL II, 10-19 MIN: ICD-10-PCS | Mod: S$GLB,,, | Performed by: OTOLARYNGOLOGY

## 2021-01-01 PROCEDURE — 99499 RISK ADDL DX/OHS AUDIT: ICD-10-PCS | Mod: S$GLB,,, | Performed by: NURSE PRACTITIONER

## 2021-01-01 PROCEDURE — U0005 INFEC AGEN DETEC AMPLI PROBE: HCPCS

## 2021-01-01 PROCEDURE — 97165 OT EVAL LOW COMPLEX 30 MIN: CPT | Mod: HCNC

## 2021-01-01 PROCEDURE — 1125F PR PAIN SEVERITY QUANTIFIED, PAIN PRESENT: ICD-10-PCS | Mod: S$GLB,,, | Performed by: OTOLARYNGOLOGY

## 2021-01-01 PROCEDURE — 1111F PR DISCHARGE MEDS RECONCILED W/ CURRENT OUTPATIENT MED LIST: ICD-10-PCS | Mod: HCNC,CPTII,S$GLB, | Performed by: THORACIC SURGERY (CARDIOTHORACIC VASCULAR SURGERY)

## 2021-01-01 PROCEDURE — 3051F HG A1C>EQUAL 7.0%<8.0%: CPT | Mod: CPTII,S$GLB,, | Performed by: NURSE PRACTITIONER

## 2021-01-01 PROCEDURE — 1126F PR PAIN SEVERITY QUANTIFIED, NO PAIN PRESENT: ICD-10-PCS | Mod: S$GLB,,, | Performed by: OTOLARYNGOLOGY

## 2021-01-01 PROCEDURE — 31575 PR LARYNGOSCOPY, FLEXIBLE; DIAGNOSTIC: ICD-10-PCS | Mod: S$GLB,,, | Performed by: OTOLARYNGOLOGY

## 2021-01-01 PROCEDURE — 99499 UNLISTED E&M SERVICE: CPT | Mod: HCNC,S$GLB,, | Performed by: STUDENT IN AN ORGANIZED HEALTH CARE EDUCATION/TRAINING PROGRAM

## 2021-01-01 PROCEDURE — 99999 PR PBB SHADOW E&M-EST. PATIENT-LVL III: ICD-10-PCS | Mod: PBBFAC,HCNC,, | Performed by: STUDENT IN AN ORGANIZED HEALTH CARE EDUCATION/TRAINING PROGRAM

## 2021-01-01 PROCEDURE — 92612 ENDOSCOPY SWALLOW (FEES) VID: CPT | Mod: PN | Performed by: SPEECH-LANGUAGE PATHOLOGIST

## 2021-01-01 PROCEDURE — 70491 CT SOFT TISSUE NECK W/DYE: CPT | Mod: 26,,, | Performed by: RADIOLOGY

## 2021-01-01 PROCEDURE — 4010F ACE/ARB THERAPY RXD/TAKEN: CPT | Mod: HCNC,CPTII,S$GLB, | Performed by: STUDENT IN AN ORGANIZED HEALTH CARE EDUCATION/TRAINING PROGRAM

## 2021-01-01 PROCEDURE — 3079F PR MOST RECENT DIASTOLIC BLOOD PRESSURE 80-89 MM HG: ICD-10-PCS | Mod: HCNC,CPTII,S$GLB, | Performed by: INTERNAL MEDICINE

## 2021-01-01 PROCEDURE — 84484 ASSAY OF TROPONIN QUANT: CPT | Mod: HCNC | Performed by: EMERGENCY MEDICINE

## 2021-01-01 PROCEDURE — 74177 CT ABDOMEN PELVIS WITH CONTRAST: ICD-10-PCS | Mod: 26,,, | Performed by: RADIOLOGY

## 2021-01-01 PROCEDURE — 3078F PR MOST RECENT DIASTOLIC BLOOD PRESSURE < 80 MM HG: ICD-10-PCS | Mod: HCNC,CPTII,S$GLB, | Performed by: INTERNAL MEDICINE

## 2021-01-01 PROCEDURE — 99999 PR PBB SHADOW E&M-EST. PATIENT-LVL V: CPT | Mod: PBBFAC,HCNC,,

## 2021-01-01 PROCEDURE — 1159F MED LIST DOCD IN RCRD: CPT | Mod: HCNC,CPTII,S$GLB, | Performed by: UROLOGY

## 2021-01-01 PROCEDURE — 87389 HIV-1 AG W/HIV-1&-2 AB AG IA: CPT | Mod: HCNC | Performed by: PHYSICIAN ASSISTANT

## 2021-01-01 PROCEDURE — 97535 SELF CARE MNGMENT TRAINING: CPT | Mod: HCNC,CO

## 2021-01-01 PROCEDURE — 99499 UNLISTED E&M SERVICE: CPT | Mod: HCNC,,, | Performed by: INTERNAL MEDICINE

## 2021-01-01 PROCEDURE — 1101F PT FALLS ASSESS-DOCD LE1/YR: CPT | Mod: HCNC,CPTII,S$GLB, | Performed by: PHYSICIAN ASSISTANT

## 2021-01-01 PROCEDURE — 1159F MED LIST DOCD IN RCRD: CPT | Mod: S$GLB,,, | Performed by: NURSE PRACTITIONER

## 2021-01-01 PROCEDURE — 93922 US ANKLE/BRACH INDICES EXT LTD W/O STR 1-2 LEVELS: ICD-10-PCS | Mod: 26,,, | Performed by: RADIOLOGY

## 2021-01-01 PROCEDURE — 3046F HEMOGLOBIN A1C LEVEL >9.0%: CPT | Mod: HCNC,CPTII,S$GLB, | Performed by: NURSE PRACTITIONER

## 2021-01-01 PROCEDURE — 1101F PT FALLS ASSESS-DOCD LE1/YR: CPT | Mod: HCNC,CPTII,S$GLB, | Performed by: THORACIC SURGERY (CARDIOTHORACIC VASCULAR SURGERY)

## 2021-01-01 PROCEDURE — 1160F PR REVIEW ALL MEDS BY PRESCRIBER/CLIN PHARMACIST DOCUMENTED: ICD-10-PCS | Mod: HCNC,CPTII,S$GLB, | Performed by: INTERNAL MEDICINE

## 2021-01-01 PROCEDURE — 99999 PR PBB SHADOW E&M-EST. PATIENT-LVL IV: CPT | Mod: PBBFAC,HCNC,, | Performed by: THORACIC SURGERY (CARDIOTHORACIC VASCULAR SURGERY)

## 2021-01-01 PROCEDURE — 85025 COMPLETE CBC W/AUTO DIFF WBC: CPT | Mod: HCNC | Performed by: EMERGENCY MEDICINE

## 2021-01-01 PROCEDURE — 99214 PR OFFICE/OUTPT VISIT, EST, LEVL IV, 30-39 MIN: ICD-10-PCS | Mod: S$GLB,,, | Performed by: NURSE PRACTITIONER

## 2021-01-01 PROCEDURE — 93922 UPR/L XTREMITY ART 2 LEVELS: CPT | Mod: 26,HCNC,, | Performed by: SURGERY

## 2021-01-01 PROCEDURE — 99999 PR PBB SHADOW E&M-EST. PATIENT-LVL III: ICD-10-PCS | Mod: PBBFAC,HCNC,, | Performed by: INTERNAL MEDICINE

## 2021-01-01 PROCEDURE — 99232 SBSQ HOSP IP/OBS MODERATE 35: CPT | Mod: HCNC,,, | Performed by: UROLOGY

## 2021-01-01 PROCEDURE — 3074F SYST BP LT 130 MM HG: CPT | Mod: CPTII,S$GLB,, | Performed by: OTOLARYNGOLOGY

## 2021-01-01 PROCEDURE — 3052F HG A1C>EQUAL 8.0%<EQUAL 9.0%: CPT | Mod: CPTII,S$GLB,, | Performed by: INTERNAL MEDICINE

## 2021-01-01 PROCEDURE — 74220 X-RAY XM ESOPHAGUS 1CNTRST: CPT | Mod: TC

## 2021-01-01 PROCEDURE — 99222 1ST HOSP IP/OBS MODERATE 55: CPT | Mod: HCNC,,, | Performed by: UROLOGY

## 2021-01-01 PROCEDURE — 3078F DIAST BP <80 MM HG: CPT | Mod: HCNC,CPTII,S$GLB, | Performed by: INTERNAL MEDICINE

## 2021-01-01 PROCEDURE — 27000221 HC OXYGEN, UP TO 24 HOURS: Mod: HCNC

## 2021-01-01 PROCEDURE — 93010 EKG 12-LEAD: ICD-10-PCS | Mod: HCNC,,, | Performed by: INTERNAL MEDICINE

## 2021-01-01 PROCEDURE — 4010F ACE/ARB THERAPY RXD/TAKEN: CPT | Mod: HCNC,CPTII,S$GLB, | Performed by: PODIATRIST

## 2021-01-01 PROCEDURE — 3075F PR MOST RECENT SYSTOLIC BLOOD PRESS GE 130-139MM HG: ICD-10-PCS | Mod: CPTII,S$GLB,, | Performed by: PODIATRIST

## 2021-01-01 PROCEDURE — 99213 OFFICE O/P EST LOW 20 MIN: CPT | Mod: S$GLB,,, | Performed by: NURSE PRACTITIONER

## 2021-01-01 PROCEDURE — 85025 COMPLETE CBC W/AUTO DIFF WBC: CPT | Mod: HCNC | Performed by: INTERNAL MEDICINE

## 2021-01-01 PROCEDURE — 1126F AMNT PAIN NOTED NONE PRSNT: CPT | Mod: HCNC,S$GLB,, | Performed by: INTERNAL MEDICINE

## 2021-01-01 PROCEDURE — 1159F PR MEDICATION LIST DOCUMENTED IN MEDICAL RECORD: ICD-10-PCS | Mod: HCNC,CPTII,S$GLB, | Performed by: PHYSICIAN ASSISTANT

## 2021-01-01 PROCEDURE — 69210 REMOVE IMPACTED EAR WAX UNI: CPT | Mod: S$GLB,,, | Performed by: NURSE PRACTITIONER

## 2021-01-01 PROCEDURE — 99499 NO LOS: ICD-10-PCS | Mod: S$GLB,,, | Performed by: NURSE PRACTITIONER

## 2021-01-01 PROCEDURE — 80053 COMPREHEN METABOLIC PANEL: CPT | Mod: HCNC | Performed by: EMERGENCY MEDICINE

## 2021-01-01 PROCEDURE — 4010F PR ACE/ARB THEARPY RXD/TAKEN: ICD-10-PCS | Mod: CPTII,S$GLB,, | Performed by: NURSE PRACTITIONER

## 2021-01-01 PROCEDURE — 4010F PR ACE/ARB THEARPY RXD/TAKEN: ICD-10-PCS | Mod: HCNC,CPTII,S$GLB, | Performed by: STUDENT IN AN ORGANIZED HEALTH CARE EDUCATION/TRAINING PROGRAM

## 2021-01-01 PROCEDURE — 1126F AMNT PAIN NOTED NONE PRSNT: CPT | Mod: HCNC,CPTII,S$GLB, | Performed by: INTERNAL MEDICINE

## 2021-01-01 PROCEDURE — 3288F PR FALLS RISK ASSESSMENT DOCUMENTED: ICD-10-PCS | Mod: CPTII,S$GLB,, | Performed by: OTOLARYNGOLOGY

## 2021-01-01 PROCEDURE — 1160F RVW MEDS BY RX/DR IN RCRD: CPT | Mod: HCNC,CPTII,S$GLB, | Performed by: NURSE PRACTITIONER

## 2021-01-01 PROCEDURE — 71046 X-RAY EXAM CHEST 2 VIEWS: CPT | Mod: TC,HCNC,FY,PO

## 2021-01-01 PROCEDURE — 83735 ASSAY OF MAGNESIUM: CPT | Mod: HCNC | Performed by: EMERGENCY MEDICINE

## 2021-01-01 PROCEDURE — 3288F PR FALLS RISK ASSESSMENT DOCUMENTED: ICD-10-PCS | Mod: CPTII,S$GLB,, | Performed by: INTERNAL MEDICINE

## 2021-01-01 PROCEDURE — 80321 ALCOHOLS BIOMARKERS 1OR 2: CPT | Mod: HCNC | Performed by: PHYSICIAN ASSISTANT

## 2021-01-01 PROCEDURE — 36415 COLL VENOUS BLD VENIPUNCTURE: CPT | Mod: PN

## 2021-01-01 PROCEDURE — 36430 TRANSFUSION BLD/BLD COMPNT: CPT

## 2021-01-01 PROCEDURE — 99214 PR OFFICE/OUTPT VISIT, EST, LEVL IV, 30-39 MIN: ICD-10-PCS | Mod: HCNC,S$GLB,, | Performed by: NURSE PRACTITIONER

## 2021-01-01 PROCEDURE — 99214 PR OFFICE/OUTPT VISIT, EST, LEVL IV, 30-39 MIN: ICD-10-PCS | Mod: 25,S$GLB,, | Performed by: OTOLARYNGOLOGY

## 2021-01-01 PROCEDURE — 85025 COMPLETE CBC W/AUTO DIFF WBC: CPT

## 2021-01-01 PROCEDURE — 3051F HG A1C>EQUAL 7.0%<8.0%: CPT | Mod: HCNC,CPTII,S$GLB, | Performed by: OPTOMETRIST

## 2021-01-01 PROCEDURE — 3075F PR MOST RECENT SYSTOLIC BLOOD PRESS GE 130-139MM HG: ICD-10-PCS | Mod: CPTII,S$GLB,, | Performed by: INTERNAL MEDICINE

## 2021-01-01 PROCEDURE — 3046F HEMOGLOBIN A1C LEVEL >9.0%: CPT | Mod: CPTII,S$GLB,, | Performed by: NURSE PRACTITIONER

## 2021-01-01 PROCEDURE — 69210 EAR CERUMEN REMOVAL: ICD-10-PCS | Mod: S$GLB,,, | Performed by: NURSE PRACTITIONER

## 2021-01-01 PROCEDURE — 99999 PR PBB SHADOW E&M-EST. PATIENT-LVL IV: ICD-10-PCS | Mod: PBBFAC,HCNC,, | Performed by: OPTOMETRIST

## 2021-01-01 PROCEDURE — 3074F SYST BP LT 130 MM HG: CPT | Mod: HCNC,CPTII,S$GLB, | Performed by: PHYSICIAN ASSISTANT

## 2021-01-01 PROCEDURE — 99215 OFFICE O/P EST HI 40 MIN: CPT | Mod: HCNC,S$GLB,, | Performed by: PHYSICIAN ASSISTANT

## 2021-01-01 PROCEDURE — 1160F RVW MEDS BY RX/DR IN RCRD: CPT | Mod: CPTII,S$GLB,, | Performed by: NURSE PRACTITIONER

## 2021-01-01 PROCEDURE — 3008F BODY MASS INDEX DOCD: CPT | Mod: HCNC,CPTII,S$GLB, | Performed by: THORACIC SURGERY (CARDIOTHORACIC VASCULAR SURGERY)

## 2021-01-01 PROCEDURE — 3072F LOW RISK FOR RETINOPATHY: CPT | Mod: S$GLB,,, | Performed by: INTERNAL MEDICINE

## 2021-01-01 PROCEDURE — 1159F MED LIST DOCD IN RCRD: CPT | Mod: HCNC,CPTII,S$GLB, | Performed by: PHYSICIAN ASSISTANT

## 2021-01-01 PROCEDURE — 71046 X-RAY EXAM CHEST 2 VIEWS: CPT | Mod: 26,HCNC,, | Performed by: RADIOLOGY

## 2021-01-01 PROCEDURE — 1126F AMNT PAIN NOTED NONE PRSNT: CPT | Mod: HCNC,S$GLB,, | Performed by: NURSE PRACTITIONER

## 2021-01-01 PROCEDURE — 84145 PROCALCITONIN (PCT): CPT | Mod: HCNC | Performed by: EMERGENCY MEDICINE

## 2021-01-01 PROCEDURE — 87040 BLOOD CULTURE FOR BACTERIA: CPT | Mod: 59,HCNC | Performed by: EMERGENCY MEDICINE

## 2021-01-01 PROCEDURE — 1159F MED LIST DOCD IN RCRD: CPT | Mod: HCNC,CPTII,S$GLB, | Performed by: INTERNAL MEDICINE

## 2021-01-01 PROCEDURE — 99499 RISK ADDL DX/OHS AUDIT: ICD-10-PCS | Mod: S$GLB,,, | Performed by: OTOLARYNGOLOGY

## 2021-01-01 PROCEDURE — 1126F AMNT PAIN NOTED NONE PRSNT: CPT | Mod: HCNC,CPTII,S$GLB, | Performed by: UROLOGY

## 2021-01-01 PROCEDURE — 82728 ASSAY OF FERRITIN: CPT | Mod: HCNC | Performed by: INTERNAL MEDICINE

## 2021-01-01 PROCEDURE — 25500020 PHARM REV CODE 255: Mod: HCNC | Performed by: INTERNAL MEDICINE

## 2021-01-01 PROCEDURE — 3008F BODY MASS INDEX DOCD: CPT | Mod: CPTII,S$GLB,, | Performed by: PODIATRIST

## 2021-01-01 PROCEDURE — 1126F PR PAIN SEVERITY QUANTIFIED, NO PAIN PRESENT: ICD-10-PCS | Mod: HCNC,CPTII,S$GLB, | Performed by: STUDENT IN AN ORGANIZED HEALTH CARE EDUCATION/TRAINING PROGRAM

## 2021-01-01 PROCEDURE — 3077F SYST BP >= 140 MM HG: CPT | Mod: HCNC,CPTII,S$GLB, | Performed by: NURSE PRACTITIONER

## 2021-01-01 PROCEDURE — 1159F PR MEDICATION LIST DOCUMENTED IN MEDICAL RECORD: ICD-10-PCS | Mod: HCNC,CPTII,S$GLB, | Performed by: STUDENT IN AN ORGANIZED HEALTH CARE EDUCATION/TRAINING PROGRAM

## 2021-01-01 PROCEDURE — 3072F LOW RISK FOR RETINOPATHY: CPT | Mod: S$GLB,,, | Performed by: NURSE PRACTITIONER

## 2021-01-01 PROCEDURE — 1126F PR PAIN SEVERITY QUANTIFIED, NO PAIN PRESENT: ICD-10-PCS | Mod: HCNC,S$GLB,, | Performed by: NURSE PRACTITIONER

## 2021-01-01 PROCEDURE — 1159F PR MEDICATION LIST DOCUMENTED IN MEDICAL RECORD: ICD-10-PCS | Mod: CPTII,S$GLB,, | Performed by: NURSE PRACTITIONER

## 2021-01-01 PROCEDURE — 3288F FALL RISK ASSESSMENT DOCD: CPT | Mod: HCNC,CPTII,S$GLB, | Performed by: NURSE PRACTITIONER

## 2021-01-01 PROCEDURE — 3072F PR LOW RISK FOR RETINOPATHY: ICD-10-PCS | Mod: HCNC,S$GLB,, | Performed by: NURSE PRACTITIONER

## 2021-01-01 PROCEDURE — 3078F DIAST BP <80 MM HG: CPT | Mod: CPTII,S$GLB,, | Performed by: INTERNAL MEDICINE

## 2021-01-01 PROCEDURE — 1126F PR PAIN SEVERITY QUANTIFIED, NO PAIN PRESENT: ICD-10-PCS | Mod: HCNC,S$GLB,, | Performed by: INTERNAL MEDICINE

## 2021-01-01 PROCEDURE — 86256 FLUORESCENT ANTIBODY TITER: CPT | Mod: HCNC | Performed by: PHYSICIAN ASSISTANT

## 2021-01-01 PROCEDURE — 99214 OFFICE O/P EST MOD 30 MIN: CPT | Mod: HCNC,S$GLB,, | Performed by: STUDENT IN AN ORGANIZED HEALTH CARE EDUCATION/TRAINING PROGRAM

## 2021-01-01 PROCEDURE — 1125F PR PAIN SEVERITY QUANTIFIED, PAIN PRESENT: ICD-10-PCS | Mod: HCNC,CPTII,S$GLB, | Performed by: NURSE PRACTITIONER

## 2021-01-01 PROCEDURE — 71046 XR CHEST PA AND LATERAL: ICD-10-PCS | Mod: 26,HCNC,, | Performed by: RADIOLOGY

## 2021-01-01 PROCEDURE — 3078F DIAST BP <80 MM HG: CPT | Mod: CPTII,S$GLB,, | Performed by: OTOLARYNGOLOGY

## 2021-01-01 PROCEDURE — 1159F MED LIST DOCD IN RCRD: CPT | Mod: CPTII,S$GLB,, | Performed by: INTERNAL MEDICINE

## 2021-01-01 PROCEDURE — 3078F PR MOST RECENT DIASTOLIC BLOOD PRESSURE < 80 MM HG: ICD-10-PCS | Mod: HCNC,CPTII,S$GLB, | Performed by: NURSE PRACTITIONER

## 2021-01-01 PROCEDURE — 84425 ASSAY OF VITAMIN B-1: CPT | Mod: HCNC | Performed by: INTERNAL MEDICINE

## 2021-01-01 PROCEDURE — 1126F AMNT PAIN NOTED NONE PRSNT: CPT | Mod: S$GLB,,, | Performed by: OTOLARYNGOLOGY

## 2021-01-01 PROCEDURE — 95251 PR GLUCOSE MONITOR, 72 HOUR, PHYS INTERP: ICD-10-PCS | Mod: S$GLB,,, | Performed by: NURSE PRACTITIONER

## 2021-01-01 PROCEDURE — 87086 URINE CULTURE/COLONY COUNT: CPT | Mod: HCNC | Performed by: EMERGENCY MEDICINE

## 2021-01-01 PROCEDURE — 87186 SC STD MICRODIL/AGAR DIL: CPT | Mod: HCNC | Performed by: EMERGENCY MEDICINE

## 2021-01-01 PROCEDURE — 99999 PR PBB SHADOW E&M-EST. PATIENT-LVL V: ICD-10-PCS | Mod: PBBFAC,HCNC,,

## 2021-01-01 PROCEDURE — 99284 EMERGENCY DEPT VISIT MOD MDM: CPT | Mod: 25,HCNC

## 2021-01-01 PROCEDURE — 3051F HG A1C>EQUAL 7.0%<8.0%: CPT | Mod: HCNC,CPTII,S$GLB, | Performed by: PODIATRIST

## 2021-01-01 PROCEDURE — 83880 ASSAY OF NATRIURETIC PEPTIDE: CPT | Mod: HCNC | Performed by: EMERGENCY MEDICINE

## 2021-01-01 PROCEDURE — 1101F PT FALLS ASSESS-DOCD LE1/YR: CPT | Mod: CPTII,S$GLB,, | Performed by: INTERNAL MEDICINE

## 2021-01-01 PROCEDURE — U0005 INFEC AGEN DETEC AMPLI PROBE: HCPCS | Performed by: OTOLARYNGOLOGY

## 2021-01-01 PROCEDURE — 86580 TB INTRADERMAL TEST: CPT | Mod: HCNC | Performed by: INTERNAL MEDICINE

## 2021-01-01 PROCEDURE — 1126F AMNT PAIN NOTED NONE PRSNT: CPT | Mod: HCNC,S$GLB,, | Performed by: OPTOMETRIST

## 2021-01-01 PROCEDURE — 4010F PR ACE/ARB THEARPY RXD/TAKEN: ICD-10-PCS | Mod: HCNC,CPTII,S$GLB, | Performed by: PODIATRIST

## 2021-01-01 PROCEDURE — 1101F PR PT FALLS ASSESS DOC 0-1 FALLS W/OUT INJ PAST YR: ICD-10-PCS | Mod: HCNC,CPTII,S$GLB, | Performed by: STUDENT IN AN ORGANIZED HEALTH CARE EDUCATION/TRAINING PROGRAM

## 2021-01-01 PROCEDURE — 93925 LOWER EXTREMITY STUDY: CPT | Mod: 26,HCNC,, | Performed by: SURGERY

## 2021-01-01 PROCEDURE — 99214 OFFICE O/P EST MOD 30 MIN: CPT | Mod: HCNC,S$GLB,, | Performed by: UROLOGY

## 2021-01-01 PROCEDURE — 83605 ASSAY OF LACTIC ACID: CPT | Mod: HCNC | Performed by: EMERGENCY MEDICINE

## 2021-01-01 PROCEDURE — 3008F BODY MASS INDEX DOCD: CPT | Mod: HCNC,CPTII,S$GLB, | Performed by: UROLOGY

## 2021-01-01 PROCEDURE — 1159F MED LIST DOCD IN RCRD: CPT | Mod: HCNC,S$GLB,, | Performed by: STUDENT IN AN ORGANIZED HEALTH CARE EDUCATION/TRAINING PROGRAM

## 2021-01-01 PROCEDURE — 1160F RVW MEDS BY RX/DR IN RCRD: CPT | Mod: HCNC,CPTII,S$GLB, | Performed by: PODIATRIST

## 2021-01-01 PROCEDURE — U0002: ICD-10-PCS | Mod: QW,S$GLB,, | Performed by: INTERNAL MEDICINE

## 2021-01-01 PROCEDURE — 1159F PR MEDICATION LIST DOCUMENTED IN MEDICAL RECORD: ICD-10-PCS | Mod: HCNC,CPTII,S$GLB, | Performed by: UROLOGY

## 2021-01-01 PROCEDURE — 3072F LOW RISK FOR RETINOPATHY: CPT | Mod: HCNC,S$GLB,, | Performed by: STUDENT IN AN ORGANIZED HEALTH CARE EDUCATION/TRAINING PROGRAM

## 2021-01-01 PROCEDURE — 99499 UNLISTED E&M SERVICE: CPT | Mod: S$GLB,,, | Performed by: STUDENT IN AN ORGANIZED HEALTH CARE EDUCATION/TRAINING PROGRAM

## 2021-01-01 PROCEDURE — 1159F PR MEDICATION LIST DOCUMENTED IN MEDICAL RECORD: ICD-10-PCS | Mod: HCNC,CPTII,S$GLB, | Performed by: NURSE PRACTITIONER

## 2021-01-01 PROCEDURE — 3008F PR BODY MASS INDEX (BMI) DOCUMENTED: ICD-10-PCS | Mod: HCNC,CPTII,S$GLB, | Performed by: STUDENT IN AN ORGANIZED HEALTH CARE EDUCATION/TRAINING PROGRAM

## 2021-01-01 PROCEDURE — 1159F PR MEDICATION LIST DOCUMENTED IN MEDICAL RECORD: ICD-10-PCS | Mod: HCNC,CPTII,S$GLB, | Performed by: PODIATRIST

## 2021-01-01 PROCEDURE — 99999 PR PBB SHADOW E&M-EST. PATIENT-LVL III: CPT | Mod: PBBFAC,HCNC,, | Performed by: PODIATRIST

## 2021-01-01 PROCEDURE — 99291 CRITICAL CARE FIRST HOUR: CPT | Mod: 25,HCNC

## 2021-01-01 PROCEDURE — 90471 IMMUNIZATION ADMIN: CPT | Mod: HCNC | Performed by: EMERGENCY MEDICINE

## 2021-01-01 PROCEDURE — 51700 IRRIGATION OF BLADDER: CPT | Mod: HCNC,S$GLB,, | Performed by: NURSE PRACTITIONER

## 2021-01-01 PROCEDURE — 3288F PR FALLS RISK ASSESSMENT DOCUMENTED: ICD-10-PCS | Mod: HCNC,CPTII,S$GLB, | Performed by: PHYSICIAN ASSISTANT

## 2021-01-01 PROCEDURE — 97110 THERAPEUTIC EXERCISES: CPT | Mod: HCNC,CO

## 2021-01-01 PROCEDURE — 3074F PR MOST RECENT SYSTOLIC BLOOD PRESSURE < 130 MM HG: ICD-10-PCS | Mod: HCNC,CPTII,S$GLB, | Performed by: NURSE PRACTITIONER

## 2021-01-01 PROCEDURE — 3072F PR LOW RISK FOR RETINOPATHY: ICD-10-PCS | Mod: CPTII,S$GLB,, | Performed by: INTERNAL MEDICINE

## 2021-01-01 PROCEDURE — 3072F LOW RISK FOR RETINOPATHY: CPT | Mod: HCNC,S$GLB,, | Performed by: INTERNAL MEDICINE

## 2021-01-01 PROCEDURE — 93306 TTE W/DOPPLER COMPLETE: CPT | Mod: 26,HCNC,, | Performed by: INTERNAL MEDICINE

## 2021-01-01 PROCEDURE — 1126F AMNT PAIN NOTED NONE PRSNT: CPT | Mod: HCNC,CPTII,S$GLB, | Performed by: STUDENT IN AN ORGANIZED HEALTH CARE EDUCATION/TRAINING PROGRAM

## 2021-01-01 PROCEDURE — 1126F PR PAIN SEVERITY QUANTIFIED, NO PAIN PRESENT: ICD-10-PCS | Mod: CPTII,S$GLB,, | Performed by: NURSE PRACTITIONER

## 2021-01-01 PROCEDURE — 3008F BODY MASS INDEX DOCD: CPT | Mod: HCNC,CPTII,S$GLB, | Performed by: STUDENT IN AN ORGANIZED HEALTH CARE EDUCATION/TRAINING PROGRAM

## 2021-01-01 PROCEDURE — 92610 EVALUATE SWALLOWING FUNCTION: CPT | Mod: PN,59 | Performed by: SPEECH-LANGUAGE PATHOLOGIST

## 2021-01-01 PROCEDURE — 3008F PR BODY MASS INDEX (BMI) DOCUMENTED: ICD-10-PCS | Mod: HCNC,CPTII,S$GLB, | Performed by: PHYSICIAN ASSISTANT

## 2021-01-01 PROCEDURE — 4010F ACE/ARB THERAPY RXD/TAKEN: CPT | Mod: CPTII,S$GLB,, | Performed by: NURSE PRACTITIONER

## 2021-01-01 PROCEDURE — 80076 HEPATIC FUNCTION PANEL: CPT | Mod: HCNC | Performed by: PHYSICIAN ASSISTANT

## 2021-01-01 PROCEDURE — 3072F PR LOW RISK FOR RETINOPATHY: ICD-10-PCS | Mod: CPTII,S$GLB,, | Performed by: NURSE PRACTITIONER

## 2021-01-01 PROCEDURE — 3046F PR MOST RECENT HEMOGLOBIN A1C LEVEL > 9.0%: ICD-10-PCS | Mod: HCNC,CPTII,S$GLB, | Performed by: STUDENT IN AN ORGANIZED HEALTH CARE EDUCATION/TRAINING PROGRAM

## 2021-01-01 PROCEDURE — 1159F PR MEDICATION LIST DOCUMENTED IN MEDICAL RECORD: ICD-10-PCS | Mod: S$GLB,,, | Performed by: PODIATRIST

## 2021-01-01 PROCEDURE — 1126F PR PAIN SEVERITY QUANTIFIED, NO PAIN PRESENT: ICD-10-PCS | Mod: S$GLB,,, | Performed by: PODIATRIST

## 2021-01-01 PROCEDURE — 93306 ECHO (CUPID ONLY): ICD-10-PCS | Mod: 26,HCNC,, | Performed by: INTERNAL MEDICINE

## 2021-01-01 PROCEDURE — 3288F FALL RISK ASSESSMENT DOCD: CPT | Mod: HCNC,CPTII,S$GLB, | Performed by: UROLOGY

## 2021-01-01 PROCEDURE — 1159F MED LIST DOCD IN RCRD: CPT | Mod: HCNC,S$GLB,, | Performed by: NURSE PRACTITIONER

## 2021-01-01 PROCEDURE — 93970 EXTREMITY STUDY: CPT | Mod: 26,HCNC,, | Performed by: RADIOLOGY

## 2021-01-01 PROCEDURE — 83036 HEMOGLOBIN GLYCOSYLATED A1C: CPT | Mod: HCNC | Performed by: NURSE PRACTITIONER

## 2021-01-01 PROCEDURE — 3074F SYST BP LT 130 MM HG: CPT | Mod: HCNC,CPTII,S$GLB, | Performed by: INTERNAL MEDICINE

## 2021-01-01 PROCEDURE — 1111F DSCHRG MED/CURRENT MED MERGE: CPT | Mod: HCNC,CPTII,S$GLB, | Performed by: STUDENT IN AN ORGANIZED HEALTH CARE EDUCATION/TRAINING PROGRAM

## 2021-01-01 PROCEDURE — S4991 NICOTINE PATCH NONLEGEND: HCPCS | Mod: HCNC | Performed by: EMERGENCY MEDICINE

## 2021-01-01 PROCEDURE — 99999 PR PBB SHADOW E&M-EST. PATIENT-LVL IV: ICD-10-PCS | Mod: PBBFAC,HCNC,, | Performed by: THORACIC SURGERY (CARDIOTHORACIC VASCULAR SURGERY)

## 2021-01-01 PROCEDURE — 1126F PR PAIN SEVERITY QUANTIFIED, NO PAIN PRESENT: ICD-10-PCS | Mod: HCNC,S$GLB,, | Performed by: THORACIC SURGERY (CARDIOTHORACIC VASCULAR SURGERY)

## 2021-01-01 PROCEDURE — 1101F PR PT FALLS ASSESS DOC 0-1 FALLS W/OUT INJ PAST YR: ICD-10-PCS | Mod: CPTII,S$GLB,, | Performed by: PODIATRIST

## 2021-01-01 PROCEDURE — 1125F AMNT PAIN NOTED PAIN PRSNT: CPT | Mod: HCNC,CPTII,S$GLB, | Performed by: PODIATRIST

## 2021-01-01 PROCEDURE — 73630 X-RAY EXAM OF FOOT: CPT | Mod: TC,50,HCNC,FY

## 2021-01-01 PROCEDURE — 3046F HEMOGLOBIN A1C LEVEL >9.0%: CPT | Mod: HCNC,CPTII,S$GLB, | Performed by: INTERNAL MEDICINE

## 2021-01-01 PROCEDURE — 63600175 PHARM REV CODE 636 W HCPCS: Mod: HCNC | Performed by: PHYSICIAN ASSISTANT

## 2021-01-01 PROCEDURE — 3288F FALL RISK ASSESSMENT DOCD: CPT | Mod: HCNC,CPTII,S$GLB, | Performed by: PODIATRIST

## 2021-01-01 PROCEDURE — 1160F PR REVIEW ALL MEDS BY PRESCRIBER/CLIN PHARMACIST DOCUMENTED: ICD-10-PCS | Mod: HCNC,CPTII,S$GLB, | Performed by: PHYSICIAN ASSISTANT

## 2021-01-01 PROCEDURE — 3288F FALL RISK ASSESSMENT DOCD: CPT | Mod: HCNC,CPTII,S$GLB, | Performed by: STUDENT IN AN ORGANIZED HEALTH CARE EDUCATION/TRAINING PROGRAM

## 2021-01-01 PROCEDURE — U0003 INFECTIOUS AGENT DETECTION BY NUCLEIC ACID (DNA OR RNA); SEVERE ACUTE RESPIRATORY SYNDROME CORONAVIRUS 2 (SARS-COV-2) (CORONAVIRUS DISEASE [COVID-19]), AMPLIFIED PROBE TECHNIQUE, MAKING USE OF HIGH THROUGHPUT TECHNOLOGIES AS DESCRIBED BY CMS-2020-01-R: HCPCS | Mod: HCNC | Performed by: INTERNAL MEDICINE

## 2021-01-01 PROCEDURE — 3072F LOW RISK FOR RETINOPATHY: CPT | Mod: S$GLB,,, | Performed by: PODIATRIST

## 2021-01-01 PROCEDURE — 3046F PR MOST RECENT HEMOGLOBIN A1C LEVEL > 9.0%: ICD-10-PCS | Mod: HCNC,CPTII,S$GLB, | Performed by: NURSE PRACTITIONER

## 2021-01-01 PROCEDURE — 87070 CULTURE OTHR SPECIMN AEROBIC: CPT | Mod: HCNC | Performed by: HOSPITALIST

## 2021-01-01 PROCEDURE — 3046F PR MOST RECENT HEMOGLOBIN A1C LEVEL > 9.0%: ICD-10-PCS | Mod: HCNC,CPTII,S$GLB, | Performed by: INTERNAL MEDICINE

## 2021-01-01 PROCEDURE — 1126F PR PAIN SEVERITY QUANTIFIED, NO PAIN PRESENT: ICD-10-PCS | Mod: HCNC,CPTII,S$GLB, | Performed by: UROLOGY

## 2021-01-01 PROCEDURE — 25500020 PHARM REV CODE 255: Mod: HCNC | Performed by: PHYSICIAN ASSISTANT

## 2021-01-01 PROCEDURE — 1159F PR MEDICATION LIST DOCUMENTED IN MEDICAL RECORD: ICD-10-PCS | Mod: HCNC,S$GLB,, | Performed by: THORACIC SURGERY (CARDIOTHORACIC VASCULAR SURGERY)

## 2021-01-01 PROCEDURE — 99215 PR OFFICE/OUTPT VISIT, EST, LEVL V, 40-54 MIN: ICD-10-PCS | Mod: HCNC,S$GLB,, | Performed by: NURSE PRACTITIONER

## 2021-01-01 PROCEDURE — 99223 PR INITIAL HOSPITAL CARE,LEVL III: ICD-10-PCS | Mod: HCNC,,, | Performed by: INTERNAL MEDICINE

## 2021-01-01 PROCEDURE — 85610 PROTHROMBIN TIME: CPT | Mod: GA,HCNC | Performed by: INTERNAL MEDICINE

## 2021-01-01 PROCEDURE — 3075F SYST BP GE 130 - 139MM HG: CPT | Mod: CPTII,S$GLB,, | Performed by: PODIATRIST

## 2021-01-01 PROCEDURE — 1125F AMNT PAIN NOTED PAIN PRSNT: CPT | Mod: HCNC,S$GLB,, | Performed by: STUDENT IN AN ORGANIZED HEALTH CARE EDUCATION/TRAINING PROGRAM

## 2021-01-01 PROCEDURE — 3078F PR MOST RECENT DIASTOLIC BLOOD PRESSURE < 80 MM HG: ICD-10-PCS | Mod: CPTII,S$GLB,, | Performed by: OTOLARYNGOLOGY

## 2021-01-01 PROCEDURE — 93925 CV US DOPPLER ARTERIAL LEGS BILATERAL (CUPID ONLY): ICD-10-PCS | Mod: 26,HCNC,, | Performed by: SURGERY

## 2021-01-01 PROCEDURE — 71271 CT THORAX LUNG CANCER SCR C-: CPT | Mod: 26,,, | Performed by: RADIOLOGY

## 2021-01-01 PROCEDURE — 99204 PR OFFICE/OUTPT VISIT, NEW, LEVL IV, 45-59 MIN: ICD-10-PCS | Mod: HCNC,S$GLB,, | Performed by: INTERNAL MEDICINE

## 2021-01-01 PROCEDURE — 3008F PR BODY MASS INDEX (BMI) DOCUMENTED: ICD-10-PCS | Mod: HCNC,CPTII,S$GLB, | Performed by: UROLOGY

## 2021-01-01 PROCEDURE — 3074F SYST BP LT 130 MM HG: CPT | Mod: HCNC,CPTII,S$GLB, | Performed by: NURSE PRACTITIONER

## 2021-01-01 PROCEDURE — 1111F DSCHRG MED/CURRENT MED MERGE: CPT | Mod: HCNC,CPTII,S$GLB, | Performed by: NURSE PRACTITIONER

## 2021-01-01 PROCEDURE — 3072F PR LOW RISK FOR RETINOPATHY: ICD-10-PCS | Mod: HCNC,CPTII,S$GLB, | Performed by: PHYSICIAN ASSISTANT

## 2021-01-01 PROCEDURE — 3008F BODY MASS INDEX DOCD: CPT | Mod: HCNC,CPTII,S$GLB, | Performed by: PHYSICIAN ASSISTANT

## 2021-01-01 PROCEDURE — 99214 OFFICE O/P EST MOD 30 MIN: CPT | Mod: HCNC,25,S$GLB, | Performed by: NURSE PRACTITIONER

## 2021-01-01 PROCEDURE — 99999 PR PBB SHADOW E&M-EST. PATIENT-LVL V: ICD-10-PCS | Mod: PBBFAC,HCNC,, | Performed by: PHYSICIAN ASSISTANT

## 2021-01-01 PROCEDURE — 3288F FALL RISK ASSESSMENT DOCD: CPT | Mod: HCNC,CPTII,S$GLB, | Performed by: PHYSICIAN ASSISTANT

## 2021-01-01 PROCEDURE — 3072F LOW RISK FOR RETINOPATHY: CPT | Mod: HCNC,CPTII,S$GLB, | Performed by: PODIATRIST

## 2021-01-01 PROCEDURE — 87186 SC STD MICRODIL/AGAR DIL: CPT | Mod: HCNC | Performed by: HOSPITALIST

## 2021-01-01 PROCEDURE — 92610 EVALUATE SWALLOWING FUNCTION: CPT | Mod: HCNC,PN

## 2021-01-01 PROCEDURE — 82947 ASSAY GLUCOSE BLOOD QUANT: CPT | Mod: HCNC | Performed by: STUDENT IN AN ORGANIZED HEALTH CARE EDUCATION/TRAINING PROGRAM

## 2021-01-01 PROCEDURE — 99213 PR OFFICE/OUTPT VISIT, EST, LEVL III, 20-29 MIN: ICD-10-PCS | Mod: HCNC,S$GLB,, | Performed by: NURSE PRACTITIONER

## 2021-01-01 PROCEDURE — 95251 CONT GLUC MNTR ANALYSIS I&R: CPT | Mod: S$GLB,,, | Performed by: NURSE PRACTITIONER

## 2021-01-01 PROCEDURE — 84100 ASSAY OF PHOSPHORUS: CPT | Mod: HCNC | Performed by: EMERGENCY MEDICINE

## 2021-01-01 PROCEDURE — 1101F PR PT FALLS ASSESS DOC 0-1 FALLS W/OUT INJ PAST YR: ICD-10-PCS | Mod: HCNC,CPTII,S$GLB, | Performed by: PHYSICIAN ASSISTANT

## 2021-01-01 PROCEDURE — 25000003 PHARM REV CODE 250: Mod: HCNC | Performed by: PHYSICIAN ASSISTANT

## 2021-01-01 PROCEDURE — 1125F PR PAIN SEVERITY QUANTIFIED, PAIN PRESENT: ICD-10-PCS | Mod: S$GLB,,, | Performed by: INTERNAL MEDICINE

## 2021-01-01 PROCEDURE — 1125F PR PAIN SEVERITY QUANTIFIED, PAIN PRESENT: ICD-10-PCS | Mod: CPTII,S$GLB,, | Performed by: INTERNAL MEDICINE

## 2021-01-01 PROCEDURE — 92526 PR ORAL FUNCTION THERAPY: ICD-10-PCS | Mod: HCNC,GP,ICN, | Performed by: SPEECH-LANGUAGE PATHOLOGIST

## 2021-01-01 PROCEDURE — 3072F PR LOW RISK FOR RETINOPATHY: ICD-10-PCS | Mod: HCNC,CPTII,S$GLB, | Performed by: NURSE PRACTITIONER

## 2021-01-01 PROCEDURE — 1101F PR PT FALLS ASSESS DOC 0-1 FALLS W/OUT INJ PAST YR: ICD-10-PCS | Mod: HCNC,CPTII,S$GLB, | Performed by: NURSE PRACTITIONER

## 2021-01-01 PROCEDURE — 4010F PR ACE/ARB THEARPY RXD/TAKEN: ICD-10-PCS | Mod: CPTII,S$GLB,, | Performed by: OTOLARYNGOLOGY

## 2021-01-01 PROCEDURE — 99499 UNLISTED E&M SERVICE: CPT | Mod: S$GLB,,, | Performed by: OTOLARYNGOLOGY

## 2021-01-01 PROCEDURE — 80053 COMPREHEN METABOLIC PANEL: CPT

## 2021-01-01 PROCEDURE — 99999 PR PBB SHADOW E&M-EST. PATIENT-LVL V: CPT | Mod: PBBFAC,,, | Performed by: PODIATRIST

## 2021-01-01 PROCEDURE — 99214 OFFICE O/P EST MOD 30 MIN: CPT | Mod: S$GLB,,, | Performed by: OTOLARYNGOLOGY

## 2021-01-01 PROCEDURE — 87340 HEPATITIS B SURFACE AG IA: CPT | Mod: HCNC | Performed by: PHYSICIAN ASSISTANT

## 2021-01-01 PROCEDURE — U0002 COVID-19 LAB TEST NON-CDC: HCPCS | Mod: QW,S$GLB,, | Performed by: INTERNAL MEDICINE

## 2021-01-01 PROCEDURE — 99215 PR OFFICE/OUTPT VISIT, EST, LEVL V, 40-54 MIN: ICD-10-PCS | Mod: HCNC,S$GLB,, | Performed by: PHYSICIAN ASSISTANT

## 2021-01-01 PROCEDURE — 3288F FALL RISK ASSESSMENT DOCD: CPT | Mod: CPTII,S$GLB,, | Performed by: PODIATRIST

## 2021-01-01 PROCEDURE — 99215 OFFICE O/P EST HI 40 MIN: CPT | Mod: HCNC,S$GLB,, | Performed by: NURSE PRACTITIONER

## 2021-01-01 PROCEDURE — 1159F MED LIST DOCD IN RCRD: CPT | Mod: HCNC,CPTII,S$GLB, | Performed by: STUDENT IN AN ORGANIZED HEALTH CARE EDUCATION/TRAINING PROGRAM

## 2021-01-01 PROCEDURE — 1126F AMNT PAIN NOTED NONE PRSNT: CPT | Mod: HCNC,S$GLB,, | Performed by: THORACIC SURGERY (CARDIOTHORACIC VASCULAR SURGERY)

## 2021-01-01 PROCEDURE — 3288F PR FALLS RISK ASSESSMENT DOCUMENTED: ICD-10-PCS | Mod: HCNC,CPTII,S$GLB, | Performed by: NURSE PRACTITIONER

## 2021-01-01 PROCEDURE — 3072F LOW RISK FOR RETINOPATHY: CPT | Mod: HCNC,S$GLB,, | Performed by: THORACIC SURGERY (CARDIOTHORACIC VASCULAR SURGERY)

## 2021-01-01 PROCEDURE — 99999 PR PBB SHADOW E&M-EST. PATIENT-LVL IV: CPT | Mod: PBBFAC,HCNC,, | Performed by: OPTOMETRIST

## 2021-01-01 PROCEDURE — 1160F PR REVIEW ALL MEDS BY PRESCRIBER/CLIN PHARMACIST DOCUMENTED: ICD-10-PCS | Mod: CPTII,S$GLB,, | Performed by: NURSE PRACTITIONER

## 2021-01-01 PROCEDURE — 93010 ELECTROCARDIOGRAM REPORT: CPT | Mod: HCNC,,, | Performed by: INTERNAL MEDICINE

## 2021-01-01 PROCEDURE — 3008F PR BODY MASS INDEX (BMI) DOCUMENTED: ICD-10-PCS | Mod: HCNC,CPTII,S$GLB, | Performed by: PODIATRIST

## 2021-01-01 PROCEDURE — 70491 CT SOFT TISSUE NECK WITH CONTRAST: ICD-10-PCS | Mod: 26,,, | Performed by: RADIOLOGY

## 2021-01-01 PROCEDURE — 87086 URINE CULTURE/COLONY COUNT: CPT | Mod: HCNC | Performed by: UROLOGY

## 2021-01-01 PROCEDURE — 1126F PR PAIN SEVERITY QUANTIFIED, NO PAIN PRESENT: ICD-10-PCS | Mod: HCNC,CPTII,S$GLB, | Performed by: NURSE PRACTITIONER

## 2021-01-01 PROCEDURE — 30200315 PPD INTRADERMAL TEST REV CODE 302: Mod: HCNC | Performed by: INTERNAL MEDICINE

## 2021-01-01 PROCEDURE — 74160 CT ABDOMEN WITH CONTRAST: ICD-10-PCS | Mod: 26,HCNC,, | Performed by: RADIOLOGY

## 2021-01-01 PROCEDURE — 3072F PR LOW RISK FOR RETINOPATHY: ICD-10-PCS | Mod: HCNC,CPTII,S$GLB, | Performed by: UROLOGY

## 2021-01-01 PROCEDURE — 92507 TX SP LANG VOICE COMM INDIV: CPT | Mod: HCNC

## 2021-01-01 PROCEDURE — A9698 NON-RAD CONTRAST MATERIALNOC: HCPCS | Performed by: OTOLARYNGOLOGY

## 2021-01-01 PROCEDURE — 4010F ACE/ARB THERAPY RXD/TAKEN: CPT | Mod: HCNC,CPTII,S$GLB, | Performed by: PHYSICIAN ASSISTANT

## 2021-01-01 PROCEDURE — 3074F SYST BP LT 130 MM HG: CPT | Mod: HCNC,CPTII,S$GLB, | Performed by: STUDENT IN AN ORGANIZED HEALTH CARE EDUCATION/TRAINING PROGRAM

## 2021-01-01 PROCEDURE — 3046F PR MOST RECENT HEMOGLOBIN A1C LEVEL > 9.0%: ICD-10-PCS | Mod: HCNC,CPTII,S$GLB, | Performed by: PHYSICIAN ASSISTANT

## 2021-01-01 PROCEDURE — 99204 PR OFFICE/OUTPT VISIT, NEW, LEVL IV, 45-59 MIN: ICD-10-PCS | Mod: HCNC,S$GLB,, | Performed by: THORACIC SURGERY (CARDIOTHORACIC VASCULAR SURGERY)

## 2021-01-01 PROCEDURE — 3008F PR BODY MASS INDEX (BMI) DOCUMENTED: ICD-10-PCS | Mod: CPTII,S$GLB,, | Performed by: PODIATRIST

## 2021-01-01 PROCEDURE — 3077F PR MOST RECENT SYSTOLIC BLOOD PRESSURE >= 140 MM HG: ICD-10-PCS | Mod: HCNC,CPTII,S$GLB, | Performed by: NURSE PRACTITIONER

## 2021-01-01 PROCEDURE — 25500020 PHARM REV CODE 255: Performed by: INTERNAL MEDICINE

## 2021-01-01 PROCEDURE — 99499 RISK ADDL DX/OHS AUDIT: ICD-10-PCS | Mod: S$GLB,,, | Performed by: INTERNAL MEDICINE

## 2021-01-01 PROCEDURE — 92610 EVALUATE SWALLOWING FUNCTION: CPT | Mod: HCNC

## 2021-01-01 PROCEDURE — 99214 OFFICE O/P EST MOD 30 MIN: CPT | Mod: S$GLB,,, | Performed by: INTERNAL MEDICINE

## 2021-01-01 PROCEDURE — 1160F RVW MEDS BY RX/DR IN RCRD: CPT | Mod: CPTII,S$GLB,, | Performed by: INTERNAL MEDICINE

## 2021-01-01 PROCEDURE — 87181 SC STD AGAR DILUTION PER AGT: CPT | Mod: HCNC | Performed by: HOSPITALIST

## 2021-01-01 PROCEDURE — 1125F AMNT PAIN NOTED PAIN PRSNT: CPT | Mod: HCNC,CPTII,S$GLB, | Performed by: NURSE PRACTITIONER

## 2021-01-01 PROCEDURE — 3078F DIAST BP <80 MM HG: CPT | Mod: HCNC,CPTII,S$GLB, | Performed by: PHYSICIAN ASSISTANT

## 2021-01-01 PROCEDURE — 73630 X-RAY EXAM OF FOOT: CPT | Mod: 26,50,HCNC, | Performed by: RADIOLOGY

## 2021-01-01 PROCEDURE — 1100F PR PT FALLS ASSESS DOC 2+ FALLS/FALL W/INJURY/YR: ICD-10-PCS | Mod: HCNC,CPTII,S$GLB, | Performed by: INTERNAL MEDICINE

## 2021-01-01 PROCEDURE — 1111F PR DISCHARGE MEDS RECONCILED W/ CURRENT OUTPATIENT MED LIST: ICD-10-PCS | Mod: HCNC,CPTII,S$GLB, | Performed by: STUDENT IN AN ORGANIZED HEALTH CARE EDUCATION/TRAINING PROGRAM

## 2021-01-01 PROCEDURE — 3288F PR FALLS RISK ASSESSMENT DOCUMENTED: ICD-10-PCS | Mod: CPTII,S$GLB,, | Performed by: PODIATRIST

## 2021-01-01 PROCEDURE — 1100F PTFALLS ASSESS-DOCD GE2>/YR: CPT | Mod: HCNC,CPTII,S$GLB, | Performed by: UROLOGY

## 2021-01-01 PROCEDURE — 36415 COLL VENOUS BLD VENIPUNCTURE: CPT | Mod: HCNC,PN | Performed by: NURSE PRACTITIONER

## 2021-01-01 PROCEDURE — 73502 XR HIP WITH PELVIS WHEN PERFORMED, 2 OR 3 VIEWS LEFT: ICD-10-PCS | Mod: LT,S$GLB,, | Performed by: RADIOLOGY

## 2021-01-01 PROCEDURE — 1111F DSCHRG MED/CURRENT MED MERGE: CPT | Mod: HCNC,CPTII,S$GLB, | Performed by: THORACIC SURGERY (CARDIOTHORACIC VASCULAR SURGERY)

## 2021-01-01 PROCEDURE — 99999 PR PBB SHADOW E&M-EST. PATIENT-LVL V: CPT | Mod: PBBFAC,,, | Performed by: NURSE PRACTITIONER

## 2021-01-01 PROCEDURE — 3046F HEMOGLOBIN A1C LEVEL >9.0%: CPT | Mod: HCNC,CPTII,S$GLB, | Performed by: PHYSICIAN ASSISTANT

## 2021-01-01 PROCEDURE — 51798 US URINE CAPACITY MEASURE: CPT | Mod: HCNC

## 2021-01-01 PROCEDURE — 81000 URINALYSIS NONAUTO W/SCOPE: CPT | Mod: HCNC | Performed by: EMERGENCY MEDICINE

## 2021-01-01 PROCEDURE — G0180 PR HOME HEALTH MD CERTIFICATION: ICD-10-PCS | Mod: ,,, | Performed by: INTERNAL MEDICINE

## 2021-01-01 PROCEDURE — 86235 NUCLEAR ANTIGEN ANTIBODY: CPT | Mod: 91,HCNC | Performed by: PHYSICIAN ASSISTANT

## 2021-01-01 PROCEDURE — 4010F ACE/ARB THERAPY RXD/TAKEN: CPT | Mod: CPTII,S$GLB,, | Performed by: INTERNAL MEDICINE

## 2021-01-01 PROCEDURE — 85610 PROTHROMBIN TIME: CPT | Mod: HCNC | Performed by: EMERGENCY MEDICINE

## 2021-01-01 PROCEDURE — 93005 ELECTROCARDIOGRAM TRACING: CPT | Mod: HCNC

## 2021-01-01 PROCEDURE — 3074F PR MOST RECENT SYSTOLIC BLOOD PRESSURE < 130 MM HG: ICD-10-PCS | Mod: HCNC,CPTII,S$GLB, | Performed by: STUDENT IN AN ORGANIZED HEALTH CARE EDUCATION/TRAINING PROGRAM

## 2021-01-01 PROCEDURE — 1160F RVW MEDS BY RX/DR IN RCRD: CPT | Mod: HCNC,CPTII,S$GLB, | Performed by: PHYSICIAN ASSISTANT

## 2021-01-01 PROCEDURE — 87075 CULTR BACTERIA EXCEPT BLOOD: CPT | Mod: HCNC | Performed by: HOSPITALIST

## 2021-01-01 PROCEDURE — 25000003 PHARM REV CODE 250: Mod: HCNC | Performed by: STUDENT IN AN ORGANIZED HEALTH CARE EDUCATION/TRAINING PROGRAM

## 2021-01-01 PROCEDURE — 3072F PR LOW RISK FOR RETINOPATHY: ICD-10-PCS | Mod: S$GLB,,, | Performed by: PODIATRIST

## 2021-01-01 PROCEDURE — 99999 PR PBB SHADOW E&M-EST. PATIENT-LVL V: ICD-10-PCS | Mod: PBBFAC,,, | Performed by: PODIATRIST

## 2021-01-01 PROCEDURE — 99214 OFFICE O/P EST MOD 30 MIN: CPT | Mod: S$GLB,,, | Performed by: PODIATRIST

## 2021-01-01 PROCEDURE — 3008F PR BODY MASS INDEX (BMI) DOCUMENTED: ICD-10-PCS | Mod: HCNC,CPTII,S$GLB, | Performed by: THORACIC SURGERY (CARDIOTHORACIC VASCULAR SURGERY)

## 2021-01-01 PROCEDURE — 51700 PR IRRIGATION, BLADDER: ICD-10-PCS | Mod: HCNC,S$GLB,, | Performed by: NURSE PRACTITIONER

## 2021-01-01 PROCEDURE — 1111F PR DISCHARGE MEDS RECONCILED W/ CURRENT OUTPATIENT MED LIST: ICD-10-PCS | Mod: HCNC,CPTII,S$GLB, | Performed by: NURSE PRACTITIONER

## 2021-01-01 PROCEDURE — 3072F PR LOW RISK FOR RETINOPATHY: ICD-10-PCS | Mod: HCNC,CPTII,S$GLB, | Performed by: STUDENT IN AN ORGANIZED HEALTH CARE EDUCATION/TRAINING PROGRAM

## 2021-01-01 PROCEDURE — 1125F PR PAIN SEVERITY QUANTIFIED, PAIN PRESENT: ICD-10-PCS | Mod: HCNC,S$GLB,, | Performed by: STUDENT IN AN ORGANIZED HEALTH CARE EDUCATION/TRAINING PROGRAM

## 2021-01-01 PROCEDURE — 1126F AMNT PAIN NOTED NONE PRSNT: CPT | Mod: S$GLB,,, | Performed by: PODIATRIST

## 2021-01-01 PROCEDURE — 99999 PR PBB SHADOW E&M-EST. PATIENT-LVL III: ICD-10-PCS | Mod: PBBFAC,HCNC,, | Performed by: PODIATRIST

## 2021-01-01 PROCEDURE — 71271 CT CHEST LUNG SCREENING LOW DOSE: ICD-10-PCS | Mod: 26,,, | Performed by: RADIOLOGY

## 2021-01-01 PROCEDURE — 82784 ASSAY IGA/IGD/IGG/IGM EACH: CPT | Mod: HCNC | Performed by: PHYSICIAN ASSISTANT

## 2021-01-01 PROCEDURE — 93922 UPR/L XTREMITY ART 2 LEVELS: CPT | Mod: 26,,, | Performed by: RADIOLOGY

## 2021-01-01 PROCEDURE — 92014 COMPRE OPH EXAM EST PT 1/>: CPT | Mod: HCNC,S$GLB,, | Performed by: OPTOMETRIST

## 2021-01-01 PROCEDURE — 1100F PTFALLS ASSESS-DOCD GE2>/YR: CPT | Mod: HCNC,CPTII,S$GLB, | Performed by: NURSE PRACTITIONER

## 2021-01-01 PROCEDURE — 1101F PR PT FALLS ASSESS DOC 0-1 FALLS W/OUT INJ PAST YR: ICD-10-PCS | Mod: HCNC,CPTII,S$GLB, | Performed by: THORACIC SURGERY (CARDIOTHORACIC VASCULAR SURGERY)

## 2021-01-01 PROCEDURE — 99999 PR PBB SHADOW E&M-EST. PATIENT-LVL V: CPT | Mod: PBBFAC,HCNC,, | Performed by: PHYSICIAN ASSISTANT

## 2021-01-01 PROCEDURE — 1111F PR DISCHARGE MEDS RECONCILED W/ CURRENT OUTPATIENT MED LIST: ICD-10-PCS | Mod: CPTII,S$GLB,, | Performed by: OTOLARYNGOLOGY

## 2021-01-01 PROCEDURE — 1126F AMNT PAIN NOTED NONE PRSNT: CPT | Mod: CPTII,S$GLB,, | Performed by: NURSE PRACTITIONER

## 2021-01-01 PROCEDURE — 99222 PR INITIAL HOSPITAL CARE,LEVL II: ICD-10-PCS | Mod: HCNC,,, | Performed by: UROLOGY

## 2021-01-01 PROCEDURE — 92610 PR EVAL,ORAL & PHARYNGEAL SWALLOW FUNCTION: ICD-10-PCS | Mod: ,,, | Performed by: SPEECH-LANGUAGE PATHOLOGIST

## 2021-01-01 PROCEDURE — 3078F DIAST BP <80 MM HG: CPT | Mod: CPTII,S$GLB,, | Performed by: NURSE PRACTITIONER

## 2021-01-01 PROCEDURE — 1160F PR REVIEW ALL MEDS BY PRESCRIBER/CLIN PHARMACIST DOCUMENTED: ICD-10-PCS | Mod: CPTII,S$GLB,, | Performed by: INTERNAL MEDICINE

## 2021-01-01 PROCEDURE — 86803 HEPATITIS C AB TEST: CPT | Mod: HCNC | Performed by: PHYSICIAN ASSISTANT

## 2021-01-01 PROCEDURE — 3072F PR LOW RISK FOR RETINOPATHY: ICD-10-PCS | Mod: HCNC,CPTII,S$GLB, | Performed by: PODIATRIST

## 2021-01-01 PROCEDURE — 1125F AMNT PAIN NOTED PAIN PRSNT: CPT | Mod: S$GLB,,, | Performed by: INTERNAL MEDICINE

## 2021-01-01 PROCEDURE — 83690 ASSAY OF LIPASE: CPT | Mod: HCNC | Performed by: EMERGENCY MEDICINE

## 2021-01-01 PROCEDURE — 12000002 HC ACUTE/MED SURGE SEMI-PRIVATE ROOM: Mod: HCNC

## 2021-01-01 PROCEDURE — 99204 OFFICE O/P NEW MOD 45 MIN: CPT | Mod: HCNC,S$GLB,, | Performed by: INTERNAL MEDICINE

## 2021-01-01 PROCEDURE — 92526 ORAL FUNCTION THERAPY: CPT | Mod: HCNC

## 2021-01-01 PROCEDURE — 3052F PR MOST RECENT HEMOGLOBIN A1C LEVEL 8.0 - < 9.0%: ICD-10-PCS | Mod: CPTII,S$GLB,, | Performed by: INTERNAL MEDICINE

## 2021-01-01 PROCEDURE — U0003 INFECTIOUS AGENT DETECTION BY NUCLEIC ACID (DNA OR RNA); SEVERE ACUTE RESPIRATORY SYNDROME CORONAVIRUS 2 (SARS-COV-2) (CORONAVIRUS DISEASE [COVID-19]), AMPLIFIED PROBE TECHNIQUE, MAKING USE OF HIGH THROUGHPUT TECHNOLOGIES AS DESCRIBED BY CMS-2020-01-R: HCPCS | Performed by: OTOLARYNGOLOGY

## 2021-01-01 PROCEDURE — 80053 COMPREHEN METABOLIC PANEL: CPT | Mod: HCNC | Performed by: INTERNAL MEDICINE

## 2021-01-01 PROCEDURE — 1125F PR PAIN SEVERITY QUANTIFIED, PAIN PRESENT: ICD-10-PCS | Mod: S$GLB,,, | Performed by: NURSE PRACTITIONER

## 2021-01-01 PROCEDURE — 1100F PTFALLS ASSESS-DOCD GE2>/YR: CPT | Mod: HCNC,CPTII,S$GLB, | Performed by: INTERNAL MEDICINE

## 2021-01-01 PROCEDURE — 92610 EVALUATE SWALLOWING FUNCTION: CPT | Mod: HCNC,PN | Performed by: SPEECH-LANGUAGE PATHOLOGIST

## 2021-01-01 PROCEDURE — 99499 RISK ADDL DX/OHS AUDIT: ICD-10-PCS | Mod: S$GLB,,, | Performed by: PHYSICIAN ASSISTANT

## 2021-01-01 PROCEDURE — 99285 EMERGENCY DEPT VISIT HI MDM: CPT | Mod: 25,HCNC

## 2021-01-01 PROCEDURE — 3072F LOW RISK FOR RETINOPATHY: CPT | Mod: HCNC,CPTII,S$GLB, | Performed by: STUDENT IN AN ORGANIZED HEALTH CARE EDUCATION/TRAINING PROGRAM

## 2021-01-01 PROCEDURE — 3079F DIAST BP 80-89 MM HG: CPT | Mod: HCNC,CPTII,S$GLB, | Performed by: INTERNAL MEDICINE

## 2021-01-01 PROCEDURE — 1159F MED LIST DOCD IN RCRD: CPT | Mod: HCNC,S$GLB,, | Performed by: THORACIC SURGERY (CARDIOTHORACIC VASCULAR SURGERY)

## 2021-01-01 RX ORDER — GLUCAGON 1 MG
1 KIT INJECTION
Status: DISCONTINUED | OUTPATIENT
Start: 2021-01-01 | End: 2021-01-01 | Stop reason: HOSPADM

## 2021-01-01 RX ORDER — DIPHENOXYLATE HYDROCHLORIDE AND ATROPINE SULFATE 2.5; .025 MG/1; MG/1
1 TABLET ORAL EVERY 6 HOURS PRN
COMMUNITY
Start: 2021-01-01 | End: 2021-01-01 | Stop reason: SDUPTHER

## 2021-01-01 RX ORDER — ESCITALOPRAM OXALATE 10 MG/1
20 TABLET ORAL DAILY
Status: DISCONTINUED | OUTPATIENT
Start: 2021-01-01 | End: 2021-01-01 | Stop reason: HOSPADM

## 2021-01-01 RX ORDER — CHOLESTYRAMINE 4 G/5.5G
POWDER, FOR SUSPENSION ORAL
COMMUNITY
Start: 2021-01-01 | End: 2021-01-01

## 2021-01-01 RX ORDER — GABAPENTIN 300 MG/1
300 CAPSULE ORAL 3 TIMES DAILY
Status: DISCONTINUED | OUTPATIENT
Start: 2021-01-01 | End: 2021-01-01 | Stop reason: HOSPADM

## 2021-01-01 RX ORDER — HYDROCODONE BITARTRATE AND ACETAMINOPHEN 5; 325 MG/1; MG/1
1 TABLET ORAL EVERY 8 HOURS PRN
Qty: 20 TABLET | Refills: 0 | Status: SHIPPED | OUTPATIENT
Start: 2021-01-01 | End: 2021-01-01

## 2021-01-01 RX ORDER — TRIAMCINOLONE ACETONIDE 1 MG/G
CREAM TOPICAL
COMMUNITY
Start: 2021-01-01

## 2021-01-01 RX ORDER — CHOLESTYRAMINE 4 G/5.5G
POWDER, FOR SUSPENSION ORAL
Status: ON HOLD | COMMUNITY
Start: 2021-01-01 | End: 2021-01-01 | Stop reason: HOSPADM

## 2021-01-01 RX ORDER — MAGNESIUM SULFATE HEPTAHYDRATE 40 MG/ML
2 INJECTION, SOLUTION INTRAVENOUS ONCE
Status: DISCONTINUED | OUTPATIENT
Start: 2021-01-01 | End: 2021-11-14 | Stop reason: HOSPADM

## 2021-01-01 RX ORDER — BACLOFEN 10 MG/1
TABLET ORAL
Qty: 60 TABLET | Refills: 0 | Status: SHIPPED | OUTPATIENT
Start: 2021-01-01 | End: 2021-01-01 | Stop reason: SDUPTHER

## 2021-01-01 RX ORDER — POTASSIUM CHLORIDE 20 MEQ/1
40 TABLET, EXTENDED RELEASE ORAL ONCE
Status: COMPLETED | OUTPATIENT
Start: 2021-01-01 | End: 2021-01-01

## 2021-01-01 RX ORDER — OMEPRAZOLE 40 MG/1
CAPSULE, DELAYED RELEASE ORAL
Qty: 60 CAPSULE | Refills: 2 | Status: SHIPPED | OUTPATIENT
Start: 2021-01-01

## 2021-01-01 RX ORDER — LIDOCAINE HYDROCHLORIDE 10 MG/ML
5 INJECTION INFILTRATION; PERINEURAL
Status: COMPLETED | OUTPATIENT
Start: 2021-01-01 | End: 2021-01-01

## 2021-01-01 RX ORDER — LIDOCAINE HYDROCHLORIDE 20 MG/ML
JELLY TOPICAL ONCE AS NEEDED
Status: DISCONTINUED | OUTPATIENT
Start: 2021-01-01 | End: 2021-01-01

## 2021-01-01 RX ORDER — FENTANYL CITRATE 50 UG/ML
INJECTION, SOLUTION INTRAMUSCULAR; INTRAVENOUS CODE/TRAUMA/SEDATION MEDICATION
Status: COMPLETED | OUTPATIENT
Start: 2021-01-01 | End: 2021-01-01

## 2021-01-01 RX ORDER — NOREPINEPHRINE BITARTRATE/D5W 4MG/250ML
0-3 PLASTIC BAG, INJECTION (ML) INTRAVENOUS CONTINUOUS
Status: DISCONTINUED | OUTPATIENT
Start: 2021-01-01 | End: 2021-11-14 | Stop reason: HOSPADM

## 2021-01-01 RX ORDER — DEXAMETHASONE SODIUM PHOSPHATE 4 MG/ML
4 INJECTION, SOLUTION INTRA-ARTICULAR; INTRALESIONAL; INTRAMUSCULAR; INTRAVENOUS; SOFT TISSUE ONCE
Status: DISCONTINUED | OUTPATIENT
Start: 2021-01-01 | End: 2021-11-14 | Stop reason: HOSPADM

## 2021-01-01 RX ORDER — IPRATROPIUM BROMIDE AND ALBUTEROL SULFATE 2.5; .5 MG/3ML; MG/3ML
3 SOLUTION RESPIRATORY (INHALATION) EVERY 4 HOURS PRN
Status: DISCONTINUED | OUTPATIENT
Start: 2021-01-01 | End: 2021-01-01

## 2021-01-01 RX ORDER — TAMSULOSIN HYDROCHLORIDE 0.4 MG/1
0.4 CAPSULE ORAL DAILY
Qty: 90 CAPSULE | Refills: 3 | Status: SHIPPED | OUTPATIENT
Start: 2021-01-01

## 2021-01-01 RX ORDER — HYDROCODONE BITARTRATE AND ACETAMINOPHEN 5; 325 MG/1; MG/1
1 TABLET ORAL EVERY 6 HOURS PRN
Refills: 0 | Status: CANCELLED | OUTPATIENT
Start: 2021-01-01

## 2021-01-01 RX ORDER — IPRATROPIUM BROMIDE AND ALBUTEROL SULFATE 2.5; .5 MG/3ML; MG/3ML
3 SOLUTION RESPIRATORY (INHALATION)
Status: DISCONTINUED | OUTPATIENT
Start: 2021-01-01 | End: 2021-01-01

## 2021-01-01 RX ORDER — DOXYCYCLINE 100 MG/1
100 CAPSULE ORAL EVERY 12 HOURS
Qty: 20 CAPSULE | Refills: 0 | Status: SHIPPED | OUTPATIENT
Start: 2021-01-01 | End: 2021-01-01

## 2021-01-01 RX ORDER — METOPROLOL SUCCINATE 25 MG/1
12.5 TABLET, EXTENDED RELEASE ORAL DAILY
Qty: 90 TABLET | Refills: 3 | Status: ON HOLD | OUTPATIENT
Start: 2021-01-01 | End: 2021-01-01 | Stop reason: HOSPADM

## 2021-01-01 RX ORDER — SODIUM,POTASSIUM PHOSPHATES 280-250MG
2 POWDER IN PACKET (EA) ORAL
Status: DISCONTINUED | OUTPATIENT
Start: 2021-01-01 | End: 2021-01-01

## 2021-01-01 RX ORDER — IBUPROFEN 200 MG
1 TABLET ORAL DAILY
Qty: 90 PATCH | Refills: 3 | Status: SHIPPED | OUTPATIENT
Start: 2021-01-01 | End: 2022-06-23

## 2021-01-01 RX ORDER — MUPIROCIN 20 MG/G
OINTMENT TOPICAL 2 TIMES DAILY
Status: DISPENSED | OUTPATIENT
Start: 2021-01-01 | End: 2021-01-01

## 2021-01-01 RX ORDER — HYDROCODONE BITARTRATE AND ACETAMINOPHEN 5; 325 MG/1; MG/1
1 TABLET ORAL EVERY 6 HOURS PRN
Status: DISCONTINUED | OUTPATIENT
Start: 2021-01-01 | End: 2021-01-01 | Stop reason: HOSPADM

## 2021-01-01 RX ORDER — VALSARTAN 40 MG/1
40 TABLET ORAL 2 TIMES DAILY
Status: DISCONTINUED | OUTPATIENT
Start: 2021-01-01 | End: 2021-01-01

## 2021-01-01 RX ORDER — ATORVASTATIN CALCIUM 10 MG/1
10 TABLET, FILM COATED ORAL NIGHTLY
Status: DISCONTINUED | OUTPATIENT
Start: 2021-01-01 | End: 2021-01-01 | Stop reason: HOSPADM

## 2021-01-01 RX ORDER — MONTELUKAST SODIUM 4 MG/1
TABLET, CHEWABLE ORAL
Status: ON HOLD | COMMUNITY
Start: 2021-01-01 | End: 2021-01-01 | Stop reason: HOSPADM

## 2021-01-01 RX ORDER — METOPROLOL TARTRATE 25 MG/1
25 TABLET, FILM COATED ORAL 2 TIMES DAILY
Status: DISCONTINUED | OUTPATIENT
Start: 2021-01-01 | End: 2021-01-01

## 2021-01-01 RX ORDER — AZELASTINE 1 MG/ML
1 SPRAY, METERED NASAL 2 TIMES DAILY
Status: DISCONTINUED | OUTPATIENT
Start: 2021-01-01 | End: 2021-01-01 | Stop reason: HOSPADM

## 2021-01-01 RX ORDER — METHYLPREDNISOLONE 4 MG/1
TABLET ORAL
Qty: 21 TABLET | Refills: 0 | Status: SHIPPED | OUTPATIENT
Start: 2021-01-01 | End: 2021-01-01 | Stop reason: ALTCHOICE

## 2021-01-01 RX ORDER — ESCITALOPRAM OXALATE 20 MG/1
TABLET ORAL
COMMUNITY
Start: 2021-01-01

## 2021-01-01 RX ORDER — HYDROCODONE BITARTRATE AND ACETAMINOPHEN 500; 5 MG/1; MG/1
TABLET ORAL
Status: DISCONTINUED | OUTPATIENT
Start: 2021-01-01 | End: 2021-11-14 | Stop reason: HOSPADM

## 2021-01-01 RX ORDER — LANOLIN ALCOHOL/MO/W.PET/CERES
800 CREAM (GRAM) TOPICAL
Status: DISCONTINUED | OUTPATIENT
Start: 2021-01-01 | End: 2021-01-01

## 2021-01-01 RX ORDER — IPRATROPIUM BROMIDE AND ALBUTEROL SULFATE 2.5; .5 MG/3ML; MG/3ML
3 SOLUTION RESPIRATORY (INHALATION) EVERY 6 HOURS PRN
Status: DISCONTINUED | OUTPATIENT
Start: 2021-01-01 | End: 2021-01-01 | Stop reason: HOSPADM

## 2021-01-01 RX ORDER — ALPRAZOLAM 0.25 MG/1
TABLET ORAL
Qty: 5 TABLET | Refills: 0 | Status: ON HOLD | OUTPATIENT
Start: 2021-01-01 | End: 2021-01-01 | Stop reason: HOSPADM

## 2021-01-01 RX ORDER — IBUPROFEN 200 MG
24 TABLET ORAL
Status: DISCONTINUED | OUTPATIENT
Start: 2021-01-01 | End: 2021-01-01

## 2021-01-01 RX ORDER — FUROSEMIDE 40 MG/1
40 TABLET ORAL DAILY
Qty: 7 TABLET | Refills: 0 | Status: SHIPPED | OUTPATIENT
Start: 2021-01-01 | End: 2021-01-01

## 2021-01-01 RX ORDER — MUPIROCIN 20 MG/G
OINTMENT TOPICAL 2 TIMES DAILY
Status: DISCONTINUED | OUTPATIENT
Start: 2021-01-01 | End: 2021-11-14 | Stop reason: HOSPADM

## 2021-01-01 RX ORDER — NYSTATIN 100000 [USP'U]/ML
4 SUSPENSION ORAL 3 TIMES DAILY
Qty: 168 ML | Refills: 1 | Status: SHIPPED | OUTPATIENT
Start: 2021-01-01 | End: 2021-01-01

## 2021-01-01 RX ORDER — GLUCAGON 1 MG
1 KIT INJECTION
Status: DISCONTINUED | OUTPATIENT
Start: 2021-01-01 | End: 2021-01-01

## 2021-01-01 RX ORDER — INSULIN ASPART 100 [IU]/ML
1-10 INJECTION, SOLUTION INTRAVENOUS; SUBCUTANEOUS EVERY 6 HOURS PRN
Status: DISCONTINUED | OUTPATIENT
Start: 2021-01-01 | End: 2021-01-01

## 2021-01-01 RX ORDER — INSULIN ASPART INJECTION 100 [IU]/ML
INJECTION, SOLUTION SUBCUTANEOUS
Qty: 5 PEN | Refills: 1 | Status: SHIPPED | OUTPATIENT
Start: 2021-01-01

## 2021-01-01 RX ORDER — MELOXICAM 7.5 MG/1
7.5 TABLET ORAL DAILY
Qty: 14 TABLET | Refills: 0 | Status: SHIPPED | OUTPATIENT
Start: 2021-01-01 | End: 2021-01-01

## 2021-01-01 RX ORDER — DEXTROSE MONOHYDRATE AND SODIUM CHLORIDE 5; .9 G/100ML; G/100ML
INJECTION, SOLUTION INTRAVENOUS CONTINUOUS
Status: DISCONTINUED | OUTPATIENT
Start: 2021-01-01 | End: 2021-01-01

## 2021-01-01 RX ORDER — NYSTATIN 100000 [USP'U]/ML
5 SUSPENSION ORAL 4 TIMES DAILY
Qty: 280 ML | Refills: 0 | Status: SHIPPED | OUTPATIENT
Start: 2021-01-01 | End: 2021-01-01

## 2021-01-01 RX ORDER — NYSTATIN 100000 [USP'U]/ML
SUSPENSION ORAL
Status: ON HOLD | COMMUNITY
Start: 2021-01-01 | End: 2021-01-01 | Stop reason: HOSPADM

## 2021-01-01 RX ORDER — ACETAMINOPHEN 325 MG/1
650 TABLET ORAL EVERY 6 HOURS PRN
Status: DISCONTINUED | OUTPATIENT
Start: 2021-01-01 | End: 2021-01-01

## 2021-01-01 RX ORDER — MOMETASONE FUROATE 1 MG/G
OINTMENT TOPICAL
Status: ON HOLD | COMMUNITY
Start: 2020-01-01 | End: 2021-01-01 | Stop reason: HOSPADM

## 2021-01-01 RX ORDER — INSULIN ASPART 100 [IU]/ML
INJECTION, SOLUTION INTRAVENOUS; SUBCUTANEOUS
Qty: 10 ML | Refills: 0 | Status: SHIPPED | OUTPATIENT
Start: 2021-01-01 | End: 2021-01-01 | Stop reason: ALTCHOICE

## 2021-01-01 RX ORDER — HYDROCODONE BITARTRATE AND ACETAMINOPHEN 5; 325 MG/1; MG/1
1 TABLET ORAL EVERY 6 HOURS PRN
Status: DISCONTINUED | OUTPATIENT
Start: 2021-01-01 | End: 2021-01-01

## 2021-01-01 RX ORDER — POLYETHYLENE GLYCOL 3350 17 G/17G
17 POWDER, FOR SOLUTION ORAL DAILY
Status: DISCONTINUED | OUTPATIENT
Start: 2021-01-01 | End: 2021-01-01

## 2021-01-01 RX ORDER — IBUPROFEN 200 MG
16 TABLET ORAL
Status: DISCONTINUED | OUTPATIENT
Start: 2021-01-01 | End: 2021-01-01

## 2021-01-01 RX ORDER — DUTASTERIDE AND TAMSULOSIN HYDROCHLORIDE CAPSULES .5; .4 MG/1; MG/1
CAPSULE ORAL
COMMUNITY
Start: 2021-01-01

## 2021-01-01 RX ORDER — ALBUTEROL SULFATE 90 UG/1
2 AEROSOL, METERED RESPIRATORY (INHALATION) EVERY 6 HOURS PRN
Qty: 18 G | Refills: 0 | Status: SHIPPED | OUTPATIENT
Start: 2021-01-01 | End: 2022-04-07

## 2021-01-01 RX ORDER — SODIUM CHLORIDE 0.9 % (FLUSH) 0.9 %
3 SYRINGE (ML) INJECTION EVERY 8 HOURS
Status: DISCONTINUED | OUTPATIENT
Start: 2021-01-01 | End: 2021-01-01 | Stop reason: HOSPADM

## 2021-01-01 RX ORDER — CLOTRIMAZOLE AND BETAMETHASONE DIPROPIONATE 10; .64 MG/G; MG/G
CREAM TOPICAL 2 TIMES DAILY
Qty: 45 G | Refills: 1 | Status: SHIPPED | OUTPATIENT
Start: 2021-01-01

## 2021-01-01 RX ORDER — COSYNTROPIN 0.25 MG/ML
0.25 INJECTION, POWDER, FOR SOLUTION INTRAMUSCULAR; INTRAVENOUS ONCE
Status: DISCONTINUED | OUTPATIENT
Start: 2021-01-01 | End: 2021-11-14 | Stop reason: HOSPADM

## 2021-01-01 RX ORDER — ACETAMINOPHEN 325 MG/1
650 TABLET ORAL EVERY 4 HOURS PRN
Status: DISCONTINUED | OUTPATIENT
Start: 2021-01-01 | End: 2021-01-01 | Stop reason: HOSPADM

## 2021-01-01 RX ORDER — TIOTROPIUM BROMIDE INHALATION SPRAY 1.56 UG/1
SPRAY, METERED RESPIRATORY (INHALATION)
COMMUNITY
Start: 2021-01-01

## 2021-01-01 RX ORDER — FLUCONAZOLE 100 MG/1
100 TABLET ORAL DAILY
Status: ON HOLD | COMMUNITY
Start: 2021-01-01 | End: 2021-01-01 | Stop reason: HOSPADM

## 2021-01-01 RX ORDER — ALPRAZOLAM 0.25 MG/1
0.25 TABLET ORAL 3 TIMES DAILY
Qty: 90 TABLET | Refills: 0 | Status: CANCELLED | OUTPATIENT
Start: 2021-01-01 | End: 2021-11-17

## 2021-01-01 RX ORDER — FUROSEMIDE 20 MG/1
20 TABLET ORAL DAILY
Qty: 90 TABLET | Refills: 3 | Status: SHIPPED | OUTPATIENT
Start: 2021-01-01 | End: 2021-01-01

## 2021-01-01 RX ORDER — HYDROCODONE BITARTRATE AND ACETAMINOPHEN 5; 325 MG/1; MG/1
1 TABLET ORAL EVERY 6 HOURS PRN
Qty: 5 TABLET | Refills: 0 | Status: ON HOLD | OUTPATIENT
Start: 2021-01-01 | End: 2021-01-01 | Stop reason: SDUPTHER

## 2021-01-01 RX ORDER — BACLOFEN 10 MG/1
10 TABLET ORAL 2 TIMES DAILY PRN
Status: DISCONTINUED | OUTPATIENT
Start: 2021-01-01 | End: 2021-01-01 | Stop reason: HOSPADM

## 2021-01-01 RX ORDER — VALSARTAN 160 MG/1
TABLET ORAL
Qty: 90 TABLET | Refills: 0 | OUTPATIENT
Start: 2021-01-01

## 2021-01-01 RX ORDER — ONDANSETRON 4 MG/1
4 TABLET, ORALLY DISINTEGRATING ORAL
Status: COMPLETED | OUTPATIENT
Start: 2021-01-01 | End: 2021-01-01

## 2021-01-01 RX ORDER — GABAPENTIN 300 MG/1
300 CAPSULE ORAL 3 TIMES DAILY
Status: CANCELLED | OUTPATIENT
Start: 2021-01-01

## 2021-01-01 RX ORDER — IBUPROFEN 200 MG
1 TABLET ORAL DAILY
Status: DISCONTINUED | OUTPATIENT
Start: 2021-01-01 | End: 2021-01-01

## 2021-01-01 RX ORDER — ERGOCALCIFEROL (VITAMIN D2) 10 MCG
TABLET ORAL
COMMUNITY
Start: 2021-01-01

## 2021-01-01 RX ORDER — OMEPRAZOLE 40 MG/1
40 CAPSULE, DELAYED RELEASE ORAL DAILY
Start: 2021-01-01 | End: 2021-01-01

## 2021-01-01 RX ORDER — ENOXAPARIN SODIUM 100 MG/ML
40 INJECTION SUBCUTANEOUS EVERY 24 HOURS
Status: DISCONTINUED | OUTPATIENT
Start: 2021-01-01 | End: 2021-01-01 | Stop reason: HOSPADM

## 2021-01-01 RX ORDER — AMITRIPTYLINE HYDROCHLORIDE 25 MG/1
50 TABLET, FILM COATED ORAL NIGHTLY
Status: DISCONTINUED | OUTPATIENT
Start: 2021-01-01 | End: 2021-01-01 | Stop reason: HOSPADM

## 2021-01-01 RX ORDER — TAMSULOSIN HYDROCHLORIDE 0.4 MG/1
0.4 CAPSULE ORAL DAILY
Status: DISCONTINUED | OUTPATIENT
Start: 2021-01-01 | End: 2021-01-01 | Stop reason: HOSPADM

## 2021-01-01 RX ORDER — SODIUM, POTASSIUM,MAG SULFATES 17.5-3.13G
SOLUTION, RECONSTITUTED, ORAL ORAL
Status: ON HOLD | COMMUNITY
Start: 2021-01-01 | End: 2021-01-01 | Stop reason: HOSPADM

## 2021-01-01 RX ORDER — TIOTROPIUM BROMIDE 18 UG/1
18 CAPSULE ORAL; RESPIRATORY (INHALATION) DAILY
Qty: 30 CAPSULE | Refills: 11 | Status: SHIPPED | OUTPATIENT
Start: 2021-01-01 | End: 2022-01-31

## 2021-01-01 RX ORDER — AMITRIPTYLINE HYDROCHLORIDE 25 MG/1
50 TABLET, FILM COATED ORAL NIGHTLY
Status: CANCELLED | OUTPATIENT
Start: 2021-01-01

## 2021-01-01 RX ORDER — TALC
6 POWDER (GRAM) TOPICAL NIGHTLY PRN
Status: DISCONTINUED | OUTPATIENT
Start: 2021-01-01 | End: 2021-01-01 | Stop reason: HOSPADM

## 2021-01-01 RX ORDER — OMEPRAZOLE 40 MG/1
CAPSULE, DELAYED RELEASE ORAL
COMMUNITY
Start: 2021-01-01

## 2021-01-01 RX ORDER — ACETAMINOPHEN 325 MG/1
650 TABLET ORAL EVERY 8 HOURS PRN
Status: DISCONTINUED | OUTPATIENT
Start: 2021-01-01 | End: 2021-01-01 | Stop reason: HOSPADM

## 2021-01-01 RX ORDER — FUROSEMIDE 20 MG/1
20 TABLET ORAL DAILY
Qty: 90 TABLET | Refills: 3 | Status: SHIPPED | OUTPATIENT
Start: 2021-01-01

## 2021-01-01 RX ORDER — IBUPROFEN 200 MG
24 TABLET ORAL
Status: DISCONTINUED | OUTPATIENT
Start: 2021-01-01 | End: 2021-01-01 | Stop reason: HOSPADM

## 2021-01-01 RX ORDER — ESCITALOPRAM OXALATE 10 MG/1
20 TABLET ORAL DAILY
Status: CANCELLED | OUTPATIENT
Start: 2021-01-01

## 2021-01-01 RX ORDER — AMITRIPTYLINE HYDROCHLORIDE 25 MG/1
50 TABLET, FILM COATED ORAL NIGHTLY
Qty: 60 TABLET | Refills: 11 | Status: SHIPPED | OUTPATIENT
Start: 2021-01-01 | End: 2022-02-25

## 2021-01-01 RX ORDER — LEVOFLOXACIN 750 MG/1
750 TABLET ORAL DAILY
Qty: 7 TABLET | Refills: 0 | Status: SHIPPED | OUTPATIENT
Start: 2021-01-01 | End: 2021-01-01

## 2021-01-01 RX ORDER — ATORVASTATIN CALCIUM 10 MG/1
10 TABLET, FILM COATED ORAL DAILY
Status: CANCELLED | OUTPATIENT
Start: 2021-01-01

## 2021-01-01 RX ORDER — ONDANSETRON 2 MG/ML
8 INJECTION INTRAMUSCULAR; INTRAVENOUS EVERY 6 HOURS PRN
Status: DISCONTINUED | OUTPATIENT
Start: 2021-01-01 | End: 2021-01-01 | Stop reason: HOSPADM

## 2021-01-01 RX ORDER — PANTOPRAZOLE SODIUM 40 MG/1
40 TABLET, DELAYED RELEASE ORAL DAILY
Status: DISCONTINUED | OUTPATIENT
Start: 2021-01-01 | End: 2021-01-01 | Stop reason: HOSPADM

## 2021-01-01 RX ORDER — IBUPROFEN 200 MG
1 TABLET ORAL
Status: COMPLETED | OUTPATIENT
Start: 2021-01-01 | End: 2021-01-01

## 2021-01-01 RX ORDER — HYDROCODONE BITARTRATE AND ACETAMINOPHEN 5; 325 MG/1; MG/1
TABLET ORAL
Status: ON HOLD | COMMUNITY
Start: 2021-01-01 | End: 2021-01-01 | Stop reason: HOSPADM

## 2021-01-01 RX ORDER — PEN NEEDLE, DIABETIC 30 GX3/16"
1 NEEDLE, DISPOSABLE MISCELLANEOUS 3 TIMES DAILY
Qty: 300 EACH | Refills: 4 | Status: SHIPPED | OUTPATIENT
Start: 2021-01-01

## 2021-01-01 RX ORDER — AMOXICILLIN 500 MG/1
500 CAPSULE ORAL 3 TIMES DAILY
Qty: 30 CAPSULE | Refills: 0
Start: 2021-01-01 | End: 2021-01-01

## 2021-01-01 RX ORDER — BACLOFEN 10 MG/1
TABLET ORAL
COMMUNITY
Start: 2021-01-01

## 2021-01-01 RX ORDER — TAMSULOSIN HYDROCHLORIDE 0.4 MG/1
0.4 CAPSULE ORAL DAILY
Status: CANCELLED | OUTPATIENT
Start: 2021-01-01

## 2021-01-01 RX ORDER — BACLOFEN 10 MG/1
10 TABLET ORAL 2 TIMES DAILY
Qty: 60 TABLET | Refills: 0 | Status: SHIPPED | OUTPATIENT
Start: 2021-01-01

## 2021-01-01 RX ORDER — BARIUM SULFATE 0.6 G/ML
SUSPENSION ORAL
Status: ON HOLD | COMMUNITY
Start: 2021-01-01 | End: 2021-01-01 | Stop reason: HOSPADM

## 2021-01-01 RX ORDER — NYSTATIN 100000 [USP'U]/ML
4 SUSPENSION ORAL 4 TIMES DAILY
Qty: 160 ML | Refills: 0 | Status: SHIPPED | OUTPATIENT
Start: 2021-01-01 | End: 2021-01-01

## 2021-01-01 RX ORDER — DIPHENOXYLATE HYDROCHLORIDE AND ATROPINE SULFATE 2.5; .025 MG/1; MG/1
1 TABLET ORAL EVERY 6 HOURS PRN
Qty: 30 TABLET | Refills: 0 | Status: SHIPPED | OUTPATIENT
Start: 2021-01-01

## 2021-01-01 RX ORDER — IOHEXOL 350 MG/ML
INJECTION, SOLUTION INTRAVENOUS
Status: ON HOLD | COMMUNITY
Start: 2021-01-01 | End: 2021-01-01 | Stop reason: HOSPADM

## 2021-01-01 RX ORDER — ALBUMIN HUMAN 250 G/1000ML
25 SOLUTION INTRAVENOUS EVERY 6 HOURS
Status: DISCONTINUED | OUTPATIENT
Start: 2021-01-01 | End: 2021-11-14 | Stop reason: HOSPADM

## 2021-01-01 RX ORDER — INSULIN ASPART 100 [IU]/ML
1-10 INJECTION, SOLUTION INTRAVENOUS; SUBCUTANEOUS
Status: DISCONTINUED | OUTPATIENT
Start: 2021-01-01 | End: 2021-01-01

## 2021-01-01 RX ORDER — POLYETHYLENE GLYCOL 3350 17 G/17G
17 POWDER, FOR SOLUTION ORAL 2 TIMES DAILY PRN
Status: DISCONTINUED | OUTPATIENT
Start: 2021-01-01 | End: 2021-01-01 | Stop reason: HOSPADM

## 2021-01-01 RX ORDER — SODIUM CHLORIDE 9 MG/ML
INJECTION, SOLUTION INTRAVENOUS
Status: COMPLETED | OUTPATIENT
Start: 2021-01-01 | End: 2021-01-01

## 2021-01-01 RX ORDER — INSULIN ASPART 100 [IU]/ML
0-5 INJECTION, SOLUTION INTRAVENOUS; SUBCUTANEOUS
Status: DISCONTINUED | OUTPATIENT
Start: 2021-01-01 | End: 2021-01-01 | Stop reason: HOSPADM

## 2021-01-01 RX ORDER — MONTELUKAST SODIUM 10 MG/1
10 TABLET ORAL DAILY
Status: DISCONTINUED | OUTPATIENT
Start: 2021-01-01 | End: 2021-01-01

## 2021-01-01 RX ORDER — LANOLIN ALCOHOL/MO/W.PET/CERES
100 CREAM (GRAM) TOPICAL DAILY
Qty: 90 TABLET | Refills: 3 | Status: SHIPPED | OUTPATIENT
Start: 2021-01-01

## 2021-01-01 RX ORDER — IBUPROFEN 200 MG
16 TABLET ORAL
Status: DISCONTINUED | OUTPATIENT
Start: 2021-01-01 | End: 2021-01-01 | Stop reason: HOSPADM

## 2021-01-01 RX ORDER — SODIUM CHLORIDE 9 MG/ML
INJECTION, SOLUTION INTRAVENOUS CONTINUOUS
Status: DISCONTINUED | OUTPATIENT
Start: 2021-01-01 | End: 2021-01-01

## 2021-01-01 RX ADMIN — AZELASTINE HYDROCHLORIDE 137 MCG: 137 SPRAY, METERED NASAL at 09:06

## 2021-01-01 RX ADMIN — DEXTROSE MONOHYDRATE 25 G: 25 INJECTION, SOLUTION INTRAVENOUS at 08:05

## 2021-01-01 RX ADMIN — HYDROCODONE BITARTRATE AND ACETAMINOPHEN 1 TABLET: 5; 325 TABLET ORAL at 10:05

## 2021-01-01 RX ADMIN — Medication 3 ML: at 10:05

## 2021-01-01 RX ADMIN — TAMSULOSIN HYDROCHLORIDE 0.4 MG: 0.4 CAPSULE ORAL at 09:06

## 2021-01-01 RX ADMIN — FENTANYL CITRATE 50 MCG: 50 INJECTION INTRAMUSCULAR; INTRAVENOUS at 11:05

## 2021-01-01 RX ADMIN — TAMSULOSIN HYDROCHLORIDE 0.4 MG: 0.4 CAPSULE ORAL at 08:06

## 2021-01-01 RX ADMIN — HYDROCODONE BITARTRATE AND ACETAMINOPHEN 1 TABLET: 5; 325 TABLET ORAL at 06:06

## 2021-01-01 RX ADMIN — Medication 1 TABLET: at 09:06

## 2021-01-01 RX ADMIN — TAMSULOSIN HYDROCHLORIDE 0.4 MG: 0.4 CAPSULE ORAL at 09:05

## 2021-01-01 RX ADMIN — METOPROLOL SUCCINATE 12.5 MG: 25 TABLET, EXTENDED RELEASE ORAL at 10:06

## 2021-01-01 RX ADMIN — METOPROLOL SUCCINATE 12.5 MG: 25 TABLET, EXTENDED RELEASE ORAL at 09:06

## 2021-01-01 RX ADMIN — HYDROCODONE BITARTRATE AND ACETAMINOPHEN 1 TABLET: 5; 325 TABLET ORAL at 08:05

## 2021-01-01 RX ADMIN — PANTOPRAZOLE SODIUM 40 MG: 40 TABLET, DELAYED RELEASE ORAL at 10:05

## 2021-01-01 RX ADMIN — ESCITALOPRAM OXALATE 20 MG: 10 TABLET ORAL at 09:05

## 2021-01-01 RX ADMIN — METOPROLOL SUCCINATE 12.5 MG: 25 TABLET, EXTENDED RELEASE ORAL at 10:05

## 2021-01-01 RX ADMIN — ENOXAPARIN SODIUM 40 MG: 40 INJECTION SUBCUTANEOUS at 05:06

## 2021-01-01 RX ADMIN — GABAPENTIN 300 MG: 300 CAPSULE ORAL at 02:05

## 2021-01-01 RX ADMIN — ACETAMINOPHEN 650 MG: 325 TABLET ORAL at 09:06

## 2021-01-01 RX ADMIN — Medication 1 TABLET: at 09:05

## 2021-01-01 RX ADMIN — INSULIN ASPART 2 UNITS: 100 INJECTION, SOLUTION INTRAVENOUS; SUBCUTANEOUS at 04:06

## 2021-01-01 RX ADMIN — GABAPENTIN 300 MG: 300 CAPSULE ORAL at 10:05

## 2021-01-01 RX ADMIN — POTASSIUM CHLORIDE 40 MEQ: 1500 TABLET, EXTENDED RELEASE ORAL at 06:05

## 2021-01-01 RX ADMIN — INSULIN DETEMIR 15 UNITS: 100 INJECTION, SOLUTION SUBCUTANEOUS at 09:05

## 2021-01-01 RX ADMIN — INSULIN ASPART 4 UNITS: 100 INJECTION, SOLUTION INTRAVENOUS; SUBCUTANEOUS at 11:06

## 2021-01-01 RX ADMIN — AZELASTINE HYDROCHLORIDE 137 MCG: 137 SPRAY, METERED NASAL at 09:05

## 2021-01-01 RX ADMIN — IOHEXOL 75 ML: 350 INJECTION, SOLUTION INTRAVENOUS at 08:03

## 2021-01-01 RX ADMIN — PANTOPRAZOLE SODIUM 40 MG: 40 TABLET, DELAYED RELEASE ORAL at 08:06

## 2021-01-01 RX ADMIN — CEFTRIAXONE 1 G: 1 INJECTION, SOLUTION INTRAVENOUS at 11:05

## 2021-01-01 RX ADMIN — HYDROCODONE BITARTRATE AND ACETAMINOPHEN 1 TABLET: 5; 325 TABLET ORAL at 04:05

## 2021-01-01 RX ADMIN — HYDROCODONE BITARTRATE AND ACETAMINOPHEN 1 TABLET: 5; 325 TABLET ORAL at 09:06

## 2021-01-01 RX ADMIN — INSULIN ASPART 1 UNITS: 100 INJECTION, SOLUTION INTRAVENOUS; SUBCUTANEOUS at 08:06

## 2021-01-01 RX ADMIN — AMITRIPTYLINE HYDROCHLORIDE 50 MG: 25 TABLET, FILM COATED ORAL at 09:05

## 2021-01-01 RX ADMIN — GABAPENTIN 300 MG: 300 CAPSULE ORAL at 08:06

## 2021-01-01 RX ADMIN — HYDROCODONE BITARTRATE AND ACETAMINOPHEN 1 TABLET: 5; 325 TABLET ORAL at 01:05

## 2021-01-01 RX ADMIN — Medication 3 ML: at 09:06

## 2021-01-01 RX ADMIN — DEXTROSE 20 MILLION UNITS: 5 SOLUTION INTRAVENOUS at 12:06

## 2021-01-01 RX ADMIN — SODIUM CHLORIDE: 0.9 INJECTION, SOLUTION INTRAVENOUS at 02:05

## 2021-01-01 RX ADMIN — HYDROCODONE BITARTRATE AND ACETAMINOPHEN 1 TABLET: 5; 325 TABLET ORAL at 10:06

## 2021-01-01 RX ADMIN — GABAPENTIN 300 MG: 300 CAPSULE ORAL at 09:06

## 2021-01-01 RX ADMIN — VANCOMYCIN HYDROCHLORIDE 1250 MG: 1.25 INJECTION, POWDER, LYOPHILIZED, FOR SOLUTION INTRAVENOUS at 08:05

## 2021-01-01 RX ADMIN — ENOXAPARIN SODIUM 40 MG: 40 INJECTION SUBCUTANEOUS at 05:05

## 2021-01-01 RX ADMIN — IOHEXOL 15 ML: 300 INJECTION, SOLUTION INTRAVENOUS at 07:03

## 2021-01-01 RX ADMIN — Medication 3 ML: at 05:06

## 2021-01-01 RX ADMIN — MONTELUKAST 10 MG: 10 TABLET, FILM COATED ORAL at 09:05

## 2021-01-01 RX ADMIN — IPRATROPIUM BROMIDE AND ALBUTEROL SULFATE 3 ML: .5; 3 SOLUTION RESPIRATORY (INHALATION) at 08:05

## 2021-01-01 RX ADMIN — Medication 3 ML: at 02:06

## 2021-01-01 RX ADMIN — ENOXAPARIN SODIUM 40 MG: 40 INJECTION SUBCUTANEOUS at 04:06

## 2021-01-01 RX ADMIN — GABAPENTIN 300 MG: 300 CAPSULE ORAL at 10:06

## 2021-01-01 RX ADMIN — GABAPENTIN 300 MG: 300 CAPSULE ORAL at 04:06

## 2021-01-01 RX ADMIN — MONTELUKAST 10 MG: 10 TABLET, FILM COATED ORAL at 10:05

## 2021-01-01 RX ADMIN — DEXTROSE MONOHYDRATE 25 G: 25 INJECTION, SOLUTION INTRAVENOUS at 04:06

## 2021-01-01 RX ADMIN — GABAPENTIN 300 MG: 300 CAPSULE ORAL at 09:05

## 2021-01-01 RX ADMIN — SODIUM CHLORIDE: 0.9 INJECTION, SOLUTION INTRAVENOUS at 12:05

## 2021-01-01 RX ADMIN — GABAPENTIN 300 MG: 300 CAPSULE ORAL at 03:05

## 2021-01-01 RX ADMIN — METOPROLOL SUCCINATE 12.5 MG: 25 TABLET, EXTENDED RELEASE ORAL at 09:05

## 2021-01-01 RX ADMIN — PANTOPRAZOLE SODIUM 40 MG: 40 TABLET, DELAYED RELEASE ORAL at 09:05

## 2021-01-01 RX ADMIN — CLOSTRIDIUM TETANI TOXOID ANTIGEN (FORMALDEHYDE INACTIVATED), CORYNEBACTERIUM DIPHTHERIAE TOXOID ANTIGEN (FORMALDEHYDE INACTIVATED), BORDETELLA PERTUSSIS TOXOID ANTIGEN (GLUTARALDEHYDE INACTIVATED), BORDETELLA PERTUSSIS FILAMENTOUS HEMAGGLUTININ ANTIGEN (FORMALDEHYDE INACTIVATED), BORDETELLA PERTUSSIS PERTACTIN ANTIGEN, AND BORDETELLA PERTUSSIS FIMBRIAE 2/3 ANTIGEN 0.5 ML: 5; 2; 2.5; 5; 3; 5 INJECTION, SUSPENSION INTRAMUSCULAR at 12:05

## 2021-01-01 RX ADMIN — INSULIN ASPART 2 UNITS: 100 INJECTION, SOLUTION INTRAVENOUS; SUBCUTANEOUS at 08:06

## 2021-01-01 RX ADMIN — IPRATROPIUM BROMIDE AND ALBUTEROL SULFATE 3 ML: .5; 3 SOLUTION RESPIRATORY (INHALATION) at 07:05

## 2021-01-01 RX ADMIN — SODIUM CHLORIDE 2000 ML: 0.9 INJECTION, SOLUTION INTRAVENOUS at 04:11

## 2021-01-01 RX ADMIN — TAMSULOSIN HYDROCHLORIDE 0.4 MG: 0.4 CAPSULE ORAL at 10:05

## 2021-01-01 RX ADMIN — INSULIN ASPART 3 UNITS: 100 INJECTION, SOLUTION INTRAVENOUS; SUBCUTANEOUS at 12:06

## 2021-01-01 RX ADMIN — HYDROCODONE BITARTRATE AND ACETAMINOPHEN 1 TABLET: 5; 325 TABLET ORAL at 12:05

## 2021-01-01 RX ADMIN — GABAPENTIN 300 MG: 300 CAPSULE ORAL at 03:06

## 2021-01-01 RX ADMIN — MONTELUKAST 10 MG: 10 TABLET, FILM COATED ORAL at 09:06

## 2021-01-01 RX ADMIN — VANCOMYCIN HYDROCHLORIDE 1250 MG: 1.25 INJECTION, POWDER, LYOPHILIZED, FOR SOLUTION INTRAVENOUS at 05:11

## 2021-01-01 RX ADMIN — GABAPENTIN 300 MG: 300 CAPSULE ORAL at 08:05

## 2021-01-01 RX ADMIN — NICOTINE 1 PATCH: 21 PATCH, EXTENDED RELEASE TRANSDERMAL at 09:05

## 2021-01-01 RX ADMIN — AMITRIPTYLINE HYDROCHLORIDE 50 MG: 25 TABLET, FILM COATED ORAL at 08:06

## 2021-01-01 RX ADMIN — DEXTROSE MONOHYDRATE 25 G: 25 INJECTION, SOLUTION INTRAVENOUS at 04:05

## 2021-01-01 RX ADMIN — AZELASTINE HYDROCHLORIDE 137 MCG: 137 SPRAY, METERED NASAL at 08:06

## 2021-01-01 RX ADMIN — ESCITALOPRAM OXALATE 20 MG: 10 TABLET ORAL at 10:05

## 2021-01-01 RX ADMIN — Medication 3 ML: at 08:06

## 2021-01-01 RX ADMIN — DORNASE ALFA 5 MG: 1 SOLUTION RESPIRATORY (INHALATION) at 06:05

## 2021-01-01 RX ADMIN — INSULIN ASPART 2 UNITS: 100 INJECTION, SOLUTION INTRAVENOUS; SUBCUTANEOUS at 12:06

## 2021-01-01 RX ADMIN — INSULIN ASPART 3 UNITS: 100 INJECTION, SOLUTION INTRAVENOUS; SUBCUTANEOUS at 11:06

## 2021-01-01 RX ADMIN — PANTOPRAZOLE SODIUM 40 MG: 40 TABLET, DELAYED RELEASE ORAL at 09:06

## 2021-01-01 RX ADMIN — AMITRIPTYLINE HYDROCHLORIDE 50 MG: 25 TABLET, FILM COATED ORAL at 08:05

## 2021-01-01 RX ADMIN — Medication 1 TABLET: at 08:06

## 2021-01-01 RX ADMIN — ATORVASTATIN CALCIUM 10 MG: 10 TABLET, FILM COATED ORAL at 09:06

## 2021-01-01 RX ADMIN — ACETAMINOPHEN 650 MG: 325 TABLET ORAL at 04:05

## 2021-01-01 RX ADMIN — ESCITALOPRAM OXALATE 20 MG: 10 TABLET ORAL at 09:06

## 2021-01-01 RX ADMIN — IPRATROPIUM BROMIDE AND ALBUTEROL SULFATE 3 ML: .5; 3 SOLUTION RESPIRATORY (INHALATION) at 02:05

## 2021-01-01 RX ADMIN — Medication 3 ML: at 03:05

## 2021-01-01 RX ADMIN — AMITRIPTYLINE HYDROCHLORIDE 50 MG: 25 TABLET, FILM COATED ORAL at 09:06

## 2021-01-01 RX ADMIN — AZELASTINE HYDROCHLORIDE 137 MCG: 137 SPRAY, METERED NASAL at 08:05

## 2021-01-01 RX ADMIN — IOHEXOL 15 ML: 300 INJECTION, SOLUTION INTRAVENOUS at 08:05

## 2021-01-01 RX ADMIN — SODIUM CHLORIDE: 0.9 INJECTION, SOLUTION INTRAVENOUS at 09:05

## 2021-01-01 RX ADMIN — POLYETHYLENE GLYCOL 3350 17 G: 17 POWDER, FOR SOLUTION ORAL at 09:06

## 2021-01-01 RX ADMIN — ENOXAPARIN SODIUM 40 MG: 40 INJECTION SUBCUTANEOUS at 06:05

## 2021-01-01 RX ADMIN — DEXTROSE 20 MILLION UNITS: 5 SOLUTION INTRAVENOUS at 12:05

## 2021-01-01 RX ADMIN — ACETAMINOPHEN 650 MG: 325 TABLET ORAL at 03:05

## 2021-01-01 RX ADMIN — INSULIN ASPART 2 UNITS: 100 INJECTION, SOLUTION INTRAVENOUS; SUBCUTANEOUS at 11:06

## 2021-01-01 RX ADMIN — DEXTROSE 20 MILLION UNITS: 5 SOLUTION INTRAVENOUS at 03:06

## 2021-01-01 RX ADMIN — IPRATROPIUM BROMIDE AND ALBUTEROL SULFATE 3 ML: .5; 3 SOLUTION RESPIRATORY (INHALATION) at 01:05

## 2021-01-01 RX ADMIN — DEXTROSE 20 MILLION UNITS: 5 SOLUTION INTRAVENOUS at 05:05

## 2021-01-01 RX ADMIN — HYDROCODONE BITARTRATE AND ACETAMINOPHEN 1 TABLET: 5; 325 TABLET ORAL at 06:05

## 2021-01-01 RX ADMIN — Medication 3 ML: at 03:06

## 2021-01-01 RX ADMIN — INSULIN ASPART 2 UNITS: 100 INJECTION, SOLUTION INTRAVENOUS; SUBCUTANEOUS at 05:06

## 2021-01-01 RX ADMIN — VALSARTAN 40 MG: 40 TABLET, FILM COATED ORAL at 09:05

## 2021-01-01 RX ADMIN — HYDROCODONE BITARTRATE AND ACETAMINOPHEN 1 TABLET: 5; 325 TABLET ORAL at 02:05

## 2021-01-01 RX ADMIN — Medication 1 TABLET: at 10:05

## 2021-01-01 RX ADMIN — ATORVASTATIN CALCIUM 10 MG: 10 TABLET, FILM COATED ORAL at 08:06

## 2021-01-01 RX ADMIN — ENOXAPARIN SODIUM 40 MG: 40 INJECTION SUBCUTANEOUS at 06:06

## 2021-01-01 RX ADMIN — Medication 1 TABLET: at 10:06

## 2021-01-01 RX ADMIN — ATORVASTATIN CALCIUM 10 MG: 10 TABLET, FILM COATED ORAL at 09:05

## 2021-01-01 RX ADMIN — VANCOMYCIN HYDROCHLORIDE 1250 MG: 1.25 INJECTION, POWDER, LYOPHILIZED, FOR SOLUTION INTRAVENOUS at 09:05

## 2021-01-01 RX ADMIN — INSULIN ASPART 2 UNITS: 100 INJECTION, SOLUTION INTRAVENOUS; SUBCUTANEOUS at 09:06

## 2021-01-01 RX ADMIN — IOHEXOL 75 ML: 350 INJECTION, SOLUTION INTRAVENOUS at 08:05

## 2021-01-01 RX ADMIN — HYDROCODONE BITARTRATE AND ACETAMINOPHEN 1 TABLET: 5; 325 TABLET ORAL at 12:06

## 2021-01-01 RX ADMIN — ESCITALOPRAM OXALATE 20 MG: 10 TABLET ORAL at 08:06

## 2021-01-01 RX ADMIN — GABAPENTIN 300 MG: 300 CAPSULE ORAL at 02:06

## 2021-01-01 RX ADMIN — Medication 3 ML: at 09:05

## 2021-01-01 RX ADMIN — NICOTINE 1 PATCH: 21 PATCH, EXTENDED RELEASE TRANSDERMAL at 10:05

## 2021-01-01 RX ADMIN — GABAPENTIN 300 MG: 300 CAPSULE ORAL at 04:05

## 2021-01-01 RX ADMIN — PIPERACILLIN AND TAZOBACTAM 4.5 G: 4; .5 INJECTION, POWDER, LYOPHILIZED, FOR SOLUTION INTRAVENOUS; PARENTERAL at 04:11

## 2021-01-01 RX ADMIN — BACITRACIN, NEOMYCIN, POLYMYXIN B 1 EACH: 400; 3.5; 5 OINTMENT TOPICAL at 02:06

## 2021-01-01 RX ADMIN — SODIUM CHLORIDE: 0.9 INJECTION, SOLUTION INTRAVENOUS at 03:05

## 2021-01-01 RX ADMIN — INSULIN ASPART 3 UNITS: 100 INJECTION, SOLUTION INTRAVENOUS; SUBCUTANEOUS at 09:06

## 2021-01-01 RX ADMIN — Medication 6 MG: at 08:05

## 2021-01-01 RX ADMIN — ATORVASTATIN CALCIUM 10 MG: 10 TABLET, FILM COATED ORAL at 08:05

## 2021-01-01 RX ADMIN — ALTEPLASE 10 MG: 2.2 INJECTION, POWDER, LYOPHILIZED, FOR SOLUTION INTRAVENOUS at 02:05

## 2021-01-01 RX ADMIN — Medication 3 ML: at 05:05

## 2021-01-01 RX ADMIN — PANTOPRAZOLE SODIUM 40 MG: 40 TABLET, DELAYED RELEASE ORAL at 10:06

## 2021-01-01 RX ADMIN — INSULIN ASPART 1 UNITS: 100 INJECTION, SOLUTION INTRAVENOUS; SUBCUTANEOUS at 09:06

## 2021-01-01 RX ADMIN — MONTELUKAST 10 MG: 10 TABLET, FILM COATED ORAL at 08:05

## 2021-01-01 RX ADMIN — DORNASE ALFA 5 MG: 1 SOLUTION RESPIRATORY (INHALATION) at 11:05

## 2021-01-01 RX ADMIN — HYDROCODONE BITARTRATE AND ACETAMINOPHEN 1 TABLET: 5; 325 TABLET ORAL at 05:06

## 2021-01-01 RX ADMIN — Medication 3 ML: at 10:06

## 2021-01-01 RX ADMIN — POLYETHYLENE GLYCOL 3350 17 G: 17 POWDER, FOR SOLUTION ORAL at 08:06

## 2021-01-01 RX ADMIN — INSULIN ASPART 3 UNITS: 100 INJECTION, SOLUTION INTRAVENOUS; SUBCUTANEOUS at 06:06

## 2021-01-01 RX ADMIN — ACETAMINOPHEN 650 MG: 325 TABLET ORAL at 08:06

## 2021-01-01 RX ADMIN — MUPIROCIN: 20 OINTMENT TOPICAL at 09:05

## 2021-01-01 RX ADMIN — ESCITALOPRAM OXALATE 20 MG: 10 TABLET ORAL at 08:05

## 2021-01-01 RX ADMIN — HYDROCODONE BITARTRATE AND ACETAMINOPHEN 1 TABLET: 5; 325 TABLET ORAL at 03:05

## 2021-01-01 RX ADMIN — ALTEPLASE 10 MG: 2.2 INJECTION, POWDER, LYOPHILIZED, FOR SOLUTION INTRAVENOUS at 06:05

## 2021-01-01 RX ADMIN — PIPERACILLIN AND TAZOBACTAM 4.5 G: 4; .5 INJECTION, POWDER, LYOPHILIZED, FOR SOLUTION INTRAVENOUS; PARENTERAL at 09:05

## 2021-01-01 RX ADMIN — SODIUM CHLORIDE: 0.9 INJECTION, SOLUTION INTRAVENOUS at 05:05

## 2021-01-01 RX ADMIN — Medication 3 ML: at 11:06

## 2021-01-01 RX ADMIN — NICOTINE 1 PATCH: 21 PATCH, EXTENDED RELEASE TRANSDERMAL at 08:05

## 2021-01-01 RX ADMIN — PANTOPRAZOLE SODIUM 40 MG: 40 TABLET, DELAYED RELEASE ORAL at 08:05

## 2021-01-01 RX ADMIN — IOHEXOL 75 ML: 350 INJECTION, SOLUTION INTRAVENOUS at 06:11

## 2021-01-01 RX ADMIN — FENTANYL CITRATE 25 MCG: 50 INJECTION INTRAMUSCULAR; INTRAVENOUS at 10:05

## 2021-01-01 RX ADMIN — HYDROCODONE BITARTRATE AND ACETAMINOPHEN 1 TABLET: 5; 325 TABLET ORAL at 08:06

## 2021-01-01 RX ADMIN — AZELASTINE HYDROCHLORIDE 137 MCG: 137 SPRAY, METERED NASAL at 10:05

## 2021-01-01 RX ADMIN — BACITRACIN, NEOMYCIN, POLYMYXIN B 1 EACH: 400; 3.5; 5 OINTMENT TOPICAL at 12:05

## 2021-01-01 RX ADMIN — ACETAMINOPHEN 650 MG: 325 TABLET ORAL at 10:05

## 2021-01-01 RX ADMIN — ACETAMINOPHEN 650 MG: 325 TABLET ORAL at 11:05

## 2021-01-01 RX ADMIN — POTASSIUM CHLORIDE 40 MEQ: 1500 TABLET, FILM COATED, EXTENDED RELEASE ORAL at 11:05

## 2021-01-01 RX ADMIN — ENOXAPARIN SODIUM 40 MG: 40 INJECTION SUBCUTANEOUS at 04:05

## 2021-01-01 RX ADMIN — METOPROLOL SUCCINATE 12.5 MG: 25 TABLET, EXTENDED RELEASE ORAL at 08:06

## 2021-01-01 RX ADMIN — Medication 3 ML: at 02:05

## 2021-01-01 RX ADMIN — DEXTROSE MONOHYDRATE 25 G: 25 INJECTION, SOLUTION INTRAVENOUS at 06:05

## 2021-01-01 RX ADMIN — DEXTROSE AND SODIUM CHLORIDE: 5; .9 INJECTION, SOLUTION INTRAVENOUS at 04:05

## 2021-01-01 RX ADMIN — Medication 6 MG: at 08:06

## 2021-01-01 RX ADMIN — ONDANSETRON 4 MG: 4 TABLET, ORALLY DISINTEGRATING ORAL at 12:05

## 2021-01-01 RX ADMIN — VANCOMYCIN HYDROCHLORIDE 1250 MG: 1.25 INJECTION, POWDER, LYOPHILIZED, FOR SOLUTION INTRAVENOUS at 06:05

## 2021-01-01 RX ADMIN — HYDROCODONE BITARTRATE AND ACETAMINOPHEN 1 TABLET: 5; 325 TABLET ORAL at 05:05

## 2021-01-01 RX ADMIN — MUPIROCIN: 20 OINTMENT TOPICAL at 08:05

## 2021-01-01 RX ADMIN — INSULIN ASPART 5 UNITS: 100 INJECTION, SOLUTION INTRAVENOUS; SUBCUTANEOUS at 04:06

## 2021-01-01 RX ADMIN — MUPIROCIN: 20 OINTMENT TOPICAL at 10:05

## 2021-01-01 RX ADMIN — POLYETHYLENE GLYCOL 3350 17 G: 17 POWDER, FOR SOLUTION ORAL at 09:05

## 2021-01-01 RX ADMIN — HYDROCODONE BITARTRATE AND ACETAMINOPHEN 1 TABLET: 5; 325 TABLET ORAL at 09:05

## 2021-01-01 RX ADMIN — VALSARTAN 40 MG: 40 TABLET, FILM COATED ORAL at 08:05

## 2021-01-01 RX ADMIN — PIPERACILLIN AND TAZOBACTAM 4.5 G: 4; .5 INJECTION, POWDER, LYOPHILIZED, FOR SOLUTION INTRAVENOUS; PARENTERAL at 05:05

## 2021-01-01 RX ADMIN — IOHEXOL 75 ML: 350 INJECTION, SOLUTION INTRAVENOUS at 07:10

## 2021-01-01 RX ADMIN — TAMSULOSIN HYDROCHLORIDE 0.4 MG: 0.4 CAPSULE ORAL at 10:06

## 2021-01-01 RX ADMIN — HYDROCODONE BITARTRATE AND ACETAMINOPHEN 1 TABLET: 5; 325 TABLET ORAL at 04:06

## 2021-01-01 RX ADMIN — DEXTROSE 20 MILLION UNITS: 5 SOLUTION INTRAVENOUS at 06:05

## 2021-01-01 RX ADMIN — TAMSULOSIN HYDROCHLORIDE 0.4 MG: 0.4 CAPSULE ORAL at 08:05

## 2021-01-01 RX ADMIN — POLYETHYLENE GLYCOL 3350 17 G: 17 POWDER, FOR SOLUTION ORAL at 01:05

## 2021-01-01 RX ADMIN — IOHEXOL 75 ML: 350 INJECTION, SOLUTION INTRAVENOUS at 07:05

## 2021-01-01 RX ADMIN — CEFTRIAXONE 1 G: 1 INJECTION, SOLUTION INTRAVENOUS at 12:05

## 2021-01-01 RX ADMIN — MUPIROCIN: 20 OINTMENT TOPICAL at 12:05

## 2021-01-01 RX ADMIN — VALSARTAN 40 MG: 40 TABLET, FILM COATED ORAL at 10:05

## 2021-01-01 RX ADMIN — SODIUM CHLORIDE 1000 ML/HR: 0.9 INJECTION, SOLUTION INTRAVENOUS at 05:05

## 2021-01-01 RX ADMIN — HYDROCODONE BITARTRATE AND ACETAMINOPHEN 1 TABLET: 5; 325 TABLET ORAL at 01:06

## 2021-01-01 RX ADMIN — INSULIN ASPART 5 UNITS: 100 INJECTION, SOLUTION INTRAVENOUS; SUBCUTANEOUS at 12:06

## 2021-01-01 RX ADMIN — DEXTROSE AND SODIUM CHLORIDE: 5; .9 INJECTION, SOLUTION INTRAVENOUS at 06:05

## 2021-01-01 RX ADMIN — DEXTROSE 20 MILLION UNITS: 5 SOLUTION INTRAVENOUS at 02:05

## 2021-01-01 RX ADMIN — GABAPENTIN 300 MG: 300 CAPSULE ORAL at 05:06

## 2021-01-01 RX ADMIN — POTASSIUM CHLORIDE 40 MEQ: 1500 TABLET, EXTENDED RELEASE ORAL at 10:05

## 2021-01-01 RX ADMIN — DEXTROSE 20 MILLION UNITS: 5 SOLUTION INTRAVENOUS at 04:06

## 2021-01-01 RX ADMIN — MONTELUKAST 10 MG: 10 TABLET, FILM COATED ORAL at 08:06

## 2021-01-01 RX ADMIN — HYDROCODONE BITARTRATE AND ACETAMINOPHEN 1 TABLET: 5; 325 TABLET ORAL at 11:06

## 2021-01-01 RX ADMIN — HYDROCODONE BITARTRATE AND ACETAMINOPHEN 1 TABLET: 5; 325 TABLET ORAL at 03:06

## 2021-01-01 RX ADMIN — SODIUM CHLORIDE: 0.9 INJECTION, SOLUTION INTRAVENOUS at 07:05

## 2021-01-01 RX ADMIN — Medication 1 MCG/KG/MIN: at 05:11

## 2021-01-01 RX ADMIN — INSULIN ASPART 2 UNITS: 100 INJECTION, SOLUTION INTRAVENOUS; SUBCUTANEOUS at 09:05

## 2021-01-01 RX ADMIN — BARIUM SULFATE 300 ML: 0.6 SUSPENSION ORAL at 10:03

## 2021-01-01 RX ADMIN — IOHEXOL 80 ML: 350 INJECTION, SOLUTION INTRAVENOUS at 08:03

## 2021-01-01 RX ADMIN — MONTELUKAST 10 MG: 10 TABLET, FILM COATED ORAL at 10:06

## 2021-01-01 RX ADMIN — Medication 1 PATCH: at 03:05

## 2021-01-01 RX ADMIN — INSULIN ASPART 2 UNITS: 100 INJECTION, SOLUTION INTRAVENOUS; SUBCUTANEOUS at 06:06

## 2021-01-01 RX ADMIN — SODIUM CHLORIDE 500 ML: 0.9 INJECTION, SOLUTION INTRAVENOUS at 03:05

## 2021-01-01 RX ADMIN — DEXTROSE AND SODIUM CHLORIDE: 5; .9 INJECTION, SOLUTION INTRAVENOUS at 02:05

## 2021-01-01 RX ADMIN — HYDROCODONE BITARTRATE AND ACETAMINOPHEN 1 TABLET: 5; 325 TABLET ORAL at 11:05

## 2021-01-01 RX ADMIN — INSULIN ASPART 4 UNITS: 100 INJECTION, SOLUTION INTRAVENOUS; SUBCUTANEOUS at 12:06

## 2021-01-01 RX ADMIN — DEXTROSE 20 MILLION UNITS: 5 SOLUTION INTRAVENOUS at 01:05

## 2021-01-01 RX ADMIN — LIDOCAINE HYDROCHLORIDE 5 ML: 10 INJECTION, SOLUTION INFILTRATION; PERINEURAL at 02:06

## 2021-01-01 RX ADMIN — INSULIN DETEMIR 3 UNITS: 100 INJECTION, SOLUTION SUBCUTANEOUS at 09:06

## 2021-01-01 RX ADMIN — Medication 1 TABLET: at 08:05

## 2021-01-01 RX ADMIN — Medication 3 ML: at 01:06

## 2021-01-01 RX ADMIN — TUBERCULIN PURIFIED PROTEIN DERIVATIVE 5 UNITS: 5 INJECTION, SOLUTION INTRADERMAL at 04:06

## 2021-01-01 RX ADMIN — ESCITALOPRAM OXALATE 20 MG: 10 TABLET ORAL at 10:06

## 2021-01-01 RX ADMIN — NICOTINE 1 PATCH: 21 PATCH, EXTENDED RELEASE TRANSDERMAL at 11:05

## 2021-01-01 RX ADMIN — NICOTINE 1 PATCH: 21 PATCH, EXTENDED RELEASE TRANSDERMAL at 10:06

## 2021-01-01 RX ADMIN — PIPERACILLIN AND TAZOBACTAM 4.5 G: 4; .5 INJECTION, POWDER, LYOPHILIZED, FOR SOLUTION INTRAVENOUS; PARENTERAL at 01:05

## 2021-01-01 RX ADMIN — Medication 3 ML: at 06:06

## 2021-01-01 RX ADMIN — NICOTINE 1 PATCH: 21 PATCH, EXTENDED RELEASE TRANSDERMAL at 09:06

## 2021-01-12 PROBLEM — N20.0 RIGHT RENAL STONE: Status: ACTIVE | Noted: 2021-01-01

## 2021-03-31 PROBLEM — R13.10 DYSPHAGIA: Status: ACTIVE | Noted: 2021-01-01

## 2021-04-07 PROBLEM — I73.9 PERIPHERAL ARTERY DISEASE: Status: ACTIVE | Noted: 2021-01-01

## 2021-04-07 PROBLEM — J69.0 ASPIRATION PNEUMONITIS: Status: ACTIVE | Noted: 2021-01-01

## 2021-04-07 PROBLEM — E44.0 MODERATE PROTEIN-CALORIE MALNUTRITION: Status: ACTIVE | Noted: 2021-01-01

## 2021-05-20 PROBLEM — J86.9 EMPYEMA: Status: ACTIVE | Noted: 2021-01-01

## 2021-05-20 PROBLEM — J18.9 PNEUMONIA: Status: ACTIVE | Noted: 2021-01-01

## 2021-05-20 PROBLEM — J86.9 EMPYEMA OF LEFT PLEURAL SPACE: Status: ACTIVE | Noted: 2021-01-01

## 2021-05-23 PROBLEM — N31.9 NEUROGENIC BLADDER: Status: ACTIVE | Noted: 2021-01-01

## 2021-05-23 PROBLEM — R33.9 URINARY RETENTION: Status: ACTIVE | Noted: 2021-01-01

## 2021-06-10 PROBLEM — Z87.19 HISTORY OF PANCREATITIS: Status: ACTIVE | Noted: 2018-01-01

## 2021-06-10 PROBLEM — Z93.1 STATUS POST INSERTION OF PERCUTANEOUS ENDOSCOPIC GASTROSTOMY (PEG) TUBE: Status: ACTIVE | Noted: 2021-01-01

## 2021-07-16 PROBLEM — J18.9 PNEUMONIA DUE TO INFECTIOUS ORGANISM: Status: RESOLVED | Noted: 2021-01-01 | Resolved: 2021-01-01

## 2021-07-16 PROBLEM — R55 SYNCOPE AND COLLAPSE: Status: RESOLVED | Noted: 2018-11-05 | Resolved: 2021-01-01

## 2021-07-16 PROBLEM — J86.9 EMPYEMA OF LUNG: Status: RESOLVED | Noted: 2021-01-01 | Resolved: 2021-01-01

## 2021-10-18 PROBLEM — Z87.09 HISTORY OF PLEURAL EMPYEMA: Status: ACTIVE | Noted: 2021-01-01

## 2021-11-13 NOTE — Clinical Note
Is this patient a high probability for COVID-19?: No   Diagnosis: Pyuria [219892]   Admitting Provider:: NAYA MARTINEZ [[id number]]   Future Attending Provider: NAYA MARTINEZ [[id number]]   Reason for IP Medical Treatment  (Clinical interventions that can only be accomplished in the IP setting? ) :: Hypotension possible sepsis shortness of breath severe anemia   Estimated Length of Stay:: 5+ midnights   I certify that Inpatient services for greater than or equal to 2 midnights are medically necessary:: Yes   Plans for Post-Acute care--if anticipated (pick the single best option):: A. No post acute care anticipated at this time   Special Needs:: No Special Needs [1]

## 2021-11-13 NOTE — ED PROVIDER NOTES
"Encounter Date: 11/13/2021       History     Chief Complaint   Patient presents with    Hip Pain     The patient presents to the ED via [employer] em[facility] from home. Per ems, patient started c/o left hip and back pain last night. THey reports that patient had a BP of 80/50 and then received a NS bolus of 150 before it went up to 104/66. They also report that patient had a cbg of 40 before being given an amp of D50.      HPI   This 71-year-old white male presents to the emergency room complaining that I just feel bad .  The patient has pain in the left ankle in the left hip.  There is a history of a fall 2 weeks ago.  The patient has a history of a malabsorption syndrome.  Patient has no other complaints.  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    Cataract     Cigarette smoker     12/16/20:  1ppd    Colon polyp     Colon polyp     COPD (chronic obstructive pulmonary disease) with emphysema     Diabetes mellitus     Essential hypertension 12/16/2020    Gastric ulcer     GERD (gastroesophageal reflux disease)     Pancreatitis 2008    2 Times     Past Surgical History:   Procedure Laterality Date     thumb surgery      Dead Skin cell tumor Rt thumb    COLONOSCOPY N/A 4/11/2017    Procedure: COLONOSCOPY;  Surgeon: Meet Quesada MD;  Location: [facility];  Service: Endoscopy;  Laterality: N/A;    ESOPHAGOGASTRODUODENOSCOPY N/A 6/18/2018    Procedure: ESOPHAGOGASTRODUODENOSCOPY (EGD);  Surgeon: Marco Antonio Gonsalves MD;  " Location: [facility] ENDO;  Service: Endoscopy;  Laterality: N/A;    ESOPHAGOGASTRODUODENOSCOPY N/A 12/16/2020    Procedure: EGD (ESOPHAGOGASTRODUODENOSCOPY);  Surgeon: Meet Quesada MD;  Location: [facility];  Service: Endoscopy;  Laterality: N/A;    MINIMALLY INVASIVE TRANSFORAMINAL LUMBAR INTERBODY FUSION (TLIF) Left 1/10/2019    Procedure: FUSION, SPINE, LUMBAR, TLIF, MINIMALLY INVASIVE @L4/5 & L5/S1;  Surgeon: Josias Ling MD;  Location: [facility] OR [address] [place] [address];  Service: Neurosurgery;  Laterality: Left;    MOUTH FLOOR MASS EXCISION  2009    NASAL SEPTUM SURGERY      SKIN SURGERY       Family History   Problem Relation Age of Onset    Cataracts Mother     Leukemia Mother     Lung cancer Brother     Mental illness Father         suicide    No Known Problems Daughter     No Known Problems Sister     No Known Problems Son     No Known Problems Maternal Aunt     No Known Problems Maternal Uncle     No Known Problems Paternal Aunt     No Known Problems Paternal Uncle     No Known Problems Maternal Grandmother     No Known Problems Maternal Grandfather     No Known Problems Paternal Grandmother     No Known Problems Paternal Grandfather     Amblyopia Neg Hx     Blindness Neg Hx     Cancer Neg Hx     Diabetes Neg Hx     Glaucoma Neg Hx     Hypertension Neg Hx     Macular degeneration Neg Hx     Retinal detachment Neg Hx     Strabismus Neg Hx     Stroke Neg Hx     Thyroid disease Neg Hx     Colon cancer Neg Hx     Esophageal cancer Neg Hx     Cirrhosis Neg Hx      Social History     Tobacco Use    Smoking status: Current Every Day Smoker     Packs/day: 0.50     Years: 45.00     Pack years: 22.50     Types: Cigarettes    Smokeless tobacco: Never Used   Substance Use Topics    Alcohol use: Not Currently     Alcohol/week: 36.0 standard drinks     Types: 35 Shots of liquor, 1 Standard drinks or equivalent per week     Comment: pt states stopped 3-4m ago    Drug use: Yes     Types: Marijuana,  "Benzodiazepines, Hydrocodone     Comment: 12/16/20:  "sometimes, not everyday"     Review of Systems  The patient was questioned specifically with regard to the following.  General: Fever, chills, sweats. Neuro: Headache. Eyes: eye problems. ENT: Ear pain, sore throat. Cardiovascular: Chest pain. Respiratory: Cough, shortness of breath. Gastrointestinal: Abdominal pain, vomiting, diarrhea. Genitourinary: Painful urination.  Musculoskeletal: Arm and leg problems. Skin: Rash.  The review of systems was negative except for the following:  Feeling bad.  Left hip pain, left ankle pain  Physical Exam     Initial Vitals   BP Pulse Resp Temp SpO2   11/13/21 1520 -- 11/13/21 1517 11/13/21 1615 11/13/21 1517   (!) 80/40  20 (!) 91.1 °F (32.8 °C) (!) 84 %      MAP       --                Physical Exam  The patient was examined specifically for the following:   General:No significant distress, Good color, Warm and dry. Head and neck:Scalp atraumatic, Neck supple. Neurological:Appropriate conversation, Gross motor deficits. Eyes:Conjugate gaze, Clear corneas. ENT: No epistaxis. Cardiac: Regular rate and rhythm, Grossly normal heart tones. Pulmonary: Wheezing, Rales. Gastrointestinal: Abdominal tenderness, Abdominal distention. Musculoskeletal: Extremity deformity, Apparent pain with range of motion of the joints. Skin: Rash.   The findings on examination were normal except for the following:  Patient has tenderness and pain with range of motion of the left hip in the left ankle.  Left lower extremity is swollen.  The lungs are clear and free of wheezing rales rubs or rhonchi.  Heart tones are normal.  The patient has regular rate and rhythm.  The patient does have a left radial pulse.   ED Course   Critical Care    Date/Time: 11/13/2021 5:56 PM  Performed by: Moe Umana MD  Authorized by: Amelie Sy MD   Direct patient critical care time: 22 minutes  Additional history critical care time: 11 minutes  Ordering / " reviewing critical care time: 11 minutes  Documentation critical care time: 11 minutes  Consulting other physicians critical care time: 11 minutes  Total critical care time (exclusive of procedural time) : 66 minutes  Critical care time was exclusive of separately billable procedures and treating other patients and teaching time.  Critical care was necessary to treat or prevent imminent or life-threatening deterioration of the following conditions: circulatory failure, shock, sepsis and respiratory failure.  Critical care was time spent personally by me on the following activities: development of treatment plan with patient or surrogate, discussions with primary provider, evaluation of patient's response to treatment, examination of patient, obtaining history from patient or surrogate, ordering and performing treatments and interventions, ordering and review of laboratory studies, ordering and review of radiographic studies, pulse oximetry, re-evaluation of patient's condition and review of old charts.    Central Line    Date/Time: 11/13/2021 5:58 PM  Performed by: Moe Umana MD  Authorized by: Amelie Sy MD     Location procedure was performed:  [facility] EMERGENCY DEPARTMENT  Indications:  Med administration and vascular access  Anesthesia:  Local infiltration  Local anesthetic:  Lidocaine 1% without epinephrine  Preparation:  Skin prepped with ChloraPrep  Location:  Right internal jugular  Catheter size:  7 Fr  Ultrasound guidance: Yes    Vessel Caliber:  Large  Comprressibility:  Normal  Steril sheath on probe.    Manometry: No    Number of attempts:  1  Securement:  Line sutured, chlorhexidine patch, sterile dressing applied and blood return through all ports  Complications: No    Specimens: No    Implants: No    XRay:  Placement verified by x-ray and successful placementTermination Site: superior vena cava      Labs Reviewed   CBC W/ AUTO DIFFERENTIAL - Abnormal; Notable for the following components:        Result Value    WBC 2.28 (*)     RBC 2.10 (*)     Hemoglobin 6.5 (*)     Hematocrit 19.9 (*)     RDW 16.8 (*)     Platelets 129 (*)     Gran % 20.0 (*)     Platelet Estimate Decreased (*)     All other components within normal limits    Narrative:       HEMOGLOBIN AND HEMATOCRIT critical result(s) called and verbal   readback obtained from CANDY WYLIE by [facility] 11/13/2021 16:29   COMPREHENSIVE METABOLIC PANEL - Abnormal; Notable for the following components:    Sodium 127 (*)     CO2 15 (*)     Glucose 198 (*)     Creatinine 1.5 (*)     Calcium 6.8 (*)     Total Protein 3.0 (*)     Albumin 0.8 (*)     Alkaline Phosphatase 158 (*)     AST 52 (*)     eGFR if  54 (*)     eGFR if non  47 (*)     All other components within normal limits    Narrative:     Calcium   critical result(s) called and verbal readback obtained from   Candy Wylie. by [facility] 11/13/2021 16:18   LACTIC ACID, PLASMA - Abnormal; Notable for the following components:    Lactate (Lactic Acid) 11.8 (*)     All other components within normal limits    Narrative:      Lactic Acid  critical result(s) called and verbal readback obtained   from Candy Wylie. by [facility] 11/13/2021 16:18   URINALYSIS, REFLEX TO URINE CULTURE - Abnormal; Notable for the following components:    Leukocytes, UA 2+ (*)     All other components within normal limits    Narrative:     Specimen Source->Urine   MAGNESIUM - Abnormal; Notable for the following components:    Magnesium 1.2 (*)     All other components within normal limits   PROTIME-INR - Abnormal; Notable for the following components:    Prothrombin Time 30.3 (*)     INR 3.1 (*)     All other components within normal limits   PROCALCITONIN - Abnormal; Notable for the following components:    Procalcitonin 1.23 (*)     All other components within normal limits   B-TYPE NATRIURETIC PEPTIDE - Abnormal; Notable for the following components:    BNP 1,115 (*)     All other components within  normal limits   URINALYSIS MICROSCOPIC - Abnormal; Notable for the following components:    WBC, UA 36 (*)     Hyaline Casts, UA 4 (*)     All other components within normal limits    Narrative:     Specimen Source->Urine   ISTAT PROCEDURE - Abnormal; Notable for the following components:    POC PH 7.154 (*)     POC PO2 134 (*)     POC HCO3 13.0 (*)     POC TCO2 14 (*)     All other components within normal limits   CULTURE, BLOOD   CULTURE, BLOOD   CULTURE, URINE   CULTURE, RESPIRATORY   PHOSPHORUS   TROPONIN I   LACTIC ACID, PLASMA   SARS-COV-2 RDRP GENE    Narrative:     This test utilizes isothermal nucleic acid amplification   technology to detect the SARS-CoV-2 RdRp nucleic acid segment.   The analytical sensitivity (limit of detection) is 125 genome   equivalents/mL.   A POSITIVE result implies infection with the SARS-CoV-2 virus;   the patient is presumed to be contagious.     A NEGATIVE result means that SARS-CoV-2 nucleic acids are not   present above the limit of detection. A NEGATIVE result should be   treated as presumptive. It does not rule out the possibility of   COVID-19 and should not be the sole basis for treatment decisions.   If COVID-19 is strongly suspected based on clinical and exposure   history, re-testing using an alternate molecular assay should be   considered.   This test is only for use under the Food and Drug   Administration s Emergency Use Authorization (EUA).   Commercial kits are provided by [employer].   Performance characteristics of the EUA have been independently   verified by [facility] Department of   Pathology and Laboratory Medicine.   _________________________________________________________________   The authorized Fact Sheet for Healthcare Providers and the authorized Fact   Sheet for Patients of the ID NOW COVID-19 are available on the FDA   website:     https://www.fda.gov/media/048527/download  https://www.fda.gov/media/776126/download       TYPE  & SCREEN   PREPARE RBC SOFT     EKG Readings: (Independently Interpreted)   Patient has low voltage QRS complexes.  He is in a normal sinus rhythm.  There are nonspecific ST segment and T-wave changes.  There is no definite evidence of acute myocardial infarction or malignant arrhythmia.       Imaging Results          CTA Chest Abdomen Pelvis (In process)                X-Ray Chest 1 View (Final result)  Result time 11/13/21 17:43:33    Final result by Leonardo Cooper MD (11/13/21 17:43:33)                 Impression:      As above      Electronically signed by: Leonardo Cooper MD  Date:    11/13/2021  Time:    17:43             Narrative:    EXAMINATION:  XR CHEST 1 VIEW    CLINICAL HISTORY:  Encounter for adjustment and management of vascular access device    TECHNIQUE:  Single frontal view of the chest was performed.    COMPARISON:  11/13/2021    FINDINGS:  Right central venous catheter tip projects over the distal SVC.  There is patchy increased interstitial and parenchymal attenuation bilaterally, more conspicuous projected over the right lower lung zone as compared to the previous exam, likely accentuated by shallow inspiratory effort on current exam.  Continued follow-up is advised.  No pneumothorax or other significant detrimental change as compared to the previous exam.                               US Lower Extremity Veins Left (Final result)  Result time 11/13/21 17:17:56    Final result by Leonardo Cooper MD (11/13/21 17:17:56)                 Impression:      No evidence of deep venous thrombosis in the left lower extremity.    Left popliteal fossa cyst.    Left lower extremity subcutaneous edema.      Electronically signed by: Leonardo Cooper MD  Date:    11/13/2021  Time:    17:17             Narrative:    EXAMINATION:  US LOWER EXTREMITY VEINS LEFT    CLINICAL HISTORY:  Other specified soft tissue disorders    TECHNIQUE:  Duplex and color flow Doppler evaluation and graded compression of the  left lower extremity veins was performed.    COMPARISON:  09/21/2021    FINDINGS:  Duplex and color flow Doppler evaluation does not reveal any evidence of acute venous thrombosis in the common femoral, superficial femoral, greater saphenous, popliteal, peroneal, anterior tibial and posterior tibial veins of the left lower extremity.  There is no reflux to suggest valvular incompetence.  There is a hypoechoic collection within the left popliteal fossa measuring approximately 4.7 x 2.5 x 2.7 cm.  There is subcutaneous edema of the left lower extremity.                               X-Ray Chest AP Portable (Final result)  Result time 11/13/21 16:01:07    Final result by Albertina Valladares MD (11/13/21 16:01:07)                 Impression:      As above.      Electronically signed by: Albertina Valladares MD  Date:    11/13/2021  Time:    16:01             Narrative:    EXAMINATION:  XR CHEST AP PORTABLE    CLINICAL HISTORY:  Sepsis;    TECHNIQUE:  Single frontal view of the chest was performed.    COMPARISON:  10/18/2021    FINDINGS:  Patchy mixed opacities are seen in the perihilar regions bilaterally, more so on the left, with findings also seen in the right lung base.  In the appropriate clinical setting, findings are concerning for multilobar pneumonia.  Aspiration and edema would be included in the differential.  The heart is normal in size.  Calcified atheromatous disease affects the aorta.  Age-appropriate degenerative changes affect the skeleton.                               X-Ray Ankle Complete Left (Final result)  Result time 11/13/21 15:59:50    Final result by Albertina Valladares MD (11/13/21 15:59:50)                 Impression:      No evidence of fracture.No significant degenerative changes.      Electronically signed by: Albertina Valladares MD  Date:    11/13/2021  Time:    15:59             Narrative:    EXAMINATION:  XR ANKLE COMPLETE 3 VIEW LEFT    CLINICAL HISTORY:  Pain in left ankle and joints of left  foot    TECHNIQUE:  Three views of the left ankle    COMPARISON:  None.    FINDINGS:  The alignment is within normal limits.  No displaced fractures identified.  No evidence of lytic or blastic lesions.Joint spaces are unremarkable.Soft tissues are unremarkable.                               X-Ray Hip 2 or 3 views Left (with Pelvis when performed) (Final result)  Result time 11/13/21 15:59:17    Final result by Albertina Valladares MD (11/13/21 15:59:17)                 Impression:      As above.      Electronically signed by: Albertina Valladares MD  Date:    11/13/2021  Time:    15:59             Narrative:    EXAMINATION:  XR HIP WITH PELVIS WHEN PERFORMED, 2 OR 3 VIEWS LEFT    CLINICAL HISTORY:  Pain in left hip    TECHNIQUE:  AP view of the pelvis and frog leg lateral view of the left hip were performed.    COMPARISON:  07/07/2021    FINDINGS:  Lumbar fusion hardware is noted.  There are mild moderate degenerative changes affecting the bilateral sacroiliac joints, hip joints and pubic symphysis.  The femoral heads maintain a normal spherical contour.  There is no evidence for avascular necrosis.  No definite fracture or dislocation is seen.  Vascular calcifications are noted.                            Medical decision making:  Given the above this patient presents to the emergency room complaining of feeling bad and pain all over.  After exhaustive questioning the patient has pain in the left lower extremity mostly left ankle and left hip.  He also has pain in the left knee.  X-rays of left hip left ankle unremarkable.  Ultrasound left lower extremity fails to reveal significant abnormalities.  Left lower extremity is swollen.  There is soft tissue edema.  Good arterial flow stocking ended by more than 1 ultrasound during the stay in the emergency room.  The patient complains of shortness of breath.  He has history of COPD.  He still smokes.  Chest x-ray reveals a questionable left-sided infiltrate.  The patient has  had a good ejection fraction on prior echocardiograms.  He was treated with multiple rounds of fluid.  He required treatment with norepinephrine through a central line.  A right internal jugular central line was placed using sterile technique.  The patient is on 100% non-rebreather.  His oxygen saturation is 145.  The CO2 is 42.  PH is 7 0.14.  The patient has an elevated lactic acid.  The procalcitonin is elevated.  He has a rectal temperature of 91°.  Warming blanket is on.  The patient was treated with antibiotics.  Consultation was obtained with .  We decided to scan the chest abdomen and pelvis because of this patient's precipitous drop in hemoglobin and hematocrit.  Patient has a very low albumin.  Rectal examination fails to reveal rectal bleeding.  The patient has pyuria but no bacteriuria.  The patient does not have abdominal pain the abdomen is soft.  Patient has an elevated INR possibly for malabsorption of vitamin K.      This is doctor Dong dictating on 2021 at 6:15 a.m. in the morning.  CT of the abdomen and pelvis was not revealing as a source for blood loss.  I soon the to profound anemia was from the patient has malabsorption syndrome.  I learned the patient had refused PEG tube per my conversation with Dr. Álvarez.  In morning report today I learned that the patient became bradycardic and arrested during his blood transfusion.  The family declined CPR and further resuscitation efforts.  The patient  at 7:04 p.m. and was pronounced dead by Dr. Boyce.      Medications   0.9%  NaCl infusion (for blood administration) (has no administration in time range)   vancomycin 1.25 g in dextrose 5% 250 mL IVPB (ready to mix) (1,250 mg Intravenous New Bag 21 8365)   NORepinephrine 4 mg in dextrose 5% 250 mL infusion (premix) (titrating) (1 mcg/kg/min × 56.7 kg Intravenous New Bag 21 5284)   piperacillin-tazobactam 4.5 g in dextrose 5 % 100 mL IVPB (ready to mix  system) (has no administration in time range)   vancomycin - pharmacy to dose (has no administration in time range)   mupirocin 2 % ointment (has no administration in time range)   vancomycin 750 mg in dextrose 5 % 250 mL IVPB (ready to mix system) (750 mg Intravenous Trough Due As Scheduled Before Dose 11/15/21 1600)   sodium chloride 0.9% bolus 2,000 mL (2,000 mLs Intravenous New Bag 21 1603)   piperacillin-tazobactam 4.5 g in dextrose 5 % 100 mL IVPB (ready to mix system) (0 g Intravenous Stopped 21 1748)                 ED Course as of 21 0615   Sat 2021   191 Patient began to have bradycardia and periods of apnea.  Patient was on pressors and receiving blood.  No improvement with increasing oxygen.  Wife did not wish to have chest compressions performed.  Patient  at 1904. [MH]      ED Course User Index  [MH] Sherrill Boyce MD             Clinical Impression:   Final diagnoses:  [A41.9] Sepsis  [M25.572] Left ankle pain  [M79.89] Left leg swelling  [M25.552] Left hip pain  [Z45.2] Encounter for central line placement  [R82.81] Pyuria  [R91.8] Left pulmonary infiltrate on CXR  [A41.9, R65.21] Septic shock (Primary)  [T68.XXXA] Hypothermia, initial encounter  [M71.22] Baker's cyst of knee, left  [E87.2] Metabolic acidosis  [E87.1] Hyponatremia          ED Disposition Condition    Admit               Moe Umana MD  21 1800       Moe Umana MD  21 1800       Moe Umana MD  21 1812       Moe Umana MD  21 0622

## 2021-11-13 NOTE — PROGRESS NOTES
Pharmacokinetic Initial Assessment: IV Vancomycin    Assessment/Plan:    Initiate intravenous vancomycin with loading dose of 1,250 mg once followed by a maintenance dose of vancomycin 750mg IV every 24 hours  Desired empiric serum trough concentration is 10 to 20 mcg/mL  Draw vancomycin trough level 60 min prior to third dose on 11/15/21 at approximately 1600  Pharmacy will continue to follow and monitor vancomycin.      Please contact pharmacy at extension [phone] with any questions regarding this assessment.     Thank you for the consult,   Jensen Barnard       Patient brief summary:  Moe Ren is a 71 y.o. male initiated on antimicrobial therapy with IV Vancomycin for treatment of suspected urinary tract infection    Drug Allergies:   Review of patient's allergies indicates:   Allergen Reactions    Adhesive Rash     Can only use paper tape   Has problem with Band aid    Metformin Diarrhea     Appetite loss        Actual Body Weight:   56.7kg    Renal Function:   Estimated Creatinine Clearance: 37.3 mL/min (A) (based on SCr of 1.5 mg/dL (H)).,     Dialysis Method (if applicable):  N/A    CBC (last 72 hours):  Recent Labs   Lab Result Units 11/13/21  1533   WBC K/uL 2.28*   Hemoglobin g/dL 6.5*   Hematocrit % 19.9*   Platelets K/uL 129*   Gran % % 20.0*   Lymph % % 31.0   Mono % % 11.0   Eosinophil % % 3.0   Basophil % % 0.0   Differential Method  Manual       Metabolic Panel (last 72 hours):  Recent Labs   Lab Result Units 11/13/21  1533 11/13/21  1608   Sodium mmol/L 127*  --    Potassium mmol/L 4.3  --    Chloride mmol/L 97  --    CO2 mmol/L 15*  --    Glucose mg/dL 198*  --    Glucose, UA   --  Negative   BUN mg/dL 13  --    Creatinine mg/dL 1.5*  --    Albumin g/dL 0.8*  --    Total Bilirubin mg/dL 0.6  --    Alkaline Phosphatase U/L 158*  --    AST U/L 52*  --    ALT U/L 19  --    Magnesium mg/dL 1.2*  --    Phosphorus mg/dL 4.0  --        Drug levels (last 3 results):  No results for input(s):  VANCOMYCINRA, VANCOMYCINPE, VANCOMYCINTR in the last 72 hours.    Microbiologic Results:  Microbiology Results (last 7 days)       Procedure Component Value Units Date/Time    Culture, Respiratory with Gram Stain [[id number]]     Order Status: No result Specimen: Respiratory     Urine culture [[id number]] Collected: 11/13/21 1608    Order Status: No result Specimen: Urine Updated: 11/13/21 1625    Blood culture x two cultures. Draw prior to antibiotics. [[id number]] Collected: 11/13/21 1534    Order Status: Sent Specimen: Blood from Peripheral, Hand, Left Updated: 11/13/21 1617    Blood culture x two cultures. Draw prior to antibiotics. [[id number]] Collected: 11/13/21 1602    Order Status: Sent Specimen: Blood from Peripheral, Hand, Right Updated: 11/13/21 1602

## 2021-11-14 NOTE — SUBJECTIVE & OBJECTIVE
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    Cataract     Cigarette smoker     12/16/20:  1ppd    Colon polyp     Colon polyp     COPD (chronic obstructive pulmonary disease) with emphysema     Diabetes mellitus     Essential hypertension 12/16/2020    Gastric ulcer     GERD (gastroesophageal reflux disease)     Pancreatitis 2008    2 Times       Past Surgical History:   Procedure Laterality Date     thumb surgery      Dead Skin cell tumor Rt thumb    COLONOSCOPY N/A 4/11/2017    Procedure: COLONOSCOPY;  Surgeon: Meet Quesada MD;  Location: [facility] ENDO;  Service: Endoscopy;  Laterality: N/A;    ESOPHAGOGASTRODUODENOSCOPY N/A 6/18/2018    Procedure: ESOPHAGOGASTRODUODENOSCOPY (EGD);  Surgeon: Marco Antonio Gonsalves MD;  Location: [facility];  Service: Endoscopy;  Laterality: N/A;    ESOPHAGOGASTRODUODENOSCOPY N/A 12/16/2020    Procedure: EGD (ESOPHAGOGASTRODUODENOSCOPY);  Surgeon: Meet Quesada MD;  Location: [facility];  Service: Endoscopy;  Laterality: N/A;    MINIMALLY INVASIVE TRANSFORAMINAL LUMBAR INTERBODY FUSION (TLIF) Left 1/10/2019    Procedure: FUSION, SPINE, LUMBAR, TLIF, MINIMALLY INVASIVE @L4/5 & L5/S1;  Surgeon: Josias Ling MD;  Location: [address];  Service: Neurosurgery;  Laterality: Left;    MOUTH FLOOR MASS EXCISION  2009    NASAL SEPTUM SURGERY      SKIN SURGERY         Review of patient's allergies indicates:   Allergen Reactions    Adhesive Rash     Can only use paper tape   Has problem with Band aid    Metformin Diarrhea     Appetite loss        No current facility-administered medications on file prior to encounter.     Current Outpatient Medications on File Prior " to Encounter   Medication Sig    ACCU-CHEK GUIDE ME GLUCOSE MTR Misc USE 1 TO CHECK GLUCOSE SIX TIMES DAILY    albuterol (PROVENTIL/VENTOLIN HFA) 90 mcg/actuation inhaler Inhale 2 puffs into the lungs every 6 (six) hours as needed for Wheezing.    amitriptyline (ELAVIL) 25 MG tablet Take 2 tablets (50 mg total) by mouth every evening. Increased dose    atorvastatin (LIPITOR) 10 MG tablet Take 1 tablet by mouth once daily    azelastine (ASTELIN) 137 mcg (0.1 %) nasal spray 1 spray (137 mcg total) by Nasal route 2 (two) times daily.    baclofen (LIORESAL) 10 MG tablet Take 1 tablet (10 mg total) by mouth 2 (two) times daily.    baclofen (LIORESAL) 10 MG tablet 1 tablet EVERY 12 HOURS (route: oral)    blood sugar diagnostic Strp Check blood glucose 6x/day for accuchek guide meter. E11.65    blood-glucose meter kit Use as instructed, Accu-Chek Amanda Plus glucometer    blood-glucose meter,continuous (DEXCOM G6 ) Misc Use with dexcom G6 system    blood-glucose sensor (DEXCOM G6 SENSOR) Vivi Change sensor every 10 days    blood-glucose transmitter (DEXCOM G6 TRANSMITTER) Vivi Change every 3 months    clotrimazole-betamethasone 1-0.05% (LOTRISONE) cream Apply topically 2 (two) times daily.    diphenoxylate-atropine 2.5-0.025 mg (LOMOTIL) 2.5-0.025 mg per tablet Take 1 tablet by mouth every 6 (six) hours as needed (diarrhea).    dutasteride-tamsulosin 0.5-0.4 mg CM24 1 capsule BEDTIME (route: oral)    ergocalciferol, vitamin D2, 10 mcg (400 unit) Tab 1 tablet WEEKLY (route: oral)    escitalopram oxalate (LEXAPRO) 20 MG tablet Take 1 tablet (20 mg total) by mouth once daily.    EScitalopram oxalate (LEXAPRO) 20 MG tablet 1 tablet EVERY AM (route: oral)    fluticasone (FLONASE) 50 mcg/actuation nasal spray 2 sprays (100 mcg total) by Each Nare route daily as needed.    furosemide (LASIX) 20 MG tablet Take 1 tablet (20 mg total) by mouth once daily.    gabapentin (NEURONTIN) 300 MG capsule Take 1  "capsule (300 mg total) by mouth 3 (three) times daily.    insulin aspart, niacinamide, (FIASP FLEXTOUCH U-100 INSULIN) 100 unit/mL (3 mL) InPn Inject 4-8 units three times daily before meals with ss. Max daily dose 36 units per day    lancets (ACCU-CHEK FASTCLIX LANCING DEV) Misc 1 lancet by Misc.(Non-Drug; Combo Route) route 6 (six) times daily. To be used with Accu-Chek Amanda Plus glucometer    multivitamin (THERAGRAN) per tablet Take 2 tablets by mouth once daily.    multivitamin with minerals tablet 1 tablet EVERY AM (route: oral)    nicotine (NICODERM CQ) 21 mg/24 hr Place 1 patch onto the skin once daily.    nystatin (MYCOSTATIN) 100,000 unit/mL suspension SWISH & SWALLOW 4 ML BY MOUTH  4 TIMES DAILY FOR 10 DAYS    omeprazole (PRILOSEC) 40 MG capsule TAKE 1 CAPSULE BY MOUTH TWICE DAILY BEFORE MEAL(S)    omeprazole (PRILOSEC) 40 MG capsule 1 capsule EVERY AM (route: oral)    pen needle, diabetic (BD ULTRA-FINE RENETTA PEN NEEDLE) 32 gauge x 5/32" Ndle 1 Device by Misc.(Non-Drug; Combo Route) route 3 (three) times daily.    tamsulosin (FLOMAX) 0.4 mg Cap Take 1 capsule (0.4 mg total) by mouth once daily.    thiamine 100 MG tablet Take 1 tablet (100 mg total) by mouth once daily.    tiotropium (SPIRIVA) 18 mcg inhalation capsule Inhale 1 capsule (18 mcg total) into the lungs once daily. Controller    tiotropium bromide (SPIRIVA RESPIMAT) 1.25 mcg/actuation inhaler 1 inhalation EVERY AM (route: inhalation)    triamcinolone acetonide 0.1% (KENALOG) 0.1 % cream APPLY CREAM EXTERNALLY TWICE DAILY     Family History     Problem Relation (Age of Onset)    Cataracts Mother    Leukemia Mother    Lung cancer Brother    Mental illness Father    No Known Problems Daughter, Sister, Son, Maternal Aunt, Maternal Uncle, Paternal Aunt, Paternal Uncle, Maternal Grandmother, Maternal Grandfather, Paternal Grandmother, Paternal Grandfather        Tobacco Use    Smoking status: Current Every Day Smoker     Packs/day: " "0.50     Years: 45.00     Pack years: 22.50     Types: Cigarettes    Smokeless tobacco: Never Used   Substance and Sexual Activity    Alcohol use: Not Currently     Alcohol/week: 36.0 standard drinks     Types: 35 Shots of liquor, 1 Standard drinks or equivalent per week     Comment: pt states stopped 3-4m ago    Drug use: Yes     Types: Marijuana, Benzodiazepines, Hydrocodone     Comment: 12/16/20:  "sometimes, not everyday"    Sexual activity: Yes     Partners: Female     Review of Systems   Constitutional: Negative for chills, fatigue and fever.   HENT: Negative for congestion.    Respiratory: Negative for cough and shortness of breath.    Cardiovascular: Negative for chest pain.   Gastrointestinal: Negative for abdominal pain, constipation, diarrhea, nausea and vomiting.   Neurological: Negative for dizziness and weakness.   Psychiatric/Behavioral: Negative for confusion. The patient is not nervous/anxious.      Objective:     Vital Signs (Most Recent):  Temp: (!) 91.1 °F (32.8 °C) (11/13/21 1615)  Resp: 20 (11/13/21 1517)  BP: (!) 80/40 (11/13/21 1520)  SpO2: (!) 84 % (room air) (11/13/21 1517) Vital Signs (24h Range):  Temp:  [91.1 °F (32.8 °C)] 91.1 °F (32.8 °C)  Resp:  [20] 20  SpO2:  [84 %] 84 %  BP: (80)/(40) 80/40     Weight: 56.7 kg (125 lb)  Body mass index is 16.95 kg/m².    Physical Exam  Vitals and nursing note reviewed.   Constitutional:       Appearance: He is well-developed and well-nourished.   HENT:      Head: Normocephalic and atraumatic.      Nose: Nose normal.      Mouth/Throat:      Mouth: Oropharynx is clear and moist.      Pharynx: No oropharyngeal exudate.   Eyes:      General: No scleral icterus.        Right eye: No discharge.         Left eye: No discharge.      Extraocular Movements: EOM normal.      Conjunctiva/sclera: Conjunctivae normal.      Pupils: Pupils are equal, round, and reactive to light.   Neck:      Thyroid: No thyromegaly.      Vascular: No JVD.      Trachea: No " tracheal deviation.   Cardiovascular:      Rate and Rhythm: Normal rate and regular rhythm.      Pulses: Intact distal pulses.      Heart sounds: Normal heart sounds. No murmur heard.  No friction rub. No gallop.    Pulmonary:      Effort: Pulmonary effort is normal. No respiratory distress.      Breath sounds: Normal breath sounds. No stridor. No wheezing or rales.   Chest:      Chest wall: No tenderness.   Abdominal:      General: Bowel sounds are normal. There is no distension.      Palpations: Abdomen is soft. There is no mass.      Tenderness: There is no abdominal tenderness. There is no guarding or rebound.   Musculoskeletal:         General: No tenderness or edema. Normal range of motion.      Cervical back: Normal range of motion and neck supple.   Lymphadenopathy:      Cervical: No cervical adenopathy.   Skin:     General: Skin is warm and dry.      Coloration: Skin is not pale.      Findings: No erythema or rash.   Neurological:      Mental Status: He is alert and oriented to person, place, and time.      Cranial Nerves: No cranial nerve deficit.      Motor: No abnormal muscle tone.      Coordination: Coordination normal.      Deep Tendon Reflexes: Reflexes are normal and symmetric. Reflexes normal.   Psychiatric:         Mood and Affect: Mood and affect normal.         Behavior: Behavior normal.         Thought Content: Thought content normal.         Judgment: Judgment normal.           CRANIAL NERVES     CN III, IV, VI   Pupils are equal, round, and reactive to light.  Extraocular motions are normal.        Significant Labs:   Recent Results (from the past 24 hour(s))   CBC auto differential    Collection Time: 11/13/21  3:33 PM   Result Value Ref Range    WBC 2.28 (L) 3.90 - 12.70 K/uL    RBC 2.10 (L) 4.60 - 6.20 M/uL    Hemoglobin 6.5 (L) 14.0 - 18.0 g/dL    Hematocrit 19.9 (LL) 40.0 - 54.0 %    MCV 95 82 - 98 fL    MCH 31.0 27.0 - 31.0 pg    MCHC 32.7 32.0 - 36.0 g/dL    RDW 16.8 (H) 11.5 - 14.5 %     Platelets 129 (L) 150 - 450 K/uL    MPV 10.8 9.2 - 12.9 fL    Immature Granulocytes CANCELED 0.0 - 0.5 %    Immature Grans (Abs) CANCELED 0.00 - 0.04 K/uL    nRBC 0 0 /100 WBC    Gran % 20.0 (L) 38.0 - 73.0 %    Lymph % 31.0 18.0 - 48.0 %    Mono % 11.0 4.0 - 15.0 %    Eosinophil % 3.0 0.0 - 8.0 %    Basophil % 0.0 0.0 - 1.9 %    Bands 19.0 %    Metamyelocytes 8.0 %    Myelocytes 8.0 %    Platelet Estimate Decreased (A)     Aniso Slight     Poik Slight     Hypo Occasional     Target Cells Occasional     Toxic Granulation Present     Differential Method Manual    Comprehensive metabolic panel    Collection Time: 11/13/21  3:33 PM   Result Value Ref Range    Sodium 127 (L) 136 - 145 mmol/L    Potassium 4.3 3.5 - 5.1 mmol/L    Chloride 97 95 - 110 mmol/L    CO2 15 (L) 23 - 29 mmol/L    Glucose 198 (H) 70 - 110 mg/dL    BUN 13 8 - 23 mg/dL    Creatinine 1.5 (H) 0.5 - 1.4 mg/dL    Calcium 6.8 (LL) 8.7 - 10.5 mg/dL    Total Protein 3.0 (L) 6.0 - 8.4 g/dL    Albumin 0.8 (L) 3.5 - 5.2 g/dL    Total Bilirubin 0.6 0.1 - 1.0 mg/dL    Alkaline Phosphatase 158 (H) 55 - 135 U/L    AST 52 (H) 10 - 40 U/L    ALT 19 10 - 44 U/L    Anion Gap 15 8 - 16 mmol/L    eGFR if African American 54 (A) >60 mL/min/1.73 m^2    eGFR if non African American 47 (A) >60 mL/min/1.73 m^2   Lactic acid, plasma #1    Collection Time: 11/13/21  3:33 PM   Result Value Ref Range    Lactate (Lactic Acid) 11.8 (HH) 0.5 - 2.2 mmol/L   Magnesium    Collection Time: 11/13/21  3:33 PM   Result Value Ref Range    Magnesium 1.2 (L) 1.6 - 2.6 mg/dL   Phosphorus    Collection Time: 11/13/21  3:33 PM   Result Value Ref Range    Phosphorus 4.0 2.7 - 4.5 mg/dL   Protime-INR    Collection Time: 11/13/21  3:33 PM   Result Value Ref Range    Prothrombin Time 30.3 (H) 9.0 - 12.5 sec    INR 3.1 (H) 0.8 - 1.2   Procalcitonin    Collection Time: 11/13/21  3:33 PM   Result Value Ref Range    Procalcitonin 1.23 (H) <0.25 ng/mL   BNP    Collection Time: 11/13/21  3:33 PM    Result Value Ref Range    BNP 1,115 (H) 0 - 99 pg/mL   Troponin I    Collection Time: 11/13/21  3:33 PM   Result Value Ref Range    Troponin I 0.014 0.000 - 0.026 ng/mL   Urinalysis, Reflex to Urine Culture Urine, Clean Catch    Collection Time: 11/13/21  4:08 PM    Specimen: Urine   Result Value Ref Range    Specimen UA Urine, Catheterized     Color, UA Yellow Yellow, Straw, Chrissie    Appearance, UA Clear Clear    pH, UA 6.0 5.0 - 8.0    Specific Gravity, UA 1.010 1.005 - 1.030    Protein, UA Negative Negative    Glucose, UA Negative Negative    Ketones, UA Negative Negative    Bilirubin (UA) Negative Negative    Occult Blood UA Negative Negative    Nitrite, UA Negative Negative    Urobilinogen, UA Negative <2.0 EU/dL    Leukocytes, UA 2+ (A) Negative   Urinalysis Microscopic    Collection Time: 11/13/21  4:08 PM   Result Value Ref Range    WBC, UA 36 (H) 0 - 5 /hpf    Bacteria Rare None-Occ /hpf    Hyaline Casts, UA 4 (A) 0-1/lpf /lpf    Microscopic Comment SEE COMMENT    POCT COVID-19 Rapid Screening    Collection Time: 11/13/21  4:45 PM   Result Value Ref Range    POC Rapid COVID Negative Negative     Acceptable Yes    Type & Screen    Collection Time: 11/13/21  4:51 PM   Result Value Ref Range    Group & Rh A POS     Indirect Carmen NEG    Prepare RBC 2 Units; Pending transfusion    Collection Time: 11/13/21  4:51 PM   Result Value Ref Range    UNIT NUMBER [id number]     Product Code [id number]     DISPENSE STATUS CROSSMATCHED     CODING SYSTEM HSQM025     Unit Blood Type Code 6200     Unit Blood Type A POS     Unit Expiration [id number]     UNIT NUMBER [id number]     Product Code K3309I19     DISPENSE STATUS CROSSMATCHED     CODING SYSTEM WZHN011     Unit Blood Type Code 6200     Unit Blood Type A POS     Unit Expiration [id number]    ISTAT PROCEDURE    Collection Time: 11/13/21  5:39 PM   Result Value Ref Range    POC PH 7.154 (LL) 7.35 - 7.45    POC PCO2 37.0 35 - 45 mmHg    POC PO2  134 (H) 80 - 100 mmHg    POC HCO3 13.0 (L) 24 - 28 mmol/L    POC BE -15 -2 to 2 mmol/L    POC SATURATED O2 98 95 - 100 %    POC TCO2 14 (L) 23 - 27 mmol/L    Sample ARTERIAL     Site LR     Allens Test Pass     DelSys NRB        Significant Imaging:   XR ANKLE COMPLETE 3 VIEW LEFT  FINDINGS:  The alignment is within normal limits.  No displaced fractures identified.  No evidence of lytic or blastic lesions.Joint spaces are unremarkable.Soft tissues are unremarkable.     Impression:   No evidence of fracture.No significant degenerative changes.      Electronically signed by: Albertina Valladares MD  Date:                                            11/13/2021  Time:                                           15:59    XR CHEST 1 VIEW  FINDINGS:  Right central venous catheter tip projects over the distal SVC.    There is patchy increased interstitial and parenchymal attenuation bilaterally, more conspicuous projected over the right lower lung zone as compared to the previous exam, likely accentuated by shallow inspiratory effort on current exam.    Continued follow-up is advised.  No pneumothorax or other significant detrimental change as compared to the previous exam.      Electronically signed by: Leonardo Cooper MD  Date:                                            11/13/2021  Time:                                           17:43    XR CHEST AP PORTABLE  FINDINGS:  Patchy mixed opacities are seen in the perihilar regions bilaterally, more so on the left, with findings also seen in the right lung base.    In the appropriate clinical setting, findings are concerning for multilobar pneumonia.  Aspiration and edema would be included in the differential.    The heart is normal in size.  Calcified atheromatous disease affects the aorta.  Age-appropriate degenerative changes affect the skeleton.      Electronically signed by: Albertina Valladares MD  Date:                                            11/13/2021  Time:                                            16:01    XR HIP WITH PELVIS WHEN PERFORMED, 2 OR 3 VIEWS LEFT  FINDINGS:  Lumbar fusion hardware is noted.  There are mild moderate degenerative changes affecting the bilateral sacroiliac joints, hip joints and pubic symphysis.    The femoral heads maintain a normal spherical contour.  There is no evidence for avascular necrosis.  No definite fracture or dislocation is seen.    Vascular calcifications are noted.      Electronically signed by: Albertina Valladares MD  Date:                                            11/13/2021  Time:                                           15:59    US LOWER EXTREMITY VEINS LEFT  FINDINGS:  Duplex and color flow Doppler evaluation does not reveal any evidence of acute venous thrombosis in the common femoral, superficial femoral, greater saphenous, popliteal, peroneal, anterior tibial and posterior tibial veins of the left lower extremity.  There is no reflux to suggest valvular incompetence.  There is a hypoechoic collection within the left popliteal fossa measuring approximately 4.7 x 2.5 x 2.7 cm.  There is subcutaneous edema of the left lower extremity.     Impression:   No evidence of deep venous thrombosis in the left lower extremity.   Left popliteal fossa cyst.   Left lower extremity subcutaneous edema.      Electronically signed by: Leonardo Cooper MD  Date:                                            11/13/2021  Time:                                           17:17    13-NOV-2021 16:07:37 EKG    Normal sinus rhythm  Vent. rate 87 BPM  CT interval 168 ms  QRS duration 62 ms  QT/QTc 386/464 ms  P-R-T axes 64 85 89  Low voltage QRS  Nonspecific ST and T wave abnormality  Abnormal ECG  When compared with ECG of 11-JAN-2019 15:38,  Significant changes have occurred    10/15/21 JEAN-PAUL:  1. Right lower extremity pressures and waveforms indicate mild arterial occlusive disease.  2. Left lower extremity pressures and waveforms indicate no hemodynamically significant  arterial occlusive disease.    10/15/21 Echo:  · Mild mitral regurgitation.  · Normal systolic function.  · The estimated ejection fraction is 60%.  · Normal left ventricular diastolic function.  · Normal right ventricular size with normal right ventricular systolic function.  · Mild to moderate left atrial enlargement.  · Mild right atrial enlargement.  · Normal central venous pressure (3 mmHg).  · The estimated PA systolic pressure is 29 mmHg.

## 2021-11-14 NOTE — ED NOTES
Pt no longer responsive - Dr. Boyce will be contacted - family at bedside (wife wishes to hold off on chest compressions)

## 2021-11-14 NOTE — ED NOTES
Pt's HR began to decrease and pt became unresponsive. Wife decided to make pt DNR. Time of Death @ 1904 Pronounced by Dr. Boyce

## 2021-11-14 NOTE — HPI
"Mr. Ren is a 70yo man with a past medical history of ETOH abuse, CAD, COPD, DM2, HTN, ETOH pancreatitis, severe protein calorie malnutrition, prior duodenal ulcer (NSAID induced), and admission 5/2021 was for empyema and had peg tube placement at the time.  He is followed by Hepatology at [facility]. He has been drinking daily for over 20 years, and admits prior to May 2021 admission he was drinking a lot more than usual at least 4 scotch per day.    Hepatology last saw him on 10/27/21 for a liver lesion previously visualized 5/2021.  His AFP was negative, so a triple phase CT with repeat AFP was recommended.  This was done 10/28 and revealed that "since 2009, unchanged hypoenhancing left hepatic lobe lesion, probably a benign perfusional defect.  No suspicious liver lesion."  His case was referred to liver committee on 11//2/21, who noted, "Committee Discussion/Plan: Patient has nothing in liver that is concerning. CT scan shows a focal fatty infiltration since 2019. His lungs bases looks fibrotic and he has bad emphysema."    "

## 2021-11-14 NOTE — ED NOTES
[facility] contacted, spoke with Nevin Graham - pt is able to be released - no autopsy will be required

## 2021-11-14 NOTE — ED NOTES
ANDI contacted - pt considered for eye donation - screened by Katie Rivers - [phone] referral number

## 2021-11-15 LAB
BACTERIA BLD CULT: ABNORMAL
BACTERIA UR CULT: ABNORMAL

## 2021-11-16 ENCOUNTER — TELEPHONE (OUTPATIENT)
Dept: FAMILY MEDICINE | Facility: CLINIC | Age: 71
End: 2021-11-16
Payer: MEDICARE

## 2021-11-17 ENCOUNTER — TELEPHONE (OUTPATIENT)
Dept: FAMILY MEDICINE | Facility: CLINIC | Age: 71
End: 2021-11-17
Payer: MEDICARE

## 2022-07-15 NOTE — ED NOTES
Please asked patient to get with her insurance to figure out which neurologist will be covered then we will send a new referral to a place that is covered.   Cannot guarantee that she will be able to get in any faster than what she is currently scheduled for Pt and wife signed consent forms consenting to insertion of chest tube

## 2023-04-13 NOTE — INTERVAL H&P NOTE
The patient has been examined and the H&P has been reviewed:    I concur with the findings and no changes have occurred since H&P was written.    Anesthesia/Surgery risks, benefits and alternative options discussed and understood by patient/family.          Active Hospital Problems    Diagnosis  POA    Lumbar stenosis [M48.061]  Yes      Resolved Hospital Problems   No resolved problems to display.      11-Apr-2023

## 2023-12-06 NOTE — TELEPHONE ENCOUNTER
TERRY Lundberg - CNP [id number]   1/19/2024 11:30 AM Nathalie Espinosa  [facility] Patient has an appt for 6/23/2020 needs pre-screen covid test for ent scope

## 2025-02-12 NOTE — PROGRESS NOTES
"  Emergency Department Note      History of Present Illness     Chief Complaint   Melena      HPI   Riki Alvarez is a 89 year old male with history of hypertension, hyperlipidemia, hyperglycemia, who presents to the ED for evaluation of melena. Patient reports that he has been having dark stools for approximately 1 week and sudden urges to have bowel movements.  He states he has not had a bowel movement in over 24 hours.  He denies any bright red blood in the stool or abdominal pain.  He states that his biggest concern is the melena which has been persistent for about seven days.  He denies light headedness, shortness of breath, or other pain. He is currently not on any blood thinners. His niece reports that he has had recent leg swelling that has been persistent for the past few weeks, he was started on diuretics with improvement of the symptoms. He endorses that he doesn't take any pepto bismol, or iron supplements. He endorses that he lives alone.     Independent Historian   Niece supplements the history as noted above    Review of External Notes   I reviewed the nurse triage note from [facility] system today.    Past Medical History     Medical History and Problem List   Hyperlipidemia  Herpes  PSA  Nasal sinus dis NEC  Epistaxis  Schatzki's ring  Hiatal hernia  Anxiety   BPH  GERD  Hyperglycemia  Hypertension  Mild dementia  Nocturia  Osteoarthritis   Pleurisy  Herpesviral disease  Polyosteoarthritis  Dyspnea    Medications   Doxycycl   Lexapro  Lasix  Loratadine  Tolterodine  Desyrel  Aricept  Famvir  Pepcid  Cozaar  Toprol  Crestor  Flomax    Surgical History   Colonoscopy  Biopsy prostate  History fistulotomy  Targis tx of prostate    Physical Exam     Patient Vitals for the past 24 hrs:   BP Temp Temp src Pulse Resp SpO2 Height Weight   02/12/25 2135 (!) 183/82 -- -- 57 -- 98 % -- --   02/12/25 1614 (!) 173/77 98.5  F (36.9  C) Oral 71 16 95 % 1.778 m (5' 10\") 70.8 kg (156 lb)     Physical " "CC: Mr. Moe Ren arrives today for management of Type 2 DM and chronic medical conditions as noted in visit diagnosis.      HPI: Mr. Moe Ren was diagnosed with Type 2 sometime between 2009 and 2012. No hospitalizations for DM.     He has a h/o squamos cell oral CA, previously on primarily liquid diet, but is now tolerating solids well.   No longer Ensures for BF.   Meals generally at 10AM, 2PM, 6PM  Currently using Accu check Amanda expert meter and is carb counting.     Checks BG 6x/day. BG logs:  Fasting 88-110s  BF mostly 100-120s  VASHTI 130s-160s, 208, 216  DIN 66, 90s-173  HS 74, 86-190s-139  + outliers    Hypoglycemia: yes, felt "shaky" and treated with 1/2 can regular soda.   He eats extra at night to allay overnight lows.   Also has more lows with increased activity.    Exercise: no formal exercise    Continues to smoke 2 ppd    CURRENT DIABETIC MEDS: Tresiba 4 units AM, Humalog (uses Accu-Chek Amanda Expert)  MN: Target 120, ISF 50, I:C 13  11AM: Target 120, ISF 50, I:C 18  1600: Target 120, ISF 50, I:C 15  Missing doses of diabetes medications: No  Prandial Insulin Injections Taken with Meals: Yes    Uses Vials or Pens: pens  Type of Glucose Meter: Accu check Staci    Last Eye Exam: 04/2016  Last Podiatry Exam: 08/2016, +PN    REVIEW OF SYSTEMS  Personally reviewed past medical, social, and family history.     General: stable weight; no weakness,  fatigue, or fever.   Eyes: denies intermittent blurry vision.   Cardiac: no palpitations, chest pain, or edema.   Respiratory: no dyspnea, or wheezing; + smokers cough.   GI: no abdominal pain, heartburn, loss of appetite, or nausea.   Skin: no rashes, itching, dryness, or color changes.   Neuro: no mood changes, headaches, + numbness, tingling in toes.     Vital Signs  Personally reviewed the labs noted below.     /76 (BP Location: Left arm, Patient Position: Sitting)  Pulse 86  Ht 6' (1.829 m)  Wt 72.5 kg (159 lb 12.8 oz)  BMI 21.67 " Exam  General: Alert, interactive   Head:  Scalp is atraumatic  Eyes:  No scleral icterus  ENT:      Nose:  The external nose is normal  Ears:  External ears are normal  Mouth/Throat: Mucus membranes are moist       Neck:  Normal range of motion.      There is no rigidity.    Trachea is in the midline         CV:  Regular rate and rhythm    No murmur   Resp:  Breath sounds are clear bilaterally    Non-labored, no retractions or accessory muscle use      GI:  Abdomen is soft, no distension, no tenderness. Rectal exam non tender, no stool in the rectal vault.      MS:  Normal strength in all 4 extremities  Skin:  Warm and dry, No rash or lesions noted.  Neuro:   Strength 5/5 x4.   Psych: Awake. Alert.  Normal affect.      Appropriate interactions.    Diagnostics     Lab Results   Labs Ordered and Resulted from Time of ED Arrival to Time of ED Departure   COMPREHENSIVE METABOLIC PANEL - Abnormal       Result Value    Sodium 143      Potassium 4.1      Carbon Dioxide (CO2) 28      Anion Gap 9      Urea Nitrogen 13.9      Creatinine 0.93      GFR Estimate 78      Calcium 8.6 (*)     Chloride 106      Glucose 125 (*)     Alkaline Phosphatase 57      AST 19      ALT 10      Protein Total 6.4      Albumin 3.6      Bilirubin Total 0.8     CBC WITH PLATELETS AND DIFFERENTIAL - Abnormal    WBC Count 5.9      RBC Count 3.37 (*)     Hemoglobin 10.8 (*)     Hematocrit 33.0 (*)     MCV 98      MCH 32.0      MCHC 32.7      RDW 14.6      Platelet Count 194      % Neutrophils 73      % Lymphocytes 17      % Monocytes 7      % Eosinophils 2      % Basophils 1      % Immature Granulocytes 0      NRBCs per 100 WBC 0      Absolute Neutrophils 4.3      Absolute Lymphocytes 1.0      Absolute Monocytes 0.4      Absolute Eosinophils 0.1      Absolute Basophils 0.1      Absolute Immature Granulocytes 0.0      Absolute NRBCs 0.0     TROPONIN T, HIGH SENSITIVITY - Abnormal    Troponin T, High Sensitivity 40 (*)    TROPONIN T, HIGH SENSITIVITY -  kg/m2    Hemoglobin A1C   Date Value Ref Range Status   04/19/2017 7.4 (H) 4.5 - 6.2 % Final     Comment:     According to ADA guidelines, hemoglobin A1C <7.0% represents  optimal control in non-pregnant diabetic patients.  Different  metrics may apply to specific populations.   Standards of Medical Care in Diabetes - 2016.  For the purpose of screening for the presence of diabetes:  <5.7%     Consistent with the absence of diabetes  5.7-6.4%  Consistent with increasing risk for diabetes   (prediabetes)  >or=6.5%  Consistent with diabetes  Currently no consensus exists for use of hemoglobin A1C  for diagnosis of diabetes for children.     12/14/2016 7.2 (H) 4.5 - 6.2 % Final     Comment:     According to ADA guidelines, hemoglobin A1C <7.0% represents  optimal control in non-pregnant diabetic patients.  Different  metrics may apply to specific populations.   Standards of Medical Care in Diabetes - 2016.  For the purpose of screening for the presence of diabetes:  <5.7%     Consistent with the absence of diabetes  5.7-6.4%  Consistent with increasing risk for diabetes   (prediabetes)  >or=6.5%  Consistent with diabetes  Currently no consensus exists for use of hemoglobin A1C  for diagnosis of diabetes for children.     08/15/2016 7.6 (H) 4.5 - 6.2 % Final     Comment:     According to ADA guidelines, hemoglobin A1C <7.0% represents  optimal control in non-pregnant diabetic patients.  Different  metrics may apply to specific populations.   Standards of Medical Care in Diabetes - 2016.  For the purpose of screening for the presence of diabetes:  <5.7%     Consistent with the absence of diabetes  5.7-6.4%  Consistent with increasing risk for diabetes   (prediabetes)  >or=6.5%  Consistent with diabetes  Currently no consensus exists for use of hemoglobin A1C  for diagnosis of diabetes for children.         Chemistry        Component Value Date/Time     12/29/2016 1047    K 4.4 12/29/2016 1047     12/29/2016  1047    CO2 25 12/29/2016 1047    BUN 8 12/29/2016 1047    CREATININE 1.0 12/29/2016 1047     (H) 12/29/2016 1047        Component Value Date/Time    CALCIUM 9.0 12/29/2016 1047    ALKPHOS 78 12/29/2016 1047    AST 30 12/29/2016 1047    ALT 18 12/29/2016 1047    BILITOT 0.4 12/29/2016 1047          Lab Results   Component Value Date    CHOL 165 12/14/2016    CHOL 154 02/01/2016    CHOL 145 12/17/2015     Lab Results   Component Value Date    HDL 52 12/14/2016    HDL 47 02/01/2016    HDL 47 12/17/2015     Lab Results   Component Value Date    LDLCALC 49.0 (L) 12/14/2016    LDLCALC 61.2 (L) 02/01/2016    LDLCALC 51.6 (L) 12/17/2015     Lab Results   Component Value Date    TRIG 98 04/19/2017    TRIG 320 (H) 12/14/2016    TRIG 229 (H) 02/01/2016     Lab Results   Component Value Date    CHOLHDL 31.5 12/14/2016    CHOLHDL 30.5 02/01/2016    CHOLHDL 32.4 12/17/2015     Lab Results   Component Value Date    TSH 2.621 12/14/2016     Lab Results   Component Value Date    MICALBCREAT 13.2 08/22/2016     Vit D, 25-Hydroxy   Date Value Ref Range Status   02/01/2016 33 30 - 96 ng/mL Final     Comment:     Vitamin D deficiency.........<10 ng/mL                              Vitamin D insufficiency......10-29 ng/mL       Vitamin D sufficiency........> or equal to 30 ng/mL  Vitamin D toxicity............>100 ng/mL       PHYSICAL EXAMINATION  Constitutional: Appears well, no distress  Neck: Supple, trachea midline. No thryomegaly.  Respiratory: even and unlabored.  Cardiovascular: RRR  Lymph: no edema.   Skin: warm and dry; no rash, petechiae, ecchymosis, or acanthosis nigracans observed.  Neuro: CN 2-12 grossly intact; patient alert and cooperative.  Feet: wears appropriate shoes;    Protective Sensation (w/ 10 gram monofilament):  Right: Intact  Left: Intact    Visual Inspection:  Normal -  Bilateral    Pedal Pulses:   Right: Present  Left: Present    Posterior tibialis:   Right:Present  Left:  Abnormal    Troponin T, High Sensitivity 39 (*)    OCCULT BLOOD STOOL       Imaging   No orders to display     ECG  ECG results from 02/12/25   EKG 12-lead, tracing only     Value    Systolic Blood Pressure     Diastolic Blood Pressure     Ventricular Rate 56    Atrial Rate 56    DC Interval 216    QRS Duration 132        QTc 440    P Axis 57    R AXIS -50    T Axis 17    Interpretation ECG      Sinus bradycardia with 1st degree A-V block  Right bundle branch block  Left anterior fascicular block  ** Bifascicular block **  Minimal voltage criteria for LVH, may be normal variant ( R in aVL )  Abnormal ECG  When compared with ECG of 31-Dec-2024 16:13,  DC interval has increased    Interpreted by me at 2354         Independent Interpretation   None    ED Course      Medications Administered   Medications   donepezil (ARICEPT) tablet 5 mg (5 mg Oral $Given 2/12/25 2136)   escitalopram (LEXAPRO) tablet 5 mg (5 mg Oral $Given 2/12/25 2136)   furosemide (LASIX) half-tab 10 mg (has no administration in time range)   losartan (COZAAR) tablet 25 mg (has no administration in time range)   metoprolol succinate ER (TOPROL XL) 24 hr tablet 25 mg (has no administration in time range)   carboxymethylcellulose PF (REFRESH PLUS) 0.5 % ophthalmic solution 1 drop (has no administration in time range)   carboxymethylcellulose PF (REFRESH PLUS) 0.5 % ophthalmic solution 1 drop (has no administration in time range)   rosuvastatin (CRESTOR) tablet 10 mg (has no administration in time range)   tamsulosin (FLOMAX) capsule 0.4 mg (0.4 mg Oral $Given 2/12/25 2136)   tolterodine ER (DETROL LA) 24 hr capsule 2 mg (has no administration in time range)   traZODone (DESYREL) tablet 150 mg (has no administration in time range)   senna-docusate (SENOKOT-S/PERICOLACE) 8.6-50 MG per tablet 1 tablet (has no administration in time range)     Or   senna-docusate (SENOKOT-S/PERICOLACE) 8.6-50 MG per tablet 2 tablet (has no administration in time  Present    Assessment/Plan    1. Type II or unspecified type diabetes mellitus with neurological manifestations  - A1C, DM stable  - Continue to monitor BG AC/HS  - Continue Amanda meter with carb counting  - Trial without Tresiba given low dose, his concerns about overnight hypo, and hypo with increased activity  - Decrease target 115.   - Change ICR and times  MN 1:12  11AM 1:17  1600 1:14  - All ISF 50    - On Gabapentin for neuropathy and follows with Podiatry.     - On ACEi, statin     2. Spinal stenosis, lumbar region, with neurogenic claudication - limits physical activity. Sx is an option if tobacco use stops.      3. Smokes 2 packs of cigarettes per day - cessation encouraged.     4. Hypertriglyceridemia - On Lipitor. Triglycerides improved.        FOLLOW UP  Return in about 3 months    Orders Placed This Encounter   Procedures    Hemoglobin A1c     Standing Status:   Future     Standing Expiration Date:   6/25/2018      range)   ondansetron (ZOFRAN ODT) ODT tab 4 mg (has no administration in time range)     Or   ondansetron (ZOFRAN) injection 4 mg (has no administration in time range)   prochlorperazine (COMPAZINE) injection 5 mg (has no administration in time range)     Or   prochlorperazine (COMPAZINE) tablet 5 mg (has no administration in time range)   acetaminophen (TYLENOL) tablet 650 mg (has no administration in time range)     Or   acetaminophen (TYLENOL) Suppository 650 mg (has no administration in time range)   pantoprazole (PROTONIX) IV push injection 40 mg (40 mg Intravenous $Given 2/12/25 2136)   valACYclovir (VALTREX) tablet 1,000 mg (1,000 mg Oral $Given 2/12/25 2136)   fluorometholone (FML LIQUIFILM) 0.1 % ophthalmic susp 1 drop (has no administration in time range)   pantoprazole (PROTONIX) IV push injection 40 mg (has no administration in time range)       Procedures   Procedures     Discussion of Management   Admitting Hospitalist, Dr. Burleson    ED Course   ED Course as of 02/12/25 2333   Wed Feb 12, 2025   1802 I obtained history and examined the patient as noted above.    1910 I consulted with Dr. Burleson of the hospitalist service and discussed patient admission. They accepted care of the patient.          Additional Documentation  None    Medical Decision Making / Diagnosis     CMS Diagnoses: None    MIPS       None    MDM   Riki Alvarez is a 89 year old male presenting with melena, unfortunately there is no stool in the rectal vault on examination to perform testing.  His hemoglobin has dipped from his recent hospital stay.  Thankfully he is hemodynamically stable.  Given his age and medical comorbidities I believe he benefit from hospitalization, serial hemoglobins and possible GI consultation to discuss EGD.  Patient does have an elevated troponin but this is stable from his baseline, he has no chest pain to suggest acute coronary syndrome or more concerning illness.  He received a dose of a PPI here  and remained hemodynamically stable throughout his stay in the emergency department.    Disposition   The patient was admitted to the hospital.     Diagnosis     ICD-10-CM    1. Melena  K92.1       2. Anemia, unspecified type  D64.9       3. Elevated troponin  R79.89              Scribe Disclosure:  I, Yahaira Guolissett, am serving as a scribe at 9:01 PM on 2/12/2025 to document services personally performed by Ez Nugent, based on my observations and the provider's statements to me.        Ez Nugent MD  02/12/25 1069

## (undated) DEVICE — GAUZE SPONGE 4X4 12PLY

## (undated) DEVICE — TRAY FOLEY 16FR INFECTION CONT

## (undated) DEVICE — DRAPE C ARM 42 X 120 10/BX

## (undated) DEVICE — DURAPREP SURG SCRUB 26ML

## (undated) DEVICE — NDL PAK PEDICLE ACCESS TROCAR

## (undated) DEVICE — TEGADERM IV

## (undated) DEVICE — SUT MCRYL PLUS 4-0 PS2 27IN

## (undated) DEVICE — DRAPE INCISE IOBAN 2 23X17IN

## (undated) DEVICE — MARKER SKIN STND TIP BLUE BARR

## (undated) DEVICE — GWIRE SPINAL BLNT TIP 1.6X19
Type: IMPLANTABLE DEVICE | Site: SPINE LUMBAR | Status: NON-FUNCTIONAL
Removed: 2019-01-10

## (undated) DEVICE — BLADE ELECTRO EDGE INSULATED

## (undated) DEVICE — PACK SET UP CONVERTORS

## (undated) DEVICE — Device

## (undated) DEVICE — KIT ACCESS DILATOR SET PROBE

## (undated) DEVICE — DRESSING ADH FILM TELFA 2X3IN

## (undated) DEVICE — DRESSING AQUACEL SACRAL 8 X 7

## (undated) DEVICE — DRESSING SURGICAL 1/2X1/2

## (undated) DEVICE — SEE MEDLINE ITEM 156905

## (undated) DEVICE — DRESSING AQUACEL FOAM 5 X 5

## (undated) DEVICE — CARTRIDGE OIL

## (undated) DEVICE — DRAPE ABDOMINAL TIBURON 14X11

## (undated) DEVICE — CORD BIPOLAR 12 FOOT

## (undated) DEVICE — DRAPE OPMI STERILE

## (undated) DEVICE — DRESSING AQUACEL FOAM 3 X 3

## (undated) DEVICE — DRAPE STERI INSTRUMENT 1018

## (undated) DEVICE — TUBE FRAZIER 5MM 2FT SOFT TIP

## (undated) DEVICE — BUR BONE CUT MICRO TPS 3X3.8MM

## (undated) DEVICE — KIT GRAFTMAG GRAFT DELIVERY

## (undated) DEVICE — DRESSING TRANS 8X12 TEGADERM

## (undated) DEVICE — DIFFUSER

## (undated) DEVICE — ELECTRODE REM PLYHSV RETURN 9

## (undated) DEVICE — SUT D SPECIAL VICRYL 2-0

## (undated) DEVICE — SEE MEDLINE ITEM 157150